# Patient Record
Sex: FEMALE | Race: WHITE | NOT HISPANIC OR LATINO | Employment: FULL TIME | URBAN - METROPOLITAN AREA
[De-identification: names, ages, dates, MRNs, and addresses within clinical notes are randomized per-mention and may not be internally consistent; named-entity substitution may affect disease eponyms.]

---

## 2020-09-04 LAB — HEP C HIM: NORMAL

## 2021-01-18 LAB — HEP C HIM: NORMAL

## 2021-09-14 ENCOUNTER — LAB REQUISITION (OUTPATIENT)
Dept: LAB | Facility: CLINIC | Age: 31
End: 2021-09-14

## 2021-09-14 LAB — HBV SURFACE AB SERPL IA-ACNC: 106.74 M[IU]/ML

## 2021-09-14 PROCEDURE — 86735 MUMPS ANTIBODY: CPT | Performed by: INTERNAL MEDICINE

## 2021-09-14 PROCEDURE — 86787 VARICELLA-ZOSTER ANTIBODY: CPT | Performed by: INTERNAL MEDICINE

## 2021-09-14 PROCEDURE — 86765 RUBEOLA ANTIBODY: CPT | Performed by: INTERNAL MEDICINE

## 2021-09-14 PROCEDURE — 86706 HEP B SURFACE ANTIBODY: CPT | Performed by: INTERNAL MEDICINE

## 2021-09-14 PROCEDURE — 86762 RUBELLA ANTIBODY: CPT | Performed by: INTERNAL MEDICINE

## 2021-09-15 LAB
MEV IGG SER IA-ACNC: 136 AU/ML
MEV IGG SER IA-ACNC: POSITIVE
MUMPS ANTIBODY IGG INSTRUMENT VALUE: 76.5 AU/ML
MUV IGG SER QL IA: POSITIVE
RUBV IGG SERPL QL IA: 7.37 INDEX
RUBV IGG SERPL QL IA: POSITIVE
VZV IGG SER QL IA: 2203 INDEX
VZV IGG SER QL IA: POSITIVE

## 2021-11-22 ENCOUNTER — TRANSFERRED RECORDS (OUTPATIENT)
Dept: HEALTH INFORMATION MANAGEMENT | Facility: CLINIC | Age: 31
End: 2021-11-22

## 2022-01-17 ENCOUNTER — TRANSFERRED RECORDS (OUTPATIENT)
Dept: HEALTH INFORMATION MANAGEMENT | Facility: CLINIC | Age: 32
End: 2022-01-17

## 2022-01-17 LAB — HEP C HIM: NORMAL

## 2022-03-24 ENCOUNTER — TRANSFERRED RECORDS (OUTPATIENT)
Dept: HEALTH INFORMATION MANAGEMENT | Facility: CLINIC | Age: 32
End: 2022-03-24

## 2022-03-25 ENCOUNTER — TRANSFERRED RECORDS (OUTPATIENT)
Dept: HEALTH INFORMATION MANAGEMENT | Facility: CLINIC | Age: 32
End: 2022-03-25

## 2022-05-23 ENCOUNTER — TRANSFERRED RECORDS (OUTPATIENT)
Dept: HEALTH INFORMATION MANAGEMENT | Facility: CLINIC | Age: 32
End: 2022-05-23

## 2022-06-22 ENCOUNTER — TRANSFERRED RECORDS (OUTPATIENT)
Dept: HEALTH INFORMATION MANAGEMENT | Facility: CLINIC | Age: 32
End: 2022-06-22

## 2022-07-01 ENCOUNTER — TRANSFERRED RECORDS (OUTPATIENT)
Dept: HEALTH INFORMATION MANAGEMENT | Facility: CLINIC | Age: 32
End: 2022-07-01

## 2022-07-22 ENCOUNTER — REFERRAL (OUTPATIENT)
Dept: TRANSPLANT | Facility: CLINIC | Age: 32
End: 2022-07-22

## 2022-07-22 DIAGNOSIS — N18.6 ESRD (END STAGE RENAL DISEASE) (H): Primary | ICD-10-CM

## 2022-07-22 DIAGNOSIS — N18.6 END STAGE RENAL DISEASE (H): ICD-10-CM

## 2022-07-22 DIAGNOSIS — Z01.818 PRE-TRANSPLANT EVALUATION FOR KIDNEY TRANSPLANT: ICD-10-CM

## 2022-07-22 DIAGNOSIS — N12 INTERSTITIAL NEPHRITIS: ICD-10-CM

## 2022-07-22 NOTE — LETTER
Ira Zamora  [address] [facility] [address]  [place] [facility] [address]                September 27, 2022    Adrien Ira,     It was a pleasure to speak with you over the phone again today.  I am sending an email to review the pre transplant information we covered and also sending this same content in this letter via your My Chart for your convenience.     A  from our Office has already sent you a schedule for your pre kidney transplant evaluation day on September 29, 2022. Please do bring 1 family member or friend to this appointment day to help listen to the patient education and to help ask questions that are important to you. You can eat/ drink normally on this day. Also, do take all of your prescribed medications as ordered on this day. Upon completion of your appointments I will compile the outcomes and have your results reviewed at the Transplant Team Selection Committee on Wednesday, October 5, 2022  This is a medical review meeting only and so you will not be asked to attend. I will call you within a few days after this meeting to inform you of the outcomes and to assist in making arrangements for completion of your evaluation. I will also send you a summary letter after our telephone conversation.     You will receive an email from Flor in our Transplant Office today. This email contains a Receipt of Information Consent and patient education materials. Please follow the directions and electronically sign the consent as well as try to read as much of the materials as possible prior to your September 29, 2022 appointments.      Please complete your pre kidney transplant education on [facility] Transplant Place. This is an online website that our Transplant Program uses to house a lot of patient education for our Program.  To find My Transplant Place you can google search for it or click on this link : [email or web address].  Your first step is to register as a new user, then pick your settings:  adult/kidney/English. Next notice the education is divided into 3 sections: pre transplant/transplant/post transplant. In the pre transplant section please view pre kidney eval parts 1 and 2 video sets.  Please complete these videos prior to your appointments as this will give you good background to then speak with the providers.     Additional transplant resources are as follows:   www.UNOS.org  UNOS, or United Network of Organ Sharing, is the national organization in our Country that maintains all of the organ wait lists as well as is responsible for the rules and regulations about organ allocation. I would recommend looking at the Transplant Living section as this area is created just for patients.   www.SRTR.org  SRTR, or the Scientific Registry for Transplant Recipients is a national data base that all Transplant Centers report their success and failure rates to for all organ transplant types performed at their Center. The results are public knowledge and do provide a good perspective of organ transplant.     Once you have attended your kidney transplant evaluation appointments, you can have live donors register online with our Program to initiate their evaluations at [email or web address]. The donor will receive a detailed email response back with information and next steps specific to their situation. Donors can also call our Office and ask to speak with a live donor coordinator in the event of questions at [phone].     Please let me know of any questions or concerns!   Liberty Ruiz, RN, BSN  Pre Kidney Pancreas Transplant Coordinator   [facility]  Solid Organ Transplant Care   [address] [place] [address] [address]  [address] [address]  [email or web address]   Office: [phone] Direct Number: [phone]   Fax: [phone]  Employed by [facility]     CC's: Dr. Man Feldman, Dr. London Leroy, [facility]

## 2022-07-22 NOTE — LETTER
Ira Zamora  [address] [facility] [address]  [place] [address]          Dear Ira,    Thank you for your interest in the Transplant Center at [facility]. We look forward to being a part of your care team and assisting you through the transplant process.    As we discussed, your transplant coordinator is Liberty Ruiz (Kidney).  You may call your coordinator at any time with questions or concerns.  Your first scheduled call will be on 8/9/2022 between 8am and 12pm.  If this needs to change, call [phone].    Please complete the following.    1. Fill out and return the enclosed forms    Authorization for Electronic Communication    Authorization to Discuss Protected Health Information    Authorization for Release of Protected Health Information    2. Sign up for:    [facility], access to your electronic medical record (see enclosed pamphlet)    [email or web address], a transplant education website    You can use these tools to learn more about your transplant, communicate with your care team, and track your medical details      Sincerely,      Solid Organ Transplant  [facility]    cc: Referring Physician PCP

## 2022-07-26 ENCOUNTER — REFERRAL (OUTPATIENT)
Dept: TRANSPLANT | Facility: CLINIC | Age: 32
End: 2022-07-26

## 2022-07-26 VITALS — HEIGHT: 62 IN | BODY MASS INDEX: 27.6 KG/M2 | WEIGHT: 150 LBS

## 2022-07-26 NOTE — TELEPHONE ENCOUNTER
PCP: Dr London Leroy   Referring Provider: Self,  Nephrologist: Dr Man Feldman at [facility]  Referring Diagnosis: ESRD, Interstitial Nephritis    GFR/Date:     Is patient under the age of 65? No  Is patient diabetic? No  Is patient on insulin? No  Was patient offered a pancreas transplant referral? No    Is patient in a group home/assisted living? No  Does patient have a guardian? No    Referral intake process completed.  Patient is aware that after financial approval is received, medical records will be requested.   Patient confirmed for a callback from transplant coordinator on 8/9/2022. (within 2 weeks)  Tentative evaluation date 8/15/2022 (within 4 weeks) if appointment is virtual, does patient have capabilities of setting this up? Patient is requesting an in office visit.    Confirmed coordinator will discuss evaluation process in more detail at the time of their call.   Patient is aware of the need to arrange age appropriate cancer screening, vaccinations, and dental care.  Reminded patient to complete questionnaire, complete medical records release, and review packet prior to evaluation visit .  Assessed patient for special needs (ie-wheelchair, assistance, guardian, and ):  Yes   Patient instructed to call [phone] with questions.     Patient gave verbal consent during intake call to obtain medical records and documents outside of [facility]:  Yes

## 2022-07-27 ENCOUNTER — TRANSFERRED RECORDS (OUTPATIENT)
Dept: HEALTH INFORMATION MANAGEMENT | Facility: CLINIC | Age: 32
End: 2022-07-27

## 2022-07-27 LAB
ALT SERPL-CCNC: 10 IU/L (ref 0–32)
AST SERPL-CCNC: 15 IU/L (ref 0–40)

## 2022-08-08 ENCOUNTER — TRANSFERRED RECORDS (OUTPATIENT)
Dept: HEALTH INFORMATION MANAGEMENT | Facility: CLINIC | Age: 32
End: 2022-08-08

## 2022-08-09 NOTE — TELEPHONE ENCOUNTER
Reviewed chart for purpose of pre kidney transplant evaluation planning. Patient has ESRD on dialysis since 09/04/2020 per 2728 form. 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 Has followed with Dr. Man Feldman since 2010. Listed at [facility] before starting on dialysis - currently on INACTIVE status due to insurance out of network with plan to transfer time to new listing. Was listed at [facility] 12/04/2020 and removed 08/04/2022 due to insurance out of network. Diagnosed with Chron's diagnosed in 2004 - takes Michael, follows with [place] - requested clinic notes, upper endoscopy 08/08/2022 at [facility] in [place], colonoscopy at [facility] 03/24/2022 in [place]. Seen by cardiology 01/21/2020 for palpitations and remote mitral regurgitation by echo 2010. Echo done 02/04/2020 normal left ventricle size and function - EF 62%, mitral regurgitation was resolved. PAP 08/22/2022 negative, ASCUS with positive high risk HPV cervical 07/24/2017. No history of smoking, occasional alcohol, no recreational drug use. BMI about 27. 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 All okay to proceed with kidney transplant evaluation.       Contacted patient and introduced myself as their Transplant Coordinator, also introduced the role of the Transplant Coordinator in the transplant process.  Explained the purpose of this call including reviewing next steps and answering questions.  Pt explained she is very interested in listing at our Center and is intending to transfer wait time here. Explained her insur is out of network for [facility] and [facility]. Pt explained she thinks her interstitial nephritis is from taking omeprazole. Patient explained she was diagnosed with Chron's in 2004, has been in remission for some time now.   Confirmed Referring Provider, Dialysis Center, and Primary Care Physician. Notified patient of the importance of continued communication with referring providers  and primary care physicians.    Reviewed components of transplant evaluation process including necessary appointments, tests, and procedures.    Answered questions for patient regarding evaluation, provided my name and contact information and requested they call with any additional questions.    Determined that patient would like additional information regarding transplant by:     Drop Down choices: Mail, Email, Rocco, Phone Call   Encourage Rocco   Explained a  will send her a schedule for her PKE on 08/15/2022 via My Chart. Instructed to bring someone along to eval. Instructed to eat/drink normally. Instructed to take medications as prescribed. Explained she will receive an email from Flor in our Office with Receipt of Info and patient education materials - instructed to sign consent and read materials. Instructed on My Transplant Place website and to view pre kidney eval parts 1 and 2. Instructed on use of www.unos.org and www.srtr.org.  Patient expressed excellent understanding of all and was in good agreement with the plan.     Pre kidney transplant patient education email/ letter sent.

## 2022-08-10 ENCOUNTER — TRANSFERRED RECORDS (OUTPATIENT)
Dept: HEALTH INFORMATION MANAGEMENT | Facility: CLINIC | Age: 32
End: 2022-08-10

## 2022-08-10 NOTE — TELEPHONE ENCOUNTER
Patient called asking about start time for PKE on Mon, Aug 15, and she doesn't think that she would be here by 730am as she gets off dialysis at 730am.  She wants to reschedule her eval to a non dialysis date and confirmed for Thurs, Sept 29.

## 2022-08-15 ENCOUNTER — TRANSFERRED RECORDS (OUTPATIENT)
Dept: HEALTH INFORMATION MANAGEMENT | Facility: CLINIC | Age: 32
End: 2022-08-15

## 2022-09-27 NOTE — TELEPHONE ENCOUNTER
Called patient yesterday and LM asking her to call me and to confirm if she will be attending her pre K eval on 09/29/2022 or not.     Called patient again and spoke pt, she confirmed she will attend appts for pre kidney tx eval on Thurs Sept 29th. Explained a  will send her a schedule in her My Chart, Flor will send an email with Receipt of Info and other educational materials - instructed to sign consent/ read materials. Re-instructed pt today on use of My Transplant Place and to view pre kidney eval parts 1 and 2. Patient stating she has already visited www.unos.org and www.srtr.org websites and has found them to be very interesting. Pt aware to eat/ drink normally and take all medications as prescribed. Pt will attend PKE appts with her .   Pt expressed very understanding of all and was in good agreement with the plan.

## 2022-09-28 PROBLEM — N18.6 ESRD (END STAGE RENAL DISEASE) ON DIALYSIS (H): Status: ACTIVE | Noted: 2022-09-28

## 2022-09-28 PROBLEM — Z99.2 ESRD (END STAGE RENAL DISEASE) ON DIALYSIS (H): Status: ACTIVE | Noted: 2022-09-28

## 2022-09-28 PROBLEM — K50.90 CROHN'S DISEASE (H): Status: ACTIVE | Noted: 2022-09-28

## 2022-09-28 LAB
ABO/RH(D): NORMAL
ANTIBODY SCREEN: NEGATIVE
SPECIMEN EXPIRATION DATE: NORMAL

## 2022-09-29 ENCOUNTER — LAB (OUTPATIENT)
Dept: LAB | Facility: CLINIC | Age: 32
End: 2022-09-29
Payer: COMMERCIAL

## 2022-09-29 ENCOUNTER — OFFICE VISIT (OUTPATIENT)
Dept: TRANSPLANT | Facility: CLINIC | Age: 32
End: 2022-09-29
Attending: NURSE PRACTITIONER
Payer: COMMERCIAL

## 2022-09-29 ENCOUNTER — DOCUMENTATION ONLY (OUTPATIENT)
Dept: TRANSPLANT | Facility: CLINIC | Age: 32
End: 2022-09-29

## 2022-09-29 VITALS
HEART RATE: 72 BPM | SYSTOLIC BLOOD PRESSURE: 134 MMHG | HEIGHT: 63 IN | WEIGHT: 146.9 LBS | OXYGEN SATURATION: 95 % | BODY MASS INDEX: 26.03 KG/M2 | DIASTOLIC BLOOD PRESSURE: 89 MMHG

## 2022-09-29 DIAGNOSIS — Z01.818 PRE-TRANSPLANT EVALUATION FOR KIDNEY TRANSPLANT: ICD-10-CM

## 2022-09-29 DIAGNOSIS — N12 INTERSTITIAL NEPHRITIS: ICD-10-CM

## 2022-09-29 DIAGNOSIS — N18.6 ESRD (END STAGE RENAL DISEASE) (H): ICD-10-CM

## 2022-09-29 DIAGNOSIS — N18.6 END STAGE RENAL DISEASE (H): ICD-10-CM

## 2022-09-29 DIAGNOSIS — N05.8 IGM NEPHROPATHY: Primary | ICD-10-CM

## 2022-09-29 DIAGNOSIS — Z01.818 PRE-TRANSPLANT EVALUATION FOR KIDNEY TRANSPLANT: Primary | ICD-10-CM

## 2022-09-29 LAB
A1 AB TITR SERPL: >256 {TITER}
ABO/RH(D): NORMAL
ALBUMIN SERPL BCG-MCNC: 4.4 G/DL (ref 3.5–5.2)
ALBUMIN UR-MCNC: 100 MG/DL
ALP SERPL-CCNC: 75 U/L (ref 35–104)
ALT SERPL W P-5'-P-CCNC: 12 U/L (ref 10–35)
ANION GAP SERPL CALCULATED.3IONS-SCNC: 13 MMOL/L (ref 7–15)
ANTIBODY TITER IGM SCREEN: NEGATIVE
APPEARANCE UR: CLEAR
APTT PPP: 29 SECONDS (ref 22–38)
AST SERPL W P-5'-P-CCNC: 17 U/L (ref 10–35)
B IGG TITR SERPL: >256 {TITER}
BASOPHILS # BLD AUTO: 0 10E3/UL (ref 0–0.2)
BASOPHILS NFR BLD AUTO: 0 %
BILIRUB SERPL-MCNC: 0.4 MG/DL
BILIRUB UR QL STRIP: NEGATIVE
BUN SERPL-MCNC: 35.9 MG/DL (ref 6–20)
CALCIUM SERPL-MCNC: 10.1 MG/DL (ref 8.6–10)
CHLORIDE SERPL-SCNC: 97 MMOL/L (ref 98–107)
COLOR UR AUTO: YELLOW
CREAT SERPL-MCNC: 4.92 MG/DL (ref 0.51–0.95)
DEPRECATED HCO3 PLAS-SCNC: 28 MMOL/L (ref 22–29)
EOSINOPHIL # BLD AUTO: 0.2 10E3/UL (ref 0–0.7)
EOSINOPHIL NFR BLD AUTO: 3 %
ERYTHROCYTE [DISTWIDTH] IN BLOOD BY AUTOMATED COUNT: 12.6 % (ref 10–15)
FACTOR 2 INTERPRETATION: ABNORMAL
FACTOR V INTERPRETATION: ABNORMAL
GFR SERPL CREATININE-BSD FRML MDRD: 11 ML/MIN/1.73M2
GLUCOSE SERPL-MCNC: 79 MG/DL (ref 70–99)
GLUCOSE UR STRIP-MCNC: 100 MG/DL
HBV CORE AB SERPL QL IA: NONREACTIVE
HBV SURFACE AB SERPL IA-ACNC: 106.74 M[IU]/ML
HBV SURFACE AB SERPL IA-ACNC: REACTIVE M[IU]/ML
HBV SURFACE AG SERPL QL IA: NONREACTIVE
HCG SERPL QL: NEGATIVE
HCT VFR BLD AUTO: 35.6 % (ref 35–47)
HCV AB SERPL QL IA: NONREACTIVE
HGB BLD-MCNC: 11.6 G/DL (ref 11.7–15.7)
HGB UR QL STRIP: ABNORMAL
IMM GRANULOCYTES # BLD: 0 10E3/UL
IMM GRANULOCYTES NFR BLD: 0 %
INR PPP: 1.01 (ref 0.85–1.15)
KETONES UR STRIP-MCNC: NEGATIVE MG/DL
LAB DIRECTOR COMMENTS: ABNORMAL
LAB DIRECTOR DISCLAIMER: ABNORMAL
LAB DIRECTOR INTERPRETATION: ABNORMAL
LAB DIRECTOR METHODOLOGY: ABNORMAL
LAB DIRECTOR RESULTS: ABNORMAL
LEUKOCYTE ESTERASE UR QL STRIP: NEGATIVE
LYMPHOCYTES # BLD AUTO: 2 10E3/UL (ref 0.8–5.3)
LYMPHOCYTES NFR BLD AUTO: 28 %
MCH RBC QN AUTO: 30.2 PG (ref 26.5–33)
MCHC RBC AUTO-ENTMCNC: 32.6 G/DL (ref 31.5–36.5)
MCV RBC AUTO: 93 FL (ref 78–100)
MONOCYTES # BLD AUTO: 0.4 10E3/UL (ref 0–1.3)
MONOCYTES NFR BLD AUTO: 5 %
NEUTROPHILS # BLD AUTO: 4.5 10E3/UL (ref 1.6–8.3)
NEUTROPHILS NFR BLD AUTO: 64 %
NITRATE UR QL: NEGATIVE
NRBC # BLD AUTO: 0 10E3/UL
NRBC BLD AUTO-RTO: 0 /100
PH UR STRIP: 8.5 [PH] (ref 5–7)
PLATELET # BLD AUTO: 170 10E3/UL (ref 150–450)
POTASSIUM SERPL-SCNC: 4 MMOL/L (ref 3.4–5.3)
PROT SERPL-MCNC: 7.6 G/DL (ref 6.4–8.3)
RBC # BLD AUTO: 3.84 10E6/UL (ref 3.8–5.2)
RBC URINE: <1 /HPF
SODIUM SERPL-SCNC: 138 MMOL/L (ref 136–145)
SP GR UR STRIP: 1.01 (ref 1–1.03)
SPECIMEN DESCRIPTION: ABNORMAL
SPECIMEN EXPIRATION DATE: NORMAL
SPECIMEN EXPIRATION DATE: NORMAL
SQUAMOUS EPITHELIAL: 1 /HPF
T PALLIDUM AB SER QL: NONREACTIVE
UROBILINOGEN UR STRIP-MCNC: NORMAL MG/DL
WBC # BLD AUTO: 7.1 10E3/UL (ref 4–11)
WBC URINE: 1 /HPF

## 2022-09-29 PROCEDURE — 80053 COMPREHEN METABOLIC PANEL: CPT | Performed by: PATHOLOGY

## 2022-09-29 PROCEDURE — 86803 HEPATITIS C AB TEST: CPT | Mod: 90 | Performed by: PATHOLOGY

## 2022-09-29 PROCEDURE — 99000 SPECIMEN HANDLING OFFICE-LAB: CPT | Performed by: PATHOLOGY

## 2022-09-29 PROCEDURE — 85730 THROMBOPLASTIN TIME PARTIAL: CPT | Performed by: PATHOLOGY

## 2022-09-29 PROCEDURE — 86147 CARDIOLIPIN ANTIBODY EA IG: CPT | Mod: 90 | Performed by: PATHOLOGY

## 2022-09-29 PROCEDURE — 99207 PR NO CHARGE COORDINATED CARE PS: CPT

## 2022-09-29 PROCEDURE — 86850 RBC ANTIBODY SCREEN: CPT | Mod: 90 | Performed by: PATHOLOGY

## 2022-09-29 PROCEDURE — 99205 OFFICE O/P NEW HI 60 MIN: CPT

## 2022-09-29 PROCEDURE — 81001 URINALYSIS AUTO W/SCOPE: CPT | Performed by: PATHOLOGY

## 2022-09-29 PROCEDURE — 99204 OFFICE O/P NEW MOD 45 MIN: CPT | Performed by: SURGERY

## 2022-09-29 PROCEDURE — 86665 EPSTEIN-BARR CAPSID VCA: CPT | Mod: 90 | Performed by: PATHOLOGY

## 2022-09-29 PROCEDURE — 86787 VARICELLA-ZOSTER ANTIBODY: CPT | Mod: 90 | Performed by: PATHOLOGY

## 2022-09-29 PROCEDURE — 86901 BLOOD TYPING SEROLOGIC RH(D): CPT | Mod: 90 | Performed by: PATHOLOGY

## 2022-09-29 PROCEDURE — 93000 ELECTROCARDIOGRAM COMPLETE: CPT | Performed by: INTERNAL MEDICINE

## 2022-09-29 PROCEDURE — 86481 TB AG RESPONSE T-CELL SUSP: CPT | Mod: 90 | Performed by: PATHOLOGY

## 2022-09-29 PROCEDURE — 85025 COMPLETE CBC W/AUTO DIFF WBC: CPT | Performed by: PATHOLOGY

## 2022-09-29 PROCEDURE — 86704 HEP B CORE ANTIBODY TOTAL: CPT | Mod: 90 | Performed by: PATHOLOGY

## 2022-09-29 PROCEDURE — 85670 THROMBIN TIME PLASMA: CPT | Mod: 90 | Performed by: PATHOLOGY

## 2022-09-29 PROCEDURE — 86886 COOMBS TEST INDIRECT TITER: CPT | Mod: 90 | Performed by: PATHOLOGY

## 2022-09-29 PROCEDURE — 36415 COLL VENOUS BLD VENIPUNCTURE: CPT | Performed by: PATHOLOGY

## 2022-09-29 PROCEDURE — 86832 HLA CLASS I HIGH DEFIN QUAL: CPT | Performed by: PATHOLOGY

## 2022-09-29 PROCEDURE — 86780 TREPONEMA PALLIDUM: CPT | Mod: 90 | Performed by: PATHOLOGY

## 2022-09-29 PROCEDURE — G0452 MOLECULAR PATHOLOGY INTERPR: HCPCS | Mod: 26 | Performed by: STUDENT IN AN ORGANIZED HEALTH CARE EDUCATION/TRAINING PROGRAM

## 2022-09-29 PROCEDURE — 86900 BLOOD TYPING SEROLOGIC ABO: CPT | Mod: 90 | Performed by: PATHOLOGY

## 2022-09-29 PROCEDURE — 81378 HLA I & II TYPING HR: CPT | Performed by: PATHOLOGY

## 2022-09-29 PROCEDURE — 86644 CMV ANTIBODY: CPT | Mod: 90 | Performed by: PATHOLOGY

## 2022-09-29 PROCEDURE — 85390 FIBRINOLYSINS SCREEN I&R: CPT | Performed by: PATHOLOGY

## 2022-09-29 PROCEDURE — 81240 F2 GENE: CPT | Performed by: PHYSICIAN ASSISTANT

## 2022-09-29 PROCEDURE — 85613 RUSSELL VIPER VENOM DILUTED: CPT | Mod: 90 | Performed by: PATHOLOGY

## 2022-09-29 PROCEDURE — 87340 HEPATITIS B SURFACE AG IA: CPT | Mod: 90 | Performed by: PATHOLOGY

## 2022-09-29 PROCEDURE — 86706 HEP B SURFACE ANTIBODY: CPT | Mod: 90 | Performed by: PATHOLOGY

## 2022-09-29 PROCEDURE — 81382 HLA II TYPING 1 LOC HR: CPT | Mod: 59 | Performed by: PATHOLOGY

## 2022-09-29 PROCEDURE — 85610 PROTHROMBIN TIME: CPT | Performed by: PATHOLOGY

## 2022-09-29 PROCEDURE — 84703 CHORIONIC GONADOTROPIN ASSAY: CPT | Performed by: PATHOLOGY

## 2022-09-29 PROCEDURE — 86833 HLA CLASS II HIGH DEFIN QUAL: CPT | Performed by: PATHOLOGY

## 2022-09-29 RX ORDER — ROPINIROLE 0.25 MG/1
TABLET, FILM COATED ORAL
Status: ON HOLD | COMMUNITY
Start: 2022-09-16 | End: 2024-04-17

## 2022-09-29 RX ORDER — ACETAMINOPHEN 500 MG
1000 TABLET ORAL EVERY 6 HOURS PRN
COMMUNITY
Start: 2020-12-07

## 2022-09-29 RX ORDER — ONDANSETRON 4 MG/1
TABLET, ORALLY DISINTEGRATING ORAL
Status: ON HOLD | COMMUNITY
Start: 2022-07-29 | End: 2024-04-21

## 2022-09-29 RX ORDER — FAMOTIDINE 40 MG/1
40 TABLET, FILM COATED ORAL
Status: ON HOLD | COMMUNITY
Start: 2022-08-08 | End: 2024-04-17

## 2022-09-29 RX ORDER — USTEKINUMAB 90 MG/ML
90 INJECTION, SOLUTION SUBCUTANEOUS
COMMUNITY
Start: 2021-09-28 | End: 2024-09-03

## 2022-09-29 RX ORDER — PROCHLORPERAZINE MALEATE 5 MG
5 TABLET ORAL
Status: ON HOLD | COMMUNITY
Start: 2020-11-16 | End: 2024-04-17

## 2022-09-29 RX ORDER — DOXAZOSIN 8 MG/1
1 TABLET ORAL AT BEDTIME
Status: ON HOLD | COMMUNITY
Start: 2021-12-10 | End: 2024-04-21

## 2022-09-29 RX ORDER — CALCIUM ACETATE 667 MG/1
CAPSULE ORAL
Status: ON HOLD | COMMUNITY
Start: 2021-01-22 | End: 2024-04-17

## 2022-09-29 RX ORDER — PAROXETINE 30 MG/1
30 TABLET, FILM COATED ORAL EVERY EVENING
COMMUNITY
Start: 2022-09-16

## 2022-09-29 NOTE — PROGRESS NOTES
Transplant Surgery Consult Note     Medical record number: [id number]  YOB: 1990,   Consult requested  for evaluation of kidney transplant candidacy.    Assessment and Recommendations:Ms. Zamora appears to be a good candidate for kidney transplantation and has a good understanding of the risks and benefits of this approach to the management of renal failure. The following issues should be addressed prior to finalizing her transplant candidacy:     Transplant order: Ms. Zamora has End stage renal failure due to interstitial nephritis whose condition is not expected to resolve, is expected to progress, and is expected to continue to develop related comorbid conditions.  Recommend he be considered as a candidate for kidney transplant.  Cardiology consult for cardiac risk stratification to be ordered: Yes. Will need comment on aortic root dilation  CT abdomen and pelvis without contrast to be ordered for assessment of vascular targets: No  Transplant listing labs ordered to include HLA, ABOx2, Cr, etc.  Dietician consult ordered: Yes  Social work consult ordered: Yes  Imaging reports reviewed:  None available  Radiology images reviewed: none available  Recipient suitable to move forward with work up of living donors:  Yes   Cardiology    The majority of our visit was spent in counselling, discussing the medical and surgical risks of kidney transplantation. We discussed approximate wait time and how that is influenced by issues such as blood type and sensitization (PRA) and access to a living donor. I contrasted potential waiting time for living vs  donor kidneys from  normal (0-85%) or higher (%) kidney donor profile index (KDPI) donors and their associated outcomes. I would not recommend this individual to consider kidneys from high KDPI donors. The reason for this decision is best summarized as: improved long term graft survival. Potential surgical complications of kidney transplantation  include bleeding, superficial or deep wound complications (infection, hernia, lymphocele), ureteral anastomotic failure (leak or stenosis), graft thrombosis, need for reoperation and other issues such as cardiac complications, pneumonia, deep venous thrombosis, pulmonary embolism, post transplant diabetes and death. The potential for recurrent disease or need for retransplantation was also addressed. We discussed the possible need for ureteral stent (and subsequent removal), and the utility of protocol biopsy and laboratory studies to evaluate for rejection or recurrent disease. We discussed the risk of graft rejection, our center's average graft and patient survival rates, immunosuppression protocols, as well as the potential opportunity to participate in clinical trials.  We also discussed the average length of stay, recovery process, and posttransplant lab and monitoring protocol.  I emphasized the need for strict immunosuppression medication adherence and the potential for complications of immunosuppression such as skin cancer or lymphoma, as well as a very low but not zero risk of donor-derived disease transmission risks (infection, cancer). Ms. aZmora asked good questions and her candidacy will be reviewed at our Multidisciplinary Selection Committee. Thank you for the opportunity to participate in Ms. Zamora's care.      Total time: 60 minutes        Glenis Vann MD FACS  Assistant Professor of Surgery  Director, Living Kidney Donor Program.    ---------------------------------------------------------------------------------------------------    HPI: Ms. Zamora has End stage renal failure due to interstitial nephritis. The patient is non-diabetic.     Crohn's in remission on Sterara without many episodes of diarrhea    The patient is on dialysis.    Has potential kidney donors:  Doesn't know .  Interested in participation in paired exchange if a donor is willing: Doesn't know     The patient has the  following pertinent history:       No    Yes  Dialysis:    []      [x] via:   R AVF    Blood Transfusion                  []      [x]  Number of units:   Most recently: 2010  Pregnancy:    [x]      [] Number:       Previous Transplant:  [x]      [] Details:    Cancer    [x]      [] Comment:   Kidney stones   [x]      [] Comment:      Recurrent infections  [x]      []  Type:                  Bladder dysfunction  [x]      [] Cause:    Claudication   [x]      [] Distance:    Previous Amputation  [x]      [] Cause:     Chronic anticoagulation  [x]      [] Indication:   Restorationism  [x]      []     PSHx:  fistula     Past Medical History:   Diagnosis Date     Crohn's disease (H)     diagnosed in 2004     ESRD (end stage renal disease) on dialysis (H)      History of blood transfusion      Hypertension      Tubulointerstitial nephropathy 11/09/2011    kidney biopsy 11/09/2011 - Acute and  chronic tubulointerstitial nephropathy.     No past surgical history on file.  No family history on file.  Social History     Socioeconomic History     Marital status:      Spouse name: Not on file     Number of children: Not on file     Years of education: Not on file     Highest education level: Not on file   Occupational History     Not on file   Tobacco Use     Smoking status: Never Smoker     Smokeless tobacco: Never Used   Substance and Sexual Activity     Alcohol use: Yes     Comment: Very occasional, last drink two months ago, wine cooler     Drug use: Never     Sexual activity: Not on file   Other Topics Concern     Not on file   Social History Narrative     Not on file     Social Determinants of Health     Financial Resource Strain: Not on file   Food Insecurity: Not on file   Transportation Needs: Not on file   Physical Activity: Not on file   Stress: Not on file   Social Connections: Not on file   Intimate Partner Violence: Not on file   Housing Stability: Not on file       ROS:   CONSTITUTIONAL:  No fevers or  chills  EYES: negative for icterus  ENT:  negative for hearing loss, tinnitus and sore throat  RESPIRATORY:  negative for cough, sputum, dyspnea  CARDIOVASCULAR:  negative for chest pain Fatigue  GASTROINTESTINAL:  negative for nausea, vomiting, diarrhea or constipation  GENITOURINARY:  negative for incontinence, dysuria, bladder emptying problems  HEME:  No easy bruising  INTEGUMENT:  negative for rash and pruritus  NEURO:  Negative for headache, seizure disorder  Allergies:   No Known Allergies  Medications:  Prescription Medications as of 2022       Rx Number Disp Refills Start End Last Dispensed Date Next Fill Date Owning Pharmacy    acetaminophen (TYLENOL) 500 MG tablet    2020        Sig: Take 1,000 mg by mouth    Class: Historical    Route: Oral    calcium acetate (PHOSLO) 667 MG CAPS capsule    2021        Class: Historical    Route: Oral    cholecalciferol 50 MCG ( UT) tablet            Sig: Take 50 mcg by mouth daily    Class: Historical    Route: Oral    doxazosin (CARDURA) 8 MG tablet    12/10/2021        Sig: Take 1 tablet by mouth 2 times daily    Class: Historical    Route: Oral    famotidine (PEPCID) 40 MG tablet    2022        Sig: Take 40 mg by mouth    Class: Historical    Route: Oral    iron sucrose (VENOFER) 20 MG/ML injection    10/8/2021 2023       Si mg    Class: Historical    labetalol (NORMODYNE) 100 MG tablet    3/22/2021        Sig: Take 1 tablet by mouth twice a day at 8am and 8pm    Class: Historical    levonorgestrel (KYLEENA) 19.5 MG IUD    2021        Si Device by Intrauterine route    Class: Historical    Route: Intrauterine    omeprazole (PRILOSEC) 20 MG DR capsule            Sig: Take 20 mg by mouth daily    Class: Historical    Route: Oral    ondansetron (ZOFRAN ODT) 4 MG ODT tab    2022        Class: Historical    Route: Sublingual    PARoxetine (PAXIL) 30 MG tablet    2022        Sig: Take 30 mg by mouth    Class: Historical     Route: Oral    prochlorperazine (COMPAZINE) 5 MG tablet    11/16/2020        Sig: Take 5 mg by mouth    Class: Historical    Route: Oral    rOPINIRole (REQUIP) 0.25 MG tablet    9/16/2022        Sig: TAKE ONE TABLET BY MOUTH AT BEDTIME FOR RESTLESS LEG    Class: Historical    ustekinumab (STELARA) 90 MG/ML    9/28/2021        Sig: Inject 90 mg Subcutaneous    Class: Historical    Route: Subcutaneous        Exam:     There were no vitals taken for this visit.  Appearance: in no apparent distress.   Skin: normal  Eyes:  no redness or discharge.  Sclera anicteric  Head and Neck: Normal, no rashes or jaundice  Respiratory: easy respirations, no audible wheezing.  Abdomen: rounded, No distention   Extremeties: femoral 3+/3+, Edema, none  Neuro: without deficit   Psychiatric: Normal mood and affect    Diagnostics:   No results found for this or any previous visit (from the past 672 hour(s)).  No results found for: CPRA

## 2022-09-29 NOTE — LETTER
9/29/2022         RE: Ira Zamora  [address]  [address]  [facility] [address]        Dear Colleague,    Thank you for referring your patient, Ira Zamora, to the [facility]. Please see a copy of my visit note below.    TRANSPLANT NEPHROLOGY RECIPIENT EVALUATION NOTE    Assessment and Plan:  # Kidney Transplant Evaluation: Patient is a good candidate overall. Benefits of a living donor transplant were discussed.    # ESKD: doing OK on dialysis since September 2020, but would likely benefit from a kidney transplant. She has been listed at [facility], but recently had insurance changes, and so is here to transfer her waiting time. Will review results of both native kidney biopsies with the multidisciplinary committee.     # Cardiac Risk: Feb 2020 ECHO showing EF 62%, dilated ascending aorta 3.8 cm and dilated aortic sinus 3.6 cm. Due for repeat ECHO. No other known history of cardiac disease or events. She is asymptomatic at a low level of exertion.     # Crohn's: followed by [facility]. Does occasionally alternate between diarrhea and constipation, but overall feels symptoms are well controlled on ustekinumab.     # Health Maintenance: PAP: Up to date and Dental: Up to date    Discussed the risks and benefits of a transplant, including the risk of surgery and immunosuppression medications.  Patient's overall evaluation will be discussed in the Transplant Program's regular meeting with a final recommendation on the patients suitability for transplant to be made at that time.    Pending completion of the full evaluation, patient presently appears to be enough of an acceptable kidney transplant recipient candidate to have any potential kidney donors start the evaluation process.      Evaluation:  Ira Zamora was seen in consultation at the request of Dr. Glenis Vann for evaluation as a potential kidney transplant recipient.    Reason for Visit:  Ira Zamora is a 32 year old female with ESKD who  presents for kidney transplant evaluation.    History of Present Illness:  Ms. Zamora is a 32-year-old female with Crohn's since age 14 currently on ustekinumab, ESKD on dialysis since September 2020.         Kidney Disease Hx:   Native kidney biopsy December 2010 showing:  FINAL DIAGNOSIS   KIDNEY, SIDE NOT SPECIFIED, NEEDLE BIOPSIES (VIALS A, B, AND C):   - Acute tubular interstitial nephritis associated with few eosinophils most   consistent with hypersensitivity reaction.   - Mild vascular sclerosis associated with mild interstitial fibrosis.    She was treated with prednisone (which was also being used for Crohn's treatment) and discontinuation of PPI.     Second native kidney biopsy in October 2011 showing:  DIAGNOSIS (EVALUATED AT [facility])   KIDNEY, LEFT, NEEDLE BIOPSY (VIALS A, B AND C):   - Diffuse chronic (active) tubulointerstitial nephritis   - Focal sclerosing immune complex mediated glomerulopathy (see report and     see comment) consistent with focal sclerosing IgM nephropathy   - Marked interstitial fibrosis, global glomerular sclerosis (50%), mild     arteriosclerosis (vial B), and mild arteriolosclerosis      Has been on HD since September 2020, which is going OK. Still has [employer]ERSONF working OK. Listed at [facility] pre-HD, but inactive due to insurance changes. Now here to transfer waiting time.       Primary Nephrologist: Dr. Feldman       H/o Kidney Stones: No       H/o Recurrent/Frequent UTI: No         Diabetic Hx: None           Cardiac/Vascular Disease Risk Factors:        - Dilated ascending aorta (3.8 cm) and aortic sinus (3.6 cm) on 2020 ECHO. No other known history of cardiac disease or events.         Viral Serology Status       CMV IgG Antibody: Negative       EBV IgG Antibody: Positive         Volume Status/Weight:        Volume status: Euvolemic       Weight:  Acceptable BMI       BMI: Body mass index is 26.44 kg/m .         Functional Capacity/Frailty:        No exercise, but  no physical limitations. Can walk several blocks and go up and down stairs. No chest pain, SOB, or claudication.    Fatigue/Decreased Energy: [x] No [] Yes    Chest Pain or SOB with Exertion: [x] No [] Yes    Significant Weight Change: [x] No [] Yes    Nausea, Vomiting or Diarrhea: [x] No [] Yes    Fever, Sweats or Chills:  [x] No [] Yes    Leg Swelling [x] No [] Yes      Allergy Testing Questions:   Medication that caused a reaction None   Antibiotics used that didn't give an allergic reaction?  None    COVID Vaccination Up To Date: Yes    Potential Living Kidney Donors: No    Review of Systems:  A comprehensive review of systems was obtained and negative, except as noted in the HPI or PMH.    Past Medical History:   Medical record was reviewed and PMH was discussed with patient and noted below.  Past Medical History:   Diagnosis Date     Anemia in chronic kidney disease      Crohn's disease (H)     diagnosed in 2004     ESRD (end stage renal disease) on dialysis (H)      History of blood transfusion      Hypertension      Secondary renal hyperparathyroidism (H)      Tubulointerstitial nephropathy 11/09/2011    kidney biopsy 11/09/2011 - Acute and  chronic tubulointerstitial nephropathy.       Past Social History:   Past Surgical History:   Procedure Laterality Date     H NEEDLE GUIDE,  KIDNEY BIOPSY       Personal history of bleeding or anesthesia problems: No    Family History:  Family History   Problem Relation Age of Onset     Endometriosis Mother      Coronary Artery Disease Mother        Personal History:   Social History     Socioeconomic History     Marital status:      Spouse name: Not on file     Number of children: Not on file     Years of education: Not on file     Highest education level: Not on file   Occupational History     Not on file   Tobacco Use     Smoking status: Never Smoker     Smokeless tobacco: Never Used   Substance and Sexual Activity     Alcohol use: Yes     Comment: Very  "occasional, last drink two months ago, wine cooler     Drug use: Never     Sexual activity: Not on file   Other Topics Concern     Not on file   Social History Narrative     Not on file     Social Determinants of Health     Financial Resource Strain: Not on file   Food Insecurity: Not on file   Transportation Needs: Not on file   Physical Activity: Not on file   Stress: Not on file   Social Connections: Not on file   Intimate Partner Violence: Not on file   Housing Stability: Not on file       Allergies:  No Known Allergies    Medications:  Current Outpatient Medications   Medication Sig     acetaminophen (TYLENOL) 500 MG tablet Take 1,000 mg by mouth     calcium acetate (PHOSLO) 667 MG CAPS capsule      cholecalciferol 50 MCG (2000 UT) tablet Take 50 mcg by mouth daily     doxazosin (CARDURA) 8 MG tablet Take 1 tablet by mouth 2 times daily     famotidine (PEPCID) 40 MG tablet Take 40 mg by mouth     iron sucrose (VENOFER) 20 MG/ML injection 50 mg     labetalol (NORMODYNE) 100 MG tablet Take 1 tablet by mouth twice a day at 8am and 8pm     levonorgestrel (KYLEENA) 19.5 MG IUD 1 Device by Intrauterine route     omeprazole (PRILOSEC) 20 MG DR capsule Take 20 mg by mouth daily     ondansetron (ZOFRAN ODT) 4 MG ODT tab      PARoxetine (PAXIL) 30 MG tablet Take 30 mg by mouth     prochlorperazine (COMPAZINE) 5 MG tablet Take 5 mg by mouth     rOPINIRole (REQUIP) 0.25 MG tablet TAKE ONE TABLET BY MOUTH AT BEDTIME FOR RESTLESS LEG     ustekinumab (STELARA) 90 MG/ML Inject 90 mg Subcutaneous     No current facility-administered medications for this visit.       Vitals:  /89   Pulse 72   Ht 1.588 m (5' 2.5\")   Wt 66.6 kg (146 lb 14.4 oz)   SpO2 95%   BMI 26.44 kg/m      Exam:  GENERAL APPEARANCE: alert and no distress  HENT: mouth without ulcers or lesions  LYMPHATICS: no cervical or supraclavicular nodes  RESP: lungs clear to auscultation - no rales, rhonchi or wheezes  CV: regular rhythm, normal rate, no rub, " no murmur  EDEMA: no LE edema bilaterally  ABDOMEN: soft, nondistended, nontender, bowel sounds normal  MS: extremities normal - no gross deformities noted, no evidence of inflammation in joints, no muscle tenderness  SKIN: no rash    Results:   Recent Results (from the past 336 hour(s))   EKG 12-lead, tracing only [EKG1]    Collection Time: 09/29/22  9:47 AM   Result Value Ref Range    Systolic Blood Pressure  mmHg    Diastolic Blood Pressure  mmHg    Ventricular Rate 63 BPM    Atrial Rate 63 BPM    WV Interval 156 ms    QRS Duration 80 ms     ms    QTc 454 ms    P Axis 22 degrees    R AXIS -6 degrees    T Axis 27 degrees    Interpretation ECG       Sinus rhythm  Left ventricular hypertrophy by voltage  Otherwise normal ECG  No previous ECGs available  Confirmed by fellow Boy West ([id number]) on 9/29/2022 1:56:38 PM  Confirmed by MD QUICK DAVID ([id number]) on 9/30/2022 10:04:22 AM     HCG qualitative [CNO427]    Collection Time: 09/29/22 11:40 AM   Result Value Ref Range    hCG Serum Qualitative Negative Negative   Varicella Zoster Virus Antibody IgG [[id number]]    Collection Time: 09/29/22 11:40 AM   Result Value Ref Range    VZV Marielle IgG Instrument Value 1,687.0 <135.0 Index    Varicella Zoster Antibody IgG Positive    Treponema Abs w Reflex to RPR and Titer [[id number]]    Collection Time: 09/29/22 11:40 AM   Result Value Ref Range    Treponema Antibody Total Nonreactive Nonreactive   HIV Antigen Antibody Combo Pretransplant    Collection Time: 09/29/22 11:40 AM   Result Value Ref Range    HIV Antigen Antibody Combo Pretransplant Nonreactive Nonreactive   Hepatitis C antibody [[id number]]    Collection Time: 09/29/22 11:40 AM   Result Value Ref Range    Hepatitis C Antibody Nonreactive Nonreactive   Hepatitis B surface antigen [[id number]]    Collection Time: 09/29/22 11:40 AM   Result Value Ref Range    Hepatitis B Surface Antigen Nonreactive Nonreactive   Hepatitis B Surface Antibody [[id number]]    Collection Time: 09/29/22  11:40 AM   Result Value Ref Range    Hepatitis B Surface Antibody Instrument Value 106.74 <8.00 m[IU]/mL    Hepatitis B Surface Antibody Reactive    Hepatitis B core antibody [[id number]]    Collection Time: 09/29/22 11:40 AM   Result Value Ref Range    Hepatitis B Core Antibody Total Nonreactive Nonreactive   EBV Capsid Antibody IgG [[id number]]    Collection Time: 09/29/22 11:40 AM   Result Value Ref Range    EBV Capsid Marielle IgG Instrument Value >750.0 (H) <18.0 U/mL    EBV Capsid Antibody IgG Positive (A) No detectable antibody.   CMV Antibody IgG [[id number]]    Collection Time: 09/29/22 11:40 AM   Result Value Ref Range    CMV Marielle IgG Instrument Value <0.20 <0.60 U/mL    CMV Antibody IgG No detectable antibody. No detectable antibody.    Thrombin time [DTQ798]    Collection Time: 09/29/22 11:40 AM   Result Value Ref Range    Thrombin Time 17.1 13.0 - 19.0 Seconds   Partial thromboplastin time [LAB56]    Collection Time: 09/29/22 11:40 AM   Result Value Ref Range    aPTT 29 22 - 38 Seconds   Lupus Anticoagulant Panel [[id number]]    Collection Time: 09/29/22 11:40 AM   Result Value Ref Range    PTT Ratio 1.08 <1.21    DRVVT Screen Ratio 1.04 <1.08    Lupus Result Negative Negative    Lupus Interpretation       The INR is normal.  APTT ratio is normal.    DRVVT Screen ratio is normal.  Thrombin time is normal.  NEGATIVE TEST; A LUPUS ANTICOAGULANT WAS NOT DETECTED IN THIS SPECIMEN WITHIN THE LIMITS OF THE TESTING REPERTOIRE.  If the clinical picture is strongly suggestive of an antiphospholipid syndrome, recommend anticardiolipin and beta-2-glycoprotein (IgG and IgM) antibody tests.    Nadja Nayak MD, PhD  [facility]       INR [[id number]]    Collection Time: 09/29/22 11:40 AM   Result Value Ref Range    INR 1.01 0.85 - 1.15   Cardiolipin Marielle IgG and IgM [LAB 6836]    Collection Time: 09/29/22 11:40 AM   Result Value Ref Range    Cardiolipin Marielle IgG Instrument Value 2.9 <10.0 GPL-U/mL    Cardiolipin Antibody IgG  Negative Negative    Cardiolipin Marielle IgM Instrument Value <2.0 <10.0 MPL-U/mL    Cardiolipin Antibody IgM Negative Negative   Comprehensive metabolic panel [LAB17]    Collection Time: 09/29/22 11:40 AM   Result Value Ref Range    Sodium 138 136 - 145 mmol/L    Potassium 4.0 3.4 - 5.3 mmol/L    Chloride 97 (L) 98 - 107 mmol/L    Carbon Dioxide (CO2) 28 22 - 29 mmol/L    Anion Gap 13 7 - 15 mmol/L    Urea Nitrogen 35.9 (H) 6.0 - 20.0 mg/dL    Creatinine 4.92 (H) 0.51 - 0.95 mg/dL    Calcium 10.1 (H) 8.6 - 10.0 mg/dL    Glucose 79 70 - 99 mg/dL    Alkaline Phosphatase 75 35 - 104 U/L    AST 17 10 - 35 U/L    ALT 12 10 - 35 U/L    Protein Total 7.6 6.4 - 8.3 g/dL    Albumin 4.4 3.5 - 5.2 g/dL    Bilirubin Total 0.4 <=1.2 mg/dL    GFR Estimate 11 (L) >60 mL/min/1.73m2   Antibody titer red cell [[id number]]    Collection Time: 09/29/22 11:40 AM   Result Value Ref Range    Anti-A1 IgG Titer >256     Anti-B IgG Titer >256     SPECIMEN EXPIRATION DATE [id number]     ANTIBODY TITER IGM SCREEN Negative    Quantiferon TB Gold Plus Grey Tube    Collection Time: 09/29/22 11:40 AM    Specimen: Arm, Left; Blood   Result Value Ref Range    Quantiferon Nil Tube 0.03 IU/mL   Quantiferon TB Gold Plus Green Tube    Collection Time: 09/29/22 11:40 AM    Specimen: Arm, Left; Blood   Result Value Ref Range    Quantiferon TB1 Tube 0.03 IU/mL   Quantiferon TB Gold Plus Yellow Tube    Collection Time: 09/29/22 11:40 AM    Specimen: Arm, Left; Blood   Result Value Ref Range    Quantiferon TB2 Tube 0.03    Quantiferon TB Gold Plus Purple Tube    Collection Time: 09/29/22 11:40 AM    Specimen: Arm, Left; Blood   Result Value Ref Range    Quantiferon Mitogen 4.51 IU/mL   CBC with platelets and differential    Collection Time: 09/29/22 11:40 AM   Result Value Ref Range    WBC Count 7.1 4.0 - 11.0 10e3/uL    RBC Count 3.84 3.80 - 5.20 10e6/uL    Hemoglobin 11.6 (L) 11.7 - 15.7 g/dL    Hematocrit 35.6 35.0 - 47.0 %    MCV 93 78 - 100 fL    MCH 30.2  26.5 - 33.0 pg    MCHC 32.6 31.5 - 36.5 g/dL    RDW 12.6 10.0 - 15.0 %    Platelet Count 170 150 - 450 10e3/uL    % Neutrophils 64 %    % Lymphocytes 28 %    % Monocytes 5 %    % Eosinophils 3 %    % Basophils 0 %    % Immature Granulocytes 0 %    NRBCs per 100 WBC 0 <1 /100    Absolute Neutrophils 4.5 1.6 - 8.3 10e3/uL    Absolute Lymphocytes 2.0 0.8 - 5.3 10e3/uL    Absolute Monocytes 0.4 0.0 - 1.3 10e3/uL    Absolute Eosinophils 0.2 0.0 - 0.7 10e3/uL    Absolute Basophils 0.0 0.0 - 0.2 10e3/uL    Absolute Immature Granulocytes 0.0 <=0.4 10e3/uL    Absolute NRBCs 0.0 10e3/uL   Adult Type and Screen    Collection Time: 09/29/22 11:40 AM   Result Value Ref Range    ABO/RH(D) O POS     Antibody Screen Negative Negative    SPECIMEN EXPIRATION DATE [id number]    Quantiferon TB Gold Plus    Collection Time: 09/29/22 11:40 AM    Specimen: Arm, Left; Blood   Result Value Ref Range    Quantiferon-TB Gold Plus Negative Negative    TB1 Ag minus Nil Value 0.00 IU/mL    TB2 Ag minus Nil Value 0.00 IU/mL    Mitogen minus Nil Result 4.48 IU/mL    Nil Result 0.03 IU/mL   HLA Halley Class I, Single Antigen    Collection Time: 09/29/22 11:40 AM   Result Value Ref Range    SA 1 TEST METHOD SA FCS     SA 1 CELL Class I     SA1 HI RISK HALLEY None     SA1 MOD RISK HALLEY None     SA 1  COMMENTS        Test performed by modified procedure. Serum heat inactivated and tested by a modified (Schaller) protocol including fetal calf serum addition. High-risk, mfi >3,000. Mod-risk, mfi 500-3,000.   HLA Halley, CPRA    Collection Time: 09/29/22 11:40 AM   Result Value Ref Range    PROTOCOL CUTOFF Plan A, 500 mfi cumulative      UNOS CPRA 0     UNACCEPTABLE ANTIGENS None    ABO and Rh    Collection Time: 09/29/22 11:44 AM   Result Value Ref Range    ABO/RH(D) O POS     SPECIMEN EXPIRATION DATE [id number]    UA with Microscopic reflex to Culture    Collection Time: 09/29/22 11:53 AM    Specimen: Urine, Midstream   Result Value Ref Range    Color Urine  Yellow Colorless, Straw, Light Yellow, Yellow    Appearance Urine Clear Clear    Glucose Urine 100  (A) Negative mg/dL    Bilirubin Urine Negative Negative    Ketones Urine Negative Negative mg/dL    Specific Gravity Urine 1.010 1.003 - 1.035    Blood Urine Trace (A) Negative    pH Urine 8.5 (H) 5.0 - 7.0    Protein Albumin Urine 100  (A) Negative mg/dL    Urobilinogen Urine Normal Normal, 2.0 mg/dL    Nitrite Urine Negative Negative    Leukocyte Esterase Urine Negative Negative    RBC Urine <1 <=2 /HPF    WBC Urine 1 <=5 /HPF    Squamous Epithelials Urine 1 <=1 /HPF             Again, thank you for allowing me to participate in the care of your patient.        Sincerely,        KANNAN

## 2022-09-29 NOTE — LETTER
Date:October 4, 2022      Patient was self referred, no letter generated. Do not send.        [facility] Health Information

## 2022-09-29 NOTE — PROGRESS NOTES
Kidney Transplant Referral - 7/22/2022  Patient attended appointments accompanied by   Patient completed AM appointments with all [facility] providers.  Time and location of PM appointments reviewed with patient.  Patient instructed next contact from Transplant Coordinator will be following Selection Committee  Patient stated understanding  Patient states Receipt of Information for Organ Transplant Recipient form signed and returned via [employer] form signed and faxed to HIM  Patient stated she watched [facility] Pre Kidney Transplant videos 1&2.      Summary    Team s concerns/comments:   1) Cardiac risk assessment  2) PAD assessment  3) Crohn's  4) Health maintenance    Candidacy category: Green    Action/Plan:   1) EKG today. Echocardiogrm ordered  2) Imaging per Surgery  3) Followed by [facility]. Stable  4) UTD  Expected Selection Meeting Discussion: 10/5/22

## 2022-09-29 NOTE — LETTER
2022         RE: Ira Zamora  [address]  [address]  [facility] [address]        Dear Colleague,    Thank you for referring your patient, Ira Zamora, to the [facility]. Please see a copy of my visit note below.    Transplant Surgery Consult Note     Medical record number: [id number]  YOB: 1990,   Consult requested  for evaluation of kidney transplant candidacy.    Assessment and Recommendations:Ms. Zamora appears to be a good candidate for kidney transplantation and has a good understanding of the risks and benefits of this approach to the management of renal failure. The following issues should be addressed prior to finalizing her transplant candidacy:     Transplant order: Ms. Zamora has End stage renal failure due to interstitial nephritis whose condition is not expected to resolve, is expected to progress, and is expected to continue to develop related comorbid conditions.  Recommend he be considered as a candidate for kidney transplant.  Cardiology consult for cardiac risk stratification to be ordered: Yes. Will need comment on aortic root dilation  CT abdomen and pelvis without contrast to be ordered for assessment of vascular targets: No  Transplant listing labs ordered to include HLA, ABOx2, Cr, etc.  Dietician consult ordered: Yes  Social work consult ordered: Yes  Imaging reports reviewed:  None available  Radiology images reviewed: none available  Recipient suitable to move forward with work up of living donors:  Yes   Cardiology    The majority of our visit was spent in counselling, discussing the medical and surgical risks of kidney transplantation. We discussed approximate wait time and how that is influenced by issues such as blood type and sensitization (PRA) and access to a living donor. I contrasted potential waiting time for living vs  donor kidneys from  normal (0-85%) or higher (%) kidney donor profile index (KDPI) donors and their  associated outcomes. I would not recommend this individual to consider kidneys from high KDPI donors. The reason for this decision is best summarized as: improved long term graft survival. Potential surgical complications of kidney transplantation include bleeding, superficial or deep wound complications (infection, hernia, lymphocele), ureteral anastomotic failure (leak or stenosis), graft thrombosis, need for reoperation and other issues such as cardiac complications, pneumonia, deep venous thrombosis, pulmonary embolism, post transplant diabetes and death. The potential for recurrent disease or need for retransplantation was also addressed. We discussed the possible need for ureteral stent (and subsequent removal), and the utility of protocol biopsy and laboratory studies to evaluate for rejection or recurrent disease. We discussed the risk of graft rejection, our center's average graft and patient survival rates, immunosuppression protocols, as well as the potential opportunity to participate in clinical trials.  We also discussed the average length of stay, recovery process, and posttransplant lab and monitoring protocol.  I emphasized the need for strict immunosuppression medication adherence and the potential for complications of immunosuppression such as skin cancer or lymphoma, as well as a very low but not zero risk of donor-derived disease transmission risks (infection, cancer). Ms. Zamora asked good questions and her candidacy will be reviewed at our Multidisciplinary Selection Committee. Thank you for the opportunity to participate in Ms. Zamora's care.      Total time: 60 minutes        Glenis Vann MD FACS  Assistant Professor of Surgery  Director, Living Kidney Donor Program.    ---------------------------------------------------------------------------------------------------    HPI: Ms. Zamora has End stage renal failure due to interstitial nephritis. The patient is non-diabetic.     Crohn's  in remission on Sterara without many episodes of diarrhea    The patient is on dialysis.    Has potential kidney donors:  Doesn't know .  Interested in participation in paired exchange if a donor is willing: Doesn't know     The patient has the following pertinent history:       No    Yes  Dialysis:    []      [x] via:   R AVF    Blood Transfusion                  []      [x]  Number of units:   Most recently: 2010  Pregnancy:    [x]      [] Number:       Previous Transplant:  [x]      [] Details:    Cancer    [x]      [] Comment:   Kidney stones   [x]      [] Comment:      Recurrent infections  [x]      []  Type:                  Bladder dysfunction  [x]      [] Cause:    Claudication   [x]      [] Distance:    Previous Amputation  [x]      [] Cause:     Chronic anticoagulation  [x]      [] Indication:   Religious  [x]      []     PSHx:  fistula     Past Medical History:   Diagnosis Date     Crohn's disease (H)     diagnosed in 2004     ESRD (end stage renal disease) on dialysis (H)      History of blood transfusion      Hypertension      Tubulointerstitial nephropathy 11/09/2011    kidney biopsy 11/09/2011 - Acute and  chronic tubulointerstitial nephropathy.     No past surgical history on file.  No family history on file.  Social History     Socioeconomic History     Marital status:      Spouse name: Not on file     Number of children: Not on file     Years of education: Not on file     Highest education level: Not on file   Occupational History     Not on file   Tobacco Use     Smoking status: Never Smoker     Smokeless tobacco: Never Used   Substance and Sexual Activity     Alcohol use: Yes     Comment: Very occasional, last drink two months ago, wine cooler     Drug use: Never     Sexual activity: Not on file   Other Topics Concern     Not on file   Social History Narrative     Not on file     Social Determinants of Health     Financial Resource Strain: Not on file   Food Insecurity: Not on file    Transportation Needs: Not on file   Physical Activity: Not on file   Stress: Not on file   Social Connections: Not on file   Intimate Partner Violence: Not on file   Housing Stability: Not on file       ROS:   CONSTITUTIONAL:  No fevers or chills  EYES: negative for icterus  ENT:  negative for hearing loss, tinnitus and sore throat  RESPIRATORY:  negative for cough, sputum, dyspnea  CARDIOVASCULAR:  negative for chest pain Fatigue  GASTROINTESTINAL:  negative for nausea, vomiting, diarrhea or constipation  GENITOURINARY:  negative for incontinence, dysuria, bladder emptying problems  HEME:  No easy bruising  INTEGUMENT:  negative for rash and pruritus  NEURO:  Negative for headache, seizure disorder  Allergies:   No Known Allergies  Medications:  Prescription Medications as of 2022       Rx Number Disp Refills Start End Last Dispensed Date Next Fill Date Owning Pharmacy    acetaminophen (TYLENOL) 500 MG tablet    2020        Sig: Take 1,000 mg by mouth    Class: Historical    Route: Oral    calcium acetate (PHOSLO) 667 MG CAPS capsule    2021        Class: Historical    Route: Oral    cholecalciferol 50 MCG (2000 UT) tablet            Sig: Take 50 mcg by mouth daily    Class: Historical    Route: Oral    doxazosin (CARDURA) 8 MG tablet    12/10/2021        Sig: Take 1 tablet by mouth 2 times daily    Class: Historical    Route: Oral    famotidine (PEPCID) 40 MG tablet    2022        Sig: Take 40 mg by mouth    Class: Historical    Route: Oral    iron sucrose (VENOFER) 20 MG/ML injection    10/8/2021 2023       Si mg    Class: Historical    labetalol (NORMODYNE) 100 MG tablet    3/22/2021        Sig: Take 1 tablet by mouth twice a day at 8am and 8pm    Class: Historical    levonorgestrel (KYLEENA) 19.5 MG IUD    2021        Si Device by Intrauterine route    Class: Historical    Route: Intrauterine    omeprazole (PRILOSEC) 20 MG DR capsule            Sig: Take 20 mg by mouth  daily    Class: Historical    Route: Oral    ondansetron (ZOFRAN ODT) 4 MG ODT tab    7/29/2022        Class: Historical    Route: Sublingual    PARoxetine (PAXIL) 30 MG tablet    9/16/2022        Sig: Take 30 mg by mouth    Class: Historical    Route: Oral    prochlorperazine (COMPAZINE) 5 MG tablet    11/16/2020        Sig: Take 5 mg by mouth    Class: Historical    Route: Oral    rOPINIRole (REQUIP) 0.25 MG tablet    9/16/2022        Sig: TAKE ONE TABLET BY MOUTH AT BEDTIME FOR RESTLESS LEG    Class: Historical    ustekinumab (STELARA) 90 MG/ML    9/28/2021        Sig: Inject 90 mg Subcutaneous    Class: Historical    Route: Subcutaneous        Exam:     There were no vitals taken for this visit.  Appearance: in no apparent distress.   Skin: normal  Eyes:  no redness or discharge.  Sclera anicteric  Head and Neck: Normal, no rashes or jaundice  Respiratory: easy respirations, no audible wheezing.  Abdomen: rounded, No distention   Extremeties: femoral 3+/3+, Edema, none  Neuro: without deficit   Psychiatric: Normal mood and affect    Diagnostics:   No results found for this or any previous visit (from the past 672 hour(s)).  No results found for: [facility]        Again, thank you for allowing me to participate in the care of your patient.      Sincerely,    No name on file

## 2022-09-29 NOTE — PROGRESS NOTES
[facility]plant  Outpatient MNT: Kidney Transplant Evaluation    Current BMI: 26.4 (HT 62.5 in,  lbs/67 kg)  BMI is within recommendation of <35 for kidney transplant    Frailty Assessment -- Not Frail (1/5 points)- low activity level       Time Spent: 15 minutes  Visit Type: Initial   Referring Physician: Soo Morris accompanied by: her      History of previous txp: no   Dialysis: yes     Dialysis Info: 10/2020 HD 450am   Protein supplement: no     Nutrition Assessment  - Appetite: good  - Food allergies/intolerances: no   - Meal prep & grocery shopping:    - Previous RD education: yes  - Issues chewing or swallowing: no   - N/V/D/C: D & C alternates d/t Crohn's   - Food access concerns: no     Vitamins, Supplements, Pertinent Meds: phoslo (takes consistently), vit D   Herbal Medicines/Supplements: none     Edema: none     Weight hx: stable     Diet Recall  Breakfast Cereal w/ 2% or eggos (HD days)    Lunch Spaghetti leftovers from [employer]   Dinner Steak w/ green beans & cottage cheese; usually has a protein every night   Snacks Chips, string cheese    Beverages Water, milk (8-16 oz/day), energy drink, pop (strawberry soda)- 1-2/day (not daily)   Alcohol Once every 3 months    Dining out 3x/week- moved into own place in March/not living w/ parents now so they find they dine out more frequently     Physical Activity  No routine at baseline; no limitations      Labs  9/19 Phos 8.4   K 4.3     Nutrition Diagnosis  No nutrition diagnosis identified at this time     Nutrition Intervention  Nutrition education provided:  Discussed sodium intake (low sodium foods and drinks, seasoning food without salt and tips for low sodium diet).  Reviewed high Phos and to consider switching to almond milk, reducing processed food/frequency of dining out d/t likely contributing to phosphorus.     Reviewed post txp diet guidelines in brief (will review in further detail post  txp):  (1) Review of proper food safety measures d/t immunosuppressant therapy post-op and increased risk for food-borne illness    (2) Avoid the following post txp d/t risk for rejection, unknown effects on the organs, and/or potential interactions with immunosuppressants:  - Herbal, Chinese, holistic, chiropractic, natural, alternative medicines and supplements  - Detoxes and cleanses  - Weight loss pills  - Protein powders or other products with extracts or herbs (ie green tea extract)    (3) Med regimen and possible side effects    Patient Understanding: Pt verbalized understanding of education provided.  Expected Engagement: Good  Follow-Up Plans: PRN     Nutrition Goals  No nutrition goals identified at this time     Jael Madden, RD, LD, CCTD

## 2022-09-29 NOTE — PROGRESS NOTES
TRANSPLANT NEPHROLOGY RECIPIENT EVALUATION NOTE    Assessment and Plan:  # Kidney Transplant Evaluation: Patient is a good candidate overall. Benefits of a living donor transplant were discussed.    # ESKD: doing OK on dialysis since September 2020, but would likely benefit from a kidney transplant. She has been listed at [facility], but recently had insurance changes, and so is here to transfer her waiting time. Will review results of both native kidney biopsies with the multidisciplinary committee.     # Cardiac Risk: Feb 2020 ECHO showing EF 62%, dilated ascending aorta 3.8 cm and dilated aortic sinus 3.6 cm. Due for repeat ECHO. No other known history of cardiac disease or events. She is asymptomatic at a low level of exertion.     # Crohn's: followed by [facility]. Does occasionally alternate between diarrhea and constipation, but overall feels symptoms are well controlled on ustekinumab.     # Health Maintenance: PAP: Up to date and Dental: Up to date    Discussed the risks and benefits of a transplant, including the risk of surgery and immunosuppression medications.  Patient's overall evaluation will be discussed in the Transplant Program's regular meeting with a final recommendation on the patients suitability for transplant to be made at that time.    Pending completion of the full evaluation, patient presently appears to be enough of an acceptable kidney transplant recipient candidate to have any potential kidney donors start the evaluation process.      Evaluation:  Ira Zamora was seen in consultation at the request of Dr. Glenis Vann for evaluation as a potential kidney transplant recipient.    Reason for Visit:  Ira Zamora is a 32 year old female with ESKD who presents for kidney transplant evaluation.    History of Present Illness:  Ms. Zamora is a 32-year-old female with Crohn's since age 14 currently on ustekinumab, ESKD on dialysis since September 2020.         Kidney Disease Hx:   Native kidney biopsy  December 2010 showing:  FINAL DIAGNOSIS   KIDNEY, SIDE NOT SPECIFIED, NEEDLE BIOPSIES (VIALS A, B, AND C):   - Acute tubular interstitial nephritis associated with few eosinophils most   consistent with hypersensitivity reaction.   - Mild vascular sclerosis associated with mild interstitial fibrosis.    She was treated with prednisone (which was also being used for Crohn's treatment) and discontinuation of PPI.     Second native kidney biopsy in October 2011 showing:  DIAGNOSIS (EVALUATED AT [facility])   KIDNEY, LEFT, NEEDLE BIOPSY (VIALS A, B AND C):   - Diffuse chronic (active) tubulointerstitial nephritis   - Focal sclerosing immune complex mediated glomerulopathy (see report and     see comment) consistent with focal sclerosing IgM nephropathy   - Marked interstitial fibrosis, global glomerular sclerosis (50%), mild     arteriosclerosis (vial B), and mild arteriolosclerosis      Has been on HD since September 2020, which is going OK. Still has [employer] [place] working OK. Listed at [facility] pre-HD, but inactive due to insurance changes. Now here to transfer waiting time.       Primary Nephrologist: Dr. Feldman       H/o Kidney Stones: No       H/o Recurrent/Frequent UTI: No         Diabetic Hx: None           Cardiac/Vascular Disease Risk Factors:        - Dilated ascending aorta (3.8 cm) and aortic sinus (3.6 cm) on 2020 ECHO. No other known history of cardiac disease or events.         Viral Serology Status       CMV IgG Antibody: Negative       EBV IgG Antibody: Positive         Volume Status/Weight:        Volume status: Euvolemic       Weight:  Acceptable BMI       BMI: Body mass index is 26.44 kg/m .         Functional Capacity/Frailty:        No exercise, but no physical limitations. Can walk several blocks and go up and down stairs. No chest pain, SOB, or claudication.    Fatigue/Decreased Energy: [x] No [] Yes    Chest Pain or SOB with Exertion: [x] No [] Yes    Significant Weight Change: [x] No []  Yes    Nausea, Vomiting or Diarrhea: [x] No [] Yes    Fever, Sweats or Chills:  [x] No [] Yes    Leg Swelling [x] No [] Yes      Allergy Testing Questions:   Medication that caused a reaction None   Antibiotics used that didn't give an allergic reaction?  None    COVID Vaccination Up To Date: Yes    Potential Living Kidney Donors: No    Review of Systems:  A comprehensive review of systems was obtained and negative, except as noted in the HPI or PMH.    Past Medical History:   Medical record was reviewed and PMH was discussed with patient and noted below.  Past Medical History:   Diagnosis Date     Anemia in chronic kidney disease      Crohn's disease (H)     diagnosed in 2004     ESRD (end stage renal disease) on dialysis (H)      History of blood transfusion      Hypertension      Secondary renal hyperparathyroidism (H)      Tubulointerstitial nephropathy 11/09/2011    kidney biopsy 11/09/2011 - Acute and  chronic tubulointerstitial nephropathy.       Past Social History:   Past Surgical History:   Procedure Laterality Date     H NEEDLE GUIDE,  KIDNEY BIOPSY       Personal history of bleeding or anesthesia problems: No    Family History:  Family History   Problem Relation Age of Onset     Endometriosis Mother      Coronary Artery Disease Mother        Personal History:   Social History     Socioeconomic History     Marital status:      Spouse name: Not on file     Number of children: Not on file     Years of education: Not on file     Highest education level: Not on file   Occupational History     Not on file   Tobacco Use     Smoking status: Never Smoker     Smokeless tobacco: Never Used   Substance and Sexual Activity     Alcohol use: Yes     Comment: Very occasional, last drink two months ago, wine cooler     Drug use: Never     Sexual activity: Not on file   Other Topics Concern     Not on file   Social History Narrative     Not on file     Social Determinants of Health     Financial Resource Strain:  "Not on file   Food Insecurity: Not on file   Transportation Needs: Not on file   Physical Activity: Not on file   Stress: Not on file   Social Connections: Not on file   Intimate Partner Violence: Not on file   Housing Stability: Not on file       Allergies:  No Known Allergies    Medications:  Current Outpatient Medications   Medication Sig     acetaminophen (TYLENOL) 500 MG tablet Take 1,000 mg by mouth     calcium acetate (PHOSLO) 667 MG CAPS capsule      cholecalciferol 50 MCG (2000 UT) tablet Take 50 mcg by mouth daily     doxazosin (CARDURA) 8 MG tablet Take 1 tablet by mouth 2 times daily     famotidine (PEPCID) 40 MG tablet Take 40 mg by mouth     iron sucrose (VENOFER) 20 MG/ML injection 50 mg     labetalol (NORMODYNE) 100 MG tablet Take 1 tablet by mouth twice a day at 8am and 8pm     levonorgestrel (KYLEENA) 19.5 MG IUD 1 Device by Intrauterine route     omeprazole (PRILOSEC) 20 MG DR capsule Take 20 mg by mouth daily     ondansetron (ZOFRAN ODT) 4 MG ODT tab      PARoxetine (PAXIL) 30 MG tablet Take 30 mg by mouth     prochlorperazine (COMPAZINE) 5 MG tablet Take 5 mg by mouth     rOPINIRole (REQUIP) 0.25 MG tablet TAKE ONE TABLET BY MOUTH AT BEDTIME FOR RESTLESS LEG     ustekinumab (STELARA) 90 MG/ML Inject 90 mg Subcutaneous     No current facility-administered medications for this visit.       Vitals:  /89   Pulse 72   Ht 1.588 m (5' 2.5\")   Wt 66.6 kg (146 lb 14.4 oz)   SpO2 95%   BMI 26.44 kg/m      Exam:  GENERAL APPEARANCE: alert and no distress  HENT: mouth without ulcers or lesions  LYMPHATICS: no cervical or supraclavicular nodes  RESP: lungs clear to auscultation - no rales, rhonchi or wheezes  CV: regular rhythm, normal rate, no rub, no murmur  EDEMA: no LE edema bilaterally  ABDOMEN: soft, nondistended, nontender, bowel sounds normal  MS: extremities normal - no gross deformities noted, no evidence of inflammation in joints, no muscle tenderness  SKIN: no rash    Results:   Recent " Results (from the past 336 hour(s))   EKG 12-lead, tracing only [EKG1]    Collection Time: 09/29/22  9:47 AM   Result Value Ref Range    Systolic Blood Pressure  mmHg    Diastolic Blood Pressure  mmHg    Ventricular Rate 63 BPM    Atrial Rate 63 BPM    NY Interval 156 ms    QRS Duration 80 ms     ms    QTc 454 ms    P Axis 22 degrees    R AXIS -6 degrees    T Axis 27 degrees    Interpretation ECG       Sinus rhythm  Left ventricular hypertrophy by voltage  Otherwise normal ECG  No previous ECGs available  Confirmed by fellow Boy West ([id number]) on 9/29/2022 1:56:38 PM  Confirmed by MD QUICK DAVID [id number]) on 9/30/2022 10:04:22 AM     HCG qualitative [YKU391]    Collection Time: 09/29/22 11:40 AM   Result Value Ref Range    hCG Serum Qualitative Negative Negative   Varicella Zoster Virus Antibody IgG [[id number]]    Collection Time: 09/29/22 11:40 AM   Result Value Ref Range    VZV Marielle IgG Instrument Value 1,687.0 <135.0 Index    Varicella Zoster Antibody IgG Positive    Treponema Abs w Reflex to RPR and Titer [MYS3163]    Collection Time: 09/29/22 11:40 AM   Result Value Ref Range    Treponema Antibody Total Nonreactive Nonreactive   HIV Antigen Antibody Combo Pretransplant    Collection Time: 09/29/22 11:40 AM   Result Value Ref Range    HIV Antigen Antibody Combo Pretransplant Nonreactive Nonreactive   Hepatitis C antibody [LTP127]    Collection Time: 09/29/22 11:40 AM   Result Value Ref Range    Hepatitis C Antibody Nonreactive Nonreactive   Hepatitis B surface antigen [KKW044]    Collection Time: 09/29/22 11:40 AM   Result Value Ref Range    Hepatitis B Surface Antigen Nonreactive Nonreactive   Hepatitis B Surface Antibody [[id number]]    Collection Time: 09/29/22 11:40 AM   Result Value Ref Range    Hepatitis B Surface Antibody Instrument Value 106.74 <8.00 m[IU]/mL    Hepatitis B Surface Antibody Reactive    Hepatitis B core antibody [[id number]]    Collection Time: 09/29/22 11:40 AM   Result Value Ref Range     Hepatitis B Core Antibody Total Nonreactive Nonreactive   EBV Capsid Antibody IgG [[id number]]    Collection Time: 09/29/22 11:40 AM   Result Value Ref Range    EBV Capsid Marielle IgG Instrument Value >750.0 (H) <18.0 U/mL    EBV Capsid Antibody IgG Positive (A) No detectable antibody.   CMV Antibody IgG [[id number]]    Collection Time: 09/29/22 11:40 AM   Result Value Ref Range    CMV Marielle IgG Instrument Value <0.20 <0.60 U/mL    CMV Antibody IgG No detectable antibody. No detectable antibody.    Thrombin time [XYH304]    Collection Time: 09/29/22 11:40 AM   Result Value Ref Range    Thrombin Time 17.1 13.0 - 19.0 Seconds   Partial thromboplastin time [LAB56]    Collection Time: 09/29/22 11:40 AM   Result Value Ref Range    aPTT 29 22 - 38 Seconds   Lupus Anticoagulant Panel [[id number]]    Collection Time: 09/29/22 11:40 AM   Result Value Ref Range    PTT Ratio 1.08 <1.21    DRVVT Screen Ratio 1.04 <1.08    Lupus Result Negative Negative    Lupus Interpretation       The INR is normal.  APTT ratio is normal.    DRVVT Screen ratio is normal.  Thrombin time is normal.  NEGATIVE TEST; A LUPUS ANTICOAGULANT WAS NOT DETECTED IN THIS SPECIMEN WITHIN THE LIMITS OF THE TESTING REPERTOIRE.  If the clinical picture is strongly suggestive of an antiphospholipid syndrome, recommend anticardiolipin and beta-2-glycoprotein (IgG and IgM) antibody tests.    Nadja Nayak MD, PhD  [facility]       INR [[id number]]    Collection Time: 09/29/22 11:40 AM   Result Value Ref Range    INR 1.01 0.85 - 1.15   Cardiolipin Marielle IgG and IgM [LAB 6836]    Collection Time: 09/29/22 11:40 AM   Result Value Ref Range    Cardiolipin Marielle IgG Instrument Value 2.9 <10.0 GPL-U/mL    Cardiolipin Antibody IgG Negative Negative    Cardiolipin Marielle IgM Instrument Value <2.0 <10.0 MPL-U/mL    Cardiolipin Antibody IgM Negative Negative   Comprehensive metabolic panel [LAB17]    Collection Time: 09/29/22 11:40 AM   Result Value Ref Range    Sodium 138 136 - 145  mmol/L    Potassium 4.0 3.4 - 5.3 mmol/L    Chloride 97 (L) 98 - 107 mmol/L    Carbon Dioxide (CO2) 28 22 - 29 mmol/L    Anion Gap 13 7 - 15 mmol/L    Urea Nitrogen 35.9 (H) 6.0 - 20.0 mg/dL    Creatinine 4.92 (H) 0.51 - 0.95 mg/dL    Calcium 10.1 (H) 8.6 - 10.0 mg/dL    Glucose 79 70 - 99 mg/dL    Alkaline Phosphatase 75 35 - 104 U/L    AST 17 10 - 35 U/L    ALT 12 10 - 35 U/L    Protein Total 7.6 6.4 - 8.3 g/dL    Albumin 4.4 3.5 - 5.2 g/dL    Bilirubin Total 0.4 <=1.2 mg/dL    GFR Estimate 11 (L) >60 mL/min/1.73m2   Antibody titer red cell [[id number]]    Collection Time: 09/29/22 11:40 AM   Result Value Ref Range    Anti-A1 IgG Titer >256     Anti-B IgG Titer >256     SPECIMEN EXPIRATION DATE [id number]     ANTIBODY TITER IGM SCREEN Negative    Quantiferon TB Gold Plus Grey Tube    Collection Time: 09/29/22 11:40 AM    Specimen: Arm, Left; Blood   Result Value Ref Range    Quantiferon Nil Tube 0.03 IU/mL   Quantiferon TB Gold Plus Green Tube    Collection Time: 09/29/22 11:40 AM    Specimen: Arm, Left; Blood   Result Value Ref Range    Quantiferon TB1 Tube 0.03 IU/mL   Quantiferon TB Gold Plus Yellow Tube    Collection Time: 09/29/22 11:40 AM    Specimen: Arm, Left; Blood   Result Value Ref Range    Quantiferon TB2 Tube 0.03    Quantiferon TB Gold Plus Purple Tube    Collection Time: 09/29/22 11:40 AM    Specimen: Arm, Left; Blood   Result Value Ref Range    Quantiferon Mitogen 4.51 IU/mL   CBC with platelets and differential    Collection Time: 09/29/22 11:40 AM   Result Value Ref Range    WBC Count 7.1 4.0 - 11.0 10e3/uL    RBC Count 3.84 3.80 - 5.20 10e6/uL    Hemoglobin 11.6 (L) 11.7 - 15.7 g/dL    Hematocrit 35.6 35.0 - 47.0 %    MCV 93 78 - 100 fL    MCH 30.2 26.5 - 33.0 pg    MCHC 32.6 31.5 - 36.5 g/dL    RDW 12.6 10.0 - 15.0 %    Platelet Count 170 150 - 450 10e3/uL    % Neutrophils 64 %    % Lymphocytes 28 %    % Monocytes 5 %    % Eosinophils 3 %    % Basophils 0 %    % Immature Granulocytes 0 %     NRBCs per 100 WBC 0 <1 /100    Absolute Neutrophils 4.5 1.6 - 8.3 10e3/uL    Absolute Lymphocytes 2.0 0.8 - 5.3 10e3/uL    Absolute Monocytes 0.4 0.0 - 1.3 10e3/uL    Absolute Eosinophils 0.2 0.0 - 0.7 10e3/uL    Absolute Basophils 0.0 0.0 - 0.2 10e3/uL    Absolute Immature Granulocytes 0.0 <=0.4 10e3/uL    Absolute NRBCs 0.0 10e3/uL   Adult Type and Screen    Collection Time: 09/29/22 11:40 AM   Result Value Ref Range    ABO/RH(D) O POS     Antibody Screen Negative Negative    SPECIMEN EXPIRATION DATE [id number]    Quantiferon TB Gold Plus    Collection Time: 09/29/22 11:40 AM    Specimen: Arm, Left; Blood   Result Value Ref Range    Quantiferon-TB Gold Plus Negative Negative    TB1 Ag minus Nil Value 0.00 IU/mL    TB2 Ag minus Nil Value 0.00 IU/mL    Mitogen minus Nil Result 4.48 IU/mL    Nil Result 0.03 IU/mL   HLA Halley Class I, Single Antigen    Collection Time: 09/29/22 11:40 AM   Result Value Ref Range    SA 1 TEST METHOD SA FCS     SA 1 CELL Class I     SA1 HI RISK HALLEY None     SA1 MOD RISK HALLEY None     SA 1  COMMENTS        Test performed by modified procedure. Serum heat inactivated and tested by a modified (Palacios) protocol including fetal calf serum addition. High-risk, mfi >3,000. Mod-risk, mfi 500-3,000.   HLA Halley, CPRA    Collection Time: 09/29/22 11:40 AM   Result Value Ref Range    PROTOCOL CUTOFF Plan A, 500 mfi cumulative      UNOS CPRA 0     UNACCEPTABLE ANTIGENS None    ABO and Rh    Collection Time: 09/29/22 11:44 AM   Result Value Ref Range    ABO/RH(D) O POS     SPECIMEN EXPIRATION DATE [id number]    UA with Microscopic reflex to Culture    Collection Time: 09/29/22 11:53 AM    Specimen: Urine, Midstream   Result Value Ref Range    Color Urine Yellow Colorless, Straw, Light Yellow, Yellow    Appearance Urine Clear Clear    Glucose Urine 100  (A) Negative mg/dL    Bilirubin Urine Negative Negative    Ketones Urine Negative Negative mg/dL    Specific Gravity Urine 1.010 1.003 - 1.035     Blood Urine Trace (A) Negative    pH Urine 8.5 (H) 5.0 - 7.0    Protein Albumin Urine 100  (A) Negative mg/dL    Urobilinogen Urine Normal Normal, 2.0 mg/dL    Nitrite Urine Negative Negative    Leukocyte Esterase Urine Negative Negative    RBC Urine <1 <=2 /HPF    WBC Urine 1 <=5 /HPF    Squamous Epithelials Urine 1 <=1 /HPF

## 2022-09-29 NOTE — PROGRESS NOTES
Psychosocial Assessment For Kidney Transplantation  Patient Name/ Age: Ira Zamora 32 year old   Medical Record #: [id number]  Duration of Interview:     30 min  Process:   Face-to-Face Interview                (counseling < 50%)   Present at Appointment: Ira and spouse Slim         : MARTHA Jones LIC Date:  September 29, 2022        Type of transplant: Kidney     Donor type:      Cadaver   Prior Transplants:    No Status of Transplant:           Current Living Situation    Location:   [address] [place] [address] [address]  [facility] [address]  With Whom: lives with their spouse       Family/ Social Support:    Ira lives in [place], [place] with her spouse Slim. She has no children. She has two siblings and existing parents in [place]. She feels that she has a strong, involved support system.  available, helpful   Committed Relationship:    Slim  Stable/Supportive   Other Supports:   Extended family, friends   Spouse to be caregiver post transplant  available, helpful       Activities/ Functional Ability    Current Level: independent with ADL's     Transportation drives self       Vocational/Employment/Financial     Employment   full time   Job Description  Works full time as        Income   Salary/wages and Spouse's Income   Insurance  Preferred on through employer       At this time, patient can afford medication costs:  Yes  Private Insurance       Medical Status    Current Mode of Treatment for ESRD None   Complications None       Behavioral    Tobacco Use No Chemical Dependency No         Psychiatric Impairment Yes   Generalized anixety- paxil and does not see a therapist. She feels that symptoms are well managed.     Reading Ability: Good  Education Level: Technical College Recent Legal History No      Coping Style/Strategies: coloring, arts and puzzles        Ability to Adhere to Complex Medical Regime: Yes     Adherence History:        Education  _X_ Medicare  _X_  Rehabilitation  _X_ Donor issues  _X_ Community resources  _X_ Post discharge housing  _X_ Financial resources  _X_ Medical insurance options  _X_ Psych adjustment  _X_ Family adjustment  _X_ Health Care Directive Provided Education   Psychosocial Risks of Transplant Reviewed and Discussed:  _X_ Increased stress related to emotional,            family, social, employment or financial           situation  _X_ Effect on work and/or disability benefits  _X_ Effect on future health and life           insurance  _X_ Transplant outcome expectations may           not be met  _X_ Mental Health Risks: anxiety,           depression, PTSD, guilt, grief and           chronic fatigue     Notable Items:   None noted.       Final Evaluation/Assessment   Patient seemed to process information well. Appeared well informed, motivated and able to follow post transplant requirements. Behavior was appropriate during interview. Has adequate income and insurance coverage. Adequate social support. No major contraindications noted for transplant.  At this time patient appears to understand the risks and benefits of transplant.      Recommendation  Acceptable    Selection Criteria Met:  Plan for support Yes   Chemical Dependence No  Smoking No  Mental Health No  Adequate Finances Yes    Signature: MARTHA Jones   Title: Clinical

## 2022-09-30 LAB
ATRIAL RATE - MUSE: 63 BPM
CARDIOLIPIN IGG SER IA-ACNC: 2.9 GPL-U/ML
CARDIOLIPIN IGG SER IA-ACNC: NEGATIVE
CARDIOLIPIN IGM SER IA-ACNC: <2 MPL-U/ML
CARDIOLIPIN IGM SER IA-ACNC: NEGATIVE
CMV IGG SERPL IA-ACNC: <0.2 U/ML
CMV IGG SERPL IA-ACNC: NORMAL
DIASTOLIC BLOOD PRESSURE - MUSE: NORMAL MMHG
DRVVT SCREEN RATIO: 1.04
EBV VCA IGG SER IA-ACNC: >750 U/ML
EBV VCA IGG SER IA-ACNC: POSITIVE
GAMMA INTERFERON BACKGROUND BLD IA-ACNC: 0.03 IU/ML
HIV 1+2 AB+HIV1 P24 AG SERPL QL IA: NONREACTIVE
INTERPRETATION ECG - MUSE: NORMAL
LA PPP-IMP: NEGATIVE
LUPUS INTERPRETATION: NORMAL
M TB IFN-G BLD-IMP: NEGATIVE
M TB IFN-G CD4+ BCKGRND COR BLD-ACNC: 4.48 IU/ML
MITOGEN IGNF BCKGRD COR BLD-ACNC: 0 IU/ML
MITOGEN IGNF BCKGRD COR BLD-ACNC: 0 IU/ML
P AXIS - MUSE: 22 DEGREES
PR INTERVAL - MUSE: 156 MS
PROTOCOL CUTOFF: NORMAL
PTT RATIO: 1.08
QRS DURATION - MUSE: 80 MS
QT - MUSE: 444 MS
QTC - MUSE: 454 MS
QUANTIFERON MITOGEN: 4.51 IU/ML
QUANTIFERON NIL TUBE: 0.03 IU/ML
QUANTIFERON TB1 TUBE: 0.03 IU/ML
QUANTIFERON TB2 TUBE: 0.03
R AXIS - MUSE: -6 DEGREES
SA 1 CELL: NORMAL
SA 1 TEST METHOD: NORMAL
SA1 HI RISK ABY: NORMAL
SA1 MOD RISK ABY: NORMAL
SYSTOLIC BLOOD PRESSURE - MUSE: NORMAL MMHG
T AXIS - MUSE: 27 DEGREES
THROMBIN TIME: 17.1 SECONDS (ref 13–19)
UNACCEPTABLE ANTIGENS: NORMAL
UNOS CPRA: 0
VENTRICULAR RATE- MUSE: 63 BPM
VZV IGG SER QL IA: 1687 INDEX
VZV IGG SER QL IA: POSITIVE
ZZZSA 1  COMMENTS: NORMAL

## 2022-10-03 PROBLEM — N25.81 SECONDARY RENAL HYPERPARATHYROIDISM (H): Status: ACTIVE | Noted: 2022-10-03

## 2022-10-04 LAB
SA 2 CELL: NORMAL
SA 2 TEST METHOD: NORMAL
SA2 HI RISK ABY: NORMAL
SA2 MOD RISK ABY: NORMAL
ZZZSA 2 COMMENTS: NORMAL

## 2022-10-05 LAB
A*LOCUS SEROLOGIC EQUIVALENT: 2
A*LOCUS: NORMAL
ABTEST METHOD: NORMAL
B*: NORMAL
B*LOCUS SEROLOGIC EQUIVALENT: 7
B*LOCUS: NORMAL
B*SEROLOGIC EQUIVALENT: 51
BW-1: NORMAL
BW-2: NORMAL
C*: NORMAL
C*LOCUS SEROLOGIC EQUIVALENT: 7
C*LOCUS: NORMAL
C*SEROLOGIC EQUIVALENT: 15
DPA1*: NORMAL
DPB1*: NORMAL
DPB1*LOCUS NMDP: NORMAL
DPB1*LOCUS: NORMAL
DPB1*NMDP: NORMAL
DQA1*: NORMAL
DQA1*LOCUS: NORMAL
DQB1*: NORMAL
DQB1*LOCUS SEROLOGIC EQUIVALENT: 9
DQB1*LOCUS: NORMAL
DQB1*SEROLOGIC EQUIVALENT: 6
DRB1*: NORMAL
DRB1*LOCUS SEROLOGIC EQUIVALENT: 9
DRB1*LOCUS: NORMAL
DRB1*SEROLOGIC EQUIVALENT: 15
DRB4*LOCUS SEROLOGIC EQUIVALENT: 53
DRB4*LOCUS: NORMAL
DRB5*: NORMAL
DRB5*SEROLOGIC EQUIVALENT: 51
DRSSO TEST METHOD: NORMAL

## 2022-10-17 ENCOUNTER — TELEPHONE (OUTPATIENT)
Dept: TRANSPLANT | Facility: CLINIC | Age: 32
End: 2022-10-17

## 2022-10-17 ENCOUNTER — CARE COORDINATION (OUTPATIENT)
Dept: TRANSPLANT | Facility: CLINIC | Age: 32
End: 2022-10-17

## 2022-10-17 ENCOUNTER — DOCUMENTATION ONLY (OUTPATIENT)
Dept: TRANSPLANT | Facility: CLINIC | Age: 32
End: 2022-10-17

## 2022-10-17 NOTE — LETTER
10/17/22        Irajorgito Zamora  [address]  [address]  [facility] [address]        Dear Ira,    It was a pleasure to see you recently for consideration of kidney transplantation. Your pre-transplant evaluation results were reviewed at our Multidisciplinary Selection Committee on 10/05/2022. The Committee is requesting the following items are completed before determining your candidacy:    1. Echocardiogram now for updated assessment of your dilated ascending aorta.   2. Blood draw for protein immunofixation and electrophoresis, kappa lambda light chains.   3. PAP smear needs to remain up to date. Please work with your primary care doctor for this.   4. Dental work needs to remain up to date. Please work with your dentist for this.   5. Vaccinations need to remain up to date for: COVID-19, hepatitis B, pneumovax. Please work with your primary care doctor for this.   6. You have already signed the required consents for Receipt of Information and KDPI > 85%.   7. Review of [facility] patient education over the phone with myself still needs to be done. If not already done so, please view pre kidney eval parts 1 and 2 at [email or web address]     A  from our Office will call you to make appointments for items # 1 and 2. Please call me as soon as you have completed items # 1 and 2 so I can have the results reviewed at the Multidisciplinary Selection Committee for approval. When approved as well have completed item# 7, you will be eligible to be added onto the kidney transplant waitlist on ACTIVE status as weill as to proceed with a live donor kidney transplant in the event you have an approved live donor.     Please have any potential live kidney donors register online at [facility] to initiate their evaluations through our program's living donor screening tool at [email or web address]. Please note that potential donors will get an e-mail response once they submit their form within  1-2 business days. Potential donors may call our Main Office Number at [phone] and ask to speak with a donor coordinator if they have questions about the process. You will be notified by your transplant coordinator if a live donor has been approved for donation.     You have not been added onto the kidney transplant waitlist at this time because you are already on dialysis. In the future when you are added onto the waitlist, your initial wait time start date will be the same as the start date of your chronic dialysis. I will also then fax your Wait Time Transfer Form to [facility] to transfer your wait time from [facility] to your listing here.     For any questions, please contact myself at  the Transplant Office Main Number at [phone] or at my Direct Number at [phone].    Sincerely,  Liberty Ruiz, RN, BSN  Pre Kidney Pancreas Transplant Coordinator   [facility]  Solid Organ Transplant Care   [address] [place] [address]  [address] [address]  [email or web address]   Office: [phone] Direct Number: [phone]   Fax: [phone]  Employed by [facility] [employer]     CC's: Dr. Man Feldman, Dr. London Leroy, [facility]

## 2022-11-14 DIAGNOSIS — N18.6 ESRD (END STAGE RENAL DISEASE) (H): Primary | ICD-10-CM

## 2022-11-14 DIAGNOSIS — Z76.82 AWAITING ORGAN TRANSPLANT: ICD-10-CM

## 2022-11-23 ENCOUNTER — LAB (OUTPATIENT)
Dept: LAB | Facility: CLINIC | Age: 32
End: 2022-11-23
Payer: COMMERCIAL

## 2022-11-23 ENCOUNTER — ANCILLARY PROCEDURE (OUTPATIENT)
Dept: CARDIOLOGY | Facility: CLINIC | Age: 32
End: 2022-11-23
Attending: NURSE PRACTITIONER
Payer: COMMERCIAL

## 2022-11-23 DIAGNOSIS — N12 INTERSTITIAL NEPHRITIS: ICD-10-CM

## 2022-11-23 DIAGNOSIS — N18.6 ESRD (END STAGE RENAL DISEASE) (H): ICD-10-CM

## 2022-11-23 DIAGNOSIS — Z01.818 PRE-TRANSPLANT EVALUATION FOR KIDNEY TRANSPLANT: ICD-10-CM

## 2022-11-23 DIAGNOSIS — N18.6 END STAGE RENAL DISEASE (H): ICD-10-CM

## 2022-11-23 DIAGNOSIS — N05.8 IGM NEPHROPATHY: ICD-10-CM

## 2022-11-23 DIAGNOSIS — Z76.82 AWAITING ORGAN TRANSPLANT: ICD-10-CM

## 2022-11-23 LAB
LVEF ECHO: NORMAL
TOTAL PROTEIN SERUM FOR ELP: 6.7 G/DL (ref 6.4–8.3)

## 2022-11-23 PROCEDURE — 93306 TTE W/DOPPLER COMPLETE: CPT | Performed by: INTERNAL MEDICINE

## 2022-11-23 PROCEDURE — 83521 IG LIGHT CHAINS FREE EACH: CPT | Mod: 90 | Performed by: PATHOLOGY

## 2022-11-23 PROCEDURE — 84155 ASSAY OF PROTEIN SERUM: CPT | Mod: 90 | Performed by: PATHOLOGY

## 2022-11-23 PROCEDURE — 99000 SPECIMEN HANDLING OFFICE-LAB: CPT | Performed by: PATHOLOGY

## 2022-11-23 PROCEDURE — 84165 PROTEIN E-PHORESIS SERUM: CPT

## 2022-11-23 PROCEDURE — 36415 COLL VENOUS BLD VENIPUNCTURE: CPT | Performed by: PATHOLOGY

## 2022-11-23 PROCEDURE — 86334 IMMUNOFIX E-PHORESIS SERUM: CPT

## 2022-11-25 LAB
ALBUMIN SERPL ELPH-MCNC: 4.1 G/DL (ref 3.7–5.1)
ALPHA1 GLOB SERPL ELPH-MCNC: 0.3 G/DL (ref 0.2–0.4)
ALPHA2 GLOB SERPL ELPH-MCNC: 0.6 G/DL (ref 0.5–0.9)
B-GLOBULIN SERPL ELPH-MCNC: 0.7 G/DL (ref 0.6–1)
GAMMA GLOB SERPL ELPH-MCNC: 1.1 G/DL (ref 0.7–1.6)
KAPPA LC FREE SER-MCNC: 10.97 MG/DL (ref 0.33–1.94)
KAPPA LC FREE/LAMBDA FREE SER NEPH: 1.24 {RATIO} (ref 0.26–1.65)
LAMBDA LC FREE SERPL-MCNC: 8.84 MG/DL (ref 0.57–2.63)
M PROTEIN SERPL ELPH-MCNC: 0 G/DL
PROT PATTERN SERPL ELPH-IMP: NORMAL
PROT PATTERN SERPL IFE-IMP: NORMAL

## 2022-12-13 ENCOUNTER — TELEPHONE (OUTPATIENT)
Dept: TRANSPLANT | Facility: CLINIC | Age: 32
End: 2022-12-13

## 2022-12-13 DIAGNOSIS — Z76.82 AWAITING ORGAN TRANSPLANT: Primary | ICD-10-CM

## 2022-12-13 NOTE — LETTER
PHYSICIAN ORDER   ALA/PRA BLOOD    DATE & TIME ISSUED: 2022 7:51 AM  PATIENT NAME: Ira Zamora   : 1990     [facility] MR#  [id number]     DIAGNOSIS/ICD-10 CODE: Awaiting Organ transplant [Z76.82}   EXPIRES: (1 YEAR AFTER DATE ISSUED)  EVERY 12 weeks / 3 months   1. Please draw 20ml of blood in red top (plain) tube for Antileukocyte Antibody (ALA or PRA).   2. Label tubes with the patient s name, complete lab slip.         3. Mailers, lab slips with instructions are sent to patient separately.      4. Call the Outreach Lab at [phone] to reorder mailers.       5. Mail blood to (this address is also on the mailers):    IMMUNOLOGY LABORATORY   [facility]   Room [phone] [address]  [address]        Steve Acuna MD  Surgical Director, Kidney Transplantation

## 2022-12-13 NOTE — TELEPHONE ENCOUNTER
Called patient today and reached her VMSTELLA stating she is approved for active listing and can be listed as soon as we have reviewed the MTP education. Asked her to return my call.

## 2022-12-13 NOTE — LETTER
12/13/22        Ira Zamora  [address]  [address]  [facility] [address]        Dear Ira,    Your pre-transplant evaluation results were reviewed at our Multidisciplinary Selection Committee 11/30/2022. The Committee has approved you as a candidate for kidney transplant at our Center. You can be listed here as soon as you have completed the below item.     1. Review of [facility] patient education over the phone with myself. Please do call me as soon as possible to complete this.     For any questions, please contact myself at the Transplant Office Main Number at [phone] or at my Direct Number at [phone].      Sincerely,  Liberty Ruiz, RN, BSN  Pre Kidney Pancreas Transplant Coordinator   [facility]  Solid Organ Transplant Care   [address] [place] [address] [address]  [address] [address]  [email or web address]   Office Number: [phone] Direct Number: [phone]   Fax Number: [phone]  Employed by [facility]     CC's: Dr. Man Feldman, Dr. London Leroy, [facility]

## 2023-01-05 ENCOUNTER — DOCUMENTATION ONLY (OUTPATIENT)
Dept: TRANSPLANT | Facility: CLINIC | Age: 33
End: 2023-01-05

## 2023-01-05 ENCOUNTER — TELEPHONE (OUTPATIENT)
Dept: TRANSPLANT | Facility: CLINIC | Age: 33
End: 2023-01-05

## 2023-01-05 NOTE — PROGRESS NOTES
Kidney Transplant Evaluation - 9/29/2022  Ira Zamora confirmed she has viewed both pre kidney eval parts 1 and 2 on The [facility] website. Pt stating she does not have any questions at this time.   Content reviewed:    Living Donation and how to access that program. Pt confirmed she has the website and number for pt's to register at and call.     Paired exchange    Kidney Donor Profile Index (KDPI). Pt has already signed with a no response.     Waiting list issues (right to decline without penalty, high PHS risk donors, what to expect when called with an offer)    Hospital experience, length of stay, need to stay locally. Pt lives locally in [place],  post-discharge (2-4 weeks).    Surgical options (with pictures)                             Post-surgery lifting and driving restrictions. Instructed cannot lift over 10 pounds for first 8-10 weeks and cannot drive until approved by transplant surgeon at 2 weeks post op appointment.     Post-transplant routines, frequency of lab work and clinic visits. Pt plans to attend appts at [facility].     Need to stay locally post-discharge (2-4 weeks)    Role of Transplant Coordinator    Participants were informed of the benefits of transplant as well as potential risks such as infection, cancer, and death. The need for total adherence with immunosuppression medications and following transplant regimens was stressed.  The overall evaluation/approval/listing process was reviewed.        The patient was provided with the following documents:  What You Need to Know About a Kidney Transplant  Adult Kidney Transplant - A Guide for Patients  SRTR Data Sheet - Kidney  Brochure - Kidney Allocation  Brochure - Multiple Listing and Waiting Time Transfer  What Every Patient Needs to Know (UNOS)  UNOS Facts and Figures  Finding a Donor    Pt expressed excellent understanding of all.

## 2023-01-05 NOTE — TELEPHONE ENCOUNTER
Called pt today and spoke with her at length. Completed review of [facility] education, see my separate note from today for documentation of this. Explained she needs new PRA sample now, order sent to dialysis and I will call them as well to start draws now and to continue every 3 months. Explained her insur prior auth for kidney transplant remains pending at this moment, explained as soon as confirmed I will call her and list active on K alone. Once listed will proceed with transferring her wait time. Instructed pt to continue to look for live donors. Pt expressed excellent understanding of all and was in good agreement with the plan.    Called dialysis, asked they draw new PRA sample at next convenience.

## 2023-01-05 NOTE — LETTER
PHYSICIAN ORDER   ALA/PRA BLOOD    DATE & TIME ISSUED: 2023 3:20 PM  PATIENT NAME: Ira Zamora   : 1990     [facility] MR#  [id number]     DIAGNOSIS/ICD-10 CODE: Awaiting Organ transplant [Z76.82}   EXPIRES: (1 YEAR AFTER DATE ISSUED)  EVERY 12 weeks / 3 months   1. Please draw 20ml of blood in red top (plain) tube for Antileukocyte Antibody (ALA or PRA).   2. Label tubes with the patient s name, complete lab slip.         3. Mailers, lab slips with instructions are sent to patient separately.      4. Call the Outreach Lab at [phone] to reorder mailers.       5. Mail blood to (this address is also on the mailers):    IMMUNOLOGY LABORATORY   [facility]   Room [phone]  [address]        Steve Acuna MD  Surgical Director, Kidney Transplantation

## 2023-01-09 PROCEDURE — 86833 HLA CLASS II HIGH DEFIN QUAL: CPT | Performed by: TRANSPLANT SURGERY

## 2023-01-09 PROCEDURE — 86832 HLA CLASS I HIGH DEFIN QUAL: CPT | Performed by: TRANSPLANT SURGERY

## 2023-01-10 ENCOUNTER — LAB (OUTPATIENT)
Dept: LAB | Facility: CLINIC | Age: 33
End: 2023-01-10

## 2023-01-10 DIAGNOSIS — Z76.82 AWAITING ORGAN TRANSPLANT: ICD-10-CM

## 2023-01-11 LAB
PROTOCOL CUTOFF: NORMAL
SA 1 CELL: NORMAL
SA 1 TEST METHOD: NORMAL
SA 2 CELL: NORMAL
SA 2 TEST METHOD: NORMAL
SA1 HI RISK ABY: NORMAL
SA1 MOD RISK ABY: NORMAL
SA2 HI RISK ABY: NORMAL
SA2 MOD RISK ABY: NORMAL
UNACCEPTABLE ANTIGENS: NORMAL
UNOS CPRA: 0
ZZZSA 1  COMMENTS: NORMAL
ZZZSA 2 COMMENTS: NORMAL

## 2023-01-26 ENCOUNTER — TELEPHONE (OUTPATIENT)
Dept: TRANSPLANT | Facility: CLINIC | Age: 33
End: 2023-01-26
Payer: MEDICARE

## 2023-01-26 NOTE — Clinical Note
Please note I have added patient onto kidney alone wait list today on ACTIVE status. Thanks! Liberty

## 2023-01-26 NOTE — TELEPHONE ENCOUNTER
Was notified by PFR that insurance prior auth is now confirmed. Listed pt today on kidney transplant wait list today on ACTIVE status as pt now meets all criteria for this. Pt qualifies for wait time with a dialysis start date of 2020. Will also send Wait Time Transfer Form to [facility] to transfer wait time here from listing at [facility]. Generated listing letter today in Epic - electronically sent to pt/ providers.     Called pt today and LM that I have now listed her on kidney wait list on ACTIVE status. Instructed pt to call me today to review pt education/ routine.     Faxed Wait Time Transfer Form to [facility] today. Noticed on 2023 at 1:50 pm, [facility] has already modified wait time accrual start date to 2019 in OS listing.     Spoke with patient when she returned my call and informed her of eligibility of listing to active status. Pt very happy and wanting to list ACTIVE status as well as to transfer wait time here from [facility]. Explained I have faxed her Wait Time Transfer Form to [facility] today. ( Sent My Chart Message to pt at  1:55 pm that wait time has been transferred and her wait time start date is now 2019).   -Verified contact information as documented in EPIC  -Informed patient that a  donor offer can happen at any time (24 hours/day, 7 days/week)  -Instructed patient about importance of having PRAs drawn every 3 months via: (Mailers/[facility] Lab). Explained her next due date is 2023.   -Reviewed organ offer process including request to accept or deny offer, informing coordinator of any recent infections  -Reviewed timeframe after call with organ offer (process for admission to hospital and pre-op testing and work and timeframe)  -Discussed expected length of surgical procedure, expected inpatient length of stay post transplant, and potential to stay locally post transplant.  Pt lives locally. Pt confirmed her  will be assisting her post transplant.   Provided name and contact  information for additional questions or concerns. Pt expressed excellent understanding of all and was in good agreement with the plan.      Handoff staff message today to Marivel PATEL and Flor KIMBALL.

## 2023-01-26 NOTE — LETTER
2023    Ira Zamora  [address]  [address]  [place] [address]      Dear Ms. Zamora,    This letter is sent to confirm that you have completed your transplant work-up and you are a candidate in the kidney transplant program at the [facility]. You were placed on the kidney ACTIVE waitlist on 2023.  I have also faxed your Wait Time Transfer form to [employer] today to transfer your wait time from [facility] to your listing here.     You are now ACTIVE on the waitlist and when an organ becomes available, a coordinator will need to speak to you immediately.  You could be contacted at any time during the day or night as an organ could become available at any time.  Please make certain our office always has your current telephone numbers and address.      Items we will need from you:      We have received approval from your insurance company for the transplant procedure.  It is critical that you notify us if there is any change in your insurance.  It is also important that you familiarize yourself with the details of your specific insurance policy.  Our patient  is available to assist you if you should have any questions regarding your coverage.      An ALA or PRA blood sample needs to be sent here every 3 months to match you with  donors or any potential living donors. Your next sample is due by 2023. Special mailing boxes (called mailers) will be sent to you directly from the Outreach Department.  You should take the physician order form and the  to your dialysis unit when it is time to for this testing to be done.  Additional mailers can be obtained by calling the Transplant Office and asking to speak to a kidney .      During this waiting period, we may request additional periodic laboratory tests with your primary physician.  It will be your responsibility to remind your physician to forward your  results to the Transplant Office.      We need to be kept informed of any changes in your medical condition such as:    o changes in your medications,   o significant changes in your health  o significant infections (such as pneumonia or abscesses)  o blood transfusions  o any condition which requires hospitalization  o any surgery      Remember to complete any routine cancer screening tests required before your transplant.  This includes colonoscopy; prostate screening for men, and mammogram and gynecologic testing for women, as well as dental work.  Your primary care clinic can assist you with arranging for these exams.  Remind your caregivers to forward copies of the records and final reports.      We want you to know that our program has physician and surgeon coverage 24 hours a day, 365 days a year. In addition, our transplant surgeons and physicians will not be on call for two or more transplant programs more than 30 miles apart unless the circumstances have been reviewed and approved by the United Network for Organ Sharing (UNOS) Membership and Professional Standards Committee (MPSC). Finally, our primary physician and primary surgeons are not designated as the primary surgeon or primary physician at more than 1 transplant hospital. If this coverage changes or there are substantial program changes, you will be notified in writing by letter.     Attached is a letter from UNOS that describes the services and information offered to patients by UNOS and the Organ Procurement and Transplantation Network (OPTN).    We appreciate having had the opportunity to participate in your care.  If you have questions, please feel free to call the Transplant Office at [phone] or [phone].      Sincerely,  Solid Organ Transplant  [facility], [facility]      Enclosures: Telephone Contact List, Travel Resources, OS Letter,  Waitlist Information Update and While You Are Waiting  cc: Dr. Man Feldman, Dr. London Leroy, [facility]                           The Organ Procurement and Transplantation Network  Toll-free patient services line:     Your resource for organ transplant information    If you have a question regarding your own medical care, you always should call your transplant hospital first. However, for general organ transplant-related information, you can call the Organ Procurement and Transplantation Network (OPTN) toll-free patient services line at [phone]- [phone]. Anyone, including potential transplant candidates, candidates, recipients, family members, friends, living donors, and donor family members, can call this number to:          Talk about organ donation, living donation, the transplant process, the donation process, and transplant policies.    Get a free patient information kit with helpful booklets, waiting list and transplant information, and a list of all transplant hospitals.    Ask questions about the OPTN website (https://optn.transplant.[facility].gov/), the United Network for Organ Sharing s (UNOS) website (https://unos.org/), or the UNOS website for living donors and transplant recipients. (https://www.transplantliving.org/).    Learn how the OPTN can help you.    Talk about any concerns that you may have with a transplant hospital.    The nation s transplant system, the OPTN, is managed under federal contract by the United Network for Organ Sharing (UNOS), which is a non-profit charitable organization. The OPTN helps create and define organ sharing policies that make the best use of donated organs. This process continuously evaluating new advances and discoveries so policies can be adapted to best serve patients waiting for transplants. To do so, the OPTN works closely with transplant professionals, transplant patients, transplant candidates, donor families, living donors, and the public. All  transplant programs and organ procurement organizations throughout the country are OPTN members and are obligated to follow the policies the OPTN creates for allocating organs.    The OPTN also is responsible for:      Providing educational material for patients, the public, and professionals.    Raising awareness of the need for donated organs and tissue.    Coordinating organ procurement, matching, and placement.    Collecting information about every organ transplant and donation that occurs in the [place].    Remember, you should contact your transplant hospital directly if you have questions or concerns about your own medical care including medical records, work-up progress, and test results.    We are not your transplant hospital, and our staff will not be able to answer questions about your case, so please keep your transplant hospital s phone number handy.    However, while you research your transplant needs and learn as much as you can about transplantation and donation, we welcome your call to our toll-free patient services line at [phone].          Updated 4/1/2019

## 2023-01-27 ENCOUNTER — LAB REQUISITION (OUTPATIENT)
Dept: LAB | Facility: CLINIC | Age: 33
End: 2023-01-27
Payer: COMMERCIAL

## 2023-01-27 ENCOUNTER — DOCUMENTATION ONLY (OUTPATIENT)
Dept: TRANSPLANT | Facility: CLINIC | Age: 33
End: 2023-01-27

## 2023-01-27 LAB
PROTOCOL CUTOFF: NORMAL
UNACCEPTABLE ANTIGENS: NORMAL
UNOS CPRA: 0

## 2023-04-07 ENCOUNTER — LAB (OUTPATIENT)
Dept: LAB | Facility: CLINIC | Age: 33
End: 2023-04-07
Payer: COMMERCIAL

## 2023-04-07 DIAGNOSIS — Z76.82 AWAITING ORGAN TRANSPLANT: ICD-10-CM

## 2023-04-07 PROCEDURE — 86832 HLA CLASS I HIGH DEFIN QUAL: CPT

## 2023-04-07 PROCEDURE — 86833 HLA CLASS II HIGH DEFIN QUAL: CPT

## 2023-08-19 DIAGNOSIS — Z76.82 AWAITING ORGAN TRANSPLANT: Primary | ICD-10-CM

## 2023-08-20 ENCOUNTER — ORGAN (OUTPATIENT)
Dept: TRANSPLANT | Facility: CLINIC | Age: 33
End: 2023-08-20
Payer: MEDICARE

## 2023-08-20 ENCOUNTER — APPOINTMENT (OUTPATIENT)
Dept: LAB | Facility: CLINIC | Age: 33
End: 2023-08-20
Payer: MEDICARE

## 2023-08-20 DIAGNOSIS — Z76.82 AWAITING ORGAN TRANSPLANT: Primary | ICD-10-CM

## 2023-08-20 PROCEDURE — 86832 HLA CLASS I HIGH DEFIN QUAL: CPT | Performed by: TRANSPLANT SURGERY

## 2023-08-20 PROCEDURE — 86833 HLA CLASS II HIGH DEFIN QUAL: CPT | Performed by: TRANSPLANT SURGERY

## 2023-08-20 PROCEDURE — 36415 COLL VENOUS BLD VENIPUNCTURE: CPT

## 2023-08-20 PROCEDURE — 86825 HLA X-MATH NON-CYTOTOXIC: CPT | Performed by: TRANSPLANT SURGERY

## 2023-08-20 NOTE — TELEPHONE ENCOUNTER
There are three types of donors - living donors, brain dead donors, and DCD donors.  Living donation would be like if your family member donated an organ to you.  Brain death donors are  donors who have no brain stem reflexes whose bodies are being kept alive with life support.  And then there is your type of offer - DCD donation.    DCD stands for donation after cardiac death.  It is when the potential donor had a brain injury but did not progress to brain death, so the family is choosing to withdraw life support.  There is a chance that the donor may not pass in time suitable for donation and transplant, but without moving forward, we have no way of knowing for sure.    So the important part for you to know about this is kidneys from DCD donors have the same long term outcomes as kidneys from brain dead donors.  Sometimes they may take longer to come to full power after transplant. This is called 'delayed graft function' and may result in the need for some dialysis treatments for the first few days or weeks.

## 2023-08-21 ENCOUNTER — LAB REQUISITION (OUTPATIENT)
Dept: LAB | Facility: CLINIC | Age: 33
End: 2023-08-21
Payer: MEDICARE

## 2023-08-21 PROCEDURE — 86832 HLA CLASS I HIGH DEFIN QUAL: CPT | Performed by: TRANSPLANT SURGERY

## 2023-08-21 PROCEDURE — 86833 HLA CLASS II HIGH DEFIN QUAL: CPT | Performed by: TRANSPLANT SURGERY

## 2023-08-21 NOTE — TELEPHONE ENCOUNTER
Organ Offer Encounter Information    Organ Offer Information  Organ offer date & time: 8/20/2023  9:47 AM  Coordinator/Fellow/Attending name: Liseth Mcgill RN   Organ(s):  Organ UNOS ID Match Run ID Comment Organ Laterality   Kidney [id number] MNOP backup         Recent infections?: No      New medications?: No Recent pregnancy?: No     Angicoagulation medications?: No Recent vaccinations?: No     Recent blood transfusions?: No Recent hospitalizations?: No   Has your insurance changed in the last 6-12 months?: Neg    Patient last dialyzed: 8/18/2023  5:00 AM  Dialysis type: Hemo  Discussed organ offer with: Patient  Patient/Caregiver name: Ira Zamora  Discussed risk category with Patient/Other: DCD  Understood donor criteria, verbalized understanding  Patient/Other asked to speak to a surgeon?: No  Discussed program-specific outcomes: Did not have questions regarding SRTR  Right to decline organ offer without penalty, Patient/Other: Aware of option to decline without penalty  Organ offer decision status Patient/Other: Accepted Offer  Organ disposition: Case Cancelled - Other (specify)  Additional Comments: 8/20/2023 9:48 AM  Kidney: Backup local  MD: Dr Díaz  OPO Contact: Misty [phone]  VXM Results: Compatible per Dr Vicente-No DSA in 4/7/23 sample. Peak DSA  in 7/1/2020 sample.   XM Plan (FXM must be done with serum no older than 10 days from transplant): patient will come in to drop off blood for FXM   Is this an ABO A2 donor to ABO B Recipient:No    Plan (Admission, NPO, Donor OR): Patient to drop off blood for FXM and return home. Donor OR not set yet-probably will not happen until Monday 8/21/2023 per onsite coordinator.    FXM ordered.   1105-spoke with Du in Acute Lab-he confirmed he has FXM order.   1110-Spoke with Sid in Immunology-he confirmed he has donor blood and will do FXM this afternoon  - - -   COVID Screening  In the past month, have you:  Or anyone close to you had a  positive COVID test or suspected to have COVID: No  Had any COVID symptoms (Fever, Cough, Short of Breath, Loss of Taste/Smell, Rash): No  Liseth Mcgill, RT    1650: FXM results received. Results forwarded to Dr Díaz and Dr Martins.   1754: Spoke with Dr Martins. He discussed FXM results with Dr Díaz and we are good to move forward. Confirmed with Dr Martins we are ok to report FXM results as negative to patient. We are still backup for kidney. Per Dr Martins, once we get a set OR time, patient is to be called and instructed to be NPO 4 hours prior to set OR time. If we become primary for kidney, then patient can be admitted tomorrow am.  1819: Called and spoke with patient. She was informed FXM results are negative. She was told that she is still backup for kidney and there is no set OR time as of now, but we will keep her updated.  1836: Updated  7A Angelina Cordero RN that we do not need a bed tonight but there is a possibility we will need a bed for tomorrow am    Liseth Mcgill, RT    8/20/2023 7:08 PM:   Called Ira to give her my information and verify her understanding of the situation that she is 2nd on the list but still backup to multivisceral offers for this donor. DCD status reviewed and questions answered regarding the process. Informed that I will call with any updates if the donor OR is set otherwise she can expect to be called around 0700 tomorrow with the oncoming coordinators information for further updates tomorrow. Pt verbalized understanding and no further questions at this time.   Mallory Oconnell RN  Transplant Coordinator    August 21, 2023 7:13 AM  Called patient to provide updated contact info.  She is just finishing up at [facility].  Donor OR set for 1430, HR accepted on NRP.  Will follow up with patient after WIT determined.  Donor OR Time: 1430  Procuring MD: Pedrito  Contact in the OR: Martha  Organs Being Procured: HR on NRP & KI  Flush Solution: UW  Biopsy: No  Pump: Yes (In [place])  Special  Requests (Special blood tubes, nodes, waivers): No  MD for Visualization: Finger  Transportation Details: TBD  Paola Huang, RN   Transplant Coordinator    August 21, 2023 5:04 PM  WIT >60 min, Dr. Díaz declined offer.  Updated patient.  Paola Huang RN   Transplant Coordinator               Attestation I have discussed all of the above with the Patient/Legal Guardian/Caregiver regarding this organ offer.: Yes  Coordinator/Fellow/Attending name: Liesth Mcgill RN

## 2023-08-22 LAB
PROTOCOL CUTOFF: NORMAL
SA 1 CELL: NORMAL
SA 1 CELL: NORMAL
SA 1 TEST METHOD: NORMAL
SA 1 TEST METHOD: NORMAL
SA 2 CELL: NORMAL
SA 2 CELL: NORMAL
SA 2 TEST METHOD: NORMAL
SA 2 TEST METHOD: NORMAL
SA1 HI RISK ABY: NORMAL
SA1 HI RISK ABY: NORMAL
SA1 MOD RISK ABY: NORMAL
SA1 MOD RISK ABY: NORMAL
SA2 HI RISK ABY: NORMAL
SA2 HI RISK ABY: NORMAL
SA2 MOD RISK ABY: NORMAL
SA2 MOD RISK ABY: NORMAL
UNACCEPTABLE ANTIGENS: NORMAL
UNOS CPRA: 9
ZZZSA 1  COMMENTS: NORMAL
ZZZSA 1  COMMENTS: NORMAL
ZZZSA 2 COMMENTS: NORMAL
ZZZSA 2 COMMENTS: NORMAL

## 2023-08-23 LAB
CELL TYPE ALLO: NORMAL
CELL TYPE AUTO: NORMAL
CHANNELSHIFTALLOB1: -21
CHANNELSHIFTALLOB2: 1
CHANNELSHIFTALLOT1: 42
CHANNELSHIFTALLOT2: -6
CHANNELSHIFTAUTOB1: -20
CHANNELSHIFTAUTOB2: -16
CHANNELSHIFTAUTOT1: -14
CHANNELSHIFTAUTOT2: -25
CROSSMATCHDATEALLO: NORMAL
CROSSMATCHDATEAUTO: NORMAL
DONOR ALLO: NORMAL
DONOR AUTO: NORMAL
DONORCELLDATE ALLO: NORMAL
DONORCELLDATE AUTO: NORMAL
POS CUT OFF ALLO B: >69
POS CUT OFF ALLO T: >53
POS CUT OFF AUTO B: >69
POS CUT OFF AUTO T: >53
RESULT ALLO B1: NORMAL
RESULT ALLO B2: NORMAL
RESULT ALLO T1: NORMAL
RESULT ALLO T2: NORMAL
RESULT AUTO B1: NORMAL
RESULT AUTO B2: NORMAL
RESULT AUTO T1: NORMAL
RESULT AUTO T2: NORMAL
SERUM DATE ALLO B1: NORMAL
SERUM DATE ALLO B2: NORMAL
SERUM DATE ALLO T1: NORMAL
SERUM DATE ALLO T2: NORMAL
SERUM DATE AUTO B1: NORMAL
SERUM DATE AUTO B2: NORMAL
SERUM DATE AUTO T1: NORMAL
SERUM DATE AUTO T2: NORMAL
TESTMETHODALLO: NORMAL
TESTMETHODAUTO: NORMAL
TREATMENT ALLO B1: NORMAL
TREATMENT ALLO B2: NORMAL
TREATMENT ALLO T1: NORMAL
TREATMENT ALLO T2: NORMAL
TREATMENT AUTO B1: NORMAL
TREATMENT AUTO B2: NORMAL
TREATMENT AUTO T1: NORMAL
TREATMENT AUTO T2: NORMAL
ZZZCOMMENT ALLOB2: NORMAL

## 2023-11-16 DIAGNOSIS — Z01.810 PRE-OPERATIVE CARDIOVASCULAR EXAMINATION: ICD-10-CM

## 2023-11-16 DIAGNOSIS — Z76.82 ORGAN TRANSPLANT CANDIDATE: ICD-10-CM

## 2023-11-16 DIAGNOSIS — N18.6 ESRD (END STAGE RENAL DISEASE) (H): Primary | ICD-10-CM

## 2023-11-20 ENCOUNTER — LAB (OUTPATIENT)
Dept: LAB | Facility: CLINIC | Age: 33
End: 2023-11-20
Payer: MEDICARE

## 2023-11-20 DIAGNOSIS — Z76.82 AWAITING ORGAN TRANSPLANT: ICD-10-CM

## 2023-11-20 PROCEDURE — 86832 HLA CLASS I HIGH DEFIN QUAL: CPT

## 2023-11-20 PROCEDURE — 86833 HLA CLASS II HIGH DEFIN QUAL: CPT

## 2023-11-25 ENCOUNTER — HEALTH MAINTENANCE LETTER (OUTPATIENT)
Age: 33
End: 2023-11-25

## 2023-12-07 LAB
PROTOCOL CUTOFF: NORMAL
SA 1 CELL: NORMAL
SA 1 TEST METHOD: NORMAL
SA 2 CELL: NORMAL
SA 2 TEST METHOD: NORMAL
SA1 HI RISK ABY: NORMAL
SA1 MOD RISK ABY: NORMAL
SA2 HI RISK ABY: NORMAL
SA2 MOD RISK ABY: NORMAL
UNACCEPTABLE ANTIGENS: NORMAL
UNOS CPRA: 9
ZZZSA 1  COMMENTS: NORMAL
ZZZSA 2 COMMENTS: NORMAL

## 2024-01-04 ENCOUNTER — ANCILLARY PROCEDURE (OUTPATIENT)
Dept: CARDIOLOGY | Facility: CLINIC | Age: 34
End: 2024-01-04
Attending: NURSE PRACTITIONER
Payer: MEDICARE

## 2024-01-04 DIAGNOSIS — N18.6 ESRD (END STAGE RENAL DISEASE) (H): ICD-10-CM

## 2024-01-04 DIAGNOSIS — Z01.810 PRE-OPERATIVE CARDIOVASCULAR EXAMINATION: ICD-10-CM

## 2024-01-04 DIAGNOSIS — Z76.82 ORGAN TRANSPLANT CANDIDATE: ICD-10-CM

## 2024-01-04 LAB — LVEF ECHO: NORMAL

## 2024-01-04 PROCEDURE — 93306 TTE W/DOPPLER COMPLETE: CPT | Performed by: INTERNAL MEDICINE

## 2024-01-16 DIAGNOSIS — Z76.82 AWAITING ORGAN TRANSPLANT: Primary | ICD-10-CM

## 2024-01-28 ENCOUNTER — HEALTH MAINTENANCE LETTER (OUTPATIENT)
Age: 34
End: 2024-01-28

## 2024-02-27 ENCOUNTER — DOCUMENTATION ONLY (OUTPATIENT)
Dept: TRANSPLANT | Facility: CLINIC | Age: 34
End: 2024-02-27
Payer: MEDICARE

## 2024-02-28 ENCOUNTER — LAB (OUTPATIENT)
Dept: LAB | Facility: CLINIC | Age: 34
End: 2024-02-28
Payer: MEDICARE

## 2024-02-28 DIAGNOSIS — N18.6 ESRD (END STAGE RENAL DISEASE) (H): ICD-10-CM

## 2024-02-28 DIAGNOSIS — Z76.82 ORGAN TRANSPLANT CANDIDATE: ICD-10-CM

## 2024-02-28 PROCEDURE — 86832 HLA CLASS I HIGH DEFIN QUAL: CPT | Performed by: SURGERY

## 2024-02-28 PROCEDURE — 86833 HLA CLASS II HIGH DEFIN QUAL: CPT | Performed by: SURGERY

## 2024-02-29 DIAGNOSIS — Z76.82 ORGAN TRANSPLANT CANDIDATE: ICD-10-CM

## 2024-02-29 DIAGNOSIS — N18.6 ESRD (END STAGE RENAL DISEASE) (H): Primary | ICD-10-CM

## 2024-03-19 ENCOUNTER — DOCUMENTATION ONLY (OUTPATIENT)
Dept: TRANSPLANT | Facility: CLINIC | Age: 34
End: 2024-03-19
Payer: MEDICARE

## 2024-04-01 ENCOUNTER — TELEPHONE (OUTPATIENT)
Dept: TRANSPLANT | Facility: CLINIC | Age: 34
End: 2024-04-01
Payer: MEDICARE

## 2024-04-17 ENCOUNTER — APPOINTMENT (OUTPATIENT)
Dept: GENERAL RADIOLOGY | Facility: CLINIC | Age: 34
DRG: 652 | End: 2024-04-17
Attending: NURSE PRACTITIONER
Payer: MEDICARE

## 2024-04-17 ENCOUNTER — HOSPITAL ENCOUNTER (INPATIENT)
Facility: CLINIC | Age: 34
LOS: 3 days | Discharge: HOME HEALTH | DRG: 652 | End: 2024-04-21
Attending: TRANSPLANT SURGERY | Admitting: TRANSPLANT SURGERY
Payer: MEDICARE

## 2024-04-17 ENCOUNTER — ANESTHESIA EVENT (OUTPATIENT)
Dept: SURGERY | Facility: CLINIC | Age: 34
DRG: 652 | End: 2024-04-17
Payer: MEDICARE

## 2024-04-17 ENCOUNTER — APPOINTMENT (OUTPATIENT)
Dept: GENERAL RADIOLOGY | Facility: CLINIC | Age: 34
DRG: 652 | End: 2024-04-17
Attending: STUDENT IN AN ORGANIZED HEALTH CARE EDUCATION/TRAINING PROGRAM
Payer: MEDICARE

## 2024-04-17 ENCOUNTER — DOCUMENTATION ONLY (OUTPATIENT)
Dept: TRANSPLANT | Facility: CLINIC | Age: 34
End: 2024-04-17
Payer: MEDICARE

## 2024-04-17 ENCOUNTER — ANESTHESIA (OUTPATIENT)
Dept: SURGERY | Facility: CLINIC | Age: 34
DRG: 652 | End: 2024-04-17
Payer: MEDICARE

## 2024-04-17 ENCOUNTER — APPOINTMENT (OUTPATIENT)
Dept: ULTRASOUND IMAGING | Facility: CLINIC | Age: 34
DRG: 652 | End: 2024-04-17
Attending: STUDENT IN AN ORGANIZED HEALTH CARE EDUCATION/TRAINING PROGRAM
Payer: MEDICARE

## 2024-04-17 ENCOUNTER — ORGAN (OUTPATIENT)
Dept: TRANSPLANT | Facility: CLINIC | Age: 34
End: 2024-04-17
Payer: MEDICARE

## 2024-04-17 DIAGNOSIS — Z94.0 KIDNEY REPLACED BY TRANSPLANT: Primary | Chronic | ICD-10-CM

## 2024-04-17 PROBLEM — Z76.82 ORGAN TRANSPLANT CANDIDATE: Status: ACTIVE | Noted: 2024-04-17

## 2024-04-17 PROBLEM — R94.31 LONG QT INTERVAL: Status: ACTIVE | Noted: 2024-04-17

## 2024-04-17 LAB
ABO/RH(D): NORMAL
ALBUMIN MFR UR ELPH: 83.3 MG/DL
ALBUMIN SERPL BCG-MCNC: 4.1 G/DL (ref 3.5–5.2)
ALBUMIN UR-MCNC: 100 MG/DL
ALP SERPL-CCNC: 55 U/L (ref 40–150)
ALT SERPL W P-5'-P-CCNC: 5 U/L (ref 0–50)
ANION GAP SERPL CALCULATED.3IONS-SCNC: 11 MMOL/L (ref 7–15)
ANION GAP SERPL CALCULATED.3IONS-SCNC: 11 MMOL/L (ref 7–15)
ANTIBODY SCREEN: NEGATIVE
APPEARANCE UR: CLEAR
APTT PPP: 27 SECONDS (ref 22–38)
AST SERPL W P-5'-P-CCNC: 17 U/L (ref 0–45)
BASE EXCESS BLDA CALC-SCNC: 7 MMOL/L (ref -3–3)
BASOPHILS # BLD AUTO: 0 10E3/UL (ref 0–0.2)
BASOPHILS NFR BLD AUTO: 1 %
BILIRUB SERPL-MCNC: 0.6 MG/DL
BILIRUB UR QL STRIP: NEGATIVE
BLD PROD TYP BPU: NORMAL
BLD PROD TYP BPU: NORMAL
BLOOD COMPONENT TYPE: NORMAL
BLOOD COMPONENT TYPE: NORMAL
BUN SERPL-MCNC: 11.6 MG/DL (ref 6–20)
BUN SERPL-MCNC: 16 MG/DL (ref 6–20)
CA-I BLD-MCNC: 4.5 MG/DL (ref 4.4–5.2)
CALCIUM SERPL-MCNC: 8.7 MG/DL (ref 8.6–10)
CALCIUM SERPL-MCNC: 9.3 MG/DL (ref 8.6–10)
CHLORIDE SERPL-SCNC: 95 MMOL/L (ref 98–107)
CHLORIDE SERPL-SCNC: 96 MMOL/L (ref 98–107)
CHOLEST SERPL-MCNC: 177 MG/DL
CMV DNA SPEC NAA+PROBE-ACNC: NOT DETECTED IU/ML
CMV IGG SERPL IA-ACNC: <0.2 U/ML
CMV IGG SERPL IA-ACNC: NORMAL
CODING SYSTEM: NORMAL
CODING SYSTEM: NORMAL
COLOR UR AUTO: ABNORMAL
CREAT SERPL-MCNC: 3.11 MG/DL (ref 0.51–0.95)
CREAT SERPL-MCNC: 3.92 MG/DL (ref 0.51–0.95)
CREAT UR-MCNC: 8.2 MG/DL
CROSSMATCH: NORMAL
CROSSMATCH: NORMAL
DEPRECATED HCO3 PLAS-SCNC: 29 MMOL/L (ref 22–29)
DEPRECATED HCO3 PLAS-SCNC: 33 MMOL/L (ref 22–29)
EBV VCA IGG SER IA-ACNC: >750 U/ML
EBV VCA IGG SER IA-ACNC: POSITIVE
EBV VCA IGM SER IA-ACNC: <10 U/ML
EBV VCA IGM SER IA-ACNC: NORMAL
EGFRCR SERPLBLD CKD-EPI 2021: 15 ML/MIN/1.73M2
EGFRCR SERPLBLD CKD-EPI 2021: 19 ML/MIN/1.73M2
EOSINOPHIL # BLD AUTO: 0.2 10E3/UL (ref 0–0.7)
EOSINOPHIL NFR BLD AUTO: 3 %
ERYTHROCYTE [DISTWIDTH] IN BLOOD BY AUTOMATED COUNT: 12.9 % (ref 10–15)
ERYTHROCYTE [DISTWIDTH] IN BLOOD BY AUTOMATED COUNT: 13 % (ref 10–15)
FASTING STATUS PATIENT QL REPORTED: NO
GLUCOSE BLD-MCNC: 168 MG/DL (ref 70–99)
GLUCOSE BLDC GLUCOMTR-MCNC: 96 MG/DL (ref 70–99)
GLUCOSE SERPL-MCNC: 166 MG/DL (ref 70–99)
GLUCOSE SERPL-MCNC: 91 MG/DL (ref 70–99)
GLUCOSE UR STRIP-MCNC: 70 MG/DL
HBA1C MFR BLD: 4.7 %
HBV CORE AB SERPL QL IA: NONREACTIVE
HBV SURFACE AB SERPL IA-ACNC: 110 M[IU]/ML
HBV SURFACE AB SERPL IA-ACNC: REACTIVE M[IU]/ML
HBV SURFACE AG SERPL QL IA: NONREACTIVE
HCG INTACT+B SERPL-ACNC: 1 MIU/ML
HCO3 BLDA-SCNC: 31 MMOL/L (ref 21–28)
HCT VFR BLD AUTO: 29.5 % (ref 35–47)
HCT VFR BLD AUTO: 31.3 % (ref 35–47)
HCV AB SERPL QL IA: NONREACTIVE
HDLC SERPL-MCNC: 40 MG/DL
HGB BLD-MCNC: 10.5 G/DL (ref 11.7–15.7)
HGB BLD-MCNC: 9.6 G/DL (ref 11.7–15.7)
HGB BLD-MCNC: 9.6 G/DL (ref 11.7–15.7)
HGB BLD-MCNC: 9.7 G/DL (ref 11.7–15.7)
HGB UR QL STRIP: ABNORMAL
HIV 1+2 AB+HIV1 P24 AG SERPL QL IA: NONREACTIVE
IMM GRANULOCYTES # BLD: 0.1 10E3/UL
IMM GRANULOCYTES NFR BLD: 1 %
INR PPP: 1.2 (ref 0.85–1.15)
KETONES UR STRIP-MCNC: NEGATIVE MG/DL
LACTATE BLD-SCNC: 0.8 MMOL/L (ref 0.7–2)
LDLC SERPL CALC-MCNC: 109 MG/DL
LEUKOCYTE ESTERASE UR QL STRIP: NEGATIVE
LYMPHOCYTES # BLD AUTO: 1.6 10E3/UL (ref 0.8–5.3)
LYMPHOCYTES NFR BLD AUTO: 26 %
MAGNESIUM SERPL-MCNC: 2.5 MG/DL (ref 1.7–2.3)
MCH RBC QN AUTO: 30.8 PG (ref 26.5–33)
MCH RBC QN AUTO: 31.5 PG (ref 26.5–33)
MCHC RBC AUTO-ENTMCNC: 32.5 G/DL (ref 31.5–36.5)
MCHC RBC AUTO-ENTMCNC: 33.5 G/DL (ref 31.5–36.5)
MCV RBC AUTO: 94 FL (ref 78–100)
MCV RBC AUTO: 95 FL (ref 78–100)
MONOCYTES # BLD AUTO: 0.4 10E3/UL (ref 0–1.3)
MONOCYTES NFR BLD AUTO: 6 %
NEUTROPHILS # BLD AUTO: 3.9 10E3/UL (ref 1.6–8.3)
NEUTROPHILS NFR BLD AUTO: 63 %
NITRATE UR QL: NEGATIVE
NONHDLC SERPL-MCNC: 137 MG/DL
NRBC # BLD AUTO: 0 10E3/UL
NRBC BLD AUTO-RTO: 0 /100
O2/TOTAL GAS SETTING VFR VENT: 46 %
OXYHGB MFR BLDA: 91 % (ref 92–100)
PCO2 BLDA: 38 MM HG (ref 35–45)
PH BLDA: 7.51 [PH] (ref 7.35–7.45)
PH UR STRIP: 8.5 [PH] (ref 5–7)
PHOSPHATE SERPL-MCNC: 2.7 MG/DL (ref 2.5–4.5)
PLATELET # BLD AUTO: 122 10E3/UL (ref 150–450)
PLATELET # BLD AUTO: 91 10E3/UL (ref 150–450)
PO2 BLDA: 61 MM HG (ref 80–105)
POTASSIUM BLD-SCNC: 3.7 MMOL/L (ref 3.4–5.3)
POTASSIUM SERPL-SCNC: 3.4 MMOL/L (ref 3.4–5.3)
POTASSIUM SERPL-SCNC: 3.9 MMOL/L (ref 3.4–5.3)
POTASSIUM SERPL-SCNC: 3.9 MMOL/L (ref 3.4–5.3)
PROT SERPL-MCNC: 6.5 G/DL (ref 6.4–8.3)
PROT/CREAT 24H UR: 10.16 MG/MG CR (ref 0–0.2)
RBC # BLD AUTO: 3.12 10E6/UL (ref 3.8–5.2)
RBC # BLD AUTO: 3.33 10E6/UL (ref 3.8–5.2)
RBC URINE: 2 /HPF
SAO2 % BLDA: 94 % (ref 96–97)
SARS-COV-2 RNA RESP QL NAA+PROBE: NEGATIVE
SODIUM BLD-SCNC: 136 MMOL/L (ref 135–145)
SODIUM SERPL-SCNC: 136 MMOL/L (ref 135–145)
SODIUM SERPL-SCNC: 139 MMOL/L (ref 135–145)
SP GR UR STRIP: 1 (ref 1–1.03)
SPECIMEN EXPIRATION DATE: NORMAL
SQUAMOUS EPITHELIAL: 1 /HPF
TRIGL SERPL-MCNC: 142 MG/DL
UNIT ABO/RH: NORMAL
UNIT ABO/RH: NORMAL
UNIT NUMBER: NORMAL
UNIT NUMBER: NORMAL
UNIT STATUS: NORMAL
UNIT STATUS: NORMAL
UNIT TYPE ISBT: 5100
UNIT TYPE ISBT: 5100
UROBILINOGEN UR STRIP-MCNC: NORMAL MG/DL
WBC # BLD AUTO: 6.1 10E3/UL (ref 4–11)
WBC # BLD AUTO: 8.1 10E3/UL (ref 4–11)
WBC URINE: <1 /HPF

## 2024-04-17 PROCEDURE — 30243S1 TRANSFUSION OF NONAUTOLOGOUS GLOBULIN INTO CENTRAL VEIN, PERCUTANEOUS APPROACH: ICD-10-PCS | Performed by: STUDENT IN AN ORGANIZED HEALTH CARE EDUCATION/TRAINING PROGRAM

## 2024-04-17 PROCEDURE — 999N000141 HC STATISTIC PRE-PROCEDURE NURSING ASSESSMENT: Performed by: TRANSPLANT SURGERY

## 2024-04-17 PROCEDURE — 83036 HEMOGLOBIN GLYCOSYLATED A1C: CPT | Performed by: NURSE PRACTITIONER

## 2024-04-17 PROCEDURE — 250N000011 HC RX IP 250 OP 636: Performed by: STUDENT IN AN ORGANIZED HEALTH CARE EDUCATION/TRAINING PROGRAM

## 2024-04-17 PROCEDURE — 80053 COMPREHEN METABOLIC PANEL: CPT | Performed by: NURSE PRACTITIONER

## 2024-04-17 PROCEDURE — 250N000011 HC RX IP 250 OP 636: Performed by: ANESTHESIOLOGY

## 2024-04-17 PROCEDURE — 86706 HEP B SURFACE ANTIBODY: CPT | Performed by: NURSE PRACTITIONER

## 2024-04-17 PROCEDURE — 87389 HIV-1 AG W/HIV-1&-2 AB AG IA: CPT | Performed by: NURSE PRACTITIONER

## 2024-04-17 PROCEDURE — 250N000009 HC RX 250

## 2024-04-17 PROCEDURE — 250N000025 HC SEVOFLURANE, PER MIN: Performed by: TRANSPLANT SURGERY

## 2024-04-17 PROCEDURE — 87340 HEPATITIS B SURFACE AG IA: CPT | Performed by: NURSE PRACTITIONER

## 2024-04-17 PROCEDURE — 250N000013 HC RX MED GY IP 250 OP 250 PS 637: Performed by: NURSE PRACTITIONER

## 2024-04-17 PROCEDURE — 76776 US EXAM K TRANSPL W/DOPPLER: CPT

## 2024-04-17 PROCEDURE — 82962 GLUCOSE BLOOD TEST: CPT

## 2024-04-17 PROCEDURE — 86833 HLA CLASS II HIGH DEFIN QUAL: CPT | Performed by: NURSE PRACTITIONER

## 2024-04-17 PROCEDURE — 812N000003 HC ACQUISITION KIDNEY CADAVER

## 2024-04-17 PROCEDURE — 250N000009 HC RX 250: Performed by: ANESTHESIOLOGY

## 2024-04-17 PROCEDURE — 272N000001 HC OR GENERAL SUPPLY STERILE: Performed by: TRANSPLANT SURGERY

## 2024-04-17 PROCEDURE — 50360 RNL ALTRNSPLJ W/O RCP NFRCT: CPT | Mod: RT | Performed by: TRANSPLANT SURGERY

## 2024-04-17 PROCEDURE — 86825 HLA X-MATH NON-CYTOTOXIC: CPT | Performed by: NURSE PRACTITIONER

## 2024-04-17 PROCEDURE — 250N000011 HC RX IP 250 OP 636: Performed by: TRANSPLANT SURGERY

## 2024-04-17 PROCEDURE — 86803 HEPATITIS C AB TEST: CPT | Performed by: NURSE PRACTITIONER

## 2024-04-17 PROCEDURE — 87635 SARS-COV-2 COVID-19 AMP PRB: CPT | Performed by: NURSE PRACTITIONER

## 2024-04-17 PROCEDURE — 76776 US EXAM K TRANSPL W/DOPPLER: CPT | Mod: 26 | Performed by: RADIOLOGY

## 2024-04-17 PROCEDURE — 86704 HEP B CORE ANTIBODY TOTAL: CPT | Performed by: NURSE PRACTITIONER

## 2024-04-17 PROCEDURE — 258N000003 HC RX IP 258 OP 636: Performed by: TRANSPLANT SURGERY

## 2024-04-17 PROCEDURE — 86900 BLOOD TYPING SEROLOGIC ABO: CPT | Performed by: NURSE PRACTITIONER

## 2024-04-17 PROCEDURE — 360N000077 HC SURGERY LEVEL 4, PER MIN: Performed by: TRANSPLANT SURGERY

## 2024-04-17 PROCEDURE — 258N000003 HC RX IP 258 OP 636: Performed by: ANESTHESIOLOGY

## 2024-04-17 PROCEDURE — 999N000128 HC STATISTIC PERIPHERAL IV START W/O US GUIDANCE

## 2024-04-17 PROCEDURE — 86923 COMPATIBILITY TEST ELECTRIC: CPT

## 2024-04-17 PROCEDURE — 85027 COMPLETE CBC AUTOMATED: CPT | Performed by: STUDENT IN AN ORGANIZED HEALTH CARE EDUCATION/TRAINING PROGRAM

## 2024-04-17 PROCEDURE — 85730 THROMBOPLASTIN TIME PARTIAL: CPT | Performed by: NURSE PRACTITIONER

## 2024-04-17 PROCEDURE — 84295 ASSAY OF SERUM SODIUM: CPT | Performed by: STUDENT IN AN ORGANIZED HEALTH CARE EDUCATION/TRAINING PROGRAM

## 2024-04-17 PROCEDURE — 84100 ASSAY OF PHOSPHORUS: CPT | Performed by: STUDENT IN AN ORGANIZED HEALTH CARE EDUCATION/TRAINING PROGRAM

## 2024-04-17 PROCEDURE — 80061 LIPID PANEL: CPT | Performed by: NURSE PRACTITIONER

## 2024-04-17 PROCEDURE — 710N000010 HC RECOVERY PHASE 1, LEVEL 2, PER MIN: Performed by: TRANSPLANT SURGERY

## 2024-04-17 PROCEDURE — 84702 CHORIONIC GONADOTROPIN TEST: CPT | Performed by: NURSE PRACTITIONER

## 2024-04-17 PROCEDURE — 250N000011 HC RX IP 250 OP 636

## 2024-04-17 PROCEDURE — 85025 COMPLETE CBC W/AUTO DIFF WBC: CPT | Performed by: NURSE PRACTITIONER

## 2024-04-17 PROCEDURE — 81001 URINALYSIS AUTO W/SCOPE: CPT | Performed by: NURSE PRACTITIONER

## 2024-04-17 PROCEDURE — 0TY00Z0 TRANSPLANTATION OF RIGHT KIDNEY, ALLOGENEIC, OPEN APPROACH: ICD-10-PCS | Performed by: STUDENT IN AN ORGANIZED HEALTH CARE EDUCATION/TRAINING PROGRAM

## 2024-04-17 PROCEDURE — 87516 HEPATITIS B DNA AMP PROBE: CPT | Performed by: NURSE PRACTITIONER

## 2024-04-17 PROCEDURE — 36415 COLL VENOUS BLD VENIPUNCTURE: CPT | Performed by: NURSE PRACTITIONER

## 2024-04-17 PROCEDURE — 99223 1ST HOSP IP/OBS HIGH 75: CPT | Mod: AI

## 2024-04-17 PROCEDURE — 250N000011 HC RX IP 250 OP 636: Performed by: NURSE PRACTITIONER

## 2024-04-17 PROCEDURE — C2617 STENT, NON-COR, TEM W/O DEL: HCPCS | Performed by: TRANSPLANT SURGERY

## 2024-04-17 PROCEDURE — 370N000017 HC ANESTHESIA TECHNICAL FEE, PER MIN: Performed by: TRANSPLANT SURGERY

## 2024-04-17 PROCEDURE — 83735 ASSAY OF MAGNESIUM: CPT | Performed by: STUDENT IN AN ORGANIZED HEALTH CARE EDUCATION/TRAINING PROGRAM

## 2024-04-17 PROCEDURE — 86790 VIRUS ANTIBODY NOS: CPT | Performed by: NURSE PRACTITIONER

## 2024-04-17 PROCEDURE — 258N000003 HC RX IP 258 OP 636: Performed by: NURSE PRACTITIONER

## 2024-04-17 PROCEDURE — 999N000065 XR CHEST PORT 1 VIEW

## 2024-04-17 PROCEDURE — 86665 EPSTEIN-BARR CAPSID VCA: CPT | Performed by: NURSE PRACTITIONER

## 2024-04-17 PROCEDURE — 82330 ASSAY OF CALCIUM: CPT

## 2024-04-17 PROCEDURE — 86644 CMV ANTIBODY: CPT | Performed by: NURSE PRACTITIONER

## 2024-04-17 PROCEDURE — 71046 X-RAY EXAM CHEST 2 VIEWS: CPT

## 2024-04-17 PROCEDURE — 258N000003 HC RX IP 258 OP 636: Performed by: STUDENT IN AN ORGANIZED HEALTH CARE EDUCATION/TRAINING PROGRAM

## 2024-04-17 PROCEDURE — 85610 PROTHROMBIN TIME: CPT | Performed by: NURSE PRACTITIONER

## 2024-04-17 PROCEDURE — 71046 X-RAY EXAM CHEST 2 VIEWS: CPT | Mod: 26 | Performed by: RADIOLOGY

## 2024-04-17 PROCEDURE — 999N000054 HC STATISTIC EKG NON-CHARGEABLE

## 2024-04-17 PROCEDURE — 84156 ASSAY OF PROTEIN URINE: CPT | Performed by: NURSE PRACTITIONER

## 2024-04-17 PROCEDURE — 71045 X-RAY EXAM CHEST 1 VIEW: CPT | Mod: 26 | Performed by: RADIOLOGY

## 2024-04-17 PROCEDURE — 86832 HLA CLASS I HIGH DEFIN QUAL: CPT | Performed by: NURSE PRACTITIONER

## 2024-04-17 DEVICE — URETERAL STENT
Type: IMPLANTABLE DEVICE | Site: URETER | Status: NON-FUNCTIONAL
Brand: PERCUFLEX™ PLUS
Removed: 2024-05-23

## 2024-04-17 RX ORDER — HYDROMORPHONE HCL IN WATER/PF 6 MG/30 ML
0.4 PATIENT CONTROLLED ANALGESIA SYRINGE INTRAVENOUS EVERY 5 MIN PRN
Status: CANCELLED | OUTPATIENT
Start: 2024-04-17

## 2024-04-17 RX ORDER — FUROSEMIDE 10 MG/ML
INJECTION INTRAMUSCULAR; INTRAVENOUS PRN
Status: DISCONTINUED | OUTPATIENT
Start: 2024-04-17 | End: 2024-04-17

## 2024-04-17 RX ORDER — ATORVASTATIN CALCIUM 10 MG/1
10 TABLET, FILM COATED ORAL DAILY
Status: DISCONTINUED | OUTPATIENT
Start: 2024-04-18 | End: 2024-04-21 | Stop reason: HOSPADM

## 2024-04-17 RX ORDER — VALGANCICLOVIR 450 MG/1
450 TABLET, FILM COATED ORAL
Status: DISCONTINUED | OUTPATIENT
Start: 2024-04-18 | End: 2024-04-21 | Stop reason: HOSPADM

## 2024-04-17 RX ORDER — FUROSEMIDE 10 MG/ML
80 INJECTION INTRAMUSCULAR; INTRAVENOUS
Status: DISCONTINUED | OUTPATIENT
Start: 2024-04-18 | End: 2024-04-18

## 2024-04-17 RX ORDER — MAGNESIUM OXIDE 400 MG/1
400 TABLET ORAL
Status: DISCONTINUED | OUTPATIENT
Start: 2024-04-19 | End: 2024-04-21 | Stop reason: HOSPADM

## 2024-04-17 RX ORDER — DEXAMETHASONE SODIUM PHOSPHATE 4 MG/ML
4 INJECTION, SOLUTION INTRA-ARTICULAR; INTRALESIONAL; INTRAMUSCULAR; INTRAVENOUS; SOFT TISSUE
Status: CANCELLED | OUTPATIENT
Start: 2024-04-17

## 2024-04-17 RX ORDER — CEFUROXIME SODIUM 1.5 G/16ML
1.5 INJECTION, POWDER, FOR SOLUTION INTRAVENOUS ONCE
Status: COMPLETED | OUTPATIENT
Start: 2024-04-17 | End: 2024-04-17

## 2024-04-17 RX ORDER — ACETAMINOPHEN 325 MG/1
975 TABLET ORAL ONCE
Status: COMPLETED | OUTPATIENT
Start: 2024-04-17 | End: 2024-04-17

## 2024-04-17 RX ORDER — HYDRALAZINE HYDROCHLORIDE 20 MG/ML
2.5-5 INJECTION INTRAMUSCULAR; INTRAVENOUS EVERY 10 MIN PRN
Status: DISCONTINUED | OUTPATIENT
Start: 2024-04-17 | End: 2024-04-18 | Stop reason: HOSPADM

## 2024-04-17 RX ORDER — SODIUM CHLORIDE, SODIUM LACTATE, POTASSIUM CHLORIDE, CALCIUM CHLORIDE 600; 310; 30; 20 MG/100ML; MG/100ML; MG/100ML; MG/100ML
INJECTION, SOLUTION INTRAVENOUS CONTINUOUS
Status: DISCONTINUED | OUTPATIENT
Start: 2024-04-17 | End: 2024-04-17 | Stop reason: HOSPADM

## 2024-04-17 RX ORDER — PROPOFOL 10 MG/ML
INJECTION, EMULSION INTRAVENOUS PRN
Status: DISCONTINUED | OUTPATIENT
Start: 2024-04-17 | End: 2024-04-17

## 2024-04-17 RX ORDER — FENTANYL CITRATE 50 UG/ML
50 INJECTION, SOLUTION INTRAMUSCULAR; INTRAVENOUS EVERY 5 MIN PRN
Status: DISCONTINUED | OUTPATIENT
Start: 2024-04-17 | End: 2024-04-18 | Stop reason: HOSPADM

## 2024-04-17 RX ORDER — FENTANYL CITRATE 50 UG/ML
INJECTION, SOLUTION INTRAMUSCULAR; INTRAVENOUS PRN
Status: DISCONTINUED | OUTPATIENT
Start: 2024-04-17 | End: 2024-04-17

## 2024-04-17 RX ORDER — PROPOFOL 10 MG/ML
INJECTION, EMULSION INTRAVENOUS CONTINUOUS PRN
Status: DISCONTINUED | OUTPATIENT
Start: 2024-04-17 | End: 2024-04-17

## 2024-04-17 RX ORDER — SULFAMETHOXAZOLE AND TRIMETHOPRIM 400; 80 MG/1; MG/1
1 TABLET ORAL
Status: DISCONTINUED | OUTPATIENT
Start: 2024-04-18 | End: 2024-04-21 | Stop reason: HOSPADM

## 2024-04-17 RX ORDER — MEPERIDINE HYDROCHLORIDE 25 MG/ML
12.5 INJECTION INTRAMUSCULAR; INTRAVENOUS; SUBCUTANEOUS EVERY 5 MIN PRN
Status: CANCELLED | OUTPATIENT
Start: 2024-04-17

## 2024-04-17 RX ORDER — MYCOPHENOLATE MOFETIL 250 MG/1
750 CAPSULE ORAL
Status: DISCONTINUED | OUTPATIENT
Start: 2024-04-18 | End: 2024-04-21 | Stop reason: HOSPADM

## 2024-04-17 RX ORDER — HYDROMORPHONE HYDROCHLORIDE 1 MG/ML
0.2 INJECTION, SOLUTION INTRAMUSCULAR; INTRAVENOUS; SUBCUTANEOUS
Status: DISCONTINUED | OUTPATIENT
Start: 2024-04-17 | End: 2024-04-18

## 2024-04-17 RX ORDER — SODIUM CHLORIDE 450 MG/100ML
INJECTION, SOLUTION INTRAVENOUS CONTINUOUS PRN
Status: DISCONTINUED | OUTPATIENT
Start: 2024-04-17 | End: 2024-04-18

## 2024-04-17 RX ORDER — FENTANYL CITRATE 50 UG/ML
25 INJECTION, SOLUTION INTRAMUSCULAR; INTRAVENOUS EVERY 5 MIN PRN
Status: DISCONTINUED | OUTPATIENT
Start: 2024-04-17 | End: 2024-04-18 | Stop reason: HOSPADM

## 2024-04-17 RX ORDER — NALOXONE HYDROCHLORIDE 0.4 MG/ML
0.1 INJECTION, SOLUTION INTRAMUSCULAR; INTRAVENOUS; SUBCUTANEOUS
Status: CANCELLED | OUTPATIENT
Start: 2024-04-17

## 2024-04-17 RX ORDER — LABETALOL HYDROCHLORIDE 5 MG/ML
10 INJECTION, SOLUTION INTRAVENOUS
Status: CANCELLED | OUTPATIENT
Start: 2024-04-17

## 2024-04-17 RX ORDER — SODIUM CHLORIDE 9 MG/ML
1000 INJECTION, SOLUTION INTRAVENOUS CONTINUOUS PRN
Status: DISCONTINUED | OUTPATIENT
Start: 2024-04-17 | End: 2024-04-18

## 2024-04-17 RX ORDER — SODIUM CHLORIDE, SODIUM LACTATE, POTASSIUM CHLORIDE, CALCIUM CHLORIDE 600; 310; 30; 20 MG/100ML; MG/100ML; MG/100ML; MG/100ML
INJECTION, SOLUTION INTRAVENOUS CONTINUOUS
Status: CANCELLED | OUTPATIENT
Start: 2024-04-17

## 2024-04-17 RX ORDER — HYDROMORPHONE HYDROCHLORIDE 1 MG/ML
0.2 INJECTION, SOLUTION INTRAMUSCULAR; INTRAVENOUS; SUBCUTANEOUS EVERY 5 MIN PRN
Status: DISCONTINUED | OUTPATIENT
Start: 2024-04-17 | End: 2024-04-18 | Stop reason: HOSPADM

## 2024-04-17 RX ORDER — FENTANYL CITRATE 50 UG/ML
25 INJECTION, SOLUTION INTRAMUSCULAR; INTRAVENOUS EVERY 5 MIN PRN
Status: CANCELLED | OUTPATIENT
Start: 2024-04-17

## 2024-04-17 RX ORDER — MAGNESIUM SULFATE HEPTAHYDRATE 40 MG/ML
2 INJECTION, SOLUTION INTRAVENOUS ONCE
Status: COMPLETED | OUTPATIENT
Start: 2024-04-17 | End: 2024-04-17

## 2024-04-17 RX ORDER — SODIUM CHLORIDE, SODIUM GLUCONATE, SODIUM ACETATE, POTASSIUM CHLORIDE AND MAGNESIUM CHLORIDE 526; 502; 368; 37; 30 MG/100ML; MG/100ML; MG/100ML; MG/100ML; MG/100ML
INJECTION, SOLUTION INTRAVENOUS CONTINUOUS PRN
Status: DISCONTINUED | OUTPATIENT
Start: 2024-04-17 | End: 2024-04-17

## 2024-04-17 RX ORDER — SODIUM CHLORIDE, SODIUM LACTATE, POTASSIUM CHLORIDE, CALCIUM CHLORIDE 600; 310; 30; 20 MG/100ML; MG/100ML; MG/100ML; MG/100ML
INJECTION, SOLUTION INTRAVENOUS CONTINUOUS
Status: DISCONTINUED | OUTPATIENT
Start: 2024-04-17 | End: 2024-04-18 | Stop reason: HOSPADM

## 2024-04-17 RX ORDER — TACROLIMUS 1 MG/1
0.03 CAPSULE ORAL
Status: DISCONTINUED | OUTPATIENT
Start: 2024-04-18 | End: 2024-04-20

## 2024-04-17 RX ORDER — ACETAMINOPHEN 325 MG/1
650 TABLET ORAL EVERY 4 HOURS PRN
Status: DISCONTINUED | OUTPATIENT
Start: 2024-04-20 | End: 2024-04-21 | Stop reason: HOSPADM

## 2024-04-17 RX ORDER — OXYCODONE HYDROCHLORIDE 10 MG/1
10 TABLET ORAL EVERY 4 HOURS PRN
Status: DISCONTINUED | OUTPATIENT
Start: 2024-04-17 | End: 2024-04-18

## 2024-04-17 RX ORDER — EPHEDRINE SULFATE 50 MG/ML
INJECTION, SOLUTION INTRAMUSCULAR; INTRAVENOUS; SUBCUTANEOUS PRN
Status: DISCONTINUED | OUTPATIENT
Start: 2024-04-17 | End: 2024-04-17

## 2024-04-17 RX ORDER — CEFUROXIME SODIUM 1.5 G/16ML
1.5 INJECTION, POWDER, FOR SOLUTION INTRAVENOUS SEE ADMIN INSTRUCTIONS
Status: DISCONTINUED | OUTPATIENT
Start: 2024-04-17 | End: 2024-04-17 | Stop reason: HOSPADM

## 2024-04-17 RX ORDER — LIDOCAINE 40 MG/G
CREAM TOPICAL
Status: DISCONTINUED | OUTPATIENT
Start: 2024-04-17 | End: 2024-04-17 | Stop reason: HOSPADM

## 2024-04-17 RX ORDER — ONDANSETRON 2 MG/ML
4 INJECTION INTRAMUSCULAR; INTRAVENOUS EVERY 6 HOURS PRN
Status: DISCONTINUED | OUTPATIENT
Start: 2024-04-17 | End: 2024-04-21 | Stop reason: HOSPADM

## 2024-04-17 RX ORDER — MANNITOL 20 G/100ML
INJECTION, SOLUTION INTRAVENOUS PRN
Status: DISCONTINUED | OUTPATIENT
Start: 2024-04-17 | End: 2024-04-17

## 2024-04-17 RX ORDER — HYDRALAZINE HYDROCHLORIDE 20 MG/ML
2.5-5 INJECTION INTRAMUSCULAR; INTRAVENOUS EVERY 10 MIN PRN
Status: CANCELLED | OUTPATIENT
Start: 2024-04-17

## 2024-04-17 RX ORDER — ONDANSETRON 4 MG/1
4 TABLET, ORALLY DISINTEGRATING ORAL EVERY 6 HOURS PRN
Status: DISCONTINUED | OUTPATIENT
Start: 2024-04-17 | End: 2024-04-21 | Stop reason: HOSPADM

## 2024-04-17 RX ORDER — HYDROMORPHONE HCL IN WATER/PF 6 MG/30 ML
0.2 PATIENT CONTROLLED ANALGESIA SYRINGE INTRAVENOUS EVERY 5 MIN PRN
Status: CANCELLED | OUTPATIENT
Start: 2024-04-17

## 2024-04-17 RX ORDER — FENTANYL CITRATE 50 UG/ML
50 INJECTION, SOLUTION INTRAMUSCULAR; INTRAVENOUS EVERY 5 MIN PRN
Status: CANCELLED | OUTPATIENT
Start: 2024-04-17

## 2024-04-17 RX ORDER — LABETALOL HYDROCHLORIDE 5 MG/ML
10 INJECTION, SOLUTION INTRAVENOUS
Status: DISCONTINUED | OUTPATIENT
Start: 2024-04-17 | End: 2024-04-18 | Stop reason: HOSPADM

## 2024-04-17 RX ORDER — HYDROMORPHONE HYDROCHLORIDE 1 MG/ML
0.4 INJECTION, SOLUTION INTRAMUSCULAR; INTRAVENOUS; SUBCUTANEOUS EVERY 5 MIN PRN
Status: DISCONTINUED | OUTPATIENT
Start: 2024-04-17 | End: 2024-04-18 | Stop reason: HOSPADM

## 2024-04-17 RX ORDER — AMOXICILLIN 250 MG
1 CAPSULE ORAL 2 TIMES DAILY
Status: DISCONTINUED | OUTPATIENT
Start: 2024-04-17 | End: 2024-04-21 | Stop reason: HOSPADM

## 2024-04-17 RX ORDER — NALOXONE HYDROCHLORIDE 0.4 MG/ML
0.1 INJECTION, SOLUTION INTRAMUSCULAR; INTRAVENOUS; SUBCUTANEOUS
Status: DISCONTINUED | OUTPATIENT
Start: 2024-04-17 | End: 2024-04-18 | Stop reason: HOSPADM

## 2024-04-17 RX ORDER — POLYETHYLENE GLYCOL 3350 17 G/17G
17 POWDER, FOR SOLUTION ORAL DAILY
Status: DISCONTINUED | OUTPATIENT
Start: 2024-04-18 | End: 2024-04-21 | Stop reason: HOSPADM

## 2024-04-17 RX ORDER — DEXTROSE, SODIUM CHLORIDE, SODIUM LACTATE, POTASSIUM CHLORIDE, AND CALCIUM CHLORIDE 5; .6; .31; .03; .02 G/100ML; G/100ML; G/100ML; G/100ML; G/100ML
INJECTION, SOLUTION INTRAVENOUS CONTINUOUS
Status: DISCONTINUED | OUTPATIENT
Start: 2024-04-17 | End: 2024-04-18

## 2024-04-17 RX ORDER — OXYCODONE HYDROCHLORIDE 5 MG/1
5 TABLET ORAL EVERY 4 HOURS PRN
Status: DISCONTINUED | OUTPATIENT
Start: 2024-04-17 | End: 2024-04-18

## 2024-04-17 RX ORDER — ONDANSETRON 2 MG/ML
4 INJECTION INTRAMUSCULAR; INTRAVENOUS EVERY 30 MIN PRN
Status: CANCELLED | OUTPATIENT
Start: 2024-04-17

## 2024-04-17 RX ORDER — ONDANSETRON 4 MG/1
4 TABLET, ORALLY DISINTEGRATING ORAL EVERY 30 MIN PRN
Status: CANCELLED | OUTPATIENT
Start: 2024-04-17

## 2024-04-17 RX ORDER — BISACODYL 10 MG
10 SUPPOSITORY, RECTAL RECTAL DAILY PRN
Status: DISCONTINUED | OUTPATIENT
Start: 2024-04-20 | End: 2024-04-21 | Stop reason: HOSPADM

## 2024-04-17 RX ORDER — HYDROMORPHONE HYDROCHLORIDE 1 MG/ML
0.4 INJECTION, SOLUTION INTRAMUSCULAR; INTRAVENOUS; SUBCUTANEOUS
Status: DISCONTINUED | OUTPATIENT
Start: 2024-04-17 | End: 2024-04-18

## 2024-04-17 RX ORDER — ACETAMINOPHEN 325 MG/1
975 TABLET ORAL EVERY 8 HOURS
Status: DISCONTINUED | OUTPATIENT
Start: 2024-04-17 | End: 2024-04-18

## 2024-04-17 RX ORDER — PROCHLORPERAZINE MALEATE 10 MG
10 TABLET ORAL EVERY 6 HOURS PRN
Status: DISCONTINUED | OUTPATIENT
Start: 2024-04-17 | End: 2024-04-21 | Stop reason: HOSPADM

## 2024-04-17 RX ADMIN — Medication 70 MG: at 17:05

## 2024-04-17 RX ADMIN — SUGAMMADEX 50 MG: 100 INJECTION, SOLUTION INTRAVENOUS at 21:43

## 2024-04-17 RX ADMIN — Medication 20 MG: at 18:50

## 2024-04-17 RX ADMIN — MANNITOL 25 G: 20 INJECTION, SOLUTION INTRAVENOUS at 19:42

## 2024-04-17 RX ADMIN — Medication 10 MG: at 20:59

## 2024-04-17 RX ADMIN — PROPOFOL 30 MG: 10 INJECTION, EMULSION INTRAVENOUS at 18:05

## 2024-04-17 RX ADMIN — CEFUROXIME 1.5 G: 1.5 INJECTION, POWDER, FOR SOLUTION INTRAVENOUS at 18:16

## 2024-04-17 RX ADMIN — PROPOFOL 200 MG: 10 INJECTION, EMULSION INTRAVENOUS at 17:04

## 2024-04-17 RX ADMIN — ACETAMINOPHEN 975 MG: 325 TABLET, FILM COATED ORAL at 16:21

## 2024-04-17 RX ADMIN — FENTANYL CITRATE 25 MCG: 50 INJECTION, SOLUTION INTRAMUSCULAR; INTRAVENOUS at 23:13

## 2024-04-17 RX ADMIN — SODIUM CHLORIDE, SODIUM LACTATE, POTASSIUM CHLORIDE, CALCIUM CHLORIDE AND DEXTROSE MONOHYDRATE 100 ML/HR: 5; 600; 310; 30; 20 INJECTION, SOLUTION INTRAVENOUS at 22:00

## 2024-04-17 RX ADMIN — Medication 20 MG: at 18:05

## 2024-04-17 RX ADMIN — FENTANYL CITRATE 25 MCG: 50 INJECTION, SOLUTION INTRAMUSCULAR; INTRAVENOUS at 22:28

## 2024-04-17 RX ADMIN — SODIUM CHLORIDE, SODIUM GLUCONATE, SODIUM ACETATE, POTASSIUM CHLORIDE AND MAGNESIUM CHLORIDE: 526; 502; 368; 37; 30 INJECTION, SOLUTION INTRAVENOUS at 16:58

## 2024-04-17 RX ADMIN — Medication 10 MG: at 19:52

## 2024-04-17 RX ADMIN — FOSAPREPITANT 150 MG: 150 INJECTION, POWDER, LYOPHILIZED, FOR SOLUTION INTRAVENOUS at 18:26

## 2024-04-17 RX ADMIN — PROPOFOL 180 MCG/KG/MIN: 10 INJECTION, EMULSION INTRAVENOUS at 17:08

## 2024-04-17 RX ADMIN — SODIUM CHLORIDE 500 MG: 9 INJECTION, SOLUTION INTRAVENOUS at 18:21

## 2024-04-17 RX ADMIN — FENTANYL CITRATE 50 MCG: 50 INJECTION INTRAMUSCULAR; INTRAVENOUS at 19:10

## 2024-04-17 RX ADMIN — SODIUM CHLORIDE 1000 ML: 9 INJECTION, SOLUTION INTRAVENOUS at 23:12

## 2024-04-17 RX ADMIN — SUGAMMADEX 50 MG: 100 INJECTION, SOLUTION INTRAVENOUS at 21:39

## 2024-04-17 RX ADMIN — FENTANYL CITRATE 50 MCG: 50 INJECTION INTRAMUSCULAR; INTRAVENOUS at 17:04

## 2024-04-17 RX ADMIN — SUGAMMADEX 50 MG: 100 INJECTION, SOLUTION INTRAVENOUS at 21:32

## 2024-04-17 RX ADMIN — Medication 10 MG: at 20:20

## 2024-04-17 RX ADMIN — ANTI-THYMOCYTE GLOBULIN (RABBIT) 125 MG: 5 INJECTION, POWDER, LYOPHILIZED, FOR SOLUTION INTRAVENOUS at 18:23

## 2024-04-17 RX ADMIN — FENTANYL CITRATE 25 MCG: 50 INJECTION, SOLUTION INTRAMUSCULAR; INTRAVENOUS at 23:33

## 2024-04-17 RX ADMIN — HYDROMORPHONE HYDROCHLORIDE 0.5 MG: 1 INJECTION, SOLUTION INTRAMUSCULAR; INTRAVENOUS; SUBCUTANEOUS at 20:22

## 2024-04-17 RX ADMIN — MAGNESIUM SULFATE HEPTAHYDRATE 2 G: 40 INJECTION, SOLUTION INTRAVENOUS at 18:34

## 2024-04-17 RX ADMIN — FENTANYL CITRATE 25 MCG: 50 INJECTION, SOLUTION INTRAMUSCULAR; INTRAVENOUS at 22:35

## 2024-04-17 RX ADMIN — FUROSEMIDE 40 MG: 10 INJECTION, SOLUTION INTRAVENOUS at 19:42

## 2024-04-17 RX ADMIN — MIDAZOLAM 2 MG: 1 INJECTION INTRAMUSCULAR; INTRAVENOUS at 17:00

## 2024-04-17 RX ADMIN — EPHEDRINE SULFATE 10 MG: 5 INJECTION INTRAVENOUS at 17:36

## 2024-04-17 RX ADMIN — MYCOPHENOLATE MOFETIL 1000 MG: 500 INJECTION, POWDER, LYOPHILIZED, FOR SOLUTION INTRAVENOUS at 18:58

## 2024-04-17 ASSESSMENT — ACTIVITIES OF DAILY LIVING (ADL)
ADLS_ACUITY_SCORE: 20
ADLS_ACUITY_SCORE: 35
ADLS_ACUITY_SCORE: 20

## 2024-04-17 ASSESSMENT — COLUMBIA-SUICIDE SEVERITY RATING SCALE - C-SSRS
2. HAVE YOU ACTUALLY HAD ANY THOUGHTS OF KILLING YOURSELF IN THE PAST MONTH?: NO
6. HAVE YOU EVER DONE ANYTHING, STARTED TO DO ANYTHING, OR PREPARED TO DO ANYTHING TO END YOUR LIFE?: NO
1. IN THE PAST MONTH, HAVE YOU WISHED YOU WERE DEAD OR WISHED YOU COULD GO TO SLEEP AND NOT WAKE UP?: NO

## 2024-04-17 NOTE — TELEPHONE ENCOUNTER
"TRANSPLANT OR REPORT    Organ: En bloc Kidney  Laterality (if known): n/a  Organ Location: SD    UNOS ID: [id number]  Donor OR Time: 1030  Expected/Actual Cross Clamp Time: 1230  Expected Organ Arrival Time: 1600    Surgeon: Finger  Time in OR: 1600  Time in 3C (N/A for LI): 1500    Recipient Details  Admission ETA: 0800  Unit: 7A  Isolation: NO  Latex Allergy: NO  : NO  Diagnosis: ESRD    Liver Transplants  Bypass/Perfusion: NO  Cellsaver: NO  Hemodialysis: NO  ~ \"RENAL STAFF TEACHING SERVICE MEDICINE\" : Remind LI Fellow to discuss with nephrology on call.  ~ CRRT Resource Nurse: NO  (Telephone Number for CRRT [phone]. When prompted, the caller should say  CRRT Resource 1\")    Kidney/Panc Transplants  XM Status (Need to wait for XM?): FXM upon pt arrival.     Liver or KP/PA Recipients - Vessel Banking:  Donor has positive serologies for HIV/HCV/HBV: NO  Donor has risk criteria for HIV/HCV/HBV: no      Transplant Coordinator Contact Info: Marshal Martin RN        Vessel Bank Information  Transplant hospitals must not store a donor s extra vessels if the donor has tested positive for any of the following:   - HIV by antibody, antigen, or nucleic acid test (CARLOS EDUARDO)   - Hepatitis B surface antigen (HBsAg)   - Hepatitis B (HBV) by CARLOS EDUARDO   - Hepatitis C (HCV) by antibody or CARLOS EDUARDO     Extra vessels from donors that do not test positive for HIV, HBV, or HCV as above may be stored   "

## 2024-04-17 NOTE — CONSULTS
[facility]  Transplant Nephrology Consult  Date of Admission:  4/17/2024  Today's Date: 04/17/2024  Requesting physician: Carlitos Díaz MD    Recommendations:  - Recommend high intensity induction to avoid steroid taper due to severe GERD  - Stop PPI and start famotidine 20 mg PO BID. May need to use sucralfate as well  - No indication for dialysis now. She last had dialysis this morning.     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   Initial treatment included immune suppression and stopping PPI.  PPI later resumed.  She has been on hemodialysis since September 2020 and presents today for pediatric en bloc kidney transplant.   -iHD via RUE AVF MWF, last HD on 4/17 AM    # Blood Pressure: Controlled;  Goal BP: < 150/90   - Volume status: Euvolemic  EDW ~  60.5 kg   - Changes: Not at this time    # Anemia in Chronic Renal Disease: Hgb:  10.5       JUNG: No   - Iron studies: Unknown at this time, but checked with dialysis    # Mineral Bone Disorder:    - Secondary renal hyperparathyroidism; PTH level: Unknown at this time, but checked with dialysis        On treatment: None  - Vitamin D; level: Unknown at this time, but checked with dialysis        On supplement: No  - Calcium; level: Normal        On supplement: No  - Phosphorus; level: Not checked recently            On supplement: No    # Electrolytes:   - Potassium; level: Low normal           On supplement: No  - Magnesium; level: Not checked recently         On supplement: No  - Bicarbonate; level: High           On supplement: No  - Sodium; level: Normal    # Crohns: followed by [facility]. Does occasionally alternate between diarrhea and constipation.  On ustekinumab PTA.   -Will plan to hold for at least 6 weeks post txp    # Cardiac/Vascular Disease Risk Factors: Dilated ascending aorta (3.8 cm)  on 2024 ECHO (stable from 2020).    # GERD: Stop PPI and start famotidine 20 mg PO BID.      Recommendations were communicated to the primary team verbally.    Seen and discussed with Dr. Quezada.    Jas Gutierrez, LAURITA CNP  Pager: [phone]        Physician Attestation     I saw and evaluated Ira Zamora as part of a shared APRN/PA visit.     I personally reviewed the vital signs, medications, labs, and imaging.    I personally provided a substantive portion of care for this patient and I approve the care plan as written by the ERICA.  I was involved with Medical Decision Making including: Please see A&P for additional details of medical decision making.  MANAGEMENT DISCUSSED with the following over the past 24 hours: rATG induction to avoid steroids due to GERD. Concern that AIN was caused by PPI so will stop now and start H2 blocker instead. Proceed with DDKT later today. Last iHD earlier today. Nephrologist is Dr. Feldman.      Yan Quezada MD  Date of Service (when I saw the patient): 04/17/24     REASON FOR CONSULT   Presents for Kidney Transplant    History of Present Illness   Ira Zamora is a 34 year old female with ESRD secondary to interstitial nephritis which was thought to be induced by PPI. She has been on hemodialysis since September 2020 and presents today for pediatric en bloc kidney transplant.  She reports she dialyzed at her outpatient unit this morning.  The final crossmatch is in process and most recent PRA 9%.   Pt denies recent illness. No blood transfusions in the last 6 months.      Review of Systems    The 10 point Review of Systems is negative other than noted in the HPI or here.     Past Medical History    I have reviewed this patient's medical history and updated it with pertinent information if needed.   Past Medical History:   Diagnosis Date    Anemia in chronic kidney disease     Anxiety 2007    Ascending aorta dilation (H24) 2020 2020: ascending aorta 3.8 cm, aortic sinus 3.6 cm. 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       Past Surgical History   I have reviewed this patient's surgical history and updated it with pertinent information if needed.  Past Surgical History:   Procedure Laterality Date    H NEEDLE GUIDE,  KIDNEY BIOPSY         Family History   I have reviewed this patient's family history and updated it with pertinent information if needed.   Family History   Problem Relation Age of Onset    Endometriosis Mother     Coronary Artery Disease Mother        Social History   I have reviewed this patient's social history and updated it with pertinent information if needed. Ira Zamora  reports that she has never smoked. She has never used smokeless tobacco. She reports current alcohol use. She reports that she does not use drugs.    Allergies   No Known Allergies  Prior to Admission Medications   Current Facility-Administered Medications   Medication Dose Route Frequency Provider Last Rate Last Admin    acetaminophen (TYLENOL) tablet 975 mg  975 mg Oral Once Holli Nelson APRN CNP        basiliximab (SIMULECT) 20 mg in sodium chloride 0.9 % 50 mL infusion  20 mg Intravenous See Admin Instructions Holli Nelson APRN CNP        cefuroxime (ZINACEF) 1.5 g vial to attach to  ml bag for ADULTS or NS 50 ml bag for PEDS  1.5 g Intravenous Once Holli Nelson APRN CNP        And    cefuroxime (ZINACEF) 1.5 g vial to attach to  ml bag for ADULTS or NS 50 ml bag for PEDS  1.5 g  Intravenous See Admin Instructions Holli Nelson APRN CNP        methylPREDNISolone sodium succinate (solu-MEDROL) 500 mg in sodium chloride 0.9 % 104 mL intermittent infusion  500 mg Intravenous Once Holli Nelson APRN CNP        mycophenolate mofetil (CELLCEPT) 1,000 mg in D5W intra-operative intermittent infusion  1,000 mg Intravenous Once Holli Nelson APRN CNP        sodium chloride (PF) 0.9% PF flush 3 mL  3 mL Intracatheter Q8H Holli Nelson APRN CNP         Current Facility-Administered Medications   Medication Dose Route Frequency Provider Last Rate Last Admin       Physical Exam   No data recorded      No data recorded No data recorded     There were no vitals taken for this visit.    Admit       GENERAL APPEARANCE: alert and no distress  HENT: mouth without ulcers or lesions  LYMPHATICS: no cervical or supraclavicular nodes  RESP: lungs clear to auscultation - no rales, rhonchi or wheezes  CV: RRR, low grade murmur noted  EDEMA: no LE edema bilaterally  ABDOMEN: soft, nondistended, nontender, bowel sounds normal  MS: extremities normal - no gross deformities noted, no evidence of inflammation in joints, no muscle tenderness  SKIN: no rash  PSYCH: mentation appears normal and affect normal/bright  DIALYSIS ACCESS:  RUE AV fistula +bruit/+thrill      Data   CMPNo lab results found in last 7 days.  CBCNo lab results found in last 7 days.  INRNo lab results found in last 7 days.  ABGNo lab results found in last 7 days.   Urine Studies  Recent Labs   Lab Test 09/29/22  1153   COLOR Yellow   APPEARANCE Clear   URINEGLC 100*   URINEBILI Negative   URINEKETONE Negative   SG 1.010   UBLD Trace*   URINEPH 8.5*   PROTEIN 100*   NITRITE Negative   LEUKEST Negative   RBCU <1   WBCU 1     No lab results found.  PTH  No lab results found.  Iron Studies  No lab results found.    IMAGING:  All imaging studies reviewed by me.

## 2024-04-17 NOTE — ANESTHESIA PROCEDURE NOTES
Airway       Patient location during procedure: OR       Procedure Start/Stop Times: 4/17/2024 5:07 PM  Staff -        Other Anesthesia Staff: Shira Means       Performed By: CHASIDY and with CRNAs       Procedure performed by resident/fellow/CRNA in presence of a teaching physician.    Consent for Airway        Urgency: elective  Indications and Patient Condition       Indications for airway management: rayshawn-procedural       Induction type:intravenous       Mask difficulty assessment: 1 - vent by mask    Final Airway Details       Final airway type: endotracheal airway       Successful airway: ETT - single and Oral  Endotracheal Airway Details        ETT size (mm): 7.0       Cuffed: yes       Cuff volume (mL): 8       Successful intubation technique: direct laryngoscopy       DL Blade Type: Baltazar 2       Grade View of Cords: 1       Adjucts: stylet       Position: Center       Measured from: gums/teeth       Secured at (cm): 2       Bite block used: None    Post intubation assessment        Placement verified by: capnometry, equal breath sounds and chest rise        Number of attempts at approach: 1       Secured with: tape       Ease of procedure: easy       Dentition: Intact and Unchanged    Medication(s) Administered   Medication Administration Time: 4/17/2024 5:07 PM

## 2024-04-17 NOTE — PLAN OF CARE
BP (!) 140/83 (BP Location: Left arm)   Pulse 99   Temp 99.5  F (37.5  C)   Resp 16   Wt 59.4 kg (131 lb)   SpO2 98%   BMI 23.58 kg/m      Shift: arrived to  from home @0900, through 1530  Isolation Status: standard  VS: stable on room air, afebrile  Neuro: Aox4  Behaviors: calm, able to make needs known family @ bedside  BG: none  Labs: creat 3.11  Respiratory: WDL  Cardiac: WDL  Pain/Nausea: denies  PRN: none given  Diet: NPO, been NPO since last night  IV Access: L hand PIV, R AV fistula   Infusion(s): none given  Lines/Drains: N/A  GI/: voiding spotnaneously, no BM on shift  Skin: R shin scabs/ bruising, skin check completed  Mobility: UAL  Events/Education: EKG, CXR, labs, UA, COVID swab, pre-op showers x2 complete. Waiting for OR, watching transplant education videos with family at bedside  Plan: OR @1600

## 2024-04-17 NOTE — H&P
[facility]    History and Physical  Transplant Surgery     Date of Admission:  24    Assessment & Plan   Ira Zamora is a 34 year old female with history of ESRD on dialysis d/t interstitial nephritis, Crohn's, HTN, GERD, and dilated ascending aorta. Presents for a possible  donor en bloc kidney transplant.    -NPO  -Labs, CXR, EKG, COVID PCR, Xmatch  -Pre-op shower  -cPRA 9, but will give Thymo due to need to minimize steroids. History of severe reflux and suspected interstitial nephritis secondary to PPI.    Holli Nelson NP       Chief Complaint   Organ transplant candidate    History of Present Illness   Ira Zamora is a 34 year old female with history of ESRD on dialysis d/t interstitial nephritis, Crohn's, HTN, GERD, and dilated ascending aorta. Presents for a possible  donor en bloc kidney transplant.    Feeling nervous today but otherwise well. Has chronic frontal HA, unchanged from baseline. No other associated symptoms. Chronic semi-loose stools d/t Crohn's. Chronic tingling in R hand related to dialysis fistula. Unknown LMP (Kyleena IUD). No recent illnesses or hospitalizations. Denies fever, chills, night sweats.    Last dialysis: today  Access: RUE fistula  Urine: Small volumes 4-5x/d  EDW: 60.5kg    Clinically Significant Risk Factors Present on Admission                  # Hypertension: Noted on problem list                    Past Medical History    I have reviewed this patient's medical history and updated it with pertinent information if needed.   Past Medical History:   Diagnosis Date    Anemia in chronic kidney disease     Anxiety     Ascending aorta dilation (H24) 2020: ascending aorta 3.8 cm, aortic sinus 3.6 cm.  Echo: Ao root diam: 3.2 cm, asc Aorta Diam: 3.8 cm    ASCUS with positive high risk HPV cervical     ASCUS with positive high risk HPV cervical 2017    Crohn's disease (H) 2004    ESRD (end  stage renal disease) on dialysis (H)     GERD (gastroesophageal reflux disease)     Hidradenitis suppurativa 2015    History of blood transfusion     Hypertension     Juvenile rheumatoid arthritis (H)     Asymptomatic in adulthood    Secondary renal hyperparathyroidism (H24)     Tubulointerstitial nephropathy 2011    kidney biopsy 2011 - Acute and  chronic tubulointerstitial nephropathy.       Past Surgical History   I have reviewed this patient's surgical history and updated it with pertinent information if needed.  Past Surgical History:   Procedure Laterality Date    H NEEDLE GUIDE,  KIDNEY BIOPSY         Prior to Admission Medications   Prior to Admission Medications   Prescriptions Last Dose Informant Patient Reported? Taking?   LABETALOL HCL PO 2024  Yes Yes   Sig: Take 50 mg by mouth at bedtime   PARoxetine (PAXIL) 30 MG tablet 2024  Yes Yes   Sig: Take 30 mg by mouth   acetaminophen (TYLENOL) 500 MG tablet Unknown  Yes Yes   Sig: Take 1,000 mg by mouth   cholecalciferol 50 MCG (2000 UT) tablet 2024  Yes Yes   Sig: Take 50 mcg by mouth three times a week   doxazosin (CARDURA) 8 MG tablet 2024  Yes Yes   Sig: Take 1 tablet by mouth at bedtime   levonorgestrel (KYLEENA) 19.5 MG IUD 2024  Yes Yes   Si Device by Intrauterine route   omeprazole (PRILOSEC) 20 MG DR capsule 2024  Yes Yes   Sig: Take 20 mg by mouth daily   ondansetron (ZOFRAN ODT) 4 MG ODT tab Unknown  Yes Yes   ustekinumab (STELARA) 90 MG/ML Past Month  Yes Yes   Sig: Inject 90 mg Subcutaneous once every eight weeks      Facility-Administered Medications: None     Allergies   No Known Allergies    Social History   I have reviewed this patient's social history and updated it with pertinent information if needed. Rare EtOH use. No tobacco or illegal drugs.    Family History   I have reviewed this patient's family history and updated it with pertinent information if needed.   Family History   Problem  Relation Age of Onset    Endometriosis Mother     Coronary Artery Disease Mother        Review of Systems   The 10 point Review of Systems is negative other than noted in the HPI or here.     Physical Exam                      Vital Signs with Ranges  Temp:  [98.1  F (36.7  C)] 98.1  F (36.7  C)  Pulse:  [99] 99  Resp:  [16] 16  BP: (140)/(83) 140/83  SpO2:  [98 %] 98 %  131 lbs 0 oz    Constitutional: NAD  Eyes: Clear  Respiratory: CTA  Cardiovascular: RRR, Murmur (dialysis fistula)  GI: Soft, non-tender  Lymph/Hematologic: No edema  Genitourinary: Not examined  Skin: Bruising at fistula  Musculoskeletal: Strength 5/5  Neurologic: A&Ox4  Neuropsychiatric: Calm    Data   Pending

## 2024-04-17 NOTE — PROGRESS NOTES
Px. Alert and oriented x4, able to make needs known.. No c/o pain. Had 2 showers today. Temp earlier was 99.5 deg C, rechecked and was 98.3. Said it must have been because she took an warm shower prior to the temp check earlier. Ready to go to OR for her procedure. Out of the unit via wheelchair by 15:45. Continue with POC.

## 2024-04-17 NOTE — ANESTHESIA PREPROCEDURE EVALUATION
Anesthesia Pre-Procedure Evaluation    Patient: Ira Zamora   MRN: [id number] : 1990        Procedure : Procedure(s):  Transplant kidney recipient  donor          Past Medical History:   Diagnosis Date    Anemia in chronic kidney disease     Crohn's disease (H)     ESRD (end stage renal disease) on dialysis (H)     History of blood transfusion     Hypertension     Secondary renal hyperparathyroidism (H)     Tubulointerstitial nephropathy 2011    kidney biopsy 2011 - Acute and  chronic tubulointerstitial nephropathy.      Past Surgical History:   Procedure Laterality Date    H NEEDLE GUIDE,  KIDNEY BIOPSY        No Known Allergies   Social History     Tobacco Use    Smoking status: Never    Smokeless tobacco: Never   Substance Use Topics    Alcohol use: Yes     Comment: Very occasional, last drink two months ago, wine cooler      Wt Readings from Last 1 Encounters:   22 66.6 kg (146 lb 14.4 oz)        Anesthesia Evaluation   Pt has had prior anesthetic.     No history of anesthetic complications       ROS/MED HX  ENT/Pulmonary:  - neg pulmonary ROS     Neurologic:  - neg neurologic ROS     Cardiovascular: Comment: Asc Ao dilation    Prolonged QT (QTc 503 today)    (+)  hypertension- -   -  - -                                 Previous cardiac testing   Echo: Date: 24 Results:  Global and regional left ventricular function is normal with an EF of 60-65%. Right ventricular function, chamber size, wall motion, and thickness are normal. Ascending aorta is mildly dilated at 3.8 cm (similar to previous study). No significant valvular abnormalities present. This study was compared with the study from 2022 . No significant changes noted.    Stress Test:  Date: Results:    ECG Reviewed:  Date: Results:    Cath:  Date: Results:      METS/Exercise Tolerance: 4 - Raking leaves, gardening    Hematologic:     (+)      anemia,          Musculoskeletal:       GI/Hepatic:     (+) GERD,  Symptomatic,     Inflammatory bowel disease,             Renal/Genitourinary:     (+) renal disease (interstitial nephritis 2/2 omeprazole use, on HD MWF, last dialyzed this morning), type: ESRD, Pt requires dialysis, type: Hemodialysis,          Endo:     (+)          thyroid problem, hyperthyroidism,           Psychiatric/Substance Use:       Infectious Disease:       Malignancy:       Other:            Physical Exam    Airway        Mallampati: I   TM distance: > 3 FB   Neck ROM: full   Mouth opening: > 3 cm    Respiratory Devices and Support         Dental     Comment: Patient reports no loose or chipped teeth        Cardiovascular          Rhythm and rate: regular and normal     Pulmonary           breath sounds clear to auscultation           OUTSIDE LABS:  CBC:   Lab Results   Component Value Date    WBC 7.1 09/29/2022    HGB 11.6 (L) 09/29/2022    HCT 35.6 09/29/2022     09/29/2022     BMP:   Lab Results   Component Value Date     09/29/2022    POTASSIUM 4.0 09/29/2022    CHLORIDE 97 (L) 09/29/2022    CO2 28 09/29/2022    BUN 35.9 (H) 09/29/2022    CR 4.92 (H) 09/29/2022    GLC 79 09/29/2022     COAGS:   Lab Results   Component Value Date    PTT 29 09/29/2022    INR 1.01 09/29/2022     POC:   Lab Results   Component Value Date    HCGS Negative 09/29/2022     HEPATIC:   Lab Results   Component Value Date    ALBUMIN 4.4 09/29/2022    PROTTOTAL 7.6 09/29/2022    ALT 12 09/29/2022    AST 17 09/29/2022    ALKPHOS 75 09/29/2022    BILITOTAL 0.4 09/29/2022     OTHER:   Lab Results   Component Value Date    ROBLES 10.1 (H) 09/29/2022       Anesthesia Plan    ASA Status:  3    NPO Status:  ELEVATED Aspiration Risk/Unknown    Anesthesia Type: General.     - Airway: ETT   Induction: RSI.   Maintenance: TIVA.   Techniques and Equipment:     - Lines/Monitors: Central Line, BIS     - Blood: T&S     Consents    Anesthesia Plan(s) and associated risks, benefits, and realistic alternatives discussed. Questions  answered and patient/representative(s) expressed understanding.     - Discussed:     - Discussed with:  Patient      - Extended Intubation/Ventilatory Support Discussed: No.      - Patient is DNR/DNI Status: No          Postoperative Care    Pain management: IV analgesics, Multi-modal analgesia.   PONV prophylaxis: Aprepitant, Background Propofol Infusion     Comments:               Brandon Torres MD    I have reviewed the pertinent notes and labs in the chart from the past 30 days and (re)examined the patient.  Any updates or changes from those notes are reflected in this note.

## 2024-04-17 NOTE — PROGRESS NOTES
Admitted/transferred from: home  2 RN full   skin assessment completed by Suyapa Gamino, RN and Radha BRITT RN.  Skin assessment finding: skin intact, no problems some R shin bruising/scabs  Interventions/actions: other , no intervention needed      Will continue to monitor.

## 2024-04-17 NOTE — PHARMACY-ADMISSION MEDICATION HISTORY
Pharmacist Admission Medication History    Admission medication history is complete. The information provided in this note is only as accurate as the sources available at the time of the update.    Information Source(s): Patient, Clinic records, and CareEverywhere/SureScripts via in-person    Pertinent Information: Spoke with Ira who is an accurate and complete historian. She was not aware exactly when her last dose of Stelara was but looking at previous clinic notes, it would be 3/21 and next dose on 5/16.     Changes made to PTA medication list:  Deleted: Cholecalciferol  Changed: Omeprazole to PM    Allergies reviewed with patient and updates made in EHR: yes    Medication History Completed By: Asher Guaman [facility] 4/17/2024 9:52 AM    PTA Med List   Medication Sig Last Dose    acetaminophen (TYLENOL) 500 MG tablet Take 1,000 mg by mouth 4/15/2024    doxazosin (CARDURA) 8 MG tablet Take 1 tablet by mouth at bedtime 4/16/2024    LABETALOL HCL PO Take 50 mg by mouth at bedtime 4/16/2024    levonorgestrel (KYLEENA) 19.5 MG IUD 1 Device by Intrauterine route 4/17/2024    omeprazole (PRILOSEC) 20 MG DR capsule Take 40 mg by mouth at bedtime 4/17/2024    ondansetron (ZOFRAN ODT) 4 MG ODT tab  More than a month    PARoxetine (PAXIL) 30 MG tablet Take 30 mg by mouth 4/17/2024    ustekinumab (STELARA) 90 MG/ML Inject 90 mg Subcutaneous once every eight weeks 3/21/2024

## 2024-04-17 NOTE — LETTER
Transition Communication Hand-off for Care Transitions to Next Level of Care Provider    Name: Ira Zamora  : 1990  MRN #: [id number]  Primary Care Provider: Shalom Pierce     Primary Clinic: No address on file     Reason for Hospitalization:  End stage renal disease (H) [N18.6]  Admit Date/Time: 2024  9:04 AM  Discharge Date: 2024  Payor Source: Payor: MEDICARE / Plan: MEDICARE / Product Type: Medicare /          Reason for Communication Hand-off Referral: Other continuity of care    Discharge Plan: home with home care      Discharge Needs Assessment:  Needs      Flowsheet Row Most Recent Value   Equipment Currently Used at Home none          Follow-up plan:    Future Appointments   Date Time Provider Department Center   2024  8:00 AM [facility]       Any outstanding tests or procedures:        Referrals       Future Labs/Procedures    Home Care Referral     Comments:    [facility]  Skilled Nursing: Transplant Labs Monday-Thursday  SOC:     Your provider has ordered home health services. If you have not been contacted within 2 days of your discharge please call the selected Home Care agency listed on your Discharge document.  If a Home Care agency is NOT listed, please call [phone].            MERYL Mcdonough  Phone: [phone]  Pager: [phone]  7B Med Surg Vocera  Nurse Coordinator      Social Work and Care Management Department       SEARCHABLE in [facility] - search CARE COORDINATOR       [place] [address] [place] ([phone]) Saturday & ; ([phone]) [facility] Recognized Holidays     Units: 5A Onc Vocera & 5C Vocera Pager: [phone]    Units: 6B Vocera & 6C Vocera  Pager: [phone]    Units: 7A SOT RNCC Vocera, 7B Med Surg Vocera, & 7C Med Surg Vocera  Pager: [phone]    Units: 6A Vocera & 4A CVICU Vocera, 4C MICU Vocera, and 4E SICU Vocera   Pager: [phone]    Units: 5 Ortho Vocera & 5 Med Surg Vocera  Pager:  [phone]    Units: 6 Med Surg Vocera & 8 Med Surg Vocera  Pager [phone]         AVS/Discharge Summary is the source of truth; this is a helpful guide for improved communication of patient story

## 2024-04-18 ENCOUNTER — LAB REQUISITION (OUTPATIENT)
Dept: LAB | Facility: CLINIC | Age: 34
DRG: 652 | End: 2024-04-18
Payer: MEDICARE

## 2024-04-18 ENCOUNTER — TELEPHONE (OUTPATIENT)
Dept: PHARMACY | Facility: CLINIC | Age: 34
End: 2024-04-18
Payer: MEDICARE

## 2024-04-18 ENCOUNTER — DOCUMENTATION ONLY (OUTPATIENT)
Dept: TRANSPLANT | Facility: CLINIC | Age: 34
End: 2024-04-18
Payer: MEDICARE

## 2024-04-18 ENCOUNTER — APPOINTMENT (OUTPATIENT)
Dept: ULTRASOUND IMAGING | Facility: CLINIC | Age: 34
DRG: 652 | End: 2024-04-18
Attending: NURSE PRACTITIONER
Payer: MEDICARE

## 2024-04-18 PROBLEM — Z94.0 KIDNEY REPLACED BY TRANSPLANT: Chronic | Status: ACTIVE | Noted: 2024-04-18

## 2024-04-18 PROBLEM — D84.9 IMMUNOSUPPRESSED STATUS (H): Status: ACTIVE | Noted: 2024-04-18

## 2024-04-18 PROBLEM — Z94.0 KIDNEY REPLACED BY TRANSPLANT: Status: ACTIVE | Noted: 2024-04-18

## 2024-04-18 PROBLEM — Z76.82 ORGAN TRANSPLANT CANDIDATE: Status: RESOLVED | Noted: 2024-04-17 | Resolved: 2024-04-18

## 2024-04-18 PROBLEM — D84.9 IMMUNOSUPPRESSED STATUS (H): Chronic | Status: ACTIVE | Noted: 2024-04-18

## 2024-04-18 LAB
ANION GAP SERPL CALCULATED.3IONS-SCNC: 10 MMOL/L (ref 7–15)
BASOPHILS # BLD AUTO: 0 10E3/UL (ref 0–0.2)
BASOPHILS NFR BLD AUTO: 0 %
BUN SERPL-MCNC: 19.6 MG/DL (ref 6–20)
CALCIUM SERPL-MCNC: 9.2 MG/DL (ref 8.6–10)
CHLORIDE SERPL-SCNC: 95 MMOL/L (ref 98–107)
CREAT SERPL-MCNC: 4.16 MG/DL (ref 0.51–0.95)
DEPRECATED HCO3 PLAS-SCNC: 27 MMOL/L (ref 22–29)
EGFRCR SERPLBLD CKD-EPI 2021: 14 ML/MIN/1.73M2
EOSINOPHIL # BLD AUTO: 0 10E3/UL (ref 0–0.7)
EOSINOPHIL NFR BLD AUTO: 0 %
ERYTHROCYTE [DISTWIDTH] IN BLOOD BY AUTOMATED COUNT: 13.1 % (ref 10–15)
GLUCOSE BLDC GLUCOMTR-MCNC: 181 MG/DL (ref 70–99)
GLUCOSE BLDC GLUCOMTR-MCNC: 181 MG/DL (ref 70–99)
GLUCOSE BLDC GLUCOMTR-MCNC: 185 MG/DL (ref 70–99)
GLUCOSE BLDC GLUCOMTR-MCNC: 189 MG/DL (ref 70–99)
GLUCOSE BLDC GLUCOMTR-MCNC: 193 MG/DL (ref 70–99)
GLUCOSE SERPL-MCNC: 209 MG/DL (ref 70–99)
HCT VFR BLD AUTO: 30.5 % (ref 35–47)
HGB BLD-MCNC: 9.4 G/DL (ref 11.7–15.7)
HGB BLD-MCNC: 9.5 G/DL (ref 11.7–15.7)
HGB BLD-MCNC: 9.6 G/DL (ref 11.7–15.7)
HGB BLD-MCNC: 9.6 G/DL (ref 11.7–15.7)
HGB BLD-MCNC: 9.7 G/DL (ref 11.7–15.7)
IMM GRANULOCYTES # BLD: 0.1 10E3/UL
IMM GRANULOCYTES NFR BLD: 1 %
LYMPHOCYTES # BLD AUTO: 0.1 10E3/UL (ref 0.8–5.3)
LYMPHOCYTES NFR BLD AUTO: 1 %
MAGNESIUM SERPL-MCNC: 2.5 MG/DL (ref 1.7–2.3)
MCH RBC QN AUTO: 30.4 PG (ref 26.5–33)
MCHC RBC AUTO-ENTMCNC: 31.5 G/DL (ref 31.5–36.5)
MCV RBC AUTO: 97 FL (ref 78–100)
MONOCYTES # BLD AUTO: 0.2 10E3/UL (ref 0–1.3)
MONOCYTES NFR BLD AUTO: 2 %
NEUTROPHILS # BLD AUTO: 12 10E3/UL (ref 1.6–8.3)
NEUTROPHILS NFR BLD AUTO: 96 %
NRBC # BLD AUTO: 0 10E3/UL
NRBC BLD AUTO-RTO: 0 /100
PHOSPHATE SERPL-MCNC: 4.2 MG/DL (ref 2.5–4.5)
PLATELET # BLD AUTO: 112 10E3/UL (ref 150–450)
POTASSIUM SERPL-SCNC: 3.9 MMOL/L (ref 3.4–5.3)
POTASSIUM SERPL-SCNC: 3.9 MMOL/L (ref 3.4–5.3)
POTASSIUM SERPL-SCNC: 4.2 MMOL/L (ref 3.4–5.3)
POTASSIUM SERPL-SCNC: 4.2 MMOL/L (ref 3.4–5.3)
POTASSIUM SERPL-SCNC: 4.4 MMOL/L (ref 3.4–5.3)
POTASSIUM SERPL-SCNC: 4.4 MMOL/L (ref 3.4–5.3)
RBC # BLD AUTO: 3.16 10E6/UL (ref 3.8–5.2)
SODIUM SERPL-SCNC: 132 MMOL/L (ref 135–145)
WBC # BLD AUTO: 12.4 10E3/UL (ref 4–11)

## 2024-04-18 PROCEDURE — 99233 SBSQ HOSP IP/OBS HIGH 50: CPT | Performed by: INTERNAL MEDICINE

## 2024-04-18 PROCEDURE — 84132 ASSAY OF SERUM POTASSIUM: CPT | Performed by: STUDENT IN AN ORGANIZED HEALTH CARE EDUCATION/TRAINING PROGRAM

## 2024-04-18 PROCEDURE — 250N000013 HC RX MED GY IP 250 OP 250 PS 637: Performed by: NURSE PRACTITIONER

## 2024-04-18 PROCEDURE — 258N000003 HC RX IP 258 OP 636: Performed by: NURSE PRACTITIONER

## 2024-04-18 PROCEDURE — 76776 US EXAM K TRANSPL W/DOPPLER: CPT

## 2024-04-18 PROCEDURE — 85025 COMPLETE CBC W/AUTO DIFF WBC: CPT | Performed by: STUDENT IN AN ORGANIZED HEALTH CARE EDUCATION/TRAINING PROGRAM

## 2024-04-18 PROCEDURE — 80048 BASIC METABOLIC PNL TOTAL CA: CPT | Performed by: STUDENT IN AN ORGANIZED HEALTH CARE EDUCATION/TRAINING PROGRAM

## 2024-04-18 PROCEDURE — 250N000013 HC RX MED GY IP 250 OP 250 PS 637: Performed by: STUDENT IN AN ORGANIZED HEALTH CARE EDUCATION/TRAINING PROGRAM

## 2024-04-18 PROCEDURE — 76776 US EXAM K TRANSPL W/DOPPLER: CPT | Mod: 26 | Performed by: RADIOLOGY

## 2024-04-18 PROCEDURE — 85018 HEMOGLOBIN: CPT | Performed by: STUDENT IN AN ORGANIZED HEALTH CARE EDUCATION/TRAINING PROGRAM

## 2024-04-18 PROCEDURE — 120N000011 HC R&B TRANSPLANT UMMC

## 2024-04-18 PROCEDURE — 250N000011 HC RX IP 250 OP 636: Performed by: STUDENT IN AN ORGANIZED HEALTH CARE EDUCATION/TRAINING PROGRAM

## 2024-04-18 PROCEDURE — 82374 ASSAY BLOOD CARBON DIOXIDE: CPT | Performed by: STUDENT IN AN ORGANIZED HEALTH CARE EDUCATION/TRAINING PROGRAM

## 2024-04-18 PROCEDURE — 36591 DRAW BLOOD OFF VENOUS DEVICE: CPT | Performed by: STUDENT IN AN ORGANIZED HEALTH CARE EDUCATION/TRAINING PROGRAM

## 2024-04-18 PROCEDURE — 36592 COLLECT BLOOD FROM PICC: CPT | Performed by: STUDENT IN AN ORGANIZED HEALTH CARE EDUCATION/TRAINING PROGRAM

## 2024-04-18 PROCEDURE — 250N000013 HC RX MED GY IP 250 OP 250 PS 637: Performed by: TRANSPLANT SURGERY

## 2024-04-18 PROCEDURE — 250N000012 HC RX MED GY IP 250 OP 636 PS 637: Performed by: NURSE PRACTITIONER

## 2024-04-18 PROCEDURE — 86833 HLA CLASS II HIGH DEFIN QUAL: CPT | Performed by: TRANSPLANT SURGERY

## 2024-04-18 PROCEDURE — 86832 HLA CLASS I HIGH DEFIN QUAL: CPT | Performed by: TRANSPLANT SURGERY

## 2024-04-18 PROCEDURE — 250N000012 HC RX MED GY IP 250 OP 636 PS 637: Performed by: STUDENT IN AN ORGANIZED HEALTH CARE EDUCATION/TRAINING PROGRAM

## 2024-04-18 PROCEDURE — 84100 ASSAY OF PHOSPHORUS: CPT | Performed by: STUDENT IN AN ORGANIZED HEALTH CARE EDUCATION/TRAINING PROGRAM

## 2024-04-18 PROCEDURE — 250N000011 HC RX IP 250 OP 636

## 2024-04-18 PROCEDURE — 250N000011 HC RX IP 250 OP 636: Mod: JZ | Performed by: NURSE PRACTITIONER

## 2024-04-18 PROCEDURE — 85048 AUTOMATED LEUKOCYTE COUNT: CPT | Performed by: STUDENT IN AN ORGANIZED HEALTH CARE EDUCATION/TRAINING PROGRAM

## 2024-04-18 PROCEDURE — 36415 COLL VENOUS BLD VENIPUNCTURE: CPT | Performed by: STUDENT IN AN ORGANIZED HEALTH CARE EDUCATION/TRAINING PROGRAM

## 2024-04-18 PROCEDURE — 83735 ASSAY OF MAGNESIUM: CPT | Performed by: STUDENT IN AN ORGANIZED HEALTH CARE EDUCATION/TRAINING PROGRAM

## 2024-04-18 PROCEDURE — 82962 GLUCOSE BLOOD TEST: CPT

## 2024-04-18 RX ORDER — NALOXONE HYDROCHLORIDE 0.4 MG/ML
0.4 INJECTION, SOLUTION INTRAMUSCULAR; INTRAVENOUS; SUBCUTANEOUS
Status: DISCONTINUED | OUTPATIENT
Start: 2024-04-18 | End: 2024-04-21 | Stop reason: HOSPADM

## 2024-04-18 RX ORDER — NICOTINE POLACRILEX 4 MG
15-30 LOZENGE BUCCAL
Status: DISCONTINUED | OUTPATIENT
Start: 2024-04-18 | End: 2024-04-21 | Stop reason: HOSPADM

## 2024-04-18 RX ORDER — ACETAMINOPHEN 325 MG/1
650 TABLET ORAL EVERY 8 HOURS
Status: COMPLETED | OUTPATIENT
Start: 2024-04-18 | End: 2024-04-20

## 2024-04-18 RX ORDER — OXYCODONE HYDROCHLORIDE 10 MG/1
10 TABLET ORAL EVERY 4 HOURS PRN
Status: DISCONTINUED | OUTPATIENT
Start: 2024-04-18 | End: 2024-04-18

## 2024-04-18 RX ORDER — METHOCARBAMOL 500 MG/1
500 TABLET, FILM COATED ORAL 4 TIMES DAILY PRN
Status: DISCONTINUED | OUTPATIENT
Start: 2024-04-18 | End: 2024-04-21 | Stop reason: HOSPADM

## 2024-04-18 RX ORDER — ACETAMINOPHEN 325 MG/1
650 TABLET ORAL ONCE
Status: DISCONTINUED | OUTPATIENT
Start: 2024-04-18 | End: 2024-04-18 | Stop reason: ALTCHOICE

## 2024-04-18 RX ORDER — FAMOTIDINE 10 MG
10 TABLET ORAL DAILY
Status: DISCONTINUED | OUTPATIENT
Start: 2024-04-18 | End: 2024-04-18

## 2024-04-18 RX ORDER — LIDOCAINE 4 G/G
2 PATCH TOPICAL
Status: DISCONTINUED | OUTPATIENT
Start: 2024-04-18 | End: 2024-04-21 | Stop reason: HOSPADM

## 2024-04-18 RX ORDER — NALOXONE HYDROCHLORIDE 0.4 MG/ML
0.2 INJECTION, SOLUTION INTRAMUSCULAR; INTRAVENOUS; SUBCUTANEOUS
Status: DISCONTINUED | OUTPATIENT
Start: 2024-04-18 | End: 2024-04-21 | Stop reason: HOSPADM

## 2024-04-18 RX ORDER — SODIUM CHLORIDE, SODIUM LACTATE, POTASSIUM CHLORIDE, CALCIUM CHLORIDE 600; 310; 30; 20 MG/100ML; MG/100ML; MG/100ML; MG/100ML
INJECTION, SOLUTION INTRAVENOUS CONTINUOUS
Status: DISCONTINUED | OUTPATIENT
Start: 2024-04-18 | End: 2024-04-20

## 2024-04-18 RX ORDER — DIPHENHYDRAMINE HCL 25 MG
25-50 CAPSULE ORAL ONCE
Qty: 2 CAPSULE | Refills: 0 | Status: COMPLETED | OUTPATIENT
Start: 2024-04-18 | End: 2024-04-18

## 2024-04-18 RX ORDER — FAMOTIDINE 20 MG/1
20 TABLET, FILM COATED ORAL DAILY
Status: DISCONTINUED | OUTPATIENT
Start: 2024-04-19 | End: 2024-04-21 | Stop reason: HOSPADM

## 2024-04-18 RX ORDER — PAROXETINE 30 MG/1
30 TABLET, FILM COATED ORAL DAILY
Status: DISCONTINUED | OUTPATIENT
Start: 2024-04-18 | End: 2024-04-21 | Stop reason: HOSPADM

## 2024-04-18 RX ORDER — FAMOTIDINE 10 MG
10 TABLET ORAL DAILY
Status: DISCONTINUED | OUTPATIENT
Start: 2024-04-19 | End: 2024-04-18

## 2024-04-18 RX ORDER — DIPHENHYDRAMINE HCL 12.5MG/5ML
25-50 LIQUID (ML) ORAL ONCE
Qty: 20 ML | Refills: 0 | Status: COMPLETED | OUTPATIENT
Start: 2024-04-18 | End: 2024-04-18

## 2024-04-18 RX ORDER — HEPARIN SODIUM 10000 [USP'U]/100ML
400 INJECTION, SOLUTION INTRAVENOUS CONTINUOUS
Status: DISPENSED | OUTPATIENT
Start: 2024-04-18 | End: 2024-04-20

## 2024-04-18 RX ORDER — DEXTROSE MONOHYDRATE 25 G/50ML
25-50 INJECTION, SOLUTION INTRAVENOUS
Status: DISCONTINUED | OUTPATIENT
Start: 2024-04-18 | End: 2024-04-21 | Stop reason: HOSPADM

## 2024-04-18 RX ORDER — FAMOTIDINE 20 MG/1
20 TABLET, FILM COATED ORAL 2 TIMES DAILY
Status: DISCONTINUED | OUTPATIENT
Start: 2024-04-18 | End: 2024-04-18

## 2024-04-18 RX ORDER — OXYCODONE HYDROCHLORIDE 5 MG/1
5 TABLET ORAL EVERY 4 HOURS PRN
Status: DISCONTINUED | OUTPATIENT
Start: 2024-04-18 | End: 2024-04-18

## 2024-04-18 RX ORDER — HYDROCODONE BITARTRATE AND ACETAMINOPHEN 5; 325 MG/1; MG/1
1 TABLET ORAL EVERY 4 HOURS PRN
Status: DISCONTINUED | OUTPATIENT
Start: 2024-04-18 | End: 2024-04-19

## 2024-04-18 RX ORDER — HYDROMORPHONE HCL IN WATER/PF 6 MG/30 ML
PATIENT CONTROLLED ANALGESIA SYRINGE INTRAVENOUS
Status: COMPLETED
Start: 2024-04-18 | End: 2024-04-18

## 2024-04-18 RX ADMIN — HYDROMORPHONE HYDROCHLORIDE 0.2 MG: 0.2 INJECTION, SOLUTION INTRAMUSCULAR; INTRAVENOUS; SUBCUTANEOUS at 00:36

## 2024-04-18 RX ADMIN — HYDROCODONE BITARTRATE AND ACETAMINOPHEN 1 TABLET: 5; 325 TABLET ORAL at 10:36

## 2024-04-18 RX ADMIN — HEPARIN SODIUM 200 UNITS/HR: 10000 INJECTION, SOLUTION INTRAVENOUS at 10:43

## 2024-04-18 RX ADMIN — MYCOPHENOLATE MOFETIL 750 MG: 250 CAPSULE ORAL at 19:52

## 2024-04-18 RX ADMIN — METHYLPREDNISOLONE SODIUM SUCCINATE 250 MG: 125 INJECTION INTRAMUSCULAR; INTRAVENOUS at 13:58

## 2024-04-18 RX ADMIN — TACROLIMUS 2 MG: 1 CAPSULE ORAL at 19:52

## 2024-04-18 RX ADMIN — METHOCARBAMOL 500 MG: 500 TABLET ORAL at 14:58

## 2024-04-18 RX ADMIN — ATORVASTATIN CALCIUM 10 MG: 10 TABLET, FILM COATED ORAL at 08:14

## 2024-04-18 RX ADMIN — ACETAMINOPHEN 650 MG: 325 TABLET, FILM COATED ORAL at 13:57

## 2024-04-18 RX ADMIN — VALGANCICLOVIR HYDROCHLORIDE 450 MG: 450 TABLET ORAL at 09:29

## 2024-04-18 RX ADMIN — MYCOPHENOLATE MOFETIL 750 MG: 250 CAPSULE ORAL at 06:13

## 2024-04-18 RX ADMIN — SULFAMETHOXAZOLE AND TRIMETHOPRIM 1 TABLET: 400; 80 TABLET ORAL at 09:29

## 2024-04-18 RX ADMIN — OXYCODONE HYDROCHLORIDE 5 MG: 5 TABLET ORAL at 00:54

## 2024-04-18 RX ADMIN — INSULIN ASPART 1 UNITS: 100 INJECTION, SOLUTION INTRAVENOUS; SUBCUTANEOUS at 12:30

## 2024-04-18 RX ADMIN — DIPHENHYDRAMINE HYDROCHLORIDE 25 MG: 25 CAPSULE ORAL at 13:57

## 2024-04-18 RX ADMIN — ACETAMINOPHEN 650 MG: 325 TABLET, FILM COATED ORAL at 22:12

## 2024-04-18 RX ADMIN — ANTI-THYMOCYTE GLOBULIN (RABBIT) 125 MG: 5 INJECTION, POWDER, LYOPHILIZED, FOR SOLUTION INTRAVENOUS at 14:50

## 2024-04-18 RX ADMIN — FAMOTIDINE 10 MG: 10 TABLET ORAL at 08:26

## 2024-04-18 RX ADMIN — SENNOSIDES AND DOCUSATE SODIUM 1 TABLET: 50; 8.6 TABLET ORAL at 19:52

## 2024-04-18 RX ADMIN — ACETAMINOPHEN 975 MG: 325 TABLET, FILM COATED ORAL at 06:13

## 2024-04-18 RX ADMIN — SODIUM CHLORIDE, POTASSIUM CHLORIDE, SODIUM LACTATE AND CALCIUM CHLORIDE: 600; 310; 30; 20 INJECTION, SOLUTION INTRAVENOUS at 16:04

## 2024-04-18 RX ADMIN — INSULIN ASPART 1 UNITS: 100 INJECTION, SOLUTION INTRAVENOUS; SUBCUTANEOUS at 17:36

## 2024-04-18 RX ADMIN — HYDROMORPHONE HYDROCHLORIDE 0.4 MG: 1 INJECTION, SOLUTION INTRAMUSCULAR; INTRAVENOUS; SUBCUTANEOUS at 03:14

## 2024-04-18 RX ADMIN — TACROLIMUS 2 MG: 1 CAPSULE ORAL at 08:13

## 2024-04-18 RX ADMIN — PAROXETINE 30 MG: 30 TABLET, FILM COATED ORAL at 08:14

## 2024-04-18 RX ADMIN — SODIUM CHLORIDE, POTASSIUM CHLORIDE, SODIUM LACTATE AND CALCIUM CHLORIDE: 600; 310; 30; 20 INJECTION, SOLUTION INTRAVENOUS at 06:49

## 2024-04-18 ASSESSMENT — ACTIVITIES OF DAILY LIVING (ADL)
ADLS_ACUITY_SCORE: 25
ADLS_ACUITY_SCORE: 22
ADLS_ACUITY_SCORE: 25

## 2024-04-18 NOTE — TELEPHONE ENCOUNTER
A pharmacist spent 30 minutes providing medication teaching with Ira Zamora for discharge with a focus on new medications/dose changes.  The discharge medication list was reviewed with the patient/family and the following points were discussed, as applicable: Name, description, purpose, dose/strength, duration of medications, common side effects, food/medications to avoid, action to be taken if dose is missed, when to call MD, and how to obtain refills.  The patient will be responsible for managing medications. Additionally, the following transplant related education was covered: Purpose of medication card, Medication videos, Timing of medications and day of lab draw considerations , Prescription Insurance , and Discharge process for receiving meds   Patient will  transplant supplies including 7 day pill organizer, thermometer, and BP monitor at the discharge pharmacy along with medications.  Patient chooses to receive medications from  specialty pharmacy.   Clinical Pharmacy Consult:                                                      Transplant Specific:   Date of Transplant: 4/17/24  Type of Transplant: kidney  First Transplant: yes  History of rejection: no    Immunosuppression Regimen   TAC 2mg qAM & 2mg qPM and MMF 750mg qAM & 750mg qPM  Patient specific goal: 8-10  Most recent level: not drawn yet   Immunosuppressant Levels:  not drawn yet  Pt adherent to lab draws: yes  Scr:   Lab Results   Component Value Date    CR 4.16 04/18/2024     Side effects: no side effects    Prophylactic Medications  PJP Prophylactic: Bactrim SS three times per week  Scheduled Discontinue Date: Lifelong     Antifungal: Not needed thus far  Scheduled Discontinue Date: N/A     CMV Prophylactic: CrCl 10 to 24 mL/minute: Valcyte 450 mg twice weekly   Scheduled Discontinue Date:  3 to 6 months  Anticipated date TBD    Acid Reducer: Pepcid (famotidine)  Scheduled Reviewed Date:  TBD    Vascular Prophylactic: Not needed thus  far  Scheduled Discontinue Date: N/A    Med rec/DUR performed: yes  Med Rec Discrepancies: no    Reminders:    Bring to first clinic appt: med box, med card, bp monitor, all medications being taken, and lab book.  2.   MTM pharmacist visit on first clinic appt and if ok, again in 3 to 4 months during follow up appt.  3.   Avoid Grapefruit and Grapefruit juice.   4.   Avoid herbal supplements. If wish to take other medications or supplements, call your coordinator.   5.   Keep lab appts.   6.   Can use apps on phone like [employer] to help manage medication lists and reminders.   7.   Make sure you are protecting your skin by wearing long sleeves and applying sunscreen to exposed skin, for any significant time in the sun.     Transplant Coordinator is Radha Eddy [facility]

## 2024-04-18 NOTE — BRIEF OP NOTE
[facility]    Brief Operative Note    Pre-operative diagnosis: End stage renal disease (H) [N18.6]  Post-operative diagnosis Same as pre-operative diagnosis    Procedure: Transplant kidney recipient  donor,bilateral uretral stent implantation, N/A - Abdomen    Surgeon: Surgeons and Role:     * Carlitos Díaz MD - Primary     * Scott Jerry MD - Fellow - Assisting  Anesthesia: General   Estimated Blood Loss: Less than 100 ml    Drains:19 F Deniz  Specimens: * No specimens in log *  Findings:   None.  Complications: None.  Implants:   Implant Name Type Inv. Item Serial No.  Lot No. LRB No. Used Action   STENT URETERAL PERCUFLEX PLUS [id number] - [id number] Stent STENT URETERAL PERCUFLEX PLUS [id number]  [employer] [id number] Left 1 Implanted   STENT URETERAL PERCUFLEX PLUS [id number] - [id number] Stent STENT URETERAL PERCUFLEX PLUS [id number]  [employer] [id number] Right 1 Implanted

## 2024-04-18 NOTE — PROGRESS NOTES
"Transplant Surgery  Inpatient Daily Progress Note  2024    Assessment & Plan: 34 year old with history of ESRD on dialysis d/t interstitial nephritis, Crohn's, HTN, GERD, and dilated ascending aorta. S/p  donor en bloc kidney transplant with ureteral stent x2 on 24.    Graft function: POD #1   Kidney: Cr 3.9->4.2. Made urine prior to transplant. Post-op US normal. Repeat today.     Immunosuppressed status secondary to medications:   -cPRA 9, but will give Thymo due to need to minimize steroids. History of severe reflux and suspected interstitial nephritis secondary to PPI.   Thymo: 125/125  Steroids:  500/250  MMF: 750mg BID. Will need REMS teaching. Has IUD.  Tacro: Goal level 8-10    Hematology:   Anemia of chronic disease: Hgb 9.6, stable.  Thrombocytopenia: Plt 112, present on admission.  Anticoagulation: Heparin 200u/h. Increase to 400u/h this afternoon. Start ASA 81mg tomorrow. Plan for ASA 325mg daily on discharge.    Cardiorespiratory:   HTN: Monitor for now.    GI/Nutrition:   Diet: Regular  Bowel regimen: Senna, PEG  GERD: Start famotidine. NO PPI due to history of interstitial nephritis.    Endocrine:   Steroid hyperglycemia, mild: Stop D5 in IVF. Add sliding scale insulin.    Fluid/Electrolytes: MIVF: Straight rate  Hyponatremia: Due to renal dysfunction. Monitor.    : Mcintosh to remain due to new surgical anastomosis x7 days.    Infectious disease: Afebrile    Neuro:  Acute post-op pain: Controlled.  -APAP 650mg q8H  -Norco 5/325mg q4h PRN per pt preference  -Lidoderm    Prophylaxis: DVT, fall, GI, viral (Valcyte), pneumocystis (TMP/sulfa)    Disposition: 7A     ERICA/Fellow/Resident Provider: Holli Nelson NP [id number]    Faculty: Carlitos Díaz MD   _________________________________________________________________    Interval History: History from patient and/or EMR  Overnight events: Minimal pain, feeling OK. Wants Norco instead of oxy because oxy makes her \"sleepy\".    ROS:   A 10-point " review of systems was negative except as noted above.    Meds:  Current Facility-Administered Medications   Medication Dose Route Frequency Provider Last Rate Last Admin    acetaminophen (TYLENOL) tablet 650 mg  650 mg Oral Q8H Holli Nelson APRN CNP        anti-thymocyte globulin (THYMOGLOBULIN - Rabbit) 125 mg in sodium chloride 0.9 % intermittent infusion  2 mg/kg Intravenous Central line Once Holli Nelson APRN CNP        atorvastatin (LIPITOR) tablet 10 mg  10 mg Oral Daily Scott Jerry MD   10 mg at 04/18/24 0814    diphenhydrAMINE (BENADRYL) capsule 25-50 mg  25-50 mg Oral Once Holli Nelson APRN CNP        Or    diphenhydrAMINE (BENADRYL) solution 25-50 mg  25-50 mg Per NG tube Once Holli Nelson APRN CNP        [START ON 4/19/2024] famotidine (PEPCID) tablet 20 mg  20 mg Oral Daily Carlitos Díaz MD        Lidocaine (LIDOCARE) 4 % Patch 2 patch  2 patch Transdermal Q24H Holli Nelson APRN CNP        [Held by provider] magnesium oxide (MAG-OX) tablet 400 mg  400 mg Oral Daily with lunch Scott Jerry MD        methylPREDNISolone sodium succinate (solu-MEDROL) 250 mg in sodium chloride 0.9 % 59 mL intermittent infusion  250 mg Intravenous Once Holli Nelson APRN CNP        mycophenolate (GENERIC EQUIVALENT) capsule 750 mg  750 mg Oral BID IS Scott Jerry MD   750 mg at 04/18/24 0613    PARoxetine (PAXIL) tablet 30 mg  30 mg Oral Daily Holli Nelson APRN CNP   30 mg at 04/18/24 0814    polyethylene glycol (MIRALAX) Packet 17 g  17 g Oral Daily Scott Jerry MD        senna-docusate (SENOKOT-S/PERICOLACE) 8.6-50 MG per tablet 1 tablet  1 tablet Oral BID Scott Jerry MD        sodium chloride (PF) 0.9% PF flush 10 mL  10 mL Intracatheter Q8H Scott Jerry MD        sulfamethoxazole-trimethoprim (BACTRIM) 400-80 MG per tablet 1 tablet  1 tablet Oral Once per day on Tuesday Thursday Saturday Scott Jerry MD   1 tablet at 04/18/24 0957  "   tacrolimus (GENERIC EQUIVALENT) capsule 2 mg  0.03 mg/kg Oral BID IS Soctt Jerry MD   2 mg at 04/18/24 0813    valGANciclovir (VALCYTE) tablet 450 mg  450 mg Oral Once per day on Monday Thursday Scott Jerry MD   450 mg at 04/18/24 0929       Physical Exam:     Admit Weight: 59.4 kg (131 lb)    Current vitals:   /86   Pulse 96   Temp 97.4  F (36.3  C) (Axillary)   Resp 16   Ht 1.588 m (5' 2.52\")   Wt 59.4 kg (131 lb)   LMP 04/03/2024 (Approximate)   SpO2 97%   BMI 23.56 kg/m      Vital sign ranges:    Temp:  [97.4  F (36.3  C)-99.5  F (37.5  C)] 97.4  F (36.3  C)  Pulse:  [77-96] 96  Resp:  [12-21] 16  BP: (102-148)/(68-86) 123/86  SpO2:  [95 %-98 %] 97 %    General Appearance: in no apparent distress.   Skin: Warm, perfused  Heart: NSR  Lungs: Unlabored  Abdomen: The abdomen is Soft, incision covered, EVELIO serosang  : de leon is present.  Urine is pink.  Extremities: edema: none, strength 5/5  Neurologic: awake, alert, and oriented x4. Tremor absent..     Data:   CMP  Recent Labs   Lab 04/18/24  0956 04/18/24  0549 04/18/24  0200 04/18/24  0024 04/17/24  2230 04/17/24  2123 04/17/24  1603 04/17/24  0958   NA  --  132*  --   --  136 136  --  139   POTASSIUM 4.2 4.4  4.4   < >  --  3.9  3.9 3.7  --  3.4   CHLORIDE  --  95*  --   --  96*  --   --  95*   CO2  --  27  --   --  29  --   --  33*   GLC  --  209*  --  193* 166* 168*   < > 91   BUN  --  19.6  --   --  16.0  --   --  11.6   CR  --  4.16*  --   --  3.92*  --   --  3.11*   GFRESTIMATED  --  14*  --   --  15*  --   --  19*   ROBLES  --  9.2  --   --  8.7  --   --  9.3   ICAW  --   --   --   --   --  4.5  --   --    MAG  --  2.5*  --   --  2.5*  --   --   --    PHOS  --  4.2  --   --  2.7  --   --   --    ALBUMIN  --   --   --   --   --   --   --  4.1   BILITOTAL  --   --   --   --   --   --   --  0.6   ALKPHOS  --   --   --   --   --   --   --  55   AST  --   --   --   --   --   --   --  17   ALT  --   --   --   --   --   --   --  5    < > = " values in this interval not displayed.     CBC  Recent Labs   Lab 04/18/24  0956 04/18/24  0549 04/18/24  0200 04/17/24  2230 04/17/24  2123 04/17/24  0958   HGB 9.6* 9.6*   < > 9.6*  9.6*   < > 10.5*   WBC  --  12.4*  --  8.1  --  6.1   PLT  --  112*  --  91*  --  122*   A1C  --   --   --   --   --  4.7    < > = values in this interval not displayed.

## 2024-04-18 NOTE — TELEPHONE ENCOUNTER
Organ Offer Encounter Information    Organ Offer Information  Organ offer date & time: 4/17/2024  1:05 AM  Coordinator/Fellow/Attending name: Marshal Martin RN   Organ(s):  Organ UNOS ID Match Run ID Comment Organ Laterality   Kidney [id number] MNOP         Recent infections?: No      New medications?: No Recent pregnancy?: No     Angicoagulation medications?: No Recent vaccinations?: No     Recent blood transfusions?: No Recent hospitalizations?: No   Has your insurance changed in the last 6-12 months?: Neg    Patient last dialyzed: 4/15/2024 12:44 AM  Dialysis type: Hemo  Discussed organ offer with: Patient  Patient/Caregiver name: Ira  Discussed risk category with Patient/Other: N/A  Understood donor criteria, verbalized understanding  Patient/Other asked to speak to a surgeon?: No  Discussed program-specific outcomes: Asked questions regarding SRTR, verbalized understanding  Right to decline organ offer without penalty, Patient/Other: Aware of option to decline without penalty  Organ offer decision status Patient/Other: Accepted Offer  Organ disposition: Transplanted  Additional Comments: 4/17/2024 12:45 AM  Kidney: Local   MD: Bre  OPO Contact:  Import Line [phone]  VXM Results: Compatible with no DSA per Dr Martinez.   XM Plan (FXM must be done with serum no older than 10 days from transplant): FXM upon pt arrival.   Donor/Recip HCV Status: NEG/NEG  (HCV+ Donors - Discuss HCV genotyping/quant testing with MD & send Epic in basket message to [facility] HCV POOL - Include Donor UNOS ID)  Is this an ABO A2 donor to ABO B Recipient:  (In the event of A2 to B transplant, Anti-A titers need to be collected at the time of the crossmatch or admission - whichever is first.  Use [employer] .A2toB for instructions.)  Plan (Admission, NPO, Donor OR):  Called pt  with a primary en bloc kidney offer. At stated she will accept offer. Admission instructions provided and pt verbalized understanding.  Pt's ETA 0800.   - - -   COVID Screening  In the past month, have you:  Or anyone close to you had a positive COVID test or suspected to have COVID: no  Had any COVID symptoms (Fever, Cough, Short of Breath, Loss of Taste/Smell, Rash): no    Admissions: pt ETA 0800. Yun @ 0120.   Unit: 7A   Darcell @0100.   Update Provider Entering Orders (XM Plan & COVID Testing):  Day shift PA to place orders.   Immunology:  Lucretia @ 0142  Inpatient Lab (COVID Testing [phone], Option 2): Brando @ 0147.   KIDNEY Research ([phone]): SURG  RESEARCH PAGER [ Msg Id [id number] ] @ 0200.  Research team (Teddy) notified.   Book OR: OR set @ 1600. Christiane @ 0220  Vessel Storage Confirmation (PA/BANDAR/NILTON):  N/A  Blood Bank: Yue @ 0200.   TransNet/ABO Verification: Done @ 0239  Add Organ:  Done @ 0210    Marshal Martin RN on 4/17/2024 at 1:50 AM  _ _ _  Donor OR Time: 1030  Procuring MD: Ming Jerry   Contact in the OR: LS Import Line  Organs Being Procured: Hr, Liver, & En bloc Kidneys   Expected Delays: no  Flush Solution: UW  Biopsy: NO  Pump: NO  Special Requests (Special blood tubes, nodes, waivers-XM and/or anatomical): NONE  MD for Visualization: Dr Díaz/Dr Jerry  Transportation Details: [facility] will set up transportation. En bloc kidney will be brought back by [facility] liver team.     Marshal Martin RN on 4/17/2024 at 2:06 AM  _ _ _      4/17/2024 7:22 AM:   Called patient to give her my contact information, she is in dialysis and plans to be admitted to  afterwards around 0900. Made NPO at 0800. No further questions at this time.   Mallory Oconnell RN  Transplant Coordinator    Attestation I have discussed all of the above with the Patient/Legal Guardian/Caregiver regarding this organ offer.: Yes  Coordinator/Fellow/Attending name: Marshal Martin RN

## 2024-04-18 NOTE — PROGRESS NOTES
Patient removed from OS waitlist after  donor kidney transplant. OS ID [id number].    Donor Has Risk Criteria for Transmission of HIV/HCV/HBV: No  Recipient Notified of Risk Criteria: N/A

## 2024-04-18 NOTE — PHARMACY-TRANSPLANT NOTE
Planned immunosuppression regimen per kidney transplant protocol:  INDUCTION: High intensity, PRA 9. High intensity chosen due to need to reduce steroid use.  Thymoglobulin 6 mg/kg total as divided below:  4/17/24 (POD 0): thymoglobulin 2 mg/kg (125mg), methylprednisolone 500 mg IV  4/18/24 (POD 1): thymoglobulin 2 mg/kg (125mg), methylprednisolone 250 mg IV  4/19/24 (POD 2): thymoglobulin 2 mg/kg (125mg), methylprednisolone 100 mg IV     MAINTENANCE:   - Mycophenolate 750 mg BID  - Tacrolimus 2mg BID with goal trough levels of 8-10 mcg/L for first 6 months post-transplant     Opportunistic pathogen prophylaxis includes:  - PJP: trimethoprim/sulfamethoxazole  - CMV D unknown/R-: Valganciclovir for 3 months duration tentatively

## 2024-04-18 NOTE — PLAN OF CARE
Goal Outcome Evaluation:      Plan of Care Reviewed With: patient, family    Overall Patient Progress: no changeOverall Patient Progress: no change    Outcome Evaluation: See RD note 4/18    MAVIS Aly, RD, LD  Available on [employer]  Vocera: M-F (7:00-3:30) 7A/7B Clinical Dietitian  Weekend/Holiday (7:00-3:30) - Weekend Clinical Dietitian      **Clinical Dietitians are no longer available by pager

## 2024-04-18 NOTE — PLAN OF CARE
"BP (!) 144/89 (BP Location: Left arm, Patient Position: Supine, Cuff Size: Adult Regular)   Pulse 99   Temp 98.8  F (37.1  C) (Oral)   Resp 16   Ht 1.588 m (5' 2.52\")   Wt 59.4 kg (131 lb)   LMP 04/03/2024 (Approximate)   SpO2 95%   BMI 23.56 kg/m      Shift: [id number]  VS: Stable on RA, afebrile.  Neuro: AOx4  BG: ACHS with sliding scale insulin.  Labs: Creatinine 4.16.  Respiratory: WDL  Pain/Nausea/PRN: Pain managed with NORCO x1, tylenol (given as pre-med for Thymo), robaxin x1.  Diet: Regular diet, good appetite.  LDA: R internal jugular - quad lumen, L PIV x2, both saline locked, EVELIO drain.  GI/: Mcintosh catheter, fair output, no BM, has not passed gas.  Skin: Abdominal surgical incision still covered with post-op dressing, UTV.   Mobility: Assist x1 with IV pole, up to chair today.  Plan: Thymo today, continue plan of care.     Handoff given to following RN.    "

## 2024-04-18 NOTE — PROGRESS NOTES
Edward Brown from transplant team notified via page that labs results & ultrasound were complete. Provider called back & was informed that there were no concerning issues at that time.

## 2024-04-18 NOTE — DISCHARGE SUMMARY
[facility]    Discharge Summary  Transplant Surgery    Date of Admission:  2024  Date of Discharge:  2024  Discharging Provider: Skye Judge NP/Herber Patel MD    Attestation: I saw and examined the patient with the transplant team. I independently reviewed the pertinent laboratory and imaging information and made management decisions. I agree with the findings and plan as documented in this note.  Herber Patel MD      Discharge Diagnoses   Principal Problem:    Kidney replaced by transplant  Active Problems:    Hypertension    Anemia in chronic kidney disease    Long QT interval    Immunosuppressed status (H24)    Gross hematuria    Gastroesophageal reflux disease without esophagitis    Acute post-operative pain      History of Present Illness   Ira Zamora is an 34 year old female with history of ESRD on dialysis d/t interstitial nephritis, Crohn's, HTN, GERD, and dilated ascending aorta. S/p  donor en bloc kidney transplant with ureteral stent x2 on 24.     Hospital Course   Kidney transplant, en bloc: SCr down to 2.39 by discharge.    Imaging:  -Postop US Normal   -Repeat US obtained POD #1 Small, hypoechoic fluid collection adjacent to the lateral kidney measuring 2.2 x 0.8 x 2.0 cm likely representing postsurgical fluid versus hematoma    Immunosuppressed due to medications:  -cPRA 9, Thymo due to need to minimize steroids with history of severe reflux and suspected interstitial nephritis secondary to PPI.   -Induction with Thymoglobulin and steroid taper.  -Tacrolimus, goal level 8-10 (12 hour trough).  -Mycophenolate 750mg BID.  -Infectious prophylaxis with Bactrim indefinitely and valganciclovir x6 months.    Transplant coordinator Radha Jorge  [phone]  Donor type:  DBD  DSA at time of transplant:  NO  Ureteral stent: TWO STENTS  CMV:  Donor + / Recipient -  EBV:  Donor + / Recipient +  Thymoglobulin:  356mg ,  6mg/kg      Hematology:   Anemia of chronic disease, ABL due to hematuria: Hgb stable ~8 at discharge.  Thrombocytopenia: present on admission. Plts ~80 at discharge.   Anticoagulation: on a Heparin drip straight rate while hospitalized. Stopped on POD #3 due to worsening gross hematuria. Will continue ASA 325mg at discharge.      Cardiorespiratory:   HTN: -150. Continue to monitor, No antihypertensives at discharge.      GI/Nutrition:   Diet: Regular, post-transplant: Heart healthy dietary habits long term (low saturated/trans fat, low sodium). High protein diet x 8 weeks. Practice food safety precautions.    GERD: Started famotidine. NO PPI due to history of interstitial nephritis.     Endocrine:   Steroid hyperglycemia, mild: was not requiring insulin at discharge.      Fluid/Electrolytes:    Hyponatremia: Due to renal dysfunction. Resolved at discharge.      : Mcintosh to remain due to new surgical anastomosis x7 days.     Infectious disease: Afebrile, WBC normal.     Neuro:  Acute post-op pain: Controlled. Discontinued on tylenol, robaxin,and Oxycodone PRN pain.        Significant Results and Procedures   24  Transplant kidney recipient  donor, bilateral uretral stent implantation  Surgeon:         Surgeons and Role:     * Carlitos Díaz MD - Primary      Code Status   Full    Primary Care Physician   Shalom SALVADOR Self    Physical Exam   Temp: 97.6  F (36.4  C) Temp src: Oral BP: 133/88 Pulse: 93   Resp: 16 SpO2: 95 % O2 Device: None (Room air)    Vitals:    24 0936 24 0652   Weight: 59.4 kg (131 lb) 63.1 kg (139 lb 1.6 oz)     Vital Signs with Ranges  Temp:  [97.6  F (36.4  C)-97.7  F (36.5  C)] 97.6  F (36.4  C)  Pulse:  [] 93  Resp:  [16-18] 16  BP: (131-149)/(86-97) 133/88  SpO2:  [95 %-96 %] 95 %  I/O last 3 completed shifts:  In: 730 [P.O.:720; I.V.:10]  Out: 1620 [Urine:1400; Drains:220]     General Appearance: in no apparent distress.   Skin: Warm, perfused  Lungs:  Unlabored  Abdomen: The abdomen is Soft, appropriately tender, ND, incision CDI,  EVELIO x1 with serosang output  : de leon is present +gross hematuria, Urine is pink.  Extremities: edema: none, strength 5/5  Neurologic: awake, alert, and oriented x4. Tremor absent..       Time Spent on this Encounter   I, Skye Judge NP, personally saw the patient today and spent greater than 30 minutes discharging this patient.    Discharge Disposition   Discharged to home  Condition at discharge: Good    Consultations This Hospital Stay   NEPHROLOGY KIDNEY/PANCREAS TRANSPLANT ADULT IP CONSULT  NURSING TO CONSULT FOR VASCULAR ACCESS CARE IP CONSULT  SOT MEDICATION HISTORY IP PHARMACY CONSULT  SOCIAL WORK IP CONSULT  PHARMACY IP CONSULT  NUTRITION SERVICES ADULT IP CONSULT  NEPHROLOGY KIDNEY/PANCREAS TRANSPLANT ADULT IP CONSULT  PHYSICAL THERAPY ADULT IP CONSULT    Discharge Orders       Home Care Referral      Reason for your hospital stay    Kidney transpalnt surgery     Activity    Walk at least four times a day, lift no greater than 10 pounds for 6-8 weeks from the time of surgery.  No driving while taking narcotics or 3 weeks after surgery.     When to contact your care team    WHEN TO CONTACT YOUR  COORDINATOR:     Transplant Coordinator [phone]     Notify your coordinator if you have pain over your kidney, increased redness or drainage from your incision, fever greater than 100F, decreased urine output or new or increased amount of blood in urine.     Notify your coordinator immediately if you are ever unable to take your immunosuppressive medications for any reason.     If you have URGENT concerns after office hours, please call the hospital switchboard at [phone] and ask to have the organ transplant nurse on-call paged. If you have a life-threatening emergency, go to the nearest emergency room.     Monitor and record    blood pressure daily  weight every day. Call your coordinator if you experience a 2 lb  weight gain or weight loss within a 24 hour period.     Wound care and dressings    If you have staples in place, they will be removed in 3 weeks after operation. Wash incision daily with soap and water. Do not soak or scrub.     Tubes and drains    EVELIO drain plan: Please monitor and write down color and amount of output. Drain will be removed at discretion of transplant surgeon.     Mcintosh plan: Please monitor color and amount of urine output. Mcintosh will be removed on post-op day 7 Days.     Adult [facility] Follow-up and recommended labs and tests    [facility] FOLLOW UP:     1. [facility]: Over the next 2 days you will be seen in the [facility] (ph. [phone], option 3). Your labs will be drawn at the beginning of your appointment. DO NOT take your medications prior to having labs drawn. Please bring all your medications with you from home to take after labs are drawn.     2. Follow up with Dr. Carlitos Díaz M.D., Ph.D. in Transplant Clinic in 1-2 weeks.     3. Follow up with Transplant Nephrologist as scheduled.     4.  Ureteral stent removal x2 in 4-6 weeks, to be scheduled by Transplant Coordinator. If a  does not contact you for this, please contact your transplant coordinator.     5. Follow up with your primary care provider in ~8 weeks. Patient to schedule.     Remember to always bring an updated medication list to all appointments.        Call your Transplant Coordinator ([phone]) with questions about Transplant Center appointment scheduling.     LABS:     CBC, BMP, magnesium, phosphorus, tacrolimus level to be drawn daily while in AT[facility], then every Monday and Thursday by home health care nurse if arranged, or at an outpatient lab.            Appointments on [place] and/or [facility] (with [facility] or [facility] provider or service). Call [phone] if you haven't heard regarding these appointments within 7 days of discharge.     Diet    Diet  recommendations post-transplant: Heart healthy dietary habits long term (low saturated/trans fat, low sodium). High protein diet x 8 weeks. Practice food safety precautions.     Discharge Medications    Current Discharge Medication List        START taking these medications    Details   aspirin (ASA) 325 MG tablet Take 1 tablet (325 mg) by mouth daily  Qty: 30 tablet, Refills: 2    Associated Diagnoses: Kidney replaced by transplant      atorvastatin (LIPITOR) 10 MG tablet Take 1 tablet (10 mg) by mouth daily  Qty: 30 tablet, Refills: 1    Associated Diagnoses: Kidney replaced by transplant      famotidine (PEPCID) 20 MG tablet Take 2 tablets (40 mg) by mouth daily  Qty: 60 tablet, Refills: 1    Associated Diagnoses: Kidney replaced by transplant      methocarbamol (ROBAXIN) 500 MG tablet Take 1 tablet (500 mg) by mouth 4 times daily as needed for muscle spasms  Qty: 12 tablet, Refills: 0    Associated Diagnoses: Kidney replaced by transplant      mycophenolate (GENERIC EQUIVALENT) 250 MG capsule Take 3 capsules (750 mg) by mouth 2 times daily  Qty: 180 capsule, Refills: 1    Associated Diagnoses: Kidney replaced by transplant      oxyCODONE (ROXICODONE) 5 MG tablet Take 1 tablet (5 mg) by mouth every 6 hours as needed for moderate to severe pain  Qty: 8 tablet, Refills: 0    Associated Diagnoses: Kidney replaced by transplant      senna-docusate (SENOKOT-S/PERICOLACE) 8.6-50 MG tablet Take 1 tablet by mouth 2 times daily as needed for constipation  Qty: 30 tablet, Refills: 0    Associated Diagnoses: Kidney replaced by transplant      sulfamethoxazole-trimethoprim (BACTRIM) 400-80 MG tablet Take 1 tablet by mouth three times a week  Qty: 30 tablet, Refills: 1    Associated Diagnoses: Kidney replaced by transplant      !! tacrolimus (GENERIC EQUIVALENT) 0.5 MG capsule Take 1 capsule (0.5 mg) by mouth 2 times daily Total=2.5mg BID  Qty: 60 capsule, Refills: 1    Associated Diagnoses: Kidney replaced by transplant       !! tacrolimus (GENERIC EQUIVALENT) 1 MG capsule Take 2 capsules (2 mg) by mouth 2 times daily Total=2.5mg BID  Qty: 120 capsule, Refills: 1    Associated Diagnoses: Kidney replaced by transplant      valGANciclovir (VALCYTE) 450 MG tablet Take 1 tablet (450 mg) by mouth twice a week  Qty: 60 tablet, Refills: 2    Associated Diagnoses: Kidney replaced by transplant       !! - Potential duplicate medications found. Please discuss with provider.        CONTINUE these medications which have CHANGED    Details   doxazosin (CARDURA) 4 MG tablet Take 1 tablet (4 mg) by mouth at bedtime  Qty: 30 tablet, Refills: 1    Associated Diagnoses: Kidney replaced by transplant           CONTINUE these medications which have NOT CHANGED    Details   acetaminophen (TYLENOL) 500 MG tablet Take 1,000 mg by mouth every 6 hours as needed      levonorgestrel (KYLEENA) 19.5 MG IUD 1 Device by Intrauterine route      PARoxetine (PAXIL) 30 MG tablet Take 30 mg by mouth      ustekinumab (STELARA) 90 MG/ML Inject 90 mg Subcutaneous once every eight weeks           STOP taking these medications       LABETALOL HCL PO Comments:   Reason for Stopping:         omeprazole (PRILOSEC) 20 MG DR capsule Comments:   Reason for Stopping:         ondansetron (ZOFRAN ODT) 4 MG ODT tab Comments:   Reason for Stopping:             Allergies   Allergies   Allergen Reactions    Proton Pump Inhibitors Nephrotoxicity     No PPIs due to history of renal failure due to interstitial nephritis     Data   Results for orders placed or performed during the hospital encounter of 04/17/24   XR Chest 2 Views    Narrative    Exam: XR CHEST 2 VIEWS, 4/17/2024 11:00 AM    Indication: Pre-op screening for transplant    Comparison: 9/29/2022    Findings:   2 views of the chest. Mild cardiomegaly. No pneumothorax, pleural  effusion, or consolidation. Pulmonary vascularity is distinct.      Impression    Impression: Mild cardiomegaly, otherwise negative chest.    I have  personally reviewed the examination and initial interpretation  and I agree with the findings.    YARIEL GARCIA MD         SYSTEM ID:  [id number]   XR Chest Port 1 View    Narrative    Exam: XR CHEST PORT 1 VIEW 4/17/2024 10:40 PM    Indication: Central line    Comparison: Chest x-ray 4/17/2024    Findings:   AP portable chest radiograph at 20 degrees. Right IJ central line with  the tip at the distal SVC. Trach is midline. Cardiac silhouette is  enlarged. Increased perihilar and left basilar opacities. No  pneumothorax. Questionable trace pleural effusions. Partially  visualized upper abdomen unremarkable. No acute osseous abnormalities.      Impression    Impression:   1. Interval placement of a right IJ central venous catheter with the  tip of the distal SVC.  2. Cardiomegaly with opacities favoring mild pulmonary  edema/atelectasis.    I have personally reviewed the examination and initial interpretation  and I agree with the findings.    KARI ROGERS MD         SYSTEM ID:  [id number]   US Renal Transplant with Doppler    Narrative    EXAMINATION: US RENAL TRANSPLANT 4/17/2024 11:15 PM     COMPARISON: None.    HISTORY: En bloc kidneys (2 kidneys), only 1 artery, 1 vein, double  ureter, postoperative day 0.    TECHNIQUE: Grey-scale, color Doppler and spectral flow analysis.    FINDINGS:  The transplant kidneys are located in the right lower quadrant and  measure inferior: 7.0 cm, superior: 7.1 cm. Both demonstrate a  parenchyma that is of normal thickness and mildly increased cortex  echogenicity with prominent pyramids. No focal lesions. No  hydronephrosis. No perinephric fluid collection.    SUPERIOR:  Renal artery flow:   141 cm/s peak systolic at hilum.  143 cm/s peak systolic at aorta.  Arcuate artery resistive indices (upper to lower): 0.65, 0.61, 0.69    Renal Vein Flow:  50 cm/s at hilum.   122 cm/s at IVC.    INFERIOR:  Renal artery flow:   94 cm/s peak systolic at hilum.  143 cm/s peak systolic at  aorta.  Arcuate artery resistive indices (upper to lower): 0.69, 0.75, 0.70    Renal Vein Flow:  10 cm/s at hilum.   122 cm/s at IVC.    Iliac artery flow:  Transplant aorta to iliac artery, anastomosis: 152 cm/s.  164 cm/s peak systolic above anastomosis.  179 cm/s peak systolic below anastomosis.    Iliac vein flow:  Transplant IVC to iliac vein: 86 cm/s  47 cm/s peak systolic above anastomosis.  34 cm/s peak systolic below anastomosis.    The bladder is decompressed with Mcintosh inside.        Impression    IMPRESSION:   Normal POD0 En Bloc transplant kidneys ultrasound.    I have personally reviewed the examination and initial interpretation  and I agree with the findings.    KARI ROGERS MD         SYSTEM ID:  [id number]   US Renal Transplant with Doppler    Narrative    EXAMINATION: US RENAL TRANSPLANT,  4/18/2024 11:28 AM     COMPARISON: 4/17/2024    HISTORY: POD #1 en bloc transplant with rising creatinine, one artery,  one vein, double ureter.    TECHNIQUE:  Grey-scale, color Doppler and spectral flow analysis.    FINDINGS:  The transplant kidney is located in the right lower quadrant, and  measure lateral: 7.3 cm, medial: 6.8 cm. Parenchyma is of normal  thickness and echogenicity. No focal lesions. No hydronephrosis. No  perinephric fluid collection. Mcintosh catheter balloon within the  bladder lumen. Hypoechoic perinephric fluid collection measuring 2.2 x  0.8 x 0.2 cm just superior to the lateral transplant kidney, which  does not demonstrate internal vascularity on Doppler.    LATERAL:   Renal artery flow:   59 cm/sec peak systolic at hilum.  198 cm/s peak systolic at anastomosis.  Arcuate artery resistive indices (upper to lower): 0.75, 0.8, 0.78    Renal Vein Flow:  13 cm/sat hilum.   129 cm/s at anastomosis.    MEDIAL:   Renal artery flow:   66 cm/sec peak systolic at hilum.  142 cm/s peak systolic at anastomosis.  Arcuate artery resistive indices (upper to lower): 0.62, 0.62, 0.65    Renal Vein  Flow:  15 cm/sat hilum.   109 at anastomosis.    Iliac artery flow:  Transplant aorta to iliac artery anastomosis: 105 cm/s  118 peak systolic above anastomosis.  115 peak systolic below anastomosis.    Iliac vein flow:  Transplant IVC to iliac vein anastomosis: 119 cm/s  Patent above and below the anastomosis.      Impression    IMPRESSION: Small, hypoechoic fluid collection adjacent to the lateral  kidney measuring 2.2 x 0.8 x 2.0 cm likely representing postsurgical  fluid versus hematoma. Otherwise normal sonographic appearance of the  en bloc transplant kidneys.    I have personally reviewed the examination and initial interpretation  and I agree with the findings.    KALEN MOROCHO MD         SYSTEM ID:  [id number]     Most Recent 3 CBC's:  Recent Labs   Lab Test 04/21/24  0552 04/20/24  1327 04/20/24  0559 04/19/24  0621   WBC 6.1  --  7.2 12.5*   HGB 8.0* 8.2* 7.8* 8.8*   MCV 97  --  97 97   PLT 84*  --  94* 107*     Most Recent 3 BMP's:  Recent Labs   Lab Test 04/21/24  0838 04/21/24  0552 04/20/24  2153 04/20/24  0850 04/20/24  0559 04/19/24  0733 04/19/24  0621   NA  --  137  --   --  138  --  136   POTASSIUM  --  4.3  --   --  4.3  --  4.2   CHLORIDE  --  107  --   --  105  --  101   CO2  --  23  --   --  23  --  22   BUN  --  47.1*  --   --  49.3*  --  36.7*   CR  --  2.39*  --   --  2.84*  --  3.54*   ANIONGAP  --  7  --   --  10  --  13   ROBLES  --  9.4  --   --  9.6  --  9.7   GLC 92 96 86   < > 122*   < > 152*    < > = values in this interval not displayed.     Most Recent Hemoglobin A1c:  Recent Labs   Lab Test 04/17/24  0958   A1C 4.7     Most Recent 6 glucoses:  Recent Labs   Lab Test 04/21/24  0838 04/21/24  0552 04/20/24  2153 04/20/24  1757 04/20/24  1236 04/20/24  0850   GLC 92 96 86 84 95 104*

## 2024-04-18 NOTE — ANESTHESIA PROCEDURE NOTES
Central Line/PA Catheter Placement    Pre-Procedure   Staff -        Anesthesiologist:  Jae Fernandes MD       Resident/Fellow: Brandon Torres MD       Performed By: anesthesiologist and resident       Location: OR       Pre-Anesthestic Checklist: patient identified, IV checked, site marked, risks and benefits discussed, informed consent, monitors and equipment checked, pre-op evaluation and at physician/surgeon's request  Timeout:       Correct Patient: Yes        Correct Procedure: Yes        Correct Site: Yes        Correct Position: Yes        Correct Laterality: Yes   Line Placement:   This line was placed Post Induction    Procedure   Procedure: central line       Laterality: right       Insertion Site: internal jugular.       Patient Position: Trendelenburg and supine  Sterile Prep        All elements of maximal sterile barrier technique followed       Patient Prep/Sterile Barriers: draped, hand hygiene, gloves , hat , mask , draped, gown, sterile gel and probe cover       Skin prep: Chloraprep  Insertion/Injection        Technique: ultrasound guided        1. Ultrasound was used to evaluate the access site.       2. Vein evaluated via ultrasound for patency/adequacy.       3. Using real-time ultrasound the needle/catheter was observed entering the artery/vein.       5. The visualized structures were anatomically normal.       6. There were no apparent abnormal pathologic findings.       Introducer Type: 9 Fr, 10 cm        Type: CVC       Catheter Size: 7 Fr       Catheter Length: 20       Number of Lumens: quad lumen  Narrative         Secured by: suture       Biopatch and Tegaderm dressing used.       Complications: None apparent,        blood aspirated from all lumens,        All lumens flushed: Yes       Verification method: Ultrasound

## 2024-04-18 NOTE — ANESTHESIA POSTPROCEDURE EVALUATION
Patient: Ira Zamora    Procedure: Procedure(s):  Transplant kidney recipient  donor,bilateral uretral stent implantation       Anesthesia Type:  General    Note:  Disposition: Admission   Postop Pain Control:    PONV:    Neuro/Psych:    Airway/Respiratory:    CV/Hemodynamics:    Other NRE:    DID A NON-ROUTINE EVENT OCCUR?     Event details/Postop Comments:  Discharged from PACU without call for postop evaluation           Last vitals:  Vitals Value Taken Time   /68 24 2345   Temp 36.4  C (97.6  F) 24 0000   Pulse 87 24 2359   Resp 11 24 2359   SpO2 97 % 24 2359   Vitals shown include unfiled device data.    Electronically Signed By: Jae Fernandes MD  2024  2:25 AM

## 2024-04-18 NOTE — PROVIDER NOTIFICATION
notified as pt's BP's were below parameters. 100's-110's/70's-80's. CVP also lower at this time at 4-5. He came to see the pt. No new orders.

## 2024-04-18 NOTE — PROGRESS NOTES
[facility]   Transplant Nephrology Progress Note  Date of Admission:  4/17/2024  Today's Date: 04/18/2024    Recommendations:  - Agree with repeating U/S  -Continue rATG induction     Assessment & Plan   # DDKT (pediatric en bloc): Trend up   - Baseline Creatinine: ~ TBD   - Proteinuria: Not checked post transplant   - Date DSA Last Checked: Apr/2024      Latest DSA: No DSA at time of transplant   - BK Viremia: Not checked post transplant   - Kidney Tx Biopsy: No   - Transplant Ureteral Stent: Yes, 2 stents    # Immunosuppression: Tacrolimus immediate release (goal 8-10) and Mycophenolate mofetil (dose 750 mg every 12 hours)   - Induction with Recent Transplant:  High Intensity Protocol due to severe GERD and concern with steroids   - Patient is in an immunosuppressed state and will continue to monitor for efficacy and toxicity of immunosuppression medications.   - Changes: Not at this time    # Infection Prophylaxis:   - PJP: Sulfa/TMP (Bactrim)  - CMV: Valganciclovir (Valcyte). Recipient CMV IgG negative. Awaiting donor serologies    # Hypertension: Controlled;  Goal BP: < 150/90   - Volume status: Euvolemic     - Changes: Not at this time. Hold doxazosin 8mg at bedtime and labetalol 50mg at bedtime     # Elevated Blood Glucose: Glucose generally running ~ 200   - Management as per primary team.    # Anemia in Chronic Renal Disease: Hgb: Stable      JUNG: No   - Iron studies: Unknown at this time, but checked with dialysis    # Mineral Bone Disorder:   - Secondary renal hyperparathyroidism; PTH level: Unknown at this time, but checked with dialysis        On treatment: None  - Vitamin D; level: Unknown at this time, but checked with dialysis        On supplement: No  - Calcium; level: Normal        On supplement: No  - Phosphorus; level: Normal        On supplement: No    # Electrolytes:   - Potassium; level: Normal        On supplement: No  - Magnesium; level: High         On supplement: No  - Bicarbonate; level: Normal        On supplement: No    # Crohns: followed by [facility]. Does occasionally alternate between diarrhea and constipation.  On ustekinumab PTA.              -Will plan to hold for at least 6 weeks post txp     # Cardiac/Vascular Disease Risk Factors: Dilated ascending aorta (3.8 cm)  on 2024 ECHO (stable from 2020).     # GERD:   -Pt should remain off PPI due to concern for AIN.   -Famotidine increased to 40mg PO BID      # Transplant History:  Etiology of Kidney Failure: Interstitial nephritis  Tx: DDKT, pediatric en-bloc  Transplant: 4/17/2024 (Kidney)  Donor Type: Donation after Brain Death Donor Class: Standard Criteria Donor  Crossmatch at time of Tx: negative  DSA at time of Tx: No  Significant changes in immunosuppression: None  Significant transplant-related complications: None    Recommendations were communicated to the primary team verbally.      Yan Quezada MD  Transplant Nephrology  Contact information available via [facility] Paging/Directory      Interval History   Ms. Yadavs creatinine is 4.16 (04/18 0549); Trend up.  1L urine output.  Other significant labs/tests/vitals: Scr increased from post op  No events overnight.  No chest pain or shortness of breath.  No leg swelling.  No nausea and vomiting.  Bowel movements are not present.  No fever, sweats or chills.      Review of Systems   4 point ROS was obtained and negative except as noted in the Interval History.    MEDICATIONS:  Current Facility-Administered Medications   Medication Dose Route Frequency Provider Last Rate Last Admin    acetaminophen (TYLENOL) tablet 650 mg  650 mg Oral Q8H Holli Nelson APRN CNP        anti-thymocyte globulin (THYMOGLOBULIN - Rabbit) 125 mg in sodium chloride 0.9 % intermittent infusion  2 mg/kg Intravenous Central line Once Holli Nelson APRN CNP        atorvastatin (LIPITOR) tablet 10 mg  10 mg Oral Daily Scott Jerry MD   10 mg at 04/18/24 0814     diphenhydrAMINE (BENADRYL) capsule 25-50 mg  25-50 mg Oral Once Holli Nelson APRN CNP        Or    diphenhydrAMINE (BENADRYL) solution 25-50 mg  25-50 mg Per NG tube Once Holli Nelson APRN CNP        [START ON 4/19/2024] famotidine (PEPCID) tablet 20 mg  20 mg Oral Daily Carlitos Díaz MD        insulin aspart (NovoLOG) injection (RAPID ACTING)  1-7 Units Subcutaneous TID AC Holli Nelson APRN CNP        insulin aspart (NovoLOG) injection (RAPID ACTING)  1-5 Units Subcutaneous At Bedtime Holli Nelson APRN CNP        Lidocaine (LIDOCARE) 4 % Patch 2 patch  2 patch Transdermal Q24H Holli Nelson APRN CNP        [Held by provider] magnesium oxide (MAG-OX) tablet 400 mg  400 mg Oral Daily with lunch Scott Jerry MD        methylPREDNISolone sodium succinate (solu-MEDROL) 250 mg in sodium chloride 0.9 % 59 mL intermittent infusion  250 mg Intravenous Once Holli Nelson APRN CNP        mycophenolate (GENERIC EQUIVALENT) capsule 750 mg  750 mg Oral BID IS Scott Jerry MD   750 mg at 04/18/24 0613    PARoxetine (PAXIL) tablet 30 mg  30 mg Oral Daily Holli Nelson APRN CNP   30 mg at 04/18/24 0814    polyethylene glycol (MIRALAX) Packet 17 g  17 g Oral Daily Scott Jerry MD        senna-docusate (SENOKOT-S/PERICOLACE) 8.6-50 MG per tablet 1 tablet  1 tablet Oral BID Scott Jerry MD        sodium chloride (PF) 0.9% PF flush 10 mL  10 mL Intracatheter Q8H Scott Jerry MD        sulfamethoxazole-trimethoprim (BACTRIM) 400-80 MG per tablet 1 tablet  1 tablet Oral Once per day on Tuesday Thursday Saturday Scott Jerry MD   1 tablet at 04/18/24 0929    tacrolimus (GENERIC EQUIVALENT) capsule 2 mg  0.03 mg/kg Oral BID IS Scott Jerry MD   2 mg at 04/18/24 0813    valGANciclovir (VALCYTE) tablet 450 mg  450 mg Oral Once per day on Monday Thursday Scott Jerry MD   450 mg at 04/18/24 0929     Current Facility-Administered Medications   Medication  "Dose Route Frequency Provider Last Rate Last Admin    heparin infusion 25,000 units in 0.45% NaCl 250 mL ANTICOAGULANT  200 Units/hr Intravenous Continuous Holli Nelson APRN CNP 2 mL/hr at 24 1043 200 Units/hr at 24 1043    lactated ringers infusion   Intravenous Continuous Holli Nelson APRN  mL/hr at 24 0900 Rate Verify at 24 0900       Physical Exam   Temp  Av.2  F (36.8  C)  Min: 97.4  F (36.3  C)  Max: 99.5  F (37.5  C)      Pulse  Av.6  Min: 77  Max: 99 Resp  Av  Min: 12  Max: 21  SpO2  Av %  Min: 95 %  Max: 98 %    CVP (mmHg): 7 mmHgBP 123/86   Pulse 96   Temp 97.4  F (36.3  C) (Axillary)   Resp 16   Ht 1.588 m (5' 2.52\")   Wt 59.4 kg (131 lb)   LMP 2024 (Approximate)   SpO2 97%   BMI 23.56 kg/m     Date 24 0700 - 24 0659   Shift 3443-4019 9166-6168 2018-9687 24 Hour Total   INTAKE   P.O. 240   240   Shift Total(mL/kg) 240(4.04)   240(4.04)   OUTPUT   Urine 300   300   Drains 120   120   Shift Total(mL/kg) 420(7.07)   420(7.07)   Weight (kg) 59.42 59.42 59.42 59.42      Admit Weight: 59.4 kg (131 lb)     GENERAL APPEARANCE: alert and no distress  HENT: mouth without ulcers or lesions  RESP: lungs clear to auscultation - no rales, rhonchi or wheezes  CV: regular rhythm, normal rate, no rub, no murmur  EDEMA: no LE edema bilaterally  ABDOMEN: soft, nondistended, nontender, bowel sounds normal  MS: extremities normal - no gross deformities noted, no evidence of inflammation in joints, no muscle tenderness  SKIN: no rash  NEURO: normal strength and tone, sensory exam grossly normal, mentation intact and speech normal  PSYCH: mentation appears normal and affect normal/bright  TX KIDNEY: normal  DIALYSIS ACCESS:  RUE AV fistula with good thrill, aneurysmal    Data   All labs reviewed by me.  CMP  Recent Labs   Lab 24  0956 24  0549 24  0200 24  0024 24  2230 24  2123 24  1603 " 04/17/24  0958   NA  --  132*  --   --  136 136  --  139   POTASSIUM 4.2 4.4  4.4 4.2  --  3.9  3.9 3.7  --  3.4   CHLORIDE  --  95*  --   --  96*  --   --  95*   CO2  --  27  --   --  29  --   --  33*   ANIONGAP  --  10  --   --  11  --   --  11   GLC  --  209*  --  193* 166* 168*   < > 91   BUN  --  19.6  --   --  16.0  --   --  11.6   CR  --  4.16*  --   --  3.92*  --   --  3.11*   GFRESTIMATED  --  14*  --   --  15*  --   --  19*   ROBLES  --  9.2  --   --  8.7  --   --  9.3   MAG  --  2.5*  --   --  2.5*  --   --   --    PHOS  --  4.2  --   --  2.7  --   --   --    PROTTOTAL  --   --   --   --   --   --   --  6.5   ALBUMIN  --   --   --   --   --   --   --  4.1   BILITOTAL  --   --   --   --   --   --   --  0.6   ALKPHOS  --   --   --   --   --   --   --  55   AST  --   --   --   --   --   --   --  17   ALT  --   --   --   --   --   --   --  5    < > = values in this interval not displayed.     CBC  Recent Labs   Lab 04/18/24  0956 04/18/24  0549 04/18/24  0200 04/17/24  2230 04/17/24  2123 04/17/24 0958   HGB 9.6* 9.6* 9.7* 9.6*  9.6*   < > 10.5*   WBC  --  12.4*  --  8.1  --  6.1   RBC  --  3.16*  --  3.12*  --  3.33*   HCT  --  30.5*  --  29.5*  --  31.3*   MCV  --  97  --  95  --  94   MCH  --  30.4  --  30.8  --  31.5   MCHC  --  31.5  --  32.5  --  33.5   RDW  --  13.1  --  13.0  --  12.9   PLT  --  112*  --  91*  --  122*    < > = values in this interval not displayed.     INR  Recent Labs   Lab 04/17/24  0958   INR 1.20*   PTT 27     ABG  Recent Labs   Lab 04/17/24  2123   PH 7.51*   PCO2 38   PO2 61*   HCO3 31*   O2PER 46.0      Urine Studies  Recent Labs   Lab Test 04/17/24  1113 09/29/22  1153   COLOR Straw Yellow   APPEARANCE Clear Clear   URINEGLC 70* 100*   URINEBILI Negative Negative   URINEKETONE Negative Negative   SG 1.004 1.010   UBLD Trace* Trace*   URINEPH 8.5* 8.5*   PROTEIN 100* 100*   NITRITE Negative Negative   LEUKEST Negative Negative   RBCU 2 <1   WBCU <1 1     No lab results  found.  PTH  No lab results found.  Iron Studies  No lab results found.    IMAGING:  All imaging studies reviewed by me.

## 2024-04-18 NOTE — PROGRESS NOTES
"CLINICAL NUTRITION SERVICES - ASSESSMENT NOTE     Nutrition Prescription    RECOMMENDATIONS FOR MDs/PROVIDERS TO ORDER:  None at this time.    Malnutrition Status:    Patient does not meet two of the established criteria necessary for diagnosing malnutrition but is at risk for malnutrition    Recommendations already ordered by Registered Dietitian (RD):  - Ensure Max once/day  - Magic cup once/day    Future/Additional Recommendations:  Monitor appetite/po intake, acceptance of ONS, wt trends, pertinent labs.  Monitor for ability to provide post-txp nutrition education.     REASON FOR ASSESSMENT  Ira Zamora is a/an 34 year old female assessed by the dietitian for Provider Order - Post Op Kidney Transplant- Assess and educate post SOT    CLINICAL HISTORY  History of ESRD on dialysis d/t interstitial nephritis, Crohn's, HTN, GERD, and dilated ascending aorta. Presents for a possible  donor en bloc kidney transplant.     NUTRITION HISTORY  Pt reports decent appetite since txp. Was very hungry for breakfast this morning but did not like her grilled cheese, so did not eat much at lunchtime. Reports PTA had variable intake, but most of the time would have one large meal plus snacks. Did have some wt loss from 67 kg to 60.5 kg over the course of ~1 year. Declined post-txp nutrition education at this time d/t feeling tired. Briefly discussed increased protein needs and provided handouts. Pt agreeable to trying supplements.    CURRENT NUTRITION ORDERS  Diet: Regular  Intake/Tolerance: 1x 100% intake since admission.    LABS  Na 132 (L)  BUN 19.6 (WNL), Cr 4.16 (H)  Mg 2.5 (H), Phos 4.2 (WNL), K 4.4 (WNL)  Glucose 209 (H)    MEDICATIONS  Thymoglobulin  Novolog  Pepcid  Mycophenolate  Miralax (not given)  Senokot (not given)  Bactrim  Tacrolimus    ANTHROPOMETRICS  Height: 158.8 cm (5' 2.52\")  Most Recent Weight: 59.4 kg (131 lb)    IBW: 51.1 kg  BMI: Normal BMI  Weight History:   Wt Readings from Last 15 Encounters: "   04/17/24 59.4 kg (131 lb)   09/29/22 66.6 kg (146 lb 14.4 oz)   07/26/22 68 kg (150 lb)   Lack of recent wt hx available    Dosing Weight: 59.4 kg (actual wt)    ASSESSED NUTRITION NEEDS  Estimated Energy Needs: 1780 - 2080 kcals/day (30 - 35 kcals/kg )  Justification: Increased needs  Estimated Protein Needs: 77 - 119 grams protein/day (1.3 - 2 grams of pro/kg)  Justification: Increased needs  Estimated Fluid Needs: UOP + 500 mL/day  Justification: Maintenance or Per provider pending fluid status    PHYSICAL FINDINGS  See malnutrition section below.     MALNUTRITION  % Intake: Unable to assess -> difficult to assess exact quantity of intake PTA  % Weight Loss: Weight loss does not meet criteria  Subcutaneous Fat Loss: None observed  Muscle Loss: Temporal:  mild, Upper leg (quadricep, hamstring):  moderate, and Patellar region:  moderate  Fluid Accumulation/Edema: None noted  Malnutrition Diagnosis: Patient does not meet two of the established criteria necessary for diagnosing malnutrition but is at risk for malnutrition    NUTRITION DIAGNOSIS  Increased nutrient needs (kcals/protein) related to s/p kidney transplant as evidenced by assessed nutrition needs 30-35 kcals/kg and 1.3-2.0 g/kg protein.      INTERVENTIONS  Implementation  Nutrition Education: Pt declined at this time d/t feeling tired. Provided handouts: Guide to your diet after transplant and Food safety booklet. Briefly discussed increased protein needs post-txp.   Medical food supplement therapy - Ensure max and magic cup daily    Goals  Patient to consume % of nutritionally adequate meal trays TID, or the equivalent with supplements/snacks.     Monitoring/Evaluation  Progress toward goals will be monitored and evaluated per protocol.    Krista Villarreal, MPS, RD, LD  Available on [facility]: M-F (7:00-3:30) 7A/7B Clinical Dietitian  Weekend/Holiday (7:00-3:30) - Weekend Clinical Dietitian      **Clinical Dietitians are no longer available by  pager

## 2024-04-18 NOTE — PROGRESS NOTES
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 Chron's followed by [facility]. Dilated ascending aorta (last echo 1/2024).   Barriers to post transplant care: None   Patient Status Form Completed: done

## 2024-04-18 NOTE — ANESTHESIA CARE TRANSFER NOTE
Patient: Ira Zamora    Procedure: Procedure(s):  Transplant kidney recipient  donor,bilateral uretral stent implantation       Diagnosis: End stage renal disease (H) [N18.6]  Diagnosis Additional Information: No value filed.    Anesthesia Type:   General     Note:    Oropharynx: oropharynx clear of all foreign objects and spontaneously breathing  Level of Consciousness: drowsy  Oxygen Supplementation: face mask  Level of Supplemental Oxygen (L/min / FiO2): 6  Independent Airway: airway patency satisfactory and stable  Dentition: dentition unchanged  Vital Signs Stable: post-procedure vital signs reviewed and stable  Report to RN Given: handoff report given  Patient transferred to: PACU    Handoff Report: Identifed the Patient, Identified the Reponsible Provider, Reviewed the pertinent medical history, Discussed the surgical course, Reviewed Intra-OP anesthesia mangement and issues during anesthesia, Set expectations for post-procedure period and Allowed opportunity for questions and acknowledgement of understanding      Vitals:  Vitals Value Taken Time   BP     Temp     Pulse 82 24 2225   Resp 20 24 2225   SpO2 97 % 24 2225   Vitals shown include unfiled device data.    Electronically Signed By: Brandon Torres MD  2024  10:25 PM

## 2024-04-18 NOTE — PLAN OF CARE
"/72   Pulse 77   Temp 97.4  F (36.3  C) (Axillary)   Resp 16   Ht 1.588 m (5' 2.52\")   Wt 59.4 kg (131 lb)   LMP 04/03/2024 (Approximate)   SpO2 96%   BMI 23.56 kg/m   Afeb. BP slightly low 100's-120's/60's-80's.MD aware of softer BP's. HR-70's-80's. Satting well on 2 L/ NC. Tele-NSR. CVP-4-6..MD aware of CVP's being on the low side. Patient c/o pain in her incision and her right upper arm fistula. Upon arrival pt asked if anything was done with her fistula during surgery because it hurts. I told her no that I don't think anything was done with it. Pt's fistula was noted to be very bruised. Both her and her family all stated that her fistula has been bruised for some time. Pt then slept pretty well until about 0500. When she woke up at 0500 she stated that her fistula hurt really bad, worse than her incision. Pt stated that when she put her arm up across her chest the pain was less. Gave her an ice pack for her fistula.. On call MD notified and came to see the pt. Prn IV Dilaudid given x 2. Prn Oxycodone given x 1. At about 0700 pt stated that the pain in her fistula was gone. Patient denies nausea.. . Urine Output - 125-75 ml /hr via de leon. MD made aware when UOP dropped off to 75 ml /hr at 0500. Urine was yellow pink when arriving on 7A then has progressively became pink/red then red. Bowel Function - LBM 4/16. Not passing gas yet. BS hypoactive.. Nutrition - drank a couple cups of ice water..Diet advanced to regular. Drains - EVELIO x 1 with 110 ml bloody out. Activity - has not been OOB yet. Pt states she is willing to get up and walk after breakfast. Gave IS and instructions on how to use it.                            "

## 2024-04-18 NOTE — PROGRESS NOTES
"  Surgery Cross-Cover  Post Op Check    2024    Ira Zamora is a 34 year old female POD#0 s/p Procedure(s):  Transplant kidney recipient  donor,bilateral uretral stent implantation for Pre-Op Diagnosis Codes:     * End stage renal disease (H) [N18.6]    Pt reports their pain is controlled with current regimen. Reports being very tired since waking up from surgery. Denies nausea, SOB, chest pain, or dizziness. Patient Is voiding via urinary catheter.    /80   Pulse 84   Temp 97.6  F (36.4  C) (Axillary)   Resp 17   Ht 1.588 m (5' 2.52\")   Wt 59.4 kg (131 lb)   LMP 2024 (Approximate)   SpO2 97%   BMI 23.56 kg/m      Gen: A&O x4, NAD   Chest: breathing non-labored on nasal cannula at 1.5 liters per minute   Abdomen: soft, appropriately tender, non-distended  Incision: clean, dry, intact covered by dressings with a small amount of drainage.  Extremities: warm and well perfused  Devices: EVELIO drain with serosanguinous output    A/P: No acute post-op issues. Noted soft blood pressures and CVP. Continue to monitor. Continue q4hr Hbg and K. Continue current plan of care. Please call with any questions.    Edward Brown MD  PGY1  "

## 2024-04-19 LAB
ANION GAP SERPL CALCULATED.3IONS-SCNC: 13 MMOL/L (ref 7–15)
ATRIAL RATE - MUSE: 74 BPM
BASOPHILS # BLD AUTO: 0 10E3/UL (ref 0–0.2)
BASOPHILS NFR BLD AUTO: 0 %
BUN SERPL-MCNC: 36.7 MG/DL (ref 6–20)
CALCIUM SERPL-MCNC: 9.7 MG/DL (ref 8.6–10)
CHLORIDE SERPL-SCNC: 101 MMOL/L (ref 98–107)
CREAT SERPL-MCNC: 3.54 MG/DL (ref 0.51–0.95)
DEPRECATED HCO3 PLAS-SCNC: 22 MMOL/L (ref 22–29)
DIASTOLIC BLOOD PRESSURE - MUSE: NORMAL MMHG
DONOR IDENTIFICATION: NORMAL
DSA COMMENTS: NORMAL
DSA PRESENT: NO
DSA TEST METHOD: NORMAL
EGFRCR SERPLBLD CKD-EPI 2021: 17 ML/MIN/1.73M2
EOSINOPHIL # BLD AUTO: 0 10E3/UL (ref 0–0.7)
EOSINOPHIL NFR BLD AUTO: 0 %
ERYTHROCYTE [DISTWIDTH] IN BLOOD BY AUTOMATED COUNT: 13.1 % (ref 10–15)
GLUCOSE BLDC GLUCOMTR-MCNC: 121 MG/DL (ref 70–99)
GLUCOSE BLDC GLUCOMTR-MCNC: 138 MG/DL (ref 70–99)
GLUCOSE BLDC GLUCOMTR-MCNC: 142 MG/DL (ref 70–99)
GLUCOSE BLDC GLUCOMTR-MCNC: 145 MG/DL (ref 70–99)
GLUCOSE BLDC GLUCOMTR-MCNC: 166 MG/DL (ref 70–99)
GLUCOSE SERPL-MCNC: 152 MG/DL (ref 70–99)
HBV DNA SERPL QL NAA+PROBE: NORMAL
HCT VFR BLD AUTO: 27.2 % (ref 35–47)
HCV RNA SERPL QL NAA+PROBE: NORMAL
HGB BLD-MCNC: 8.8 G/DL (ref 11.7–15.7)
HIV1+2 RNA SERPL QL NAA+PROBE: NORMAL
IMM GRANULOCYTES # BLD: 0.2 10E3/UL
IMM GRANULOCYTES NFR BLD: 2 %
INTERPRETATION ECG - MUSE: NORMAL
LYMPHOCYTES # BLD AUTO: 0.2 10E3/UL (ref 0.8–5.3)
LYMPHOCYTES NFR BLD AUTO: 1 %
MAGNESIUM SERPL-MCNC: 2.3 MG/DL (ref 1.7–2.3)
MCH RBC QN AUTO: 31.3 PG (ref 26.5–33)
MCHC RBC AUTO-ENTMCNC: 32.4 G/DL (ref 31.5–36.5)
MCV RBC AUTO: 97 FL (ref 78–100)
MONOCYTES # BLD AUTO: 0.2 10E3/UL (ref 0–1.3)
MONOCYTES NFR BLD AUTO: 2 %
NEUTROPHILS # BLD AUTO: 11.9 10E3/UL (ref 1.6–8.3)
NEUTROPHILS NFR BLD AUTO: 95 %
NRBC # BLD AUTO: 0 10E3/UL
NRBC BLD AUTO-RTO: 0 /100
ORGAN: NORMAL
P AXIS - MUSE: 44 DEGREES
PHOSPHATE SERPL-MCNC: 6.1 MG/DL (ref 2.5–4.5)
PLATELET # BLD AUTO: 107 10E3/UL (ref 150–450)
POTASSIUM SERPL-SCNC: 4.2 MMOL/L (ref 3.4–5.3)
PR INTERVAL - MUSE: 150 MS
PROTOCOL CUTOFF: NORMAL
QRS DURATION - MUSE: 84 MS
QT - MUSE: 454 MS
QTC - MUSE: 503 MS
R AXIS - MUSE: -5 DEGREES
RBC # BLD AUTO: 2.81 10E6/UL (ref 3.8–5.2)
SA 1 CELL: NORMAL
SA 1 CELL: NORMAL
SA 1 TEST METHOD: NORMAL
SA 1 TEST METHOD: NORMAL
SA 2 CELL: NORMAL
SA 2 CELL: NORMAL
SA 2 TEST METHOD: NORMAL
SA 2 TEST METHOD: NORMAL
SA1 HI RISK ABY: NORMAL
SA1 HI RISK ABY: NORMAL
SA1 MOD RISK ABY: NORMAL
SA1 MOD RISK ABY: NORMAL
SA2 HI RISK ABY: NORMAL
SA2 HI RISK ABY: NORMAL
SA2 MOD RISK ABY: NORMAL
SA2 MOD RISK ABY: NORMAL
SODIUM SERPL-SCNC: 136 MMOL/L (ref 135–145)
SYSTOLIC BLOOD PRESSURE - MUSE: NORMAL MMHG
T AXIS - MUSE: 28 DEGREES
UFH PPP CHRO-ACNC: <0.1 IU/ML
UNACCEPTABLE ANTIGENS: NORMAL
UNOS CPRA: 9
VENTRICULAR RATE- MUSE: 74 BPM
WBC # BLD AUTO: 12.5 10E3/UL (ref 4–11)
ZZZSA 1  COMMENTS: NORMAL
ZZZSA 1  COMMENTS: NORMAL
ZZZSA 2 COMMENTS: NORMAL
ZZZSA 2 COMMENTS: NORMAL

## 2024-04-19 PROCEDURE — 258N000003 HC RX IP 258 OP 636: Performed by: NURSE PRACTITIONER

## 2024-04-19 PROCEDURE — 250N000013 HC RX MED GY IP 250 OP 250 PS 637: Performed by: NURSE PRACTITIONER

## 2024-04-19 PROCEDURE — 80048 BASIC METABOLIC PNL TOTAL CA: CPT | Performed by: STUDENT IN AN ORGANIZED HEALTH CARE EDUCATION/TRAINING PROGRAM

## 2024-04-19 PROCEDURE — 250N000013 HC RX MED GY IP 250 OP 250 PS 637: Performed by: TRANSPLANT SURGERY

## 2024-04-19 PROCEDURE — 250N000013 HC RX MED GY IP 250 OP 250 PS 637: Performed by: STUDENT IN AN ORGANIZED HEALTH CARE EDUCATION/TRAINING PROGRAM

## 2024-04-19 PROCEDURE — 120N000011 HC R&B TRANSPLANT UMMC

## 2024-04-19 PROCEDURE — 85520 HEPARIN ASSAY: CPT | Performed by: NURSE PRACTITIONER

## 2024-04-19 PROCEDURE — 250N000013 HC RX MED GY IP 250 OP 250 PS 637

## 2024-04-19 PROCEDURE — 84100 ASSAY OF PHOSPHORUS: CPT | Performed by: STUDENT IN AN ORGANIZED HEALTH CARE EDUCATION/TRAINING PROGRAM

## 2024-04-19 PROCEDURE — 83735 ASSAY OF MAGNESIUM: CPT | Performed by: STUDENT IN AN ORGANIZED HEALTH CARE EDUCATION/TRAINING PROGRAM

## 2024-04-19 PROCEDURE — 36592 COLLECT BLOOD FROM PICC: CPT | Performed by: STUDENT IN AN ORGANIZED HEALTH CARE EDUCATION/TRAINING PROGRAM

## 2024-04-19 PROCEDURE — 250N000011 HC RX IP 250 OP 636: Performed by: NURSE PRACTITIONER

## 2024-04-19 PROCEDURE — 250N000012 HC RX MED GY IP 250 OP 636 PS 637: Performed by: STUDENT IN AN ORGANIZED HEALTH CARE EDUCATION/TRAINING PROGRAM

## 2024-04-19 PROCEDURE — 99233 SBSQ HOSP IP/OBS HIGH 50: CPT

## 2024-04-19 PROCEDURE — 85025 COMPLETE CBC W/AUTO DIFF WBC: CPT | Performed by: STUDENT IN AN ORGANIZED HEALTH CARE EDUCATION/TRAINING PROGRAM

## 2024-04-19 RX ORDER — DIPHENHYDRAMINE HCL 12.5MG/5ML
25-50 LIQUID (ML) ORAL ONCE
Qty: 20 ML | Refills: 0 | Status: COMPLETED | OUTPATIENT
Start: 2024-04-19 | End: 2024-04-19

## 2024-04-19 RX ORDER — ASPIRIN 81 MG/1
81 TABLET ORAL DAILY
Status: COMPLETED | OUTPATIENT
Start: 2024-04-19 | End: 2024-04-19

## 2024-04-19 RX ORDER — ASPIRIN 325 MG
325 TABLET ORAL DAILY
Status: DISCONTINUED | OUTPATIENT
Start: 2024-04-20 | End: 2024-04-21 | Stop reason: HOSPADM

## 2024-04-19 RX ORDER — DIPHENHYDRAMINE HCL 25 MG
25-50 CAPSULE ORAL ONCE
Qty: 2 CAPSULE | Refills: 0 | Status: COMPLETED | OUTPATIENT
Start: 2024-04-19 | End: 2024-04-19

## 2024-04-19 RX ORDER — CALCIUM CARBONATE 500 MG/1
1000 TABLET, CHEWABLE ORAL 2 TIMES DAILY PRN
Status: DISCONTINUED | OUTPATIENT
Start: 2024-04-19 | End: 2024-04-21 | Stop reason: HOSPADM

## 2024-04-19 RX ORDER — OXYCODONE HYDROCHLORIDE 5 MG/1
5 TABLET ORAL EVERY 4 HOURS PRN
Status: DISCONTINUED | OUTPATIENT
Start: 2024-04-19 | End: 2024-04-21 | Stop reason: HOSPADM

## 2024-04-19 RX ORDER — METHYLPREDNISOLONE SODIUM SUCCINATE 125 MG/2ML
100 INJECTION, POWDER, LYOPHILIZED, FOR SOLUTION INTRAMUSCULAR; INTRAVENOUS ONCE
Qty: 2 ML | Refills: 0 | Status: COMPLETED | OUTPATIENT
Start: 2024-04-19 | End: 2024-04-19

## 2024-04-19 RX ORDER — ACETAMINOPHEN 325 MG/1
650 TABLET ORAL ONCE
Status: DISCONTINUED | OUTPATIENT
Start: 2024-04-19 | End: 2024-04-19

## 2024-04-19 RX ADMIN — SENNOSIDES AND DOCUSATE SODIUM 1 TABLET: 50; 8.6 TABLET ORAL at 20:01

## 2024-04-19 RX ADMIN — OXYCODONE HYDROCHLORIDE 5 MG: 5 TABLET ORAL at 19:04

## 2024-04-19 RX ADMIN — DIPHENHYDRAMINE HYDROCHLORIDE 25 MG: 25 CAPSULE ORAL at 13:51

## 2024-04-19 RX ADMIN — MYCOPHENOLATE MOFETIL 750 MG: 250 CAPSULE ORAL at 17:35

## 2024-04-19 RX ADMIN — PAROXETINE 30 MG: 30 TABLET, FILM COATED ORAL at 07:49

## 2024-04-19 RX ADMIN — FAMOTIDINE 20 MG: 20 TABLET ORAL at 07:49

## 2024-04-19 RX ADMIN — ASPIRIN 81 MG: 81 TABLET ORAL at 10:22

## 2024-04-19 RX ADMIN — OXYCODONE HYDROCHLORIDE 5 MG: 5 TABLET ORAL at 02:59

## 2024-04-19 RX ADMIN — POLYETHYLENE GLYCOL 3350 17 G: 17 POWDER, FOR SOLUTION ORAL at 07:50

## 2024-04-19 RX ADMIN — ANTI-THYMOCYTE GLOBULIN (RABBIT) 100 MG: 5 INJECTION, POWDER, LYOPHILIZED, FOR SOLUTION INTRAVENOUS at 14:31

## 2024-04-19 RX ADMIN — METHYLPREDNISOLONE SODIUM SUCCINATE 100 MG: 125 INJECTION, POWDER, FOR SOLUTION INTRAMUSCULAR; INTRAVENOUS at 13:51

## 2024-04-19 RX ADMIN — MYCOPHENOLATE MOFETIL 750 MG: 250 CAPSULE ORAL at 07:49

## 2024-04-19 RX ADMIN — ATORVASTATIN CALCIUM 10 MG: 10 TABLET, FILM COATED ORAL at 07:49

## 2024-04-19 RX ADMIN — SODIUM CHLORIDE, POTASSIUM CHLORIDE, SODIUM LACTATE AND CALCIUM CHLORIDE: 600; 310; 30; 20 INJECTION, SOLUTION INTRAVENOUS at 11:39

## 2024-04-19 RX ADMIN — SODIUM CHLORIDE, POTASSIUM CHLORIDE, SODIUM LACTATE AND CALCIUM CHLORIDE: 600; 310; 30; 20 INJECTION, SOLUTION INTRAVENOUS at 02:01

## 2024-04-19 RX ADMIN — ACETAMINOPHEN 650 MG: 325 TABLET, FILM COATED ORAL at 13:50

## 2024-04-19 RX ADMIN — METHOCARBAMOL 500 MG: 500 TABLET ORAL at 02:03

## 2024-04-19 RX ADMIN — CALCIUM CARBONATE (ANTACID) CHEW TAB 500 MG 1000 MG: 500 CHEW TAB at 14:31

## 2024-04-19 RX ADMIN — SENNOSIDES AND DOCUSATE SODIUM 1 TABLET: 50; 8.6 TABLET ORAL at 07:49

## 2024-04-19 RX ADMIN — OXYCODONE HYDROCHLORIDE 5 MG: 5 TABLET ORAL at 14:31

## 2024-04-19 RX ADMIN — ACETAMINOPHEN 650 MG: 325 TABLET, FILM COATED ORAL at 06:39

## 2024-04-19 RX ADMIN — TACROLIMUS 2 MG: 1 CAPSULE ORAL at 07:49

## 2024-04-19 RX ADMIN — TACROLIMUS 2 MG: 1 CAPSULE ORAL at 17:35

## 2024-04-19 RX ADMIN — INSULIN ASPART 1 UNITS: 100 INJECTION, SOLUTION INTRAVENOUS; SUBCUTANEOUS at 12:43

## 2024-04-19 RX ADMIN — ACETAMINOPHEN 650 MG: 325 TABLET, FILM COATED ORAL at 21:09

## 2024-04-19 ASSESSMENT — ACTIVITIES OF DAILY LIVING (ADL)
ADLS_ACUITY_SCORE: 26
DEPENDENT_IADLS:: INDEPENDENT
ADLS_ACUITY_SCORE: 26
ADLS_ACUITY_SCORE: 25
ADLS_ACUITY_SCORE: 26
ADLS_ACUITY_SCORE: 25
ADLS_ACUITY_SCORE: 26

## 2024-04-19 NOTE — PLAN OF CARE
"Goal Outcome Evaluation:      Plan of Care Reviewed With: patient    Overall Patient Progress: improvingOverall Patient Progress: improving    BP (!) 151/87 (BP Location: Left arm)   Pulse 97   Temp 97.7  F (36.5  C) (Oral)   Resp 16   Ht 1.588 m (5' 2.52\")   Wt 59.4 kg (131 lb)   LMP 04/03/2024 (Approximate)   SpO2 98%   BMI 23.56 kg/m      Shift: 0076-5211  Isolation Status: N/A  VS: stable on RA, afebrile  Neuro: A&O x4  Behaviors: receptive to cares  BG: ACHS w/ 0200 check: 166  Labs/Imaging: Creatinine 3.54, Xa <0.10  Respiratory: WDL  Cardiac: WDL  Pain/Nausea: Denies nausea. C/O some pain, given PRN Robaxin and Oxycodone  PRN: Robaxin 500 mg PO x1 and Oxycodone 5 mg PO x1 for pain  Diet: regular  IV Access: L PIV x2, R internal jugular, R AV fistula  Infusion(s): LR @ 100 mL/hr, Heparin SR @ 400 units/hr  Lines/Drains: R EVELIO drain, ~ 160 mL serosang output overnight; de leon   GI/: No BM on shift, LBM 4/16 prior to surgery, not passing gas, +BS. De Leon patent, ~ 625 mL red urine overnight  Skin: R hockey stick incision with op dressing in place, small amount of dried drainage  Mobility: Ax1 with lines  Events/Education: Patient requested to switch from Norco back to Oxycodone for better pain relief  Plan: Notify MD of any significant changes, continue plan of care.       "

## 2024-04-19 NOTE — PROGRESS NOTES
"Transplant Surgery  Inpatient Daily Progress Note  2024    Assessment & Plan: 34 year old with history of ESRD on dialysis d/t interstitial nephritis, Crohn's, HTN, GERD, and dilated ascending aorta. S/p  donor en bloc kidney transplant with ureteral stent x2 on 24.    Graft function: POD #2   Kidney: Cr 3.52 from 3.9. Made urine prior to transplant. Post-op US normal.     Immunosuppressed status secondary to medications:   -cPRA 9, but will give Thymo due to need to minimize steroids. History of severe reflux and suspected interstitial nephritis secondary to PPI.   Thymo: 125/125  Steroids:  500/250  MMF: 750mg BID. Will need REMS teaching. Has IUD.  Tacro: Goal level 8-10    Hematology:   Anemia of chronic disease: Hgb 9.6, stable.  Thrombocytopenia: Plt 112, present on admission.  Anticoagulation: Heparin 400u/h, plan to stop heparin drip tomorrow. Start ASA 81mg today. Plan for ASA 325mg daily on discharge.    Cardiorespiratory:   HTN: Monitor for now.    GI/Nutrition:   Diet: Regular  Bowel regimen: Senna, PEG  GERD: Start famotidine. NO PPI due to history of interstitial nephritis.    Endocrine:   Steroid hyperglycemia, mild: Stop D5 in IVF. Add sliding scale insulin.    Fluid/Electrolytes: MIVF: Straight rate  Hyponatremia: Due to renal dysfunction. Monitor.    : De Leon to remain due to new surgical anastomosis x7 days.    Infectious disease: Afebrile    Neuro:  Acute post-op pain: Controlled.  -APAP 650mg q8H  -Norco 5/325mg q4h PRN per pt preference  -Lidoderm    Prophylaxis: DVT, fall, GI, viral (Valcyte), pneumocystis (TMP/sulfa)    Disposition: 7A, plan to discharge tomorrow with de leon in place.     ERICA/Fellow/Resident Provider:   Sam \"Jacob\" Pete CHOWDHURY  General Surgery Resident    Faculty: Carlitos Díaz MD   _________________________________________________________________    Interval History: History from patient and/or EMR  reports ambulating well, pain control, and tolerating " diet. Denies nausea and vomiting.    ROS:   A 10-point review of systems was negative except as noted above.    Meds:  Current Facility-Administered Medications   Medication Dose Route Frequency Provider Last Rate Last Admin    acetaminophen (TYLENOL) tablet 650 mg  650 mg Oral Q8H Holli Nelson APRN CNP   650 mg at 04/19/24 0639    anti-thymocyte globulin (THYMOGLOBULIN - Rabbit) 100 mg in sodium chloride 0.9 % 295 mL intermittent infusion  100 mg Intravenous Central line Once Skye Judge NP        atorvastatin (LIPITOR) tablet 10 mg  10 mg Oral Daily Scott Jerry MD   10 mg at 04/19/24 0749    diphenhydrAMINE (BENADRYL) capsule 25-50 mg  25-50 mg Oral Once Skye Judge NP        Or    diphenhydrAMINE (BENADRYL) solution 25-50 mg  25-50 mg Per NG tube Once Skye Judge NP        famotidine (PEPCID) tablet 20 mg  20 mg Oral Daily Carlitos Díaz MD   20 mg at 04/19/24 0749    insulin aspart (NovoLOG) injection (RAPID ACTING)  1-7 Units Subcutaneous TID AC Holli Nelson APRN CNP   1 Units at 04/18/24 1736    insulin aspart (NovoLOG) injection (RAPID ACTING)  1-5 Units Subcutaneous At Bedtime Holli Nelson APRN CNP        Lidocaine (LIDOCARE) 4 % Patch 2 patch  2 patch Transdermal Q24H Holli Nelson APRN CNP        [Held by provider] magnesium oxide (MAG-OX) tablet 400 mg  400 mg Oral Daily with lunch Scott Jerry MD        methylPREDNISolone sodium succinate (solu-MEDROL) injection 100 mg  100 mg Intravenous Once Skye Judge NP        mycophenolate (GENERIC EQUIVALENT) capsule 750 mg  750 mg Oral BID IS Scott Jerry MD   750 mg at 04/19/24 0749    PARoxetine (PAXIL) tablet 30 mg  30 mg Oral Daily Holli Nelson APRN CNP   30 mg at 04/19/24 0749    polyethylene glycol (MIRALAX) Packet 17 g  17 g Oral Daily Scott Jerry MD   17 g at 04/19/24 0750    senna-docusate (SENOKOT-S/PERICOLACE) 8.6-50 MG per tablet 1 tablet  1 tablet Oral  "BID Scott Jerry MD   1 tablet at 04/19/24 0749    sodium chloride (PF) 0.9% PF flush 10 mL  10 mL Intracatheter Q8H Scott Jerry MD   10 mL at 04/18/24 2213    sulfamethoxazole-trimethoprim (BACTRIM) 400-80 MG per tablet 1 tablet  1 tablet Oral Once per day on Tuesday Thursday Saturday Scott Jerry MD   1 tablet at 04/18/24 0929    tacrolimus (GENERIC EQUIVALENT) capsule 2 mg  0.03 mg/kg Oral BID IS Scott Jerry MD   2 mg at 04/19/24 0749    valGANciclovir (VALCYTE) tablet 450 mg  450 mg Oral Once per day on Monday Thursday Scott Jerry MD   450 mg at 04/18/24 0929       Physical Exam:     Admit Weight: 59.4 kg (131 lb)    Current vitals:   BP (!) 134/91 (BP Location: Left arm)   Pulse 105   Temp 97.9  F (36.6  C) (Oral)   Resp 16   Ht 1.588 m (5' 2.52\")   Wt 63.1 kg (139 lb 1.6 oz)   LMP 04/03/2024 (Approximate)   SpO2 100%   BMI 25.02 kg/m      Vital sign ranges:    Temp:  [97.7  F (36.5  C)-98.8  F (37.1  C)] 97.9  F (36.6  C)  Pulse:  [] 105  Resp:  [16] 16  BP: (122-151)/(79-95) 134/91  SpO2:  [90 %-100 %] 100 %    General Appearance: in no apparent distress.   Skin: Warm, perfused  Lungs: Unlabored  Abdomen: The abdomen is Soft, incision covered, EVELIO serosang  : de leon is present.  Urine is pink.  Extremities: edema: none, strength 5/5  Neurologic: awake, alert, and oriented x4. Tremor absent..     Data:   CMP  Recent Labs   Lab 04/19/24  0733 04/19/24  0621 04/18/24  2138 04/18/24  1842 04/18/24  0956 04/18/24  0549 04/17/24  2230 04/17/24  2123 04/17/24  1603 04/17/24  0958   NA  --  136  --   --   --  132*   < > 136  --  139   POTASSIUM  --  4.2  --  3.9   < > 4.4  4.4   < > 3.7  --  3.4   CHLORIDE  --  101  --   --   --  95*   < >  --   --  95*   CO2  --  22  --   --   --  27   < >  --   --  33*   * 152*   < >  --    < > 209*   < > 168*   < > 91   BUN  --  36.7*  --   --   --  19.6   < >  --   --  11.6   CR  --  3.54*  --   --   --  4.16*   < >  --   --  3.11* "   GFRESTIMATED  --  17*  --   --   --  14*   < >  --   --  19*   ROBLES  --  9.7  --   --   --  9.2   < >  --   --  9.3   ICAW  --   --   --   --   --   --   --  4.5  --   --    MAG  --  2.3  --   --   --  2.5*   < >  --   --   --    PHOS  --  6.1*  --   --   --  4.2   < >  --   --   --    ALBUMIN  --   --   --   --   --   --   --   --   --  4.1   BILITOTAL  --   --   --   --   --   --   --   --   --  0.6   ALKPHOS  --   --   --   --   --   --   --   --   --  55   AST  --   --   --   --   --   --   --   --   --  17   ALT  --   --   --   --   --   --   --   --   --  5    < > = values in this interval not displayed.     CBC  Recent Labs   Lab 04/19/24  0621 04/18/24  1842 04/18/24  0956 04/18/24  0549 04/17/24  2123 04/17/24  0958   HGB 8.8* 9.4*   < > 9.6*   < > 10.5*   WBC 12.5*  --   --  12.4*   < > 6.1   *  --   --  112*   < > 122*   A1C  --   --   --   --   --  4.7    < > = values in this interval not displayed.

## 2024-04-19 NOTE — PROGRESS NOTES
Care Management Discharge Note    Discharge Date: 04/20/2024       Discharge Disposition:  Home with Family     Discharge Services:  Home care, transplant labs     Discharge DME:  n/a     Discharge Transportation:  Family will transport home     Private pay costs discussed: Not applicable    Does the patient's insurance plan have a 3 day qualifying hospital stay waiver?  No    PAS Confirmation Code:  n/a   Patient/family educated on Medicare website which has current facility and service quality ratings:  n/a     Education Provided on the Discharge Plan:  yes   Persons Notified of Discharge Plans: Pt, many family members at the bedside  Patient/Family in Agreement with the Plan:  Yes     Handoff Referral Completed: Yes    Additional Information:  Pt is anticipated to discharge on Saturday. Pt will discharge with her de leon, follow up appointment confirmed and available on AVS. Pt will have home care for transplant labs following [place] [facility] two [place] [facility] Home care orders placed. Will fax DC orders once available. Pt's father will transport home late Saturday afternoon.    No other discharge needs identified at this time.     Rama Kelly RN  RNCC Float   [phone]

## 2024-04-19 NOTE — PROGRESS NOTES
Care Management Follow Up    Length of Stay (days): 1    Expected Discharge Date: 04/20/2024     Concerns to be Addressed:  discharge planning      Patient plan of care discussed at interdisciplinary rounds: Yes    Anticipated Discharge Disposition: Home with Family       Anticipated Discharge Services:  Home Care, labs   Anticipated Discharge DME:  n/a     Patient/family educated on Medicare website which has current facility and service quality ratings:  n/a   Education Provided on the Discharge Plan:  yes   Patient/Family in Agreement with the Plan:  yes     Referrals Placed by CM/SW:  x1   Private pay costs discussed: Not applicable    Additional Information:  RNCC notified Pt will discharge on Saturday. ATC appointments requested. Home care confirmed, [facility] is confirmed for ongoing home transplant labs.     Home Care Confirmed  [facility]   [place]  P: [phone]  SOC: 4/24    RNCC updated transplant SW. RNCC met with Pt at the bedside to discuss home care and discharge planning.    Care management will continue to follow and support safe discharge planning as needed.     Rama Kelly RN  RN[facility] Float   [phone]

## 2024-04-19 NOTE — DISCHARGE INSTRUCTIONS
Your Lab appointments are scheduled for 4 hours. You will go over all of the transplant education, medication list and pill identification to ensure accuracy and knowledge.  There is an assessment by an RN on the weekend.  Then during the week you will see a NP.  Please ensure that the you bring all of their medication to the appointment.     Nutrition Services - Post-Transplant Diet Guidelines   Follow recommendations on the Guide to Your Diet after Transplant handout (summary below).    You have increased energy and protein needs for six to eight weeks after transplant. Your goal is to consume at least ----- grams of protein per day during this time frame.   Eat a heart-healthy diet (low sodium, low saturated and trans-fat) once you are outside of the 6-8 week post-transplant window, and once your appetite improves to normal  In some cases (but not in all cases), adjust potassium intake   Take calcium and vitamin D supplement if your doctor or team orders  Take measures to prevent food poisoning: store and prepare foods to the proper temperature, practice good handwashing, heat all deli meat (to 165 degrees Fahrenheit), avoid raw fish and meats (including uncooked eggs, non-pasteurized or raw milk, and mold-ripened, non-pasteurized, and raw cheeses [including Brie, Camembert, Roquefort, Stilton, Gorgonzola and Raheem or other soft, unpasteurized cheeses such as Mexican queso fresco]), and throw out leftovers older than two days.   Do not consume grapefruit or pomegranate-containing products. These can interact with your medications.   Avoid the following post txp d/t risk for rejection, unknown effects on the organs, and/or potential interactions with immunosuppressants:       - Herbal, Chinese, holistic, chiropractic, natural, alternative medicines and supplements       - Detoxes and cleanses       - Weight loss pills       - Protein powders or other products with extracts or herbs (ie green tea extract)  If you  have any nutrition-related questions or concerns after discharge, please contact our outpatient transplant dietitian, Jael Madden at [phone]

## 2024-04-19 NOTE — PLAN OF CARE
Goal Outcome Evaluation:    Vitally stable. Pain adequately controlled with scheduled Tylenol. Dose #2 of Thymo given uneventfully. Fair appetite on regular diet. Blood sugars 181,185. Up with assist of one to chair. 425 mL red urine output via Mcintosh. 70 mL serosanguinous output via EVELIO.

## 2024-04-19 NOTE — PROGRESS NOTES
[facility]   Transplant Nephrology Progress Note  Date of Admission:  4/17/2024  Today's Date: 04/19/2024    Recommendations:  - Start calcium carbonate 1000 mg PO BID PRN for GERD.  - Increase famotidine when creatinine clearance improves.      Assessment & Plan   # DDKT (pediatric en bloc): Decreased serum creatinine   - Baseline Creatinine: ~ TBD   - Proteinuria: Not checked post transplant   - Date DSA Last Checked: Apr/2024      Latest DSA: No DSA at time of transplant   - BK Viremia: Not checked post transplant   - Kidney Tx Biopsy: No   - Transplant Ureteral Stent: Yes, 2 stents    # Immunosuppression: Tacrolimus immediate release (goal 8-10) and Mycophenolate mofetil (dose 750 mg every 12 hours)   - Induction with Recent Transplant:  High Intensity Protocol due to severe GERD and concern with steroids   - Patient is in an immunosuppressed state and will continue to monitor for efficacy and toxicity of immunosuppression medications.   - Changes: Not at this time    # Infection Prophylaxis:   - PJP: Sulfa/TMP (Bactrim)  - CMV: Valganciclovir (Valcyte). Recipient CMV IgG negative. Awaiting donor serologies    # Hypertension: Controlled;  Goal BP: < 150/90   - Volume status: Euvolemic     - Changes: Not at this time. Hold doxazosin 8mg at bedtime and labetalol 50mg at bedtime     # Elevated Blood Glucose: Glucose generally running ~ 200   - Management as per primary team.    # Anemia in Chronic Renal Disease: Hgb: Trend down      JUNG: No   - Iron studies: Unknown at this time, but checked with dialysis    # Mineral Bone Disorder:   - Secondary renal hyperparathyroidism; PTH level: Unknown at this time, but checked with dialysis        On treatment: None  - Vitamin D; level: Unknown at this time, but checked with dialysis        On supplement: No  - Calcium; level: Normal            On supplement: No  - Phosphorus; level: High (trend for now)         On supplement:  No    # Electrolytes:   - Potassium; level: Normal        On supplement: No  - Magnesium; level: Normal        On supplement: No  - Bicarbonate; level: Normal        On supplement: No    # Crohns: followed by [facility]. Does occasionally alternate between diarrhea and constipation.  On ustekinumab PTA.              -Will plan to hold for at least 6 weeks post txp     # Cardiac/Vascular Disease Risk Factors: Dilated ascending aorta (3.8 cm)  on 2024 ECHO (stable from 2020).     # GERD:   -Pt should remain off PPI due to concern for AIN.   -Famotidine increased to 40mg PO BID    - Start calcium carbonate 1000 mg PO BID PRN for GERD.    # Transplant History:  Etiology of Kidney Failure: Interstitial nephritis  Tx: DDKT, pediatric en-bloc  Transplant: 4/17/2024 (Kidney)  Donor Type: Donation after Brain Death Donor Class: Standard Criteria Donor  Crossmatch at time of Tx: negative  DSA at time of Tx: No  Significant changes in immunosuppression: None  Significant transplant-related complications: None    Recommendations were communicated to the primary team verbally.      LAURITA H[facility]  Transplant Nephrology  Contact information available via [facility] Paging/Directory      Interval History   Ms. Yadavs creatinine is 3.54 (04/19 0621); Decreased from 4.16 yesterday.  Estimated Creatinine Clearance: 19.8 mL/min (A) (based on SCr of 3.54 mg/dL (H)).   I/O last 3 completed shifts:  In: 2196.67 [P.O.:580; I.V.:1616.67]  Out: 1920 [Urine:1500; Drains:420]   Other significant labs/tests/vitals: SBP 120s - 151 overnight. Afebrile   - 4/18/24 Transplant Renal Ultrasound: Small, hypoechoic fluid collection adjacent to the lateral kidney measuring 2.2 x 0.8 x 2.0 cm likely representing postsurgical fluid versus hematoma.      No acute events overnight.  No chest pain or shortness of breath.  Reports cough secondary to GERD, but denies heartburn.  No leg swelling.  No nausea or vomiting.            Review of Systems   4 point  ROS was obtained and negative except as noted in the Interval History.    MEDICATIONS:  Current Facility-Administered Medications   Medication Dose Route Frequency Provider Last Rate Last Admin    acetaminophen (TYLENOL) tablet 650 mg  650 mg Oral Q8H Holli Nelson APRN CNP   650 mg at 04/19/24 0639    atorvastatin (LIPITOR) tablet 10 mg  10 mg Oral Daily Scott Jerry MD   10 mg at 04/18/24 0814    famotidine (PEPCID) tablet 20 mg  20 mg Oral Daily Carlitos Díaz MD        insulin aspart (NovoLOG) injection (RAPID ACTING)  1-7 Units Subcutaneous TID AC Holli Nelson APRN CNP   1 Units at 04/18/24 1736    insulin aspart (NovoLOG) injection (RAPID ACTING)  1-5 Units Subcutaneous At Bedtime Holli Nelson APRN CNP        Lidocaine (LIDOCARE) 4 % Patch 2 patch  2 patch Transdermal Q24H Holli Nelson APRN CNP        [Held by provider] magnesium oxide (MAG-OX) tablet 400 mg  400 mg Oral Daily with lunch Scott Jerry MD        mycophenolate (GENERIC EQUIVALENT) capsule 750 mg  750 mg Oral BID IS Scott Jerry MD   750 mg at 04/18/24 1952    PARoxetine (PAXIL) tablet 30 mg  30 mg Oral Daily Holli Nelson APRN CNP   30 mg at 04/18/24 0814    polyethylene glycol (MIRALAX) Packet 17 g  17 g Oral Daily Scott Jerry MD        senna-docusate (SENOKOT-S/PERICOLACE) 8.6-50 MG per tablet 1 tablet  1 tablet Oral BID Scott Jerry MD   1 tablet at 04/18/24 1952    sodium chloride (PF) 0.9% PF flush 10 mL  10 mL Intracatheter Q8H Scott Jerry MD   10 mL at 04/18/24 2213    sulfamethoxazole-trimethoprim (BACTRIM) 400-80 MG per tablet 1 tablet  1 tablet Oral Once per day on Tuesday Thursday Saturday Scott Jerry MD   1 tablet at 04/18/24 0929    tacrolimus (GENERIC EQUIVALENT) capsule 2 mg  0.03 mg/kg Oral BID IS Scott Jerry MD   2 mg at 04/18/24 1952    valGANciclovir (VALCYTE) tablet 450 mg  450 mg Oral Once per day on Monday Thursday Scott Jerry MD   450 mg at  "24 0929     Current Facility-Administered Medications   Medication Dose Route Frequency Provider Last Rate Last Admin    heparin infusion 25,000 units in 0.45% NaCl 250 mL ANTICOAGULANT  400 Units/hr Intravenous Continuous Holli Nelson APRN CNP 4 mL/hr at 24 0000 400 Units/hr at 24 0000    lactated ringers infusion   Intravenous Continuous Holli Nelson APRN  mL/hr at 24 0201 New Bag at 24 0201       Physical Exam   Temp  Av.2  F (36.8  C)  Min: 97.4  F (36.3  C)  Max: 99.5  F (37.5  C)      Pulse  Av.6  Min: 77  Max: 99 Resp  Av  Min: 12  Max: 21  SpO2  Av %  Min: 95 %  Max: 98 %    CVP (mmHg): 7 mmHgBP (!) 140/95 (BP Location: Left arm)   Pulse 106   Temp 97.9  F (36.6  C) (Oral)   Resp 16   Ht 1.588 m (5' 2.52\")   Wt 63.1 kg (139 lb 1.6 oz)   LMP 2024 (Approximate)   SpO2 96%   BMI 25.02 kg/m     Date 24 07 - 24 0659   Shift 0127-7140 7208-9466 9262-0433 24 Hour Total   INTAKE   P.O. 240   240   Shift Total(mL/kg) 240(4.04)   240(4.04)   OUTPUT   Urine 300   300   Drains 120   120   Shift Total(mL/kg) 420(7.07)   420(7.07)   Weight (kg) 59.42 59.42 59.42 59.42      Admit Weight: 59.4 kg (131 lb)     GENERAL APPEARANCE: alert and no distress  RESP: lungs clear to auscultation - no rales, rhonchi or wheezes  CV: regular rhythm, normal rate, no rub, no murmur  EDEMA: no LE edema bilaterally  ABDOMEN: soft, nondistended, nontender, bowel sounds normal  MS: extremities normal - no gross deformities noted, no evidence of inflammation in joints, no muscle tenderness  SKIN: no rash  NEURO: normal strength and tone, sensory exam grossly normal, mentation intact and speech normal  PSYCH: mentation appears normal and affect normal/bright  TX KIDNEY: normal  DIALYSIS ACCESS:  RUE AV fistula with good thrill, aneurysmal    Data   All labs reviewed by me.  CMP  Recent Labs   Lab 24  0621 24  0209 24  2138 " 04/18/24  1842 04/18/24  1712 04/18/24  1434 04/18/24  1215 04/18/24  0956 04/18/24  0549 04/18/24  0024 04/17/24  2230 04/17/24  2123 04/17/24  1603 04/17/24  0958     --   --   --   --   --   --   --  132*  --  136 136  --  139   POTASSIUM 4.2  --   --  3.9  --  3.9  --  4.2 4.4  4.4   < > 3.9  3.9 3.7  --  3.4   CHLORIDE 101  --   --   --   --   --   --   --  95*  --  96*  --   --  95*   CO2 22  --   --   --   --   --   --   --  27  --  29  --   --  33*   ANIONGAP 13  --   --   --   --   --   --   --  10  --  11  --   --  11   * 166* 185*  --  181*  --    < >  --  209*   < > 166* 168*   < > 91   BUN 36.7*  --   --   --   --   --   --   --  19.6  --  16.0  --   --  11.6   CR 3.54*  --   --   --   --   --   --   --  4.16*  --  3.92*  --   --  3.11*   GFRESTIMATED 17*  --   --   --   --   --   --   --  14*  --  15*  --   --  19*   ROBLES 9.7  --   --   --   --   --   --   --  9.2  --  8.7  --   --  9.3   MAG 2.3  --   --   --   --   --   --   --  2.5*  --  2.5*  --   --   --    PHOS 6.1*  --   --   --   --   --   --   --  4.2  --  2.7  --   --   --    PROTTOTAL  --   --   --   --   --   --   --   --   --   --   --   --   --  6.5   ALBUMIN  --   --   --   --   --   --   --   --   --   --   --   --   --  4.1   BILITOTAL  --   --   --   --   --   --   --   --   --   --   --   --   --  0.6   ALKPHOS  --   --   --   --   --   --   --   --   --   --   --   --   --  55   AST  --   --   --   --   --   --   --   --   --   --   --   --   --  17   ALT  --   --   --   --   --   --   --   --   --   --   --   --   --  5    < > = values in this interval not displayed.     CBC  Recent Labs   Lab 04/19/24  0621 04/18/24  1842 04/18/24  1434 04/18/24  0956 04/18/24  0549 04/18/24  0200 04/17/24  2230 04/17/24  2123 04/17/24  0958   HGB 8.8* 9.4* 9.5* 9.6* 9.6*   < > 9.6*  9.6*   < > 10.5*   WBC 12.5*  --   --   --  12.4*  --  8.1  --  6.1   RBC 2.81*  --   --   --  3.16*  --  3.12*  --  3.33*   HCT 27.2*  --   --   --   30.5*  --  29.5*  --  31.3*   MCV 97  --   --   --  97  --  95  --  94   MCH 31.3  --   --   --  30.4  --  30.8  --  31.5   MCHC 32.4  --   --   --  31.5  --  32.5  --  33.5   RDW 13.1  --   --   --  13.1  --  13.0  --  12.9   *  --   --   --  112*  --  91*  --  122*    < > = values in this interval not displayed.     INR  Recent Labs   Lab 04/17/24  0958   INR 1.20*   PTT 27     ABG  Recent Labs   Lab 04/17/24  2123   PH 7.51*   PCO2 38   PO2 61*   HCO3 31*   O2PER 46.0      Urine Studies  Recent Labs   Lab Test 04/17/24  1113 09/29/22  1153   COLOR Straw Yellow   APPEARANCE Clear Clear   URINEGLC 70* 100*   URINEBILI Negative Negative   URINEKETONE Negative Negative   SG 1.004 1.010   UBLD Trace* Trace*   URINEPH 8.5* 8.5*   PROTEIN 100* 100*   NITRITE Negative Negative   LEUKEST Negative Negative   RBCU 2 <1   WBCU <1 1     No lab results found.  PTH  No lab results found.  Iron Studies  No lab results found.    IMAGING:  All imaging studies reviewed by me.    Patent

## 2024-04-19 NOTE — PROGRESS NOTES
CLINICAL NUTRITION SERVICES - DISCHARGE/Brief NOTE    Patient s discharge needs assessed and discharge planning has been conducted with the multidisciplinary transplant care team including physicians, pharmacy, social work and transplant coordinator.    Diet: regular - Ira reports she is eating 50% of her meals. She likes the ensure max protein and magic cup   Labs (4/19): phos 6.1 mg/dL (H), BUN 36.7 mg/dL (H), Cr 3.54 mg/dL (H)     Interventions  Reviewed post transplant nutrition guidelines. Ira reported she did not have any questions about nutrition guidelines.  Increase ensure max protein to twice daily   Discharge instructions written    Follow up/Monitoring:  Once discharged, place outpatient nutrition consult via the transplant team if nutrition concerns arise.    Rama Ellis MS/RD/LD/CNSC  Available on [employer]   M-F (7am-3:30pm) - 7A/7B Clinical Dietitian  Weekend/Holiday Dietitian (7am-3:30pm)    ** Clinical Dietitians no longer available on pager

## 2024-04-19 NOTE — PROVIDER NOTIFICATION
[id number]- ERIK Zamora.  Pt requesting if she can get Oxycodone for pain. Pt ok if norco needs to be discontinued for oxycodone to be available. Call with questions.   Olimpia Vieira

## 2024-04-19 NOTE — PLAN OF CARE
Goal Outcome Evaluation:      Plan of Care Reviewed With: patient, spouse, parent    Overall Patient Progress: no changeOverall Patient Progress: no change    Outcome Evaluation: Anticipate dc to home (with family support) tomorrow if medically cleared

## 2024-04-19 NOTE — PLAN OF CARE
"/87   Pulse 92   Temp 97.7  F (36.5  C) (Oral)   Resp 16   Ht 1.588 m (5' 2.52\")   Wt 63.1 kg (139 lb 1.6 oz)   LMP 04/03/2024 (Approximate)   SpO2 93%   BMI 25.02 kg/m      Shift: 6036-2941  Isolation Status: None  VS: VSS on RA, afebrile  Neuro: Aox4  Behaviors: Calm, pleasant, sleeping in between cares  BG: ACHs. 138, 142  Labs: K 4.2, Cr 3.54  Respiratory: WDL  Cardiac: WDL  Pain/Nausea: Denies nausea. C/o incisional pain  PRN: Oxy x2, Tyl x1 for Thymo pre-med  Diet: Regular, tolerating well  IV Access: R IJ, R AVF, L PIV x2  Infusion(s): Hep SR @ 400u/hr, LR @ 50ml/hr, Thymo running  Lines/Drains: R EVELIO 175ml, serosang output  GI/: Mcintosh in place. +BS, no BM today, passing gas  Skin: R incision, covered with primapore  Mobility: Ax1 with IV pole  Events/Education: n/a  Plan: Continue with plan of care and update provider of changes.   "

## 2024-04-19 NOTE — CONSULTS
Care Management Initial Consult    General Information  Assessment completed with: Patient, Family, Spouse or significant other,    Type of CM/SW Visit: Initial Assessment    Primary Care Provider verified and updated as needed: No   Readmission within the last 30 days: no previous admission in last 30 days      Reason for Consult: discharge planning  Advance Care Planning: Advance Care Planning Reviewed: no concerns identified          Communication Assessment  Patient's communication style: spoken language (English or Bilingual)    Hearing Difficulty or Deaf: no   Wear Glasses or Blind: yes    Cognitive  Cognitive/Neuro/Behavioral: WDL                      Living Environment:   People in home: spouse     Current living Arrangements: apartment      Able to return to prior arrangements: yes       Family/Social Support:  Care provided by: self  Provides care for: no one  Marital Status:   , Parent(s), Sibling(s)  Collin       Description of Support System: Involved, Supportive    Support Assessment: Adequate family and caregiver support, Adequate social supports    Current Resources:   Patient receiving home care services:       Community Resources: None  Equipment currently used at home: none  Supplies currently used at home: None    Employment/Financial:  Employment Status: employed full-time        Financial Concerns: none   Referral to Financial Worker: No        PHARMACY BENEFIT: [employer]    PROCESSING INFO: ID# [id number] [facility]#  PCN# [id number] BIN# [id number] (EFFECTIVE 01/01/23 ).    DEDUCTIBLE $500 & MAX OUT-OF-POCKET $2,600    COPAY STRUCTURE:    $ 10 FOR GENERIC    20 % COINSURANCE FOR BRAND    $ 10 (GENERIC), 30 % (BRAND) FOR NON-FORMULARY MEDICATIONS         TEST CLAIM SPECIALTY #28    MYCOPHENOLATE 250mg (#240/30DS)-- $0 AT TIME OF SERVICE    PROGRAF 1mg (#120/30DS)-- $0 AT TIME OF SERVICE    TACROLIMUS 1mg (#120/30DS)-- $0 AT TIME OF SERVICE    CYCLOSPORINE 100mg (#60/30DS)-- $0 AT TIME OF  SERVICE    VALGANCICLOVIR 450mg (#60/30DS)--$10         TEST CLAIM DISCHARGE #27    MYCOPHENOLATE 250mg (#240/30DS)-- $0 AT TIME OF SERVICE    PROGRAF 1mg (#120/30DS)-- $0 AT TIME OF SERVICE    TACROLIMUS 1mg (#120/30DS)-- $0 AT TIME OF SERVICE    CYCLOSPORINE 100mg (#60/30DS)-- $0 AT TIME OF SERVICE    VALGANCICLOVIR 450mg (#60/30DS)--$10    Does the patient's insurance plan have a 3 day qualifying hospital stay waiver?  No    Lifestyle & Psychosocial Needs:  Social Determinants of Health     Food Insecurity: Not on file   Depression: Not at risk (10/4/2023)    [facility], [facility]    PHQ-2     PHQ-2 TOTAL SCORE: 0   Housing Stability: Not on file   Tobacco Use: Low Risk  (10/31/2023)    Received from [facility], [facility]    Patient History     Smoking Tobacco Use: Never     Smokeless Tobacco Use: Never     Passive Exposure: Not on file   Financial Resource Strain: High Risk (12/23/2021)    Received from [facility], [facility]    Financial Resource Strain     Difficulty of Paying Living Expenses: Not on file     Difficulty of Paying Living Expenses: Not on file   Alcohol Use: Not on file   Transportation Needs: Not on file   Physical Activity: Not on file   Interpersonal Safety: Not on file   Stress: Not on file   Social Connections: Unknown (12/23/2021)    Received from [facility], [facility]    Social Connections     Frequency of Communication with Friends and Family: Not on file   Health Literacy: Not on file       Functional Status:  Prior to admission patient needed assistance:   Dependent ADLs:: Independent  Dependent IADLs:: Independent  Assesssment of Functional Status: Not at  functional baseline    Mental Health  Status:  Mental Health Status: No Current Concerns       Chemical Dependency Status:  Chemical Dependency Status: No Current Concerns             Values/Beliefs:  Spiritual, Cultural Beliefs, Jehovah's witness Practices, Values that affect care: no               Additional Information:  Patient underwent pediatric en-bloc kidney transplant.  Met with patient to update psychosocial assessment and provide brief education about SW role while inpatient, as well as expectations/requirements and follow up needs post-transplant. SW also provided education about need for compliance with transplant medications, and explained ESRD Medicare benefits and medication coverage under Medicare part B.  Medicare 2728 forms completed and signed by patient.    SW met with patient, spouse and family at bedside. Pt reports that she has strong social and family supports in place that include her spouse, parents and siblings. She plans to commute to/from clinic for follow up appointments. Her dad will transport her. He plans to pick patient up from hospital tomorrow after noon. RNCC arranged for home care for ongoing transplant labs after initial clinic follow up. Pt denies any mental health concerns. She reports she already has coverage through ESRD Medicare. She reports she plans to connect with her HR services to initiate FMLA paperwork. SILVANA confirmed hospital staff can assist if patient needs signatures for paperwork.       VILLA Barber, [facility]   Outpatient Kidney/Pancreas/Auto Islet Transplant Program   Ph: [phone]

## 2024-04-20 ENCOUNTER — APPOINTMENT (OUTPATIENT)
Dept: PHYSICAL THERAPY | Facility: CLINIC | Age: 34
DRG: 652 | End: 2024-04-20
Attending: NURSE PRACTITIONER
Payer: MEDICARE

## 2024-04-20 LAB
ANION GAP SERPL CALCULATED.3IONS-SCNC: 10 MMOL/L (ref 7–15)
BASOPHILS # BLD AUTO: 0 10E3/UL (ref 0–0.2)
BASOPHILS NFR BLD AUTO: 0 %
BUN SERPL-MCNC: 49.3 MG/DL (ref 6–20)
CALCIUM SERPL-MCNC: 9.6 MG/DL (ref 8.6–10)
CHLORIDE SERPL-SCNC: 105 MMOL/L (ref 98–107)
CREAT FLD-MCNC: 2.9 MG/DL
CREAT SERPL-MCNC: 2.84 MG/DL (ref 0.51–0.95)
CREATININE BODY FLUID SOURCE: NORMAL
DEPRECATED HCO3 PLAS-SCNC: 23 MMOL/L (ref 22–29)
EGFRCR SERPLBLD CKD-EPI 2021: 22 ML/MIN/1.73M2
EOSINOPHIL # BLD AUTO: 0 10E3/UL (ref 0–0.7)
EOSINOPHIL NFR BLD AUTO: 0 %
ERYTHROCYTE [DISTWIDTH] IN BLOOD BY AUTOMATED COUNT: 13.4 % (ref 10–15)
GLUCOSE BLDC GLUCOMTR-MCNC: 104 MG/DL (ref 70–99)
GLUCOSE BLDC GLUCOMTR-MCNC: 147 MG/DL (ref 70–99)
GLUCOSE BLDC GLUCOMTR-MCNC: 84 MG/DL (ref 70–99)
GLUCOSE BLDC GLUCOMTR-MCNC: 86 MG/DL (ref 70–99)
GLUCOSE BLDC GLUCOMTR-MCNC: 95 MG/DL (ref 70–99)
GLUCOSE SERPL-MCNC: 122 MG/DL (ref 70–99)
HCT VFR BLD AUTO: 24.9 % (ref 35–47)
HGB BLD-MCNC: 7.8 G/DL (ref 11.7–15.7)
HGB BLD-MCNC: 8.2 G/DL (ref 11.7–15.7)
IMM GRANULOCYTES # BLD: 0 10E3/UL
IMM GRANULOCYTES NFR BLD: 0 %
LYMPHOCYTES # BLD AUTO: 0.2 10E3/UL (ref 0.8–5.3)
LYMPHOCYTES NFR BLD AUTO: 2 %
MAGNESIUM SERPL-MCNC: 2.2 MG/DL (ref 1.7–2.3)
MCH RBC QN AUTO: 30.4 PG (ref 26.5–33)
MCHC RBC AUTO-ENTMCNC: 31.3 G/DL (ref 31.5–36.5)
MCV RBC AUTO: 97 FL (ref 78–100)
MONOCYTES # BLD AUTO: 0.3 10E3/UL (ref 0–1.3)
MONOCYTES NFR BLD AUTO: 4 %
NEUTROPHILS # BLD AUTO: 6.7 10E3/UL (ref 1.6–8.3)
NEUTROPHILS NFR BLD AUTO: 94 %
NRBC # BLD AUTO: 0 10E3/UL
NRBC BLD AUTO-RTO: 0 /100
PHOSPHATE SERPL-MCNC: 4.5 MG/DL (ref 2.5–4.5)
PLATELET # BLD AUTO: 94 10E3/UL (ref 150–450)
POTASSIUM SERPL-SCNC: 4.3 MMOL/L (ref 3.4–5.3)
RBC # BLD AUTO: 2.57 10E6/UL (ref 3.8–5.2)
SODIUM SERPL-SCNC: 138 MMOL/L (ref 135–145)
TACROLIMUS BLD-MCNC: 5.9 UG/L (ref 5–15)
TME LAST DOSE: NORMAL H
TME LAST DOSE: NORMAL H
UFH PPP CHRO-ACNC: <0.1 IU/ML
WBC # BLD AUTO: 7.2 10E3/UL (ref 4–11)

## 2024-04-20 PROCEDURE — 80197 ASSAY OF TACROLIMUS: CPT | Performed by: STUDENT IN AN ORGANIZED HEALTH CARE EDUCATION/TRAINING PROGRAM

## 2024-04-20 PROCEDURE — 250N000013 HC RX MED GY IP 250 OP 250 PS 637: Performed by: NURSE PRACTITIONER

## 2024-04-20 PROCEDURE — 250N000012 HC RX MED GY IP 250 OP 636 PS 637: Performed by: STUDENT IN AN ORGANIZED HEALTH CARE EDUCATION/TRAINING PROGRAM

## 2024-04-20 PROCEDURE — 83735 ASSAY OF MAGNESIUM: CPT | Performed by: STUDENT IN AN ORGANIZED HEALTH CARE EDUCATION/TRAINING PROGRAM

## 2024-04-20 PROCEDURE — 120N000011 HC R&B TRANSPLANT UMMC

## 2024-04-20 PROCEDURE — 250N000012 HC RX MED GY IP 250 OP 636 PS 637: Performed by: NURSE PRACTITIONER

## 2024-04-20 PROCEDURE — 97116 GAIT TRAINING THERAPY: CPT | Mod: GP | Performed by: STUDENT IN AN ORGANIZED HEALTH CARE EDUCATION/TRAINING PROGRAM

## 2024-04-20 PROCEDURE — 97530 THERAPEUTIC ACTIVITIES: CPT | Mod: GP | Performed by: STUDENT IN AN ORGANIZED HEALTH CARE EDUCATION/TRAINING PROGRAM

## 2024-04-20 PROCEDURE — 99233 SBSQ HOSP IP/OBS HIGH 50: CPT | Performed by: INTERNAL MEDICINE

## 2024-04-20 PROCEDURE — 80048 BASIC METABOLIC PNL TOTAL CA: CPT | Performed by: STUDENT IN AN ORGANIZED HEALTH CARE EDUCATION/TRAINING PROGRAM

## 2024-04-20 PROCEDURE — 85520 HEPARIN ASSAY: CPT | Performed by: NURSE PRACTITIONER

## 2024-04-20 PROCEDURE — 250N000013 HC RX MED GY IP 250 OP 250 PS 637

## 2024-04-20 PROCEDURE — 36592 COLLECT BLOOD FROM PICC: CPT | Performed by: NURSE PRACTITIONER

## 2024-04-20 PROCEDURE — 250N000013 HC RX MED GY IP 250 OP 250 PS 637: Performed by: TRANSPLANT SURGERY

## 2024-04-20 PROCEDURE — 258N000003 HC RX IP 258 OP 636: Performed by: NURSE PRACTITIONER

## 2024-04-20 PROCEDURE — 82570 ASSAY OF URINE CREATININE: CPT | Performed by: NURSE PRACTITIONER

## 2024-04-20 PROCEDURE — 250N000013 HC RX MED GY IP 250 OP 250 PS 637: Performed by: STUDENT IN AN ORGANIZED HEALTH CARE EDUCATION/TRAINING PROGRAM

## 2024-04-20 PROCEDURE — 85018 HEMOGLOBIN: CPT | Performed by: NURSE PRACTITIONER

## 2024-04-20 PROCEDURE — 36415 COLL VENOUS BLD VENIPUNCTURE: CPT | Performed by: STUDENT IN AN ORGANIZED HEALTH CARE EDUCATION/TRAINING PROGRAM

## 2024-04-20 PROCEDURE — 85025 COMPLETE CBC W/AUTO DIFF WBC: CPT | Performed by: STUDENT IN AN ORGANIZED HEALTH CARE EDUCATION/TRAINING PROGRAM

## 2024-04-20 PROCEDURE — 84100 ASSAY OF PHOSPHORUS: CPT | Performed by: STUDENT IN AN ORGANIZED HEALTH CARE EDUCATION/TRAINING PROGRAM

## 2024-04-20 PROCEDURE — 250N000013 HC RX MED GY IP 250 OP 250 PS 637: Performed by: INTERNAL MEDICINE

## 2024-04-20 PROCEDURE — 97161 PT EVAL LOW COMPLEX 20 MIN: CPT | Mod: GP | Performed by: STUDENT IN AN ORGANIZED HEALTH CARE EDUCATION/TRAINING PROGRAM

## 2024-04-20 RX ORDER — DOXAZOSIN 4 MG/1
4 TABLET ORAL AT BEDTIME
Status: DISCONTINUED | OUTPATIENT
Start: 2024-04-20 | End: 2024-04-21 | Stop reason: HOSPADM

## 2024-04-20 RX ADMIN — ATORVASTATIN CALCIUM 10 MG: 10 TABLET, FILM COATED ORAL at 08:45

## 2024-04-20 RX ADMIN — ASPIRIN 325 MG ORAL TABLET 325 MG: 325 PILL ORAL at 08:45

## 2024-04-20 RX ADMIN — ACETAMINOPHEN 650 MG: 325 TABLET, FILM COATED ORAL at 19:48

## 2024-04-20 RX ADMIN — BISACODYL 10 MG: 10 SUPPOSITORY RECTAL at 06:09

## 2024-04-20 RX ADMIN — SULFAMETHOXAZOLE AND TRIMETHOPRIM 1 TABLET: 400; 80 TABLET ORAL at 08:57

## 2024-04-20 RX ADMIN — TACROLIMUS 2 MG: 1 CAPSULE ORAL at 08:45

## 2024-04-20 RX ADMIN — FAMOTIDINE 20 MG: 20 TABLET ORAL at 08:45

## 2024-04-20 RX ADMIN — DOXAZOSIN 4 MG: 4 TABLET ORAL at 22:07

## 2024-04-20 RX ADMIN — OXYCODONE HYDROCHLORIDE 5 MG: 5 TABLET ORAL at 06:38

## 2024-04-20 RX ADMIN — MYCOPHENOLATE MOFETIL 750 MG: 250 CAPSULE ORAL at 17:58

## 2024-04-20 RX ADMIN — SENNOSIDES AND DOCUSATE SODIUM 1 TABLET: 50; 8.6 TABLET ORAL at 08:45

## 2024-04-20 RX ADMIN — ACETAMINOPHEN 650 MG: 325 TABLET, FILM COATED ORAL at 06:08

## 2024-04-20 RX ADMIN — TACROLIMUS 2.5 MG: 1 CAPSULE ORAL at 17:57

## 2024-04-20 RX ADMIN — MYCOPHENOLATE MOFETIL 750 MG: 250 CAPSULE ORAL at 08:45

## 2024-04-20 RX ADMIN — OXYCODONE HYDROCHLORIDE 5 MG: 5 TABLET ORAL at 19:48

## 2024-04-20 RX ADMIN — SODIUM CHLORIDE, POTASSIUM CHLORIDE, SODIUM LACTATE AND CALCIUM CHLORIDE: 600; 310; 30; 20 INJECTION, SOLUTION INTRAVENOUS at 04:40

## 2024-04-20 RX ADMIN — PAROXETINE 30 MG: 30 TABLET, FILM COATED ORAL at 08:45

## 2024-04-20 ASSESSMENT — ACTIVITIES OF DAILY LIVING (ADL)
ADLS_ACUITY_SCORE: 25

## 2024-04-20 NOTE — PLAN OF CARE
"BP (!) 150/92 (BP Location: Left arm)   Pulse 87   Temp 97.9  F (36.6  C) (Oral)   Resp 18   Ht 1.588 m (5' 2.52\")   Wt 63.1 kg (139 lb 1.6 oz)   LMP 04/03/2024 (Approximate)   SpO2 100%   BMI 25.02 kg/m      1900 - 0730    Hypertensive up to 150/90's, OVSS and afebrile. Regular diet. BG ACHS (145, 147).     Heparin @400 units/hr- straight rated. EVELIO serosang with clots- 180 out.    Pain controlled with scheduled tylenol and PRN oxycodone x1. Suppository given.     Discharge today with de leon.   "

## 2024-04-20 NOTE — PROGRESS NOTES
24 1200   Appointment Info   Signing Clinician's Name / Credentials (PT) Whit Portillo   Rehab Comments (PT) Abdominal   Living Environment   People in Home parent(s)   Current Living Arrangements house   Home Accessibility stairs to enter home;stairs within home   Number of Stairs, Main Entrance 3   Stair Railings, Main Entrance railing on left side (ascending)   Number of Stairs, Within Home, Primary other (see comments)  (Doesn't need to use- can stay on one level)   Transportation Anticipated family or friend will provide   Living Environment Comments Patient lives with her parents. Both will be around to help at discharge. THey are moving a bed to the main level and she will stay there until she can do the stairs better.   Self-Care   Usual Activity Tolerance good   Current Activity Tolerance moderate   Equipment Currently Used at Home none   Fall history within last six months no   Activity/Exercise/Self-Care Comment Patient was independent with all ADLS and IADLS. 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   General Information   Onset of Illness/Injury or Date of Surgery 24   Referring Physician Skye Judge   Patient/Family Therapy Goals Statement (PT) Get back home   Pertinent History of Current Problem (include personal factors and/or comorbidities that impact the POC) 4 year old with history of ESRD on dialysis d/t interstitial nephritis, Crohn's, HTN, GERD, and dilated ascending aorta. S/p  donor en bloc kidney transplant with ureteral stent x2 on    Existing Precautions/Restrictions abdominal   General Observations Patient with david red urine in catheter. Reports dizziness with position changes   Cognition   Affect/Mental Status (Cognition) WNL   Orientation Status (Cognition) oriented x 4   Follows Commands (Cognition) WNL   Pain Assessment   Patient Currently in Pain Yes, see Vital Sign flowsheet   Range of Motion (ROM)   ROM Comment WNL   Strength (Manual Muscle Testing)  "  Strength Comments At least 3/5 per transfers   Bed Mobility   Comment, (Bed Mobility) Rolls with verbal cues for logroll   Transfers   Comment, (Transfers) Sit to stand with CGA only without AD   Gait/Stairs (Locomotion)   Comment, (Gait/Stairs) Ambulates with slow gait speed and demonstrates mild unsteadiness. States \"My legs feel shaky\"   Balance   Balance Comments Good sitting balance. SBA for standing balance   Sensory Examination   Sensory Perception Comments NT   Clinical Impression   Criteria for Skilled Therapeutic Intervention Yes, treatment indicated   PT Diagnosis (PT) Impaired functional mobility   Influenced by the following impairments SOB, fatigue, pain   Functional limitations due to impairments Transfers, gait and stairs   Clinical Presentation (PT Evaluation Complexity) stable   Clinical Presentation Rationale Clinical judgment   Clinical Decision Making (Complexity) low complexity   Planned Therapy Interventions (PT) balance training;bed mobility training;gait training;stair training;strengthening;transfer training;progressive activity/exercise;risk factor education;home program guidelines   Risk & Benefits of therapy have been explained evaluation/treatment results reviewed;care plan/treatment goals reviewed;patient   Clinical Impression Comments Patient presents with dizziness, fatigue and higher level balance issues with all upright mobility. HR up to 120 bpm with minimal activity and BP higher than parameters. RN and provider alerted to changes in vitals and pt's concern for pain in her bladder.   PT Total Evaluation Time   PT Moris Low Complexity Minutes (97481) 7   Physical Therapy Goals   PT Frequency Daily   PT Predicted Duration/Target Date for Goal Attainment 04/25/24   PT Goals Bed Mobility;Transfers;Gait;Stairs   PT: Bed Mobility Independent;Within precautions;Supine to/from sit   PT: Transfers Independent;Sit to/from stand;Within precautions   PT: Gait Independent;Greater than 200 " feet;Within precautions   PT: Stairs Independent;3 stairs   Interventions   Interventions Quick Adds Gait Training;Therapeutic Activity   Therapeutic Activity   Therapeutic Activities: dynamic activities to improve functional performance Minutes (25090) 15   Treatment Detail/Skilled Intervention PT: Patient greeted supine in bed agreeable tosession. Educated on roll of PT in the hospital and dc planning. Instructed in abdominal precautions including no twist, valsalva or lift/push/pull >10#. Patient instructed in logroll technique and able to sit up with verbal cues independently. Reports increase in dizziness- BP initially 158/110. Discussed waiting to see if this resolves but still at 162/98. Returned to supine and let RN know. Returned 2 hours later and retook- initially 153/93 so mobilized. Able to demonstrate some carry over of education from previous. Reports new bladder pain with burning sensation and leak around catheter. Again provider alerted following gait.   Gait Training   Gait Training Minutes (11392) 8   Treatment Detail/Skilled Intervention PT: Patient with resting HR is 82 bpm and /93. Following gait, patient's HR was 120 bpm and BP was 169/99 - provider alerted as out of parameters. Patient ambulates with no AD but reports feeling shaky throughout needing close SBA. Patient reports she feels off balance and demonstrates some shakiness in LEs. Walks slow (not age appropriate speed) and needs to stop 1x due to fatigue for standing break due to SOB. Patient's O2 at 93% at beginning of walk and 98% following.   PT Discharge Planning   PT Plan PT: Gait, stairs (needs to do 3 to go home), assess standing and walking balance   PT Discharge Recommendation (DC Rec) home with assist   PT Rationale for DC Rec Patient presents with dizziness, fatigue and higher level balance issues with all upright mobility. HR up to 120 bpm with minimal activity and BP higher than parameters. RN and provider alerted to  changes in vitals and pt's concern for pain in her bladder. Anticipate once is medically stability, will be able to go home with assistance.   PT Brief overview of current status SBA for all mobility   Total Session Time   Timed Code Treatment Minutes 23   Total Session Time (sum of timed and untimed services) 30

## 2024-04-20 NOTE — PROGRESS NOTES
"Transplant Surgery  Inpatient Daily Progress Note  2024    Assessment & Plan: 34 year old with history of ESRD on dialysis d/t interstitial nephritis, Crohn's, HTN, GERD, and dilated ascending aorta. S/p  donor en bloc kidney transplant with ureteral stent x2 on 24.    Updates:  Creatinine trending downward 2.5  Urine bloody today - will stop heparin and continue 325 mg Aspirin  Cleared for discharge once urine clears up  Otherwise, doing well    ____________________________________________    Graft function: POD #3   Kidney: Cr 3.52 from 3.9. Made urine prior to transplant. Post-op US normal.     Immunosuppressed status secondary to medications:   -cPRA 9, but will give Thymo due to need to minimize steroids. History of severe reflux and suspected interstitial nephritis secondary to PPI.   Thymo: 125/125  Steroids:  500/250  MMF: 750mg BID. Will need REMS teaching. Has IUD.  Tacro: Goal level 8-10    Hematology:   Anemia of chronic disease: Hgb 9.6, stable.  Thrombocytopenia: Plt 112, present on admission.  Anticoagulation: Heparin 400u/h, plan to stop heparin drip tomorrow. Start ASA 81mg today. Plan for ASA 325mg daily on discharge.    Cardiorespiratory:   HTN: Monitor for now.    GI/Nutrition:   Diet: Regular  Bowel regimen: Senna, PEG  GERD: Start famotidine. NO PPI due to history of interstitial nephritis.    Endocrine:   Steroid hyperglycemia, mild: Stop D5 in IVF. Add sliding scale insulin.    Fluid/Electrolytes: MIVF: Straight rate  Hyponatremia: Due to renal dysfunction. Monitor.    : De Leon to remain due to new surgical anastomosis x7 days.    Infectious disease: Afebrile    Neuro:  Acute post-op pain: Controlled.  -APAP 650mg q8H  -Norco 5/325mg q4h PRN per pt preference  -Lidoderm    Prophylaxis: DVT, fall, GI, viral (Valcyte), pneumocystis (TMP/sulfa)    Disposition: 7A, plan to discharge tomorrow with de leon in place.     ERICA/Fellow/Resident Provider:   Sam \"Jacob\" Pete " MD  General Surgery Resident    Faculty: Carlitos Díaz MD   _________________________________________________________________    Interval History: History from patient and/or EMR  reports ambulating well, pain control, and tolerating diet. Denies nausea and vomiting.    ROS:   A 10-point review of systems was negative except as noted above.    Meds:  Current Facility-Administered Medications   Medication Dose Route Frequency Provider Last Rate Last Admin     acetaminophen (TYLENOL) tablet 650 mg  650 mg Oral Q8H Holli Nelson APRN CNP   650 mg at 04/20/24 0608     aspirin (ASA) tablet 325 mg  325 mg Oral Daily Sam Freeman MD   325 mg at 04/20/24 0845     atorvastatin (LIPITOR) tablet 10 mg  10 mg Oral Daily Scott Jerry MD   10 mg at 04/20/24 0845     famotidine (PEPCID) tablet 20 mg  20 mg Oral Daily Carlitos Díaz MD   20 mg at 04/20/24 0845     insulin aspart (NovoLOG) injection (RAPID ACTING)  1-7 Units Subcutaneous TID AC Holli Nelson APRN CNP   1 Units at 04/19/24 1243     insulin aspart (NovoLOG) injection (RAPID ACTING)  1-5 Units Subcutaneous At Bedtime Holli Nelson APRN CNP         Lidocaine (LIDOCARE) 4 % Patch 2 patch  2 patch Transdermal Q24H Holli Nelson APRN CNP         [Held by provider] magnesium oxide (MAG-OX) tablet 400 mg  400 mg Oral Daily with lunch Scott Jerry MD         mycophenolate (GENERIC EQUIVALENT) capsule 750 mg  750 mg Oral BID IS Scott Jerry MD   750 mg at 04/20/24 0845     PARoxetine (PAXIL) tablet 30 mg  30 mg Oral Daily Holli Nelson APRN CNP   30 mg at 04/20/24 0845     polyethylene glycol (MIRALAX) Packet 17 g  17 g Oral Daily Scott Jerry MD   17 g at 04/19/24 0750     senna-docusate (SENOKOT-S/PERICOLACE) 8.6-50 MG per tablet 1 tablet  1 tablet Oral BID Scott Jerry MD   1 tablet at 04/20/24 0845     sodium chloride (PF) 0.9% PF flush 10 mL  10 mL Intracatheter Q8H Scott Jerry MD   10 mL at 04/20/24 0610  "    sulfamethoxazole-trimethoprim (BACTRIM) 400-80 MG per tablet 1 tablet  1 tablet Oral Once per day on Tuesday Thursday Saturday Scott Jerry MD   1 tablet at 04/20/24 0857     tacrolimus (GENERIC EQUIVALENT) capsule 2 mg  0.03 mg/kg Oral BID IS Scott Jerry MD   2 mg at 04/20/24 0845     valGANciclovir (VALCYTE) tablet 450 mg  450 mg Oral Once per day on Monday Thursday Scott Jerry MD   450 mg at 04/18/24 0929       Physical Exam:     Admit Weight: 59.4 kg (131 lb)    Current vitals:   BP (!) 162/110   Pulse 88   Temp 98.4  F (36.9  C) (Oral)   Resp 16   Ht 1.588 m (5' 2.52\")   Wt 63.1 kg (139 lb 1.6 oz)   LMP 04/03/2024 (Approximate)   SpO2 95%   BMI 25.02 kg/m      Vital sign ranges:    Temp:  [97.7  F (36.5  C)-98.4  F (36.9  C)] 98.4  F (36.9  C)  Pulse:  [] 88  Resp:  [16-18] 16  BP: (134-162)/() 162/110  SpO2:  [93 %-100 %] 95 %    General Appearance: in no apparent distress.   Skin: Warm, perfused  Lungs: Unlabored  Abdomen: The abdomen is Soft, incision covered, EVELIO serosang  : de leon is present.  Urine is pink.  Extremities: edema: none, strength 5/5  Neurologic: awake, alert, and oriented x4. Tremor absent..     Data:   CMP  Recent Labs   Lab 04/20/24  0850 04/20/24  0559 04/19/24  0733 04/19/24  0621 04/17/24  2230 04/17/24  2123 04/17/24  1603 04/17/24  0958   NA  --  138  --  136   < > 136  --  139   POTASSIUM  --  4.3  --  4.2   < > 3.7  --  3.4   CHLORIDE  --  105  --  101   < >  --   --  95*   CO2  --  23  --  22   < >  --   --  33*   * 122*   < > 152*   < > 168*   < > 91   BUN  --  49.3*  --  36.7*   < >  --   --  11.6   CR  --  2.84*  --  3.54*   < >  --   --  3.11*   GFRESTIMATED  --  22*  --  17*   < >  --   --  19*   ROBLES  --  9.6  --  9.7   < >  --   --  9.3   ICAW  --   --   --   --   --  4.5  --   --    MAG  --  2.2  --  2.3   < >  --   --   --    PHOS  --  4.5  --  6.1*   < >  --   --   --    ALBUMIN  --   --   --   --   --   --   --  4.1   BILITOTAL  " --   --   --   --   --   --   --  0.6   ALKPHOS  --   --   --   --   --   --   --  55   AST  --   --   --   --   --   --   --  17   ALT  --   --   --   --   --   --   --  5    < > = values in this interval not displayed.     CBC  Recent Labs   Lab 04/20/24  0559 04/19/24  0621 04/17/24  2123 04/17/24  0958   HGB 7.8* 8.8*   < > 10.5*   WBC 7.2 12.5*   < > 6.1   PLT 94* 107*   < > 122*   A1C  --   --   --  4.7    < > = values in this interval not displayed.

## 2024-04-20 NOTE — PROGRESS NOTES
[facility]   Transplant Nephrology Progress Note  Date of Admission:  4/17/2024  Today's Date: 04/20/2024    Recommendations:  -Recommend stopping heparin due to bloody urine output.   -Plan for discharge tmrw if UOP clears up  -Start doxazosin 4mg at bedtime (ordered)      Assessment & Plan   # DDKT (pediatric en bloc): Trend down in SCr   - Baseline Creatinine: ~ TBD   - Proteinuria: Not checked post transplant   - Date DSA Last Checked: Apr/2024      Latest DSA: No DSA at time of transplant   - BK Viremia: Not checked post transplant   - Kidney Tx Biopsy: No   - Transplant Ureteral Stent: Yes, 2 stents   -7d de leon   -Stop heparin gtt due to bloody UOP. Continue ASA 81mg daily. Will plan to increase ASA to 325mg daily on discharge.     # Immunosuppression: Tacrolimus immediate release (goal 8-10) and Mycophenolate mofetil (dose 750 mg every 12 hours)   - Induction with Recent Transplant:  High Intensity Protocol due to severe GERD and concern with steroids   - Patient is in an immunosuppressed state and will continue to monitor for efficacy and toxicity of immunosuppression medications.   - Changes: Not at this time    # Infection Prophylaxis:   - PJP: Sulfa/TMP (Bactrim)  - CMV: Valganciclovir (Valcyte). Recipient CMV IgG negative. Awaiting donor serologies    # Hypertension: Borderline control;  Goal BP: < 150/90   - Volume status: Euvolemic     - Changes: Yes - start doxazosin 4mg at bedtime .     # Elevated Blood Glucose: Glucose generally running ~ 200   - Management as per primary team.    # Anemia in Chronic Renal Disease: Hgb: Trend down      JUNG: No   - Iron studies: Unknown at this time, but checked with dialysis    # Mineral Bone Disorder:   - Secondary renal hyperparathyroidism; PTH level: Unknown at this time, but checked with dialysis        On treatment: None  - Vitamin D; level: Unknown at this time, but checked with dialysis        On supplement: No  -  Calcium; level: Normal            On supplement: No  - Phosphorus; level: Normal, but trend down         On supplement: No    # Electrolytes:   - Potassium; level: Normal        On supplement: No  - Magnesium; level: Normal        On supplement: No  - Bicarbonate; level: Normal        On supplement: No    # Crohns: followed by [facility]. Does occasionally alternate between diarrhea and constipation.  On ustekinumab PTA.              -Will plan to hold for at least 6 weeks post txp     # Cardiac/Vascular Disease Risk Factors: Dilated ascending aorta (3.8 cm)  on 2024 ECHO (stable from 2020).     # GERD:   -Pt should remain off PPI due to concern for AIN.   -Increase famotidine as GFR improves. Currently on famotidine 20mg daily  - Continue calcium carbonate 1000 mg PO BID PRN for GERD.    # Transplant History:  Etiology of Kidney Failure: Interstitial nephritis  Tx: DDKT, pediatric en-bloc  Transplant: 4/17/2024 (Kidney)  Donor Type: Donation after Brain Death Donor Class: Standard Criteria Donor  Crossmatch at time of Tx: negative  DSA at time of Tx: No  Significant changes in immunosuppression: None  Significant transplant-related complications: None    Recommendations were communicated to the primary team verbally.      Yan Quezada MD  Transplant Nephrology  Contact information available via [facility] Paging/Directory      Interval History   Ms. Yadavs creatinine is 2.84 (04/20 0559); Trend down.  Bloody urine output.  Other significant labs/tests/vitals: Scr decreasing, BP increasing  No events overnight.  No chest pain or shortness of breath.  No leg swelling.  No nausea and vomiting.  Bowel movements are not yet present.  No fever, sweats or chills.      Review of Systems   4 point ROS was obtained and negative except as noted in the Interval History.    MEDICATIONS:  Current Facility-Administered Medications   Medication Dose Route Frequency Provider Last Rate Last Admin    acetaminophen (TYLENOL) tablet 650 mg  650  mg Oral Q8H Holli Nelson APRN CNP   650 mg at 04/20/24 0608    aspirin (ASA) tablet 325 mg  325 mg Oral Daily Sam Freeman MD   325 mg at 04/20/24 0845    atorvastatin (LIPITOR) tablet 10 mg  10 mg Oral Daily Scott Jerry MD   10 mg at 04/20/24 0845    famotidine (PEPCID) tablet 20 mg  20 mg Oral Daily Carlitos Díaz MD   20 mg at 04/20/24 0845    insulin aspart (NovoLOG) injection (RAPID ACTING)  1-7 Units Subcutaneous TID AC Holli Nelson APRN CNP   1 Units at 04/19/24 1243    insulin aspart (NovoLOG) injection (RAPID ACTING)  1-5 Units Subcutaneous At Bedtime Holli Nelson APRN CNP        Lidocaine (LIDOCARE) 4 % Patch 2 patch  2 patch Transdermal Q24H Holli Nelson APRN CNP        [Held by provider] magnesium oxide (MAG-OX) tablet 400 mg  400 mg Oral Daily with lunch Scott Jerry MD        mycophenolate (GENERIC EQUIVALENT) capsule 750 mg  750 mg Oral BID IS Scott Jerry MD   750 mg at 04/20/24 0845    PARoxetine (PAXIL) tablet 30 mg  30 mg Oral Daily Holli Nelson APRN CNP   30 mg at 04/20/24 0845    polyethylene glycol (MIRALAX) Packet 17 g  17 g Oral Daily Scott Jerry MD   17 g at 04/19/24 0750    senna-docusate (SENOKOT-S/PERICOLACE) 8.6-50 MG per tablet 1 tablet  1 tablet Oral BID Scott Jerry MD   1 tablet at 04/20/24 0845    sodium chloride (PF) 0.9% PF flush 10 mL  10 mL Intracatheter Q8H Scott Jerry MD   10 mL at 04/20/24 0610    sulfamethoxazole-trimethoprim (BACTRIM) 400-80 MG per tablet 1 tablet  1 tablet Oral Once per day on Tuesday Thursday Saturday Scott Jerry MD   1 tablet at 04/20/24 0857    tacrolimus (GENERIC EQUIVALENT) capsule 2 mg  0.03 mg/kg Oral BID IS Scott Jerry MD   2 mg at 04/20/24 0845    valGANciclovir (VALCYTE) tablet 450 mg  450 mg Oral Once per day on Monday Thursday Scott Jerry MD   450 mg at 04/18/24 0929     Current Facility-Administered Medications   Medication Dose Route Frequency Provider  "Last Rate Last Admin       Physical Exam   Temp  Av.2  F (36.8  C)  Min: 97.4  F (36.3  C)  Max: 99.5  F (37.5  C)      Pulse  Av.6  Min: 77  Max: 99 Resp  Av  Min: 12  Max: 21  SpO2  Av %  Min: 95 %  Max: 98 %    CVP (mmHg): 7 mmHgBP (!) 162/110   Pulse 88   Temp 98.4  F (36.9  C) (Oral)   Resp 16   Ht 1.588 m (5' 2.52\")   Wt 63.1 kg (139 lb 1.6 oz)   LMP 2024 (Approximate)   SpO2 95%   BMI 25.02 kg/m     Date 24 0700 - 24 0659   Shift 7882-4497 6474-0055 7844-4211 24 Hour Total   INTAKE   P.O. 240   240   Shift Total(mL/kg) 240(4.04)   240(4.04)   OUTPUT   Urine 300   300   Drains 120   120   Shift Total(mL/kg) 420(7.07)   420(7.07)   Weight (kg) 59.42 59.42 59.42 59.42      Admit Weight: 59.4 kg (131 lb)     GENERAL APPEARANCE: alert and no distress  RESP: lungs clear to auscultation - no rales, rhonchi or wheezes  CV: regular rhythm, normal rate, no rub, no murmur  EDEMA: no LE edema bilaterally  ABDOMEN: soft, nondistended, nontender, bowel sounds normal  MS: extremities normal - no gross deformities noted, no evidence of inflammation in joints, no muscle tenderness  SKIN: no rash  NEURO: normal strength and tone, sensory exam grossly normal, mentation intact and speech normal  PSYCH: mentation appears normal and affect normal/bright  TX KIDNEY: normal  DIALYSIS ACCESS:  RUE AV fistula with good thrill, aneurysmal    Data   All labs reviewed by me.  CMP  Recent Labs   Lab 24  0850 24  0559 24  0224 24  2114 24  0733 24  0621 24  2138 24  1842 24  1712 24  1434 24  0956 24  0549 24  0024 24  2230 24  1603 24  0958   NA  --  138  --   --   --  136  --   --   --   --   --  132*  --  136   < > 139   POTASSIUM  --  4.3  --   --   --  4.2  --  3.9  --  3.9   < > 4.4  4.4   < > 3.9  3.9   < > 3.4   CHLORIDE  --  105  --   --   --  101  --   --   --   --   --  95*  --  96*  --  " 95*   CO2  --  23  --   --   --  22  --   --   --   --   --  27  --  29  --  33*   ANIONGAP  --  10  --   --   --  13  --   --   --   --   --  10  --  11  --  11   * 122* 147* 145*   < > 152*   < >  --    < >  --    < > 209*   < > 166*   < > 91   BUN  --  49.3*  --   --   --  36.7*  --   --   --   --   --  19.6  --  16.0  --  11.6   CR  --  2.84*  --   --   --  3.54*  --   --   --   --   --  4.16*  --  3.92*  --  3.11*   GFRESTIMATED  --  22*  --   --   --  17*  --   --   --   --   --  14*  --  15*  --  19*   ROBLES  --  9.6  --   --   --  9.7  --   --   --   --   --  9.2  --  8.7  --  9.3   MAG  --  2.2  --   --   --  2.3  --   --   --   --   --  2.5*  --  2.5*  --   --    PHOS  --  4.5  --   --   --  6.1*  --   --   --   --   --  4.2  --  2.7  --   --    PROTTOTAL  --   --   --   --   --   --   --   --   --   --   --   --   --   --   --  6.5   ALBUMIN  --   --   --   --   --   --   --   --   --   --   --   --   --   --   --  4.1   BILITOTAL  --   --   --   --   --   --   --   --   --   --   --   --   --   --   --  0.6   ALKPHOS  --   --   --   --   --   --   --   --   --   --   --   --   --   --   --  55   AST  --   --   --   --   --   --   --   --   --   --   --   --   --   --   --  17   ALT  --   --   --   --   --   --   --   --   --   --   --   --   --   --   --  5    < > = values in this interval not displayed.     CBC  Recent Labs   Lab 04/20/24  0559 04/19/24  0621 04/18/24  1842 04/18/24  1434 04/18/24  0956 04/18/24  0549 04/18/24  0200 04/17/24  2230   HGB 7.8* 8.8* 9.4* 9.5*   < > 9.6*   < > 9.6*  9.6*   WBC 7.2 12.5*  --   --   --  12.4*  --  8.1   RBC 2.57* 2.81*  --   --   --  3.16*  --  3.12*   HCT 24.9* 27.2*  --   --   --  30.5*  --  29.5*   MCV 97 97  --   --   --  97  --  95   MCH 30.4 31.3  --   --   --  30.4  --  30.8   MCHC 31.3* 32.4  --   --   --  31.5  --  32.5   RDW 13.4 13.1  --   --   --  13.1  --  13.0   PLT 94* 107*  --   --   --  112*  --  91*    < > = values in this interval not  displayed.     INR  Recent Labs   Lab 04/17/24  0958   INR 1.20*   PTT 27     ABG  Recent Labs   Lab 04/17/24  2123   PH 7.51*   PCO2 38   PO2 61*   HCO3 31*   O2PER 46.0      Urine Studies  Recent Labs   Lab Test 04/17/24  1113 09/29/22  1153   COLOR Straw Yellow   APPEARANCE Clear Clear   URINEGLC 70* 100*   URINEBILI Negative Negative   URINEKETONE Negative Negative   SG 1.004 1.010   UBLD Trace* Trace*   URINEPH 8.5* 8.5*   PROTEIN 100* 100*   NITRITE Negative Negative   LEUKEST Negative Negative   RBCU 2 <1   WBCU <1 1     No lab results found.  PTH  No lab results found.  Iron Studies  No lab results found.    IMAGING:  All imaging studies reviewed by me.

## 2024-04-20 NOTE — OP NOTE
Transplant Surgery  Operative Note     Procedure date:  04/17/24    Preoperative diagnosis:  End Stage renal failure due to interstitial nephritis    Postoperative diagnosis:  Same,     Procedure:  1. En-bloc kidney kidney transplant,  Donation after Brain Death, Right  iliac fossa, without vascular reconstruction. A J-J ureteral stent was placed x2 (bladder patch).  2. Kidney allograft preparation on Back Table      Surgeon:  YASMINE CHAHAL    Fellow/Assistant:  MD Rosalino Fellow. There was no qualified resident to assist with this procedure.    Anesthesia:  General    Specimen:  None    Drains:  Deniz drain    Urine output:  100 mls    Estimated blood loss:  50    Fluids administered:        Indication: The patient has End Stage renal failure due to interstitial nephritis and received an organ offer for an en bloc Donation after Brain Death kidney allograft. After discussing the risks and benefits of proceeding, the patient agreed to proceed with surgery and provided informed consent.  Findings: Integrity of recipient artery: good  Intraoperative Events: none    Final ABO/Crossmatch verification: After the donor organ arrived to the operating room and prior to anastomosis, I participated in the transplant pre-verification upon organ receipt timeout by visually verifying the donor ID, organ and laterality, donor blood type, recipient unique identifier, recipient blood type, and that the donor and recipient are blood type compatible. The crossmatch was done prospectively; the T cell flow crossmatch result was negative and B cell flow crossmatch result was negative prior to anastomosis.  The patient received Thymoglobulin, Cellcept and Solumedrol on induction.    Donor Organ Information:   Donor UNOS ID:  [id number]    Donor arterial clamp on:  4/17/2024 12:41 PM    Total ischemic time:  429 min    Cold ischemic time:  429 min    Warm ischemic time:  40 min    Preservation fluid:  UW     Back Table Details:    Procedure:  Bench preparation of the kidney allograft for transplantation without vascular reconstruction    Surgeon:  BAILEE SHAIKH    Faculty Co-Surgeon:  YASMINE CHAHAL    Fellow/Assistant:  MD Rosalino Fellow    Donor arrival to recipient room:  4/17/2024  5:02 PM    Graft injury:  None    Graft biopsy:  None    Organ received on:  Ice    Pump resistance:       Pump flow:       Arterial anatomy:  en bloc kidney - donor aorta anastomosed to recipient external iliac artery    Donor arterial quality:  good    Venous anatomy:  donor IVC anastomosed to recipient external iliac vein    Ureteral anatomy:  En bloc kidneys, donor bladder patch anastomosed to recipient bladder, 2 stents used    Any reconstruction:  none    Artery:  as above    Vein:  as above     Complications: None.    Findings:       Donor inferior aorta anastomosed to recipient external iliac artery                     Donor IVC anastomosed to external iliac vein                     Donor bladder patch anastomosed to recipient bladder, 2 stents in place                     Lateral attachments of each kidney sutured to side wall to secure in place                    Drain placed    Back Table Preparation:  The donor kidney was received and inspected. It had been flushed with UW. The graft was prepared on the back table by removing perinephric fat and ligating venous tributaries and lymphatics. The ureter was also cleaned of excess tissue. If required, reconstruction was performed as detailed above. The kidney was stored in iced cold preservation solution until ready for transplantation. Faculty was present for the critical portions of the procedure.    Operative Procedure:   Arterial anastomosis start:  4/17/2024  7:10 PM    Arterial unclamp:  4/17/2024  7:50 PM    Extra vessels used:    none      The patient was brought to the operating room, placed in a supine position, and a time out was performed. Sequential compression devices were placed on  both lower extremities and general endotracheal anesthesia was induced.  The patient was given IV antibiotics and a Mcintosh catheter. A central line was placed by Anesthesia service. The abdomen was then shaved, prepped, and draped in the usual sterile fashion.  An incision was made in the right lower quadrant and carried down through the subcutaneous tissue and the abdominal wall fascia. If encountered, the epigastric vessels were ligated in continuity, divided and secured with surgical clips. The right iliac artery and vein were exposed. The retractor system was placed and the lymphatics overlying the vessels were serially ligated and divided.     The patient was heparinized. We applied atraumatic vascular clamps and the donor kidney was brought to the operative field. We made a venotomy and the donor IVC was anastomosed to the recipient right external iliac vein in an end-to-side fashion. An arteriotomy was made and the donor aorta was anastomosed to the recipient right external iliac artery  in an end to side fashion. The patient was simultaneously loaded with IV mannitol, Lasix and volume. The renal artery was protected and the clamps were removed. After several cardiac cycles, we opened the renal artery and the kidney had good reperfusion and was soft.    The transplant bladder patch was anastomosed to the recipient bladder in 2 layers with absorbable suture. Prior to performing the anastomoses, a 12x6 stent was placed in each ureter. The kidneys made urine prior to implantation.    Hemostasis was obtained, the anastomoses inspected, and the kidneys placed in the iliac fossa. After placement, the vessel lay was inspected and found to be acceptable. The kidneys were then secured by their lateral attachments to the side wall to ensure they did not twist.  The field was irrigated with antibiotic solution. A drain was placed. The retractor was removed and the abdominal wall fascia reapproximated. Subcutaneous  tissues were irrigated and hemostasis obtained.  The skin was reapproximated with staples and a dry dressing was applied.   All needle, sponge and instrument counts were correct x 2. The patient was awakened, extubated, and transferred to PACU for post-op monitoring. Faculty was present for key portions of the procedure.    Ming Jerry MD  Transplant Surgery Fellow

## 2024-04-20 NOTE — PLAN OF CARE
"BP (!) 149/89 (BP Location: Left arm)   Pulse 93   Temp 97.7  F (36.5  C) (Oral)   Resp 18   Ht 1.588 m (5' 2.52\")   Wt 63.1 kg (139 lb 1.6 oz)   LMP 04/03/2024 (Approximate)   SpO2 95%   BMI 25.02 kg/m      Shift: 5203-9169  Isolation Status: None  VS: Mild HTN 140s on RA, afebrile  Neuro: Aox4  Behaviors: Calm, pleasant. Sleeping in between cares  BG: ACHS. 104, 93, 83  Labs: Hgb 7.8, 8.2  Respiratory: WDL  Cardiac: WDL  Pain/Nausea: Denies pain or nausea  PRN: none  Diet: Regular, tolerating well. Fair appetite this shift  IV Access: L PIV x2, R IJ, R AVF  Infusion(s): None  Lines/Drains: EVELIO - serosang output  GI/: Mcintosh in place. No BM, passing gas. Refused PRN bowel meds  Skin: R hockey stick incision, sutured, TENA  Mobility: SBA  Events/Education: Worked with PT today. Explained Mcintosh cares with patient  Plan: Possible discharge tomorrow, 4/21. Continue with plan of care and update provider of changes  "

## 2024-04-21 ENCOUNTER — APPOINTMENT (OUTPATIENT)
Dept: PHYSICAL THERAPY | Facility: CLINIC | Age: 34
DRG: 652 | End: 2024-04-21
Attending: TRANSPLANT SURGERY
Payer: MEDICARE

## 2024-04-21 ENCOUNTER — TELEPHONE (OUTPATIENT)
Dept: PHARMACY | Facility: CLINIC | Age: 34
End: 2024-04-21
Payer: MEDICARE

## 2024-04-21 VITALS
RESPIRATION RATE: 16 BRPM | HEIGHT: 63 IN | SYSTOLIC BLOOD PRESSURE: 133 MMHG | DIASTOLIC BLOOD PRESSURE: 88 MMHG | HEART RATE: 93 BPM | OXYGEN SATURATION: 95 % | BODY MASS INDEX: 24.64 KG/M2 | WEIGHT: 139.1 LBS | TEMPERATURE: 97.6 F

## 2024-04-21 LAB
ANION GAP SERPL CALCULATED.3IONS-SCNC: 7 MMOL/L (ref 7–15)
BASOPHILS # BLD AUTO: 0 10E3/UL (ref 0–0.2)
BASOPHILS NFR BLD AUTO: 0 %
BUN SERPL-MCNC: 47.1 MG/DL (ref 6–20)
CALCIUM SERPL-MCNC: 9.4 MG/DL (ref 8.6–10)
CHLORIDE SERPL-SCNC: 107 MMOL/L (ref 98–107)
CREAT SERPL-MCNC: 2.39 MG/DL (ref 0.51–0.95)
DEPRECATED HCO3 PLAS-SCNC: 23 MMOL/L (ref 22–29)
EGFRCR SERPLBLD CKD-EPI 2021: 27 ML/MIN/1.73M2
EOSINOPHIL # BLD AUTO: 0.1 10E3/UL (ref 0–0.7)
EOSINOPHIL NFR BLD AUTO: 1 %
ERYTHROCYTE [DISTWIDTH] IN BLOOD BY AUTOMATED COUNT: 13.3 % (ref 10–15)
GLUCOSE BLDC GLUCOMTR-MCNC: 84 MG/DL (ref 70–99)
GLUCOSE BLDC GLUCOMTR-MCNC: 92 MG/DL (ref 70–99)
GLUCOSE SERPL-MCNC: 96 MG/DL (ref 70–99)
HCT VFR BLD AUTO: 25.1 % (ref 35–47)
HGB BLD-MCNC: 8 G/DL (ref 11.7–15.7)
IMM GRANULOCYTES # BLD: 0.1 10E3/UL
IMM GRANULOCYTES NFR BLD: 1 %
LYMPHOCYTES # BLD AUTO: 0.2 10E3/UL (ref 0.8–5.3)
LYMPHOCYTES NFR BLD AUTO: 4 %
MAGNESIUM SERPL-MCNC: 2.2 MG/DL (ref 1.7–2.3)
MCH RBC QN AUTO: 31 PG (ref 26.5–33)
MCHC RBC AUTO-ENTMCNC: 31.9 G/DL (ref 31.5–36.5)
MCV RBC AUTO: 97 FL (ref 78–100)
MONOCYTES # BLD AUTO: 0.4 10E3/UL (ref 0–1.3)
MONOCYTES NFR BLD AUTO: 7 %
NEUTROPHILS # BLD AUTO: 5.4 10E3/UL (ref 1.6–8.3)
NEUTROPHILS NFR BLD AUTO: 87 %
NRBC # BLD AUTO: 0 10E3/UL
NRBC BLD AUTO-RTO: 0 /100
PHOSPHATE SERPL-MCNC: 2.2 MG/DL (ref 2.5–4.5)
PLATELET # BLD AUTO: 84 10E3/UL (ref 150–450)
POTASSIUM SERPL-SCNC: 4.3 MMOL/L (ref 3.4–5.3)
RBC # BLD AUTO: 2.58 10E6/UL (ref 3.8–5.2)
SODIUM SERPL-SCNC: 137 MMOL/L (ref 135–145)
WBC # BLD AUTO: 6.1 10E3/UL (ref 4–11)

## 2024-04-21 PROCEDURE — 85025 COMPLETE CBC W/AUTO DIFF WBC: CPT | Performed by: STUDENT IN AN ORGANIZED HEALTH CARE EDUCATION/TRAINING PROGRAM

## 2024-04-21 PROCEDURE — 250N000013 HC RX MED GY IP 250 OP 250 PS 637: Performed by: STUDENT IN AN ORGANIZED HEALTH CARE EDUCATION/TRAINING PROGRAM

## 2024-04-21 PROCEDURE — 250N000012 HC RX MED GY IP 250 OP 636 PS 637: Performed by: STUDENT IN AN ORGANIZED HEALTH CARE EDUCATION/TRAINING PROGRAM

## 2024-04-21 PROCEDURE — 99233 SBSQ HOSP IP/OBS HIGH 50: CPT | Performed by: INTERNAL MEDICINE

## 2024-04-21 PROCEDURE — 250N000013 HC RX MED GY IP 250 OP 250 PS 637

## 2024-04-21 PROCEDURE — 250N000013 HC RX MED GY IP 250 OP 250 PS 637: Performed by: NURSE PRACTITIONER

## 2024-04-21 PROCEDURE — 80048 BASIC METABOLIC PNL TOTAL CA: CPT | Performed by: STUDENT IN AN ORGANIZED HEALTH CARE EDUCATION/TRAINING PROGRAM

## 2024-04-21 PROCEDURE — 250N000013 HC RX MED GY IP 250 OP 250 PS 637: Performed by: TRANSPLANT SURGERY

## 2024-04-21 PROCEDURE — 97116 GAIT TRAINING THERAPY: CPT | Mod: GP

## 2024-04-21 PROCEDURE — 99207 PR NO BILLABLE SERVICE THIS VISIT: CPT | Performed by: SURGERY

## 2024-04-21 PROCEDURE — 97530 THERAPEUTIC ACTIVITIES: CPT | Mod: GP

## 2024-04-21 PROCEDURE — 250N000012 HC RX MED GY IP 250 OP 636 PS 637: Performed by: NURSE PRACTITIONER

## 2024-04-21 PROCEDURE — 36415 COLL VENOUS BLD VENIPUNCTURE: CPT | Performed by: STUDENT IN AN ORGANIZED HEALTH CARE EDUCATION/TRAINING PROGRAM

## 2024-04-21 PROCEDURE — 83735 ASSAY OF MAGNESIUM: CPT | Performed by: STUDENT IN AN ORGANIZED HEALTH CARE EDUCATION/TRAINING PROGRAM

## 2024-04-21 PROCEDURE — 84100 ASSAY OF PHOSPHORUS: CPT | Performed by: STUDENT IN AN ORGANIZED HEALTH CARE EDUCATION/TRAINING PROGRAM

## 2024-04-21 RX ORDER — MYCOPHENOLATE MOFETIL 250 MG/1
750 CAPSULE ORAL 2 TIMES DAILY
Qty: 180 CAPSULE | Refills: 1 | Status: ACTIVE | OUTPATIENT
Start: 2024-04-21 | End: 2024-04-22

## 2024-04-21 RX ORDER — OXYCODONE HYDROCHLORIDE 5 MG/1
5 TABLET ORAL EVERY 6 HOURS PRN
Qty: 8 TABLET | Refills: 0 | Status: SHIPPED | OUTPATIENT
Start: 2024-04-21 | End: 2024-05-02

## 2024-04-21 RX ORDER — SULFAMETHOXAZOLE AND TRIMETHOPRIM 400; 80 MG/1; MG/1
1 TABLET ORAL
Qty: 30 TABLET | Refills: 1 | Status: ACTIVE | OUTPATIENT
Start: 2024-04-23 | End: 2024-04-21

## 2024-04-21 RX ORDER — TACROLIMUS 0.5 MG/1
0.5 CAPSULE ORAL 2 TIMES DAILY
Qty: 60 CAPSULE | Refills: 1 | Status: ACTIVE | OUTPATIENT
Start: 2024-04-21 | End: 2024-04-21

## 2024-04-21 RX ORDER — ATORVASTATIN CALCIUM 10 MG/1
10 TABLET, FILM COATED ORAL DAILY
Qty: 30 TABLET | Refills: 1 | Status: SHIPPED | OUTPATIENT
Start: 2024-04-21 | End: 2024-07-15

## 2024-04-21 RX ORDER — ASPIRIN 325 MG
325 TABLET ORAL DAILY
Qty: 30 TABLET | Refills: 2 | Status: ON HOLD | OUTPATIENT
Start: 2024-04-21 | End: 2024-07-09

## 2024-04-21 RX ORDER — VALGANCICLOVIR 450 MG/1
450 TABLET, FILM COATED ORAL EVERY OTHER DAY
Qty: 60 TABLET | Refills: 2 | Status: ACTIVE | OUTPATIENT
Start: 2024-04-21 | End: 2024-05-23

## 2024-04-21 RX ORDER — DOXAZOSIN 4 MG/1
4 TABLET ORAL AT BEDTIME
Qty: 30 TABLET | Refills: 1 | Status: SHIPPED | OUTPATIENT
Start: 2024-04-21 | End: 2024-04-30

## 2024-04-21 RX ORDER — VALGANCICLOVIR 450 MG/1
450 TABLET, FILM COATED ORAL
Qty: 60 TABLET | Refills: 2 | Status: ACTIVE | OUTPATIENT
Start: 2024-04-22 | End: 2024-04-21

## 2024-04-21 RX ORDER — AMOXICILLIN 250 MG
1 CAPSULE ORAL 2 TIMES DAILY PRN
Qty: 30 TABLET | Refills: 0 | Status: SHIPPED | OUTPATIENT
Start: 2024-04-21 | End: 2024-04-30

## 2024-04-21 RX ORDER — METHOCARBAMOL 500 MG/1
500 TABLET, FILM COATED ORAL 4 TIMES DAILY PRN
Qty: 12 TABLET | Refills: 0 | Status: SHIPPED | OUTPATIENT
Start: 2024-04-21 | End: 2024-05-10

## 2024-04-21 RX ORDER — TACROLIMUS 1 MG/1
2 CAPSULE ORAL 2 TIMES DAILY
Qty: 120 CAPSULE | Refills: 1 | Status: ACTIVE | OUTPATIENT
Start: 2024-04-21 | End: 2024-04-22

## 2024-04-21 RX ORDER — SULFAMETHOXAZOLE AND TRIMETHOPRIM 400; 80 MG/1; MG/1
1 TABLET ORAL
Qty: 30 TABLET | Refills: 1 | Status: ON HOLD | OUTPATIENT
Start: 2024-04-23 | End: 2024-06-09

## 2024-04-21 RX ORDER — FAMOTIDINE 20 MG/1
40 TABLET, FILM COATED ORAL DAILY
Qty: 60 TABLET | Refills: 1 | Status: SHIPPED | OUTPATIENT
Start: 2024-04-21 | End: 2024-06-03

## 2024-04-21 RX ORDER — TACROLIMUS 0.5 MG/1
0.5 CAPSULE ORAL 2 TIMES DAILY
Qty: 60 CAPSULE | Refills: 1 | Status: ACTIVE | OUTPATIENT
Start: 2024-04-21 | End: 2024-04-22

## 2024-04-21 RX ADMIN — OXYCODONE HYDROCHLORIDE 5 MG: 5 TABLET ORAL at 04:18

## 2024-04-21 RX ADMIN — ASPIRIN 325 MG ORAL TABLET 325 MG: 325 PILL ORAL at 08:34

## 2024-04-21 RX ADMIN — FAMOTIDINE 20 MG: 20 TABLET ORAL at 08:34

## 2024-04-21 RX ADMIN — PAROXETINE 30 MG: 30 TABLET, FILM COATED ORAL at 08:34

## 2024-04-21 RX ADMIN — MYCOPHENOLATE MOFETIL 750 MG: 250 CAPSULE ORAL at 08:34

## 2024-04-21 RX ADMIN — OXYCODONE HYDROCHLORIDE 5 MG: 5 TABLET ORAL at 12:38

## 2024-04-21 RX ADMIN — ACETAMINOPHEN 650 MG: 325 TABLET, FILM COATED ORAL at 04:18

## 2024-04-21 RX ADMIN — TACROLIMUS 2.5 MG: 1 CAPSULE ORAL at 08:34

## 2024-04-21 RX ADMIN — SENNOSIDES AND DOCUSATE SODIUM 1 TABLET: 50; 8.6 TABLET ORAL at 08:34

## 2024-04-21 RX ADMIN — ACETAMINOPHEN 650 MG: 325 TABLET, FILM COATED ORAL at 12:38

## 2024-04-21 RX ADMIN — ATORVASTATIN CALCIUM 10 MG: 10 TABLET, FILM COATED ORAL at 08:34

## 2024-04-21 ASSESSMENT — ACTIVITIES OF DAILY LIVING (ADL)
ADLS_ACUITY_SCORE: 25

## 2024-04-21 NOTE — PLAN OF CARE
DISCHARGE:  Patient with orders to discharge to Home.     Education Provided:   Med Card - updated  Lab Book - updated  Handouts - Dayna care and EVELIO care  Specialty Pharmacy - 4/18  LDAs - Mcintosh and EVELIO    Discharge instructions, medications & follow ups reviewed with patient and significant other. Copy of discharge summary given to Patient. PIV and IJ removed. Belongings returned from security n/a. [facility] notified, report given - yes.    Patient in stable condition. AVSS. Patient and significant other had no further questions regarding discharge instructions and medications. Patient transferred out by wheelchair & left with transport.  Plan for n/a.

## 2024-04-21 NOTE — PLAN OF CARE
Physical Therapy Discharge Summary    Reason for therapy discharge:    All goals and outcomes met, no further needs identified.  Anticipate discharge home today    Progress towards therapy goal(s). See goals on Care Plan in [facility] electronic health record for goal details.  Goals met    Therapy recommendation(s):    Continue home exercise program.  Ambulation program- ambulate 3-4s/day, gradually increasing ambulation time/distance. Pt ind with ambulation on flat surfaces. Recommend SBA for ambulation outside/on uneven surfaces initially. Anticipate pt will progress well with gradual increase of activity/mobility at home.

## 2024-04-21 NOTE — PLAN OF CARE
"/88 (BP Location: Left arm, Patient Position: Semi-Guerra's, Cuff Size: Adult Regular)   Pulse 93   Temp 97.6  F (36.4  C) (Oral)   Resp 16   Ht 1.588 m (5' 2.52\")   Wt 63.1 kg (139 lb 1.6 oz)   LMP 04/03/2024 (Approximate)   SpO2 95%   BMI 25.02 kg/m      1900 - 0730    Hypertensive up to 140/90's, OVSS and afebrile. Regular diet, poor appetite. BG ACHS (86).     EVELIO serosang- 170 out.    Pain controlled with PRN tylenol x2 and oxycodone x2.     Discharge w/ de leon, red urine.   "

## 2024-04-21 NOTE — PHARMACY-TRANSPLANT NOTE
Solid Organ Transplant Recipient Prior to Discharge Note    34 year old female s/p  donor kidney transplant on 2024.    INDUCTION immunosuppression: high intensity  --Thymoglobulin total of 6 mg/kg and methylprednisolne IV daily x 3 doses    MAINTENANCE  --Mycophenolate 750 mg by mouth twice daily  --Tacrolimus titrated to goal trough level of 8-10 mcg/L for first 6 months post-transplant. Last level was 5.9 mcg/L (12.25 hour post dose) on 24. Patient's dose was increased from 2 mg twice daily to 2.5 mg twice daily 24 evening.    PROPHYLAXIS  --Trimethoprim/sulfamethoxazole for PJP prophylaxis  --Valganciclovir for CMV prophylaxis      Pharmacy has monitored for medication interactions and immunosuppression levels in conjunction with the multidisciplinary team. In anticipation for discharge, medication therapy needs have been addressed daily throughout the current admission via multidisciplinary rounds and/or discussions, order verification, daily clinical pharmacy review, and communication with prescribers.    Ryan Velasco, PharmD

## 2024-04-21 NOTE — TELEPHONE ENCOUNTER
Ira Zamora is a post-kidney transplant patient who discharged on 4/21/24. Patient chooses to receive medications from [place] [facility]. The patient will be responsible for managing medications.    SOT*LIAISON (IRA) IS A (KIDNEYS EN-BLOC) TRANSPLANT PATIENT  (DATE OF TRANSPLANT: 04/17/24 )      PRIMARY HEALTH BENEFIT: [employer]  ID# [id number] GRP# [id number]  (EFFECTIVE 01/01/2023)  PROCESSING INFO: ID# [id number] GRP#  BIN# [id number]  SECONDARY MEDICARE HEALTH BENEFIT: MEDICARE  ID# [id number](PART A EFFECTIVE 07/01/22, PART B EFFECTIVE 06/01/23 )   PROCESSING INFO: [facility] SECONDARY ID#[id number] GRP# BIN#[id number]  PT WILL PAY $0 AT TIME OF SERVICE FOR IMMUNOS     PHARMACY BENEFIT: CLEARSCRIPT  PROCESSING INFO: ID# [id number] GRP#  PCN# [id number] BIN# [id number] (EFFECTIVE 01/01/23 ).   DEDUCTIBLE $500 & MAX OUT-OF-POCKET $2,600  COPAY STRUCTURE:  $ 10 FOR GENERIC  20 % COINSURANCE FOR BRAND  $ 10 (GENERIC), 30 % (BRAND) FOR NON-FORMULARY MEDICATIONS    TEST CLAIM SPECIALTY #28  MYCOPHENOLATE 250MG (#240/30DS)-- $0 AT TIME OF SERVICE  PROGRAF 1MG (#120/30DS)-- $0 AT TIME OF SERVICE  TACROLIMUS 1MG (#120/30DS)-- $0 AT TIME OF SERVICE  CYCLOSPORINE 100MG (#60/30DS)-- $0 AT TIME OF SERVICE  VALGANCICLOVIR 450MG (#60/30DS)--$10    TEST CLAIM DISCHARGE #27   MYCOPHENOLATE 250MG (#240/30DS)-- $0 AT TIME OF SERVICE  PROGRAF 1MG (#120/30DS)-- $0 AT TIME OF SERVICE  TACROLIMUS 1MG (#120/30DS)-- $0 AT TIME OF SERVICE  CYCLOSPORINE 100MG (#60/30DS)-- $0 AT TIME OF SERVICE  VALGANCICLOVIR 450MG (#60/30DS)--$10    **CAN PATIENT FILL AT [facility] FOR MEDICATIONS LISTED? YES    Thank You,  Apoorva Samuel, PharmD  [facility]  [phone]

## 2024-04-21 NOTE — PROGRESS NOTES
[facility]   Transplant Nephrology Progress Note  Date of Admission:  4/17/2024  Today's Date: 04/21/2024    Recommendations:  -Ok to discharge.   -Increase ASA to 325mg daily  -Increase famotidine to 40mg daily       Assessment & Plan   # DDKT (pediatric en bloc): Trend down in SCr   - Baseline Creatinine: ~ TBD   - Proteinuria: Not checked post transplant   - Date DSA Last Checked: Apr/2024      Latest DSA: No DSA at time of transplant   - BK Viremia: Not checked post transplant   - Kidney Tx Biopsy: No   - Transplant Ureteral Stent: Yes, 2 stents   -7d de leon   -Increase ASA to 325mg daily on discharge.     # Immunosuppression: Tacrolimus immediate release (goal 8-10) and Mycophenolate mofetil (dose 750 mg every 12 hours)   - Induction with Recent Transplant:  High Intensity Protocol due to severe GERD and concern with steroids   - Patient is in an immunosuppressed state and will continue to monitor for efficacy and toxicity of immunosuppression medications.   - Changes: Not at this time    # Infection Prophylaxis:   - PJP: Sulfa/TMP (Bactrim)  - CMV: Valganciclovir (Valcyte). Recipient CMV IgG negative. Awaiting donor serologies    # Hypertension: Borderline control;  Goal BP: < 150/90   - Volume status: Euvolemic     - Changes: Yes - start doxazosin 4mg at bedtime .       # Anemia in Chronic Renal Disease: Hgb: Stable, low      JUNG: No   - Iron studies: Unknown at this time, but checked with dialysis    # Mineral Bone Disorder:   - Secondary renal hyperparathyroidism; PTH level: Unknown at this time, but checked with dialysis        On treatment: None  - Vitamin D; level: Unknown at this time, but checked with dialysis        On supplement: No  - Calcium; level: Normal            On supplement: No  - Phosphorus; level: Low normal        On supplement: No    # Electrolytes:   - Potassium; level: Normal        On supplement: No  - Magnesium; level: Normal        On  supplement: No  - Bicarbonate; level: Normal        On supplement: No    # Crohns: followed by [facility]. Does occasionally alternate between diarrhea and constipation.  On ustekinumab PTA.              -Will plan to hold for at least 6 weeks post txp     # Cardiac/Vascular Disease Risk Factors: Dilated ascending aorta (3.8 cm)  on 2024 ECHO (stable from 2020).     # GERD:   -Pt should remain off PPI due to concern for AIN.   -Increase famotidine to 40mg daily with improving kidney function  - Continue calcium carbonate 1000 mg PO BID PRN for GERD.    # Transplant History:  Etiology of Kidney Failure: Interstitial nephritis  Tx: DDKT, pediatric en-bloc  Transplant: 4/17/2024 (Kidney)  Donor Type: Donation after Brain Death Donor Class: Standard Criteria Donor  Crossmatch at time of Tx: negative  DSA at time of Tx: No  Significant changes in immunosuppression: None  Significant transplant-related complications: None    Recommendations were communicated to the primary team verbally.      Yan Quezada MD  Transplant Nephrology  Contact information available via [facility] Paging/Directory      Interval History   Ms. Puckett creatinine is 2.39 (04/21 0552); Trend down.  Good urine output, no longer bloody  Other significant labs/tests/vitals: Hb stable at 8, Scr decreasing  No events overnight.  No chest pain or shortness of breath.  No leg swelling.  No nausea and vomiting.  Bowel movements are normal.  No fever, sweats or chills.        Review of Systems   4 point ROS was obtained and negative except as noted in the Interval History.    MEDICATIONS:  Current Facility-Administered Medications   Medication Dose Route Frequency Provider Last Rate Last Admin    aspirin (ASA) tablet 325 mg  325 mg Oral Daily Sam Freeman MD   325 mg at 04/21/24 0834    atorvastatin (LIPITOR) tablet 10 mg  10 mg Oral Daily Scott Jerry MD   10 mg at 04/21/24 0834    doxazosin (CARDURA) tablet 4 mg  4 mg Oral At Bedtime Yan Quezada MD   4  mg at 24 2207    famotidine (PEPCID) tablet 20 mg  20 mg Oral Daily Carlitos Díaz MD   20 mg at 24 0834    insulin aspart (NovoLOG) injection (RAPID ACTING)  1-7 Units Subcutaneous TID AC Holli Nelson APRN CNP   1 Units at 24 1243    insulin aspart (NovoLOG) injection (RAPID ACTING)  1-5 Units Subcutaneous At Bedtime Holli Nelson APRN CNP        Lidocaine (LIDOCARE) 4 % Patch 2 patch  2 patch Transdermal Q24H Holli Nelson APRN CNP        [Held by provider] magnesium oxide (MAG-OX) tablet 400 mg  400 mg Oral Daily with lunch Scott Jerry MD        mycophenolate (GENERIC EQUIVALENT) capsule 750 mg  750 mg Oral BID IS Scott Jerry MD   750 mg at 24 0834    PARoxetine (PAXIL) tablet 30 mg  30 mg Oral Daily Holli Nelson APRN CNP   30 mg at 24 0834    polyethylene glycol (MIRALAX) Packet 17 g  17 g Oral Daily Scott Jerry MD   17 g at 24 0750    senna-docusate (SENOKOT-S/PERICOLACE) 8.6-50 MG per tablet 1 tablet  1 tablet Oral BID Scott Jerry MD   1 tablet at 24 0834    sodium chloride (PF) 0.9% PF flush 10 mL  10 mL Intracatheter Q8H Scott Jerry MD   10 mL at 24 2207    sulfamethoxazole-trimethoprim (BACTRIM) 400-80 MG per tablet 1 tablet  1 tablet Oral Once per day on  Scott Jerry MD   1 tablet at 24 0857    tacrolimus (GENERIC EQUIVALENT) capsule 2.5 mg  2.5 mg Oral BID IS Skye Judge NP   2.5 mg at 24 0834    valGANciclovir (VALCYTE) tablet 450 mg  450 mg Oral Once per day on  Scott Jerry MD   450 mg at 24 0929     Current Facility-Administered Medications   Medication Dose Route Frequency Provider Last Rate Last Admin       Physical Exam   Temp  Av.2  F (36.8  C)  Min: 97.4  F (36.3  C)  Max: 99.5  F (37.5  C)      Pulse  Av.6  Min: 77  Max: 99 Resp  Av  Min: 12  Max: 21  SpO2  Av %  Min: 95 %  Max: 98 %    CVP (mmHg):  "7 mmHgBP 133/88 (BP Location: Left arm, Patient Position: Semi-Guerra's, Cuff Size: Adult Regular)   Pulse 93   Temp 97.6  F (36.4  C) (Oral)   Resp 16   Ht 1.588 m (5' 2.52\")   Wt 63.1 kg (139 lb 1.6 oz)   LMP 04/03/2024 (Approximate)   SpO2 95%   BMI 25.02 kg/m     Date 04/18/24 0700 - 04/19/24 0659   Shift 1358-7757 7235-7208 3002-0354 24 Hour Total   INTAKE   P.O. 240   240   Shift Total(mL/kg) 240(4.04)   240(4.04)   OUTPUT   Urine 300   300   Drains 120   120   Shift Total(mL/kg) 420(7.07)   420(7.07)   Weight (kg) 59.42 59.42 59.42 59.42      Admit Weight: 59.4 kg (131 lb)     GENERAL APPEARANCE: alert and no distress  RESP: lungs clear to auscultation - no rales, rhonchi or wheezes  CV: regular rhythm, normal rate, no rub, no murmur  EDEMA: no LE edema bilaterally  ABDOMEN: soft, nondistended, nontender, bowel sounds normal  MS: extremities normal - no gross deformities noted, no evidence of inflammation in joints, no muscle tenderness  SKIN: no rash  NEURO: normal strength and tone, sensory exam grossly normal, mentation intact and speech normal  PSYCH: mentation appears normal and affect normal/bright  TX KIDNEY: normal  DIALYSIS ACCESS:  RUE AV fistula with good thrill, aneurysmal    Data   All labs reviewed by me.  CMP  Recent Labs   Lab 04/21/24  0838 04/21/24  0552 04/20/24  2153 04/20/24  1757 04/20/24  0850 04/20/24  0559 04/19/24  0733 04/19/24  0621 04/18/24  2138 04/18/24  1842 04/18/24  0956 04/18/24  0549 04/17/24  1603 04/17/24  0958   NA  --  137  --   --   --  138  --  136  --   --   --  132*   < > 139   POTASSIUM  --  4.3  --   --   --  4.3  --  4.2  --  3.9   < > 4.4  4.4   < > 3.4   CHLORIDE  --  107  --   --   --  105  --  101  --   --   --  95*   < > 95*   CO2  --  23  --   --   --  23  --  22  --   --   --  27   < > 33*   ANIONGAP  --  7  --   --   --  10  --  13  --   --   --  10   < > 11   GLC 92 96 86 84   < > 122*   < > 152*   < >  --    < > 209*   < > 91   BUN  --  47.1*  " --   --   --  49.3*  --  36.7*  --   --   --  19.6   < > 11.6   CR  --  2.39*  --   --   --  2.84*  --  3.54*  --   --   --  4.16*   < > 3.11*   GFRESTIMATED  --  27*  --   --   --  22*  --  17*  --   --   --  14*   < > 19*   ROBLES  --  9.4  --   --   --  9.6  --  9.7  --   --   --  9.2   < > 9.3   MAG  --  2.2  --   --   --  2.2  --  2.3  --   --   --  2.5*   < >  --    PHOS  --  2.2*  --   --   --  4.5  --  6.1*  --   --   --  4.2   < >  --    PROTTOTAL  --   --   --   --   --   --   --   --   --   --   --   --   --  6.5   ALBUMIN  --   --   --   --   --   --   --   --   --   --   --   --   --  4.1   BILITOTAL  --   --   --   --   --   --   --   --   --   --   --   --   --  0.6   ALKPHOS  --   --   --   --   --   --   --   --   --   --   --   --   --  55   AST  --   --   --   --   --   --   --   --   --   --   --   --   --  17   ALT  --   --   --   --   --   --   --   --   --   --   --   --   --  5    < > = values in this interval not displayed.     CBC  Recent Labs   Lab 04/21/24  0552 04/20/24  1327 04/20/24  0559 04/19/24  0621 04/18/24  0956 04/18/24  0549   HGB 8.0* 8.2* 7.8* 8.8*   < > 9.6*   WBC 6.1  --  7.2 12.5*  --  12.4*   RBC 2.58*  --  2.57* 2.81*  --  3.16*   HCT 25.1*  --  24.9* 27.2*  --  30.5*   MCV 97  --  97 97  --  97   MCH 31.0  --  30.4 31.3  --  30.4   MCHC 31.9  --  31.3* 32.4  --  31.5   RDW 13.3  --  13.4 13.1  --  13.1   PLT 84*  --  94* 107*  --  112*    < > = values in this interval not displayed.     INR  Recent Labs   Lab 04/17/24  0958   INR 1.20*   PTT 27     ABG  Recent Labs   Lab 04/17/24  2123   PH 7.51*   PCO2 38   PO2 61*   HCO3 31*   O2PER 46.0      Urine Studies  Recent Labs   Lab Test 04/17/24  1113 09/29/22  1153   COLOR Straw Yellow   APPEARANCE Clear Clear   URINEGLC 70* 100*   URINEBILI Negative Negative   URINEKETONE Negative Negative   SG 1.004 1.010   UBLD Trace* Trace*   URINEPH 8.5* 8.5*   PROTEIN 100* 100*   NITRITE Negative Negative   LEUKEST Negative Negative    RBCU 2 <1   WBCU <1 1     No lab results found.  PTH  No lab results found.  Iron Studies  No lab results found.    IMAGING:  All imaging studies reviewed by me.

## 2024-04-21 NOTE — PROGRESS NOTES
Care Management Discharge Note    Discharge Date: 04/21/2024       Discharge Disposition: Home with services    Discharge Services: Home Care      Discharge DME:  NA    Discharge Transportation: family or friend will provide    Private pay costs discussed: Not applicable    Does the patient's insurance plan have a 3 day qualifying hospital stay waiver?  No    PAS Confirmation Code:  NA  Patient/family educated on Medicare website which has current facility and service quality ratings:  yes    Education Provided on the Discharge Plan:  yes  Persons Notified of Discharge Plans: patient  Patient/Family in Agreement with the Plan:  yes    Handoff Referral Completed: Yes    Additional Information:  Received update from provider that patient will be discharging to home today with de leon.  Plan for de leon removal in clinic.  [facility] updated, they are planning for SOC 4/24.  ATC scheduled for tomorrow at 8am.  RNCC will remain available if further needs arise.      [facility](SN)  Phone: [phone]  Fax: [phone]     MERYL Mcdonough  Phone: [phone]  Pager: [phone]  7B Med Surg Vocera  Nurse Coordinator      Social Work and Care Management Department       SEARCHABLE in [facility] - search CARE COORDINATOR       [place] ([phone]) Saturday & Sunday; ([phone]) [facility] Recognized Holidays     Units: 5A Onc Vocera & 5C Vocera Pager: [phone]    Units: 6B Vocera & 6C Vocera  Pager: [phone]    Units: 7A SOT RNCC Vocera, 7B Med Surg Vocera, & 7C Med Surg Vocera  Pager: [phone]    Units: 6A Vocera & 4A CVICU Vocera, 4C MICU Vocera, and 4E SICU Vocera   Pager: [phone]    Units: 5 Ortho Vocera & 5 Med Surg Vocera  Pager: [phone]    Units: 6 Med Surg Vocera & 8 Med Surg Vocera  Pager [phone]

## 2024-04-22 ENCOUNTER — OFFICE VISIT (OUTPATIENT)
Dept: INFUSION THERAPY | Facility: CLINIC | Age: 34
End: 2024-04-22
Attending: NURSE PRACTITIONER
Payer: MEDICARE

## 2024-04-22 ENCOUNTER — TELEPHONE (OUTPATIENT)
Dept: TRANSPLANT | Facility: CLINIC | Age: 34
End: 2024-04-22

## 2024-04-22 VITALS
OXYGEN SATURATION: 99 % | RESPIRATION RATE: 16 BRPM | TEMPERATURE: 98.5 F | HEART RATE: 113 BPM | SYSTOLIC BLOOD PRESSURE: 153 MMHG | BODY MASS INDEX: 24.64 KG/M2 | WEIGHT: 137 LBS | DIASTOLIC BLOOD PRESSURE: 104 MMHG

## 2024-04-22 DIAGNOSIS — N18.6 END STAGE RENAL DISEASE (H): ICD-10-CM

## 2024-04-22 DIAGNOSIS — Z48.298 AFTERCARE FOLLOWING ORGAN TRANSPLANT: Primary | ICD-10-CM

## 2024-04-22 DIAGNOSIS — Z94.0 KIDNEY REPLACED BY TRANSPLANT: Chronic | ICD-10-CM

## 2024-04-22 DIAGNOSIS — E83.39 HYPOPHOSPHATEMIA: Primary | ICD-10-CM

## 2024-04-22 DIAGNOSIS — Z94.0 KIDNEY REPLACED BY TRANSPLANT: Primary | ICD-10-CM

## 2024-04-22 LAB
ANION GAP SERPL CALCULATED.3IONS-SCNC: 8 MMOL/L (ref 7–15)
BASOPHILS # BLD AUTO: 0 10E3/UL (ref 0–0.2)
BASOPHILS NFR BLD AUTO: 0 %
BUN SERPL-MCNC: 33.6 MG/DL (ref 6–20)
CALCIUM SERPL-MCNC: 9.7 MG/DL (ref 8.6–10)
CELL TYPE ALLO: NORMAL
CELL TYPE AUTO: NORMAL
CHANNELSHIFTALLOB1: -35
CHANNELSHIFTALLOB2: -8
CHANNELSHIFTALLOT1: -35
CHANNELSHIFTALLOT2: -16
CHANNELSHIFTAUTOB1: -26
CHANNELSHIFTAUTOB2: -16
CHANNELSHIFTAUTOT1: -29
CHANNELSHIFTAUTOT2: -11
CHLORIDE SERPL-SCNC: 108 MMOL/L (ref 98–107)
CREAT SERPL-MCNC: 1.71 MG/DL (ref 0.51–0.95)
CROSSMATCHDATEALLO: NORMAL
CROSSMATCHDATEAUTO: NORMAL
DEPRECATED HCO3 PLAS-SCNC: 24 MMOL/L (ref 22–29)
DONOR ALLO: NORMAL
DONOR AUTO: NORMAL
DONORCELLDATE ALLO: NORMAL
DONORCELLDATE AUTO: NORMAL
EGFRCR SERPLBLD CKD-EPI 2021: 40 ML/MIN/1.73M2
EOSINOPHIL # BLD AUTO: 0.1 10E3/UL (ref 0–0.7)
EOSINOPHIL NFR BLD AUTO: 2 %
ERYTHROCYTE [DISTWIDTH] IN BLOOD BY AUTOMATED COUNT: 12.9 % (ref 10–15)
FERRITIN SERPL-MCNC: 2181 NG/ML (ref 6–175)
GLUCOSE SERPL-MCNC: 107 MG/DL (ref 70–99)
HCT VFR BLD AUTO: 25.9 % (ref 35–47)
HGB BLD-MCNC: 8.4 G/DL (ref 11.7–15.7)
IMM GRANULOCYTES # BLD: 0 10E3/UL
IMM GRANULOCYTES NFR BLD: 1 %
IRON BINDING CAPACITY (ROCHE): 147 UG/DL (ref 240–430)
IRON SATN MFR SERPL: 36 % (ref 15–46)
IRON SERPL-MCNC: 53 UG/DL (ref 37–145)
LYMPHOCYTES # BLD AUTO: 0.2 10E3/UL (ref 0.8–5.3)
LYMPHOCYTES NFR BLD AUTO: 3 %
MAGNESIUM SERPL-MCNC: 2 MG/DL (ref 1.7–2.3)
MCH RBC QN AUTO: 30.4 PG (ref 26.5–33)
MCHC RBC AUTO-ENTMCNC: 32.4 G/DL (ref 31.5–36.5)
MCV RBC AUTO: 94 FL (ref 78–100)
MONOCYTES # BLD AUTO: 0.3 10E3/UL (ref 0–1.3)
MONOCYTES NFR BLD AUTO: 6 %
NEUTROPHILS # BLD AUTO: 4.7 10E3/UL (ref 1.6–8.3)
NEUTROPHILS NFR BLD AUTO: 88 %
NRBC # BLD AUTO: 0 10E3/UL
NRBC BLD AUTO-RTO: 0 /100
PHOSPHATE SERPL-MCNC: 1.8 MG/DL (ref 2.5–4.5)
PLATELET # BLD AUTO: 107 10E3/UL (ref 150–450)
POS CUT OFF ALLO B: >69
POS CUT OFF ALLO T: >53
POS CUT OFF AUTO B: >69
POS CUT OFF AUTO T: >53
POTASSIUM SERPL-SCNC: 4 MMOL/L (ref 3.4–5.3)
PTH-INTACT SERPL-MCNC: 138 PG/ML (ref 15–65)
RBC # BLD AUTO: 2.76 10E6/UL (ref 3.8–5.2)
RESULT ALLO B1: NORMAL
RESULT ALLO B2: NORMAL
RESULT ALLO T1: NORMAL
RESULT ALLO T2: NORMAL
RESULT AUTO B1: NORMAL
RESULT AUTO B2: NORMAL
RESULT AUTO T1: NORMAL
RESULT AUTO T2: NORMAL
SERUM DATE ALLO B1: NORMAL
SERUM DATE ALLO B2: NORMAL
SERUM DATE ALLO T1: NORMAL
SERUM DATE ALLO T2: NORMAL
SERUM DATE AUTO B1: NORMAL
SERUM DATE AUTO B2: NORMAL
SERUM DATE AUTO T1: NORMAL
SERUM DATE AUTO T2: NORMAL
SODIUM SERPL-SCNC: 140 MMOL/L (ref 135–145)
TACROLIMUS BLD-MCNC: 5 UG/L (ref 5–15)
TESTMETHODALLO: NORMAL
TESTMETHODAUTO: NORMAL
TME LAST DOSE: NORMAL H
TME LAST DOSE: NORMAL H
TREATMENT ALLO B1: NORMAL
TREATMENT ALLO B2: NORMAL
TREATMENT ALLO T1: NORMAL
TREATMENT ALLO T2: NORMAL
TREATMENT AUTO B1: NORMAL
TREATMENT AUTO B2: NORMAL
TREATMENT AUTO T1: NORMAL
TREATMENT AUTO T2: NORMAL
VIT D+METAB SERPL-MCNC: 18 NG/ML (ref 20–50)
WBC # BLD AUTO: 5.3 10E3/UL (ref 4–11)
ZZZCOMMENT ALLOB2: NORMAL

## 2024-04-22 PROCEDURE — 83970 ASSAY OF PARATHORMONE: CPT

## 2024-04-22 PROCEDURE — 80197 ASSAY OF TACROLIMUS: CPT

## 2024-04-22 PROCEDURE — 85025 COMPLETE CBC W/AUTO DIFF WBC: CPT

## 2024-04-22 PROCEDURE — 84100 ASSAY OF PHOSPHORUS: CPT

## 2024-04-22 PROCEDURE — 83550 IRON BINDING TEST: CPT

## 2024-04-22 PROCEDURE — 99213 OFFICE O/P EST LOW 20 MIN: CPT | Mod: 24 | Performed by: NURSE PRACTITIONER

## 2024-04-22 PROCEDURE — 83735 ASSAY OF MAGNESIUM: CPT

## 2024-04-22 PROCEDURE — 82306 VITAMIN D 25 HYDROXY: CPT

## 2024-04-22 PROCEDURE — 82728 ASSAY OF FERRITIN: CPT

## 2024-04-22 PROCEDURE — 80048 BASIC METABOLIC PNL TOTAL CA: CPT

## 2024-04-22 PROCEDURE — 36415 COLL VENOUS BLD VENIPUNCTURE: CPT

## 2024-04-22 RX ORDER — TACROLIMUS 0.5 MG/1
0.5 CAPSULE ORAL 2 TIMES DAILY
Qty: 60 CAPSULE | Refills: 11 | Status: SHIPPED | OUTPATIENT
Start: 2024-04-22 | End: 2024-04-23

## 2024-04-22 RX ORDER — TACROLIMUS 1 MG/1
4 CAPSULE ORAL 2 TIMES DAILY
Qty: 240 CAPSULE | Refills: 11 | Status: SHIPPED | OUTPATIENT
Start: 2024-04-22 | End: 2024-04-23

## 2024-04-22 RX ORDER — MYCOPHENOLATE MOFETIL 250 MG/1
750 CAPSULE ORAL 2 TIMES DAILY
Qty: 180 CAPSULE | Refills: 1 | Status: SHIPPED | OUTPATIENT
Start: 2024-04-22 | End: 2024-05-23

## 2024-04-22 ASSESSMENT — PAIN SCALES - GENERAL: PAINLEVEL: MILD PAIN (3)

## 2024-04-22 NOTE — PROGRESS NOTES
"Ira Zamora came to [facility] today for a lab and assess following a kidney transplant on 04/17.      Discharge date: 04/21/2024  Transplant coordinator: Radha Jorge  Phone number patient can be reached at: [phone]    Physical Assessment:  See physical assessment located under \"Document Flowsheets\".  Incision site: C/D/I, EVELIO drain emptied last evening for 30 ml serosanginous drainage, 50 ml today drained by this RN  Lines: internal jugular removed 04/21 in the afternoon, Please remove dressing 04/23  Mcintosh: Intact, rocio colored urine  Urine clarity: Clear  Hydration: 2L of water yesterday  Nutrition: Had sandwich for lunch yesterday, ensure for dinner to supplement, poor appetite   Last BM: 04/20, small, continues on po senna tabs BID and will  Miralax from pharmacy, +flatus  Pain: Pain \"3\" to the incision site   My transplant place videos watched: Yes  Labs drawn by [facility] staff Yes    Plan of care for today: Meds reviewed and med box loaded (pt to bring additional meds tomorrow that were unavailable today).  Pt assessed, teaching topics reviewed, Mcintosh and EVELIO drained and dressing to EVELIO changed.  Site benign.  Pt tolerated well.      Medication changes: Phosphorus BID sent to downstairs pharmacy for patient to  when available.  Pt to bring this to appointment tomorrow, 04/23.    Medications administered:  Am meds including 2.5mg Tacrolimus      Patient education:    The following teaching topics were addressed: Importance of drinking 2L of non-caffeinated fluids daily, Incisional care, Signs/symptoms of infection, Good handwashing, Medications (purposes, doses and times of administration), Phone numbers to call with concenrs (Transplant coordinator, Unit [facility]D and [facility]), 7A discharge check list, Plan of care, Drain care, and Mcintosh cath care   Patient and , Braxton,  verbalized understanding and all questions answered.    Drug level:  Patient took 2.5mg of Tacrolimus last evening at " 8pm.  Care coordinator to follow up with the result.    Face to face time: 120 minutes  Discharge Plan    Pt will follow up with [facility] tomorrow, 04/23.  Message sent to [facility] scheduling to facilitate this.  Discharge instructions reviewed with patient: YES  Patient/Representative verbalized understanding, all questions answered: YES    Discharged from unit at 10am with whom: , Braxton, to home.    Frieda Woods, RN, RN

## 2024-04-22 NOTE — PATIENT INSTRUCTIONS
Contact Information:    Transplant Coordinator: Radha Jorge  Transplant Office:  [phone]  [facility]:  [phone]  Ask for physician on call for kidney transplant.  Unit [address] (Transplant Unit):  [phone]  [facility]:  [phone]  [facility]:  [phone]    Dear Ira Zamora    Thank you for choosing [facility] ([facility]) for your transplant cares.  The following information is a summary of our appointment as well as important reminders.      Please make sure your phone is available today because your nurse coordinator will call to update you with your anti-rejection drug levels and possibly make changes to your anti-rejection dosages.    If you are a transplant patient and require transplant education, please click on this link: [email or web address].    We look forward in seeing you on your next appointment here at [facility] ([facility]).  Please don t hesitate to call us at [phone] to reschedule any of your appointments or to speak with one of the [facility] registered nurses.  It was a pleasure taking care of you today.    Sincerely,    [facility]  [facility]  [address] [place] [address]  [address]  Phone:  [phone]

## 2024-04-22 NOTE — TELEPHONE ENCOUNTER
Kidney and Pancreas Transplant Coordinator Transition to Outpatient Discharge Note    Surgeon (updated in Transplant Information tab): dawson  Nephrologist (updated in Transplant Information tab): finger  Transplant Coordinator: Radha Jorge RN    Lines / drains in place at time of discharge:  Stent: Yes.  Other: na  If yes, review of parameters to track and when to contact RNCC: No..    Immunosuppression:   CNI: tac  Antimetabolite: mmf  Prednisone taper: No.    Induction: high    Prophylaxis:   CMV: D-/R+ : Valcyte 6 months; renal dosing  EBV: D+/R+  Antibiotic: bactrim    High Risk DSA: No.    Pharmacy after discharge: fv  Lab after discharge: fv  Lab letter faxed to outside lab, if needed: No.    Education:  Writer spoke with Ira Zamora.   We discussed immunosuppression medications, indications, side effects, dose adjustments, and lab monitoring.   Lab draw schedule was provided via MyChart/mail to the patient and fully explained.    DSA  kits needed:  No.    If so, request submitted to centralized team for send-out.  Living Donor:  No.    If yes, discussed with patient data collection through [facility] for K[facility] in effort  to improve eplet matching. This will include additional, non-clinical lab   draw organized by an external organization, [facility].   Consent to Communicate on File: Yes.    If no, discussed inability for team to communicate with any person other than the patient regarding PHI, including scheduling.   If no, consent to communicate was offered: No.     Further education was provided on typical post transplant course, labs examined with thresholds, biopsy, infection prevention, office contact numbers, and when to call.     Discharge Transition Plan:   Pt will transition home, with assistance from family. Pt will have home care.   Pt states having working BP monitor, scale, and thermometer at home.    Follow Up:  Orders for post-transplant follow-up appointments placed:  Yes.  Patient Status Checklist Task updated: Yes.    Special/Additional needs: No.  Pt questions for follow up:     Radha Jorge RN  Post-Kidney Transplant Coordinator  (ph) [phone]

## 2024-04-22 NOTE — PROGRESS NOTES
TRANSPLANT SURGERY EARLY POST TRANSPLANT VISIT      Assessment & Plan   -Start oral phos supplement today. EVELIO drain and de leon present. Return to [facility] tomorrow.      # DDKT: (pediatric en bloc) Cr Trend down.     - Baseline Creatinine: ~ TBD   - Proteinuria: Not checked recently   - Date DSA Last Checked: Apr/2024      Latest DSA: No DSA at time of transplant   - BK Viremia: Not checked recently   - Kidney Tx Biopsy: No   - Transplant Ureteral Stent: Yes x 2     # Immunosuppression: Tacrolimus immediate release (goal 8-10) and Mycophenolate mofetil (dose 750 mg every 12 hours)   - Induction with Recent Transplant:  High Intensity Protocol   - Continue with intensive monitoring of immunosuppression for efficacy and toxicity.   - Changes: Not at this time    # Infection Prophylaxis:   - PJP: Sulfa/TMP (Bactrim)  - CMV: Valganciclovir (Valcyte)     # Hypertension: Borderline control;  Goal BP: < 150/90   - Volume status: Euvolemic  EDW ~    - Changes: Not at this time HR mildly tachycardic. Monitor home readings.       # Anemia in Chronic Renal Disease: Hgb: Trend up      JUNG: No   - Iron studies: Replete    # Mineral Bone Disorder:    - Secondary renal hyperparathyroidism; PTH level: Minimally elevated ( pg/ml)        On treatment: None  - Vitamin D; level: Unknown at this time       On supplement: No  - Calcium; level: Normal                                             On supplement: No  - Phosphorus; level: Low        On supplement: no, start oral phos today 4/22  # Electrolytes:   - Potassium; level: Normal        On supplement: No  - Magnesium; level: Normal        On supplement: No  - Bicarbonate; level: Normal        On supplement: No  - Sodium; level: Normal     # Crohns: followed by [facility]. Does occasionally alternate between diarrhea and constipation.  On ustekinumab PTA.              -Will plan to hold for at least 6 weeks post txp     # Cardiac/Vascular Disease Risk Factors: Dilated ascending aorta (3.8  cm)  on 2024 ECHO (stable from 2020).     # GERD:   -Pt should remain off PPI due to concern for AIN.   -Increase famotidine to 40mg daily with improving kidney function  - Continue calcium carbonate 1000 mg PO BID PRN for GERD    # Medical Compliance: Yes    # Health Maintenance and Vaccination Review: No issues    # Transplant History:  Etiology of Kidney Failure: Interstitial nephritis  Tx: DDKT  Transplant: 4/17/2024 (Kidney)  Donor Type: Donation after Brain Death Donor Class: Standard Criteria Donor  Crossmatch at time of Tx: negative  DSA at time of Tx: No  Significant changes in immunosuppression: None  CMV IgG Ab High Risk Discordance (D+/R-): No  EBV IgG Ab High Risk Discordance (D+/R-): No  Significant transplant-related complications: None    Transplant Office Phone Number: [phone]    Assessment and plan was discussed with the patient and she voiced her understanding and agreement.    Return visit: [facility] tomorrow     LAURITA Jacobson CNP    Chief Complaint   Ms. Zamora is a 34 year old here for hospital follow up after kidney transplant.     History of Present Illness    Feeling well overall. Pain controlled. Drinking over 2 liters per day.   LBM 2 days ago - will add on miralax with senna today.   Appetite is fair, will start protein drinks as meal replacements.   Mcintosh present, around 2,000ml output overnight.   EVELIO drain 60ml out overnight.     No leg edema.   No CP or SOB.   No F/C.   No N/V.       Home BP:  146/90    Problem List   Patient Active Problem List   Diagnosis    ESRD (end stage renal disease) on dialysis (H)    Crohn's disease (H)    Hypertension    Anemia in chronic kidney disease    Secondary renal hyperparathyroidism (H24)    Long QT interval    Kidney replaced by transplant    Immunosuppressed status (H24)       Allergies   Allergies   Allergen Reactions    Proton Pump Inhibitors Nephrotoxicity     No PPIs due to history of renal failure due to interstitial nephritis        Medications   Current Outpatient Medications   Medication Sig Dispense Refill    acetaminophen (TYLENOL) 500 MG tablet Take 1,000 mg by mouth every 6 hours as needed      aspirin (ASA) 325 MG tablet Take 1 tablet (325 mg) by mouth daily 30 tablet 2    atorvastatin (LIPITOR) 10 MG tablet Take 1 tablet (10 mg) by mouth daily 30 tablet 1    doxazosin (CARDURA) 4 MG tablet Take 1 tablet (4 mg) by mouth at bedtime 30 tablet 1    famotidine (PEPCID) 20 MG tablet Take 2 tablets (40 mg) by mouth daily 60 tablet 1    levonorgestrel (KYLEENA) 19.5 MG IUD 1 Device by Intrauterine route      methocarbamol (ROBAXIN) 500 MG tablet Take 1 tablet (500 mg) by mouth 4 times daily as needed for muscle spasms 12 tablet 0    mycophenolate (GENERIC EQUIVALENT) 250 MG capsule Take 3 capsules (750 mg) by mouth 2 times daily 180 capsule 1    oxyCODONE (ROXICODONE) 5 MG tablet Take 1 tablet (5 mg) by mouth every 6 hours as needed for moderate to severe pain 8 tablet 0    PARoxetine (PAXIL) 30 MG tablet Take 30 mg by mouth      senna-docusate (SENOKOT-S/PERICOLACE) 8.6-50 MG tablet Take 1 tablet by mouth 2 times daily as needed for constipation 30 tablet 0    [START ON 4/23/2024] sulfamethoxazole-trimethoprim (BACTRIM) 400-80 MG tablet Take 1 tablet by mouth three times a week 30 tablet 1    tacrolimus (GENERIC EQUIVALENT) 0.5 MG capsule Take 1 capsule (0.5 mg) by mouth 2 times daily Total=2.5mg BID 60 capsule 1    tacrolimus (GENERIC EQUIVALENT) 1 MG capsule Take 2 capsules (2 mg) by mouth 2 times daily Total=2.5mg  capsule 1    ustekinumab (STELARA) 90 MG/ML Inject 90 mg Subcutaneous once every eight weeks      valGANciclovir (VALCYTE) 450 MG tablet Take 1 tablet (450 mg) by mouth every other day Titrate up to 900mg daily when directed by transplant team with improving renal function 60 tablet 2     No current facility-administered medications for this visit.     There are no discontinued medications.    Physical Exam   Vital  Signs: LMP 04/03/2024 (Approximate)     GENERAL APPEARANCE: alert and no distress  HENT: mouth without ulcers or lesions  RESP: lungs clear to auscultation - no rales, rhonchi or wheezes  CV: regular rhythm, normal rate, no rub, no murmur  EDEMA: no LE edema bilaterally  ABDOMEN: soft, nondistended, nontender, bowel sounds normal  MS: extremities normal - no gross deformities noted, no evidence of inflammation in joints, no muscle tenderness  SKIN: no rash  SKIN: RLQ incision intact with staples, no erythema or drainage. EVELIO site intact and no erythema.       Data         Latest Ref Rng & Units 4/22/2024     8:21 AM 4/21/2024    12:40 PM 4/21/2024     8:38 AM   Renal   Sodium 135 - 145 mmol/L 140      K 3.4 - 5.3 mmol/L 4.0      Cl 98 - 107 mmol/L 108      Cl (external) 98 - 107 mmol/L 108      CO2 22 - 29 mmol/L 24      Urea Nitrogen 6.0 - 20.0 mg/dL 33.6      Creatinine 0.51 - 0.95 mg/dL 1.71      Glucose 70 - 99 mg/dL 107  84  92    Calcium 8.6 - 10.0 mg/dL 9.7      Magnesium 1.7 - 2.3 mg/dL 2.0            Latest Ref Rng & Units 4/22/2024     8:21 AM 4/21/2024     5:52 AM 4/20/2024     5:59 AM   Bone Health   Phosphorus 2.5 - 4.5 mg/dL 1.8  2.2  4.5    Parathyroid Hormone Intact 15 - 65 pg/mL 138            Latest Ref Rng & Units 4/22/2024     8:21 AM 4/21/2024     5:52 AM 4/20/2024     1:27 PM   Heme   WBC 4.0 - 11.0 10e3/uL 5.3  6.1     Hgb 11.7 - 15.7 g/dL 8.4  8.0  8.2    Plt 150 - 450 10e3/uL 107  84           Latest Ref Rng & Units 4/17/2024     9:58 AM 9/29/2022    11:40 AM 8/8/2022     9:23 AM   Liver   AP 40 - 150 U/L 55  75     AP (external) 44 - 121 IU/L   73       TBili <=1.2 mg/dL 0.6  0.4     TBili (external) 0.0 - 1.2 mg/dL   0.4       DBili (external) 0.00 - 0.40 mg/dL   <0.10       ALT 0 - 50 U/L 5  12     ALT (external) 0 - 32 IU/L   10       AST 0 - 45 U/L 17  17     AST (external) 0 - 40 IU/L   15       Tot Protein 6.4 - 8.3 g/dL 6.5  7.6     Tot Protein (external) 6.0 - 8.5 g/dL   6.3        Albumin 3.5 - 5.2 g/dL 4.1  4.4     Albumin (external) 3.8 - 4.8 g/dL   4.0           This result is from an external source.         Latest Ref Rng & Units 4/17/2024     9:58 AM   Pancreas   A1C <5.7 % 4.7          Latest Ref Rng & Units 4/22/2024     8:21 AM   Iron studies   Iron 37 - 145 ug/dL 53    Iron Sat Index 15 - 46 % 36          Latest Ref Rng & Units 4/17/2024     9:58 AM 9/29/2022    11:40 AM   [facility] Txp Virology   EBV CAPSID ANTIBODY IGG No detectable antibody. Positive  Positive      Failed to redirect to the Timeline version of the [employer].  Recent Labs   Lab Test 04/20/24  0559   TACROL 5.9

## 2024-04-22 NOTE — LETTER
4/22/2024         RE: Ira Zamora  [address] [facility]  [place] [address]        Dear Colleague,    Thank you for referring your patient, Ira Zamora, to the [facility]. Please see a copy of my visit note below.    TRANSPLANT SURGERY EARLY POST TRANSPLANT VISIT      Assessment & Plan  -Start oral phos supplement today. EVELIO drain and de leon present. Return to [facility] tomorrow.      # DDKT: (pediatric en bloc) Cr Trend down.     - Baseline Creatinine: ~ TBD   - Proteinuria: Not checked recently   - Date DSA Last Checked: Apr/2024      Latest DSA: No DSA at time of transplant   - BK Viremia: Not checked recently   - Kidney Tx Biopsy: No   - Transplant Ureteral Stent: Yes x 2     # Immunosuppression: Tacrolimus immediate release (goal 8-10) and Mycophenolate mofetil (dose 750 mg every 12 hours)   - Induction with Recent Transplant:  High Intensity Protocol   - Continue with intensive monitoring of immunosuppression for efficacy and toxicity.   - Changes: Not at this time    # Infection Prophylaxis:   - PJP: Sulfa/TMP (Bactrim)  - CMV: Valganciclovir (Valcyte)     # Hypertension: Borderline control;  Goal BP: < 150/90   - Volume status: Euvolemic  EDW ~    - Changes: Not at this time HR mildly tachycardic. Monitor home readings.       # Anemia in Chronic Renal Disease: Hgb: Trend up      JUNG: No   - Iron studies: Replete    # Mineral Bone Disorder:    - Secondary renal hyperparathyroidism; PTH level: Minimally elevated ( pg/ml)        On treatment: None  - Vitamin D; level: Unknown at this time       On supplement: No  - Calcium; level: Normal                                             On supplement: No  - Phosphorus; level: Low        On supplement: no, start oral phos today 4/22  # Electrolytes:   - Potassium; level: Normal        On supplement: No  - Magnesium; level: Normal        On supplement: No  - Bicarbonate; level: Normal        On supplement: No  - Sodium; level:  Normal     # Crohns: followed by [facility]. Does occasionally alternate between diarrhea and constipation.  On ustekinumab PTA.              -Will plan to hold for at least 6 weeks post txp     # Cardiac/Vascular Disease Risk Factors: Dilated ascending aorta (3.8 cm)  on 2024 ECHO (stable from 2020).     # GERD:   -Pt should remain off PPI due to concern for AIN.   -Increase famotidine to 40mg daily with improving kidney function  - Continue calcium carbonate 1000 mg PO BID PRN for GERD    # Medical Compliance: Yes    # Health Maintenance and Vaccination Review: No issues    # Transplant History:  Etiology of Kidney Failure: Interstitial nephritis  Tx: DDKT  Transplant: 4/17/2024 (Kidney)  Donor Type: Donation after Brain Death Donor Class: Standard Criteria Donor  Crossmatch at time of Tx: negative  DSA at time of Tx: No  Significant changes in immunosuppression: None  CMV IgG Ab High Risk Discordance (D+/R-): No  EBV IgG Ab High Risk Discordance (D+/R-): No  Significant transplant-related complications: None    Transplant Office Phone Number: [phone]    Assessment and plan was discussed with the patient and she voiced her understanding and agreement.    Return visit: [facility] tomorrow     LAURITA Jacobson CNP    Chief Complaint  Ms. Zamora is a 34 year old here for hospital follow up after kidney transplant.     History of Present Illness   Feeling well overall. Pain controlled. Drinking over 2 liters per day.   LBM 2 days ago - will add on miralax with senna today.   Appetite is fair, will start protein drinks as meal replacements.   Mcintosh present, around 2,000ml output overnight.   EVELIO drain 60ml out overnight.     No leg edema.   No CP or SOB.   No F/C.   No N/V.       Home BP:  146/90    Problem List  Patient Active Problem List   Diagnosis    ESRD (end stage renal disease) on dialysis (H)    Crohn's disease (H)    Hypertension    Anemia in chronic kidney disease    Secondary renal hyperparathyroidism (H24)     Long QT interval    Kidney replaced by transplant    Immunosuppressed status (H24)       Allergies  Allergies   Allergen Reactions    Proton Pump Inhibitors Nephrotoxicity     No PPIs due to history of renal failure due to interstitial nephritis       Medications  Current Outpatient Medications   Medication Sig Dispense Refill    acetaminophen (TYLENOL) 500 MG tablet Take 1,000 mg by mouth every 6 hours as needed      aspirin (ASA) 325 MG tablet Take 1 tablet (325 mg) by mouth daily 30 tablet 2    atorvastatin (LIPITOR) 10 MG tablet Take 1 tablet (10 mg) by mouth daily 30 tablet 1    doxazosin (CARDURA) 4 MG tablet Take 1 tablet (4 mg) by mouth at bedtime 30 tablet 1    famotidine (PEPCID) 20 MG tablet Take 2 tablets (40 mg) by mouth daily 60 tablet 1    levonorgestrel (KYLEENA) 19.5 MG IUD 1 Device by Intrauterine route      methocarbamol (ROBAXIN) 500 MG tablet Take 1 tablet (500 mg) by mouth 4 times daily as needed for muscle spasms 12 tablet 0    mycophenolate (GENERIC EQUIVALENT) 250 MG capsule Take 3 capsules (750 mg) by mouth 2 times daily 180 capsule 1    oxyCODONE (ROXICODONE) 5 MG tablet Take 1 tablet (5 mg) by mouth every 6 hours as needed for moderate to severe pain 8 tablet 0    PARoxetine (PAXIL) 30 MG tablet Take 30 mg by mouth      senna-docusate (SENOKOT-S/PERICOLACE) 8.6-50 MG tablet Take 1 tablet by mouth 2 times daily as needed for constipation 30 tablet 0    [START ON 4/23/2024] sulfamethoxazole-trimethoprim (BACTRIM) 400-80 MG tablet Take 1 tablet by mouth three times a week 30 tablet 1    tacrolimus (GENERIC EQUIVALENT) 0.5 MG capsule Take 1 capsule (0.5 mg) by mouth 2 times daily Total=2.5mg BID 60 capsule 1    tacrolimus (GENERIC EQUIVALENT) 1 MG capsule Take 2 capsules (2 mg) by mouth 2 times daily Total=2.5mg  capsule 1    ustekinumab (STELARA) 90 MG/ML Inject 90 mg Subcutaneous once every eight weeks      valGANciclovir (VALCYTE) 450 MG tablet Take 1 tablet (450 mg) by mouth every  other day Titrate up to 900mg daily when directed by transplant team with improving renal function 60 tablet 2     No current facility-administered medications for this visit.     There are no discontinued medications.    Physical Exam  Vital Signs: LMP 04/03/2024 (Approximate)     GENERAL APPEARANCE: alert and no distress  HENT: mouth without ulcers or lesions  RESP: lungs clear to auscultation - no rales, rhonchi or wheezes  CV: regular rhythm, normal rate, no rub, no murmur  EDEMA: no LE edema bilaterally  ABDOMEN: soft, nondistended, nontender, bowel sounds normal  MS: extremities normal - no gross deformities noted, no evidence of inflammation in joints, no muscle tenderness  SKIN: no rash  SKIN: RLQ incision intact with staples, no erythema or drainage. EVELIO site intact and no erythema.       Data        Latest Ref Rng & Units 4/22/2024     8:21 AM 4/21/2024    12:40 PM 4/21/2024     8:38 AM   Renal   Sodium 135 - 145 mmol/L 140      K 3.4 - 5.3 mmol/L 4.0      Cl 98 - 107 mmol/L 108      Cl (external) 98 - 107 mmol/L 108      CO2 22 - 29 mmol/L 24      Urea Nitrogen 6.0 - 20.0 mg/dL 33.6      Creatinine 0.51 - 0.95 mg/dL 1.71      Glucose 70 - 99 mg/dL 107  84  92    Calcium 8.6 - 10.0 mg/dL 9.7      Magnesium 1.7 - 2.3 mg/dL 2.0            Latest Ref Rng & Units 4/22/2024     8:21 AM 4/21/2024     5:52 AM 4/20/2024     5:59 AM   Bone Health   Phosphorus 2.5 - 4.5 mg/dL 1.8  2.2  4.5    Parathyroid Hormone Intact 15 - 65 pg/mL 138            Latest Ref Rng & Units 4/22/2024     8:21 AM 4/21/2024     5:52 AM 4/20/2024     1:27 PM   Heme   WBC 4.0 - 11.0 10e3/uL 5.3  6.1     Hgb 11.7 - 15.7 g/dL 8.4  8.0  8.2    Plt 150 - 450 10e3/uL 107  84           Latest Ref Rng & Units 4/17/2024     9:58 AM 9/29/2022    11:40 AM 8/8/2022     9:23 AM   Liver   AP 40 - 150 U/L 55  75     AP (external) 44 - 121 IU/L   73       TBili <=1.2 mg/dL 0.6  0.4     TBili (external) 0.0 - 1.2 mg/dL   0.4       DBili (external) 0.00 -  0.40 mg/dL   <0.10       ALT 0 - 50 U/L 5  12     ALT (external) 0 - 32 IU/L   10       AST 0 - 45 U/L 17  17     AST (external) 0 - 40 IU/L   15       Tot Protein 6.4 - 8.3 g/dL 6.5  7.6     Tot Protein (external) 6.0 - 8.5 g/dL   6.3       Albumin 3.5 - 5.2 g/dL 4.1  4.4     Albumin (external) 3.8 - 4.8 g/dL   4.0           This result is from an external source.         Latest Ref Rng & Units 4/17/2024     9:58 AM   Pancreas   A1C <5.7 % 4.7          Latest Ref Rng & Units 4/22/2024     8:21 AM   Iron studies   Iron 37 - 145 ug/dL 53    Iron Sat Index 15 - 46 % 36          Latest Ref Rng & Units 4/17/2024     9:58 AM 9/29/2022    11:40 AM   [facility] Txp Virology   EBV CAPSID ANTIBODY IGG No detectable antibody. Positive  Positive      Failed to redirect to the Timeline version of the [employer].  Recent Labs   Lab Test 04/20/24  0559   TACROL 5.9            LAURITA Jacobson CNP

## 2024-04-23 ENCOUNTER — OFFICE VISIT (OUTPATIENT)
Dept: INFUSION THERAPY | Facility: CLINIC | Age: 34
End: 2024-04-23
Attending: NURSE PRACTITIONER
Payer: MEDICARE

## 2024-04-23 ENCOUNTER — TELEPHONE (OUTPATIENT)
Dept: TRANSPLANT | Facility: CLINIC | Age: 34
End: 2024-04-23

## 2024-04-23 ENCOUNTER — APPOINTMENT (OUTPATIENT)
Dept: LAB | Facility: CLINIC | Age: 34
End: 2024-04-23
Attending: NURSE PRACTITIONER
Payer: MEDICARE

## 2024-04-23 VITALS
TEMPERATURE: 98.6 F | RESPIRATION RATE: 16 BRPM | HEART RATE: 102 BPM | OXYGEN SATURATION: 99 % | WEIGHT: 133.8 LBS | DIASTOLIC BLOOD PRESSURE: 87 MMHG | SYSTOLIC BLOOD PRESSURE: 135 MMHG | BODY MASS INDEX: 24.07 KG/M2

## 2024-04-23 DIAGNOSIS — Z98.890 OTHER SPECIFIED POSTPROCEDURAL STATES: ICD-10-CM

## 2024-04-23 DIAGNOSIS — Z79.899 ENCOUNTER FOR LONG-TERM CURRENT USE OF MEDICATION: ICD-10-CM

## 2024-04-23 DIAGNOSIS — Z94.0 KIDNEY REPLACED BY TRANSPLANT: Primary | ICD-10-CM

## 2024-04-23 DIAGNOSIS — Z20.828 CONTACT WITH AND (SUSPECTED) EXPOSURE TO OTHER VIRAL COMMUNICABLE DISEASES: ICD-10-CM

## 2024-04-23 DIAGNOSIS — Z94.0 KIDNEY REPLACED BY TRANSPLANT: ICD-10-CM

## 2024-04-23 DIAGNOSIS — Z94.0 KIDNEY REPLACED BY TRANSPLANT: Primary | Chronic | ICD-10-CM

## 2024-04-23 DIAGNOSIS — Z48.298 AFTERCARE FOLLOWING ORGAN TRANSPLANT: ICD-10-CM

## 2024-04-23 LAB
ANION GAP SERPL CALCULATED.3IONS-SCNC: 9 MMOL/L (ref 7–15)
BASOPHILS # BLD AUTO: 0 10E3/UL (ref 0–0.2)
BASOPHILS NFR BLD AUTO: 0 %
BUN SERPL-MCNC: 30.4 MG/DL (ref 6–20)
CALCIUM SERPL-MCNC: 10 MG/DL (ref 8.6–10)
CHLORIDE SERPL-SCNC: 109 MMOL/L (ref 98–107)
CMV DNA SPEC NAA+PROBE-ACNC: NOT DETECTED IU/ML
CREAT SERPL-MCNC: 1.41 MG/DL (ref 0.51–0.95)
DEPRECATED HCO3 PLAS-SCNC: 21 MMOL/L (ref 22–29)
EGFRCR SERPLBLD CKD-EPI 2021: 50 ML/MIN/1.73M2
EOSINOPHIL # BLD AUTO: 0.1 10E3/UL (ref 0–0.7)
EOSINOPHIL NFR BLD AUTO: 2 %
ERYTHROCYTE [DISTWIDTH] IN BLOOD BY AUTOMATED COUNT: 12.9 % (ref 10–15)
GLUCOSE SERPL-MCNC: 138 MG/DL (ref 70–99)
HCT VFR BLD AUTO: 27.9 % (ref 35–47)
HGB BLD-MCNC: 9 G/DL (ref 11.7–15.7)
IMM GRANULOCYTES # BLD: 0.1 10E3/UL
IMM GRANULOCYTES NFR BLD: 1 %
LYMPHOCYTES # BLD AUTO: 0.2 10E3/UL (ref 0.8–5.3)
LYMPHOCYTES NFR BLD AUTO: 3 %
MAGNESIUM SERPL-MCNC: 1.7 MG/DL (ref 1.7–2.3)
MCH RBC QN AUTO: 30.5 PG (ref 26.5–33)
MCHC RBC AUTO-ENTMCNC: 32.3 G/DL (ref 31.5–36.5)
MCV RBC AUTO: 95 FL (ref 78–100)
MONOCYTES # BLD AUTO: 0.2 10E3/UL (ref 0–1.3)
MONOCYTES NFR BLD AUTO: 4 %
NEUTROPHILS # BLD AUTO: 5.2 10E3/UL (ref 1.6–8.3)
NEUTROPHILS NFR BLD AUTO: 90 %
NRBC # BLD AUTO: 0 10E3/UL
NRBC BLD AUTO-RTO: 0 /100
PHOSPHATE SERPL-MCNC: 1.8 MG/DL (ref 2.5–4.5)
PLATELET # BLD AUTO: 134 10E3/UL (ref 150–450)
POTASSIUM SERPL-SCNC: 4.3 MMOL/L (ref 3.4–5.3)
RBC # BLD AUTO: 2.95 10E6/UL (ref 3.8–5.2)
SODIUM SERPL-SCNC: 139 MMOL/L (ref 135–145)
TACROLIMUS BLD-MCNC: 7.2 UG/L (ref 5–15)
TME LAST DOSE: NORMAL H
TME LAST DOSE: NORMAL H
WBC # BLD AUTO: 5.7 10E3/UL (ref 4–11)

## 2024-04-23 PROCEDURE — 80180 DRUG SCRN QUAN MYCOPHENOLATE: CPT

## 2024-04-23 PROCEDURE — 80048 BASIC METABOLIC PNL TOTAL CA: CPT

## 2024-04-23 PROCEDURE — 36415 COLL VENOUS BLD VENIPUNCTURE: CPT

## 2024-04-23 PROCEDURE — 84100 ASSAY OF PHOSPHORUS: CPT

## 2024-04-23 PROCEDURE — 99213 OFFICE O/P EST LOW 20 MIN: CPT | Mod: 24 | Performed by: NURSE PRACTITIONER

## 2024-04-23 PROCEDURE — 80197 ASSAY OF TACROLIMUS: CPT

## 2024-04-23 PROCEDURE — 83735 ASSAY OF MAGNESIUM: CPT

## 2024-04-23 PROCEDURE — 85004 AUTOMATED DIFF WBC COUNT: CPT

## 2024-04-23 RX ORDER — TACROLIMUS 1 MG/1
5 CAPSULE ORAL 2 TIMES DAILY
Qty: 240 CAPSULE | Refills: 11 | Status: SHIPPED | OUTPATIENT
Start: 2024-04-23 | End: 2024-04-25

## 2024-04-23 RX ORDER — TACROLIMUS 0.5 MG/1
CAPSULE ORAL
Qty: 60 CAPSULE | Refills: 11 | Status: SHIPPED | OUTPATIENT
Start: 2024-04-23 | End: 2024-04-25

## 2024-04-23 ASSESSMENT — PAIN SCALES - GENERAL: PAINLEVEL: MODERATE PAIN (5)

## 2024-04-23 NOTE — TELEPHONE ENCOUNTER
Increased patient tac dose. She reports body aches and states has temp of 99. RNCC asked patient to monitor her temp and she will report to ATC tomorrow AM for labs. Instructed patient to call on call coordinator if temp cristy over 100.4. She states she was having some bladder pain, so UA was ordered for tomorrow AM to ensure no infection.

## 2024-04-23 NOTE — PROGRESS NOTES
"Ira Zamora came to [facility] today for a lab and assess following a kidney transplant on 4/17.      Discharge date: 4/21  Transplant coordinator: Radha GANN  Phone number patient can be reached at: [phone] cell      Physical Assessment:  See physical assessment located under \"Document Flowsheets\".  Incision site: RLQ with staples, CDI. No s/s of infection. Incision care given.  Lines: Old IJ line dressing removed.  EVELIO: 90 ml output last 24 hours. 50 ml removed in SIPC.  Mcintosh: UOP 1775, complaining of irritation at insertion site and with GI cramping. To be removed in clinic tomorrow.  Urine clarity: clear yellow. 275 ml removed in SIPC.  Hydration: 1.5 L water/ milk last 24 hours. Reiterated drinking 2-3 L water daily and limiting caffeine.  Nutrition: minimal appetite, denies vomiting, some nausea yesterday   Last BM: 2 bouts of diarrhea since yesterday evening. Contributes to too much Senna  Pain: 3/10 incision, not taking any pain meds.     My transplant place videos watched: yes    Labs drawn by [facility].    Plan of care for today:   -Labs and assessment reviewed with Pratima Macedo NP.  -Confirmed patient DID NOT receive phosphorus script yesterday. Will  today add to pill box and start taking.   -Reports feeling lightheaded on ambulation.   -[facility] checklist completed.    Medication changes:   Increase doxazosin from 4 mg to 8 mg at HS. Med card updated.    Medications administered: n/a      Patient education:  The following teaching topics were addressed: Importance of drinking 2L of non-caffeinated fluids daily, Incisional care, Signs/symptoms of infection, Good handwashing, Medications (purposes, doses and times of administration), Phone numbers to call with concenrs (Transplant coordinator, [address] and [facility]), Plan of care, Drain care, and Mcintosh cath care   Patient and Spouse  verbalized understanding and all questions answered.    Drug level:  Patient took 4.5 mg of Tacrolimus last " "evening at 9 PM.  Care coordinator to follow up with the result.    Face to face time: 45 minutes    Discharge Plan  Pt will follow up with: lab tomorrow morning and clinic appt Pratima after. NP will coordinate clinic appt. Msg sent to scheduling for lab appt.   Discharge instructions reviewed with patient: YES  Patient/Representative verbalized understanding, all questions answered: YES    Discharged from unit at 0945 with whom: spouse to home.    Vital signs:  Temp: 98.6  F (37  C) Temp src: Oral BP: 137/85 Pulse: 108   Resp: 16 SpO2: 99 %       Weight: 61.6 kg (135 lb 14.4 oz)  Estimated body mass index is 24.44 kg/m  as calculated from the following:    Height as of 4/18/24: 1.588 m (5' 2.52\").    Weight as of this encounter: 61.6 kg (135 lb 14.4 oz).        "

## 2024-04-23 NOTE — PROGRESS NOTES
TRANSPLANT SURGERY EARLY POST TRANSPLANT VISIT      Assessment & Plan   -Labs tomorrow and de leon removal in clinic in the morning. EVELIO drain present. Increase doxazosin to 8mg q.HS  Push oral fluids for goal of 2-3 liters.     # DDKT: (pediatric en bloc) Cr Trend down.     - Baseline Creatinine: ~ TBD   - Proteinuria: Not checked recently   - Date DSA Last Checked: Apr/2024      Latest DSA: No DSA at time of transplant   - BK Viremia: Not checked recently   - Kidney Tx Biopsy: No   - Transplant Ureteral Stent: Yes x 2     # Immunosuppression: Tacrolimus immediate release (goal 8-10) and Mycophenolate mofetil (dose 750 mg every 12 hours)   - Induction with Recent Transplant:  High Intensity Protocol   - Continue with intensive monitoring of immunosuppression for efficacy and toxicity.   - Changes: Not at this time    # Infection Prophylaxis:   - PJP: Sulfa/TMP (Bactrim)  - CMV: Valganciclovir (Valcyte)     # Hypertension: Borderline control;  Goal BP: < 150/90. Standing BP today 159/101. .    - Volume status: Euvolemic  EDW ~    - Changes: Yes - increase doxazosin to 8mg q.HS         # Anemia in Chronic Renal Disease: Hgb: Trend up      JUNG: No   - Iron studies: Replete    # Mineral Bone Disorder:    - Secondary renal hyperparathyroidism; PTH level: Minimally elevated ( pg/ml)        On treatment: None  - Vitamin D; level: Unknown at this time       On supplement: No  - Calcium; level: Normal                                             On supplement: No  - Phosphorus; level: Low        On supplement: yes but hasn't picked up Rx yet, will do so today.     # Electrolytes:   - Potassium; level: Normal        On supplement: No  - Magnesium; level: Normal        On supplement: No  - Bicarbonate; level: Low normal        On supplement: No  - Sodium; level: Normal     # Crohns: followed by [facility]. Does occasionally alternate between diarrhea and constipation.  On ustekinumab PTA.              -Will plan to hold for  at least 6 weeks post txp     # Cardiac/Vascular Disease Risk Factors: Dilated ascending aorta (3.8 cm)  on 2024 ECHO (stable from 2020).     # GERD:   -Pt should remain off PPI due to concern for AIN.   -Increase famotidine to 40mg daily with improving kidney function  - Continue calcium carbonate 1000 mg PO BID PRN for GERD    # Medical Compliance: Yes    # Health Maintenance and Vaccination Review: No issues    # Transplant History:  Etiology of Kidney Failure: Interstitial nephritis  Tx: DDKT  Transplant: 4/17/2024 (Kidney)  Donor Type: Donation after Brain Death Donor Class: Standard Criteria Donor  Crossmatch at time of Tx: negative  DSA at time of Tx: No  Significant changes in immunosuppression: None  CMV IgG Ab High Risk Discordance (D+/R-): No  EBV IgG Ab High Risk Discordance (D+/R-): No  Significant transplant-related complications: None    Transplant Office Phone Number: [phone]    Assessment and plan was discussed with the patient and she voiced her understanding and agreement.    Return visit: labs and nursing visit tomorrow for LAURITA Soto CNP    Chief Complaint   Ms. Zamora is a 34 year old here for hospital follow up after kidney transplant.     History of Present Illness    Had episode of diarrhea yesterday and x1 today. Believes it to be related to the senna she took yesterday. Will hold bowel meds today.   EVELIO had 90ml out yesterday   UOP in de leon 1775 out yesterday.   Didn't drink as much yesterday but will push oral fluids today.   No leg edema.   No CP or SOB.   No F/C.   No N/V.       Home BP:  146/90    Problem List   Patient Active Problem List   Diagnosis    ESRD (end stage renal disease) on dialysis (H)    Crohn's disease (H)    Hypertension    Anemia in chronic kidney disease    Secondary renal hyperparathyroidism (H24)    Long QT interval    Kidney replaced by transplant    Immunosuppressed status (H24)       Allergies   Allergies   Allergen Reactions     Proton Pump Inhibitors Nephrotoxicity     No PPIs due to history of renal failure due to interstitial nephritis    Tegaderm Transparent Dressing (Informational Only) Blisters       Medications   Current Outpatient Medications   Medication Sig Dispense Refill    acetaminophen (TYLENOL) 500 MG tablet Take 1,000 mg by mouth every 6 hours as needed      aspirin (ASA) 325 MG tablet Take 1 tablet (325 mg) by mouth daily 30 tablet 2    atorvastatin (LIPITOR) 10 MG tablet Take 1 tablet (10 mg) by mouth daily 30 tablet 1    doxazosin (CARDURA) 4 MG tablet Take 1 tablet (4 mg) by mouth at bedtime 30 tablet 1    famotidine (PEPCID) 20 MG tablet Take 2 tablets (40 mg) by mouth daily 60 tablet 1    levonorgestrel (KYLEENA) 19.5 MG IUD 1 Device by Intrauterine route      methocarbamol (ROBAXIN) 500 MG tablet Take 1 tablet (500 mg) by mouth 4 times daily as needed for muscle spasms 12 tablet 0    mycophenolate (GENERIC EQUIVALENT) 250 MG capsule Take 3 capsules (750 mg) by mouth 2 times daily 180 capsule 1    oxyCODONE (ROXICODONE) 5 MG tablet Take 1 tablet (5 mg) by mouth every 6 hours as needed for moderate to severe pain 8 tablet 0    PARoxetine (PAXIL) 30 MG tablet Take 30 mg by mouth      phosphorus tablet 250 mg 250 MG per tablet Take 1 tablet (250 mg) by mouth 2 times daily (Patient not taking: Reported on 4/23/2024) 60 tablet 1    senna-docusate (SENOKOT-S/PERICOLACE) 8.6-50 MG tablet Take 1 tablet by mouth 2 times daily as needed for constipation 30 tablet 0    sulfamethoxazole-trimethoprim (BACTRIM) 400-80 MG tablet Take 1 tablet by mouth three times a week 30 tablet 1    tacrolimus (GENERIC EQUIVALENT) 0.5 MG capsule Take 1 capsule (0.5 mg) by mouth 2 times daily Total=4.5mg BID 60 capsule 11    tacrolimus (GENERIC EQUIVALENT) 1 MG capsule Take 4 capsules (4 mg) by mouth 2 times daily Total=4.5mg  capsule 11    ustekinumab (STELARA) 90 MG/ML Inject 90 mg Subcutaneous once every eight weeks      valGANciclovir  (VALCYTE) 450 MG tablet Take 1 tablet (450 mg) by mouth every other day Titrate up to 900mg daily when directed by transplant team with improving renal function 60 tablet 2     No current facility-administered medications for this visit.     There are no discontinued medications.    Physical Exam   Vital Signs: LMP 04/03/2024 (Approximate)   BP standing 159/101        GENERAL APPEARANCE: alert and no distress  HENT: mouth without ulcers or lesions  RESP: lungs clear to auscultation - no rales, rhonchi or wheezes  CV: regular rhythm, normal rate, no rub, no murmur  EDEMA: no LE edema bilaterally  ABDOMEN: soft, nondistended, nontender, bowel sounds normal  MS: extremities normal - no gross deformities noted, no evidence of inflammation in joints, no muscle tenderness  SKIN: no rash  SKIN: RLQ incision intact with staples, no erythema or drainage. EVELIO site intact and no erythema.       Data         Latest Ref Rng & Units 4/23/2024     7:35 AM 4/22/2024     8:21 AM 4/21/2024    12:40 PM   Renal   Sodium 135 - 145 mmol/L 139  140     K 3.4 - 5.3 mmol/L 4.3  4.0     Cl 98 - 107 mmol/L 109  108     Cl (external) 98 - 107 mmol/L 109  108     CO2 22 - 29 mmol/L 21  24     Urea Nitrogen 6.0 - 20.0 mg/dL 30.4  33.6     Creatinine 0.51 - 0.95 mg/dL 1.41  1.71     Glucose 70 - 99 mg/dL 138  107  84    Calcium 8.6 - 10.0 mg/dL 10.0  9.7     Magnesium 1.7 - 2.3 mg/dL 1.7  2.0           Latest Ref Rng & Units 4/23/2024     7:35 AM 4/22/2024     8:21 AM 4/21/2024     5:52 AM   Bone Health   Phosphorus 2.5 - 4.5 mg/dL 1.8  1.8  2.2    Parathyroid Hormone Intact 15 - 65 pg/mL  138     Vit D Def 20 - 50 ng/mL  18           Latest Ref Rng & Units 4/23/2024     7:35 AM 4/22/2024     8:21 AM 4/21/2024     5:52 AM   Heme   WBC 4.0 - 11.0 10e3/uL 5.7  5.3  6.1    Hgb 11.7 - 15.7 g/dL 9.0  8.4  8.0    Plt 150 - 450 10e3/uL 134  107  84          Latest Ref Rng & Units 4/17/2024     9:58 AM 9/29/2022    11:40 AM 8/8/2022     9:23 AM    Liver   AP 40 - 150 U/L 55  75     AP (external) 44 - 121 IU/L   73       TBili <=1.2 mg/dL 0.6  0.4     TBili (external) 0.0 - 1.2 mg/dL   0.4       DBili (external) 0.00 - 0.40 mg/dL   <0.10       ALT 0 - 50 U/L 5  12     ALT (external) 0 - 32 IU/L   10       AST 0 - 45 U/L 17  17     AST (external) 0 - 40 IU/L   15       Tot Protein 6.4 - 8.3 g/dL 6.5  7.6     Tot Protein (external) 6.0 - 8.5 g/dL   6.3       Albumin 3.5 - 5.2 g/dL 4.1  4.4     Albumin (external) 3.8 - 4.8 g/dL   4.0           This result is from an external source.         Latest Ref Rng & Units 4/17/2024     9:58 AM   Pancreas   A1C <5.7 % 4.7          Latest Ref Rng & Units 4/22/2024     8:21 AM   Iron studies   Iron 37 - 145 ug/dL 53    Iron Sat Index 15 - 46 % 36    Ferritin 6 - 175 ng/mL 2,181          Latest Ref Rng & Units 4/17/2024     9:58 AM 9/29/2022    11:40 AM   [facility] T[facility] Virology   EBV CAPSID ANTIBODY IGG No detectable antibody. Positive  Positive      Failed to redirect to the Timeline version of the [employer].  Recent Labs   Lab Test 04/20/24  0559 04/22/24  0821   DOSTAC  --  4/21/2024   TACROL 5.9 5.0

## 2024-04-23 NOTE — NURSING NOTE
Chief Complaint   Patient presents with    Blood Draw     Labs drawn via PIV placed by RN in lab     Labs drawn from PIV placed by RN. Line flushed with saline. Vitals taken. Pt checked in for appointment(s).     Brittany Hoang RN

## 2024-04-23 NOTE — LETTER
4/23/2024         RE: Ira Zamora  [address]   [place] [address]        Dear Colleague,    Thank you for referring your patient, Ira Zamora, to the [facility]. Please see a copy of my visit note below.    TRANSPLANT SURGERY EARLY POST TRANSPLANT VISIT      Assessment & Plan  -Labs tomorrow and de leon removal in clinic in the morning. EVELIO drain present. Increase doxazosin to 8mg q.HS  Push oral fluids for goal of 2-3 liters.     # DDKT: (pediatric en bloc) Cr Trend down.     - Baseline Creatinine: ~ TBD   - Proteinuria: Not checked recently   - Date DSA Last Checked: Apr/2024      Latest DSA: No DSA at time of transplant   - BK Viremia: Not checked recently   - Kidney Tx Biopsy: No   - Transplant Ureteral Stent: Yes x 2     # Immunosuppression: Tacrolimus immediate release (goal 8-10) and Mycophenolate mofetil (dose 750 mg every 12 hours)   - Induction with Recent Transplant:  High Intensity Protocol   - Continue with intensive monitoring of immunosuppression for efficacy and toxicity.   - Changes: Not at this time    # Infection Prophylaxis:   - PJP: Sulfa/TMP (Bactrim)  - CMV: Valganciclovir (Valcyte)     # Hypertension: Borderline control;  Goal BP: < 150/90. Standing BP today 159/101. .    - Volume status: Euvolemic  EDW ~    - Changes: Yes - increase doxazosin to 8mg q.HS         # Anemia in Chronic Renal Disease: Hgb: Trend up      JUNG: No   - Iron studies: Replete    # Mineral Bone Disorder:    - Secondary renal hyperparathyroidism; PTH level: Minimally elevated ( pg/ml)        On treatment: None  - Vitamin D; level: Unknown at this time       On supplement: No  - Calcium; level: Normal                                             On supplement: No  - Phosphorus; level: Low        On supplement: yes but hasn't picked up Rx yet, will do so today.     # Electrolytes:   - Potassium; level: Normal        On supplement: No  - Magnesium; level: Normal         On supplement: No  - Bicarbonate; level: Low normal        On supplement: No  - Sodium; level: Normal     # Crohns: followed by [facility]. Does occasionally alternate between diarrhea and constipation.  On ustekinumab PTA.              -Will plan to hold for at least 6 weeks post txp     # Cardiac/Vascular Disease Risk Factors: Dilated ascending aorta (3.8 cm)  on 2024 ECHO (stable from 2020).     # GERD:   -Pt should remain off PPI due to concern for AIN.   -Increase famotidine to 40mg daily with improving kidney function  - Continue calcium carbonate 1000 mg PO BID PRN for GERD    # Medical Compliance: Yes    # Health Maintenance and Vaccination Review: No issues    # Transplant History:  Etiology of Kidney Failure: Interstitial nephritis  Tx: DDKT  Transplant: 4/17/2024 (Kidney)  Donor Type: Donation after Brain Death Donor Class: Standard Criteria Donor  Crossmatch at time of Tx: negative  DSA at time of Tx: No  Significant changes in immunosuppression: None  CMV IgG Ab High Risk Discordance (D+/R-): No  EBV IgG Ab High Risk Discordance (D+/R-): No  Significant transplant-related complications: None    Transplant Office Phone Number: [phone]    Assessment and plan was discussed with the patient and she voiced her understanding and agreement.    Return visit: labs and nursing visit tomorrow for LAURITA Soto CNP    Chief Complaint  Ms. Zamora is a 34 year old here for hospital follow up after kidney transplant.     History of Present Illness   Had episode of diarrhea yesterday and x1 today. Believes it to be related to the senna she took yesterday. Will hold bowel meds today.   EVELIO had 90ml out yesterday   UOP in de leon 1775 out yesterday.   Didn't drink as much yesterday but will push oral fluids today.   No leg edema.   No CP or SOB.   No F/C.   No N/V.       Home BP:  146/90    Problem List  Patient Active Problem List   Diagnosis    ESRD (end stage renal disease) on dialysis (H)     Crohn's disease (H)    Hypertension    Anemia in chronic kidney disease    Secondary renal hyperparathyroidism (H24)    Long QT interval    Kidney replaced by transplant    Immunosuppressed status (H24)       Allergies  Allergies   Allergen Reactions    Proton Pump Inhibitors Nephrotoxicity     No PPIs due to history of renal failure due to interstitial nephritis    Tegaderm Transparent Dressing (Informational Only) Blisters       Medications  Current Outpatient Medications   Medication Sig Dispense Refill    acetaminophen (TYLENOL) 500 MG tablet Take 1,000 mg by mouth every 6 hours as needed      aspirin (ASA) 325 MG tablet Take 1 tablet (325 mg) by mouth daily 30 tablet 2    atorvastatin (LIPITOR) 10 MG tablet Take 1 tablet (10 mg) by mouth daily 30 tablet 1    doxazosin (CARDURA) 4 MG tablet Take 1 tablet (4 mg) by mouth at bedtime 30 tablet 1    famotidine (PEPCID) 20 MG tablet Take 2 tablets (40 mg) by mouth daily 60 tablet 1    levonorgestrel (KYLEENA) 19.5 MG IUD 1 Device by Intrauterine route      methocarbamol (ROBAXIN) 500 MG tablet Take 1 tablet (500 mg) by mouth 4 times daily as needed for muscle spasms 12 tablet 0    mycophenolate (GENERIC EQUIVALENT) 250 MG capsule Take 3 capsules (750 mg) by mouth 2 times daily 180 capsule 1    oxyCODONE (ROXICODONE) 5 MG tablet Take 1 tablet (5 mg) by mouth every 6 hours as needed for moderate to severe pain 8 tablet 0    PARoxetine (PAXIL) 30 MG tablet Take 30 mg by mouth      phosphorus tablet 250 mg 250 MG per tablet Take 1 tablet (250 mg) by mouth 2 times daily (Patient not taking: Reported on 4/23/2024) 60 tablet 1    senna-docusate (SENOKOT-S/PERICOLACE) 8.6-50 MG tablet Take 1 tablet by mouth 2 times daily as needed for constipation 30 tablet 0    sulfamethoxazole-trimethoprim (BACTRIM) 400-80 MG tablet Take 1 tablet by mouth three times a week 30 tablet 1    tacrolimus (GENERIC EQUIVALENT) 0.5 MG capsule Take 1 capsule (0.5 mg) by mouth 2 times daily  Total=4.5mg BID 60 capsule 11    tacrolimus (GENERIC EQUIVALENT) 1 MG capsule Take 4 capsules (4 mg) by mouth 2 times daily Total=4.5mg  capsule 11    ustekinumab (STELARA) 90 MG/ML Inject 90 mg Subcutaneous once every eight weeks      valGANciclovir (VALCYTE) 450 MG tablet Take 1 tablet (450 mg) by mouth every other day Titrate up to 900mg daily when directed by transplant team with improving renal function 60 tablet 2     No current facility-administered medications for this visit.     There are no discontinued medications.    Physical Exam  Vital Signs: LMP 04/03/2024 (Approximate)   BP standing 159/101        GENERAL APPEARANCE: alert and no distress  HENT: mouth without ulcers or lesions  RESP: lungs clear to auscultation - no rales, rhonchi or wheezes  CV: regular rhythm, normal rate, no rub, no murmur  EDEMA: no LE edema bilaterally  ABDOMEN: soft, nondistended, nontender, bowel sounds normal  MS: extremities normal - no gross deformities noted, no evidence of inflammation in joints, no muscle tenderness  SKIN: no rash  SKIN: RLQ incision intact with staples, no erythema or drainage. EVELIO site intact and no erythema.       Data        Latest Ref Rng & Units 4/23/2024     7:35 AM 4/22/2024     8:21 AM 4/21/2024    12:40 PM   Renal   Sodium 135 - 145 mmol/L 139  140     K 3.4 - 5.3 mmol/L 4.3  4.0     Cl 98 - 107 mmol/L 109  108     Cl (external) 98 - 107 mmol/L 109  108     CO2 22 - 29 mmol/L 21  24     Urea Nitrogen 6.0 - 20.0 mg/dL 30.4  33.6     Creatinine 0.51 - 0.95 mg/dL 1.41  1.71     Glucose 70 - 99 mg/dL 138  107  84    Calcium 8.6 - 10.0 mg/dL 10.0  9.7     Magnesium 1.7 - 2.3 mg/dL 1.7  2.0           Latest Ref Rng & Units 4/23/2024     7:35 AM 4/22/2024     8:21 AM 4/21/2024     5:52 AM   Bone Health   Phosphorus 2.5 - 4.5 mg/dL 1.8  1.8  2.2    Parathyroid Hormone Intact 15 - 65 pg/mL  138     Vit D Def 20 - 50 ng/mL  18           Latest Ref Rng & Units 4/23/2024     7:35 AM 4/22/2024      8:21 AM 4/21/2024     5:52 AM   Heme   WBC 4.0 - 11.0 10e3/uL 5.7  5.3  6.1    Hgb 11.7 - 15.7 g/dL 9.0  8.4  8.0    Plt 150 - 450 10e3/uL 134  107  84          Latest Ref Rng & Units 4/17/2024     9:58 AM 9/29/2022    11:40 AM 8/8/2022     9:23 AM   Liver   AP 40 - 150 U/L 55  75     AP (external) 44 - 121 IU/L   73       TBili <=1.2 mg/dL 0.6  0.4     TBili (external) 0.0 - 1.2 mg/dL   0.4       DBili (external) 0.00 - 0.40 mg/dL   <0.10       ALT 0 - 50 U/L 5  12     ALT (external) 0 - 32 IU/L   10       AST 0 - 45 U/L 17  17     AST (external) 0 - 40 IU/L   15       Tot Protein 6.4 - 8.3 g/dL 6.5  7.6     Tot Protein (external) 6.0 - 8.5 g/dL   6.3       Albumin 3.5 - 5.2 g/dL 4.1  4.4     Albumin (external) 3.8 - 4.8 g/dL   4.0           This result is from an external source.         Latest Ref Rng & Units 4/17/2024     9:58 AM   Pancreas   A1C <5.7 % 4.7          Latest Ref Rng & Units 4/22/2024     8:21 AM   Iron studies   Iron 37 - 145 ug/dL 53    Iron Sat Index 15 - 46 % 36    Ferritin 6 - 175 ng/mL 2,181          Latest Ref Rng & Units 4/17/2024     9:58 AM 9/29/2022    11:40 AM   [facility] Txp Virology   EBV CAPSID ANTIBODY IGG No detectable antibody. Positive  Positive      Failed to redirect to the Timeline version of the [employer].  Recent Labs   Lab Test 04/20/24  0559 04/22/24  0821   DOSTAC  --  4/21/2024   TACROL 5.9 5.0              LAURITA Jacobson CNP

## 2024-04-23 NOTE — TELEPHONE ENCOUNTER
ISSUE:   Tacrolimus IR level 7.2 on 4/23, goal 8-10, dose 4.5 mg BID.    PLAN:   Call Patient and confirm this was an accurate 12-hour trough.   Verify Tacrolimus IR dose 4.5 mg BID.   Confirm no new medications or or missed doses.   Confirm no new illness / infection / diarrhea.   If accurate trough and accurate dose, increase Tacrolimus IR dose to 5 mg BID     Is this more than a 50% increase or decrease in current IS dose: No  If YES, justification: na    Repeat labs in 3 days.  *If > 50% change in immunosuppression dose, repeat labs in 1 week.     OUTCOME:   Spoke with Patient, they confirm accurate trough level and current dose 4.5 mg BID.   Patient confirmed dose change to 5 mg BID.  Patient agreed to repeat labs in 1 days.   Orders sent to preferred pharmacy for dose change and lab for repeat labs.   Patient voiced understanding of plan.

## 2024-04-23 NOTE — PATIENT INSTRUCTIONS
Dear Ira Zamora    Thank you for choosing [facility] ([facility]) for your transplant cares.  The following information is a summary of our appointment as well as important reminders.      Please make sure your phone is available today because your coordinator will call to update you with your anti-rejection drug levels and possibly make changes to your anti-rejection dosages.    Remember to increase doxazosin tonight to 8 mg and start taking phosphorus.     If you are a transplant patient and require transplant education, please click on this link: [email or web address].    Transplant Coordinator: Radha GANN  Transplant Office:  [phone]  [facility]:  [phone]  Ask for physician on call for kidney transplant.  Unit 7[address] (Transplant Unit):  [phone]  [facility]:  [phone]      We look forward in seeing you on your next appointment here at [facility] ([facility]).  Please don t hesitate to call us at [phone] to reschedule any of your appointments or to speak with one of the [facility] registered nurses.  It was a pleasure taking care of you today.    Sincerely,    [facility]  [address] [place]  [address]  Phone:  [phone]

## 2024-04-24 ENCOUNTER — LAB (OUTPATIENT)
Dept: LAB | Facility: CLINIC | Age: 34
End: 2024-04-24
Payer: MEDICARE

## 2024-04-24 ENCOUNTER — ALLIED HEALTH/NURSE VISIT (OUTPATIENT)
Dept: TRANSPLANT | Facility: CLINIC | Age: 34
End: 2024-04-24
Attending: NURSE PRACTITIONER
Payer: MEDICARE

## 2024-04-24 DIAGNOSIS — Z94.0 KIDNEY REPLACED BY TRANSPLANT: ICD-10-CM

## 2024-04-24 DIAGNOSIS — Z98.890 OTHER SPECIFIED POSTPROCEDURAL STATES: ICD-10-CM

## 2024-04-24 DIAGNOSIS — Z79.899 ENCOUNTER FOR LONG-TERM CURRENT USE OF MEDICATION: ICD-10-CM

## 2024-04-24 DIAGNOSIS — Z20.828 CONTACT WITH AND (SUSPECTED) EXPOSURE TO OTHER VIRAL COMMUNICABLE DISEASES: ICD-10-CM

## 2024-04-24 DIAGNOSIS — Z48.298 AFTERCARE FOLLOWING ORGAN TRANSPLANT: ICD-10-CM

## 2024-04-24 DIAGNOSIS — Z94.0 KIDNEY REPLACED BY TRANSPLANT: Primary | ICD-10-CM

## 2024-04-24 LAB
ANION GAP SERPL CALCULATED.3IONS-SCNC: 7 MMOL/L (ref 7–15)
BUN SERPL-MCNC: 26.2 MG/DL (ref 6–20)
CALCIUM SERPL-MCNC: 9.9 MG/DL (ref 8.6–10)
CHLORIDE SERPL-SCNC: 110 MMOL/L (ref 98–107)
CREAT SERPL-MCNC: 1.42 MG/DL (ref 0.51–0.95)
DEPRECATED HCO3 PLAS-SCNC: 22 MMOL/L (ref 22–29)
EGFRCR SERPLBLD CKD-EPI 2021: 50 ML/MIN/1.73M2
ERYTHROCYTE [DISTWIDTH] IN BLOOD BY AUTOMATED COUNT: 13.2 % (ref 10–15)
GLUCOSE SERPL-MCNC: 123 MG/DL (ref 70–99)
HCT VFR BLD AUTO: 27.1 % (ref 35–47)
HGB BLD-MCNC: 9 G/DL (ref 11.7–15.7)
MCH RBC QN AUTO: 31.4 PG (ref 26.5–33)
MCHC RBC AUTO-ENTMCNC: 33.2 G/DL (ref 31.5–36.5)
MCV RBC AUTO: 94 FL (ref 78–100)
MYCOPHENOLATE SERPL LC/MS/MS-MCNC: 2.2 MG/L (ref 1–3.5)
MYCOPHENOLATE-G SERPL LC/MS/MS-MCNC: 107.5 MG/L (ref 30–95)
PLATELET # BLD AUTO: 169 10E3/UL (ref 150–450)
POTASSIUM SERPL-SCNC: 5.1 MMOL/L (ref 3.4–5.3)
RBC # BLD AUTO: 2.87 10E6/UL (ref 3.8–5.2)
SODIUM SERPL-SCNC: 139 MMOL/L (ref 135–145)
TACROLIMUS BLD-MCNC: 7.2 UG/L (ref 5–15)
TME LAST DOSE: ABNORMAL H
TME LAST DOSE: ABNORMAL H
TME LAST DOSE: NORMAL H
TME LAST DOSE: NORMAL H
WBC # BLD AUTO: 6.3 10E3/UL (ref 4–11)

## 2024-04-24 PROCEDURE — 80048 BASIC METABOLIC PNL TOTAL CA: CPT | Performed by: PATHOLOGY

## 2024-04-24 PROCEDURE — 99000 SPECIMEN HANDLING OFFICE-LAB: CPT | Performed by: PATHOLOGY

## 2024-04-24 PROCEDURE — 80197 ASSAY OF TACROLIMUS: CPT | Performed by: INTERNAL MEDICINE

## 2024-04-24 PROCEDURE — 85027 COMPLETE CBC AUTOMATED: CPT | Performed by: PATHOLOGY

## 2024-04-24 PROCEDURE — 36415 COLL VENOUS BLD VENIPUNCTURE: CPT | Performed by: PATHOLOGY

## 2024-04-24 NOTE — PROGRESS NOTES
Chief Complaint   Patient presents with    Allied Health Visit     Mcintosh Removal      Removed Mcintosh w/o complication. No pain or concerns    Anu Nuñez CMA

## 2024-04-25 ENCOUNTER — DOCUMENTATION ONLY (OUTPATIENT)
Dept: TRANSPLANT | Facility: CLINIC | Age: 34
End: 2024-04-25

## 2024-04-25 ENCOUNTER — HOSPITAL ENCOUNTER (OUTPATIENT)
Facility: CLINIC | Age: 34
Discharge: HOME | End: 2024-04-25
Payer: COMMERCIAL

## 2024-04-25 ENCOUNTER — LAB REQUISITION (OUTPATIENT)
Dept: LAB | Facility: CLINIC | Age: 34
End: 2024-04-25
Payer: COMMERCIAL

## 2024-04-25 DIAGNOSIS — Z94.0 KIDNEY REPLACED BY TRANSPLANT: ICD-10-CM

## 2024-04-25 DIAGNOSIS — Z94.0 KIDNEY TRANSPLANT STATUS: ICD-10-CM

## 2024-04-25 LAB
ANION GAP SERPL CALCULATED.3IONS-SCNC: 10 MMOL/L (ref 7–15)
BASOPHILS # BLD MANUAL: 0.1 10E3/UL (ref 0–0.2)
BASOPHILS NFR BLD MANUAL: 1 %
BK VIRUS IGG ANTIBODY: ABNORMAL TITER
BUN SERPL-MCNC: 31.8 MG/DL (ref 6–20)
CALCIUM SERPL-MCNC: 10 MG/DL (ref 8.6–10)
CHLORIDE SERPL-SCNC: 110 MMOL/L (ref 98–107)
CREAT SERPL-MCNC: 1.32 MG/DL (ref 0.51–0.95)
DEPRECATED HCO3 PLAS-SCNC: 19 MMOL/L (ref 22–29)
EGFRCR SERPLBLD CKD-EPI 2021: 54 ML/MIN/1.73M2
EOSINOPHIL # BLD MANUAL: 0.2 10E3/UL (ref 0–0.7)
EOSINOPHIL NFR BLD MANUAL: 3 %
ERYTHROCYTE [DISTWIDTH] IN BLOOD BY AUTOMATED COUNT: 13.2 % (ref 10–15)
GLUCOSE SERPL-MCNC: 88 MG/DL (ref 70–99)
HCT VFR BLD AUTO: 26 % (ref 35–47)
HGB BLD-MCNC: 8.4 G/DL (ref 11.7–15.7)
LYMPHOCYTES # BLD MANUAL: 0.2 10E3/UL (ref 0.8–5.3)
LYMPHOCYTES NFR BLD MANUAL: 3 %
MCH RBC QN AUTO: 30.5 PG (ref 26.5–33)
MCHC RBC AUTO-ENTMCNC: 32.3 G/DL (ref 31.5–36.5)
MCV RBC AUTO: 95 FL (ref 78–100)
METAMYELOCYTES # BLD MANUAL: 0.3 10E3/UL
METAMYELOCYTES NFR BLD MANUAL: 4 %
MONOCYTES # BLD MANUAL: 0.4 10E3/UL (ref 0–1.3)
MONOCYTES NFR BLD MANUAL: 6 %
MYELOCYTES # BLD MANUAL: 0.1 10E3/UL
MYELOCYTES NFR BLD MANUAL: 1 %
NEUTROPHILS # BLD MANUAL: 5.8 10E3/UL (ref 1.6–8.3)
NEUTROPHILS NFR BLD MANUAL: 82 %
NRBC # BLD AUTO: 0 10E3/UL
NRBC BLD AUTO-RTO: 0 /100
PHOSPHATE SERPL-MCNC: 3.1 MG/DL (ref 2.5–4.5)
PLAT MORPH BLD: ABNORMAL
PLATELET # BLD AUTO: 197 10E3/UL (ref 150–450)
POTASSIUM SERPL-SCNC: 5.1 MMOL/L (ref 3.4–5.3)
RBC # BLD AUTO: 2.75 10E6/UL (ref 3.8–5.2)
RBC MORPH BLD: ABNORMAL
SODIUM SERPL-SCNC: 139 MMOL/L (ref 135–145)
TACROLIMUS BLD-MCNC: 9.6 UG/L (ref 5–15)
TME LAST DOSE: NORMAL H
TME LAST DOSE: NORMAL H
WBC # BLD AUTO: 7.1 10E3/UL (ref 4–11)

## 2024-04-25 PROCEDURE — 80197 ASSAY OF TACROLIMUS: CPT | Mod: ORL | Performed by: INTERNAL MEDICINE

## 2024-04-25 RX ORDER — TACROLIMUS 1 MG/1
5 CAPSULE ORAL 2 TIMES DAILY
Qty: 300 CAPSULE | Refills: 11 | Status: SHIPPED | OUTPATIENT
Start: 2024-04-25 | End: 2024-05-02

## 2024-04-25 RX ORDER — TACROLIMUS 0.5 MG/1
0.5 CAPSULE ORAL 2 TIMES DAILY
Qty: 60 CAPSULE | Refills: 11 | Status: SHIPPED | OUTPATIENT
Start: 2024-04-25 | End: 2024-05-02

## 2024-04-27 PROBLEM — R31.0 GROSS HEMATURIA: Status: ACTIVE | Noted: 2024-04-27

## 2024-04-27 PROBLEM — D63.1 ANEMIA IN CHRONIC KIDNEY DISEASE: Status: ACTIVE | Noted: 2022-10-03

## 2024-04-27 PROBLEM — G89.18 ACUTE POST-OPERATIVE PAIN: Status: ACTIVE | Noted: 2024-04-27

## 2024-04-27 PROBLEM — K21.9 GASTROESOPHAGEAL REFLUX DISEASE WITHOUT ESOPHAGITIS: Status: ACTIVE | Noted: 2024-04-27

## 2024-04-27 PROBLEM — I10 HYPERTENSION: Status: ACTIVE | Noted: 2022-09-28

## 2024-04-27 PROBLEM — N18.9 ANEMIA IN CHRONIC KIDNEY DISEASE: Status: ACTIVE | Noted: 2022-10-03

## 2024-04-30 ENCOUNTER — VIRTUAL VISIT (OUTPATIENT)
Dept: PHARMACY | Facility: CLINIC | Age: 34
End: 2024-04-30
Payer: MEDICARE

## 2024-04-30 DIAGNOSIS — E78.5 DYSLIPIDEMIA: ICD-10-CM

## 2024-04-30 DIAGNOSIS — I15.9 SECONDARY HYPERTENSION: ICD-10-CM

## 2024-04-30 DIAGNOSIS — Z78.9 TAKES DIETARY SUPPLEMENTS: ICD-10-CM

## 2024-04-30 DIAGNOSIS — Z94.0 KIDNEY REPLACED BY TRANSPLANT: Primary | ICD-10-CM

## 2024-04-30 DIAGNOSIS — F41.1 GAD (GENERALIZED ANXIETY DISORDER): ICD-10-CM

## 2024-04-30 DIAGNOSIS — K21.9 GASTROESOPHAGEAL REFLUX DISEASE, UNSPECIFIED WHETHER ESOPHAGITIS PRESENT: ICD-10-CM

## 2024-04-30 DIAGNOSIS — Z94.0 KIDNEY TRANSPLANT RECIPIENT: Primary | ICD-10-CM

## 2024-04-30 DIAGNOSIS — R19.5 LOOSE STOOLS: ICD-10-CM

## 2024-04-30 PROCEDURE — 99607 MTMS BY PHARM ADDL 15 MIN: CPT | Mod: 93 | Performed by: PHARMACIST

## 2024-04-30 PROCEDURE — 99605 MTMS BY PHARM NP 15 MIN: CPT | Mod: 93 | Performed by: PHARMACIST

## 2024-04-30 RX ORDER — MYCOPHENOLIC ACID 180 MG/1
540 TABLET, DELAYED RELEASE ORAL 2 TIMES DAILY
Qty: 180 TABLET | Refills: 3 | Status: SHIPPED | OUTPATIENT
Start: 2024-04-30 | End: 2024-05-30

## 2024-04-30 RX ORDER — DOXAZOSIN 4 MG/1
2 TABLET ORAL AT BEDTIME
COMMUNITY
End: 2024-05-10

## 2024-04-30 NOTE — PROGRESS NOTES
"Medication Therapy Management (MTM) Encounter    ASSESSMENT:                            Medication Adherence/Access: No issues identified    Kidney Transplant:    Valcyte dosed too lower per CrCl, will increase dose. Patient may benefit from switching Mycophenolate Mofetil to EC Myfortic.    GERD:   Can increase famotidine to 40mg twice daily for a few days, then try splitting the dose to 20mg twice daily rather than 40mg dosed first thing in the morning.    Supplements:   Phosphorus levels normal, will stop.     Hypertension   BPs quite low <110, well below goal of 150/90, will reduce Doxazosin further.    Hyperlipidemia   Stable.     Crohns disease/ Loose stools:   Considering patient's GERD sx as well as occasional loose stools and hx of Crohns, may be better for patient to be on enteric coated Myfortic vs Mycophenolate Mofetil. Will discuss with txp team.     Anxiety  Stable.    PLAN:                            Txp team...  Consider switching Mycophenolate Mofetil to Myfortic due to GERD sx and loose stools/ Crohns.     Pt to...  Reduce Doxazosin 2mg every evening x 1 week, then send me your BPs.   Try Famotidine 40mg twice daily for 3 days, then decrease to 20mg twice daily.   Increase Valcyte (Valganciclovir) to 1 tablet (450mg) once daily.  Stop Phosphorus  [facility] mail order will call you three weeks post discharge, but if your dose changes, call the number on the back of the pill box to talk to them earlier, [phone]. If your doctor sends over a new prescription to the mail order pharmacy you will have to call them to set up the order. They have an [facility] called \"[facility]\" as well.     Follow-up: 2 months post transplant.     SUBJECTIVE/OBJECTIVE:                          Ira Zamora is a 34 year old female called for a transitions of care visit. She was discharged from [facility] on 4/21 for kidney transplant.      Reason for visit: initial post txp.    Allergies/ADRs: Reviewed in chart  Past Medical " History: Reviewed in chart  Tobacco: She reports that she has never smoked. She has never used smokeless tobacco.  Alcohol: not currently using  THC/CBD: none    Medication Adherence/Access: Patient uses pill box(es) and uses reminders/alarms.  Patient takes medications 2 time(s) per day. 9:30 am and pm  Per patient, misses medication 0 times per week.   Medication barriers: none.   The patient fills medications at [facility]: YES.    Kidney Transplant:    Tacrolimus 5.5 mg twice daily  Mycophenolate Mofetil 750 mg twice daily.   Birth control if female is 48 yo or younger discussed: Yes   Pt reports some loose stools  Transplant date: 4/14/24  Vascular Prophylaxis: Aspirin 325mg daily. Denies gastrointestinal bleed sx.  CMV prophylaxis: CrCl 25 to 39 mL/minute: Valcyte 450 mg every other day Donor (+), Recipient (-), treat 6 months post tx   Valcyte birth defects discussed: Yes   PJP prophylaxis: Bactrim SS three times per week  Tx Coordinator: Renae Stewart MD: Dr. Ortiz, Using Med Card: Yes  Immunizations: annual flu shot 2023; Pneumovax 23:  2021; Prevnar 20: 2023; TDaP:  2012; Shingrix: unknown, HBV: immunity per last titers, COVID: Primary x 3 and Bivalent x 1    Estimated Creatinine Clearance: 57.5 mL/min (A) (based on SCr of 1.32 mg/dL (H)).    GERD:   Famotidine 40 mg once daily   Taking Tums twice daily.   PPI induced interstitial nephritis.   Patient reports heartburn all day, but has improved, but more after dinner.   Patient feels that current regimen is somewhat effective.    Supplements:   Phosphorus 250mg twice daily. Knows what foods are high on phosphorus.   Lab Results   Component Value Date    MAG 1.7 04/23/2024    PHOS 3.1 04/25/2024    PHOS 1.8 (L) 04/23/2024    PHOS 1.8 (L) 04/22/2024     Hypertension   Doxazosin 4mg at bedtime reduced 3-4 days ago.   Patient reports SOB and heart rate increases when walking around.   Patient self monitors blood pressure.  Home BP monitoring 4/24 147/93,  4/26 126/82, 4/27 111/75, 4/28 114/76, 4/29 107/79 .       BP Readings from Last 3 Encounters:   04/23/24 135/87   04/22/24 (!) 153/104   04/21/24 133/88     Pulse Readings from Last 3 Encounters:   04/23/24 102   04/22/24 113   04/21/24 93     Hyperlipidemia   Atorvastatin 10mg daily  Patient reports general aches and pains.    Recent Labs   Lab Test 04/17/24  0958   CHOL 177   HDL 40*   *   TRIG 142     Crohns disease/ Loose stools:   Stelara every 8 weeks. Hold at least 6 weeks post txp per Dr. Quezada.   Maybe a little better post transplant. Having 1-2 loose stools daily.     Anxiety  Paroxetine 30mg at bedtime.   Tried decreasing in the past, anxiety returned. Has been taking for 20 years.   Patient reports no current medication side effects.  Patient reports symptoms are stable.    Today's Vitals: LMP 04/03/2024 (Approximate)   ----------------  Post Discharge Medication Reconciliation Status: discharge medications reconciled and changed, per note/orders.    I spent 30 minutes with this patient today. All changes were made via collaborative practice agreement with Dr. Ortiz. A copy of the visit note was provided to the patient's provider(s).    A summary of these recommendations was sent via [employer].    Kirk Patel, PharmD  MTM Pharmacist    Phone: [phone]     Telemedicine Visit Details  Type of service:  Telephone visit  Start Time: 12:15 PM  End Time: 12:43 PM     Medication Therapy Recommendations  Gastroesophageal reflux disease without esophagitis    Current Medication: famotidine (PEPCID) 20 MG tablet   Rationale: Dose too low - Dosage too low - Effectiveness   Recommendation: Increase Frequency   Status: Accepted per CPA         Hypertension    Current Medication: doxazosin (CARDURA) 4 MG tablet (Discontinued)   Rationale: Dose too high - Dosage too high - Safety   Recommendation: Decrease Dose   Status: Accepted per CPA         Kidney replaced by transplant    Current Medication:  mycophenolate (GENERIC EQUIVALENT) 250 MG capsule   Rationale: Undesirable effect - Adverse medication event - Safety   Recommendation: Change Medication Formulation  - mycophenolic acid 180 MG EC tablet   Status: Contact Provider - Awaiting Response          Current Medication: valGANciclovir (VALCYTE) 450 MG tablet   Rationale: Dose too low - Dosage too low - Effectiveness   Recommendation: Increase Frequency   Status: Accepted per CPA         Takes dietary supplements    Current Medication: phosphorus tablet 250 mg 250 MG per tablet (Discontinued)   Rationale: No medical indication at this time - Unnecessary medication therapy - Indication   Recommendation: Discontinue Medication   Status: Accepted per CPA

## 2024-04-30 NOTE — Clinical Note
1. Thoughts on switching Mycophenolate Mofetil to Myfortic due to GERD sx and loose stools/ Crohns?

## 2024-04-30 NOTE — PATIENT INSTRUCTIONS
"Recommendations from today's MTM visit:                                                      Reduce Doxazosin 2mg every evening x 1 week, then send me your BPs.   Try Famotidine 40mg twice daily for 3 days, then decrease to 20mg twice daily.   Increase Valcyte (Valganciclovir) to 1 tablet (450mg) once daily.  Stop Phosphorus  [facility] mail [facility] will call you three weeks post discharge, but if your dose changes, call the number on the back of the pill box to talk to them earlier, [phone]. If your doctor sends over a new prescription to the mail order pharmacy you will have to call them to set up the order. They have an [facility] called \"[facility]\" as well.     Follow-up: 2 months post transplant.     It was great speaking with you today.  I value your experience and would be very thankful for your time in providing feedback in our clinic survey. In the next few days, you may receive an email or text message from [facility] with a link to a survey related to your  clinical pharmacist.\"     To schedule another MTM appointment, please call the clinic directly or you may call the MTM scheduling line at [phone] or toll-free at [phone].     My Clinical Pharmacist's contact information:                                                      Please feel free to contact me with any questions or concerns you have.      Kirk Patel, PharmD  MTM Pharmacist    Phone: [phone]     "

## 2024-04-30 NOTE — PROGRESS NOTES
Neno Ortiz MD Griffin, Peter Alberto, [facility]; Radha Jorge, AMANDA  Okay with me.          Previous Messages       ----- Message -----  From: Kirk Patel [facility]  Sent: 4/30/2024   1:04 PM CDT  To: Neno Ortiz MD; Radha Jorge RN      1. Thoughts on switching Mycophenolate Mofetil to Myfortic due to GERD sx and loose stools/ Crohns?

## 2024-05-01 ENCOUNTER — TELEPHONE (OUTPATIENT)
Dept: TRANSPLANT | Facility: CLINIC | Age: 34
End: 2024-05-01
Payer: MEDICARE

## 2024-05-01 NOTE — TELEPHONE ENCOUNTER
Post Kidney and Pancreas Transplant Team Conference  Date: 5/1/2024  Transplant Coordinator: Radha Jorge     Attendees:  [x]  Dr. Ortiz [x] Roxanne Betancur, RN [x] Betty Valencia LPN     []  Dr. Arellano [x] Radha Jorge, RN [] Diamond Atkinson LPN    [x] Dr. Quezada [x] Otilia Prather RN    [x] Dr. Tsai [x] Geneva Dennis RN [] Frida Harris RN   [] Dr. Burgess [] Shantelle Ko, RN    [] Dr. Díaz [] Jael Olson, AMANDA    [x]  Dr. Patel [] Nia Pryor RN    [] Dr. Brooks [] Mao Mahan RN    [x] Nicole Kowalski, NP [] Mary Lou Corcoran RN    [x] Pratima Macedo NP [] Brenda Zayas RN        Verbal Plan Read Back:   Patient to see Pratima Macedo tomorrow    Routed to RN Coordinator   Betty Valencia LPN

## 2024-05-02 ENCOUNTER — LAB REQUISITION (OUTPATIENT)
Dept: LAB | Facility: CLINIC | Age: 34
End: 2024-05-02
Payer: MEDICARE

## 2024-05-02 ENCOUNTER — OFFICE VISIT (OUTPATIENT)
Dept: TRANSPLANT | Facility: CLINIC | Age: 34
End: 2024-05-02
Attending: NURSE PRACTITIONER
Payer: MEDICARE

## 2024-05-02 ENCOUNTER — TELEPHONE (OUTPATIENT)
Dept: TRANSPLANT | Facility: CLINIC | Age: 34
End: 2024-05-02

## 2024-05-02 VITALS
HEART RATE: 102 BPM | OXYGEN SATURATION: 100 % | WEIGHT: 126.1 LBS | TEMPERATURE: 98.4 F | SYSTOLIC BLOOD PRESSURE: 115 MMHG | DIASTOLIC BLOOD PRESSURE: 78 MMHG | BODY MASS INDEX: 22.68 KG/M2

## 2024-05-02 DIAGNOSIS — Z94.0 KIDNEY REPLACED BY TRANSPLANT: ICD-10-CM

## 2024-05-02 DIAGNOSIS — Z94.0 KIDNEY TRANSPLANT STATUS: ICD-10-CM

## 2024-05-02 DIAGNOSIS — E86.0 DEHYDRATION: Primary | ICD-10-CM

## 2024-05-02 LAB
ANION GAP SERPL CALCULATED.3IONS-SCNC: 8 MMOL/L (ref 7–15)
BASOPHILS # BLD AUTO: 0.1 10E3/UL (ref 0–0.2)
BASOPHILS NFR BLD AUTO: 1 %
BUN SERPL-MCNC: 24.8 MG/DL (ref 6–20)
CALCIUM SERPL-MCNC: 9.8 MG/DL (ref 8.6–10)
CHLORIDE SERPL-SCNC: 111 MMOL/L (ref 98–107)
CREAT SERPL-MCNC: 1.26 MG/DL (ref 0.51–0.95)
DEPRECATED HCO3 PLAS-SCNC: 21 MMOL/L (ref 22–29)
EGFRCR SERPLBLD CKD-EPI 2021: 57 ML/MIN/1.73M2
EOSINOPHIL # BLD AUTO: 0.1 10E3/UL (ref 0–0.7)
EOSINOPHIL NFR BLD AUTO: 2 %
ERYTHROCYTE [DISTWIDTH] IN BLOOD BY AUTOMATED COUNT: 14.2 % (ref 10–15)
GLUCOSE SERPL-MCNC: 95 MG/DL (ref 70–99)
HCT VFR BLD AUTO: 30 % (ref 35–47)
HGB BLD-MCNC: 9.5 G/DL (ref 11.7–15.7)
IMM GRANULOCYTES # BLD: 0 10E3/UL
IMM GRANULOCYTES NFR BLD: 0 %
LYMPHOCYTES # BLD AUTO: 0.3 10E3/UL (ref 0.8–5.3)
LYMPHOCYTES NFR BLD AUTO: 6 %
MAGNESIUM SERPL-MCNC: 1.3 MG/DL (ref 1.7–2.3)
MCH RBC QN AUTO: 30.4 PG (ref 26.5–33)
MCHC RBC AUTO-ENTMCNC: 31.7 G/DL (ref 31.5–36.5)
MCV RBC AUTO: 96 FL (ref 78–100)
MONOCYTES # BLD AUTO: 0.4 10E3/UL (ref 0–1.3)
MONOCYTES NFR BLD AUTO: 7 %
NEUTROPHILS # BLD AUTO: 4.5 10E3/UL (ref 1.6–8.3)
NEUTROPHILS NFR BLD AUTO: 84 %
NRBC # BLD AUTO: 0 10E3/UL
NRBC BLD AUTO-RTO: 0 /100
PHOSPHATE SERPL-MCNC: 2.7 MG/DL (ref 2.5–4.5)
PLATELET # BLD AUTO: 238 10E3/UL (ref 150–450)
POTASSIUM SERPL-SCNC: 4.5 MMOL/L (ref 3.4–5.3)
RBC # BLD AUTO: 3.13 10E6/UL (ref 3.8–5.2)
SODIUM SERPL-SCNC: 140 MMOL/L (ref 135–145)
TACROLIMUS BLD-MCNC: 18.4 UG/L (ref 5–15)
TME LAST DOSE: ABNORMAL H
TME LAST DOSE: ABNORMAL H
WBC # BLD AUTO: 5.4 10E3/UL (ref 4–11)

## 2024-05-02 PROCEDURE — 99213 OFFICE O/P EST LOW 20 MIN: CPT | Mod: 24 | Performed by: NURSE PRACTITIONER

## 2024-05-02 PROCEDURE — 80197 ASSAY OF TACROLIMUS: CPT | Mod: ORL | Performed by: INTERNAL MEDICINE

## 2024-05-02 PROCEDURE — G0463 HOSPITAL OUTPT CLINIC VISIT: HCPCS | Performed by: NURSE PRACTITIONER

## 2024-05-02 RX ORDER — MAGNESIUM OXIDE 400 MG/1
400 TABLET ORAL 2 TIMES DAILY
Qty: 60 TABLET | Refills: 0 | Status: SHIPPED | OUTPATIENT
Start: 2024-05-02 | End: 2024-06-01

## 2024-05-02 RX ORDER — TACROLIMUS 1 MG/1
3 CAPSULE ORAL 2 TIMES DAILY
Qty: 180 CAPSULE | Refills: 11 | Status: SHIPPED | OUTPATIENT
Start: 2024-05-02 | End: 2024-05-10

## 2024-05-02 RX ORDER — TACROLIMUS 0.5 MG/1
CAPSULE ORAL
Qty: 60 CAPSULE | Refills: 11 | Status: SHIPPED | OUTPATIENT
Start: 2024-05-02 | End: 2024-05-20

## 2024-05-02 NOTE — LETTER
2024         RE: Ira Zamora  [address] [address]        Dear Colleague,    Thank you for referring your patient, Ira Zamora, to the [facility]. Please see a copy of my visit note below.    Transplant Surgery Progress Note    Transplants:  2024 (Kidney); Postoperative day:  15  S: Ira Zamora is an 34 year old female with history of ESRD on dialysis d/t interstitial nephritis, Crohn's, HTN, GERD, and dilated ascending aorta. S/p  donor en bloc kidney transplant with ureteral stent x2 on 24.     Appetite is improving. Fair fluid intake, likely less than 2 liters daily.   Notes tachycardia and fatigue with walking, no CP.   Down 7lbs. Pre sx weight 60kg.   Pain is tolerable.   EVELIO drain has had about 30ml output daily the last 4 days.      BP at home 110-117/70s at home.     On 450mg valcyte daily that was increased per pharmacy.     No N/V/D. Denies constipation.     Drain removed today.     Standing BP today, 108/ 73 and .     On doxazosin 2mg nightly.     Transplant History:    Transplant Type:  DDKT  Donor Type: Donation after Brain Death   Transplant Date:  2024 (Kidney)   Ureteral Stent:  Yes   Crossmatch:  negative   DSA at Tx:  No  Baseline Cr:    DeNovo DSA: No    Acute Rejection Hx:  No    Present Maintenance Immunosuppression:  Tacrolimus and Mycophenolate mofetil    CMV IgG Ab Discordance:  No  EBV IgG Ab Discordance:  No    BK Viremia:  No  EBV Viremia:  No    Transplant Coordinator: Radha Jorge     Transplant Office Phone Number: [phone]     Immunosuppressant Medications       Immunosuppressive Agents Disp Start End     mycophenolate (GENERIC EQUIVALENT) 250 MG capsule 180 capsule 2024 --    Sig - Route: Take 3 capsules (750 mg) by mouth 2 times daily - Oral    Class: E-Prescribe    Notes to Pharmacy: TXP DT 2024 (Kidney) TXP Dischg DT 2024 DX Kidney replaced by transplant Z94.0 TX [facility]  [facility] ([place], [place])     mycophenolic acid (GENERIC EQUIVALENT) 180 MG EC tablet 180 tablet 4/30/2024 --    Sig - Route: Take 3 tablets (540 mg) by mouth 2 times daily - Oral    Class: E-Prescribe     tacrolimus (GENERIC EQUIVALENT) 1 MG capsule 180 capsule 5/2/2024 --    Sig - Route: Take 3 capsules (3 mg) by mouth 2 times daily - Oral    Class: E-Prescribe    Notes to Pharmacy: TXP DT 4/17/2024 (Kidney) TXP Dischg DT 4/21/2024 DX Kidney replaced by transplant Z94.0 [facility] ([place], [place])     tacrolimus (GENERIC EQUIVALENT) 0.5 MG capsule 60 capsule 5/2/2024 --    Sig: Hold for dose changes    Patient not taking: Reported on 5/2/2024    Class: E-Prescribe    Notes to Pharmacy: TXP DT 4/17/2024 (Kidney) TXP Dischg DT 4/21/2024 DX Kidney replaced by transplant Z94.0 [facility] ([place], [place])            Possible Immunosuppression-related side effects:   []             headache  []             vivid dreams  []             irritability  []             cognitive difficuties  []             fine tremor  []             nausea  []             diarrhea  []             neuropathy      []             edema  []             renal calcineurin toxicity  []             hyperkalemia  []             post-transplant diabetes  []             decreased appetite  []             increased appetite  []             other:  []             none    Prescription Medications as of 5/2/2024         Rx Number Disp Refills Start End Last Dispensed Date Next Fill Date Owning Pharmacy    acetaminophen (TYLENOL) 500 MG tablet  -- -- 12/7/2020 --       Sig: Take 1,000 mg by mouth every 6 hours as needed    Class: Historical    Route: Oral    aspirin (ASA) 325 MG tablet  30 tablet 2 4/21/2024 --   [facility] - [place], [place] - [address]    Sig: Take 1 tablet (325 mg) by mouth daily     Class: E-Prescribe    Route: Oral    Renewals       Renewal requests to authorizing provider (Skye Judge NP) <b>prohibited</b>            atorvastatin (LIPITOR) 10 MG tablet  30 tablet 1 2024 --   [address]    Sig: Take 1 tablet (10 mg) by mouth daily    Class: E-Prescribe    Route: Oral    doxazosin (CARDURA) 4 MG tablet  -- --  --       Sig: Take 2 mg by mouth at bedtime    Class: Historical    Route: Oral    famotidine (PEPCID) 20 MG tablet  60 tablet 1 2024 --   [address]    Sig: Take 2 tablets (40 mg) by mouth daily    Class: E-Prescribe    Route: Oral    Renewals       Renewal requests to authorizing provider (Skye Judge NP) <b>prohibited</b>            levonorgestrel (KYLEENA) 19.5 MG IUD  -- -- 2021 --       Si Device by Intrauterine route    Class: Historical    Route: Intrauterine    methocarbamol (ROBAXIN) 500 MG tablet  12 tablet 0 2024 --   [address]    Sig: Take 1 tablet (500 mg) by mouth 4 times daily as needed for muscle spasms    Class: E-Prescribe    Route: Oral    Renewals       Renewal requests to authorizing provider (Skye Judge NP) <b>prohibited</b>            mycophenolate (GENERIC EQUIVALENT) 250 MG capsule  180 capsule 1 2024 --   [facility] [place] [id number]    Sig: Take 3 capsules (750 mg) by mouth 2 times daily    Class: E-Prescribe    Notes to Pharmacy: TXP DT 2024 (Kidney) TXP Dischg DT 2024 DX Kidney replaced by transplant Z94.0 TX Center [facility] ([place], [place])    Route: Oral    mycophenolic acid (GENERIC EQUIVALENT) 180 MG EC tablet  180 tablet 3 2024 --   [facility] [place] [id number] [address]    Sig: Take 3 tablets  (540 mg) by mouth 2 times daily    Class: E-Prescribe    Route: Oral    PARoxetine (PAXIL) 30 MG tablet  -- -- 9/16/2022 --       Sig: Take 30 mg by mouth every evening    Class: Historical    Route: Oral    sulfamethoxazole-trimethoprim (BACTRIM) 400-80 MG tablet  30 tablet 1 4/23/2024 --   [address]    Sig: Take 1 tablet by mouth three times a week    Class: E-Prescribe    Notes to Pharmacy: Titrate up to daily when directed by transplant team with improving renal function    Route: Oral    tacrolimus (GENERIC EQUIVALENT) 1 MG capsule  180 capsule 11 5/2/2024 --   [facility] [place] [address] [address]    Sig: Take 3 capsules (3 mg) by mouth 2 times daily    Class: E-Prescribe    Notes to Pharmacy: TXP DT 4/17/2024 (Kidney) TXP Dischg DT 4/21/2024 DX Kidney replaced by transplant Z94.0 TX Center [facility] ([place], [place])    Route: Oral    ustekinumab (STELARA) 90 MG/ML  -- -- 9/28/2021 --       Sig: Inject 90 mg Subcutaneous once every eight weeks    Class: Historical    Route: Subcutaneous    valGANciclovir (VALCYTE) 450 MG tablet  60 tablet 2 4/21/2024 --   [address]    Sig: Take 1 tablet (450 mg) by mouth every other day Titrate up to 900mg daily when directed by transplant team with improving renal function    Class: E-Prescribe    Route: Oral    oxyCODONE (ROXICODONE) 5 MG tablet  8 tablet 0 4/21/2024 --   [address]    Sig: Take 1 tablet (5 mg) by mouth every 6 hours as needed for moderate to severe pain    Class: E-Prescribe    Earliest Fill Date: 4/21/2024    Route: Oral    tacrolimus (GENERIC EQUIVALENT) 0.5 MG capsule  60 capsule 11 5/2/2024 --   [facility] [place] [address] [address]    Sig: Hold for dose changes     Class: E-Prescribe    Notes to Pharmacy: TXP DT 4/17/2024 (Kidney) TXP Dischg DT 4/21/2024 DX Kidney replaced by transplant Z94.0 TX Center [facility] ([place], [place])            O:   Temp:  [98.4  F (36.9  C)] 98.4  F (36.9  C)  Pulse:  [102] 102  BP: (115)/(78) 115/78  SpO2:  [100 %] 100 %  General Appearance: in no apparent distress.   Skin: Normal, no rashes or jaundice  Heart: no murmur. Mild tachycardia   Lungs: easy respirations, no audible wheezing.  Abdomen: flat, The wound is dry and intact, without hernia. The abdomen is non-tender. The kidney graft is not tender.  There is no ascites.  Extremities: Tremor absent.   Edema: absent.   RLQ incision intact with staples. No erythema or drainage.    EVELIO drain removed with no issues. Gauze applied.           Latest Ref Rng & Units 5/2/2024     9:20 AM 4/25/2024     9:30 AM 4/24/2024     9:07 AM 4/23/2024     7:35 AM 4/22/2024     8:21 AM   Transplant Immunosuppression Labs   Creat 0.51 - 0.95 mg/dL 1.26  1.32  1.42  1.41  1.71    Urea Nitrogen 6.0 - 20.0 mg/dL 24.8  31.8  26.2  30.4  33.6    WBC 4.0 - 11.0 10e3/uL 5.4  7.1  6.3  5.7  5.3    Neutrophil % 84  82   90  88    ANEU 1.6 - 8.3 10e3/uL  5.8           Chemistries:   Recent Labs   Lab Test 05/02/24  0920   BUN 24.8*   CR 1.26*   GFRESTIMATED 57*   GLC 95     Lab Results   Component Value Date    A1C 4.7 04/17/2024     Recent Labs   Lab Test 04/17/24  0958   ALBUMIN 4.1   BILITOTAL 0.6   ALKPHOS 55   AST 17   ALT 5     Urine Studies:  Recent Labs   Lab Test 04/17/24  1113   COLOR Straw   APPEARANCE Clear   URINEGLC 70*   URINEBILI Negative   URINEKETONE Negative   SG 1.004   UBLD Trace*   URINEPH 8.5*   PROTEIN 100*   NITRITE Negative   LEUKEST Negative   RBCU 2   WBCU <1     No lab results found.  Hematology:   Recent Labs   Lab Test 05/02/24  0920 04/25/24  0930 04/24/24  0907   HGB 9.5* 8.4* 9.0*    197 169   WBC 5.4 7.1 6.3     Coags:   Recent Labs   Lab  Test 04/17/24  0958 09/29/22  1140   INR 1.20* 1.01     HLA antibodies:   SA1 HI RISK HALLEY   Date Value Ref Range Status   04/17/2024 None  Final     SA1 MOD RISK HALLEY   Date Value Ref Range Status   04/17/2024 None  Final     SA2 HI RISK HALLEY   Date Value Ref Range Status   04/17/2024 None  Final     SA2 MOD RISK HALLEY   Date Value Ref Range Status   04/17/2024 None  Final       Assessment: Ira Zamora is doing fairly well s/p DDKT:  Issues we addressed during her visit include:    Plan:    1. Graft function: Cr stable at 1.2   2. Immunosuppression Management: No change    .  Complexity of management:Medium.  Contributing factors:  s/p kidney tx, hypomagnesemia, dehydration.   3. : EVELIO drain removed with no issues.   Start Mag Ox 400mg BID.   Stop doxazosin. Monitor BP.   -Arrange for IV fluid bolus       Followup: labs as scheduled   Case discussed with Dr. Ortiz      Total Time: 30 min  Counselling Time: 10 min.    LAURITA Jacobson        Again, thank you for allowing me to participate in the care of your patient.        Sincerely,        LAURITA Jacobson CNP

## 2024-05-02 NOTE — NURSING NOTE
"Chief Complaint   Patient presents with    Follow Up     Drain removal       Vital signs:  Temp: 98.4  F (36.9  C) Temp src: Oral BP: 115/78 Pulse: 102     SpO2: 100 %       Weight: 57.2 kg (126 lb 1.6 oz)  Estimated body mass index is 22.68 kg/m  as calculated from the following:    Height as of 4/18/24: 1.588 m (5' 2.52\").    Weight as of this encounter: 57.2 kg (126 lb 1.6 oz).      William Servin RN on 5/2/2024 at 1:29 PM    "

## 2024-05-02 NOTE — TELEPHONE ENCOUNTER
----- Message from Neno Ortiz MD sent at 5/2/2024 12:06 PM CDT -----  No, because of the time lag, too hard to follow.  Her creatinine is actually decreasing, so would just lower the dose as you normally would.    Jas  ----- Message -----  From: Radha Jorge RN  Sent: 5/2/2024  11:57 AM CDT  To: Neno Ortiz MD    I'm getting a lag in her results as they are coming from home care but TAC level on Tuesday was 22. Should she hold tonights dose and resume at lower dose? Currently taking 5mg BID

## 2024-05-02 NOTE — PROGRESS NOTES
Transplant Surgery Progress Note    Transplants:  2024 (Kidney); Postoperative day:  15  S: Ira Zamora is an 34 year old female with history of ESRD on dialysis d/t interstitial nephritis, Crohn's, HTN, GERD, and dilated ascending aorta. S/p  donor en bloc kidney transplant with ureteral stent x2 on 24.     Appetite is improving. Fair fluid intake, likely less than 2 liters daily.   Notes tachycardia and fatigue with walking, no CP.   Down 7lbs. Pre sx weight 60kg.   Pain is tolerable.   EVELIO drain has had about 30ml output daily the last 4 days.      BP at home 110-117/70s at home.     On 450mg valcyte daily that was increased per pharmacy.     No N/V/D. Denies constipation.     Drain removed today.     Standing BP today, 108/ 73 and .     On doxazosin 2mg nightly.     Transplant History:    Transplant Type:  DDKT  Donor Type: Donation after Brain Death   Transplant Date:  2024 (Kidney)   Ureteral Stent:  Yes   Crossmatch:  negative   DSA at Tx:  No  Baseline Cr:    DeNovo DSA: No    Acute Rejection Hx:  No    Present Maintenance Immunosuppression:  Tacrolimus and Mycophenolate mofetil    CMV IgG Ab Discordance:  No  EBV IgG Ab Discordance:  No    BK Viremia:  No  EBV Viremia:  No    Transplant Coordinator: Radha Jorge     Transplant Office Phone Number: [phone]     Immunosuppressant Medications       Immunosuppressive Agents Disp Start End     mycophenolate (GENERIC EQUIVALENT) 250 MG capsule 180 capsule 2024 --    Sig - Route: Take 3 capsules (750 mg) by mouth 2 times daily - Oral    Class: E-Prescribe    Notes to Pharmacy: TXP DT 2024 (Kidney) TXP Dischg DT 2024 DX Kidney replaced by transplant Z94.0 TX [facility] ([place], [place])     mycophenolic acid (GENERIC EQUIVALENT) 180 MG EC tablet 180 tablet 2024 --    Sig - Route: Take 3 tablets (540 mg) by mouth 2 times daily - Oral    Class: E-Prescribe      tacrolimus (GENERIC EQUIVALENT) 1 MG capsule 180 capsule 5/2/2024 --    Sig - Route: Take 3 capsules (3 mg) by mouth 2 times daily - Oral    Class: E-Prescribe    Notes to Pharmacy: TXP DT 4/17/2024 (Kidney) TXP Dischg DT 4/21/2024 DX Kidney replaced by transplant Z94.0 TX [facility] ([place], [place])     tacrolimus (GENERIC EQUIVALENT) 0.5 MG capsule 60 capsule 5/2/2024 --    Sig: Hold for dose changes    Patient not taking: Reported on 5/2/2024    Class: E-Prescribe    Notes to Pharmacy: TXP DT 4/17/2024 (Kidney) TXP Dischg DT 4/21/2024 DX Kidney replaced by transplant Z94.0 [facility] ([place], [place])            Possible Immunosuppression-related side effects:   []             headache  []             vivid dreams  []             irritability  []             cognitive difficuties  []             fine tremor  []             nausea  []             diarrhea  []             neuropathy      []             edema  []             renal calcineurin toxicity  []             hyperkalemia  []             post-transplant diabetes  []             decreased appetite  []             increased appetite  []             other:  []             none    Prescription Medications as of 5/2/2024         Rx Number Disp Refills Start End Last Dispensed Date Next Fill Date Owning Pharmacy    acetaminophen (TYLENOL) 500 MG tablet  -- -- 12/7/2020 --       Sig: Take 1,000 mg by mouth every 6 hours as needed    Class: Historical    Route: Oral    aspirin (ASA) 325 MG tablet  30 tablet 2 4/21/2024 --   [facility] - [place], [place] - [address] [facility]    Sig: Take 1 tablet (325 mg) by mouth daily    Class: E-Prescribe    Route: Oral    Renewals       Renewal requests to authorizing provider (Skye Judge NP) <b>prohibited</b>            atorvastatin (LIPITOR) 10 MG tablet  30 tablet 1 4/21/2024 --   [facility]  Discharge - [address]    Sig: Take 1 tablet (10 mg) by mouth daily    Class: E-Prescribe    Route: Oral    doxazosin (CARDURA) 4 MG tablet  -- --  --       Sig: Take 2 mg by mouth at bedtime    Class: Historical    Route: Oral    famotidine (PEPCID) 20 MG tablet  60 tablet 1 2024 --   [facility] - [address]    Sig: Take 2 tablets (40 mg) by mouth daily    Class: E-Prescribe    Route: Oral    Renewals       Renewal requests to authorizing provider (Skye Judge NP) <b>prohibited</b>            levonorgestrel (KYLEENA) 19.5 MG IUD  -- -- 2021 --       Si Device by Intrauterine route    Class: Historical    Route: Intrauterine    methocarbamol (ROBAXIN) 500 MG tablet  12 tablet 0 2024 --   [facility] - [address]    Sig: Take 1 tablet (500 mg) by mouth 4 times daily as needed for muscle spasms    Class: E-Prescribe    Route: Oral    Renewals       Renewal requests to authorizing provider (Skye Judge NP) <b>prohibited</b>            mycophenolate (GENERIC EQUIVALENT) 250 MG capsule  180 capsule 1 2024 --   [facility] - [place] [address] [address]    Sig: Take 3 capsules (750 mg) by mouth 2 times daily    Class: E-Prescribe    Notes to Pharmacy: TXP DT 2024 (Kidney) TXP Dischg DT 2024 DX Kidney replaced by transplant Z94.0 TX Center [facility] ([place], [place])    Route: Oral    mycophenolic acid (GENERIC EQUIVALENT) 180 MG EC tablet  180 tablet 3 2024 --   [facility] - [place] [address] [address]    Sig: Take 3 tablets (540 mg) by mouth 2 times daily    Class: E-Prescribe    Route: Oral    PARoxetine (PAXIL) 30 MG tablet  -- -- 2022 --       Sig: Take 30 mg by mouth every evening    Class: Historical    Route: Oral     sulfamethoxazole-trimethoprim (BACTRIM) 400-80 MG tablet  30 tablet 1 4/23/2024 --   [facility] [place], [place] - [address] [place] [address]    Sig: Take 1 tablet by mouth three times a week    Class: E-Prescribe    Notes to Pharmacy: Titrate up to daily when directed by transplant team with improving renal function    Route: Oral    tacrolimus (GENERIC EQUIVALENT) 1 MG capsule  180 capsule 11 5/2/2024 --   [facility] - [place] [address] [address]    Sig: Take 3 capsules (3 mg) by mouth 2 times daily    Class: E-Prescribe    Notes to Pharmacy: TXP DT 4/17/2024 (Kidney) TXP Dischg DT 4/21/2024 DX Kidney replaced by transplant Z94.0 [facility] ([place], [place])    Route: Oral    ustekinumab (STELARA) 90 MG/ML  -- -- 9/28/2021 --       Sig: Inject 90 mg Subcutaneous once every eight weeks    Class: Historical    Route: Subcutaneous    valGANciclovir (VALCYTE) 450 MG tablet  60 tablet 2 4/21/2024 --   [facility] [place], [place] - [address] [place] [address]    Sig: Take 1 tablet (450 mg) by mouth every other day Titrate up to 900mg daily when directed by transplant team with improving renal function    Class: E-Prescribe    Route: Oral    oxyCODONE (ROXICODONE) 5 MG tablet  8 tablet 0 4/21/2024 --   [facility] [place], [place] - [address] [place] [address]    Sig: Take 1 tablet (5 mg) by mouth every 6 hours as needed for moderate to severe pain    Class: E-Prescribe    Earliest Fill Date: 4/21/2024    Route: Oral    tacrolimus (GENERIC EQUIVALENT) 0.5 MG capsule  60 capsule 11 5/2/2024 --   [facility] - [place] [address] [address]    Sig: Hold for dose changes    Class: E-Prescribe    Notes to Pharmacy: TXP DT 4/17/2024 (Kidney) TXP Dischg DT 4/21/2024 DX Kidney replaced by transplant Z94.0 [facility] ([place], [place])             O:   Temp:  [98.4  F (36.9  C)] 98.4  F (36.9  C)  Pulse:  [102] 102  BP: (115)/(78) 115/78  SpO2:  [100 %] 100 %  General Appearance: in no apparent distress.   Skin: Normal, no rashes or jaundice  Heart: no murmur. Mild tachycardia   Lungs: easy respirations, no audible wheezing.  Abdomen: flat, The wound is dry and intact, without hernia. The abdomen is non-tender. The kidney graft is not tender.  There is no ascites.  Extremities: Tremor absent.   Edema: absent.   RLQ incision intact with staples. No erythema or drainage.    EVELIO drain removed with no issues. Gauze applied.           Latest Ref Rng & Units 5/2/2024     9:20 AM 4/25/2024     9:30 AM 4/24/2024     9:07 AM 4/23/2024     7:35 AM 4/22/2024     8:21 AM   Transplant Immunosuppression Labs   Creat 0.51 - 0.95 mg/dL 1.26  1.32  1.42  1.41  1.71    Urea Nitrogen 6.0 - 20.0 mg/dL 24.8  31.8  26.2  30.4  33.6    WBC 4.0 - 11.0 10e3/uL 5.4  7.1  6.3  5.7  5.3    Neutrophil % 84  82   90  88    ANEU 1.6 - 8.3 10e3/uL  5.8           Chemistries:   Recent Labs   Lab Test 05/02/24  0920   BUN 24.8*   CR 1.26*   GFRESTIMATED 57*   GLC 95     Lab Results   Component Value Date    A1C 4.7 04/17/2024     Recent Labs   Lab Test 04/17/24  0958   ALBUMIN 4.1   BILITOTAL 0.6   ALKPHOS 55   AST 17   ALT 5     Urine Studies:  Recent Labs   Lab Test 04/17/24  1113   COLOR Straw   APPEARANCE Clear   URINEGLC 70*   URINEBILI Negative   URINEKETONE Negative   SG 1.004   UBLD Trace*   URINEPH 8.5*   PROTEIN 100*   NITRITE Negative   LEUKEST Negative   RBCU 2   WBCU <1     No lab results found.  Hematology:   Recent Labs   Lab Test 05/02/24  0920 04/25/24  0930 04/24/24  0907   HGB 9.5* 8.4* 9.0*    197 169   WBC 5.4 7.1 6.3     Coags:   Recent Labs   Lab Test 04/17/24  0958 09/29/22  1140   INR 1.20* 1.01     HLA antibodies:   SA1 HI RISK HALLEY   Date Value Ref Range Status   04/17/2024 None  Final     SA1 MOD RISK HALLEY   Date Value Ref Range Status   04/17/2024 None   Final     SA2 HI RISK HALLEY   Date Value Ref Range Status   04/17/2024 None  Final     SA2 MOD RISK HALLEY   Date Value Ref Range Status   04/17/2024 None  Final       Assessment: Ira Zamora is doing fairly well s/p DDKT:  Issues we addressed during her visit include:    Plan:    1. Graft function: Cr stable at 1.2   2. Immunosuppression Management: No change    .  Complexity of management:Medium.  Contributing factors:  s/p kidney tx, hypomagnesemia, dehydration.   3. : EVELIO drain removed with no issues.   Start Mag Ox 400mg BID.   Stop doxazosin. Monitor BP.   -Arrange for IV fluid bolus       Followup: labs as scheduled   Case discussed with Dr. Ortiz      Total Time: 30 min  Counselling Time: 10 min.    LAURITA Jacobson

## 2024-05-03 ENCOUNTER — INFUSION THERAPY VISIT (OUTPATIENT)
Dept: INFUSION THERAPY | Facility: CLINIC | Age: 34
End: 2024-05-03
Attending: NURSE PRACTITIONER
Payer: MEDICARE

## 2024-05-03 VITALS — HEART RATE: 92 BPM | TEMPERATURE: 98.6 F | SYSTOLIC BLOOD PRESSURE: 122 MMHG | DIASTOLIC BLOOD PRESSURE: 79 MMHG

## 2024-05-03 DIAGNOSIS — E86.0 DEHYDRATION: Primary | ICD-10-CM

## 2024-05-03 DIAGNOSIS — R30.0 DYSURIA: ICD-10-CM

## 2024-05-03 LAB
ALBUMIN UR-MCNC: NEGATIVE MG/DL
APPEARANCE UR: CLEAR
BACTERIA #/AREA URNS HPF: ABNORMAL /HPF
BILIRUB UR QL STRIP: NEGATIVE
COLOR UR AUTO: ABNORMAL
GLUCOSE UR STRIP-MCNC: 100 MG/DL
HGB UR QL STRIP: NEGATIVE
KETONES UR STRIP-MCNC: NEGATIVE MG/DL
LEUKOCYTE ESTERASE UR QL STRIP: ABNORMAL
MUCOUS THREADS #/AREA URNS LPF: PRESENT /LPF
NITRATE UR QL: NEGATIVE
PH UR STRIP: 5.5 [PH] (ref 5–7)
RBC URINE: 2 /HPF
SP GR UR STRIP: 1.01 (ref 1–1.03)
SQUAMOUS EPITHELIAL: 1 /HPF
UROBILINOGEN UR STRIP-MCNC: NORMAL MG/DL
WBC URINE: 4 /HPF

## 2024-05-03 PROCEDURE — 81001 URINALYSIS AUTO W/SCOPE: CPT | Performed by: NURSE PRACTITIONER

## 2024-05-03 PROCEDURE — 87086 URINE CULTURE/COLONY COUNT: CPT | Performed by: NURSE PRACTITIONER

## 2024-05-03 PROCEDURE — 96360 HYDRATION IV INFUSION INIT: CPT

## 2024-05-03 PROCEDURE — 258N000003 HC RX IP 258 OP 636: Performed by: NURSE PRACTITIONER

## 2024-05-03 RX ORDER — METHYLPREDNISOLONE SODIUM SUCCINATE 125 MG/2ML
125 INJECTION, POWDER, LYOPHILIZED, FOR SOLUTION INTRAMUSCULAR; INTRAVENOUS
Status: CANCELLED
Start: 2024-05-03

## 2024-05-03 RX ORDER — EPINEPHRINE 1 MG/ML
0.3 INJECTION, SOLUTION INTRAMUSCULAR; SUBCUTANEOUS EVERY 5 MIN PRN
Status: CANCELLED | OUTPATIENT
Start: 2024-05-03

## 2024-05-03 RX ORDER — MEPERIDINE HYDROCHLORIDE 25 MG/ML
25 INJECTION INTRAMUSCULAR; INTRAVENOUS; SUBCUTANEOUS EVERY 30 MIN PRN
Status: CANCELLED | OUTPATIENT
Start: 2024-05-03

## 2024-05-03 RX ORDER — ALBUTEROL SULFATE 0.83 MG/ML
2.5 SOLUTION RESPIRATORY (INHALATION)
Status: CANCELLED | OUTPATIENT
Start: 2024-05-03

## 2024-05-03 RX ORDER — DIPHENHYDRAMINE HYDROCHLORIDE 50 MG/ML
50 INJECTION INTRAMUSCULAR; INTRAVENOUS
Status: CANCELLED
Start: 2024-05-03

## 2024-05-03 RX ORDER — HEPARIN SODIUM,PORCINE 10 UNIT/ML
5-20 VIAL (ML) INTRAVENOUS DAILY PRN
Status: CANCELLED | OUTPATIENT
Start: 2024-05-03

## 2024-05-03 RX ORDER — EPINEPHRINE 1 MG/ML
0.3 INJECTION, SOLUTION, CONCENTRATE INTRAVENOUS EVERY 5 MIN PRN
Status: CANCELLED | OUTPATIENT
Start: 2024-05-03

## 2024-05-03 RX ORDER — HEPARIN SODIUM (PORCINE) LOCK FLUSH IV SOLN 100 UNIT/ML 100 UNIT/ML
5 SOLUTION INTRAVENOUS
Status: CANCELLED | OUTPATIENT
Start: 2024-05-03

## 2024-05-03 RX ORDER — ALBUTEROL SULFATE 90 UG/1
1-2 AEROSOL, METERED RESPIRATORY (INHALATION)
Status: CANCELLED
Start: 2024-05-03

## 2024-05-03 RX ADMIN — SODIUM CHLORIDE 1000 ML: 9 INJECTION, SOLUTION INTRAVENOUS at 14:51

## 2024-05-03 NOTE — PROGRESS NOTES
Infusion Nursing Note:  Ira Zamora presents today for Patient presents with:  Infusion: IVF      Patient seen by provider today: No   present during visit today: No    Note: During [facility]'s [facility] appointment, orders from Pratima Macedo were completed.  Patient identification verified by name and date of birth.  Assessment completed.  Vitals recorded in Doc Flowsheets.  Patient was provided with education regarding medication/procedure and possible side effects.  Patient verbalized understanding.  Patient reporting dysuria, urgency and bladder spasms. Obtained verbal order for UA/UC from Pratima Macedo CNP    Frequency: one time  Labs: UA collected for UTI symptoms  Premedications:NA  Infusion Rate/Length: NS  infused at 999* mL/hr. Total infusion time of approximately 1 hour     Administrations This Visit       sodium chloride 0.9% BOLUS 1,000 mL       Admin Date  05/03/2024 Action  $New Bag Dose  1,000 mL Route  Intravenous Documented By  Breanne Syed, AMANDA                    Intravenous Access:    Peripheral IV placed.by VAT    Treatment Conditions:  Not Applicable.      Post Infusion Assessment:  Patient tolerated infusion without incident.  Site patent and intact, free from redness, edema or discomfort.  No evidence of extravasations.  Access discontinued per protocol.       Discharge Plan:   Discharge instructions reviewed with: Patient.  AVS to patient via [employer].  Patient will return   Future Appointments   Date Time Provider Department Center   5/6/2024  1:45 PM Carlitos Díaz MD [facility]   5/23/2024  9:30 AM [id number]   5/23/2024 10:00 AM  LAB [facility]   5/23/2024 10:30 AM Pratima Macedo APRN [facility]   5/23/2024 11:30 AM Yan Quezada MD [facility]   6/27/2024  9:30 AM  LAB [facility]   6/27/2024 10:00 AM Kirk Patel, [facility]   6/27/2024 11:30 AM Neno Ortiz MD [facility]    6/27/2024 12:00 PM Bayron Hernandez MSW [facility]   9/3/2024  9:15 AM [facility]   9/3/2024  9:30 AM Rachel Lima MD [facility]   10/22/2024  8:45 AM [facility]   10/22/2024  9:00 AM Neno Ortiz MD [facility]   2/3/2025  2:30 PM [facility]   2/3/2025  3:05 PM Neno Ortiz MD [facility]   4/22/2025 10:30 AM [facility]   4/22/2025 11:00 AM Neno Ortiz MD [facility]      for next appointment.   Patient discharged in stable condition accompanied by: self.  Departure Mode: Ambulatory    /79 (BP Location: Left arm, Patient Position: Semi-Guerra's, Cuff Size: Adult Small)   Pulse 92   Temp 98.6  F (37  C) (Oral)   LMP 04/03/2024 (Approximate)   Breanne Syed RN on 5/3/2024 at 3:02 PM  .

## 2024-05-03 NOTE — PATIENT INSTRUCTIONS
Dear Ira Zamora    Thank you for choosing [facility] ([facility]) for your infusion.  The following information is a summary of our appointment as well as important reminders.      Follow up with your transplant coordinator regarding your urine results.    If you are a transplant patient and require transplant education, please click on this link: [email or web address].    We look forward in seeing you on your next appointment here at [facility] ([facility]).  Please don t hesitate to call us at [phone] to reschedule any of your appointments or to speak with one of the [facility] registered nurses.  It was a pleasure taking care of you today.    Sincerely,    [facility]  [facility]  [address] [place] [address]  [address]  Phone:  [phone]

## 2024-05-04 LAB — BACTERIA UR CULT: NO GROWTH

## 2024-05-06 ENCOUNTER — LAB REQUISITION (OUTPATIENT)
Dept: LAB | Facility: CLINIC | Age: 34
End: 2024-05-06
Payer: MEDICARE

## 2024-05-06 ENCOUNTER — OFFICE VISIT (OUTPATIENT)
Dept: TRANSPLANT | Facility: CLINIC | Age: 34
End: 2024-05-06
Attending: INTERNAL MEDICINE
Payer: MEDICARE

## 2024-05-06 VITALS
WEIGHT: 123.8 LBS | SYSTOLIC BLOOD PRESSURE: 109 MMHG | HEART RATE: 101 BPM | DIASTOLIC BLOOD PRESSURE: 74 MMHG | BODY MASS INDEX: 22.27 KG/M2 | OXYGEN SATURATION: 98 %

## 2024-05-06 DIAGNOSIS — N18.9 CHRONIC KIDNEY DISEASE, UNSPECIFIED: ICD-10-CM

## 2024-05-06 DIAGNOSIS — I12.9 HYPERTENSIVE CHRONIC KIDNEY DISEASE WITH STAGE 1 THROUGH STAGE 4 CHRONIC KIDNEY DISEASE, OR UNSPECIFIED CHRONIC KIDNEY DISEASE: ICD-10-CM

## 2024-05-06 DIAGNOSIS — Z48.298 AFTERCARE FOLLOWING ORGAN TRANSPLANT: ICD-10-CM

## 2024-05-06 LAB
ANION GAP SERPL CALCULATED.3IONS-SCNC: 9 MMOL/L (ref 7–15)
BASOPHILS # BLD AUTO: 0 10E3/UL (ref 0–0.2)
BASOPHILS NFR BLD AUTO: 1 %
BUN SERPL-MCNC: 24.4 MG/DL (ref 6–20)
CALCIUM SERPL-MCNC: 10 MG/DL (ref 8.6–10)
CHLORIDE SERPL-SCNC: 109 MMOL/L (ref 98–107)
CREAT SERPL-MCNC: 1.17 MG/DL (ref 0.51–0.95)
DEPRECATED HCO3 PLAS-SCNC: 19 MMOL/L (ref 22–29)
EGFRCR SERPLBLD CKD-EPI 2021: 62 ML/MIN/1.73M2
EOSINOPHIL # BLD AUTO: 0.1 10E3/UL (ref 0–0.7)
EOSINOPHIL NFR BLD AUTO: 4 %
ERYTHROCYTE [DISTWIDTH] IN BLOOD BY AUTOMATED COUNT: 14.7 % (ref 10–15)
GLUCOSE SERPL-MCNC: 90 MG/DL (ref 70–99)
HCT VFR BLD AUTO: 31.6 % (ref 35–47)
HGB BLD-MCNC: 10 G/DL (ref 11.7–15.7)
IMM GRANULOCYTES # BLD: 0 10E3/UL
IMM GRANULOCYTES NFR BLD: 0 %
LYMPHOCYTES # BLD AUTO: 0.3 10E3/UL (ref 0.8–5.3)
LYMPHOCYTES NFR BLD AUTO: 9 %
MAGNESIUM SERPL-MCNC: 1.6 MG/DL (ref 1.7–2.3)
MCH RBC QN AUTO: 30.9 PG (ref 26.5–33)
MCHC RBC AUTO-ENTMCNC: 31.6 G/DL (ref 31.5–36.5)
MCV RBC AUTO: 98 FL (ref 78–100)
MONOCYTES # BLD AUTO: 0.3 10E3/UL (ref 0–1.3)
MONOCYTES NFR BLD AUTO: 11 %
NEUTROPHILS # BLD AUTO: 2.2 10E3/UL (ref 1.6–8.3)
NEUTROPHILS NFR BLD AUTO: 75 %
NRBC # BLD AUTO: 0 10E3/UL
NRBC BLD AUTO-RTO: 0 /100
PHOSPHATE SERPL-MCNC: 3.2 MG/DL (ref 2.5–4.5)
PLATELET # BLD AUTO: 236 10E3/UL (ref 150–450)
POTASSIUM SERPL-SCNC: 5 MMOL/L (ref 3.4–5.3)
RBC # BLD AUTO: 3.24 10E6/UL (ref 3.8–5.2)
SODIUM SERPL-SCNC: 137 MMOL/L (ref 135–145)
TACROLIMUS BLD-MCNC: 13.8 UG/L (ref 5–15)
TME LAST DOSE: NORMAL H
TME LAST DOSE: NORMAL H
WBC # BLD AUTO: 3 10E3/UL (ref 4–11)

## 2024-05-06 PROCEDURE — 84100 ASSAY OF PHOSPHORUS: CPT | Mod: ORL | Performed by: FAMILY MEDICINE

## 2024-05-06 PROCEDURE — 85025 COMPLETE CBC W/AUTO DIFF WBC: CPT | Mod: ORL | Performed by: FAMILY MEDICINE

## 2024-05-06 PROCEDURE — 83735 ASSAY OF MAGNESIUM: CPT | Mod: ORL | Performed by: FAMILY MEDICINE

## 2024-05-06 PROCEDURE — 80048 BASIC METABOLIC PNL TOTAL CA: CPT | Mod: ORL | Performed by: FAMILY MEDICINE

## 2024-05-06 PROCEDURE — 80197 ASSAY OF TACROLIMUS: CPT | Mod: ORL | Performed by: FAMILY MEDICINE

## 2024-05-06 PROCEDURE — G0463 HOSPITAL OUTPT CLINIC VISIT: HCPCS | Performed by: TRANSPLANT SURGERY

## 2024-05-06 PROCEDURE — 99213 OFFICE O/P EST LOW 20 MIN: CPT | Mod: 24 | Performed by: TRANSPLANT SURGERY

## 2024-05-06 ASSESSMENT — PAIN SCALES - GENERAL: PAINLEVEL: NO PAIN (0)

## 2024-05-06 NOTE — PROGRESS NOTES
Transplant Surgery Progress Note    Date of Visit: 2024    Assessment: Generally doing well. No issues.  Encourage drinking fluids.    Plan:    1. Graft function: No change  2. Immunosuppression Management: No change , recently reduced due to high levels.  .  Complexity of management:Low.  Contributing factors:     3. Prophylaxis: Bactrim and valcyte.  4. Mag/Phos/Potassium: Nl phos, K.  Mag low.  On mag suppl.  5. Stent: 2 stents.  Due for removal in a few weeks.  6.  Staples out today    Followup: routine    Total Time: 20 min,     Carlitos Díaz MD, PhD  Professor of Surgery        Transplants:  2024 (Kidney); Postoperative day:  19  History of Present Illness:    Hospital Details:  [facility]    [facility]     Discharge Summary  Transplant Surgery     Date of Admission:  2024  Date of Discharge:  2024  Discharging Provider: Skye Judge NP/Carlitos Díaz MD           Discharge Diagnoses  Principal Problem:    Kidney replaced by transplant  Active Problems:    Hypertension    Anemia in chronic kidney disease    Long QT interval    Immunosuppressed status (H24)    Gross hematuria    Gastroesophageal reflux disease without esophagitis    Acute post-operative pain              History of Present Illness  Ira Zamora is an 34 year old female with history of ESRD on dialysis d/t interstitial nephritis, Crohn's, HTN, GERD, and dilated ascending aorta. S/p  donor en bloc kidney transplant with ureteral stent x2 on 24.       Generally feeling well.  Had some myalgias yesterday but has resolved.  No sick contacts    Having trouble drinking enough fluids.    Cr 1.2.  Mag 1.3.    Tac 18.  Was lowered to 3 mg bid         Hospital Course  Kidney transplant, en bloc: SCr down to 2.39 by discharge.     Imaging:  -Postop US Normal   -Repeat US obtained POD #1 Small, hypoechoic fluid collection adjacent to the lateral kidney measuring  2.2 x 0.8 x 2.0 cm likely representing postsurgical fluid versus hematoma     Immunosuppressed due to medications:  -cPRA 9, Thymo due to need to minimize steroids with history of severe reflux and suspected interstitial nephritis secondary to PPI.   -Induction with Thymoglobulin and steroid taper.  -Tacrolimus, goal level 8-10 (12 hour trough).  -Mycophenolate 750mg BID. -> now 72) bid of MPA  -Infectious prophylaxis with Bactrim indefinitely and valganciclovir x6 months.     Transplant coordinator Radha Jorge  [phone]  Donor type:  DBD  DSA at time of transplant:  NO  Ureteral stent: TWO STENTS  CMV:  Donor + / Recipient -  EBV:  Donor + / Recipient +  Thymoglobulin:  356mg , 6mg/kg       Review of Systems:   CONSTITUTIONAL:  No fevers or chills  EYES: negative for icterus  ENT:  negative for hearing loss, tinnitus and sore throat  RESPIRATORY:  negative for cough, sputum, dyspnea  CARDIOVASCULAR:  negative for chest pain   GASTROINTESTINAL:  negative for nausea, vomiting, diarrhea or constipation  GENITOURINARY:  negative for incontinence, dysuria, bladder emptying problems  HEME:  No easy bruising  INTEGUMENT:  negative for rash and pruritus  NEURO:  Negative for headache, seizure disorder    Immunosuppression:    Immunosuppressant Medications       Immunosuppressive Agents Disp Start End     mycophenolate (GENERIC EQUIVALENT) 250 MG capsule 180 capsule 4/22/2024 --    Sig - Route: Take 3 capsules (750 mg) by mouth 2 times daily - Oral    Class: E-Prescribe    Notes to Pharmacy: TXP DT 4/17/2024 (Kidney) TXP Dischg DT 4/21/2024 DX Kidney replaced by transplant Z94.0 TX [facility] ([place], [place])     mycophenolic acid (GENERIC EQUIVALENT) 180 MG EC tablet 180 tablet 4/30/2024 --    Sig - Route: Take 3 tablets (540 mg) by mouth 2 times daily - Oral    Class: E-Prescribe     tacrolimus (GENERIC EQUIVALENT) 1 MG capsule 180 capsule 5/2/2024 --    Sig - Route:  Take 3 capsules (3 mg) by mouth 2 times daily - Oral    Class: E-Prescribe    Notes to Pharmacy: TXP DT 4/17/2024 (Kidney) TXP Dischg DT 4/21/2024 DX Kidney replaced by transplant Z94.0 TX Center [facility] ([place], [place])     tacrolimus (GENERIC EQUIVALENT) 0.5 MG capsule 60 capsule 5/2/2024 --    Sig: Hold for dose changes    Patient not taking: Reported on 5/2/2024    Class: E-Prescribe    Notes to Pharmacy: TXP DT 4/17/2024 (Kidney) TXP Dischg DT 4/21/2024 DX Kidney replaced by transplant Z94.0 TX Center [facility] ([place], [place])            Possible Immunosuppression-related side effects:   []             headache  []             vivid dreams  []             irritability  []             fine tremor  []             nausea  []             diarrhea  []             neuropathy      []             edema  []             renal calcineurin toxicity  []             hyperkalemia  []             post-transplant diabetes  []             decreased appetite  []             increased appetite  []             other:  [x]             None    Prophylaxis:    [x]             Valcyte  [x]             Mycelex  []             Bactrim  []             Dapsone  []             Protonix  []             Other:      Prescription Medications as of 5/6/2024         Rx Number Disp Refills Start End Last Dispensed Date Next Fill Date Owning Pharmacy    acetaminophen (TYLENOL) 500 MG tablet  -- -- 12/7/2020 --       Sig: Take 1,000 mg by mouth every 6 hours as needed    Class: Historical    Route: Oral    aspirin (ASA) 325 MG tablet  30 tablet 2 4/21/2024 --   [facility] - [place], [place] - [address] [facility]    Sig: Take 1 tablet (325 mg) by mouth daily    Class: E-Prescribe    Route: Oral    Renewals       Renewal requests to authorizing provider (Skye Judge NP) <b>prohibited</b>            atorvastatin (LIPITOR) 10 MG tablet  30  tablet 1 2024 --   [facility] [place], [place] - [address] [place] [address]    Sig: Take 1 tablet (10 mg) by mouth daily    Class: E-Prescribe    Route: Oral    doxazosin (CARDURA) 4 MG tablet  -- --  --       Sig: Take 2 mg by mouth at bedtime    Class: Historical    Route: Oral    famotidine (PEPCID) 20 MG tablet  60 tablet 1 2024 --   [facility] [address]    Sig: Take 2 tablets (40 mg) by mouth daily    Class: E-Prescribe    Route: Oral    Renewals       Renewal requests to authorizing provider (Skye Judge NP) <b>prohibited</b>            levonorgestrel (KYLEENA) 19.5 MG IUD  -- -- 2021 --       Si Device by Intrauterine route    Class: Historical    Route: Intrauterine    magnesium oxide (MAG-OX) 400 MG tablet  60 tablet 0 2024   [id number] - [place], [place] - [address] [place] [address]    Sig: Take 1 tablet (400 mg) by mouth 2 times daily for 30 days    Class: E-Prescribe    Route: Oral    methocarbamol (ROBAXIN) 500 MG tablet  12 tablet 0 2024 --   [facility] [address]    Sig: Take 1 tablet (500 mg) by mouth 4 times daily as needed for muscle spasms    Class: E-Prescribe    Route: Oral    Renewals       Renewal requests to authorizing provider (Skye Judge NP) <b>prohibited</b>            mycophenolate (GENERIC EQUIVALENT) 250 MG capsule  180 capsule 1 2024 --   [facility] - [place], [place] - [address]    Sig: Take 3 capsules (750 mg) by mouth 2 times daily    Class: E-Prescribe    Notes to Pharmacy: TXP DT 2024 (Kidney) TXP Dischg DT 2024 DX Kidney replaced by transplant Z94.0 TX Center [facility] ([place], [place])    Route: Oral    mycophenolic acid (GENERIC EQUIVALENT) 180 MG EC tablet  180 tablet 3 2024 --   [facility] - [place], [place] -  [facility] [address]     Sig: Take 3 tablets (540 mg) by mouth 2 times daily    Class: E-Prescribe    Route: Oral    PARoxetine (PAXIL) 30 MG tablet  -- -- 9/16/2022 --       Sig: Take 30 mg by mouth every evening    Class: Historical    Route: Oral    sulfamethoxazole-trimethoprim (BACTRIM) 400-80 MG tablet  30 tablet 1 4/23/2024 --   [facility] - [place], [place] - [address] [place] [address]    Sig: Take 1 tablet by mouth three times a week    Class: E-Prescribe    Notes to Pharmacy: Titrate up to daily when directed by transplant team with improving renal function    Route: Oral    tacrolimus (GENERIC EQUIVALENT) 1 MG capsule  180 capsule 11 5/2/2024 --   [facility] - [address]    Sig: Take 3 capsules (3 mg) by mouth 2 times daily    Class: E-Prescribe    Notes to Pharmacy: TXP DT 4/17/2024 (Kidney) TXP Dischg DT 4/21/2024 DX Kidney replaced by transplant Z94.0 TX [facility] ([place], [place])    Route: Oral    ustekinumab (STELARA) 90 MG/ML  -- -- 9/28/2021 --       Sig: Inject 90 mg Subcutaneous once every eight weeks    Class: Historical    Route: Subcutaneous    tacrolimus (GENERIC EQUIVALENT) 0.5 MG capsule  60 capsule 11 5/2/2024 --   [facility] [address]     Sig: Hold for dose changes    Class: E-Prescribe    Notes to Pharmacy: TXP DT 4/17/2024 (Kidney) TXP Dischg DT 4/21/2024 DX Kidney replaced by transplant Z94.0 [facility] ([place], [place])    valGANciclovir (VALCYTE) 450 MG tablet  60 tablet 2 4/21/2024 --   [facility] - [place], [place] - [address] [place] [address]    Sig: Take 1 tablet (450 mg) by mouth every other day Titrate up to 900mg daily when directed by transplant team with improving renal function    Class: E-Prescribe    Route: Oral            Exam:    Pulse:  [101] 101  BP: (109)/(74)  109/74  SpO2:  [98 %] 98 %    Pulse:  [101] 101  BP: (109)/(74) 109/74  SpO2:  [98 %] 98 %  General Appearance: in no apparent distress.   Skin: Normal, no rashes or jaundice  Lungs: easy respirations, no audible wheezing.  Abdomen: flat, The wound is dry and intact,  Extremities: Tremor absent.   Edema: absent.         Latest Ref Rng & Units 5/2/2024     9:20 AM 4/25/2024     9:30 AM 4/24/2024     9:07 AM 4/23/2024     7:35 AM 4/22/2024     8:21 AM   Transplant Immunosuppression Labs   Creat 0.51 - 0.95 mg/dL 1.26  1.32  1.42  1.41  1.71    Urea Nitrogen 6.0 - 20.0 mg/dL 24.8  31.8  26.2  30.4  33.6    WBC 4.0 - 11.0 10e3/uL 5.4  7.1  6.3  5.7  5.3    Neutrophil % 84  82   90  88    ANEU 1.6 - 8.3 10e3/uL  5.8           Chemistries:   Recent Labs   Lab Test 05/02/24  0920   BUN 24.8*   CR 1.26*   GFRESTIMATED 57*   GLC 95     Lab Results   Component Value Date    A1C 4.7 04/17/2024     Recent Labs   Lab Test 04/17/24  0958   ALBUMIN 4.1   BILITOTAL 0.6   ALKPHOS 55   AST 17   ALT 5     Urine Studies:  Recent Labs   Lab Test 05/03/24  1600   COLOR Light Yellow   APPEARANCE Clear   URINEGLC 100*   URINEBILI Negative   URINEKETONE Negative   SG 1.014   UBLD Negative   URINEPH 5.5   PROTEIN Negative   NITRITE Negative   LEUKEST Trace*   RBCU 2   WBCU 4     No lab results found.  Hematology:   Recent Labs   Lab Test 05/02/24  0920 04/25/24  0930 04/24/24  0907   HGB 9.5* 8.4* 9.0*    197 169   WBC 5.4 7.1 6.3     Coags:   Recent Labs   Lab Test 04/17/24  0958 09/29/22  1140   INR 1.20* 1.01     HLA antibodies:   SA1 HI RISK HALLEY   Date Value Ref Range Status   04/17/2024 None  Final     SA1 MOD RISK HALLEY   Date Value Ref Range Status   04/17/2024 None  Final     SA2 HI RISK HALLEY   Date Value Ref Range Status   04/17/2024 None  Final     SA2 MOD RISK HALLEY   Date Value Ref Range Status   04/17/2024 None  Final

## 2024-05-06 NOTE — LETTER
2024         RE: Ira Zamora  [address] [facility] [address]        Dear Colleague,    Thank you for referring your patient, Ira Zamora, to the [facility]. Please see a copy of my visit note below.    Transplant Surgery Progress Note    Date of Visit: 2024    Assessment: Generally doing well. No issues.  Encourage drinking fluids.    Plan:    1. Graft function: No change  2. Immunosuppression Management: No change , recently reduced due to high levels.  .  Complexity of management:Low.  Contributing factors:     3. Prophylaxis: Bactrim and valcyte.  4. Mag/Phos/Potassium: Nl phos, K.  Mag low.  On mag suppl.  5. Stent: 2 stents.  Due for removal in a few weeks.  6.  Staples out today    Followup: routine    Total Time: 20 min,     Carlitos Díaz MD, PhD  Professor of Surgery        Transplants:  2024 (Kidney); Postoperative day:  19  History of Present Illness:    Hospital Details:  [facility]    [facility]     Discharge Summary  Transplant Surgery     Date of Admission:  2024  Date of Discharge:  2024  Discharging Provider: Skye Judge NP/Carlitos Díaz MD           Discharge Diagnoses  Principal Problem:    Kidney replaced by transplant  Active Problems:    Hypertension    Anemia in chronic kidney disease    Long QT interval    Immunosuppressed status (H24)    Gross hematuria    Gastroesophageal reflux disease without esophagitis    Acute post-operative pain              History of Present Illness  Ira Zamora is an 34 year old female with history of ESRD on dialysis d/t interstitial nephritis, Crohn's, HTN, GERD, and dilated ascending aorta. S/p  donor en bloc kidney transplant with ureteral stent x2 on 24.       Generally feeling well.  Had some myalgias yesterday but has resolved.  No sick contacts    Having trouble drinking enough fluids.    Cr 1.2.  Mag 1.3.    Tac 18.  Was  lowered to 3 mg bid         Hospital Course  Kidney transplant, en bloc: SCr down to 2.39 by discharge.     Imaging:  -Postop US Normal   -Repeat US obtained POD #1 Small, hypoechoic fluid collection adjacent to the lateral kidney measuring 2.2 x 0.8 x 2.0 cm likely representing postsurgical fluid versus hematoma     Immunosuppressed due to medications:  -cPRA 9, Thymo due to need to minimize steroids with history of severe reflux and suspected interstitial nephritis secondary to PPI.   -Induction with Thymoglobulin and steroid taper.  -Tacrolimus, goal level 8-10 (12 hour trough).  -Mycophenolate 750mg BID. -> now 72) bid of MPA  -Infectious prophylaxis with Bactrim indefinitely and valganciclovir x6 months.     Transplant coordinator Radha Jorge  [phone]  Donor type:  DBD  DSA at time of transplant:  NO  Ureteral stent: TWO STENTS  CMV:  Donor + / Recipient -  EBV:  Donor + / Recipient +  Thymoglobulin:  356mg , 6mg/kg       Review of Systems:   CONSTITUTIONAL:  No fevers or chills  EYES: negative for icterus  ENT:  negative for hearing loss, tinnitus and sore throat  RESPIRATORY:  negative for cough, sputum, dyspnea  CARDIOVASCULAR:  negative for chest pain   GASTROINTESTINAL:  negative for nausea, vomiting, diarrhea or constipation  GENITOURINARY:  negative for incontinence, dysuria, bladder emptying problems  HEME:  No easy bruising  INTEGUMENT:  negative for rash and pruritus  NEURO:  Negative for headache, seizure disorder    Immunosuppression:    Immunosuppressant Medications       Immunosuppressive Agents Disp Start End     mycophenolate (GENERIC EQUIVALENT) 250 MG capsule 180 capsule 4/22/2024 --    Sig - Route: Take 3 capsules (750 mg) by mouth 2 times daily - Oral    Class: E-Prescribe    Notes to Pharmacy: TXP DT 4/17/2024 (Kidney) TXP Dischg DT 4/21/2024 DX Kidney replaced by transplant Z94.0 TX Center [facility] ([place], [place])     mycophenolic acid  (GENERIC EQUIVALENT) 180 MG EC tablet 180 tablet 4/30/2024 --    Sig - Route: Take 3 tablets (540 mg) by mouth 2 times daily - Oral    Class: E-Prescribe     tacrolimus (GENERIC EQUIVALENT) 1 MG capsule 180 capsule 5/2/2024 --    Sig - Route: Take 3 capsules (3 mg) by mouth 2 times daily - Oral    Class: E-Prescribe    Notes to Pharmacy: TXP DT 4/17/2024 (Kidney) TXP Dischg DT 4/21/2024 DX Kidney replaced by transplant Z94.0 TX [facility] ([place], [place])     tacrolimus (GENERIC EQUIVALENT) 0.5 MG capsule 60 capsule 5/2/2024 --    Sig: Hold for dose changes    Patient not taking: Reported on 5/2/2024    Class: E-Prescribe    Notes to Pharmacy: TXP DT 4/17/2024 (Kidney) TXP Dischg DT 4/21/2024 DX Kidney replaced by transplant Z94.0 [facility] ([place], [place])            Possible Immunosuppression-related side effects:   []             headache  []             vivid dreams  []             irritability  []             fine tremor  []             nausea  []             diarrhea  []             neuropathy      []             edema  []             renal calcineurin toxicity  []             hyperkalemia  []             post-transplant diabetes  []             decreased appetite  []             increased appetite  []             other:  [x]             None    Prophylaxis:    [x]             Valcyte  [x]             Mycelex  []             Bactrim  []             Dapsone  []             Protonix  []             Other:      Prescription Medications as of 5/6/2024         Rx Number Disp Refills Start End Last Dispensed Date Next Fill Date Owning Pharmacy    acetaminophen (TYLENOL) 500 MG tablet  -- -- 12/7/2020 --       Sig: Take 1,000 mg by mouth every 6 hours as needed    Class: Historical    Route: Oral    aspirin (ASA) 325 MG tablet  30 tablet 2 4/21/2024 --   [facility] - [place], [place] - [address] [place] [address]  SE    Sig: Take 1 tablet (325 mg) by mouth daily    Class: E-Prescribe    Route: Oral    Renewals       Renewal requests to authorizing provider (Skye Judge NP) <b>prohibited</b>            atorvastatin (LIPITOR) 10 MG tablet  30 tablet 1 2024 --   [address]    Sig: Take 1 tablet (10 mg) by mouth daily    Class: E-Prescribe    Route: Oral    doxazosin (CARDURA) 4 MG tablet  -- --  --       Sig: Take 2 mg by mouth at bedtime    Class: Historical    Route: Oral    famotidine (PEPCID) 20 MG tablet  60 tablet 1 2024 --   [address]    Sig: Take 2 tablets (40 mg) by mouth daily    Class: E-Prescribe    Route: Oral    Renewals       Renewal requests to authorizing provider (Skye Judge NP) <b>prohibited</b>            levonorgestrel (KYLEENA) 19.5 MG IUD  -- -- 2021 --       Si Device by Intrauterine route    Class: Historical    Route: Intrauterine    magnesium oxide (MAG-OX) 400 MG tablet  60 tablet 0 2024   [id number] - [place], [place] - [address] [facility]    Sig: Take 1 tablet (400 mg) by mouth 2 times daily for 30 days    Class: E-Prescribe    Route: Oral    methocarbamol (ROBAXIN) 500 MG tablet  12 tablet 0 2024 --   [address]    Sig: Take 1 tablet (500 mg) by mouth 4 times daily as needed for muscle spasms    Class: E-Prescribe    Route: Oral    Renewals       Renewal requests to authorizing provider (Skye Judge NP) <b>prohibited</b>            mycophenolate (GENERIC EQUIVALENT) 250 MG capsule  180 capsule 1 2024 --   [facility] - [place] [address] [address]     Sig: Take 3 capsules (750 mg) by mouth 2 times daily    Class: E-Prescribe    Notes to Pharmacy: TXP DT 2024 (Kidney) TXP Dischg DT 2024 DX Kidney replaced by  transplant Z94.0 TX [facility] ([place], [place])    Route: Oral    mycophenolic acid (GENERIC EQUIVALENT) 180 MG EC tablet  180 tablet 3 4/30/2024 --   [facility] - [place] [address] [address]    Sig: Take 3 tablets (540 mg) by mouth 2 times daily    Class: E-Prescribe    Route: Oral    PARoxetine (PAXIL) 30 MG tablet  -- -- 9/16/2022 --       Sig: Take 30 mg by mouth every evening    Class: Historical    Route: Oral    sulfamethoxazole-trimethoprim (BACTRIM) 400-80 MG tablet  30 tablet 1 4/23/2024 --   [facility] - [place], [place] - [address] [place] [address] SE    Sig: Take 1 tablet by mouth three times a week    Class: E-Prescribe    Notes to Pharmacy: Titrate up to daily when directed by transplant team with improving renal function    Route: Oral    tacrolimus (GENERIC EQUIVALENT) 1 MG capsule  180 capsule 11 5/2/2024 --   [facility] - [place] [address] [address]    Sig: Take 3 capsules (3 mg) by mouth 2 times daily    Class: E-Prescribe    Notes to Pharmacy: TXP DT 4/17/2024 (Kidney) TXP Dischg DT 4/21/2024 DX Kidney replaced by transplant Z94.0 [facility] ([place], [place])    Route: Oral    ustekinumab (STELARA) 90 MG/ML  -- -- 9/28/2021 --       Sig: Inject 90 mg Subcutaneous once every eight weeks    Class: Historical    Route: Subcutaneous    tacrolimus (GENERIC EQUIVALENT) 0.5 MG capsule  60 capsule 11 5/2/2024 --   [facility] - [place] [address] [address]    Sig: Hold for dose changes    Class: E-Prescribe    Notes to Pharmacy: TXP DT 4/17/2024 (Kidney) TXP Dischg DT 4/21/2024 DX Kidney replaced by transplant Z94.0 [facility] ([place], [place])    valGANciclovir (VALCYTE) 450 MG tablet  60 tablet 2 4/21/2024 --   [facility] - [place] - [facility]    Sig: Take 1 tablet (450 mg) by mouth every other day Titrate up to 900mg daily when directed by transplant team with improving renal function    Class: E-Prescribe    Route: Oral            Exam:    Pulse:  [101] 101  BP: (109)/(74) 109/74  SpO2:  [98 %] 98 %    Pulse:  [101] 101  BP: (109)/(74) 109/74  SpO2:  [98 %] 98 %  General Appearance: in no apparent distress.   Skin: Normal, no rashes or jaundice  Lungs: easy respirations, no audible wheezing.  Abdomen: flat, The wound is dry and intact,  Extremities: Tremor absent.   Edema: absent.         Latest Ref Rng & Units 5/2/2024     9:20 AM 4/25/2024     9:30 AM 4/24/2024     9:07 AM 4/23/2024     7:35 AM 4/22/2024     8:21 AM   Transplant Immunosuppression Labs   Creat 0.51 - 0.95 mg/dL 1.26  1.32  1.42  1.41  1.71    Urea Nitrogen 6.0 - 20.0 mg/dL 24.8  31.8  26.2  30.4  33.6    WBC 4.0 - 11.0 10e3/uL 5.4  7.1  6.3  5.7  5.3    Neutrophil % 84  82   90  88    ANEU 1.6 - 8.3 10e3/uL  5.8           Chemistries:   Recent Labs   Lab Test 05/02/24  0920   BUN 24.8*   CR 1.26*   GFRESTIMATED 57*   GLC 95     Lab Results   Component Value Date    A1C 4.7 04/17/2024     Recent Labs   Lab Test 04/17/24  0958   ALBUMIN 4.1   BILITOTAL 0.6   ALKPHOS 55   AST 17   ALT 5     Urine Studies:  Recent Labs   Lab Test 05/03/24  1600   COLOR Light Yellow   APPEARANCE Clear   URINEGLC 100*   URINEBILI Negative   URINEKETONE Negative   SG 1.014   UBLD Negative   URINEPH 5.5   PROTEIN Negative   NITRITE Negative   LEUKEST Trace*   RBCU 2   WBCU 4     No lab results found.  Hematology:   Recent Labs   Lab Test 05/02/24  0920 04/25/24  0930 04/24/24  0907   HGB 9.5* 8.4* 9.0*    197 169   WBC 5.4 7.1 6.3     Coags:   Recent Labs   Lab Test 04/17/24  0958 09/29/22  1140   INR 1.20* 1.01     HLA antibodies:   SA1 HI RISK HALLEY   Date Value Ref Range Status   04/17/2024 None  Final     SA1 MOD RISK HALLEY   Date Value Ref Range Status   04/17/2024 None  Final     SA2 HI  RISK HALLEY   Date Value Ref Range Status   04/17/2024 None  Final     SA2 MOD RISK HALLEY   Date Value Ref Range Status   04/17/2024 None  Final         Again, thank you for allowing me to participate in the care of your patient.        Sincerely,        Carlitos Díaz MD

## 2024-05-08 ENCOUNTER — TELEPHONE (OUTPATIENT)
Dept: TRANSPLANT | Facility: CLINIC | Age: 34
End: 2024-05-08
Payer: MEDICARE

## 2024-05-08 NOTE — TELEPHONE ENCOUNTER
Post Kidney and Pancreas Transplant Team Conference  Date: 5/8/2024  Transplant Coordinator: Radha Jorge     Attendees:  [x]  Dr. Ortiz [x] Roxanne Betancur, RN [x] Betty Valencia LPN     [x]  Dr. Arellano [x] Radha Jorge, RN [] Diamond Atkinson LPN    [x] Dr. Quezada [x] Otilia Prather RN    [x] Dr. Tsai [x] [place] Dennis, RN [x] Frida Harris RN   [] Dr. Burgess [] Shantelle Ko, RN    [x] Dr. Díaz [] Jael Olson RN    []  Dr. Patel [] Nia Pryor RN    [] Dr. Brooks [] Mao Mahan RN    [x] Nicole Kowalski NP [] Mary Lou Corcoran RN    [x] Pratima Macedo NP [] Brenda Zayas RN        Verbal Plan Read Back:   Continue current treatment plan    Routed to RN Coordinator   Betty Valencia LPN

## 2024-05-09 ENCOUNTER — LAB REQUISITION (OUTPATIENT)
Dept: LAB | Facility: CLINIC | Age: 34
End: 2024-05-09
Payer: MEDICARE

## 2024-05-09 ENCOUNTER — HOSPITAL ENCOUNTER (OUTPATIENT)
Facility: CLINIC | Age: 34
Discharge: HOME | End: 2024-05-09
Payer: COMMERCIAL

## 2024-05-09 LAB
ALBUMIN SERPL BCG-MCNC: 4.1 G/DL (ref 3.5–5.2)
ALP SERPL-CCNC: 90 U/L (ref 40–150)
ALT SERPL W P-5'-P-CCNC: 8 U/L (ref 0–50)
ANION GAP SERPL CALCULATED.3IONS-SCNC: 8 MMOL/L (ref 7–15)
AST SERPL W P-5'-P-CCNC: 17 U/L (ref 0–45)
BASOPHILS # BLD AUTO: 0 10E3/UL (ref 0–0.2)
BASOPHILS NFR BLD AUTO: 1 %
BILIRUB SERPL-MCNC: 0.4 MG/DL
BUN SERPL-MCNC: 31.8 MG/DL (ref 6–20)
CALCIUM SERPL-MCNC: 10.3 MG/DL (ref 8.6–10)
CHLORIDE SERPL-SCNC: 109 MMOL/L (ref 98–107)
CREAT SERPL-MCNC: 1.17 MG/DL (ref 0.51–0.95)
DEPRECATED HCO3 PLAS-SCNC: 22 MMOL/L (ref 22–29)
EGFRCR SERPLBLD CKD-EPI 2021: 62 ML/MIN/1.73M2
EOSINOPHIL # BLD AUTO: 0.2 10E3/UL (ref 0–0.7)
EOSINOPHIL NFR BLD AUTO: 6 %
ERYTHROCYTE [DISTWIDTH] IN BLOOD BY AUTOMATED COUNT: 14.6 % (ref 10–15)
GLUCOSE SERPL-MCNC: 92 MG/DL (ref 70–99)
HCT VFR BLD AUTO: 32.9 % (ref 35–47)
HGB BLD-MCNC: 10.3 G/DL (ref 11.7–15.7)
IMM GRANULOCYTES # BLD: 0 10E3/UL
IMM GRANULOCYTES NFR BLD: 0 %
LYMPHOCYTES # BLD AUTO: 0.3 10E3/UL (ref 0.8–5.3)
LYMPHOCYTES NFR BLD AUTO: 9 %
MAGNESIUM SERPL-MCNC: 1.6 MG/DL (ref 1.7–2.3)
MCH RBC QN AUTO: 30.2 PG (ref 26.5–33)
MCHC RBC AUTO-ENTMCNC: 31.3 G/DL (ref 31.5–36.5)
MCV RBC AUTO: 97 FL (ref 78–100)
MONOCYTES # BLD AUTO: 0.2 10E3/UL (ref 0–1.3)
MONOCYTES NFR BLD AUTO: 9 %
NEUTROPHILS # BLD AUTO: 2.1 10E3/UL (ref 1.6–8.3)
NEUTROPHILS NFR BLD AUTO: 75 %
NRBC # BLD AUTO: 0 10E3/UL
NRBC BLD AUTO-RTO: 0 /100
PHOSPHATE SERPL-MCNC: 2.7 MG/DL (ref 2.5–4.5)
PLATELET # BLD AUTO: 259 10E3/UL (ref 150–450)
POTASSIUM SERPL-SCNC: 5.1 MMOL/L (ref 3.4–5.3)
PROT SERPL-MCNC: 6.7 G/DL (ref 6.4–8.3)
RBC # BLD AUTO: 3.41 10E6/UL (ref 3.8–5.2)
SODIUM SERPL-SCNC: 139 MMOL/L (ref 135–145)
TACROLIMUS BLD-MCNC: 16.4 UG/L (ref 5–15)
TME LAST DOSE: ABNORMAL H
TME LAST DOSE: ABNORMAL H
WBC # BLD AUTO: 2.8 10E3/UL (ref 4–11)

## 2024-05-09 PROCEDURE — 80197 ASSAY OF TACROLIMUS: CPT | Mod: ORL | Performed by: INTERNAL MEDICINE

## 2024-05-10 ENCOUNTER — TELEPHONE (OUTPATIENT)
Dept: TRANSPLANT | Facility: CLINIC | Age: 34
End: 2024-05-10
Payer: MEDICARE

## 2024-05-10 DIAGNOSIS — Z94.0 KIDNEY REPLACED BY TRANSPLANT: ICD-10-CM

## 2024-05-10 RX ORDER — TACROLIMUS 1 MG/1
2 CAPSULE ORAL 2 TIMES DAILY
Qty: 120 CAPSULE | Refills: 11 | Status: SHIPPED | OUTPATIENT
Start: 2024-05-10 | End: 2024-05-20

## 2024-05-10 NOTE — TELEPHONE ENCOUNTER
Issue: elevated tac above goal    Plan: decrease tac to meet goal. Decreased tac to 2mg twice daily.     Outcome: Patient reports feeling tired and fatigued. States she feels better mid-day and is sleeping well. RNCC asked patient to mention this at her appointments next week if it is an ongoing issue. Patient's labs are stable and hemoglobin is the best it has been at 10. Will continue to monitor fatigue.

## 2024-05-20 ENCOUNTER — TELEPHONE (OUTPATIENT)
Dept: TRANSPLANT | Facility: CLINIC | Age: 34
End: 2024-05-20
Payer: MEDICARE

## 2024-05-20 DIAGNOSIS — Z94.0 KIDNEY REPLACED BY TRANSPLANT: ICD-10-CM

## 2024-05-20 RX ORDER — TACROLIMUS 0.5 MG/1
0.5 CAPSULE ORAL 2 TIMES DAILY
Qty: 60 CAPSULE | Refills: 11 | Status: ON HOLD | OUTPATIENT
Start: 2024-05-20 | End: 2024-06-09

## 2024-05-20 RX ORDER — TACROLIMUS 1 MG/1
1 CAPSULE ORAL 2 TIMES DAILY
Qty: 60 CAPSULE | Refills: 11 | Status: ON HOLD | OUTPATIENT
Start: 2024-05-20 | End: 2024-06-09

## 2024-05-20 NOTE — TELEPHONE ENCOUNTER
ISSUE:   Tacrolimus IR level 11.2 on 5/20, goal 8-10, dose 2 mg BID.    PLAN:   Call Patient and confirm this was an accurate 12-hour trough.   Verify Tacrolimus IR dose 2 mg BID.   Confirm no new medications or or missed doses.   Confirm no new illness / infection / diarrhea.   If accurate trough and accurate dose, decrease Tacrolimus IR dose to 1.5 mg BID     Is this more than a 50% increase or decrease in current IS dose: No  If YES, justification: na    Repeat labs in 2 days.  *If > 50% change in immunosuppression dose, repeat labs in 1 week.     OUTCOME:   Spoke with Patient, they confirm accurate trough level and current dose 2 mg BID.   Patient confirmed dose change to 1.5 mg BID.  Patient agreed to repeat labs in 3 days.   Orders sent to preferred pharmacy for dose change and lab for repeat labs.   Patient voiced understanding of plan.

## 2024-05-23 ENCOUNTER — LAB (OUTPATIENT)
Dept: LAB | Facility: CLINIC | Age: 34
End: 2024-05-23
Attending: INTERNAL MEDICINE
Payer: MEDICARE

## 2024-05-23 ENCOUNTER — ANCILLARY PROCEDURE (OUTPATIENT)
Dept: GENERAL RADIOLOGY | Facility: CLINIC | Age: 34
End: 2024-05-23
Attending: INTERNAL MEDICINE
Payer: MEDICARE

## 2024-05-23 ENCOUNTER — OFFICE VISIT (OUTPATIENT)
Dept: TRANSPLANT | Facility: CLINIC | Age: 34
End: 2024-05-23
Attending: INTERNAL MEDICINE
Payer: MEDICARE

## 2024-05-23 ENCOUNTER — TELEPHONE (OUTPATIENT)
Dept: PHARMACY | Facility: CLINIC | Age: 34
End: 2024-05-23

## 2024-05-23 VITALS
TEMPERATURE: 97.8 F | SYSTOLIC BLOOD PRESSURE: 102 MMHG | WEIGHT: 124.8 LBS | OXYGEN SATURATION: 100 % | BODY MASS INDEX: 22.45 KG/M2 | HEART RATE: 89 BPM | DIASTOLIC BLOOD PRESSURE: 71 MMHG

## 2024-05-23 DIAGNOSIS — Z48.298 AFTERCARE FOLLOWING ORGAN TRANSPLANT: ICD-10-CM

## 2024-05-23 DIAGNOSIS — Z98.890 OTHER SPECIFIED POSTPROCEDURAL STATES: ICD-10-CM

## 2024-05-23 DIAGNOSIS — Z79.899 ENCOUNTER FOR LONG-TERM CURRENT USE OF MEDICATION: ICD-10-CM

## 2024-05-23 DIAGNOSIS — D72.810 LYMPHOPENIA: ICD-10-CM

## 2024-05-23 DIAGNOSIS — D84.9 IMMUNOSUPPRESSED STATUS (H): ICD-10-CM

## 2024-05-23 DIAGNOSIS — Z20.828 CONTACT WITH AND (SUSPECTED) EXPOSURE TO OTHER VIRAL COMMUNICABLE DISEASES: ICD-10-CM

## 2024-05-23 DIAGNOSIS — Z94.0 KIDNEY REPLACED BY TRANSPLANT: ICD-10-CM

## 2024-05-23 DIAGNOSIS — Z48.298 AFTERCARE FOLLOWING ORGAN TRANSPLANT: Primary | ICD-10-CM

## 2024-05-23 DIAGNOSIS — Z94.0 KIDNEY REPLACED BY TRANSPLANT: Primary | Chronic | ICD-10-CM

## 2024-05-23 PROBLEM — N18.6 ESRD (END STAGE RENAL DISEASE) ON DIALYSIS (H): Status: RESOLVED | Noted: 2022-09-28 | Resolved: 2024-05-23

## 2024-05-23 PROBLEM — N18.2 ANEMIA IN STAGE 2 CHRONIC KIDNEY DISEASE: Status: ACTIVE | Noted: 2022-10-03

## 2024-05-23 PROBLEM — Z99.2 ESRD (END STAGE RENAL DISEASE) ON DIALYSIS (H): Status: RESOLVED | Noted: 2022-09-28 | Resolved: 2024-05-23

## 2024-05-23 PROBLEM — G89.18 ACUTE POST-OPERATIVE PAIN: Status: RESOLVED | Noted: 2024-04-27 | Resolved: 2024-05-23

## 2024-05-23 PROBLEM — I10 HYPERTENSION: Status: RESOLVED | Noted: 2022-09-28 | Resolved: 2024-05-23

## 2024-05-23 PROBLEM — R31.0 GROSS HEMATURIA: Status: RESOLVED | Noted: 2024-04-27 | Resolved: 2024-05-23

## 2024-05-23 LAB
ALBUMIN MFR UR ELPH: 11.7 MG/DL
ANION GAP SERPL CALCULATED.3IONS-SCNC: 8 MMOL/L (ref 7–15)
BASOPHILS # BLD AUTO: 0 10E3/UL (ref 0–0.2)
BASOPHILS NFR BLD AUTO: 1 %
BK VIRUS SPECIMEN TYPE: NORMAL
BKV DNA # SPEC NAA+PROBE: NOT DETECTED IU/ML
BUN SERPL-MCNC: 26.8 MG/DL (ref 6–20)
CALCIUM SERPL-MCNC: 9.9 MG/DL (ref 8.6–10)
CHLORIDE SERPL-SCNC: 107 MMOL/L (ref 98–107)
CREAT SERPL-MCNC: 1.14 MG/DL (ref 0.51–0.95)
CREAT UR-MCNC: 90.6 MG/DL
DEPRECATED HCO3 PLAS-SCNC: 23 MMOL/L (ref 22–29)
EGFRCR SERPLBLD CKD-EPI 2021: 64 ML/MIN/1.73M2
EOSINOPHIL # BLD AUTO: 0 10E3/UL (ref 0–0.7)
EOSINOPHIL NFR BLD AUTO: 2 %
ERYTHROCYTE [DISTWIDTH] IN BLOOD BY AUTOMATED COUNT: 14.5 % (ref 10–15)
FERRITIN SERPL-MCNC: 2181 NG/ML (ref 6–175)
GLUCOSE SERPL-MCNC: 75 MG/DL (ref 70–99)
HCT VFR BLD AUTO: 33.6 % (ref 35–47)
HGB BLD-MCNC: 10.6 G/DL (ref 11.7–15.7)
IMM GRANULOCYTES # BLD: 0 10E3/UL
IMM GRANULOCYTES NFR BLD: 1 %
IRON BINDING CAPACITY (ROCHE): 205 UG/DL (ref 240–430)
IRON SATN MFR SERPL: 38 % (ref 15–46)
IRON SERPL-MCNC: 77 UG/DL (ref 37–145)
LYMPHOCYTES # BLD AUTO: 0.3 10E3/UL (ref 0.8–5.3)
LYMPHOCYTES NFR BLD AUTO: 13 %
Lab: NORMAL
MAGNESIUM SERPL-MCNC: 1.4 MG/DL (ref 1.7–2.3)
MCH RBC QN AUTO: 29.9 PG (ref 26.5–33)
MCHC RBC AUTO-ENTMCNC: 31.5 G/DL (ref 31.5–36.5)
MCV RBC AUTO: 95 FL (ref 78–100)
MONOCYTES # BLD AUTO: 0.2 10E3/UL (ref 0–1.3)
MONOCYTES NFR BLD AUTO: 9 %
NEUTROPHILS # BLD AUTO: 1.5 10E3/UL (ref 1.6–8.3)
NEUTROPHILS NFR BLD AUTO: 74 %
NRBC # BLD AUTO: 0 10E3/UL
NRBC BLD AUTO-RTO: 0 /100
PERFORMING LABORATORY: NORMAL
PHOSPHATE SERPL-MCNC: 2.9 MG/DL (ref 2.5–4.5)
PLATELET # BLD AUTO: 196 10E3/UL (ref 150–450)
POTASSIUM SERPL-SCNC: 4.2 MMOL/L (ref 3.4–5.3)
PROT/CREAT 24H UR: 0.13 MG/MG CR (ref 0–0.2)
PTH-INTACT SERPL-MCNC: 131 PG/ML (ref 15–65)
RBC # BLD AUTO: 3.55 10E6/UL (ref 3.8–5.2)
SODIUM SERPL-SCNC: 138 MMOL/L (ref 135–145)
SPECIMEN STATUS: NORMAL
TACROLIMUS BLD-MCNC: 9.2 UG/L (ref 5–15)
TEST NAME: NORMAL
TME LAST DOSE: NORMAL H
TME LAST DOSE: NORMAL H
VIT D+METAB SERPL-MCNC: 28 NG/ML (ref 20–50)
WBC # BLD AUTO: 1.9 10E3/UL (ref 4–11)
WBC # BLD AUTO: 1.9 10E3/UL (ref 4–11)

## 2024-05-23 PROCEDURE — 82306 VITAMIN D 25 HYDROXY: CPT | Performed by: INTERNAL MEDICINE

## 2024-05-23 PROCEDURE — 83970 ASSAY OF PARATHORMONE: CPT | Performed by: PATHOLOGY

## 2024-05-23 PROCEDURE — 250N000009 HC RX 250: Performed by: NURSE PRACTITIONER

## 2024-05-23 PROCEDURE — 83735 ASSAY OF MAGNESIUM: CPT | Performed by: PATHOLOGY

## 2024-05-23 PROCEDURE — 36415 COLL VENOUS BLD VENIPUNCTURE: CPT | Performed by: PATHOLOGY

## 2024-05-23 PROCEDURE — 80180 DRUG SCRN QUAN MYCOPHENOLATE: CPT | Performed by: INTERNAL MEDICINE

## 2024-05-23 PROCEDURE — 86833 HLA CLASS II HIGH DEFIN QUAL: CPT | Performed by: INTERNAL MEDICINE

## 2024-05-23 PROCEDURE — 83540 ASSAY OF IRON: CPT | Performed by: PATHOLOGY

## 2024-05-23 PROCEDURE — 86832 HLA CLASS I HIGH DEFIN QUAL: CPT | Performed by: INTERNAL MEDICINE

## 2024-05-23 PROCEDURE — 86828 HLA CLASS I&II ANTIBODY QUAL: CPT | Mod: XU | Performed by: INTERNAL MEDICINE

## 2024-05-23 PROCEDURE — 82728 ASSAY OF FERRITIN: CPT | Performed by: PATHOLOGY

## 2024-05-23 PROCEDURE — 80197 ASSAY OF TACROLIMUS: CPT | Performed by: INTERNAL MEDICINE

## 2024-05-23 PROCEDURE — 87516 HEPATITIS B DNA AMP PROBE: CPT | Performed by: INTERNAL MEDICINE

## 2024-05-23 PROCEDURE — G2211 COMPLEX E/M VISIT ADD ON: HCPCS | Performed by: INTERNAL MEDICINE

## 2024-05-23 PROCEDURE — 84156 ASSAY OF PROTEIN URINE: CPT | Performed by: PATHOLOGY

## 2024-05-23 PROCEDURE — 99000 SPECIMEN HANDLING OFFICE-LAB: CPT | Performed by: PATHOLOGY

## 2024-05-23 PROCEDURE — 87799 DETECT AGENT NOS DNA QUANT: CPT | Performed by: INTERNAL MEDICINE

## 2024-05-23 PROCEDURE — 83550 IRON BINDING TEST: CPT | Performed by: PATHOLOGY

## 2024-05-23 PROCEDURE — 250N000013 HC RX MED GY IP 250 OP 250 PS 637: Performed by: NURSE PRACTITIONER

## 2024-05-23 PROCEDURE — 85025 COMPLETE CBC W/AUTO DIFF WBC: CPT | Performed by: PATHOLOGY

## 2024-05-23 PROCEDURE — 84100 ASSAY OF PHOSPHORUS: CPT | Performed by: PATHOLOGY

## 2024-05-23 PROCEDURE — G0463 HOSPITAL OUTPT CLINIC VISIT: HCPCS | Performed by: INTERNAL MEDICINE

## 2024-05-23 PROCEDURE — 74018 RADEX ABDOMEN 1 VIEW: CPT | Mod: GC | Performed by: RADIOLOGY

## 2024-05-23 PROCEDURE — 80048 BASIC METABOLIC PNL TOTAL CA: CPT | Performed by: PATHOLOGY

## 2024-05-23 PROCEDURE — 99215 OFFICE O/P EST HI 40 MIN: CPT | Mod: 24 | Performed by: INTERNAL MEDICINE

## 2024-05-23 RX ORDER — LEVOFLOXACIN 250 MG/1
500 TABLET, FILM COATED ORAL ONCE
Status: COMPLETED | OUTPATIENT
Start: 2024-05-23 | End: 2024-05-23

## 2024-05-23 RX ORDER — VALGANCICLOVIR 450 MG/1
900 TABLET, FILM COATED ORAL DAILY
COMMUNITY
Start: 2024-05-23 | End: 2024-06-18

## 2024-05-23 RX ORDER — LIDOCAINE HYDROCHLORIDE 20 MG/ML
JELLY TOPICAL ONCE
Status: COMPLETED | OUTPATIENT
Start: 2024-05-23 | End: 2024-05-23

## 2024-05-23 RX ADMIN — LEVOFLOXACIN 500 MG: 250 TABLET, FILM COATED ORAL at 11:22

## 2024-05-23 RX ADMIN — LIDOCAINE HYDROCHLORIDE: 20 JELLY TOPICAL at 11:23

## 2024-05-23 ASSESSMENT — PAIN SCALES - GENERAL: PAINLEVEL: NO PAIN (0)

## 2024-05-23 NOTE — LETTER
5/23/2024         RE: Ira Zamora  [address] [place] [address]  [place] [address]        Dear Colleague,    Thank you for referring your patient, Ira Zamora, to the [facility]. Please see a copy of my visit note below.    TRANSPLANT NEPHROLOGY EARLY POST TRANSPLANT VISIT    Assessment & Plan  #  DDKT (pediatric en bloc) : Stable   - Baseline Creatinine: ~ 1.1-1.3   - Proteinuria: Normal (<0.2 grams)   - Date DSA Last Checked: Apr/2024      Latest DSA: No DSA at time of transplant, repaet pending   - BK Viremia: Not checked post transplant, pending   - Kidney Tx Biopsy: No   - Transplant Ureteral Stent: Yes; Scheduled removal date May 23, 2024   -Continue ASA 325mg daily. Will check with surgery to see duration of high dose ASA     # Immunosuppression: Tacrolimus immediate release (goal 8-10) and Mycophenolic acid (dose 540 mg every 12 hours)   - Induction with Recent Transplant:  High Intensity Protocol due to severe GERD and concern with steroids    - Continue with intensive monitoring of immunosuppression for efficacy and toxicity.   - Changes: Not at this time    # Infection Prophylaxis:   - PJP: Sulfa/TMP (Bactrim) MWF, keep MWF for now with low WBC, increase if WBC stabilizes  - CMV: Valganciclovir (Valcyte). Patient is CMV IgG Ab discordant (D+/R-) and will continue on Valcyte x 6 months, then check CMV PCR monthly until 12 months post transplant.        # Hypertension: Controlled, but low at times;  Goal BP: < 140/90   - Changes: Not at this time. No longer on doxazosin    # Anemia in Chronic Renal Disease: Hgb: Stable, low      JUNG: No   - Iron studies: Replete    # Mineral Bone Disorder:    - Secondary renal hyperparathyroidism; PTH level: Minimally elevated ( pg/ml)        On treatment: None  - Vitamin D; level: Low        On supplement: No  - Calcium; level: Normal        On supplement: No  - Phosphorus; level: Normal        On supplement: No    # Electrolytes:   - Potassium;  level: Normal        On supplement: No  - Magnesium; level: Low normal        On supplement: Yes, 400mg Mg Ox BID  - Bicarbonate; level: Normal        On supplement: No    # Leukopenia:   -WBC down to 1.9 today. Obtain diff, MPA level, CMV PCR    # Crohns: followed by [facility]. Does occasionally alternate between diarrhea and constipation.  On ustekinumab PTA.              -Will plan to hold for at least 6 weeks post txp. Ok to restart in 2 weeks as long as WBC is stable     # Cardiac/Vascular Disease Risk Factors: Dilated ascending aorta (3.8 cm)  on 2024 ECHO (stable from 2020).     # GERD:   -Pt should remain off PPI due to concern for AIN.   -Continue famotidine 40mg daily with improving kidney function    # Medical Compliance: Yes    # Health Maintenance and Vaccination Review: Not Reviewed    # Transplant History:  Etiology of Kidney Failure: Interstitial nephritis  Tx: DDKT (pediatric en-bloc)  Transplant: 4/17/2024 (Kidney)  Donor Type: Donation after Brain Death Donor Class: Standard Criteria Donor  Crossmatch at time of Tx: negative  DSA at time of Tx: No  Significant changes in immunosuppression: None  CMV IgG Ab High Risk Discordance (D+/R-): Yes  EBV IgG Ab High Risk Discordance (D+/R-): No  Significant transplant-related complications: None    Transplant Office Phone Number: [phone]    Assessment and plan was discussed with the patient and she voiced her understanding and agreement.    Return visit: Return in 5 weeks (on 6/27/2024) for previously scheduled visit, 2 month post transplant visit.    Yan Quezada MD    The longitudinal plan of care for kidney transplant was addressed during this visit. Due to the added complexity in care, I will continue to support Ira Zamora in the subsequent management of this condition(s) and with the ongoing continuity of care of this condition(s).       Chief Complaint  Ms. Zamora is a 34 year old here for kidney transplant, immunosuppression management, and hospital  "follow up.     History of Present Illness  The patient overall feels well. she denies any recent hospitalizations. she denies nausea, vomiting, diarrhea, fever, chills, chest pain, LE edema, dysuria, hematuria.     She states she is fatigued. She is \"randomly nauseous\" but then it goes away quickly. She staets she is having loose stools. She is having SOB w/ exertion. Her appetite is improving but is still low compared to prior. She occasionally has night sweats but doesn't know if it is due to her heated blanket. She denies orthostasis.     Home BP:  100s systolic    Problem List  Patient Active Problem List   Diagnosis     ESRD (end stage renal disease) on dialysis (H)     Crohn's disease (H)     Hypertension     Anemia in chronic kidney disease     Secondary renal hyperparathyroidism (H24)     Long QT interval     Kidney replaced by transplant     Immunosuppressed status (H24)     Gross hematuria     Gastroesophageal reflux disease without esophagitis     Acute post-operative pain     Dehydration       Allergies  Allergies   Allergen Reactions     Proton Pump Inhibitors Nephrotoxicity     No PPIs due to history of renal failure due to interstitial nephritis     Tegaderm Transparent Dressing (Informational Only) Blisters       Medications  Current Outpatient Medications   Medication Sig Dispense Refill     valGANciclovir (VALCYTE) 450 MG tablet Take 2 tablets (900 mg) by mouth daily       acetaminophen (TYLENOL) 500 MG tablet Take 1,000 mg by mouth every 6 hours as needed       aspirin (ASA) 325 MG tablet Take 1 tablet (325 mg) by mouth daily 30 tablet 2     atorvastatin (LIPITOR) 10 MG tablet Take 1 tablet (10 mg) by mouth daily 30 tablet 1     famotidine (PEPCID) 20 MG tablet Take 2 tablets (40 mg) by mouth daily 60 tablet 1     levonorgestrel (KYLEENA) 19.5 MG IUD 1 Device by Intrauterine route       magnesium oxide (MAG-OX) 400 MG tablet Take 1 tablet (400 mg) by mouth 2 times daily for 30 days 60 tablet 0     " mycophenolic acid (GENERIC EQUIVALENT) 180 MG EC tablet Take 3 tablets (540 mg) by mouth 2 times daily 180 tablet 3     PARoxetine (PAXIL) 30 MG tablet Take 30 mg by mouth every evening       sulfamethoxazole-trimethoprim (BACTRIM) 400-80 MG tablet Take 1 tablet by mouth three times a week 30 tablet 1     tacrolimus (GENERIC EQUIVALENT) 0.5 MG capsule Take 1 capsule (0.5 mg) by mouth 2 times daily 1.5mg twice daily 60 capsule 11     tacrolimus (GENERIC EQUIVALENT) 1 MG capsule Take 1 capsule (1 mg) by mouth 2 times daily 1.5mg twice daily 60 capsule 11     ustekinumab (STELARA) 90 MG/ML Inject 90 mg Subcutaneous once every eight weeks       Current Facility-Administered Medications   Medication Dose Route Frequency Provider Last Rate Last Admin     levofloxacin (LEVAQUIN) tablet 500 mg  500 mg Oral Once Pratima Macedo APRN CNP         lidocaine (XYLOCAINE) 2 % external gel   Urethral Once Pratima Macedo APRN CNP         Medications Discontinued During This Encounter   Medication Reason     mycophenolate (GENERIC EQUIVALENT) 250 MG capsule      valGANciclovir (VALCYTE) 450 MG tablet        Physical Exam  Vital Signs: /71 (BP Location: Left arm, Patient Position: Sitting, Cuff Size: Adult Regular)   Pulse 89   Temp 97.8  F (36.6  C) (Oral)   Wt 56.6 kg (124 lb 12.8 oz)   LMP 04/03/2024 (Approximate)   SpO2 100%   BMI 22.45 kg/m      GENERAL APPEARANCE: alert and no distress  HENT: mouth without ulcers or lesions  RESP: lungs clear to auscultation - no rales, rhonchi or wheezes  CV: regular rhythm, normal rate, no rub, no murmur  EDEMA: no LE edema bilaterally  ABDOMEN: soft, nondistended, nontender, bowel sounds normal  MS: extremities normal - no gross deformities noted, no evidence of inflammation in joints, no muscle tenderness  SKIN: no rash  NEURO: normal strength and tone, sensory exam grossly normal, mentation intact and speech normal  PSYCH: mentation appears normal and affect normal/bright  TX  KIDNEY: normal with heaped up skin in the inferior part of incision  DIALYSIS ACCESS:  RUE AV fistula aneurysmal with good thrill    Data        Latest Ref Rng & Units 5/9/2024     9:10 AM 5/6/2024     9:15 AM 5/2/2024     9:20 AM   Renal   Sodium 135 - 145 mmol/L 139  137  140    K 3.4 - 5.3 mmol/L 5.1  5.0  4.5    Cl 98 - 107 mmol/L 109  109  111    Cl (external) 98 - 107 mmol/L 109  109  111    CO2 22 - 29 mmol/L 22  19  21    Urea Nitrogen 6.0 - 20.0 mg/dL 31.8  24.4  24.8    Creatinine 0.51 - 0.95 mg/dL 1.17  1.17  1.26    Glucose 70 - 99 mg/dL 92  90  95    Calcium 8.6 - 10.0 mg/dL 10.3  10.0  9.8    Magnesium 1.7 - 2.3 mg/dL 1.6  1.6  1.3          Latest Ref Rng & Units 5/9/2024     9:10 AM 5/6/2024     9:15 AM 5/2/2024     9:20 AM   Bone Health   Phosphorus 2.5 - 4.5 mg/dL 2.7  3.2  2.7          Latest Ref Rng & Units 5/9/2024     9:10 AM 5/6/2024     9:15 AM 5/2/2024     9:20 AM   Heme   WBC 4.0 - 11.0 10e3/uL 2.8  3.0  5.4    Hgb 11.7 - 15.7 g/dL 10.3  10.0  9.5    Plt 150 - 450 10e3/uL 259  236  238          Latest Ref Rng & Units 5/9/2024     9:10 AM 4/17/2024     9:58 AM 9/29/2022    11:40 AM   Liver   AP 40 - 150 U/L 90  55  75    TBili <=1.2 mg/dL 0.4  0.6  0.4    ALT 0 - 50 U/L 8  5  12    AST 0 - 45 U/L 17  17  17    Tot Protein 6.4 - 8.3 g/dL 6.7  6.5  7.6    Albumin 3.5 - 5.2 g/dL 4.1  4.1  4.4          Latest Ref Rng & Units 4/17/2024     9:58 AM   Pancreas   A1C <5.7 % 4.7          Latest Ref Rng & Units 4/22/2024     8:21 AM   Iron studies   Iron 37 - 145 ug/dL 53    Iron Sat Index 15 - 46 % 36    Ferritin 6 - 175 ng/mL 2,181          Latest Ref Rng & Units 4/17/2024     9:58 AM 9/29/2022    11:40 AM   [facility] Txp Virology   EBV CAPSID ANTIBODY IGG No detectable antibody. Positive  Positive      Failed to redirect to the Timeline version of the [employer].  Recent Labs   Lab Test 04/23/24  0735 04/24/24  0907 04/25/24  0930 05/02/24  0920 05/06/24  0915 05/09/24  0910   DOSTAC 4/22/2024  4/23/2024 4/24/2024  --   --   --    TACROL 7.2 7.2 9.6 18.4* 13.8 16.4*     Recent Labs   Lab Test 04/23/24  0735   DOSMPA 4/22/2024   9:00 PM   MPACID 2.20   MPAG 107.5*          Again, thank you for allowing me to participate in the care of your patient.        Sincerely,        Yan Quezada MD

## 2024-05-23 NOTE — NURSING NOTE
Cystoscopy - Ureteral stent removal    Prepped patient for procedure with no complications.   2% lidocaine gel injected into urethra via urojet.   Assisted Pratima Macedo CNP with stent removal x2.  Administered 500mg Levaquin PO after procedure.  Patient was discharged in stable condition.       Allergies   Allergen Reactions    Amlodipine Headache and Other (See Comments)     Other Reaction(s): Unknown    Proton Pump Inhibitors Nephrotoxicity     No PPIs due to history of renal failure due to interstitial nephritis    Tegaderm Transparent Dressing (Informational Only) Blisters         EDU WILLIS RN on 5/23/2024 at 11:23 AM

## 2024-05-23 NOTE — PROGRESS NOTES
TRANSPLANT NEPHROLOGY EARLY POST TRANSPLANT VISIT    Assessment & Plan   #  DDKT (pediatric en bloc) : Stable   - Baseline Creatinine: ~ 1.1-1.3   - Proteinuria: Normal (<0.2 grams)   - Date DSA Last Checked: Apr/2024      Latest DSA: No DSA at time of transplant, repaet pending   - BK Viremia: Not checked post transplant, pending   - Kidney Tx Biopsy: No   - Transplant Ureteral Stent: Yes; Scheduled removal date May 23, 2024   -Continue ASA 325mg daily. Will check with surgery to see duration of high dose ASA     # Immunosuppression: Tacrolimus immediate release (goal 8-10) and Mycophenolic acid (dose 540 mg every 12 hours)   - Induction with Recent Transplant:  High Intensity Protocol due to severe GERD and concern with steroids    - Continue with intensive monitoring of immunosuppression for efficacy and toxicity.   - Changes: Not at this time    # Infection Prophylaxis:   - PJP: Sulfa/TMP (Bactrim) MWF, keep MWF for now with low WBC, increase if WBC stabilizes  - CMV: Valganciclovir (Valcyte). Patient is CMV IgG Ab discordant (D+/R-) and will continue on Valcyte x 6 months, then check CMV PCR monthly until 12 months post transplant.        # Hypertension: Controlled, but low at times;  Goal BP: < 140/90   - Changes: Not at this time. No longer on doxazosin    # Anemia in Chronic Renal Disease: Hgb: Stable, low      JUNG: No   - Iron studies: Replete    # Mineral Bone Disorder:    - Secondary renal hyperparathyroidism; PTH level: Minimally elevated ( pg/ml)        On treatment: None  - Vitamin D; level: Low        On supplement: No  - Calcium; level: Normal        On supplement: No  - Phosphorus; level: Normal        On supplement: No    # Electrolytes:   - Potassium; level: Normal        On supplement: No  - Magnesium; level: Low normal        On supplement: Yes, 400mg Mg Ox BID  - Bicarbonate; level: Normal        On supplement: No    # Leukopenia:   -WBC down to 1.9 today. Obtain diff, MPA level, CMV  PCR    # Crohns: followed by [facility]. Does occasionally alternate between diarrhea and constipation.  On ustekinumab PTA.              -Will plan to hold for at least 6 weeks post txp. Ok to restart in 2 weeks as long as WBC is stable     # Cardiac/Vascular Disease Risk Factors: Dilated ascending aorta (3.8 cm)  on 2024 ECHO (stable from 2020).     # GERD:   -Pt should remain off PPI due to concern for AIN.   -Continue famotidine 40mg daily with improving kidney function    # Medical Compliance: Yes    # Health Maintenance and Vaccination Review: Not Reviewed    # Transplant History:  Etiology of Kidney Failure: Interstitial nephritis  Tx: DDKT (pediatric en-bloc)  Transplant: 4/17/2024 (Kidney)  Donor Type: Donation after Brain Death Donor Class: Standard Criteria Donor  Crossmatch at time of Tx: negative  DSA at time of Tx: No  Significant changes in immunosuppression: None  CMV IgG Ab High Risk Discordance (D+/R-): Yes  EBV IgG Ab High Risk Discordance (D+/R-): No  Significant transplant-related complications: None    Transplant Office Phone Number: [phone]    Assessment and plan was discussed with the patient and she voiced her understanding and agreement.    Return visit: Return in 5 weeks (on 6/27/2024) for previously scheduled visit, 2 month post transplant visit.    Yan Quezada MD    The longitudinal plan of care for kidney transplant was addressed during this visit. Due to the added complexity in care, I will continue to support Ira Zamora in the subsequent management of this condition(s) and with the ongoing continuity of care of this condition(s).       Chief Complaint   Ms. Zamora is a 34 year old here for kidney transplant, immunosuppression management, and hospital follow up.     History of Present Illness   The patient overall feels well. she denies any recent hospitalizations. she denies nausea, vomiting, diarrhea, fever, chills, chest pain, LE edema, dysuria, hematuria.     She states she is  "fatigued. She is \"randomly nauseous\" but then it goes away quickly. She staets she is having loose stools. She is having SOB w/ exertion. Her appetite is improving but is still low compared to prior. She occasionally has night sweats but doesn't know if it is due to her heated blanket. She denies orthostasis.     Home BP:  100s systolic    Problem List   Patient Active Problem List   Diagnosis    ESRD (end stage renal disease) on dialysis (H)    Crohn's disease (H)    Hypertension    Anemia in chronic kidney disease    Secondary renal hyperparathyroidism (H24)    Long QT interval    Kidney replaced by transplant    Immunosuppressed status (H24)    Gross hematuria    Gastroesophageal reflux disease without esophagitis    Acute post-operative pain    Dehydration       Allergies   Allergies   Allergen Reactions    Proton Pump Inhibitors Nephrotoxicity     No PPIs due to history of renal failure due to interstitial nephritis    Tegaderm Transparent Dressing (Informational Only) Blisters       Medications   Current Outpatient Medications   Medication Sig Dispense Refill    valGANciclovir (VALCYTE) 450 MG tablet Take 2 tablets (900 mg) by mouth daily      acetaminophen (TYLENOL) 500 MG tablet Take 1,000 mg by mouth every 6 hours as needed      aspirin (ASA) 325 MG tablet Take 1 tablet (325 mg) by mouth daily 30 tablet 2    atorvastatin (LIPITOR) 10 MG tablet Take 1 tablet (10 mg) by mouth daily 30 tablet 1    famotidine (PEPCID) 20 MG tablet Take 2 tablets (40 mg) by mouth daily 60 tablet 1    levonorgestrel (KYLEENA) 19.5 MG IUD 1 Device by Intrauterine route      magnesium oxide (MAG-OX) 400 MG tablet Take 1 tablet (400 mg) by mouth 2 times daily for 30 days 60 tablet 0    mycophenolic acid (GENERIC EQUIVALENT) 180 MG EC tablet Take 3 tablets (540 mg) by mouth 2 times daily 180 tablet 3    PARoxetine (PAXIL) 30 MG tablet Take 30 mg by mouth every evening      sulfamethoxazole-trimethoprim (BACTRIM) 400-80 MG tablet Take " 1 tablet by mouth three times a week 30 tablet 1    tacrolimus (GENERIC EQUIVALENT) 0.5 MG capsule Take 1 capsule (0.5 mg) by mouth 2 times daily 1.5mg twice daily 60 capsule 11    tacrolimus (GENERIC EQUIVALENT) 1 MG capsule Take 1 capsule (1 mg) by mouth 2 times daily 1.5mg twice daily 60 capsule 11    ustekinumab (STELARA) 90 MG/ML Inject 90 mg Subcutaneous once every eight weeks       Current Facility-Administered Medications   Medication Dose Route Frequency Provider Last Rate Last Admin    levofloxacin (LEVAQUIN) tablet 500 mg  500 mg Oral Once Pratima Macedo APRN CNP        lidocaine (XYLOCAINE) 2 % external gel   Urethral Once Pratima Macedo APRN CNP         Medications Discontinued During This Encounter   Medication Reason    mycophenolate (GENERIC EQUIVALENT) 250 MG capsule     valGANciclovir (VALCYTE) 450 MG tablet        Physical Exam   Vital Signs: /71 (BP Location: Left arm, Patient Position: Sitting, Cuff Size: Adult Regular)   Pulse 89   Temp 97.8  F (36.6  C) (Oral)   Wt 56.6 kg (124 lb 12.8 oz)   LMP 04/03/2024 (Approximate)   SpO2 100%   BMI 22.45 kg/m      GENERAL APPEARANCE: alert and no distress  HENT: mouth without ulcers or lesions  RESP: lungs clear to auscultation - no rales, rhonchi or wheezes  CV: regular rhythm, normal rate, no rub, no murmur  EDEMA: no LE edema bilaterally  ABDOMEN: soft, nondistended, nontender, bowel sounds normal  MS: extremities normal - no gross deformities noted, no evidence of inflammation in joints, no muscle tenderness  SKIN: no rash  NEURO: normal strength and tone, sensory exam grossly normal, mentation intact and speech normal  PSYCH: mentation appears normal and affect normal/bright  TX KIDNEY: normal with heaped up skin in the inferior part of incision  DIALYSIS ACCESS:  RUE AV fistula aneurysmal with good thrill    Data         Latest Ref Rng & Units 5/9/2024     9:10 AM 5/6/2024     9:15 AM 5/2/2024     9:20 AM   Renal   Sodium 135 - 145  mmol/L 139  137  140    K 3.4 - 5.3 mmol/L 5.1  5.0  4.5    Cl 98 - 107 mmol/L 109  109  111    Cl (external) 98 - 107 mmol/L 109  109  111    CO2 22 - 29 mmol/L 22  19  21    Urea Nitrogen 6.0 - 20.0 mg/dL 31.8  24.4  24.8    Creatinine 0.51 - 0.95 mg/dL 1.17  1.17  1.26    Glucose 70 - 99 mg/dL 92  90  95    Calcium 8.6 - 10.0 mg/dL 10.3  10.0  9.8    Magnesium 1.7 - 2.3 mg/dL 1.6  1.6  1.3          Latest Ref Rng & Units 5/9/2024     9:10 AM 5/6/2024     9:15 AM 5/2/2024     9:20 AM   Bone Health   Phosphorus 2.5 - 4.5 mg/dL 2.7  3.2  2.7          Latest Ref Rng & Units 5/9/2024     9:10 AM 5/6/2024     9:15 AM 5/2/2024     9:20 AM   Heme   WBC 4.0 - 11.0 10e3/uL 2.8  3.0  5.4    Hgb 11.7 - 15.7 g/dL 10.3  10.0  9.5    Plt 150 - 450 10e3/uL 259  236  238          Latest Ref Rng & Units 5/9/2024     9:10 AM 4/17/2024     9:58 AM 9/29/2022    11:40 AM   Liver   AP 40 - 150 U/L 90  55  75    TBili <=1.2 mg/dL 0.4  0.6  0.4    ALT 0 - 50 U/L 8  5  12    AST 0 - 45 U/L 17  17  17    Tot Protein 6.4 - 8.3 g/dL 6.7  6.5  7.6    Albumin 3.5 - 5.2 g/dL 4.1  4.1  4.4          Latest Ref Rng & Units 4/17/2024     9:58 AM   Pancreas   A1C <5.7 % 4.7          Latest Ref Rng & Units 4/22/2024     8:21 AM   Iron studies   Iron 37 - 145 ug/dL 53    Iron Sat Index 15 - 46 % 36    Ferritin 6 - 175 ng/mL 2,181          Latest Ref Rng & Units 4/17/2024     9:58 AM 9/29/2022    11:40 AM   [facility] Txp Virology   EBV CAPSID ANTIBODY IGG No detectable antibody. Positive  Positive      Failed to redirect to the Timeline version of the [employer].  Recent Labs   Lab Test 04/23/24  0735 04/24/24  0907 04/25/24  0930 05/02/24  0920 05/06/24  0915 05/09/24  0910   DOSTAC 4/22/2024 4/23/2024 4/24/2024  --   --   --    TACROL 7.2 7.2 9.6 18.4* 13.8 16.4*     Recent Labs   Lab Test 04/23/24  0735   DOSMPA 4/22/2024   9:00 PM   MPACID 2.20   MPAG 107.5*

## 2024-05-23 NOTE — TELEPHONE ENCOUNTER
Clinical Pharmacy Consult:                                                      Transplant Specific: 1 month post transplant pharmacy review  Date of Transplant: 4/17/24  Type of Transplant: kidney  First Transplant: yes  History of rejection: no    Immunosuppression Regimen   TAC 1.5 mg qAM & 1.5 mg qPM and  mg qAM & 540 mg qPM  Patient specific goal: 8-10  Most recent level: 11.9 on 5/19/2024  Immunosuppressant Levels:  Supratherapeutic  Pt adherent to lab draws: yes  Scr:   Lab Results   Component Value Date    CR 4.16 04/18/2024     Side effects:     Prophylactic Medications  PJP Prophylactic: Bactrim SS three times per week  Scheduled Discontinue Date: Lifelong     Antifungal: Not needed thus far  Scheduled Discontinue Date: N/A     CMV Prophylactic: Valcyte 900 mg once daily   Scheduled Discontinue Date:  3 to 6 months  Anticipate stop 10/17/2024     Acid Reducer: Pepcid (famotidine)  Scheduled Reviewed Date:  TBD    Vascular Prophylactic: Not needed thus far  Scheduled Discontinue Date: N/A    Med rec/DUR performed: yes  Med Rec Discrepancies: no    Unable to reach patient after 3 attempts for month 1 pharmacy check in. Patient has enrolled into [facility] services. Last tacrolimus slightly high, dose adjusted.   Will attempt follow-up 1 in month.   Time Spent: 5 minutes    Denis Wu, PharmD, BCACP  [facility]  [address] [place] [id number]  Specialty - [phone]  Transplant - [phone]  Mail - [phone]

## 2024-05-23 NOTE — NURSING NOTE
Chief Complaint   Patient presents with    RECHECK     K/P POST ACUTE TXP NEPH - AKT       /71 (BP Location: Left arm, Patient Position: Sitting, Cuff Size: Adult Regular)   Pulse 89   Temp 97.8  F (36.6  C) (Oral)   Wt 56.6 kg (124 lb 12.8 oz)   LMP 04/03/2024 (Approximate)   SpO2 100%   BMI 22.45 kg/m      Godwin Iyer CMA on 5/23/2024 at 10:19 AM

## 2024-05-23 NOTE — LETTER
5/23/2024         RE: Ira Zamora  [address]  [place] [address]        Dear Colleague,    Thank you for referring your patient, Ira Zamora, to the [facility]. Please see a copy of my visit note below.    See procedure note.       Again, thank you for allowing me to participate in the care of your patient.        Sincerely,        LAURITA Jacobson CNP

## 2024-05-23 NOTE — PATIENT INSTRUCTIONS
Patient Recommendations:  - Increase valganciclovir to 900mg daily   -I recommend psyllium based fiber supplement daily regardless of stools (powder in water, capsule, or gummy)    Transplant Patient Information  Your Post Transplant Coordinator is: Radha Jorge  For non urgent items, we encourage you to contact your coordinator/care team online via Tracka[employer]  You and your care team can also contact your transplant coordinator Monday - Friday, 8am - 5pm at [phone] (Option 2 to reach the coordinator or Option 4 to schedule an appointment).  After hours for urgent matters, please call [facility] at [phone].

## 2024-05-23 NOTE — PROCEDURES
Transplant Surgery  Operative Note    Preop dx: Status post  Donor kidney en bloc transplant.  Post op dx: same   Procedure: Flexible cystoscopy and ureteral stent removal   Surgeon: LAURITA Jacobson  Assistant: Geneva Gale RN   Anesthesia: local  EBL: 0   Specimens: none.  Findings: none abnormal. Stent inspected and noted to be intact.   Indication: The patient is status post kidney transplant and the ureteral stent is no longer needed.    Procedure: The patient was positioned supine on the table.  The groin was sterilely prepped and draped in the usual fashion. Time out was done. Urojet was applied to the urethra. A flexible cystoscope was inserted and advanced thru the urethra into the bladder x 2. The stent was visualized and grasped x 2.  The cystoscope, grasper and stent were removed en-mass x 2. Both stents were visualized to be intact.  The bladder mucosa was normal in appearance. Antibiotics were administered. The patient tolerated the procedure well and was asked to void before leaving the clinic.

## 2024-05-24 LAB
HBV DNA SERPL QL NAA+PROBE: NORMAL
HCV RNA SERPL QL NAA+PROBE: NORMAL
HIV1+2 RNA SERPL QL NAA+PROBE: NORMAL

## 2024-05-26 LAB — MISCELLANEOUS TEST 1 (ARUP): ABNORMAL

## 2024-05-27 LAB
DONOR IDENTIFICATION: NORMAL
DSA COMMENTS: NORMAL
DSA PRESENT: NO
DSA TEST METHOD: NORMAL
INT SUB COMMENTS: NORMAL
INT SUB RESULT: NORMAL
INTERF SUBSTANCE: NORMAL
INTSUB TEST METHOD: NORMAL
ORGAN: NORMAL
SA 1  COMMENTS: NORMAL
SA 1 CELL: NORMAL
SA 1 TEST METHOD: NORMAL
SA 2 CELL: NORMAL
SA 2 COMMENTS: NORMAL
SA 2 TEST METHOD: NORMAL
SA1 HI RISK ABY: NORMAL
SA1 MOD RISK ABY: NORMAL
SA2 HI RISK ABY: NORMAL
SA2 MOD RISK ABY: NORMAL
UNACCEPTABLE ANTIGENS: NORMAL
UNOS CPRA: 9

## 2024-05-28 ENCOUNTER — MYC MEDICAL ADVICE (OUTPATIENT)
Dept: OTHER | Age: 34
End: 2024-05-28

## 2024-05-29 ENCOUNTER — TELEPHONE (OUTPATIENT)
Dept: TRANSPLANT | Facility: CLINIC | Age: 34
End: 2024-05-29
Payer: MEDICARE

## 2024-05-29 DIAGNOSIS — Z94.0 KIDNEY TRANSPLANT RECIPIENT: ICD-10-CM

## 2024-05-29 NOTE — TELEPHONE ENCOUNTER
Patient called regarding mouth Sores and wondering what to do. Caller also sent a mychart message.

## 2024-05-29 NOTE — LETTER
PHYSICIAN ORDERS      DATE & TIME ISSUED: May 30, 2024 11:56 AM  PATIENT NAME: Ira Zamora   : 1990     [facility] MR# [if applicable]: [id number]     DIAGNOSIS:  kidney transplanted  ICD-10 CODE: z94.0   Please draw the following labs at patient's next visit:  -CMV Quantitative, PCR  -HSV PCR, blood      Any questions please call: [phone]    Please fax each result same day as resulted/available    Critical lab results page [phone]    Fax results to:  [phone].      Yan Quezada MD

## 2024-05-30 RX ORDER — MYCOPHENOLIC ACID 180 MG/1
360 TABLET, DELAYED RELEASE ORAL 2 TIMES DAILY
Qty: 120 TABLET | Refills: 11 | Status: SHIPPED | OUTPATIENT
Start: 2024-05-30 | End: 2024-06-12

## 2024-05-30 NOTE — TELEPHONE ENCOUNTER
Yan Quezada MD Colianni, Lauren, RN  Phone Number: [phone]     No, let's decrease MPA to 360mg bid, send CMV PCR and HSV PCR blood. Prescribe magic mouthwash.

## 2024-05-30 NOTE — TELEPHONE ENCOUNTER
Spoke to patient. She went to ER last night for chills and body aches. All testing negative; was prescribed magic mouthwash. CMV PCR added to home care labs as well as HSV. Patient will start mouthwash and continue to monitor symptoms.

## 2024-05-31 ENCOUNTER — LAB REQUISITION (OUTPATIENT)
Dept: LAB | Facility: OTHER | Age: 34
End: 2024-05-31
Payer: MEDICARE

## 2024-05-31 DIAGNOSIS — Z94.0 KIDNEY TRANSPLANT STATUS: ICD-10-CM

## 2024-05-31 LAB
ANION GAP SERPL CALCULATED.3IONS-SCNC: 11 MMOL/L (ref 7–15)
BASOPHILS # BLD AUTO: 0 10E3/UL (ref 0–0.2)
BASOPHILS NFR BLD AUTO: 1 %
BUN SERPL-MCNC: 24 MG/DL (ref 6–20)
CALCIUM SERPL-MCNC: 10.3 MG/DL (ref 8.6–10)
CHLORIDE SERPL-SCNC: 107 MMOL/L (ref 98–107)
CREAT SERPL-MCNC: 1.14 MG/DL (ref 0.51–0.95)
DEPRECATED HCO3 PLAS-SCNC: 24 MMOL/L (ref 22–29)
EGFRCR SERPLBLD CKD-EPI 2021: 64 ML/MIN/1.73M2
EOSINOPHIL # BLD AUTO: 0.1 10E3/UL (ref 0–0.7)
EOSINOPHIL NFR BLD AUTO: 6 %
ERYTHROCYTE [DISTWIDTH] IN BLOOD BY AUTOMATED COUNT: 14.1 % (ref 10–15)
GLUCOSE SERPL-MCNC: 89 MG/DL (ref 70–99)
HCT VFR BLD AUTO: 35.1 % (ref 35–47)
HGB BLD-MCNC: 11.3 G/DL (ref 11.7–15.7)
IMM GRANULOCYTES # BLD: 0.1 10E3/UL
IMM GRANULOCYTES NFR BLD: 3 %
LYMPHOCYTES # BLD AUTO: 0.3 10E3/UL (ref 0.8–5.3)
LYMPHOCYTES NFR BLD AUTO: 18 %
MAGNESIUM SERPL-MCNC: 1.7 MG/DL (ref 1.7–2.3)
MCH RBC QN AUTO: 30.5 PG (ref 26.5–33)
MCHC RBC AUTO-ENTMCNC: 32.2 G/DL (ref 31.5–36.5)
MCV RBC AUTO: 95 FL (ref 78–100)
MONOCYTES # BLD AUTO: 0.2 10E3/UL (ref 0–1.3)
MONOCYTES NFR BLD AUTO: 13 %
NEUTROPHILS # BLD AUTO: 1 10E3/UL (ref 1.6–8.3)
NEUTROPHILS NFR BLD AUTO: 59 %
NRBC # BLD AUTO: 0 10E3/UL
NRBC BLD AUTO-RTO: 0 /100
PHOSPHATE SERPL-MCNC: 2.8 MG/DL (ref 2.5–4.5)
PLATELET # BLD AUTO: 219 10E3/UL (ref 150–450)
POTASSIUM SERPL-SCNC: 4.1 MMOL/L (ref 3.4–5.3)
RBC # BLD AUTO: 3.7 10E6/UL (ref 3.8–5.2)
SODIUM SERPL-SCNC: 142 MMOL/L (ref 135–145)
TACROLIMUS BLD-MCNC: 8.7 UG/L (ref 5–15)
TME LAST DOSE: NORMAL H
TME LAST DOSE: NORMAL H
WBC # BLD AUTO: 1.7 10E3/UL (ref 4–11)

## 2024-05-31 PROCEDURE — 84100 ASSAY OF PHOSPHORUS: CPT | Mod: ORL | Performed by: INTERNAL MEDICINE

## 2024-05-31 PROCEDURE — 87529 HSV DNA AMP PROBE: CPT | Mod: ORL | Performed by: INTERNAL MEDICINE

## 2024-05-31 PROCEDURE — 83735 ASSAY OF MAGNESIUM: CPT | Mod: ORL | Performed by: INTERNAL MEDICINE

## 2024-05-31 PROCEDURE — 85025 COMPLETE CBC W/AUTO DIFF WBC: CPT | Mod: ORL | Performed by: INTERNAL MEDICINE

## 2024-05-31 PROCEDURE — 80197 ASSAY OF TACROLIMUS: CPT | Mod: ORL | Performed by: INTERNAL MEDICINE

## 2024-05-31 PROCEDURE — 80048 BASIC METABOLIC PNL TOTAL CA: CPT | Mod: ORL | Performed by: INTERNAL MEDICINE

## 2024-06-01 LAB — CMV DNA SPEC NAA+PROBE-ACNC: NOT DETECTED IU/ML

## 2024-06-03 ENCOUNTER — TELEPHONE (OUTPATIENT)
Dept: TRANSPLANT | Facility: CLINIC | Age: 34
End: 2024-06-03
Payer: MEDICARE

## 2024-06-03 DIAGNOSIS — B00.9 HERPES: ICD-10-CM

## 2024-06-03 DIAGNOSIS — K13.70 ORAL MUCOSAL LESION: Primary | ICD-10-CM

## 2024-06-03 DIAGNOSIS — K13.70 ORAL MUCOSAL LESION: ICD-10-CM

## 2024-06-03 DIAGNOSIS — Z94.0 KIDNEY REPLACED BY TRANSPLANT: Chronic | ICD-10-CM

## 2024-06-03 LAB
HSV1 DNA SPEC QL NAA+PROBE: NEGATIVE
HSV2 DNA SPEC QL NAA+PROBE: NEGATIVE

## 2024-06-03 RX ORDER — FAMOTIDINE 20 MG/1
40 TABLET, FILM COATED ORAL DAILY
Qty: 60 TABLET | Refills: 1 | Status: ON HOLD | OUTPATIENT
Start: 2024-06-03 | End: 2024-06-09

## 2024-06-03 RX ORDER — VALACYCLOVIR HYDROCHLORIDE 1 G/1
1000 TABLET, FILM COATED ORAL 2 TIMES DAILY
Qty: 28 TABLET | Refills: 0 | Status: ON HOLD | OUTPATIENT
Start: 2024-06-03 | End: 2024-06-09

## 2024-06-03 NOTE — TELEPHONE ENCOUNTER
Ira Zamora Lauren, RN  Phone Number: [phone]     Vahid Garcia,    My mouth sores aren t getting better and I m in agony, and also I ve developed an ulcer on my labia just outside my vagina. I haven t eaten very much in the last 5 days. I m not sure what to do. Can we trial taking me off myfortic for a little while just so my sores can heal or even change it to something else? I m in 24/7 pain and orajel and magic mouth wash seem to help minimally. I also need more magic mouth wash as I ve used the stuff up that I got from the hospital.    Thanks    Per Dr. Quezada: Swab for HSV, valtrex 1g BID 14 day, hold valcyte   Adeno    Patient is going to gynecologist today. Will swab for HSV and  rx for Valtrex

## 2024-06-03 NOTE — TELEPHONE ENCOUNTER
Provider Call: General  Route to LPN    Reason for call: Pharmcy received a transfer medication form [facility]- re Magic Mouthwash  They need to review  the recipe of diffent mediations used     Call back needed? Yes    Return Call Needed  Same as documented in contacts section  When to return call?: Same day: Route High Priority

## 2024-06-03 NOTE — TELEPHONE ENCOUNTER
Spoke to pharmacy; they are unable to fill this medication. RN[facility] sent med to [facility] pharmacy as they said they will be able to compound,

## 2024-06-05 ENCOUNTER — HOSPITAL ENCOUNTER (INPATIENT)
Facility: CLINIC | Age: 34
LOS: 2 days | Discharge: HOME | DRG: 157 | End: 2024-06-09
Attending: EMERGENCY MEDICINE | Admitting: SURGERY
Payer: MEDICARE

## 2024-06-05 DIAGNOSIS — Z94.0 S/P KIDNEY TRANSPLANT: ICD-10-CM

## 2024-06-05 DIAGNOSIS — K12.1 MOUTH ULCERS: ICD-10-CM

## 2024-06-05 DIAGNOSIS — D72.819 LEUKOPENIA, UNSPECIFIED TYPE: ICD-10-CM

## 2024-06-05 DIAGNOSIS — N76.5 VAGINAL ULCER: ICD-10-CM

## 2024-06-05 DIAGNOSIS — D84.9 IMMUNOSUPPRESSED STATUS (H): ICD-10-CM

## 2024-06-05 DIAGNOSIS — E83.42 HYPOMAGNESEMIA: Primary | ICD-10-CM

## 2024-06-05 DIAGNOSIS — Z94.0 KIDNEY REPLACED BY TRANSPLANT: Chronic | ICD-10-CM

## 2024-06-05 LAB
ALBUMIN SERPL BCG-MCNC: 3.8 G/DL (ref 3.5–5.2)
ALP SERPL-CCNC: 82 U/L (ref 40–150)
ALT SERPL W P-5'-P-CCNC: 18 U/L (ref 0–50)
ANION GAP SERPL CALCULATED.3IONS-SCNC: 9 MMOL/L (ref 7–15)
AST SERPL W P-5'-P-CCNC: 19 U/L (ref 0–45)
BASOPHILS # BLD AUTO: 0 10E3/UL (ref 0–0.2)
BASOPHILS NFR BLD AUTO: 1 %
BILIRUB SERPL-MCNC: 0.4 MG/DL
BUN SERPL-MCNC: 22.7 MG/DL (ref 6–20)
CALCIUM SERPL-MCNC: 10 MG/DL (ref 8.6–10)
CHLORIDE SERPL-SCNC: 106 MMOL/L (ref 98–107)
CLUE CELLS: ABNORMAL
CREAT SERPL-MCNC: 1.06 MG/DL (ref 0.51–0.95)
DEPRECATED HCO3 PLAS-SCNC: 22 MMOL/L (ref 22–29)
EGFRCR SERPLBLD CKD-EPI 2021: 70 ML/MIN/1.73M2
EOSINOPHIL # BLD AUTO: 0.1 10E3/UL (ref 0–0.7)
EOSINOPHIL NFR BLD AUTO: 4 %
ERYTHROCYTE [DISTWIDTH] IN BLOOD BY AUTOMATED COUNT: 14 % (ref 10–15)
GLUCOSE SERPL-MCNC: 124 MG/DL (ref 70–99)
HCT VFR BLD AUTO: 32.9 % (ref 35–47)
HGB BLD-MCNC: 10.7 G/DL (ref 11.7–15.7)
HOLD SPECIMEN: NORMAL
HOLD SPECIMEN: NORMAL
IMM GRANULOCYTES # BLD: 0 10E3/UL
IMM GRANULOCYTES NFR BLD: 2 %
LYMPHOCYTES # BLD AUTO: 0.2 10E3/UL (ref 0.8–5.3)
LYMPHOCYTES NFR BLD AUTO: 8 %
MCH RBC QN AUTO: 30.8 PG (ref 26.5–33)
MCHC RBC AUTO-ENTMCNC: 32.5 G/DL (ref 31.5–36.5)
MCV RBC AUTO: 95 FL (ref 78–100)
MONOCYTES # BLD AUTO: 0.3 10E3/UL (ref 0–1.3)
MONOCYTES NFR BLD AUTO: 14 %
NEUTROPHILS # BLD AUTO: 1.4 10E3/UL (ref 1.6–8.3)
NEUTROPHILS NFR BLD AUTO: 71 %
NRBC # BLD AUTO: 0 10E3/UL
NRBC BLD AUTO-RTO: 0 /100
PLATELET # BLD AUTO: 202 10E3/UL (ref 150–450)
POTASSIUM SERPL-SCNC: 4.1 MMOL/L (ref 3.4–5.3)
PROT SERPL-MCNC: 6.5 G/DL (ref 6.4–8.3)
RBC # BLD AUTO: 3.47 10E6/UL (ref 3.8–5.2)
SODIUM SERPL-SCNC: 137 MMOL/L (ref 135–145)
TRICHOMONAS, WET PREP: ABNORMAL
WBC # BLD AUTO: 1.9 10E3/UL (ref 4–11)
WBC'S/HIGH POWER FIELD, WET PREP: ABNORMAL
YEAST, WET PREP: ABNORMAL

## 2024-06-05 PROCEDURE — 87799 DETECT AGENT NOS DNA QUANT: CPT | Performed by: EMERGENCY MEDICINE

## 2024-06-05 PROCEDURE — G0378 HOSPITAL OBSERVATION PER HR: HCPCS

## 2024-06-05 PROCEDURE — 80053 COMPREHEN METABOLIC PANEL: CPT | Performed by: EMERGENCY MEDICINE

## 2024-06-05 PROCEDURE — 87529 HSV DNA AMP PROBE: CPT | Performed by: EMERGENCY MEDICINE

## 2024-06-05 PROCEDURE — 250N000013 HC RX MED GY IP 250 OP 250 PS 637

## 2024-06-05 PROCEDURE — 87491 CHLMYD TRACH DNA AMP PROBE: CPT | Performed by: EMERGENCY MEDICINE

## 2024-06-05 PROCEDURE — 85025 COMPLETE CBC W/AUTO DIFF WBC: CPT | Performed by: EMERGENCY MEDICINE

## 2024-06-05 PROCEDURE — 99285 EMERGENCY DEPT VISIT HI MDM: CPT | Mod: 25 | Performed by: EMERGENCY MEDICINE

## 2024-06-05 PROCEDURE — 250N000013 HC RX MED GY IP 250 OP 250 PS 637: Performed by: EMERGENCY MEDICINE

## 2024-06-05 PROCEDURE — 99285 EMERGENCY DEPT VISIT HI MDM: CPT | Performed by: EMERGENCY MEDICINE

## 2024-06-05 PROCEDURE — 36415 COLL VENOUS BLD VENIPUNCTURE: CPT

## 2024-06-05 PROCEDURE — 87210 SMEAR WET MOUNT SALINE/INK: CPT | Performed by: EMERGENCY MEDICINE

## 2024-06-05 PROCEDURE — 250N000012 HC RX MED GY IP 250 OP 636 PS 637

## 2024-06-05 PROCEDURE — 36415 COLL VENOUS BLD VENIPUNCTURE: CPT | Performed by: EMERGENCY MEDICINE

## 2024-06-05 RX ORDER — SULFAMETHOXAZOLE AND TRIMETHOPRIM 400; 80 MG/1; MG/1
1 TABLET ORAL
Status: DISCONTINUED | OUTPATIENT
Start: 2024-06-06 | End: 2024-06-09 | Stop reason: HOSPADM

## 2024-06-05 RX ORDER — ONDANSETRON 4 MG/1
4 TABLET, ORALLY DISINTEGRATING ORAL EVERY 6 HOURS PRN
Status: DISCONTINUED | OUTPATIENT
Start: 2024-06-05 | End: 2024-06-09 | Stop reason: HOSPADM

## 2024-06-05 RX ORDER — AMOXICILLIN 250 MG
1 CAPSULE ORAL 2 TIMES DAILY PRN
Status: DISCONTINUED | OUTPATIENT
Start: 2024-06-05 | End: 2024-06-09 | Stop reason: HOSPADM

## 2024-06-05 RX ORDER — ATORVASTATIN CALCIUM 10 MG/1
10 TABLET, FILM COATED ORAL DAILY
Status: DISCONTINUED | OUTPATIENT
Start: 2024-06-06 | End: 2024-06-09 | Stop reason: HOSPADM

## 2024-06-05 RX ORDER — LIDOCAINE HYDROCHLORIDE 20 MG/ML
JELLY TOPICAL EVERY 4 HOURS PRN
Status: DISCONTINUED | OUTPATIENT
Start: 2024-06-05 | End: 2024-06-09 | Stop reason: HOSPADM

## 2024-06-05 RX ORDER — ACETAMINOPHEN 325 MG/10.15ML
650 LIQUID ORAL EVERY 4 HOURS PRN
Status: DISCONTINUED | OUTPATIENT
Start: 2024-06-05 | End: 2024-06-09 | Stop reason: HOSPADM

## 2024-06-05 RX ORDER — PAROXETINE 30 MG/1
30 TABLET, FILM COATED ORAL EVERY EVENING
Status: DISCONTINUED | OUTPATIENT
Start: 2024-06-05 | End: 2024-06-09 | Stop reason: HOSPADM

## 2024-06-05 RX ORDER — TACROLIMUS 1 MG/1
1 CAPSULE ORAL 2 TIMES DAILY
Status: DISCONTINUED | OUTPATIENT
Start: 2024-06-05 | End: 2024-06-05

## 2024-06-05 RX ORDER — ONDANSETRON 2 MG/ML
4 INJECTION INTRAMUSCULAR; INTRAVENOUS EVERY 6 HOURS PRN
Status: DISCONTINUED | OUTPATIENT
Start: 2024-06-05 | End: 2024-06-09 | Stop reason: HOSPADM

## 2024-06-05 RX ORDER — VALGANCICLOVIR 450 MG/1
900 TABLET, FILM COATED ORAL DAILY
Status: DISCONTINUED | OUTPATIENT
Start: 2024-06-06 | End: 2024-06-09 | Stop reason: HOSPADM

## 2024-06-05 RX ORDER — AMOXICILLIN 250 MG
2 CAPSULE ORAL 2 TIMES DAILY PRN
Status: DISCONTINUED | OUTPATIENT
Start: 2024-06-05 | End: 2024-06-09 | Stop reason: HOSPADM

## 2024-06-05 RX ORDER — VALACYCLOVIR HYDROCHLORIDE 1 G/1
1000 TABLET, FILM COATED ORAL ONCE
Qty: 1 TABLET | Refills: 0 | Status: COMPLETED | OUTPATIENT
Start: 2024-06-05 | End: 2024-06-05

## 2024-06-05 RX ORDER — FAMOTIDINE 20 MG/1
40 TABLET, FILM COATED ORAL DAILY
Status: DISCONTINUED | OUTPATIENT
Start: 2024-06-06 | End: 2024-06-09

## 2024-06-05 RX ORDER — VALACYCLOVIR HYDROCHLORIDE 1 G/1
1000 TABLET, FILM COATED ORAL 2 TIMES DAILY
Status: DISCONTINUED | OUTPATIENT
Start: 2024-06-06 | End: 2024-06-06

## 2024-06-05 RX ORDER — OXYCODONE HCL 5 MG/5 ML
5 SOLUTION, ORAL ORAL EVERY 4 HOURS PRN
Status: DISCONTINUED | OUTPATIENT
Start: 2024-06-05 | End: 2024-06-09 | Stop reason: HOSPADM

## 2024-06-05 RX ORDER — OXYCODONE HCL 5 MG/5 ML
5 SOLUTION, ORAL ORAL EVERY 4 HOURS PRN
Status: DISCONTINUED | OUTPATIENT
Start: 2024-06-05 | End: 2024-06-05

## 2024-06-05 RX ORDER — MYCOPHENOLIC ACID 360 MG/1
360 TABLET, DELAYED RELEASE ORAL 2 TIMES DAILY
Status: DISCONTINUED | OUTPATIENT
Start: 2024-06-05 | End: 2024-06-09 | Stop reason: HOSPADM

## 2024-06-05 RX ORDER — DIPHENHYDRAMINE HYDROCHLORIDE AND LIDOCAINE HYDROCHLORIDE AND ALUMINUM HYDROXIDE AND MAGNESIUM HYDRO
10 KIT EVERY 6 HOURS PRN
Status: DISCONTINUED | OUTPATIENT
Start: 2024-06-05 | End: 2024-06-09 | Stop reason: HOSPADM

## 2024-06-05 RX ORDER — ASPIRIN 325 MG
325 TABLET ORAL DAILY
Status: DISCONTINUED | OUTPATIENT
Start: 2024-06-06 | End: 2024-06-09 | Stop reason: HOSPADM

## 2024-06-05 RX ADMIN — PAROXETINE 30 MG: 30 TABLET, FILM COATED ORAL at 23:04

## 2024-06-05 RX ADMIN — OXYCODONE HYDROCHLORIDE 5 MG: 5 SOLUTION ORAL at 18:47

## 2024-06-05 RX ADMIN — OXYCODONE HYDROCHLORIDE 5 MG: 5 SOLUTION ORAL at 22:11

## 2024-06-05 RX ADMIN — MYCOPHENOLIC ACID 360 MG: 360 TABLET, DELAYED RELEASE ORAL at 23:04

## 2024-06-05 RX ADMIN — TACROLIMUS 1.5 MG: 1 CAPSULE ORAL at 22:07

## 2024-06-05 RX ADMIN — VALACYCLOVIR HYDROCHLORIDE 1000 MG: 1 TABLET, FILM COATED ORAL at 19:33

## 2024-06-05 ASSESSMENT — COLUMBIA-SUICIDE SEVERITY RATING SCALE - C-SSRS
6. HAVE YOU EVER DONE ANYTHING, STARTED TO DO ANYTHING, OR PREPARED TO DO ANYTHING TO END YOUR LIFE?: NO
2. HAVE YOU ACTUALLY HAD ANY THOUGHTS OF KILLING YOURSELF IN THE PAST MONTH?: NO
1. IN THE PAST MONTH, HAVE YOU WISHED YOU WERE DEAD OR WISHED YOU COULD GO TO SLEEP AND NOT WAKE UP?: NO

## 2024-06-05 ASSESSMENT — ACTIVITIES OF DAILY LIVING (ADL)
ADLS_ACUITY_SCORE: 38
ADLS_ACUITY_SCORE: 38
ADLS_ACUITY_SCORE: 36
ADLS_ACUITY_SCORE: 38

## 2024-06-05 NOTE — ED PROVIDER NOTES
[place] EMERGENCY DEPARTMENT ([facility])    6/05/24       ED PROVIDER NOTE       History     Chief Complaint   Patient presents with    Mouth Lesions    Wound Check     HPI  Ira Zamora is a 34 year old female who is 6 weeks status post kidney transplant who presents to the Emergency Department today with mouth lesions and a vaginal lesion.    Patient reports that about 2 weeks ago she began to develop painful oral lesions.  There is one along the buccal mucosa on the right side near her molar.  That has gotten more painful, she has been using Orajel and Magic mouthwash which helps minimally.  Then she began to develop another oral lesion along the lower incisors along the buccal mucosa which is also quite painful.  She has had difficulty tolerating orals because drinking or eating is quite painful.  A few days ago she began to notice a vaginal lesion along the left labia which is very large and painful.  She has had some slight vaginal discharge which is brown in color.  She has an IUD in place, and believes her last menstrual period was in April.    Patient denies any fevers or chills, no nasal congestion, or sore throat.  No cough, shortness of breath, or chest pain.  No abdominal pain, no pain over the transplant graft site.  No nausea, vomiting, or diarrhea.  No dysuria or hematuria, patient does note that she is making as much urine as she typically does.  Patient denies any skin nonmucosal lesions.  She did go to an OB/GYN clinic through [facility] 2 days ago for this, she was supposed to start on Valtrex but has not yet picked up the prescription.  She did reportedly have an HSV swab of the vaginal lesion which the patient received notification of today was negative for HSV, I do not see this in care everywhere but that is likely because the culture is not yet finalized and is preliminary.    She messaged her clinic today and was advised to come to the Emergency Department because of ongoing  symptoms.  Of note, she had a serum HSV test from 2024 which was negative for HSV-1 and HSV-2.        Past Medical History:   Diagnosis Date    Anemia in chronic kidney disease     Anxiety     Ascending aorta dilation (H24) 2020: ascending aorta 3.8 cm, aortic sinus 3.6 cm.  Echo: Ao root diam: 3.2 cm, asc Aorta Diam: 3.8 cm    ASCUS with positive high risk HPV cervical     ASCUS with positive high risk HPV cervical 2017    Crohn's disease (H)     ESRD (end stage renal disease) on dialysis (H)     GERD (gastroesophageal reflux disease)     Hidradenitis suppurativa     History of blood transfusion     Hypertension     Juvenile rheumatoid arthritis (H)     Asymptomatic in adulthood    Kidney replaced by transplant 2024    En bloc. Induction w/ Thymoglobulin.    Secondary renal hyperparathyroidism (H24)     Tubulointerstitial nephropathy 2011    kidney biopsy 2011 - Acute and  chronic tubulointerstitial nephropathy.       Past Surgical History:   Procedure Laterality Date    H NEEDLE GUIDE,  KIDNEY BIOPSY      TRANSPLANT KIDNEY RECIPIENT  DONOR N/A 2024    Procedure: Transplant kidney recipient  donor,bilateral uretral stent implantation;  Surgeon: Carlitos Díaz MD;  Location: [facility] OR       Family History   Problem Relation Age of Onset    Endometriosis Mother     Coronary Artery Disease Mother        Social History     Tobacco Use    Smoking status: Never     Passive exposure: Never    Smokeless tobacco: Never   Substance Use Topics    Alcohol use: Not Currently     Comment: Very occasional, last drink two months ago, wine cooler         Past Medical History  Past Medical History:   Diagnosis Date    Anemia in chronic kidney disease     Anxiety     Ascending aorta dilation (H24) 2020: ascending aorta 3.8 cm, aortic sinus 3.6 cm.  Echo: Ao root diam: 3.2 cm, asc Aorta Diam: 3.8 cm    ASCUS with positive high risk HPV  cervical 2017    ASCUS with positive high risk HPV cervical 2017    Crohn's disease (H)     ESRD (end stage renal disease) on dialysis (H)     GERD (gastroesophageal reflux disease)     Hidradenitis suppurativa 2015    History of blood transfusion     Hypertension     Juvenile rheumatoid arthritis (H)     Asymptomatic in adulthood    Kidney replaced by transplant 2024    En bloc. Induction w/ Thymoglobulin.    Secondary renal hyperparathyroidism (H24)     Tubulointerstitial nephropathy 2011    kidney biopsy 2011 - Acute and  chronic tubulointerstitial nephropathy.     Past Surgical History:   Procedure Laterality Date    H NEEDLE GUIDE,  KIDNEY BIOPSY      TRANSPLANT KIDNEY RECIPIENT  DONOR N/A 2024    Procedure: Transplant kidney recipient  donor,bilateral uretral stent implantation;  Surgeon: Carlitos Díaz MD;  Location: [facility] OR     acetaminophen (TYLENOL) 500 MG tablet  aspirin (ASA) 325 MG tablet  atorvastatin (LIPITOR) 10 MG tablet  famotidine (PEPCID) 20 MG tablet  levonorgestrel (KYLEENA) 19.5 MG IUD  magic mouthwash suspension (diphenhydrAMINE, lidocaine, aluminum-magnesium & simethicone)  mycophenolic acid (GENERIC EQUIVALENT) 180 MG EC tablet  PARoxetine (PAXIL) 30 MG tablet  sulfamethoxazole-trimethoprim (BACTRIM) 400-80 MG tablet  tacrolimus (GENERIC EQUIVALENT) 0.5 MG capsule  tacrolimus (GENERIC EQUIVALENT) 1 MG capsule  ustekinumab (STELARA) 90 MG/ML  valACYclovir (VALTREX) 1000 mg tablet  valGANciclovir (VALCYTE) 450 MG tablet      Allergies   Allergen Reactions    Amlodipine Headache and Other (See Comments)     Other Reaction(s): Unknown    Proton Pump Inhibitors Nephrotoxicity     No PPIs due to history of renal failure due to interstitial nephritis    Tegaderm Transparent Dressing (Informational Only) Blisters     Family History  Family History   Problem Relation Age of Onset    Endometriosis Mother     Coronary Artery Disease Mother       Social History   Social History     Tobacco Use    Smoking status: Never     Passive exposure: Never    Smokeless tobacco: Never   Substance Use Topics    Alcohol use: Not Currently     Comment: Very occasional, last drink two months ago, wine cooler    Drug use: Never      Past medical history, past surgical history, medications, allergies, family history, and social history were reviewed with the patient. No additional pertinent items.     A medically appropriate review of systems was performed with pertinent positives and negatives noted in the HPI, and all other systems negative.    Physical Exam   BP: 124/83  Pulse: (!) 127  Temp: 98  F (36.7  C)  Resp: 20  SpO2: 98 %  Physical Exam  Vitals and nursing note reviewed.   Constitutional:       General: She is not in acute distress.     Appearance: She is not diaphoretic.      Comments: Thin adult female, chronically ill-appearing.  Appears to feel unwell but is not toxic   HENT:      Head: Atraumatic.      Mouth/Throat:      Mouth: Mucous membranes are moist.      Pharynx: Oropharynx is clear. No oropharyngeal exudate.      Comments: See pics.  Oral ulcers noted on lower lip near incisors, as well as near mandibular molars near buccal mucosa  Eyes:      General: No scleral icterus.     Pupils: Pupils are equal, round, and reactive to light.   Cardiovascular:      Rate and Rhythm: Tachycardia present.      Pulses: Normal pulses.      Heart sounds: Normal heart sounds. No murmur heard.  Pulmonary:      Effort: No respiratory distress.      Breath sounds: Normal breath sounds.   Abdominal:      General: Bowel sounds are normal.      Palpations: Abdomen is soft.      Tenderness: There is no abdominal tenderness. There is no guarding.      Comments: No tenderness to palpation over transplant graft site, surgical wound is healed   Genitourinary:     Comments: Pelvic exam demonstrates obvious left labial majora ulcerative lesion which is quite painful.  Speculum  examination does show significant cervicitis with friable mucosa with bleeding with gentle swab placement.  There is green to white discharge.  Musculoskeletal:         General: No tenderness.   Skin:     General: Skin is warm.      Findings: No rash.   Neurological:      Mental Status: She is alert.                   ED Course, Procedures, & Data      Procedures                 Results for orders placed or performed during the hospital encounter of 06/05/24   [place] Draw     Status: None    Narrative    The following orders were created for panel order [place] Draw.  Procedure                               Abnormality         Status                     ---------                               -----------         ------                     Extra Blue Top Tube[[id number]]                              Final result               Extra Red Top Tube[[id number]]                               Final result                 Please view results for these tests on the individual orders.   Extra Blue Top Tube     Status: None   Result Value Ref Range    Hold Specimen JIC    Extra Red Top Tube     Status: None   Result Value Ref Range    Hold Specimen JIC    Comprehensive metabolic panel     Status: Abnormal   Result Value Ref Range    Sodium 137 135 - 145 mmol/L    Potassium 4.1 3.4 - 5.3 mmol/L    Carbon Dioxide (CO2) 22 22 - 29 mmol/L    Anion Gap 9 7 - 15 mmol/L    Urea Nitrogen 22.7 (H) 6.0 - 20.0 mg/dL    Creatinine 1.06 (H) 0.51 - 0.95 mg/dL    GFR Estimate 70 >60 mL/min/1.73m2    Calcium 10.0 8.6 - 10.0 mg/dL    Chloride 106 98 - 107 mmol/L    Glucose 124 (H) 70 - 99 mg/dL    Alkaline Phosphatase 82 40 - 150 U/L    AST 19 0 - 45 U/L    ALT 18 0 - 50 U/L    Protein Total 6.5 6.4 - 8.3 g/dL    Albumin 3.8 3.5 - 5.2 g/dL    Bilirubin Total 0.4 <=1.2 mg/dL   CBC with platelets and differential     Status: Abnormal   Result Value Ref Range    WBC Count 1.9 (L) 4.0 - 11.0 10e3/uL    RBC Count 3.47 (L) 3.80 - 5.20 10e6/uL     Hemoglobin 10.7 (L) 11.7 - 15.7 g/dL    Hematocrit 32.9 (L) 35.0 - 47.0 %    MCV 95 78 - 100 fL    MCH 30.8 26.5 - 33.0 pg    MCHC 32.5 31.5 - 36.5 g/dL    RDW 14.0 10.0 - 15.0 %    Platelet Count 202 150 - 450 10e3/uL    % Neutrophils 71 %    % Lymphocytes 8 %    % Monocytes 14 %    % Eosinophils 4 %    % Basophils 1 %    % Immature Granulocytes 2 %    NRBCs per 100 WBC 0 <1 /100    Absolute Neutrophils 1.4 (L) 1.6 - 8.3 10e3/uL    Absolute Lymphocytes 0.2 (L) 0.8 - 5.3 10e3/uL    Absolute Monocytes 0.3 0.0 - 1.3 10e3/uL    Absolute Eosinophils 0.1 0.0 - 0.7 10e3/uL    Absolute Basophils 0.0 0.0 - 0.2 10e3/uL    Absolute Immature Granulocytes 0.0 <=0.4 10e3/uL    Absolute NRBCs 0.0 10e3/uL   Wet prep     Status: Abnormal    Specimen: Urethra; Swab   Result Value Ref Range    Trichomonas Absent Absent    Yeast Absent Absent    Clue Cells Absent Absent    WBCs/high power field 2+ (A) None   CBC with Platelets & Differential     Status: Abnormal    Narrative    The following orders were created for panel order CBC with Platelets & Differential.  Procedure                               Abnormality         Status                     ---------                               -----------         ------                     CBC with platelets and d...[[id number]]  Abnormal            Final result                 Please view results for these tests on the individual orders.     Medications   oxyCODONE (ROXICODONE) solution 5 mg (5 mg Oral $Given 6/5/24 1847)   senna-docusate (SENOKOT-S/PERICOLACE) 8.6-50 MG per tablet 1 tablet (has no administration in time range)     Or   senna-docusate (SENOKOT-S/PERICOLACE) 8.6-50 MG per tablet 2 tablet (has no administration in time range)   ondansetron (ZOFRAN ODT) ODT tab 4 mg (has no administration in time range)     Or   ondansetron (ZOFRAN) injection 4 mg (has no administration in time range)   acetaminophen (TYLENOL) oral liquid 650 mg (has no administration in time range)   oxyCODONE  (ROXICODONE) solution 5 mg (has no administration in time range)   aspirin (ASA) tablet 325 mg (has no administration in time range)   atorvastatin (LIPITOR) tablet 10 mg (has no administration in time range)   famotidine (PEPCID) tablet 40 mg (has no administration in time range)   magic mouthwash suspension (diphenhydrAMINE, lidocaine, aluminum-magnesium & simethicone) (has no administration in time range)   mycophenolic acid (GENERIC EQUIVALENT) EC tablet 360 mg (has no administration in time range)   PARoxetine (PAXIL) tablet 30 mg (has no administration in time range)   sulfamethoxazole-trimethoprim (BACTRIM) 400-80 MG per tablet 1 tablet (has no administration in time range)   tacrolimus (GENERIC EQUIVALENT) capsule 0.5 mg (has no administration in time range)   tacrolimus (GENERIC EQUIVALENT) capsule 1 mg (has no administration in time range)   valACYclovir (VALTREX) tablet 1,000 mg (has no administration in time range)   valGANciclovir (VALCYTE) tablet 900 mg (has no administration in time range)   benzocaine (ORAJEL MAXIMUM STRENGTH) 20 % gel (has no administration in time range)   lidocaine (XYLOCAINE) 2 % external gel (has no administration in time range)   valACYclovir (VALTREX) tablet 1,000 mg (1,000 mg Oral $Given 6/5/24 1933)     Labs Ordered and Resulted from Time of ED Arrival to Time of ED Departure   COMPREHENSIVE METABOLIC PANEL - Abnormal       Result Value    Sodium 137      Potassium 4.1      Carbon Dioxide (CO2) 22      Anion Gap 9      Urea Nitrogen 22.7 (*)     Creatinine 1.06 (*)     GFR Estimate 70      Calcium 10.0      Chloride 106      Glucose 124 (*)     Alkaline Phosphatase 82      AST 19      ALT 18      Protein Total 6.5      Albumin 3.8      Bilirubin Total 0.4     CBC WITH PLATELETS AND DIFFERENTIAL - Abnormal    WBC Count 1.9 (*)     RBC Count 3.47 (*)     Hemoglobin 10.7 (*)     Hematocrit 32.9 (*)     MCV 95      MCH 30.8      MCHC 32.5      RDW 14.0      Platelet Count 202       % Neutrophils 71      % Lymphocytes 8      % Monocytes 14      % Eosinophils 4      % Basophils 1      % Immature Granulocytes 2      NRBCs per 100 WBC 0      Absolute Neutrophils 1.4 (*)     Absolute Lymphocytes 0.2 (*)     Absolute Monocytes 0.3      Absolute Eosinophils 0.1      Absolute Basophils 0.0      Absolute Immature Granulocytes 0.0      Absolute NRBCs 0.0     WET PREPARATION - Abnormal    Trichomonas Absent      Yeast Absent      Clue Cells Absent      WBCs/high power field 2+ (*)    ROUTINE UA WITH MICROSCOPIC   ADENOVIRUS QUANTITATIVE PCR   HERPES SIMPLEX VIRUS 1&2 PCR   CHLAMYDIA TRACHOMATIS/NEISSERIA GONORRHOEAE BY PCR   HERPES SIMPLEX VIRUS 1&2 PCR   URINE CULTURE     No orders to display          Critical care was not performed.     Medical Decision Making  The patient's presentation was of high complexity (a chronic illness severe exacerbation, progression, or side effect of treatment).    The patient's evaluation involved:  review of external note(s) from 2 sources (see separate area of note for details)  review of 1 test result(s) ordered prior to this encounter (see separate area of note for details)  ordering and/or review of 3+ test(s) in this encounter (see separate area of note for details)  discussion of management or test interpretation with another health professional (see separate area of note for details)    The patient's management necessitated high risk (a decision regarding hospitalization).    Assessment & Plan    Patient presents for above complaints.  On my evaluation she is alert, cooperative, appears uncomfortable.  She is tachycardic with a heart rate of 127.  She is afebrile.  Oral lesions were seen in the mouth and do appear ulcerated, quite painful to touch.  The vaginal lesion is also quite large along the left labia majora and is quite painful.  See pics in exam.    We did establish IV access and we did draw blood for laboratory analysis.  CBC shows leukopenia with a  white count of 1.9, consistent with recent values though it has been dropping over the past month or so.  Hemoglobin is 10.7, normal platelet count, CMP shows normal electrolytes, creatinine 1.06, consistent/improved from previous.  Electrolytes and LFTs are within normal limits, adenovirus was also ordered as it appears that this was previously ordered by her transplant clinic as a future order.  We have ordered HSV swabs of both the mouth lesion as well as the vaginal lesion.  Wet prep shows no trichomonas, no clue cells and no yeast.  GC and Chlamydia are pending.  UA is still pending collection at this time.  I have ordered a dose of oral Valtrex as she was supposed to be on this and has not yet picked this up.  She was given oxycodone for pain.  Patient was given IV fluids and analgesics here in the Emergency Department.      Expect that the patient will likely need to consultation with transplant ID, unclear if this is an infectious process or possibly a mucositis potentially related to immunosuppressive/transplant medications.    I have paged the kidney transplant fellow on multiple occasions without response, then paged kidney transplant staff without response.  Subsequently paged the surgery resident.  Plan will be to admit the patient on observation status to the transplant service.    I have reviewed the nursing notes. I have reviewed the findings, diagnosis, plan and need for follow up with the patient.    New Prescriptions    No medications on file       Final diagnoses:   Mouth ulcers   Vaginal ulcer   S/P kidney transplant   Immunosuppressed status (H24)   Leukopenia, unspecified type   This part of the medical record was transcribed by Radha Cabezas, Medical Scribe, from a dictation done by Jose F Jordan MD.      Zohra Kohler MD  [facility] EMERGENCY DEPARTMENT  6/5/2024     Zohra Kohler MD  06/05/24 2044

## 2024-06-05 NOTE — ED TRIAGE NOTES
Arrives ambulatory with mouth sores and wound on her vaginal area. Per patient the mouth sores started a week ago.    Hx kidney transplant 2024     Triage Assessment (Adult)       Row Name 06/05/24 1639          Triage Assessment    Airway WDL WDL        Respiratory WDL    Respiratory WDL WDL        Skin Circulation/Temperature WDL    Skin Circulation/Temperature WDL WDL        Cardiac WDL    Cardiac WDL X;rhythm     Pulse Rate & Regularity tachycardic        Peripheral/Neurovascular WDL    Peripheral Neurovascular WDL WDL        Cognitive/Neuro/Behavioral WDL    Cognitive/Neuro/Behavioral WDL WDL

## 2024-06-06 PROBLEM — Z79.2 ADMINISTRATION OF LONG-TERM PROPHYLACTIC ANTIBIOTICS: Status: ACTIVE | Noted: 2024-06-06

## 2024-06-06 LAB
ALBUMIN SERPL BCG-MCNC: 3.8 G/DL (ref 3.5–5.2)
ALBUMIN UR-MCNC: 10 MG/DL
ALP SERPL-CCNC: 83 U/L (ref 40–150)
ALT SERPL W P-5'-P-CCNC: 12 U/L (ref 0–50)
ANION GAP SERPL CALCULATED.3IONS-SCNC: 12 MMOL/L (ref 7–15)
APPEARANCE UR: CLEAR
AST SERPL W P-5'-P-CCNC: 16 U/L (ref 0–45)
BILIRUB SERPL-MCNC: 0.7 MG/DL
BILIRUB UR QL STRIP: NEGATIVE
BUN SERPL-MCNC: 18.1 MG/DL (ref 6–20)
C TRACH DNA SPEC QL PROBE+SIG AMP: NEGATIVE
CALCIUM SERPL-MCNC: 10.3 MG/DL (ref 8.6–10)
CHLORIDE SERPL-SCNC: 104 MMOL/L (ref 98–107)
CMV DNA SPEC NAA+PROBE-ACNC: NOT DETECTED IU/ML
COLOR UR AUTO: YELLOW
CREAT SERPL-MCNC: 0.93 MG/DL (ref 0.51–0.95)
DEPRECATED HCO3 PLAS-SCNC: 19 MMOL/L (ref 22–29)
EGFRCR SERPLBLD CKD-EPI 2021: 82 ML/MIN/1.73M2
ERYTHROCYTE [DISTWIDTH] IN BLOOD BY AUTOMATED COUNT: 13.8 % (ref 10–15)
GLUCOSE SERPL-MCNC: 94 MG/DL (ref 70–99)
GLUCOSE UR STRIP-MCNC: NEGATIVE MG/DL
HCT VFR BLD AUTO: 33.1 % (ref 35–47)
HGB BLD-MCNC: 11 G/DL (ref 11.7–15.7)
HGB UR QL STRIP: ABNORMAL
HOLD SPECIMEN: NORMAL
HOLD SPECIMEN: NORMAL
HSV1 DNA SPEC QL NAA+PROBE: NOT DETECTED
HSV1 DNA SPEC QL NAA+PROBE: NOT DETECTED
HSV2 DNA SPEC QL NAA+PROBE: NOT DETECTED
HSV2 DNA SPEC QL NAA+PROBE: NOT DETECTED
KETONES UR STRIP-MCNC: NEGATIVE MG/DL
LEUKOCYTE ESTERASE UR QL STRIP: ABNORMAL
Lab: NORMAL
MAGNESIUM SERPL-MCNC: 1.4 MG/DL (ref 1.7–2.3)
MCH RBC QN AUTO: 31 PG (ref 26.5–33)
MCHC RBC AUTO-ENTMCNC: 33.2 G/DL (ref 31.5–36.5)
MCV RBC AUTO: 93 FL (ref 78–100)
MUCOUS THREADS #/AREA URNS LPF: PRESENT /LPF
N GONORRHOEA DNA SPEC QL NAA+PROBE: NEGATIVE
NITRATE UR QL: NEGATIVE
PERFORMING LABORATORY: NORMAL
PH UR STRIP: 5.5 [PH] (ref 5–7)
PHOSPHATE SERPL-MCNC: 1.9 MG/DL (ref 2.5–4.5)
PLATELET # BLD AUTO: 197 10E3/UL (ref 150–450)
POTASSIUM SERPL-SCNC: 4.2 MMOL/L (ref 3.4–5.3)
PROT SERPL-MCNC: 6.9 G/DL (ref 6.4–8.3)
RBC # BLD AUTO: 3.55 10E6/UL (ref 3.8–5.2)
RBC URINE: 2 /HPF
SODIUM SERPL-SCNC: 135 MMOL/L (ref 135–145)
SP GR UR STRIP: 1.03 (ref 1–1.03)
SPECIMEN STATUS: NORMAL
SQUAMOUS EPITHELIAL: 1 /HPF
T PALLIDUM AB SER QL: NONREACTIVE
TEST NAME: NORMAL
UROBILINOGEN UR STRIP-MCNC: NORMAL MG/DL
WBC # BLD AUTO: 1.9 10E3/UL (ref 4–11)
WBC URINE: 6 /HPF

## 2024-06-06 PROCEDURE — 250N000013 HC RX MED GY IP 250 OP 250 PS 637

## 2024-06-06 PROCEDURE — 87389 HIV-1 AG W/HIV-1&-2 AB AG IA: CPT | Performed by: PHYSICIAN ASSISTANT

## 2024-06-06 PROCEDURE — 36415 COLL VENOUS BLD VENIPUNCTURE: CPT | Performed by: PHYSICIAN ASSISTANT

## 2024-06-06 PROCEDURE — 87086 URINE CULTURE/COLONY COUNT: CPT | Performed by: EMERGENCY MEDICINE

## 2024-06-06 PROCEDURE — G0378 HOSPITAL OBSERVATION PER HR: HCPCS

## 2024-06-06 PROCEDURE — 99223 1ST HOSP IP/OBS HIGH 75: CPT | Mod: FS

## 2024-06-06 PROCEDURE — 80180 DRUG SCRN QUAN MYCOPHENOLATE: CPT

## 2024-06-06 PROCEDURE — 87385 HISTOPLASMA CAPSUL AG IA: CPT | Performed by: PHYSICIAN ASSISTANT

## 2024-06-06 PROCEDURE — 81001 URINALYSIS AUTO W/SCOPE: CPT | Performed by: EMERGENCY MEDICINE

## 2024-06-06 PROCEDURE — 250N000013 HC RX MED GY IP 250 OP 250 PS 637: Performed by: SURGERY

## 2024-06-06 PROCEDURE — 250N000009 HC RX 250

## 2024-06-06 PROCEDURE — 83735 ASSAY OF MAGNESIUM: CPT

## 2024-06-06 PROCEDURE — 84100 ASSAY OF PHOSPHORUS: CPT

## 2024-06-06 PROCEDURE — 86658 ENTEROVIRUS ANTIBODY: CPT | Performed by: PHYSICIAN ASSISTANT

## 2024-06-06 PROCEDURE — 85049 AUTOMATED PLATELET COUNT: CPT

## 2024-06-06 PROCEDURE — 84460 ALANINE AMINO (ALT) (SGPT): CPT

## 2024-06-06 PROCEDURE — 87581 M.PNEUMON DNA AMP PROBE: CPT | Performed by: PHYSICIAN ASSISTANT

## 2024-06-06 PROCEDURE — 250N000012 HC RX MED GY IP 250 OP 636 PS 637

## 2024-06-06 PROCEDURE — 86780 TREPONEMA PALLIDUM: CPT | Performed by: PHYSICIAN ASSISTANT

## 2024-06-06 PROCEDURE — 36415 COLL VENOUS BLD VENIPUNCTURE: CPT

## 2024-06-06 PROCEDURE — 99222 1ST HOSP IP/OBS MODERATE 55: CPT | Mod: 24 | Performed by: PHYSICIAN ASSISTANT

## 2024-06-06 PROCEDURE — 0152U NFCT DS DNA UNTRGT NGNRJ SEQ: CPT | Performed by: PHYSICIAN ASSISTANT

## 2024-06-06 PROCEDURE — 250N000013 HC RX MED GY IP 250 OP 250 PS 637: Performed by: PHYSICIAN ASSISTANT

## 2024-06-06 RX ORDER — MAGNESIUM OXIDE 400 MG/1
800 TABLET ORAL 2 TIMES DAILY
Status: DISCONTINUED | OUTPATIENT
Start: 2024-06-06 | End: 2024-06-09

## 2024-06-06 RX ORDER — MINERAL OIL/HYDROPHIL PETROLAT
OINTMENT (GRAM) TOPICAL
Status: DISCONTINUED | OUTPATIENT
Start: 2024-06-06 | End: 2024-06-09 | Stop reason: HOSPADM

## 2024-06-06 RX ADMIN — TACROLIMUS 1.5 MG: 1 CAPSULE ORAL at 09:07

## 2024-06-06 RX ADMIN — ASPIRIN 325 MG ORAL TABLET 325 MG: 325 PILL ORAL at 09:07

## 2024-06-06 RX ADMIN — OXYCODONE HYDROCHLORIDE 5 MG: 5 SOLUTION ORAL at 05:52

## 2024-06-06 RX ADMIN — DIPHENHYDRAMINE HYDROCHLORIDE AND LIDOCAINE HYDROCHLORIDE AND ALUMINUM HYDROXIDE AND MAGNESIUM HYDRO 10 ML: KIT at 09:14

## 2024-06-06 RX ADMIN — MYCOPHENOLIC ACID 360 MG: 360 TABLET, DELAYED RELEASE ORAL at 09:10

## 2024-06-06 RX ADMIN — SODIUM PHOSPHATE, DIBASIC, ANHYDROUS, POTASSIUM PHOSPHATE, MONOBASIC, AND SODIUM PHOSPHATE, MONOBASIC, MONOHYDRATE 500 MG: 852; 155; 130 TABLET, COATED ORAL at 20:37

## 2024-06-06 RX ADMIN — MAGNESIUM OXIDE TAB 400 MG (241.3 MG ELEMENTAL MG) 800 MG: 400 (241.3 MG) TAB at 20:35

## 2024-06-06 RX ADMIN — VALACYCLOVIR HYDROCHLORIDE 1000 MG: 1 TABLET, FILM COATED ORAL at 09:10

## 2024-06-06 RX ADMIN — SULFAMETHOXAZOLE AND TRIMETHOPRIM 1 TABLET: 400; 80 TABLET ORAL at 09:14

## 2024-06-06 RX ADMIN — FAMOTIDINE 40 MG: 20 TABLET ORAL at 09:07

## 2024-06-06 RX ADMIN — TACROLIMUS 1.5 MG: 1 CAPSULE ORAL at 20:38

## 2024-06-06 RX ADMIN — VALGANCICLOVIR HYDROCHLORIDE 900 MG: 450 TABLET ORAL at 09:10

## 2024-06-06 RX ADMIN — MYCOPHENOLIC ACID 360 MG: 360 TABLET, DELAYED RELEASE ORAL at 20:36

## 2024-06-06 RX ADMIN — ATORVASTATIN CALCIUM 10 MG: 10 TABLET, FILM COATED ORAL at 09:08

## 2024-06-06 RX ADMIN — Medication: at 15:01

## 2024-06-06 RX ADMIN — LIDOCAINE HYDROCHLORIDE: 20 JELLY TOPICAL at 09:30

## 2024-06-06 RX ADMIN — PAROXETINE 30 MG: 30 TABLET, FILM COATED ORAL at 20:36

## 2024-06-06 RX ADMIN — OXYCODONE HYDROCHLORIDE 5 MG: 5 SOLUTION ORAL at 14:29

## 2024-06-06 ASSESSMENT — ACTIVITIES OF DAILY LIVING (ADL)
ADLS_ACUITY_SCORE: 41
ADLS_ACUITY_SCORE: 38
ADLS_ACUITY_SCORE: 38
ADLS_ACUITY_SCORE: 41
ADLS_ACUITY_SCORE: 38
ADLS_ACUITY_SCORE: 41
ADLS_ACUITY_SCORE: 38
ADLS_ACUITY_SCORE: 41
ADLS_ACUITY_SCORE: 41
ADLS_ACUITY_SCORE: 38
ADLS_ACUITY_SCORE: 41
ADLS_ACUITY_SCORE: 38
ADLS_ACUITY_SCORE: 41
ADLS_ACUITY_SCORE: 38
ADLS_ACUITY_SCORE: 41
ADLS_ACUITY_SCORE: 38

## 2024-06-06 NOTE — CONSULTS
Care Management Follow Up    Length of Stay (days): 0    Expected Discharge Date:  TBD     Concerns to be Addressed:     MOORE Document  Patient plan of care discussed at interdisciplinary rounds:  [id number]    Anticipated Discharge Disposition:  TBD     Anticipated Discharge Services:    Anticipated Discharge DME:      Patient/family educated on Medicare website which has current facility and service quality ratings:  N/A  Education Provided on the Discharge Plan:  N/A  Patient/Family in Agreement with the Plan:  N/A    Referrals Placed by CM/SW:  Not at this time  Private pay costs discussed: insurance costs out of pocket expenses, co-pays and deductibles    Additional Information:    1440 Due to Ira's Observation status, SW met with her at bedside to introduce self, explain SW role, review the Medicare Outpatient Observation Notice (MOON) and why pt was receiving it. SW provided unsigned MOON document, explained it, then addressed questions and concerns.     1442 Ira signed MOORE document acknowledging its receipt. SW offered to make copy of signed document for pt records. Pt declined offer.      1447 SILVANA faxed MOORE to [facility] ([phone]) and placed MOORE in Ira's chart.          SW/RNCC is no longer following. Please place a new consult should new needs arise while pt is on 1A Obs.     Layne Wright, VILLA, LGSW  ED/OBS   [facility]  Phone: [phone]  Pager: [phone]  Fax: [phone]     After hours [facility] and After Hours [facility] [place]     On-call pager, [phone], 4:00 pm to midnight

## 2024-06-06 NOTE — PLAN OF CARE
Observation Goal Outcome Evaluation:    BP (!) 118/91   Pulse 116   Temp 98  F (36.7  C) (Oral)   Resp 16   LMP 04/03/2024 (Approximate)   SpO2 100%      Overall Patient Progress: no changeOverall Patient Progress: no change    -diagnostic tests and consults completed and resulted: In progress    -vital signs normal or at patient baseline: Progressing    -tolerating oral intake to maintain hydration: Progressing    -adequate pain control on oral analgesics: Progressing    Pt request tacrolimus be pushed out to 2100 per her at home schedule.

## 2024-06-06 NOTE — MEDICATION SCRIBE - ADMISSION MEDICATION HISTORY
Medication Scribe Admission Medication History    Admission medication history is complete. The information provided in this note is only as accurate as the sources available at the time of the update.    Information Source(s): Patient via in-person    Pertinent Information: Pt reported taking medications on PTA medication list as directed, stated Ustekinumab 90 mg/ml was put on hold by her provider, and Valganciclovir 450 mg tabs was put on hold by the team here last night.     Changes made to PTA medication list:  Added: None  Deleted: None  Changed: None    Allergies reviewed with patient and updates made in EHR: yes    Medication History Completed By: Lucia Wolff 6/6/2024 7:28 AM    PTA Med List   Medication Sig Last Dose    acetaminophen (TYLENOL) 500 MG tablet Take 1,000 mg by mouth every 6 hours as needed 6/5/2024 at noon    aspirin (ASA) 325 MG tablet Take 1 tablet (325 mg) by mouth daily 6/5/2024    atorvastatin (LIPITOR) 10 MG tablet Take 1 tablet (10 mg) by mouth daily 6/5/2024    famotidine (PEPCID) 20 MG tablet Take 2 tablets (40 mg) by mouth daily 6/5/2024    levonorgestrel (KYLEENA) 19.5 MG IUD 1 Device by Intrauterine route  at in place    magic mouthwash suspension (diphenhydrAMINE, lidocaine, aluminum-magnesium & simethicone) Swish and swallow 10 mLs in mouth every 6 hours as needed for mouth sores 6/5/2024    mycophenolic acid (GENERIC EQUIVALENT) 180 MG EC tablet Take 2 tablets (360 mg) by mouth 2 times daily 6/5/2024 at am    PARoxetine (PAXIL) 30 MG tablet Take 30 mg by mouth every evening 6/4/2024 at ricardo    sulfamethoxazole-trimethoprim (BACTRIM) 400-80 MG tablet Take 1 tablet by mouth three times a week 6/5/2024 at am    tacrolimus (GENERIC EQUIVALENT) 0.5 MG capsule Take 1 capsule (0.5 mg) by mouth 2 times daily 1.5mg twice daily 6/5/2024 at am    tacrolimus (GENERIC EQUIVALENT) 1 MG capsule Take 1 capsule (1 mg) by mouth 2 times daily 1.5mg twice daily 6/5/2024 at am    ustekinumab  (STELARA) 90 MG/ML Inject 90 mg Subcutaneous once every eight weeks  at on HOLD    valACYclovir (VALTREX) 1000 mg tablet Take 1 tablet (1,000 mg) by mouth 2 times daily for 14 days 6/5/2024 at AM    valGANciclovir (VALCYTE) 450 MG tablet Take 2 tablets (900 mg) by mouth daily  at put on HOLD LN

## 2024-06-06 NOTE — PROGRESS NOTES
Transplant Surgery  Inpatient Daily Progress Note  2024    Assessment & Plan: Ira Zamora is an 34 year old female with history of ESRD on dialysis d/t interstitial nephritis, Crohn's, HTN, GERD, and dilated ascending aorta. S/p  donor en bloc kidney transplant with ureteral stent x2 on 24. Now admitted with painful oral and vaginal lesions and inability to tolerate oral intake.      Kidney transplant, en bloc: Cr 0.9.      Immunosuppressed due to medications:  -cPRA 9, received full Thymo due to en bloc kidney   -Tacrolimus 1.5 mg BID, goal level 8-10. Last level  therapeutic.   -Myfortic 360 mg BID, recently decreased from 540 mg BID outpatient due to oral lesions     Hematology: On  mg daily.   Anemia of chronic disease, ABL due to hematuria: Hgb 10-11  Leukopenia: WBC 1.9. Myfortic recently decreased outpatient. Will hold Bactrim. Consider holding valcyte if no improvement.      Cardiorespiratory:   HTN: -140.      GI/Nutrition:   Diet: Regular diet. Start calorie counts due to minimal PO intake over the last week. Consult dietician and consider nutritional support.   GERD: Started famotidine. NO PPI due to history of interstitial nephritis.     Endocrine: Euglycemic     Fluid/Electrolytes:  Electrolytes WNL.      : Voiding.   Oral/vaginal lesions: Oral lesions x2 weeks, vaginal lesion x 3 days (See ED note for pictures). -Myfortic decreased outpatient recently.   -Gyn examined outpatient recently and ordered Valtrex outpatient that patient didn't start. Received 1 dose on admission prior to HSV swabs coming back negative.  HSV serum negative.   -ED performed pelvic exam on admission with negative wet mount. Chlamydia, gonorrhea, RPR and HSV pending.   -Only possible offending medication Ustekinumab which was last given on 3/1.   -ID consulted and recommended:   - Treponema antibody  - HIV  - coxsackie virus serologies  - M.pneumoniae PCR  - Histoplasma urine antigen    - CMV swab of lesion (ID will order and collect if able)  - Karius test   -Consider biopsy of ulcer if work up is negative  - Lidocaine gel, aquaphor barrier cream  Infectious disease: CMV, adenovirus and EBV pending. Work up for lesions above pending.      Neuro:  Acute post-op pain: Controlled. Discontinued on tylenol, robaxin,and Oxycodone PRN pain.      Prophylaxis: DVT mechanical, Bactrim-on hold due to leukopenia, G6PD pending. Valcyte x6 months due to CMV R-D+.   Disposition: ED, transfer to  when bed available     Transplant coordinator Radha Jorge  [phone]  Donor type: DBD, en bloc   DSA at time of transplant:  NO  Ureteral stent: TWO STENTS  CMV:  Donor + / Recipient -  EBV:  Donor + / Recipient +  Thymoglobulin:  356mg , 6mg/kg    ERICA/Fellow/Resident Provider: Judy Gallardo PA-C [id number]     Faculty: Hernando Brooks MD   _________________________________________________________________  Transplant History: Admitted 6/5/2024 for painful oral and vaginal ulcers.  4/17/2024 (Kidney), Postoperative day: 50     Interval History: History is obtained from the patient  Overnight events: Inadequate oral intake due to odynophagia.     ROS:   A 10-point review of systems was negative except as noted above.    Meds:  Current Facility-Administered Medications   Medication Dose Route Frequency Provider Last Rate Last Admin    aspirin (ASA) tablet 325 mg  325 mg Oral Daily Samia Morgan MD   325 mg at 06/06/24 0907    atorvastatin (LIPITOR) tablet 10 mg  10 mg Oral Daily Samia Morgan MD   10 mg at 06/06/24 0908    famotidine (PEPCID) tablet 40 mg  40 mg Oral Daily Samia Morgan MD   40 mg at 06/06/24 0907    mycophenolic acid (GENERIC EQUIVALENT) EC tablet 360 mg  360 mg Oral BID Samia Morgan MD   360 mg at 06/06/24 0910    PARoxetine (PAXIL) tablet 30 mg  30 mg Oral QPM Samia Morgan MD   30 mg at 06/05/24 4002    sulfamethoxazole-trimethoprim (BACTRIM) 400-80 MG per tablet 1  tablet  1 tablet Oral Once per day on Tuesday Thursday Saturday Samia Morgan MD   1 tablet at 06/06/24 0914    tacrolimus (GENERIC EQUIVALENT) capsule 1.5 mg  1.5 mg Oral BID IS Samia Morgan MD   1.5 mg at 06/06/24 0907    valGANciclovir (VALCYTE) tablet 900 mg  900 mg Oral Daily Judy Gallardo PA-C   900 mg at 06/06/24 0910       Physical Exam:     Admit      Current vitals:   BP (!) 118/91   Pulse 116   Temp 98  F (36.7  C) (Oral)   Resp 16   LMP 04/03/2024 (Approximate)   SpO2 100%          Vital sign ranges:    Temp:  [98  F (36.7  C)-98.6  F (37  C)] 98  F (36.7  C)  Pulse:  [] 116  Resp:  [16-20] 16  BP: (118-146)/(83-91) 118/91  SpO2:  [96 %-100 %] 100 %  Patient Vitals for the past 24 hrs:   BP Temp Temp src Pulse Resp SpO2   06/06/24 1100 (!) 118/91 98  F (36.7  C) Oral 116 16 100 %   06/06/24 0555 -- 98.6  F (37  C) Oral 95 18 100 %   06/06/24 0554 (!) 146/91 -- -- 97 -- --   06/05/24 2300 118/86 98.5  F (36.9  C) Oral 91 17 96 %   06/05/24 2200 -- -- -- 89 -- 98 %   06/05/24 1900 -- -- -- 87 -- 100 %   06/05/24 1638 124/83 98  F (36.7  C) Oral (!) 127 20 98 %     General Appearance: uncomfortable.   Skin: normal  Heart: regular rate and rhythm  Lungs: NLB on room air   Abdomen: The abdomen is flat, and  non-tender, generalized. she is not tender over the kidney graft. The wound is Healing well, well approximated, and without signs of infection.  : de leon is not present.    Extremities: edema: absent.   Neurologic: awake, alert, and oriented. Tremor absent.    Data:   CMP  Recent Labs   Lab 06/06/24  0707 06/05/24  1712 05/31/24  0930    137 142   POTASSIUM 4.2 4.1 4.1   CHLORIDE 104 106 107   CO2 19* 22 24   GLC 94 124* 89   BUN 18.1 22.7* 24.0*   CR 0.93 1.06* 1.14*   GFRESTIMATED 82 70 64   ROBLES 10.3* 10.0 10.3*   MAG 1.4*  --  1.7   PHOS 1.9*  --  2.8   ALBUMIN 3.8 3.8  --    BILITOTAL 0.7 0.4  --    ALKPHOS 83 82  --    AST 16 19  --    ALT 12 18  --   "    CBC  Recent Labs   Lab 06/06/24  0707 06/05/24  1714   HGB 11.0* 10.7*   WBC 1.9* 1.9*    202     COAGSNo lab results found in last 7 days.    Invalid input(s): \"XA\"   Urinalysis  Recent Labs   Lab Test 06/06/24  1113 05/03/24  1600   COLOR Yellow Light Yellow   APPEARANCE Clear Clear   URINEGLC Negative 100*   URINEBILI Negative Negative   URINEKETONE Negative Negative   SG 1.028 1.014   UBLD Small* Negative   URINEPH 5.5 5.5   PROTEIN 10* Negative   NITRITE Negative Negative   LEUKEST Moderate* Trace*   RBCU 2 2   WBCU 6* 4     Virology:  Hepatitis C Antibody   Date Value Ref Range Status   04/17/2024 Nonreactive Nonreactive Final     Comment:     A nonreactive screening test result does not exclude the possibility of exposure to or infection with HCV. Nonreactive screening test results in individuals with prior exposure to HCV may be due to antibody levels below the limit of detection of this assay or lack of reactivity to the HCV antigens used in this assay. Patients with recent HCV infections (<3 months from time of exposure) may have false-negative HCV antibody results due to the time needed for seroconversion (average of 8 to 9 weeks).     BK Virus DNA IU/mL   Date Value Ref Range Status   05/23/2024 Not Detected Not Detected IU/mL Final      "

## 2024-06-06 NOTE — PLAN OF CARE
BP (!) 118/91   Pulse 116   Temp 98  F (36.7  C) (Oral)   Resp 16   LMP 04/03/2024 (Approximate)   SpO2 100%     RN [id number]    Neuro: A&Ox4.   Cardiac: SR. VSS.   Respiratory: on RA.  GI/: Adequate urine output. Last BM PTA.  Diet/appetite: Tolerating regular diet. Appetite poor. Calorie counts.  Activity:  Up ad roya, up to chair and in halls.  Pain: C/o pain at ulcerations (buccal and vaginal). Well-controlled with Oragel and PRN oxycodone 5mg x1.  Skin: See PCS.  LDA's: PIVx2 SL.    Plan: Continue with POC. Notify primary team with changes.

## 2024-06-06 NOTE — CONSULTS
[facility]    Consult Note - Kidney Transplant Service  Date of Admission:  2024  Consult Requested by: ED, Dr. Kohler  Reason for Consult: Mucosal lesions, pain    Assessment & Plan: Surgery   Ira Zamora is an 34 year old female with history of Crohn's, HTN, GERD, and ESRD secondary to interstitial nephritis s/p  donor kidney transplant on 24 (Dr. Díaz) who presents to the ED today due to oral and vaginal sores which have been causing her a significant amount of pain and have limited her ability to eat.    - Will admit to observation for pain control and further workup  - PTA medications        Drains: None     Code Status:  Full    Clinically Significant Risk Factors Present on Admission                # Drug Induced Platelet Defect: home medication list includes an antiplatelet medication      # Anemia: based on hgb <11               # Financial/Environmental Concerns:         The patient's care was discussed with the Chief Resident/Fellow, Dr. Martins.     Samia Morgan, DO  PGY-2 General Surgery   __________________________________________________________________    Chief Complaint   Mucosal lesions    History is obtained from the patient    History of Present Illness   Ira Zamora is an 34 year old female with history of Crohn's, HTN, GERD, and ESRD secondary to interstitial nephritis s/p  donor kidney transplant on 24 (Dr. Díaz) who presents to the ED today due to oral and vaginal sores. She reports that the oral lesions developed first about 2 weeks ago, the day after getting an endoscopy. These are quite painful and do make it difficult to eat and drink. Only has two discreet ulcers, but her entire mouth is sore. She has been using Orajel and Magic mouthwash which help transiently, but overall has only been able to eat about 4 meals in the past week. The vaginal lesion developed a couple days ago and is also painful.  Endorses brown vaginal discharge (or scabbing from her ulcer, she is not quite sure). Has an IUD in place and LMP was toward the end of April. States she is not currently sexually active. She was able to see a gynecologist on 6/3 where a left labia majora ulcer was seen on exam and swabbed for HSV (reportedly came back negative). They recommended topical lidocaine and tub soaks, which do offer pain relief. She denies any other symptoms including fevers, chills, sore throat, cough, shortness of breath, abdominal pain, nausea and vomiting. Has been making a lot of urine.    In the ED she is afebrile, initially tachycardic to 127 (now improved to 90s) and . Labs notable for WBC 1.9, hgb 10.7. Crt is 1.06 (below her baseline of 1.1-1.3). Adenovirus pending. HSV swabs of both oral and vaginal lesions pending. Pelvic exam done by ED provider and chlamydia/gonorrhea tests also pending. She did have a serum HSV test 5/31/2024 which was negative for HSV-1 and HSV-2.       Past Medical History    Past Medical History:   Diagnosis Date    Anemia in chronic kidney disease     Anxiety 2007    Ascending aorta dilation (H24) 2020 2020: ascending aorta 3.8 cm, aortic sinus 3.6 cm. 2024 Echo: Ao root diam: 3.2 cm, asc Aorta Diam: 3.8 cm    ASCUS with positive high risk HPV cervical 2017    ASCUS with positive high risk HPV cervical 07/01/2017    Crohn's disease (H) 2004    ESRD (end stage renal disease) on dialysis (H) 2020    GERD (gastroesophageal reflux disease)     Hidradenitis suppurativa 2015    History of blood transfusion     Hypertension     Juvenile rheumatoid arthritis (H)     Asymptomatic in adulthood    Kidney replaced by transplant 04/17/2024    En bloc. Induction w/ Thymoglobulin.    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       Past Surgical History   Past Surgical History:    Procedure Laterality Date    H NEEDLE GUIDE,  KIDNEY BIOPSY      TRANSPLANT KIDNEY RECIPIENT  DONOR N/A 2024    Procedure: Transplant kidney recipient  donor,bilateral uretral stent implantation;  Surgeon: Carlitos Díaz MD;  Location: [facility] OR       Prior to Admission Medications   I have reviewed this patient's current medications       Physical Exam   Vital Signs: Temp: 98  F (36.7  C) Temp src: Oral BP: 124/83 Pulse: (!) 127   Resp: 20 SpO2: 98 % O2 Device: None (Room air)    Weight: 0 lbs 0 ozNo intake or output data in the 24 hours ending 24  General Appearance: No acute distress  HEENT: 1 ulcer on right side of buccal mucosa and another on internal lower lip near bottom teeth  Respiratory: unlabored breathing on room air  Cardiovascular: regular rate at time of exam  GI: soft, non-tender, well healed surgical incision  Vaginal exam deferred. Photo reviewed.         Data     I have personally reviewed the following data over the past 24 hrs:    1.9 (L)  \   10.7 (L)   / 202     137 106 22.7 (H) /  124 (H)   4.1 22 1.06 (H) \     ALT: 18 AST: 19 AP: 82 TBILI: 0.4   ALB: 3.8 TOT PROTEIN: 6.5 LIPASE: N/A      I have reviewed history, examined patient and discussed plan with the fellow/resident/ERICA.  I concur with the findings in this note.

## 2024-06-06 NOTE — CONSULTS
Brief Consult Note    Ira Zamora is a 33yo with hx of ESRD 2/2 interstitial nephritis s/p pediatric en block kidney transplant 04/2024, admitted to Transplant Surgery service in the setting of 2 weeks of painful oral lesions with recent development of vaginal lesion. Per chart review patient noticed painful vaginal lesion around 5/31, was examined by Gynecology on 6/3 where HSV culture swab was collected from the vaginal lesion; this was reportedly negative, although results not yet available yet through [facility] as they may be a preliminary culture result. She was sent home with topical lidocaine. Wet prep collected on admission negative, GC/CT pending. Recommend adding RPR with reflex titer to assess for syphilis. Less concerned for primary vulvar malignancy given preceding oral lesions and acute nature of lesion development. SJS also a possibility, although per primary team on medication review with Pharmacy no obvious potential offending agent. Agree with plan for ID consultation and will defer ultimate decision on whether to continue valacyclovir pending their assessment of oral lesions. Barring additional infectious workup suspect aphthous ulcer as cause of lesion, recommend supportive cares with continued with vulvar topical lidocaine, Aquaphor, and rayshawn-bottles after voiding PRN for comfort.     Gynecology will sign off at this time, but please do not hesitate to re-consult should new gynecologic concerns arise.     Wei Read MD   Obstetrics & Gynecology, PGY-1  06/06/2024 11:41 AM     The patient was reviewed with Dr. Read.  I agree with the above assessment and plan of care.      Haley Enamorado MD, FACOG

## 2024-06-06 NOTE — CONSULTS
SOLID ORGAN TRANSPLANT INFECTIOUS DISEASES CONSULTATION     Patient:  Ira Zamora   YOB: 1990  Date of Visit:  2024  Date of Admission: 2024  Consult Requester:Hernando Brooks MD          Assessment and Recommendations:   Recommendations:  Stop valacyclovir - numerous HSV tests negative  Continue valganciclovir for CMV ppx for high-risk serodiscordant status  Recommend the following (ordered for you):  - Treponema antibody  - HIV  - coxsackie virus serologies  - M.pneumoniae PCR  - Histoplasma urine antigen   - CMV swab of lesion (ID will order and collect if able)  - Karius test   Possibility of non-infectious causes of ulcerations including: medication-induced, Crohn's, or Behcet's syndrome considered with oral and genital ulcerations  If infectious work up negative, may need outpatient biopsy of ulcers for pathology     Thank you for the consult. Transplant ID will continue to follow with you.     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     Of note, patient has been on valganciclovir for high-risk CMV serodiscordant status. CMV negative. Leukopenia with WBC 1.9K with ANC 1.4K. Prefer to continue prophylactic VGCV due to serodiscordant status currently ~50 days post-transplant. VGCV has activity against other herpes family viruses including HSV and VZV.     Ddx includes infectious, medication induced, and immune mediated conditions. Infectious ddx: CMV (negative serum PCR), HSV (multiple PCR tests of sites negative), M.pneumoniae associated  mucositis (no URI or pneumonia sx), coxsackie virus, VZV (less likely on VGCV), syphilis, HIV (neg 24), oral and vulvar ulcers have been reported with Yersinia enterolitica (no diarrhea).     Additional non-infectious considerations: Crohn's (pt has history of this), Behcet syndrome, phemphigoid, pemphigus vulgaris, erythema multiforme (no target lesions or other rashes). Drug eruption, MPA and tac have both been associated with oral/GI ulcers- MPA dose was recently decreased due to ulcers.      Previous ID Issues:  - Crohn's disease- follows with [facility] on ustekinumab (Stelara) pre-transplant      Other ID issues:  - QTc interval:  503msec on 24  - Bacterial prophylaxis:  none  - Pneumocystis prophylaxis:  Bactrim  - Viral serostatus: CMV D+/R-, EBV D+/R+, HSV 1?/2?, VZV +   - Viral prophylaxis:  Valcyte  - Fungal prophylaxis:  none  - Immunosuppression: Induction with thymo and steroid taper; current: MPA, tac  - Immunization status:  Once >90 days post-transplant will need updated Tdap (last documented ), candidate for VZV  - Gamma globulin status:  unknown  - Isolation status: good hand hygiene           History of Present Illness:   Transplants:  2024 (Kidney), Postoperative day:  50     Ira Zamora is a 34 year old female with history of Crohn's, Hidradenitis suppurativa, Juvenile RA, HTN, 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     Symptoms started about 2 weeks ago with mouth sores (small and one large). Ira notes night sweats that started just before the mouth sores and have increased to nightly. Sores are very painful and have limited ability to eat and drink. Has tried topical lidocaine and magic mouthwash. Has pain in esophagus with swallowing or eating food. Has one swollen lymph node right  submandibular. No other lymphadenopathy that she's noticed.    Has hx of Crohn's disease, last saw GI around September 2023 and last Stelara infusion was 3/1/24. Around time of initial diagnosis with Crohns had severe reflux and canker sores in mouth.     No fevers, chills, abdominal pain, diarrhea, vomiting, rhinorrhea, dyspnea, cough, vaginal discharge, dysuria, joint pain/swelling or erythema, rashes. No sx of hidradenitis suppurativa.     Lives in [place], [place] with her  (Collin) and her parents live in upstairs in the same house. They both have indoor pet cats. No other animal exposures, fish, or birds. No recent travel. International travel to [place] in 2016 and 2018, traveled to [place], [place], [place] in 2009. 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 Not around any young children. No sick contacts.            Past Medical History:     Past Medical History:   Diagnosis Date    Anemia in chronic kidney disease     Anxiety 2007    Ascending aorta dilation (H24) 2020 2020: ascending aorta 3.8 cm, aortic sinus 3.6 cm. 2024 Echo: Ao root diam: 3.2 cm, asc Aorta Diam: 3.8 cm    ASCUS with positive high risk HPV cervical 2017    ASCUS with positive high risk HPV cervical 07/01/2017    Crohn's disease (H) 2004    ESRD (end stage renal disease) on dialysis (H) 2020    GERD (gastroesophageal reflux disease)     Hidradenitis suppurativa 2015    History of blood transfusion     Hypertension     Juvenile rheumatoid arthritis (H)     Asymptomatic in adulthood    Kidney replaced by transplant 04/17/2024    En bloc. Induction w/ Thymoglobulin.    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            Past Surgical History:     Past Surgical History:   Procedure Laterality Date    H NEEDLE GUIDE,  KIDNEY BIOPSY       TRANSPLANT KIDNEY RECIPIENT  DONOR N/A 2024    Procedure: Transplant kidney recipient  donor,bilateral uretral stent implantation;  Surgeon: Carlitos Díaz MD;  Location: [facility] OR            Family History:   Reviewed and non-contributory.   Family History   Problem Relation Age of Onset    Endometriosis Mother     Coronary Artery Disease Mother             Social History:     Social History     Tobacco Use    Smoking status: Never     Passive exposure: Never    Smokeless tobacco: Never   Substance Use Topics    Alcohol use: Not Currently     Comment: Very occasional, last drink two months ago, wine cooler     History   Sexual Activity    Sexual activity: Not on file            Current Medications:     Current Facility-Administered Medications   Medication Dose Route Frequency Provider Last Rate Last Admin    aspirin (ASA) tablet 325 mg  325 mg Oral Daily Samia Morgan MD   325 mg at 24 09    atorvastatin (LIPITOR) tablet 10 mg  10 mg Oral Daily Samia Morgan MD   10 mg at 24 09    famotidine (PEPCID) tablet 40 mg  40 mg Oral Daily Samia Morgan MD   40 mg at 24 09    mycophenolic acid (GENERIC EQUIVALENT) EC tablet 360 mg  360 mg Oral BID Samia Morgan MD   360 mg at 24 0910    PARoxetine (PAXIL) tablet 30 mg  30 mg Oral QPM Samia Morgan MD   30 mg at 24 2304    sulfamethoxazole-trimethoprim (BACTRIM) 400-80 MG per tablet 1 tablet  1 tablet Oral Once per day on  Samia Morgan MD   1 tablet at 24 0914    tacrolimus (GENERIC EQUIVALENT) capsule 1.5 mg  1.5 mg Oral BID IS Samia Morgan MD   1.5 mg at 24 09    valACYclovir (VALTREX) tablet 1,000 mg  1,000 mg Oral BID Samia Morgan MD   1,000 mg at 24 0910    valGANciclovir (VALCYTE) tablet 900 mg  900 mg Oral Daily Samia Morgan MD   900 mg at 24 0910            Allergies:     Allergies   Allergen  "Reactions    Amlodipine Headache and Other (See Comments)     Other Reaction(s): Unknown    Proton Pump Inhibitors Nephrotoxicity     No PPIs due to history of renal failure due to interstitial nephritis    Tegaderm Transparent Dressing (Informational Only) Blisters            Physical Exam:   Vitals were reviewed  Temp:  [98  F (36.7  C)-98.6  F (37  C)] 98.6  F (37  C)  Pulse:  [] 95  Resp:  [17-20] 18  BP: (118-146)/(83-91) 146/91  SpO2:  [96 %-100 %] 100 %    There were no vitals filed for this visit.    Constitutional: Pleasant and cooperative female seen sitting up in bed, in NAD. Awake, alert, interactive.  HEENT: NC/AT, EOMI, sclera clear, conjunctiva normal. Oropharynx with moist membranes, white superficial ulcerations largest measuring about 1cm with erythematous base. Mild right submandibular lymphadenopathy, no right axillary, anterior cervical chain or supraclavicular lymphadenopathy.   Respiratory: No increased work of breathing, CTAB, no crackles or wheezing.  Cardiovascular: RRR, no murmur noted. No peripheral edema.  Skin: Warm, dry, well-perfused. AVF site RUE. No bruising, bleeding, rashes, or lesions on face, arms, or legs.  Musculoskeletal: No swelling or erythema over wrists, elbows, knees, or ankles.   Neurologic: A&O. Answers questions appropriately, speech normal. Moves all extremities spontaneously. No tremor.  Neuropsychiatric: Calm. Affect appropriate to situation.  Vascular access:  PIV x2 CDI, non-tender, no surrounding erythema.           Laboratory Data:     Microbiology:  Culture   Date Value Ref Range Status   05/03/2024 No Growth  Final     No results found for: \"CULT\"    Inflammatory Markers    No lab results found.    Metabolic Studies       Recent Labs   Lab Test 06/06/24  0707 06/05/24  1712 05/31/24  0930 05/23/24  0949 05/20/24  0930 05/09/24  0910 05/06/24  0915 04/17/24  2230 04/17/24  2123 04/17/24  1603 04/17/24  0958    137 142 138  --  139 137   < > 136  -- "  139   POTASSIUM 4.2 4.1 4.1 4.2  --  5.1 5.0   < > 3.7  --  3.4   CHLORIDE 104 106 107 107 105 109* 109*   < >  --   --  95*   CO2 19* 22 24 23  --  22 19*   < >  --   --  33*   ANIONGAP 12 9 11 8  --  8 9   < >  --   --  11   BUN 18.1 22.7* 24.0* 26.8*  --  31.8* 24.4*   < >  --   --  11.6   CR 0.93 1.06* 1.14* 1.14*  --  1.17* 1.17*   < >  --   --  3.11*   GFRESTIMATED 82 70 64 64  --  62 62   < >  --   --  19*   GLC 94 124* 89 75  --  92 90   < > 168*   < > 91   A1C  --   --   --   --   --   --   --   --   --   --  4.7   ROBLES 10.3* 10.0 10.3* 9.9  --  10.3* 10.0   < >  --   --  9.3   PHOS  --   --  2.8 2.9  --  2.7 3.2   < >  --   --   --    MAG  --   --  1.7 1.4*  --  1.6* 1.6*   < >  --   --   --    LACT  --   --   --   --   --   --   --   --  0.8  --   --     < > = values in this interval not displayed.       Hepatic Studies    Recent Labs   Lab Test 06/06/24 0707 06/05/24 1712 05/09/24  0910 04/17/24  0958 09/29/22  1140   BILITOTAL 0.7 0.4 0.4 0.6 0.4   ALKPHOS 83 82 90 55 75   ALBUMIN 3.8 3.8 4.1 4.1 4.4   AST 16 19 17 17 17   ALT 12 18 8 5 12       Pancreatitis testing    Recent Labs   Lab Test 04/17/24  0958   TRIG 142       Hematology Studies      Recent Labs   Lab Test 06/06/24  0707 06/05/24  1714 05/31/24  0930 05/23/24  0949 05/09/24  0910 05/06/24  0915 05/02/24  0920 04/25/24  0930   WBC 1.9* 1.9* 1.7* 1.9*  1.9* 2.8* 3.0*   < > 7.1   ANEU  --   --   --   --   --   --   --  5.8   ALYM  --   --   --   --   --   --   --  0.2*   FAVIAN  --   --   --   --   --   --   --  0.4   AEOS  --   --   --   --   --   --   --  0.2   HGB 11.0* 10.7* 11.3* 10.6* 10.3* 10.0*   < > 8.4*   HCT 33.1* 32.9* 35.1 33.6* 32.9* 31.6*   < > 26.0*    202 219 196 259 236   < > 197    < > = values in this interval not displayed.       Arterial Blood Gas Testing    Recent Labs   Lab Test 04/17/24 2123   PH 7.51*   PCO2 38   PO2 61*   HCO3 31*   O2PER 46.0        Urine Studies     Recent Labs   Lab Test 05/03/24  1600  "04/17/24  1113 09/29/22  1153   URINEPH 5.5 8.5* 8.5*   NITRITE Negative Negative Negative   LEUKEST Trace* Negative Negative   WBCU 4 <1 1       Hepatitis B Testing   Recent Labs   Lab Test 04/17/24  0958 09/29/22  1140   HBCAB Nonreactive Nonreactive   HEPBANG Nonreactive Nonreactive       Hepatitis C Testing     Hepatitis C Antibody   Date Value Ref Range Status   04/17/2024 Nonreactive Nonreactive Final     Comment:     A nonreactive screening test result does not exclude the possibility of exposure to or infection with HCV. Nonreactive screening test results in individuals with prior exposure to HCV may be due to antibody levels below the limit of detection of this assay or lack of reactivity to the HCV antigens used in this assay. Patients with recent HCV infections (<3 months from time of exposure) may have false-negative HCV antibody results due to the time needed for seroconversion (average of 8 to 9 weeks).   09/29/2022 Nonreactive Nonreactive Final       Respiratory Virus Testing    No results found for: \"RS\", \"FLUAG\"    Last check of C difficile  No results found for: \"CDBPCT\"           Imaging:   No new imaging            "

## 2024-06-07 PROBLEM — D72.819 LEUKOPENIA, UNSPECIFIED TYPE: Status: ACTIVE | Noted: 2024-06-07

## 2024-06-07 LAB
ANION GAP SERPL CALCULATED.3IONS-SCNC: 11 MMOL/L (ref 7–15)
BACTERIA UR CULT: NO GROWTH
BUN SERPL-MCNC: 17.1 MG/DL (ref 6–20)
CALCIUM SERPL-MCNC: 9.9 MG/DL (ref 8.6–10)
CHLORIDE SERPL-SCNC: 106 MMOL/L (ref 98–107)
CREAT SERPL-MCNC: 0.93 MG/DL (ref 0.51–0.95)
DEPRECATED HCO3 PLAS-SCNC: 20 MMOL/L (ref 22–29)
EBV DNA SERPL NAA+PROBE-ACNC: NOT DETECTED IU/ML
EGFRCR SERPLBLD CKD-EPI 2021: 82 ML/MIN/1.73M2
ERYTHROCYTE [DISTWIDTH] IN BLOOD BY AUTOMATED COUNT: 13.8 % (ref 10–15)
GLUCOSE SERPL-MCNC: 97 MG/DL (ref 70–99)
HADV DNA # SPEC NAA+PROBE: NOT DETECTED COPIES/ML
HCT VFR BLD AUTO: 32.2 % (ref 35–47)
HGB BLD-MCNC: 10.4 G/DL (ref 11.7–15.7)
HIV 1+2 AB+HIV1 P24 AG SERPL QL IA: NONREACTIVE
HOLD SPECIMEN: NORMAL
MAGNESIUM SERPL-MCNC: 1.5 MG/DL (ref 1.7–2.3)
MCH RBC QN AUTO: 29.8 PG (ref 26.5–33)
MCHC RBC AUTO-ENTMCNC: 32.3 G/DL (ref 31.5–36.5)
MCV RBC AUTO: 92 FL (ref 78–100)
PHOSPHATE SERPL-MCNC: 2.8 MG/DL (ref 2.5–4.5)
PLATELET # BLD AUTO: 205 10E3/UL (ref 150–450)
POTASSIUM SERPL-SCNC: 4.3 MMOL/L (ref 3.4–5.3)
RBC # BLD AUTO: 3.49 10E6/UL (ref 3.8–5.2)
SODIUM SERPL-SCNC: 137 MMOL/L (ref 135–145)
TACROLIMUS BLD-MCNC: 11.6 UG/L (ref 5–15)
TME LAST DOSE: NORMAL H
TME LAST DOSE: NORMAL H
WBC # BLD AUTO: 2.1 10E3/UL (ref 4–11)

## 2024-06-07 PROCEDURE — 80048 BASIC METABOLIC PNL TOTAL CA: CPT | Performed by: PHYSICIAN ASSISTANT

## 2024-06-07 PROCEDURE — 120N000011 HC R&B TRANSPLANT UMMC

## 2024-06-07 PROCEDURE — 99233 SBSQ HOSP IP/OBS HIGH 50: CPT | Performed by: PHYSICIAN ASSISTANT

## 2024-06-07 PROCEDURE — 94642 AEROSOL INHALATION TREATMENT: CPT

## 2024-06-07 PROCEDURE — 99233 SBSQ HOSP IP/OBS HIGH 50: CPT | Mod: FS

## 2024-06-07 PROCEDURE — 85027 COMPLETE CBC AUTOMATED: CPT | Performed by: PHYSICIAN ASSISTANT

## 2024-06-07 PROCEDURE — 84100 ASSAY OF PHOSPHORUS: CPT | Performed by: PHYSICIAN ASSISTANT

## 2024-06-07 PROCEDURE — 250N000013 HC RX MED GY IP 250 OP 250 PS 637: Performed by: PHYSICIAN ASSISTANT

## 2024-06-07 PROCEDURE — G0378 HOSPITAL OBSERVATION PER HR: HCPCS

## 2024-06-07 PROCEDURE — 80197 ASSAY OF TACROLIMUS: CPT | Performed by: PHYSICIAN ASSISTANT

## 2024-06-07 PROCEDURE — 250N000012 HC RX MED GY IP 250 OP 636 PS 637: Performed by: PHYSICIAN ASSISTANT

## 2024-06-07 PROCEDURE — 250N000009 HC RX 250: Mod: JZ | Performed by: PHYSICIAN ASSISTANT

## 2024-06-07 PROCEDURE — 250N000012 HC RX MED GY IP 250 OP 636 PS 637

## 2024-06-07 PROCEDURE — 82955 ASSAY OF G6PD ENZYME: CPT | Performed by: PHYSICIAN ASSISTANT

## 2024-06-07 PROCEDURE — 36415 COLL VENOUS BLD VENIPUNCTURE: CPT | Performed by: PHYSICIAN ASSISTANT

## 2024-06-07 PROCEDURE — 250N000013 HC RX MED GY IP 250 OP 250 PS 637

## 2024-06-07 PROCEDURE — 83735 ASSAY OF MAGNESIUM: CPT | Performed by: PHYSICIAN ASSISTANT

## 2024-06-07 PROCEDURE — 258N000003 HC RX IP 258 OP 636: Performed by: PHYSICIAN ASSISTANT

## 2024-06-07 PROCEDURE — 87799 DETECT AGENT NOS DNA QUANT: CPT | Performed by: PHYSICIAN ASSISTANT

## 2024-06-07 PROCEDURE — 250N000009 HC RX 250

## 2024-06-07 RX ORDER — NYSTATIN 100000/ML
500000 SUSPENSION, ORAL (FINAL DOSE FORM) ORAL 4 TIMES DAILY
Status: DISCONTINUED | OUTPATIENT
Start: 2024-06-07 | End: 2024-06-09 | Stop reason: HOSPADM

## 2024-06-07 RX ORDER — NALOXONE HYDROCHLORIDE 0.4 MG/ML
0.2 INJECTION, SOLUTION INTRAMUSCULAR; INTRAVENOUS; SUBCUTANEOUS
Status: DISCONTINUED | OUTPATIENT
Start: 2024-06-07 | End: 2024-06-09 | Stop reason: HOSPADM

## 2024-06-07 RX ORDER — ALBUTEROL SULFATE 0.83 MG/ML
SOLUTION RESPIRATORY (INHALATION)
Status: DISCONTINUED
Start: 2024-06-07 | End: 2024-06-07 | Stop reason: HOSPADM

## 2024-06-07 RX ORDER — SODIUM CHLORIDE, SODIUM LACTATE, POTASSIUM CHLORIDE, CALCIUM CHLORIDE 600; 310; 30; 20 MG/100ML; MG/100ML; MG/100ML; MG/100ML
INJECTION, SOLUTION INTRAVENOUS CONTINUOUS
Status: DISCONTINUED | OUTPATIENT
Start: 2024-06-07 | End: 2024-06-09 | Stop reason: HOSPADM

## 2024-06-07 RX ORDER — PENTAMIDINE ISETHIONATE 300 MG/300MG
300 INHALANT RESPIRATORY (INHALATION)
Status: COMPLETED | OUTPATIENT
Start: 2024-06-07 | End: 2024-06-07

## 2024-06-07 RX ORDER — NALOXONE HYDROCHLORIDE 0.4 MG/ML
0.4 INJECTION, SOLUTION INTRAMUSCULAR; INTRAVENOUS; SUBCUTANEOUS
Status: DISCONTINUED | OUTPATIENT
Start: 2024-06-07 | End: 2024-06-09 | Stop reason: HOSPADM

## 2024-06-07 RX ORDER — ALBUTEROL SULFATE 0.83 MG/ML
2.5 SOLUTION RESPIRATORY (INHALATION)
Status: COMPLETED | OUTPATIENT
Start: 2024-06-07 | End: 2024-06-07

## 2024-06-07 RX ADMIN — OXYCODONE HYDROCHLORIDE 5 MG: 5 SOLUTION ORAL at 00:13

## 2024-06-07 RX ADMIN — SODIUM CHLORIDE, POTASSIUM CHLORIDE, SODIUM LACTATE AND CALCIUM CHLORIDE: 600; 310; 30; 20 INJECTION, SOLUTION INTRAVENOUS at 10:44

## 2024-06-07 RX ADMIN — VALGANCICLOVIR HYDROCHLORIDE 900 MG: 450 TABLET ORAL at 07:55

## 2024-06-07 RX ADMIN — OXYCODONE HYDROCHLORIDE 5 MG: 5 SOLUTION ORAL at 16:08

## 2024-06-07 RX ADMIN — MAGNESIUM OXIDE TAB 400 MG (241.3 MG ELEMENTAL MG) 800 MG: 400 (241.3 MG) TAB at 07:54

## 2024-06-07 RX ADMIN — ASPIRIN 325 MG ORAL TABLET 325 MG: 325 PILL ORAL at 07:54

## 2024-06-07 RX ADMIN — LIDOCAINE HYDROCHLORIDE: 20 JELLY TOPICAL at 16:08

## 2024-06-07 RX ADMIN — ATORVASTATIN CALCIUM 10 MG: 10 TABLET, FILM COATED ORAL at 07:57

## 2024-06-07 RX ADMIN — ACETAMINOPHEN 650 MG: 325 SOLUTION ORAL at 16:08

## 2024-06-07 RX ADMIN — DIPHENHYDRAMINE HYDROCHLORIDE AND LIDOCAINE HYDROCHLORIDE AND ALUMINUM HYDROXIDE AND MAGNESIUM HYDRO 10 ML: KIT at 18:43

## 2024-06-07 RX ADMIN — ALBUTEROL SULFATE 2.5 MG: 2.5 SOLUTION RESPIRATORY (INHALATION) at 14:05

## 2024-06-07 RX ADMIN — PENTAMIDINE ISETHIONATE 300 MG: 300 INHALANT RESPIRATORY (INHALATION) at 14:33

## 2024-06-07 RX ADMIN — PAROXETINE 30 MG: 30 TABLET, FILM COATED ORAL at 20:22

## 2024-06-07 RX ADMIN — TACROLIMUS 1.5 MG: 1 CAPSULE ORAL at 07:54

## 2024-06-07 RX ADMIN — NYSTATIN 500000 UNITS: 100000 SUSPENSION ORAL at 13:30

## 2024-06-07 RX ADMIN — OXYCODONE HYDROCHLORIDE 5 MG: 5 SOLUTION ORAL at 07:53

## 2024-06-07 RX ADMIN — NYSTATIN 500000 UNITS: 100000 SUSPENSION ORAL at 20:21

## 2024-06-07 RX ADMIN — FAMOTIDINE 40 MG: 20 TABLET ORAL at 07:54

## 2024-06-07 RX ADMIN — MAGNESIUM OXIDE TAB 400 MG (241.3 MG ELEMENTAL MG) 800 MG: 400 (241.3 MG) TAB at 20:22

## 2024-06-07 RX ADMIN — SODIUM PHOSPHATE, DIBASIC, ANHYDROUS, POTASSIUM PHOSPHATE, MONOBASIC, AND SODIUM PHOSPHATE, MONOBASIC, MONOHYDRATE 500 MG: 852; 155; 130 TABLET, COATED ORAL at 07:54

## 2024-06-07 RX ADMIN — NYSTATIN 500000 UNITS: 100000 SUSPENSION ORAL at 16:34

## 2024-06-07 RX ADMIN — MYCOPHENOLIC ACID 360 MG: 360 TABLET, DELAYED RELEASE ORAL at 20:21

## 2024-06-07 RX ADMIN — TACROLIMUS 1.5 MG: 1 CAPSULE ORAL at 18:20

## 2024-06-07 RX ADMIN — MYCOPHENOLIC ACID 360 MG: 360 TABLET, DELAYED RELEASE ORAL at 07:55

## 2024-06-07 ASSESSMENT — ACTIVITIES OF DAILY LIVING (ADL)
ADLS_ACUITY_SCORE: 39
ADLS_ACUITY_SCORE: 41
ADLS_ACUITY_SCORE: 41
ADLS_ACUITY_SCORE: 39
ADLS_ACUITY_SCORE: 41
ADLS_ACUITY_SCORE: 24
ADLS_ACUITY_SCORE: 41
ADLS_ACUITY_SCORE: 39
ADLS_ACUITY_SCORE: 41
ADLS_ACUITY_SCORE: 40
ADLS_ACUITY_SCORE: 41

## 2024-06-07 NOTE — PLAN OF CARE
Observation Goals    diagnostic tests and consults completed and resulted: Not met    -vital signs normal or at patient baseline: Met    -tolerating oral intake to maintain hydration: Progressing    -adequate pain control on oral analgesics: Progressing

## 2024-06-07 NOTE — PROGRESS NOTES
Admitted/transferred from:   Time of arrival on unit 8735  2 RN full  skin assessment completed by Robinson GRIMES  Skin assessment finding: issues found oral and vaginal lesions. Patient also has a healed abdominal incision and right AV fistula.   Interventions/actions: skin interventions Team is aware and medications/diagnostics have been started.      Will continue to monitor.

## 2024-06-07 NOTE — UTILIZATION REVIEW
Admission Status; Secondary Review Determination     Admission Date: 2024  4:43 PM       Under the authority of the Utilization Management Committee, the utilization review process indicated a secondary review on the above patient.  The review outcome is based on review of the medical records, discussions with staff, and applying clinical experience noted on the date of the review.        (x)      Inpatient Status Appropriate - This patient's medical care is consistent with medical management for inpatient care and reasonable inpatient medical practice.       RATIONALE FOR DETERMINATION      Brief clinical presentation, information copied from the chart, abbreviated and edited for relevant content:     Discussed with team. Extended stay expected, unclear etiology of her lesions, unable to adequately take oral intake, looking for supplemental. Work up still in progress.     Ira Zamora is an 34 year old female with history of ESRD on dialysis d/t interstitial nephritis, Crohn's, HTN, GERD, and dilated ascending aorta. S/p  donor en bloc kidney transplant with ureteral stent x2 on 24. Now admitted with painful oral and vaginal lesions and inability to tolerate oral intake.  Oral lesions x2 weeks, vaginal lesion x 3 days.Myfortic decreased outpatient recently. Gyn examined outpatient recently and ordered Valtrex outpatient that patient didn't start. Received 1 dose on admission prior to HSV swabs coming back negative.  HSV serum negative.   ED performed pelvic exam on admission with negative wet mount. Chlamydia, gonorrhea, RPR and HSV pending. Only possible offending medication Ustekinumab which was last given on 3/1.   ID consulted and recommended Treponema antibody, HIV, coxsackie virus serologies, M.pneumoniae PCR, Histoplasma urine antigen, CMV swab of lesion (ID will order and collect if able). Consider biopsy of ulcer if work up is negative      At the time of admission with the information  available to the attending physician, more than 2 nights hospital complex care was anticipated. Also, there was a risk of adverse outcome if patient was treated outpatient or observation. High intensity of services anticipated. Inpatient admission appropriate based on Medicare guidelines.       The information on this document is developed by the utilization review team in order for the business office to ensure compliance.  This only denotes the appropriateness of proper admission status and does not reflect the quality of care rendered.         The definitions of Inpatient Status and Observation Status used in making the determination above are those provided in the CMS Coverage Manual, Chapter 1 and Chapter 6, section 70.4.      Sincerely,      Daysi Portillo MD   Utilization Review/ Case Management  [facility].

## 2024-06-07 NOTE — PLAN OF CARE
BP (!) 129/90 (BP Location: Left arm)   Pulse 105   Temp 98.3  F (36.8  C) (Oral)   Resp 18   LMP 2024 (Approximate)   SpO2 97%     Shift: 5392-6643  Isolation Status: None  VS: HTN on RA, afebrile  Neuro: Aox4  Behaviors: Calm and cooperative  BG: None  Labs: Cr: 0.93, K+: 4.3, Ma.5, Phos 2.8, WBC: 2.1 & Hbg: 10.4  Respiratory: WDL  Cardiac: Sinus Tachy  Pain/Nausea: Denies nausea, pain managed with PRNs  PRN: Magic Mouthwash x1  Diet: Regular  IV Access: L PIV Infusing  Infusion(s): LR @ 75mL/hr  Lines/Drains: R AVF  GI/: Voids spontaneously without difficulty, Last BM PTA  Skin: Healed R Hockeystick incision, Oral lesions, Vaginal lesion, 2 nurse skin check completed  Mobility: Independent  Plan: Continue POC

## 2024-06-07 NOTE — PROGRESS NOTES
Neuro: A&Ox4.   Cardiac: Sinus tachycardia. VSS.   Respiratory: Sating 100 on RA.  GI/: Adequate urine output. No BM reported this shift.   Diet/appetite: Tolerating regular diet. Pt prefers supplement ensure shakes d/t mouth sores and loss of appetite.   Activity:  Independent. Pt remained in bed throughout shift.   Pain: At acceptable level on current regimen. PRN Oxy given X2. PRN tylenol given. Female nurse applied lidocaine gel to vaginal lesion per pt request.   Skin: No new deficits noted.  LDA's: LPIV infusing LR 75 mL/hr    Plan: Continue with POC. Notify primary team with changes.

## 2024-06-07 NOTE — PLAN OF CARE
Overall Patient Progress: no changeOverall Patient Progress: no change    diagnostic tests and consults completed and resulted: Progressing. Result pending for CMV, EBV, and adenovirus    -vital signs normal or at patient baseline: Met: /85   Pulse 96   Temp 98.3  F (36.8  C) (Oral)   Resp 16   LMP 04/03/2024 (Approximate)   SpO2 98%     -tolerating oral intake to maintain hydration: Progressing    -adequate pain control on oral analgesics: Progressing

## 2024-06-07 NOTE — PROGRESS NOTES
VS: BP (!) 127/90 (BP Location: Right arm)   Pulse 96   Temp 99.1  F (37.3  C) (Oral)   Resp 18   LMP 04/03/2024 (Approximate)   SpO2 100%    Neuro: A&O x4, able to make needs known  Cardiac: WDL                Respiratory: On RA, no report of SOB  GI/: WDL  Diet/appetite: Regular diet, calorie count x3 days starts today  Activity: Independent  Pain: Pain managed with prn oxycodone x1 with relief  Skin/drains: No need skin issues noted  Lines: L piv saline locked.  No change to pt's status, continue with POC

## 2024-06-07 NOTE — PROGRESS NOTES
[facility] TR[facility]PL[facility] INFECTIOUS DISEASES PROGRESS NOTE     Patient:  Ira Zamora   YOB: 1990  Date of Visit:  2024  Date of Admission: 2024  Consult Requester:Hernando Brooks MD          Assessment and Recommendations:   Recommendations:  Continue VGCV ppx  Pending: Karius, HIV, coxsackie, M.pneumoniae PCR, CMV qualitative swab, histoplasma urine antigen, adenovirus PCR  Have requested ID follow up appointment  From ID perspective, OK for discharge when medically ready per primary team, do not need to wait for ID work up to finalize  May need biopsy of ulcers if other work-up is unrevealing    Thank you for the consult. Transplant ID will continue to follow with you.     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      Of note, patient has been on valganciclovir for high-risk CMV serodiscordant status. CMV negative. Leukopenia with WBC 1.9K with ANC 1.4K. Prefer to continue prophylactic VGCV due to serodiscordant status currently ~50 days post-transplant. VGCV has activity against other herpes family viruses including HSV and VZV.      Ddx includes infectious, medication induced, and immune mediated conditions. Infectious ddx: CMV (negative serum PCR), HSV (multiple PCR tests of sites negative), M.pneumoniae associated mucositis (no URI or pneumonia sx), coxsackie virus, VZV (less likely on VGCV), EBV, syphilis (negative), HIV (neg 24), oral and vulvar ulcers have been reported with Yersinia enterolitica (no diarrhea).      Additional  non-infectious considerations: Crohn's (pt has history of this), Behcet syndrome, phemphigoid, pemphigus vulgaris, erythema multiforme (no target lesions or other rashes). Drug eruption, Stelara, MPA and tac have been associated with oral/GI ulcers- MPA dose was recently decreased due to ulcers.        Previous ID Issues:  - Crohn's disease- follows with [facility] on ustekinumab (Stelara) pre-transplant       Other ID issues:  - QTc interval:  503msec on 24  - Bacterial prophylaxis:  none  - Pneumocystis prophylaxis:  Bactrim  - Viral serostatus: CMV D+/R-, EBV D+/R+, HSV 1?/2?, VZV +   - Viral prophylaxis:  Valcyte  - Fungal prophylaxis:  none  - Immunosuppression: Induction with thymo and steroid taper; current: MPA, tac  - Immunization status:  Once >90 days post-transplant will need updated Tdap (last documented ), candidate for VZV  - Gamma globulin status:  unknown  - Isolation status: good hand hygiene           Interval History:   Feeling about the same today. No significant changes in symptoms. Ulcers are stable. Some of the topicals have been helpful but short-lived. PO intake is still low. Tolerating clear supplements better than other varieties. Discussed pending work up with patient and her mother.    Her mother reports that she had ulcers in the past and had a biopsy done- she does not recall what the results showed. Wonders if Ira may have same or similar thing.         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      Symptoms started about 2 weeks ago with mouth sores (small  and one large). Ira notes night sweats that started just before the mouth sores and have increased to nightly. Sores are very painful and have limited ability to eat and drink. Has tried topical lidocaine and magic mouthwash. Has pain in esophagus with swallowing or eating food. Has one swollen lymph node right submandibular. No other lymphadenopathy that she's noticed.     Has hx of Crohn's disease, last saw GI around September 2023 and last Stelara infusion was 3/1/24. Around time of initial diagnosis with Crohns had severe reflux and canker sores in mouth.      No fevers, chills, abdominal pain, diarrhea, vomiting, rhinorrhea, dyspnea, cough, vaginal discharge, dysuria, joint pain/swelling or erythema, rashes. No sx of hidradenitis suppurativa.      Lives in [place], [place] with her  (Collin) and her parents live in upstairs in the same house. They both have indoor pet cats. No other animal exposures, fish, or birds. No recent travel. International travel to [place] in 2016 and 2018, traveled to [place], [place], [place] in 2009. 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 Not around any young children. No sick contacts.          Current Medications:     Current Facility-Administered Medications   Medication Dose Route Frequency Provider Last Rate Last Admin    aspirin (ASA) tablet 325 mg  325 mg Oral Daily Samia Morgan MD   325 mg at 06/07/24 0754    atorvastatin (LIPITOR) tablet 10 mg  10 mg Oral Daily Samia Morgan MD   10 mg at 06/07/24 0757    famotidine (PEPCID) tablet 40 mg  40 mg Oral Daily Samia Morgan MD   40 mg at 06/07/24 0754    magnesium oxide (MAG-OX) tablet 800 mg  800 mg Oral BID Judy Gallardo PA-C   800 mg at 06/07/24 0754    mycophenolic acid (GENERIC EQUIVALENT) EC tablet 360 mg  360 mg Oral BID Samia Morgan MD   360 mg at 06/07/24 0755    PARoxetine (PAXIL) tablet 30 mg  30 mg  "Oral QPM Samia Morgan MD   30 mg at 06/06/24 2036    [Held by provider] sulfamethoxazole-trimethoprim (BACTRIM) 400-80 MG per tablet 1 tablet  1 tablet Oral Once per day on Tuesday Thursday Saturday Samia Morgan MD   1 tablet at 06/06/24 0914    tacrolimus (GENERIC EQUIVALENT) capsule 1.5 mg  1.5 mg Oral BID IS Samia Morgan MD   1.5 mg at 06/07/24 0754    valGANciclovir (VALCYTE) tablet 900 mg  900 mg Oral Daily Judy Gallardo PA-C   900 mg at 06/07/24 0755              Physical Exam:   Vitals were reviewed  Temp:  [98  F (36.7  C)-99.1  F (37.3  C)] 98.8  F (37.1  C)  Pulse:  [] 96  Resp:  [16-18] 18  BP: (118-128)/(85-91) 119/87  SpO2:  [98 %-100 %] 100 %    There were no vitals filed for this visit.    Constitutional: Pleasant and cooperative female seen supine in bed, in NAD. Awake, alert, interactive.  HEENT: NC/AT, EOMI, sclera clear, conjunctiva normal, OP with MMM with shallow white ulcerations on buccal mucosa and gingiva.  Respiratory: No increased work of breathing, CTAB, no crackles or wheezing.  Cardiovascular: RRR, no murmur noted. No peripheral edema.  Skin: Warm, dry, well-perfused. No bruising, bleeding, rashes, or lesions on limited exam.  Neurologic: A&O. Answers questions appropriately, speech normal. Moves all extremities spontaneously.  Neuropsychiatric: Calm. Affect appropriate to situation.  Vascular access:  PIV CDI, non-tender, no surrounding erythema.           Laboratory Data:   Microbiology:  Culture   Date Value Ref Range Status   05/03/2024 No Growth  Final     No results found for: \"CULT\"    Inflammatory Markers    No lab results found.    Metabolic Studies       Recent Labs   Lab Test 06/07/24  0604 06/06/24  0707 06/05/24  1712 04/17/24  2230 04/17/24  2123 04/17/24  1603 04/17/24  0958    135 137   < > 136  --  139   POTASSIUM 4.3 4.2 4.1   < > 3.7  --  3.4   CHLORIDE 106 104 106   < >  --   --  95*   CO2 20* 19* 22   < >  --   --  33* " "  ANIONGAP 11 12 9   < >  --   --  11   BUN 17.1 18.1 22.7*   < >  --   --  11.6   CR 0.93 0.93 1.06*   < >  --   --  3.11*   GFRESTIMATED 82 82 70   < >  --   --  19*   GLC 97 94 124*   < > 168*   < > 91   A1C  --   --   --   --   --   --  4.7   ROBLES 9.9 10.3* 10.0   < >  --   --  9.3   PHOS 2.8 1.9*  --    < >  --   --   --    MAG 1.5* 1.4*  --    < >  --   --   --    LACT  --   --   --   --  0.8  --   --     < > = values in this interval not displayed.       Hepatic Studies    Recent Labs   Lab Test 06/06/24  0707   BILITOTAL 0.7   ALKPHOS 83   ALBUMIN 3.8   AST 16   ALT 12       Pancreatitis testing    Recent Labs   Lab Test 04/17/24  0958   TRIG 142       Hematology Studies      Recent Labs   Lab Test 06/07/24  0604 06/06/24  0707 06/05/24  1714 05/02/24  0920 04/25/24  0930   WBC 2.1* 1.9* 1.9*   < > 7.1   ANEU  --   --   --   --  5.8   ALYM  --   --   --   --  0.2*   FAVIAN  --   --   --   --  0.4   AEOS  --   --   --   --  0.2   HGB 10.4* 11.0* 10.7*   < > 8.4*   HCT 32.2* 33.1* 32.9*   < > 26.0*    197 202   < > 197    < > = values in this interval not displayed.       Arterial Blood Gas Testing    Recent Labs   Lab Test 04/17/24  2123   PH 7.51*   PCO2 38   PO2 61*   HCO3 31*   O2PER 46.0        Urine Studies     Recent Labs   Lab Test 06/06/24  1113   URINEPH 5.5   NITRITE Negative   LEUKEST Moderate*   WBCU 6*       Vancomycin Levels     No lab results found.    Invalid input(s): \"VANCO\"    Tobramycin levels     No lab results found.    Gentamicin levels    No lab results found.    CSF testing   No lab results found.    Hepatitis B Testing   Recent Labs   Lab Test 04/17/24  0958 09/29/22  1140   HBCAB Nonreactive Nonreactive   HEPBANG Nonreactive Nonreactive       Hepatitis C Testing     Hepatitis C Antibody   Date Value Ref Range Status   04/17/2024 Nonreactive Nonreactive Final     Comment:     A nonreactive screening test result does not exclude the possibility of exposure to or infection with " "HCV. Nonreactive screening test results in individuals with prior exposure to HCV may be due to antibody levels below the limit of detection of this assay or lack of reactivity to the HCV antigens used in this assay. Patients with recent HCV infections (<3 months from time of exposure) may have false-negative HCV antibody results due to the time needed for seroconversion (average of 8 to 9 weeks).   09/29/2022 Nonreactive Nonreactive Final       Respiratory Virus Testing    No results found for: \"RS\", \"FLUAG\"    Last check of C difficile  No results found for: \"CDBPCT\"           Imaging:   No new imaging            "

## 2024-06-07 NOTE — PROGRESS NOTES
CLINICAL NUTRITION SERVICES - ASSESSMENT NOTE     Nutrition Prescription    RECOMMENDATIONS FOR MDs/PROVIDERS TO ORDER:  Monitor lytes and replace PRN  Consider magic mouthwash for mouth sores or alternative mouth rinse  If pt unable to consume at least 1200 kcals or 50 g protein daily, consider supplemental nutrition support  Malnutrition Status:    Severe malnutrition in the context of acute illness/disease    Recommendations already ordered by Registered Dietitian (RD):  Encourage small, frequent PO attempts of soft foods with protein sources  Snacks/supplements per pt request. Pt has been provided a list of available supplements  Avoid acidic or irritating foods/beverages for mouth sores    Future/Additional Recommendations:  Monitor nutrition related findings and follow up per protocol  Consider daily MVI + minerals     REASON FOR ASSESSMENT  Ira Zamora is a/an 34 year old female assessed by the dietitian for Provider Order - Eval for nutrition needs, megan counts pending. Please order appropraite supplements. She likes the clear but not great for protein.    Patient admitted for painful oral and vaginal lesions and inability to tolerate oral intake.     MEDICAL HISTORY  ESRD on dialysis d/t interstitial nephritis, Crohn's, HTN, GERD, and dilated ascending aorta. S/p  donor en bloc kidney transplant with ureteral stent x2 on 24.     NUTRITION HISTORY  Met with pt at bedside. Pt reports reduced appetite/intake r/t mouth sores. Denies N/V/D/C. Primarily has been tolerating softer foods such as cream of wheat, mashed potatoes and max and cheese. Pt reports she had an Ensure clear last night and tolerated it well. On previous admission, pt has Ensure Max and magic cup. Pt reports she did not like Ensure Max but she did like magic cup. Provided list of available snacks/supplements and reviewed. Pt states she would like to try a Boost soothe at this time, ordered. Discussed that other supplements may  provide more protein than Ensure Clear and Boost soothe. Pt agreeable to order snacks/supplements on her own. Denies recent wt changes that pt has noticed. Discussed importance of PO efforts with protein sources. Pt denied further nutrition related questions/concerns at this time.     CURRENT NUTRITION ORDERS  Diet: Regular    Intake/Tolerance: Intake of  % of 2  meal(s) so far.  Per calorie count:  6/6         Total Kcals: 92         Total Protein: 5g   LABS  Co2 20  Mg 1.5  WBC 2.1  Hgb 10.4  Hematocrit 32.2    MEDICATIONS  Lipitor  Pepcid  Mg- ox  Mycophenolic acid  Tacro  Abx  LR @ 75ml/hr    ANTHROPOMETRICS  Height: 158.8 cm  Most Recent Weight:      IBW: 52.3 kg  There is no height or weight on file to calculate BMI. BMI Category: No recent weight history to assess BMI  Weight History:  Need updated weight. Using wts from 4/23-5/23, wt down 4.1 kg in 1 month(7%)   Wt Readings from Last 20 Encounters:   05/23/24 56.6 kg (124 lb 12.8 oz)   05/06/24 56.2 kg (123 lb 12.8 oz)   05/02/24 57.2 kg (126 lb 1.6 oz)   04/23/24 60.7 kg (133 lb 12.8 oz)   04/22/24 62.1 kg (137 lb)   04/19/24 63.1 kg (139 lb 1.6 oz)   09/29/22 66.6 kg (146 lb 14.4 oz)   07/26/22 68 kg (150 lb)         ASSESSED NUTRITION NEEDS  Dosing Weight: 56.6 kg ( most recent wt from 5/23 )   Estimated Energy Needs: 7594-7781 kcals/day (25 - 35 kcal/kg)  Justification: Maintenance  Estimated Protein Needs: 68-85 grams protein/day (1.2 - 1.5 grams of pro/kg)  Justification: Increased needs  Estimated Fluid Needs:  (1 mL/kcal)   Justification: Maintenance    PHYSICAL FINDINGS  See malnutrition section below.    Calixto Score: 20  Per EMR: Skin  Skin WDL: .WDL except, integrity  Skin Integrity:  (oral lesion)  Device Skin Interventions Taken: No adjustments needed    MALNUTRITION  % Intake: < 75% for > 7 days (moderate)  % Weight Loss: > 5% in 1 month (severe malnutrition)  Subcutaneous Fat Loss: None observed   Muscle Loss: Upper arm (bicep, tricep):   mild  Fluid Accumulation/Edema: None noted  Malnutrition Diagnosis: Severe malnutrition in the context of acute illness/disease    NUTRITION DIAGNOSIS  Inadequate oral intake related to mouth sores as evidenced by patient report of inability to tolerate significant PO      INTERVENTIONS  Implementation  Nutrition Education: will be provided if nutrition education needs arise. , Nutrition Education: Introduced role of RD, and Nutrition education for recommended modifications   Medical food supplement therapy  Modify composition of meals/snacks  Multivitamin/mineral supplement therapy     Goals  Patient to consume % of nutritionally adequate meal trays TID, or the equivalent with supplements/snacks.        Monitoring/Evaluation  Progress toward goals will be monitored and evaluated per protocol.  Jayda Hurley MS, RD, LD, CNSC    6C (beds 5553-4683) + 7C (beds 4541-8329) + ED   Available in [facility] by name or unit dietitian

## 2024-06-07 NOTE — PROGRESS NOTES
Transplant Surgery  Inpatient Daily Progress Note  2024    Assessment & Plan: Ira Zamora is an 34 year old female with history of ESRD on dialysis d/t interstitial nephritis, Crohn's, HTN, GERD, and dilated ascending aorta. S/p  donor en bloc kidney transplant with ureteral stent x2 on 24. Now admitted with painful oral and vaginal lesions and inability to tolerate oral intake.      Kidney transplant, en bloc: Cr 0.93 today      Immunosuppressed due to medications:  -cPRA 9, received full Thymo due to en bloc kidney   -Tacrolimus 1.5 mg BID, goal level 8-10. Last level  therapeutic.  level pending.   -Myfortic 360 mg BID, recently decreased from 540 mg BID outpatient due to oral lesions     Hematology: On  mg daily.   Anemia of chronic disease, ABL due to hematuria: Hgb 10-11  Leukopenia: WBC 2.1 today. Myfortic recently decreased outpatient. Will hold Bactrim. Consider holding valcyte if no improvement.      Cardiorespiratory:   HTN: -140.   H/o prolonged QTc: repeat EKG today.      GI/Nutrition:   Severe malnutrition in the context of acute illness/disease: Regular diet. Start calorie counts due to minimal PO intake over the last week. Ordered supplements. Consulted dietician, awaiting recs.   GERD: Started famotidine. NO PPI due to history of interstitial nephritis in the past, will trial daily PPI at this time.     Endocrine: Euglycemic independent of insulin.      Fluid/Electrolytes:  Hypomagnesemia: Continue Mag ox  Hypophosphatemia: Neutra phos supplement     : Voiding.   Oral/vaginal lesions: Oral lesions x2 weeks, vaginal lesion x 3 days (See ED note for pictures). -Myfortic decreased outpatient recently.   -Gyn examined outpatient recently and ordered Valtrex outpatient that patient didn't start. Received 1 dose on admission prior to HSV swabs coming back negative.  HSV serum negative.   -ED performed pelvic exam on admission with negative wet mount.  Chlamydia, gonorrhea, RPR and HSV negative.   -Only possible offending medication Ustekinumab which was last given on 3/1.   -ID consulted and recommended:   - Treponema antibody, non-reactive  - HIV, pending  - coxsackie virus serologies, pending  - M.pneumoniae PCR, pending  - Histoplasma urine antigen, pending  - CMV swab of lesion, not detected  - Karius test, pending  -Will start nystatin for possible fungal secondary infection   - Consider biopsy of ulcer if work up is negative  - Lidocaine gel, aquaphor barrier cream    Infectious disease: CMV not detected. Adenovirus and EBV pending. Work up for lesions above pending.      Prophylaxis: DVT mechanical, Bactrim-on hold due to leukopenia, G6PD pending. Valcyte x6 months due to CMV R-D+.   Disposition: ED, transfer to  when bed available     Transplant coordinator Radha Jorge  [phone]  Donor type: DBD, en bloc   DSA at time of transplant:  NO  Ureteral stent: TWO STENTS  CMV:  Donor + / Recipient -  EBV:  Donor + / Recipient +  Thymoglobulin:  356mg , 6mg/kg    ERICA/Fellow/Resident Provider: Luanne Corral DO/Judy Gallardo PA-C [id number]     Faculty: Hernando Brooks MD   ________________________________________________________________  Transplant History: Admitted 6/5/2024 for painful oral and vaginal ulcers.  4/17/2024 (Kidney), Postoperative day: 51     Interval History:   No acute events overnight. Lesions painful. Tolerated clear supplements and minimal food.     ROS:   A 10-point review of systems was negative except as noted above.    Meds:  Current Facility-Administered Medications   Medication Dose Route Frequency Provider Last Rate Last Admin    aspirin (ASA) tablet 325 mg  325 mg Oral Daily Samia Morgan MD   325 mg at 06/06/24 0907    atorvastatin (LIPITOR) tablet 10 mg  10 mg Oral Daily Samia Morgan MD   10 mg at 06/06/24 0908    famotidine (PEPCID) tablet 40 mg  40 mg Oral Daily Samia Morgan MD   40 mg at 06/06/24 0907     magnesium oxide (MAG-OX) tablet 800 mg  800 mg Oral BID Judy Gallardo PA-C   800 mg at 06/06/24 2035    mycophenolic acid (GENERIC EQUIVALENT) EC tablet 360 mg  360 mg Oral BID Samia Morgan MD   360 mg at 06/06/24 2036    PARoxetine (PAXIL) tablet 30 mg  30 mg Oral QPM Samia Morgan MD   30 mg at 06/06/24 2036    phosphorus tablet 250 mg (PHOSPHA 250 NEUTRAL) per tablet 500 mg  500 mg Oral BID Judy Gallardo PA-C   500 mg at 06/06/24 2037    [Held by provider] sulfamethoxazole-trimethoprim (BACTRIM) 400-80 MG per tablet 1 tablet  1 tablet Oral Once per day on Tuesday Thursday Saturday Samia Morgan MD   1 tablet at 06/06/24 0914    tacrolimus (GENERIC EQUIVALENT) capsule 1.5 mg  1.5 mg Oral BID IS Samia Morgan MD   1.5 mg at 06/06/24 2038    valGANciclovir (VALCYTE) tablet 900 mg  900 mg Oral Daily Judy Gallardo PA-C   900 mg at 06/06/24 0910       Physical Exam:     Current vitals:   BP (!) 127/90 (BP Location: Right arm)   Pulse 96   Temp 99.1  F (37.3  C) (Oral)   Resp 18   LMP 04/03/2024 (Approximate)   SpO2 100%     Vital sign ranges:    Temp:  [98  F (36.7  C)-99.1  F (37.3  C)] 99.1  F (37.3  C)  Pulse:  [] 96  Resp:  [16-18] 18  BP: (118-128)/(85-91) 127/90  SpO2:  [98 %-100 %] 100 %  Patient Vitals for the past 24 hrs:   BP Temp Temp src Pulse Resp SpO2   06/07/24 0627 (!) 127/90 -- -- -- -- --   06/07/24 0625 (!) 128/91 99.1  F (37.3  C) Oral -- 18 100 %   06/07/24 0015 121/85 -- -- 96 -- --   06/07/24 0013 121/85 98.3  F (36.8  C) Oral 99 16 98 %   06/06/24 1100 (!) 118/91 98  F (36.7  C) Oral 116 16 100 %     General Appearance: uncomfortable.   Skin: normal  Heart: regular rate and rhythm  Lungs: NLB on room air   Abdomen: The abdomen is flat, and  non-tender, generalized. she is not tender over the kidney graft. The wound is Healing well, well approximated, and without signs of infection.  : de leon is not present.    Extremities: edema: absent.  "  Neurologic: awake, alert, and oriented. Tremor absent.    Data:   CMP  Recent Labs   Lab 06/07/24  0604 06/06/24  0707 06/05/24  1712    135 137   POTASSIUM 4.3 4.2 4.1   CHLORIDE 106 104 106   CO2 20* 19* 22   GLC 97 94 124*   BUN 17.1 18.1 22.7*   CR 0.93 0.93 1.06*   GFRESTIMATED 82 82 70   ROBLES 9.9 10.3* 10.0   MAG 1.5* 1.4*  --    PHOS 2.8 1.9*  --    ALBUMIN  --  3.8 3.8   BILITOTAL  --  0.7 0.4   ALKPHOS  --  83 82   AST  --  16 19   ALT  --  12 18     CBC  Recent Labs   Lab 06/07/24  0604 06/06/24  0707   HGB 10.4* 11.0*   WBC 2.1* 1.9*    197     COAGSNo lab results found in last 7 days.    Invalid input(s): \"XA\"   Urinalysis  Recent Labs   Lab Test 06/06/24  1113 05/03/24  1600   COLOR Yellow Light Yellow   APPEARANCE Clear Clear   URINEGLC Negative 100*   URINEBILI Negative Negative   URINEKETONE Negative Negative   SG 1.028 1.014   UBLD Small* Negative   URINEPH 5.5 5.5   PROTEIN 10* Negative   NITRITE Negative Negative   LEUKEST Moderate* Trace*   RBCU 2 2   WBCU 6* 4     Virology:  Hepatitis C Antibody   Date Value Ref Range Status   04/17/2024 Nonreactive Nonreactive Final     Comment:     A nonreactive screening test result does not exclude the possibility of exposure to or infection with HCV. Nonreactive screening test results in individuals with prior exposure to HCV may be due to antibody levels below the limit of detection of this assay or lack of reactivity to the HCV antigens used in this assay. Patients with recent HCV infections (<3 months from time of exposure) may have false-negative HCV antibody results due to the time needed for seroconversion (average of 8 to 9 weeks).     BK Virus DNA IU/mL   Date Value Ref Range Status   05/23/2024 Not Detected Not Detected IU/mL Final      "

## 2024-06-07 NOTE — PROGRESS NOTES
Calorie Count  Intake recorded for: 6/6  Total Kcals: 92 Total Protein: 5g  Kcals from Hospital Food: 92   Protein: 5g  Kcals from Outside Food (average):0 Protein: 0g  # Meals Ordered from Kitchen: 2 meals ordered  # Meals Recorded: 1 meal - 100% 4 oz whole milk, 10% Mac and cheese  # Supplements Recorded: 0

## 2024-06-07 NOTE — PROGRESS NOTES
[facility]   Transplant Nephrology Progress Note  Date of Admission:  6/5/2024  Today's Date: 06/07/2024    Recommendations:  - Would give inhaled pentamidine and hold Bactrim for a month.  - PPI once daily.  - Nystatin swish and swallow.    Assessment & Plan   #  DDKT (pediatric en bloc) : Stable serum creatinine.   - Baseline Creatinine: ~ 1.1-1.3   - Proteinuria: Normal (<0.2 grams)   - Date DSA Last Checked: Mar/2024       Latest DSA: No DSA   - BK Viremia: No   - Kidney Tx Biopsy: No    # Immunosuppression: Tacrolimus immediate release (goal 8-10) and Mycophenolic acid (dose 360 mg every 12 hours)   - Induction with Recent Transplant:  High Intensity Protocol due to severe GERD and concern with steroids    - Continue with intensive monitoring of immunosuppression for efficacy and toxicity.   - 06/07/24  Tacrolimus level: 11.6 (~ 10.2 hour trough)   - Changes: Not at this time    # Infection Prophylaxis:   - PJP: Sulfa/TMP (Bactrim), - Would give inhaled pentamidine and hold Bactrim for a month.  - CMV: Valganciclovir (Valcyte)Patient is CMV IgG Ab discordant (D+/R-)   - HSV: Valganciclovir (Valcyte)    # Mouth Sores  # Vaginal Lesions: present to the ED 6/5/24 due to two weeks of oral lesions and two days of vaginal lesions.   HSV-1 and HSV-2 have been negative.   - Infectious vs medication-induced vs Crohn's   - Transplant Infectious Disease following.     - Start nystatin.    # Hypertension: Controlled;  Goal BP: < 140/90   - Changes: Not at this time    # Leukopenia: Stable, low WBC.      # Anemia in Chronic Renal Disease: Hgb: Stable, low      JUNG: No   - Iron studies: Replete    # Mineral Bone Disorder:    - Secondary renal hyperparathyroidism; PTH level: Minimally elevated ( pg/ml)        On treatment: None  - Vitamin D; level: Low          On supplement: No  - Calcium; level: Normal          On supplement: No  - Phosphorus; level: Normal         On  supplement: Yes, phos tabs 500 mg PO BID    # Hypercalcemia: possibly secondary to decreased PO intake.    # Electrolytes:   - Potassium; level: Normal          On supplement: No  - Magnesium; level: Low           On supplement: Yes, magnesium oxide 800 mg PO BID   - Bicarbonate; level: Low          On supplement: No    # Leukopenia: Stable, low    # Crohns: followed by [facility]. Does occasionally alternate between diarrhea and constipation.  On ustekinumab PTA.              -Will plan to hold for at least 6 weeks post txp. Ok to restart in 2 weeks as long as WBC is stable     # Cardiac/Vascular Disease Risk Factors: Dilated ascending aorta (3.8 cm)  on 2024 ECHO (stable from 2020).     # GERD: Start PPI.  On famotidine.      # Transplant History:  Etiology of Kidney Failure: Interstitial nephritis  Tx: DDKT (pediatric en-bloc)  Transplant: 4/17/2024 (Kidney)  Donor Type: Donation after Brain Death Donor Class: Standard Criteria Donor  Crossmatch at time of Tx: negative  DSA at time of Tx: No  Significant changes in immunosuppression: None  CMV IgG Ab High Risk Discordance (D+/R-): Yes  EBV IgG Ab High Risk Discordance (D+/R-): No  Significant transplant-related complications: None      Recommendations were communicated to the primary team verbally.    Seen and discussed with .    LAURITA Hurt CNP   Pager: [phone]    Interval History   Ms. Yadavs creatinine is 0.93 (06/07 0604); Stable from 0.93 yesterday.  Estimated Creatinine Clearance: 76.2 mL/min (based on SCr of 0.93 mg/dL).   I/O last 3 completed shifts:  In: 360 [P.O.:360]  Out: -    Other significant labs/tests/vitals: SBP 110s - 120s overnight. Afebrile  No acute events overnight.  No chest pain or shortness of breath.  No leg swelling.  No nausea or vomiting.  No diarrhea.  Continues to report pain from mouth ulcers.        Review of Systems   4 point ROS was obtained and negative except as noted in the Interval  History.    MEDICATIONS:  Current Facility-Administered Medications   Medication Dose Route Frequency Provider Last Rate Last Admin    aspirin (ASA) tablet 325 mg  325 mg Oral Daily Samia Morgan MD   325 mg at 24 0754    atorvastatin (LIPITOR) tablet 10 mg  10 mg Oral Daily Samia Morgan MD   10 mg at 24 0757    famotidine (PEPCID) tablet 40 mg  40 mg Oral Daily Samia Morgan MD   40 mg at 24 0754    magnesium oxide (MAG-OX) tablet 800 mg  800 mg Oral BID Judy Gallardo PA-C   800 mg at 24 075    mycophenolic acid (GENERIC EQUIVALENT) EC tablet 360 mg  360 mg Oral BID Samia Morgan MD   360 mg at 24 075    PARoxetine (PAXIL) tablet 30 mg  30 mg Oral QPM Samia Morgan MD   30 mg at 24    [Held by provider] sulfamethoxazole-trimethoprim (BACTRIM) 400-80 MG per tablet 1 tablet  1 tablet Oral Once per day on  Samia Morgan MD   1 tablet at 24 0914    tacrolimus (GENERIC EQUIVALENT) capsule 1.5 mg  1.5 mg Oral BID IS Samia Morgan MD   1.5 mg at 24 075    valGANciclovir (VALCYTE) tablet 900 mg  900 mg Oral Daily Judy Gallardo PA-C   900 mg at 24 0755     Current Facility-Administered Medications   Medication Dose Route Frequency Provider Last Rate Last Admin       Physical Exam   Temp  Av.5  F (36.9  C)  Min: 98  F (36.7  C)  Max: 99.1  F (37.3  C)      Pulse  Av.3  Min: 87  Max: 127 Resp  Av.6  Min: 16  Max: 20  SpO2  Av.9 %  Min: 96 %  Max: 100 %     /87 (BP Location: Left arm, Patient Position: Semi-Guerra's, Cuff Size: Adult Regular)   Pulse 96   Temp 98.8  F (37.1  C) (Oral)   Resp 18   LMP 2024 (Approximate)   SpO2 100%     Admit       GENERAL APPEARANCE: alert and no distress  HENT: oral lesions noted  RESP: lungs clear to auscultation - no rales, rhonchi or wheezes  CV: regular rhythm, normal rate, no rub, no murmur  EDEMA: no LE edema  bilaterally  ABDOMEN: soft, nondistended, nontender, bowel sounds normal  MS: extremities normal - no gross deformities noted, no evidence of inflammation in joints, no muscle tenderness  SKIN: no rash  DIALYSIS ACCESS:  RUE AV fistula + thrill    Data   All labs reviewed by me.  CMP  Recent Labs   Lab 06/07/24  0604 06/06/24  0707 06/05/24  1712 05/31/24  0930    135 137 142   POTASSIUM 4.3 4.2 4.1 4.1   CHLORIDE 106 104 106 107   CO2 20* 19* 22 24   ANIONGAP 11 12 9 11   GLC 97 94 124* 89   BUN 17.1 18.1 22.7* 24.0*   CR 0.93 0.93 1.06* 1.14*   GFRESTIMATED 82 82 70 64   ROBLES 9.9 10.3* 10.0 10.3*   MAG 1.5* 1.4*  --  1.7   PHOS 2.8 1.9*  --  2.8   PROTTOTAL  --  6.9 6.5  --    ALBUMIN  --  3.8 3.8  --    BILITOTAL  --  0.7 0.4  --    ALKPHOS  --  83 82  --    AST  --  16 19  --    ALT  --  12 18  --      CBC  Recent Labs   Lab 06/07/24  0604 06/06/24  0707 06/05/24  1714 05/31/24  0930   HGB 10.4* 11.0* 10.7* 11.3*   WBC 2.1* 1.9* 1.9* 1.7*   RBC 3.49* 3.55* 3.47* 3.70*   HCT 32.2* 33.1* 32.9* 35.1   MCV 92 93 95 95   MCH 29.8 31.0 30.8 30.5   MCHC 32.3 33.2 32.5 32.2   RDW 13.8 13.8 14.0 14.1    197 202 219     INRNo lab results found in last 7 days.  ABGNo lab results found in last 7 days.   Urine Studies  Recent Labs   Lab Test 06/06/24  1113 05/03/24  1600 04/17/24  1113 09/29/22  1153   COLOR Yellow Light Yellow Straw Yellow   APPEARANCE Clear Clear Clear Clear   URINEGLC Negative 100* 70* 100*   URINEBILI Negative Negative Negative Negative   URINEKETONE Negative Negative Negative Negative   SG 1.028 1.014 1.004 1.010   UBLD Small* Negative Trace* Trace*   URINEPH 5.5 5.5 8.5* 8.5*   PROTEIN 10* Negative 100* 100*   NITRITE Negative Negative Negative Negative   LEUKEST Moderate* Trace* Negative Negative   RBCU 2 2 2 <1   WBCU 6* 4 <1 1     No lab results found.  PTH  Recent Labs   Lab Test 05/23/24  0949 04/22/24  0821   PTHI 131* 138*     Iron Studies  Recent Labs   Lab Test 05/23/24  0949  04/22/24  0821   IRON 77 53   * 147*   IRONSAT 38 36   PRICILA 2,181* 2,181*       IMAGING:  All imaging studies reviewed by me.

## 2024-06-08 LAB
ANION GAP SERPL CALCULATED.3IONS-SCNC: 11 MMOL/L (ref 7–15)
BUN SERPL-MCNC: 16.2 MG/DL (ref 6–20)
CALCIUM SERPL-MCNC: 10 MG/DL (ref 8.6–10)
CHLORIDE SERPL-SCNC: 106 MMOL/L (ref 98–107)
CREAT SERPL-MCNC: 0.89 MG/DL (ref 0.51–0.95)
DEPRECATED HCO3 PLAS-SCNC: 20 MMOL/L (ref 22–29)
EGFRCR SERPLBLD CKD-EPI 2021: 87 ML/MIN/1.73M2
ERYTHROCYTE [DISTWIDTH] IN BLOOD BY AUTOMATED COUNT: 13.7 % (ref 10–15)
GLUCOSE SERPL-MCNC: 106 MG/DL (ref 70–99)
HCT VFR BLD AUTO: 32.9 % (ref 35–47)
HGB BLD-MCNC: 10.6 G/DL (ref 11.7–15.7)
M PNEUMO DNA SPEC QL NAA+PROBE: NOT DETECTED
MAGNESIUM SERPL-MCNC: 1.5 MG/DL (ref 1.7–2.3)
MCH RBC QN AUTO: 30 PG (ref 26.5–33)
MCHC RBC AUTO-ENTMCNC: 32.2 G/DL (ref 31.5–36.5)
MCV RBC AUTO: 93 FL (ref 78–100)
PHOSPHATE SERPL-MCNC: 2.2 MG/DL (ref 2.5–4.5)
PLATELET # BLD AUTO: 208 10E3/UL (ref 150–450)
POTASSIUM SERPL-SCNC: 4.1 MMOL/L (ref 3.4–5.3)
RBC # BLD AUTO: 3.53 10E6/UL (ref 3.8–5.2)
SODIUM SERPL-SCNC: 137 MMOL/L (ref 135–145)
TACROLIMUS BLD-MCNC: 10.7 UG/L (ref 5–15)
TME LAST DOSE: NORMAL H
TME LAST DOSE: NORMAL H
WBC # BLD AUTO: 1.8 10E3/UL (ref 4–11)

## 2024-06-08 PROCEDURE — 250N000009 HC RX 250: Performed by: SURGERY

## 2024-06-08 PROCEDURE — 83735 ASSAY OF MAGNESIUM: CPT | Performed by: PHYSICIAN ASSISTANT

## 2024-06-08 PROCEDURE — 250N000012 HC RX MED GY IP 250 OP 636 PS 637

## 2024-06-08 PROCEDURE — 250N000013 HC RX MED GY IP 250 OP 250 PS 637

## 2024-06-08 PROCEDURE — 120N000011 HC R&B TRANSPLANT UMMC

## 2024-06-08 PROCEDURE — C9113 INJ PANTOPRAZOLE SODIUM, VIA: HCPCS | Mod: JZ | Performed by: PHYSICIAN ASSISTANT

## 2024-06-08 PROCEDURE — 250N000013 HC RX MED GY IP 250 OP 250 PS 637: Performed by: PHYSICIAN ASSISTANT

## 2024-06-08 PROCEDURE — 250N000011 HC RX IP 250 OP 636: Mod: JZ | Performed by: SURGERY

## 2024-06-08 PROCEDURE — 85027 COMPLETE CBC AUTOMATED: CPT | Performed by: PHYSICIAN ASSISTANT

## 2024-06-08 PROCEDURE — 250N000009 HC RX 250

## 2024-06-08 PROCEDURE — 84100 ASSAY OF PHOSPHORUS: CPT | Performed by: PHYSICIAN ASSISTANT

## 2024-06-08 PROCEDURE — 258N000003 HC RX IP 258 OP 636: Performed by: PHYSICIAN ASSISTANT

## 2024-06-08 PROCEDURE — 80197 ASSAY OF TACROLIMUS: CPT | Performed by: PHYSICIAN ASSISTANT

## 2024-06-08 PROCEDURE — 99233 SBSQ HOSP IP/OBS HIGH 50: CPT | Mod: FS

## 2024-06-08 PROCEDURE — 80048 BASIC METABOLIC PNL TOTAL CA: CPT | Performed by: PHYSICIAN ASSISTANT

## 2024-06-08 PROCEDURE — 36415 COLL VENOUS BLD VENIPUNCTURE: CPT | Performed by: PHYSICIAN ASSISTANT

## 2024-06-08 PROCEDURE — 250N000011 HC RX IP 250 OP 636: Performed by: PHYSICIAN ASSISTANT

## 2024-06-08 PROCEDURE — 258N000003 HC RX IP 258 OP 636: Performed by: SURGERY

## 2024-06-08 RX ORDER — MAGNESIUM SULFATE HEPTAHYDRATE 40 MG/ML
4 INJECTION, SOLUTION INTRAVENOUS ONCE
Status: COMPLETED | OUTPATIENT
Start: 2024-06-08 | End: 2024-06-08

## 2024-06-08 RX ORDER — TACROLIMUS 1 MG/1
1 CAPSULE ORAL
Status: DISCONTINUED | OUTPATIENT
Start: 2024-06-08 | End: 2024-06-09 | Stop reason: HOSPADM

## 2024-06-08 RX ADMIN — TACROLIMUS 1 MG: 1 CAPSULE ORAL at 18:30

## 2024-06-08 RX ADMIN — LIDOCAINE HYDROCHLORIDE: 20 JELLY TOPICAL at 19:57

## 2024-06-08 RX ADMIN — MAGNESIUM OXIDE TAB 400 MG (241.3 MG ELEMENTAL MG) 800 MG: 400 (241.3 MG) TAB at 08:26

## 2024-06-08 RX ADMIN — MAGNESIUM OXIDE TAB 400 MG (241.3 MG ELEMENTAL MG) 800 MG: 400 (241.3 MG) TAB at 18:30

## 2024-06-08 RX ADMIN — MAGNESIUM SULFATE IN WATER 4 G: 40 INJECTION, SOLUTION INTRAVENOUS at 19:55

## 2024-06-08 RX ADMIN — NYSTATIN 500000 UNITS: 100000 SUSPENSION ORAL at 11:54

## 2024-06-08 RX ADMIN — LIDOCAINE HYDROCHLORIDE: 20 JELLY TOPICAL at 08:38

## 2024-06-08 RX ADMIN — MYCOPHENOLIC ACID 360 MG: 360 TABLET, DELAYED RELEASE ORAL at 19:53

## 2024-06-08 RX ADMIN — ATORVASTATIN CALCIUM 10 MG: 10 TABLET, FILM COATED ORAL at 08:26

## 2024-06-08 RX ADMIN — POTASSIUM PHOSPHATE, MONOBASIC AND POTASSIUM PHOSPHATE, DIBASIC 9 MMOL: 224; 236 INJECTION, SOLUTION, CONCENTRATE INTRAVENOUS at 22:11

## 2024-06-08 RX ADMIN — NYSTATIN 500000 UNITS: 100000 SUSPENSION ORAL at 08:25

## 2024-06-08 RX ADMIN — ASPIRIN 325 MG ORAL TABLET 325 MG: 325 PILL ORAL at 08:27

## 2024-06-08 RX ADMIN — PANTOPRAZOLE SODIUM 40 MG: 40 INJECTION, POWDER, FOR SOLUTION INTRAVENOUS at 08:25

## 2024-06-08 RX ADMIN — SODIUM CHLORIDE, POTASSIUM CHLORIDE, SODIUM LACTATE AND CALCIUM CHLORIDE: 600; 310; 30; 20 INJECTION, SOLUTION INTRAVENOUS at 00:57

## 2024-06-08 RX ADMIN — LIDOCAINE HYDROCHLORIDE: 20 JELLY TOPICAL at 02:22

## 2024-06-08 RX ADMIN — FAMOTIDINE 40 MG: 20 TABLET ORAL at 08:26

## 2024-06-08 RX ADMIN — MYCOPHENOLIC ACID 360 MG: 360 TABLET, DELAYED RELEASE ORAL at 08:26

## 2024-06-08 RX ADMIN — TACROLIMUS 1.5 MG: 1 CAPSULE ORAL at 08:26

## 2024-06-08 RX ADMIN — VALGANCICLOVIR HYDROCHLORIDE 900 MG: 450 TABLET ORAL at 08:26

## 2024-06-08 RX ADMIN — NYSTATIN 500000 UNITS: 100000 SUSPENSION ORAL at 22:11

## 2024-06-08 RX ADMIN — SODIUM CHLORIDE, POTASSIUM CHLORIDE, SODIUM LACTATE AND CALCIUM CHLORIDE: 600; 310; 30; 20 INJECTION, SOLUTION INTRAVENOUS at 14:15

## 2024-06-08 RX ADMIN — PAROXETINE 30 MG: 30 TABLET, FILM COATED ORAL at 19:53

## 2024-06-08 RX ADMIN — LIDOCAINE HYDROCHLORIDE: 20 JELLY TOPICAL at 14:17

## 2024-06-08 RX ADMIN — NYSTATIN 500000 UNITS: 100000 SUSPENSION ORAL at 18:30

## 2024-06-08 ASSESSMENT — ACTIVITIES OF DAILY LIVING (ADL)
ADLS_ACUITY_SCORE: 24

## 2024-06-08 NOTE — PLAN OF CARE
Goal Outcome Evaluation:       /85 (BP Location: Left arm)   Pulse 97   Temp 98.2  F (36.8  C) (Oral)   Resp 16   LMP 04/03/2024 (Approximate)   SpO2 99%     Shift: 3511-9016    VS: VSS  Neuro: Aox4  Cardio: WDL  Respiratory: WDL on RA  GI: BM x1  : Voiding   Skin: oral lesions, vaginal lesion  Diet: Regular   BG: none  LDA:  PIV  Infusions: LR @75ml/hr  Mobility: UAL  Pain/Nausea: pain on labial ulcer  PRN medications: lidocaine gel

## 2024-06-08 NOTE — PROGRESS NOTES
[facility]   Transplant Nephrology Progress Note  Date of Admission:  6/5/2024  Today's Date: 06/08/2024    Recommendations:  - Decrease evening dose of tacrolimus to 1 mg (ordered).  Continue with 1.5 mg every morning.    Assessment & Plan   #  DDKT (pediatric en bloc) : Stable serum creatinine.   - Baseline Creatinine: ~ 1.1-1.3   - Proteinuria: Normal (<0.2 grams)   - Date DSA Last Checked: Mar/2024       Latest DSA: No DSA   - BK Viremia: No   - Kidney Tx Biopsy: No    # Immunosuppression: Tacrolimus immediate release (goal 8-10) and Mycophenolic acid (dose 360 mg every 12 hours)   - Induction with Recent Transplant:  High Intensity Protocol due to severe GERD and concern with steroids    - Continue with intensive monitoring of immunosuppression for efficacy and toxicity.   - 06/08/24  Tacrolimus level: 10.7 (~ 11.6 hour trough)   - Changes: Yes -  Decrease evening dose of tacrolimus to 1 mg (ordered).      # Infection Prophylaxis:   - PJP: Sulfa/TMP (Bactrim) on hold.  Given pentamidine 6/7/24   - CMV: Valganciclovir (Valcyte).  Patient is CMV IgG Ab discordant (D+/R-)   - HSV: Valganciclovir (Valcyte)    # Mouth Sores  # Vaginal Lesions: present to the ED 6/5/24 due to two weeks of oral lesions and two days of vaginal lesions.   HSV-1 and HSV-2 have been negative.   - 05/24/2024  Upper GI endoscopy @ [facility]:  There is a small hiatal hernia and moderate reflux esophagitis. No crohns or ulcers seen.   CMV immunohistochemistry: Negative    - Infectious vs medication-induced vs Crohn's   - Transplant Infectious Disease following.     - Started nystatin.    # Hypertension: Controlled;  Goal BP: < 140/90   - Changes: Not at this time    # Leukopenia: Stable, low WBC.    # Anemia in Chronic Renal Disease: Hgb: Stable, low      JUNG: No   - Iron studies: Replete    # Mineral Bone Disorder:    - Secondary renal hyperparathyroidism; PTH level: Minimally  elevated ( pg/ml)        On treatment: None  - Vitamin D; level: Low          On supplement: No  - Calcium; level: Normal          On supplement: No  - Phosphorus; level: Low (trend for now)        On supplement: No    # Hypercalcemia: possibly secondary to decreased PO intake.    # Electrolytes:   - Potassium; level: Normal          On supplement: No  - Magnesium; level: Low           On supplement: Yes, magnesium oxide 800 mg PO BID   - Bicarbonate; level: Low  (trend for now)      On supplement: No    # Leukopenia: Stable, low    # Crohns: followed by [facility]. Does occasionally alternate between diarrhea and constipation.  On ustekinumab PTA.              -Will plan to hold for at least 6 weeks post txp. Ok to restart in 2 weeks as long as WBC is stable     # Cardiac/Vascular Disease Risk Factors: Dilated ascending aorta (3.8 cm)  on 2024 ECHO (stable from 2020).     # GERD: Start PPI.  On famotidine.    # Transplant History:  Etiology of Kidney Failure: Interstitial nephritis  Tx: DDKT (pediatric en-bloc)  Transplant: 4/17/2024 (Kidney)  Donor Type: Donation after Brain Death Donor Class: Standard Criteria Donor  Crossmatch at time of Tx: negative  DSA at time of Tx: No  Significant changes in immunosuppression: None  CMV IgG Ab High Risk Discordance (D+/R-): Yes  EBV IgG Ab High Risk Discordance (D+/R-): No  Significant transplant-related complications: None      Recommendations were communicated to the primary team verbally.    Discussed with .     LAURITA Hurt CNP   Pager: [phone]    Interval History   Ms. Zamora's creatinine is 0.89 (06/08 0557); Trend down from 0.93 yesterday.  Estimated Creatinine Clearance: 79.6 mL/min (based on SCr of 0.89 mg/dL).   I/O last 3 completed shifts:  In: 507.5 [I.V.:507.5]  Out: -    Other significant labs/tests/vitals: SBP 120s - 130s overnight. Afebrile. ID workup has been negative so far.    No acute events overnight.  No chest pain or shortness of  breath.  No leg swelling.  No nausea or vomiting.  No diarrhea.  Pt reports mouth sores are feeling a little better.  Reports she has been able to drink some ensure and eat soft foods.        Review of Systems   4 point ROS was obtained and negative except as noted in the Interval History.    MEDICATIONS:  Current Facility-Administered Medications   Medication Dose Route Frequency Provider Last Rate Last Admin    aspirin (ASA) tablet 325 mg  325 mg Oral Daily Samia Morgan MD   325 mg at 06/07/24 0754    atorvastatin (LIPITOR) tablet 10 mg  10 mg Oral Daily Samia Morgan MD   10 mg at 06/07/24 0757    famotidine (PEPCID) tablet 40 mg  40 mg Oral Daily Samia Morgan MD   40 mg at 06/07/24 0754    magnesium oxide (MAG-OX) tablet 800 mg  800 mg Oral BID Judy Gallardo PA-C   800 mg at 06/07/24 2022    mycophenolic acid (GENERIC EQUIVALENT) EC tablet 360 mg  360 mg Oral BID Samia Morgan MD   360 mg at 06/07/24 2021    nystatin (MYCOSTATIN) suspension 500,000 Units  500,000 Units Swish & Swallow 4x Daily Judy Gallardo PA-C   500,000 Units at 06/07/24 2021    pantoprazole (PROTONIX) IV push injection 40 mg  40 mg Intravenous Daily with breakfast Judy Gallardo PA-C        PARoxetine (PAXIL) tablet 30 mg  30 mg Oral QPM Samia Morgan MD   30 mg at 06/07/24 2022    [Held by provider] sulfamethoxazole-trimethoprim (BACTRIM) 400-80 MG per tablet 1 tablet  1 tablet Oral Once per day on Tuesday Thursday Saturday Samia Morgan MD   1 tablet at 06/06/24 0914    tacrolimus (GENERIC EQUIVALENT) capsule 1.5 mg  1.5 mg Oral BID IS Samia Morgan MD   1.5 mg at 06/07/24 1820    valGANciclovir (VALCYTE) tablet 900 mg  900 mg Oral Daily Judy Gallardo PA-C   900 mg at 06/07/24 0755     Current Facility-Administered Medications   Medication Dose Route Frequency Provider Last Rate Last Admin    lactated ringers infusion   Intravenous Continuous Judy Gallardo PA-C 75  mL/hr at 24 0057 New Bag at 24 0057       Physical Exam   Temp  Av.5  F (36.9  C)  Min: 98  F (36.7  C)  Max: 99.1  F (37.3  C)      Pulse  Av.3  Min: 87  Max: 127 Resp  Av.6  Min: 16  Max: 20  SpO2  Av.9 %  Min: 96 %  Max: 100 %     /85 (BP Location: Left arm)   Pulse 97   Temp 98.2  F (36.8  C) (Oral)   Resp 16   LMP 2024 (Approximate)   SpO2 99%     Admit       GENERAL APPEARANCE: alert and no distress  HENT: oral lesions noted  RESP: lungs clear to auscultation - no rales, rhonchi or wheezes  CV: regular rhythm, normal rate, no rub, no murmur  EDEMA: no LE edema bilaterally  ABDOMEN: soft, nondistended, nontender, bowel sounds normal  MS: extremities normal - no gross deformities noted, no evidence of inflammation in joints, no muscle tenderness  SKIN: no rash  DIALYSIS ACCESS:  RUE AV fistula + thrill    Data   All labs reviewed by me.  CMP  Recent Labs   Lab 24  0557 24  0604 24  0707 24  1712    137 135 137   POTASSIUM 4.1 4.3 4.2 4.1   CHLORIDE 106 106 104 106   CO2 20* 20* 19* 22   ANIONGAP 11 11 12 9   * 97 94 124*   BUN 16.2 17.1 18.1 22.7*   CR 0.89 0.93 0.93 1.06*   GFRESTIMATED 87 82 82 70   ROBLES 10.0 9.9 10.3* 10.0   MAG 1.5* 1.5* 1.4*  --    PHOS 2.2* 2.8 1.9*  --    PROTTOTAL  --   --  6.9 6.5   ALBUMIN  --   --  3.8 3.8   BILITOTAL  --   --  0.7 0.4   ALKPHOS  --   --  83 82   AST  --   --  16 19   ALT  --   --  12 18     CBC  Recent Labs   Lab 24  0557 24  0604 24  0707 24  1714   HGB 10.6* 10.4* 11.0* 10.7*   WBC 1.8* 2.1* 1.9* 1.9*   RBC 3.53* 3.49* 3.55* 3.47*   HCT 32.9* 32.2* 33.1* 32.9*   MCV 93 92 93 95   MCH 30.0 29.8 31.0 30.8   MCHC 32.2 32.3 33.2 32.5   RDW 13.7 13.8 13.8 14.0    205 197 202     INRNo lab results found in last 7 days.  ABGNo lab results found in last 7 days.   Urine Studies  Recent Labs   Lab Test 24  1113 24  1600 24  1113 22  1153    COLOR Yellow Light Yellow Straw Yellow   APPEARANCE Clear Clear Clear Clear   URINEGLC Negative 100* 70* 100*   URINEBILI Negative Negative Negative Negative   URINEKETONE Negative Negative Negative Negative   SG 1.028 1.014 1.004 1.010   UBLD Small* Negative Trace* Trace*   URINEPH 5.5 5.5 8.5* 8.5*   PROTEIN 10* Negative 100* 100*   NITRITE Negative Negative Negative Negative   LEUKEST Moderate* Trace* Negative Negative   RBCU 2 2 2 <1   WBCU 6* 4 <1 1     No lab results found.  PTH  Recent Labs   Lab Test 05/23/24  0949 04/22/24  0821   PTHI 131* 138*     Iron Studies  Recent Labs   Lab Test 05/23/24  0949 04/22/24  0821   IRON 77 53   * 147*   IRONSAT 38 36   PRICILA 2,181* 2,181*       IMAGING:  All imaging studies reviewed by me.

## 2024-06-08 NOTE — PROGRESS NOTES
Transplant Surgery  Inpatient Daily Progress Note  2024    Assessment & Plan: Ira Zamora is an 34 year old female with history of ESRD on dialysis d/t interstitial nephritis, Crohn's, HTN, GERD, and dilated ascending aorta. S/p  donor en bloc kidney transplant with ureteral stent x2 on 24. Now admitted with painful oral and vaginal lesions and inability to tolerate oral intake.      Kidney transplant, en bloc: Cr 0.89 today      Immunosuppressed due to medications:  -cPRA 9, received full Thymo due to en bloc kidney   -Tacrolimus 1.5 mg BID, goal level 8-10.   -Myfortic 360 mg BID, recently decreased from 540 mg BID outpatient due to oral lesions     Hematology: On  mg daily.   Anemia of chronic disease, ABL due to hematuria: Hgb 10-11  Leukopenia: WBC 2.1 today. Myfortic recently decreased outpatient. Will hold Bactrim. Consider holding valcyte if no improvement.      Cardiorespiratory:   HTN: -140.   H/o prolonged QTc: QTc 464 on      GI/Nutrition:   Severe malnutrition in the context of acute illness/disease: Regular diet. Calorie counts. Ordered supplements. Per nutrition services, consider supplemental nutrition support if pt unable to consume at least 1200 kcals or 50g protein daily.  GERD: On famotidine. NO PPI due to history of interstitial nephritis in the past, will trial daily PPI at this time.     Endocrine: Euglycemic independent of insulin.      Fluid/Electrolytes:  Hypomagnesemia: Continue Mag ox  Hypophosphatemia: Neutra phos supplement     : Voiding.   Oral/vaginal lesions: Oral lesions x2 weeks, vaginal lesion x 3 days (See ED note for pictures). -Myfortic decreased outpatient recently.   -Gyn examined outpatient recently and ordered Valtrex outpatient that patient didn't start. Received 1 dose on admission prior to HSV swabs coming back negative.  HSV serum negative.   -ED performed pelvic exam on admission with negative wet mount. Chlamydia, gonorrhea,  RPR and HSV negative.   -Only possible offending medication Ustekinumab which was last given on 3/1.   -ID consulted and recommended:   - Treponema antibody, non-reactive  - HIV, pending  - coxsackie virus serologies, pending  - M.pneumoniae PCR, pending  - Histoplasma urine antigen, pending  - CMV swab of lesion, not detected  - Karius test, pending  -Oral Nystatin for possible fungal secondary infection   -Consider biopsy of ulcer if work up is negative  -Lidocaine gel, aquaphor barrier cream    Infectious disease: CMV not detected. Adenovirus and EBV pending. Work up for lesions above pending.      Prophylaxis: DVT mechanical, Bactrim-on hold due to leukopenia, G6PD pending. Valcyte x6 months due to CMV R-D+.   Disposition: 7A     Transplant coordinator Radha Jorge  [phone]  Donor type: DBD, en bloc   DSA at time of transplant:  NO  Ureteral stent: TWO STENTS  CMV:  Donor + / Recipient -  EBV:  Donor + / Recipient +  Thymoglobulin:  356mg , 6mg/kg    ERICA/Fellow/Resident Provider: owen     Faculty: Hernando Brooks MD   ________________________________________________________________  Transplant History: Admitted 6/5/2024 for painful oral and vaginal ulcers.  4/17/2024 (Kidney), Postoperative day: 52     Interval History:   No acute events overnight. Lesions still painful. Tolerated clear supplements and minimal food.     ROS:   A 10-point review of systems was negative except as noted above.    Meds:  Current Facility-Administered Medications   Medication Dose Route Frequency Provider Last Rate Last Admin    aspirin (ASA) tablet 325 mg  325 mg Oral Daily Samia Morgan MD   325 mg at 06/08/24 0827    atorvastatin (LIPITOR) tablet 10 mg  10 mg Oral Daily Samia Morgan MD   10 mg at 06/08/24 0826    famotidine (PEPCID) tablet 40 mg  40 mg Oral Daily Samia Morgan MD   40 mg at 06/08/24 0826    magnesium oxide (MAG-OX) tablet 800 mg  800 mg Oral BID Judy Gallardo PA-C   800 mg at  06/08/24 0826    mycophenolic acid (GENERIC EQUIVALENT) EC tablet 360 mg  360 mg Oral BID Samia Morgan MD   360 mg at 06/08/24 0826    nystatin (MYCOSTATIN) suspension 500,000 Units  500,000 Units Swish & Swallow 4x Daily Judy Gallardo PA-C   500,000 Units at 06/08/24 0825    pantoprazole (PROTONIX) IV push injection 40 mg  40 mg Intravenous Daily with breakfast Judy Gallardo PA-C   40 mg at 06/08/24 0825    PARoxetine (PAXIL) tablet 30 mg  30 mg Oral QPM Samia Morgan MD   30 mg at 06/07/24 2022    [Held by provider] sulfamethoxazole-trimethoprim (BACTRIM) 400-80 MG per tablet 1 tablet  1 tablet Oral Once per day on Tuesday Thursday Saturday Samia Morgan MD   1 tablet at 06/06/24 0914    tacrolimus (GENERIC EQUIVALENT) capsule 1.5 mg  1.5 mg Oral BID IS Samia Morgan MD   1.5 mg at 06/08/24 0826    valGANciclovir (VALCYTE) tablet 900 mg  900 mg Oral Daily Judy Gallardo PA-C   900 mg at 06/08/24 0826       Physical Exam:     Current vitals:   BP (!) 124/91 (BP Location: Left arm)   Pulse 109   Temp 98.6  F (37  C) (Oral)   Resp 16   LMP 04/03/2024 (Approximate)   SpO2 97%     Vital sign ranges:    Temp:  [98  F (36.7  C)-98.6  F (37  C)] 98.6  F (37  C)  Pulse:  [] 109  Resp:  [16-18] 16  BP: (120-137)/(85-93) 124/91  SpO2:  [96 %-100 %] 97 %  Patient Vitals for the past 24 hrs:   BP Temp Temp src Pulse Resp SpO2   06/08/24 0934 (!) 124/91 98.6  F (37  C) Oral 109 16 97 %   06/08/24 0630 120/85 98.2  F (36.8  C) Oral 97 16 99 %   06/08/24 0210 (!) 128/90 98.5  F (36.9  C) Oral 104 16 96 %   06/07/24 2156 (!) 133/93 98  F (36.7  C) Oral 100 16 98 %   06/07/24 1726 (!) 129/90 98.3  F (36.8  C) Oral 105 18 97 %   06/07/24 1638 137/89 98.6  F (37  C) Oral 108 18 100 %     General Appearance: comfortable.   Skin: did not inspect oral or genital lesions today  Heart: well perfused  Lungs: NLB on room air   Abdomen: abdomen flat, non-tender. No signs of infection  "over wound.  : de leon is not present.    Extremities: edema: absent.   Neurologic: awake, alert, and oriented. Tremor absent.    Data:   CMP  Recent Labs   Lab 06/08/24  0557 06/07/24  0604 06/06/24  0707 06/05/24  1712 06/05/24  1712    137 135  --  137   POTASSIUM 4.1 4.3 4.2  --  4.1   CHLORIDE 106 106 104  --  106   CO2 20* 20* 19*  --  22   * 97 94  --  124*   BUN 16.2 17.1 18.1  --  22.7*   CR 0.89 0.93 0.93  --  1.06*   GFRESTIMATED 87 82 82  --  70   ROBLES 10.0 9.9 10.3*  --  10.0   MAG 1.5* 1.5* 1.4*   < >  --    PHOS 2.2* 2.8 1.9*   < >  --    ALBUMIN  --   --  3.8  --  3.8   BILITOTAL  --   --  0.7  --  0.4   ALKPHOS  --   --  83  --  82   AST  --   --  16  --  19   ALT  --   --  12  --  18    < > = values in this interval not displayed.     CBC  Recent Labs   Lab 06/08/24  0557 06/07/24  0604   HGB 10.6* 10.4*   WBC 1.8* 2.1*    205     COAGSNo lab results found in last 7 days.    Invalid input(s): \"XA\"   Urinalysis  Recent Labs   Lab Test 06/06/24  1113 05/03/24  1600   COLOR Yellow Light Yellow   APPEARANCE Clear Clear   URINEGLC Negative 100*   URINEBILI Negative Negative   URINEKETONE Negative Negative   SG 1.028 1.014   UBLD Small* Negative   URINEPH 5.5 5.5   PROTEIN 10* Negative   NITRITE Negative Negative   LEUKEST Moderate* Trace*   RBCU 2 2   WBCU 6* 4     Virology:  Hepatitis C Antibody   Date Value Ref Range Status   04/17/2024 Nonreactive Nonreactive Final     Comment:     A nonreactive screening test result does not exclude the possibility of exposure to or infection with HCV. Nonreactive screening test results in individuals with prior exposure to HCV may be due to antibody levels below the limit of detection of this assay or lack of reactivity to the HCV antigens used in this assay. Patients with recent HCV infections (<3 months from time of exposure) may have false-negative HCV antibody results due to the time needed for seroconversion (average of 8 to 9 weeks).     BK " Virus DNA IU/mL   Date Value Ref Range Status   05/23/2024 Not Detected Not Detected IU/mL Final

## 2024-06-08 NOTE — PROGRESS NOTES
Status: Full Code   VS: Tachycardiac and Hypertensive  Neuros: A&OX4  Cardiac: Hypertensive  Respiratory: WNL pt RA  GI/: voiding without difficulty  Diet/Nausea: pt denies nausea, reg diet, calorie count   Skin:  Healed R Hockeystick incision, Oral lesions, Vaginal lesion   LDA: L piv LR infusing @ 75 ml, R AV Fistula  Pain: control with scheduled mouth wash   Activity: ind in room  Plan: continues plan of care

## 2024-06-08 NOTE — PLAN OF CARE
Goal Outcome Evaluation:  Care from 3 pm to 7 pm  A&Ox4. AVSS. Pt with  painful oral and vaginal lesions, PRN pain meds per pt request.   Mag 1.5 and phos 2.2 from this morning; crossover notified; replacements are ordered and it needs to be replaced.Voids spontaneously. BMm x1 this morning. LR is infusing at 75 ml/hr. Pt with unable to take oral intake due to pain; on calorie count. Up ad roya to bathroom. Continue with POC

## 2024-06-08 NOTE — PLAN OF CARE
/87 (BP Location: Left arm)   Pulse 107   Temp 99.2  F (37.3  C) (Oral)   Resp 16   LMP 04/03/2024 (Approximate)   SpO2 99%     Cares from: 0700 - 1530    VS & Pain: VSS except for tachycardic (within range). On RA. C/o vaginal pain managed with PRN lidocaine gel x2-- effective   Neuro: AxO x4  Respiratory: denies shortness of breath   Cardiac: no acute distress noted   Peripheral neurovascular: wdl   GI/: + bowel sounds. No BM this shift. Voids spontaneously    Nutrition: regular diet. Good appetite for breakfast. Poor appetite due to mouth sores. Denies nausea & vomiting   Skin: vaginal/labia ulcer- swelling & mouth ulcer    Musculoskeletal: generalized weakness   Lines: L. PIV- infusing LR @75 mL/hr + R. Fistula   Activity: up ad roya     Plan: continue plan of care    Goal Outcome Evaluation:  Plan of Care Reviewed With: patient, spouse  Overall Patient Progress: improving

## 2024-06-08 NOTE — PROGRESS NOTES
Calorie Count  Intake recorded for: 6/7  Total Kcals: 587 Total Protein: 23g  Kcals from Hospital Food: 587   Protein: 23g  Kcals from Outside Food (average):0 Protein: 0g  # Meals Ordered from Kitchen: 1 meal ordered  # Meals Recorded: 1 meal recorded - 100% 2 saltine packs, 8 oz whole milk; 50% mac and cheese, cream of potato soup  # Supplements Recorded: 100% 1 Ensure Clear

## 2024-06-09 VITALS
TEMPERATURE: 98.4 F | WEIGHT: 119.71 LBS | OXYGEN SATURATION: 100 % | BODY MASS INDEX: 21.53 KG/M2 | SYSTOLIC BLOOD PRESSURE: 134 MMHG | RESPIRATION RATE: 16 BRPM | HEART RATE: 98 BPM | DIASTOLIC BLOOD PRESSURE: 92 MMHG

## 2024-06-09 LAB
ANION GAP SERPL CALCULATED.3IONS-SCNC: 9 MMOL/L (ref 7–15)
ATRIAL RATE - MUSE: 116 BPM
BASOPHILS # BLD AUTO: 0 10E3/UL (ref 0–0.2)
BASOPHILS NFR BLD AUTO: 1 %
BUN SERPL-MCNC: 13.7 MG/DL (ref 6–20)
CALCIUM SERPL-MCNC: 9.6 MG/DL (ref 8.6–10)
CHLORIDE SERPL-SCNC: 107 MMOL/L (ref 98–107)
CREAT SERPL-MCNC: 0.89 MG/DL (ref 0.51–0.95)
DEPRECATED HCO3 PLAS-SCNC: 23 MMOL/L (ref 22–29)
DIASTOLIC BLOOD PRESSURE - MUSE: NORMAL MMHG
EGFRCR SERPLBLD CKD-EPI 2021: 87 ML/MIN/1.73M2
EOSINOPHIL # BLD AUTO: 0 10E3/UL (ref 0–0.7)
EOSINOPHIL NFR BLD AUTO: 3 %
ERYTHROCYTE [DISTWIDTH] IN BLOOD BY AUTOMATED COUNT: 13.6 % (ref 10–15)
G6PD RBC-CCNT: 13.6 U/G HB
GLUCOSE SERPL-MCNC: 112 MG/DL (ref 70–99)
HCT VFR BLD AUTO: 31.5 % (ref 35–47)
HGB BLD-MCNC: 10.1 G/DL (ref 11.7–15.7)
IMM GRANULOCYTES # BLD: 0.1 10E3/UL
IMM GRANULOCYTES NFR BLD: 3 %
INTERPRETATION ECG - MUSE: NORMAL
LYMPHOCYTES # BLD AUTO: 0.2 10E3/UL (ref 0.8–5.3)
LYMPHOCYTES NFR BLD AUTO: 14 %
MAGNESIUM SERPL-MCNC: 2.6 MG/DL (ref 1.7–2.3)
MAGNESIUM SERPL-MCNC: 3 MG/DL (ref 1.7–2.3)
MCH RBC QN AUTO: 30.2 PG (ref 26.5–33)
MCHC RBC AUTO-ENTMCNC: 32.1 G/DL (ref 31.5–36.5)
MCV RBC AUTO: 94 FL (ref 78–100)
MISCELLANEOUS TEST 1 (ARUP): NORMAL
MONOCYTES # BLD AUTO: 0.3 10E3/UL (ref 0–1.3)
MONOCYTES NFR BLD AUTO: 17 %
NEUTROPHILS # BLD AUTO: 0.9 10E3/UL (ref 1.6–8.3)
NEUTROPHILS NFR BLD AUTO: 62 %
NRBC # BLD AUTO: 0 10E3/UL
NRBC BLD AUTO-RTO: 0 /100
P AXIS - MUSE: 55 DEGREES
PHOSPHATE SERPL-MCNC: 2.9 MG/DL (ref 2.5–4.5)
PLATELET # BLD AUTO: 183 10E3/UL (ref 150–450)
POTASSIUM SERPL-SCNC: 4.3 MMOL/L (ref 3.4–5.3)
PR INTERVAL - MUSE: 140 MS
QRS DURATION - MUSE: 76 MS
QT - MUSE: 334 MS
QTC - MUSE: 464 MS
R AXIS - MUSE: -7 DEGREES
RBC # BLD AUTO: 3.34 10E6/UL (ref 3.8–5.2)
SODIUM SERPL-SCNC: 139 MMOL/L (ref 135–145)
SYSTOLIC BLOOD PRESSURE - MUSE: NORMAL MMHG
T AXIS - MUSE: 30 DEGREES
VENTRICULAR RATE- MUSE: 116 BPM
WBC # BLD AUTO: 1.4 10E3/UL (ref 4–11)
WBC # BLD AUTO: 1.4 10E3/UL (ref 4–11)

## 2024-06-09 PROCEDURE — 84100 ASSAY OF PHOSPHORUS: CPT | Performed by: SURGERY

## 2024-06-09 PROCEDURE — 83735 ASSAY OF MAGNESIUM: CPT | Performed by: SURGERY

## 2024-06-09 PROCEDURE — 83735 ASSAY OF MAGNESIUM: CPT | Performed by: PHYSICIAN ASSISTANT

## 2024-06-09 PROCEDURE — 250N000011 HC RX IP 250 OP 636: Performed by: PHYSICIAN ASSISTANT

## 2024-06-09 PROCEDURE — 36415 COLL VENOUS BLD VENIPUNCTURE: CPT | Performed by: SURGERY

## 2024-06-09 PROCEDURE — 250N000009 HC RX 250

## 2024-06-09 PROCEDURE — 258N000003 HC RX IP 258 OP 636: Performed by: PHYSICIAN ASSISTANT

## 2024-06-09 PROCEDURE — 250N000012 HC RX MED GY IP 250 OP 636 PS 637

## 2024-06-09 PROCEDURE — 250N000013 HC RX MED GY IP 250 OP 250 PS 637

## 2024-06-09 PROCEDURE — 250N000013 HC RX MED GY IP 250 OP 250 PS 637: Performed by: PHYSICIAN ASSISTANT

## 2024-06-09 PROCEDURE — 85025 COMPLETE CBC W/AUTO DIFF WBC: CPT | Performed by: PHYSICIAN ASSISTANT

## 2024-06-09 PROCEDURE — 36415 COLL VENOUS BLD VENIPUNCTURE: CPT | Performed by: PHYSICIAN ASSISTANT

## 2024-06-09 PROCEDURE — 99233 SBSQ HOSP IP/OBS HIGH 50: CPT | Mod: FS

## 2024-06-09 PROCEDURE — C9113 INJ PANTOPRAZOLE SODIUM, VIA: HCPCS | Mod: JZ | Performed by: PHYSICIAN ASSISTANT

## 2024-06-09 PROCEDURE — 82374 ASSAY BLOOD CARBON DIOXIDE: CPT | Performed by: PHYSICIAN ASSISTANT

## 2024-06-09 RX ORDER — NYSTATIN 100000/ML
500000 SUSPENSION, ORAL (FINAL DOSE FORM) ORAL 4 TIMES DAILY
Qty: 280 ML | Refills: 0 | Status: SHIPPED | OUTPATIENT
Start: 2024-06-09 | End: 2024-06-17

## 2024-06-09 RX ORDER — FAMOTIDINE 20 MG/1
40 TABLET, FILM COATED ORAL 2 TIMES DAILY
Status: DISCONTINUED | OUTPATIENT
Start: 2024-06-09 | End: 2024-06-09 | Stop reason: HOSPADM

## 2024-06-09 RX ORDER — TACROLIMUS 0.5 MG/1
0.5 CAPSULE ORAL EVERY EVENING
Status: ACTIVE | COMMUNITY
Start: 2024-06-09 | End: 2024-06-18

## 2024-06-09 RX ORDER — PANTOPRAZOLE SODIUM 40 MG/1
40 TABLET, DELAYED RELEASE ORAL DAILY
Status: DISCONTINUED | OUTPATIENT
Start: 2024-06-10 | End: 2024-06-09 | Stop reason: HOSPADM

## 2024-06-09 RX ORDER — MAGNESIUM OXIDE 400 MG/1
800 TABLET ORAL DAILY
Status: DISCONTINUED | OUTPATIENT
Start: 2024-06-10 | End: 2024-06-09 | Stop reason: HOSPADM

## 2024-06-09 RX ORDER — MAGNESIUM OXIDE 400 MG/1
800 TABLET ORAL DAILY
Qty: 60 TABLET | Refills: 0 | Status: SHIPPED | OUTPATIENT
Start: 2024-06-09

## 2024-06-09 RX ORDER — FAMOTIDINE 20 MG/1
40 TABLET, FILM COATED ORAL 2 TIMES DAILY
Qty: 60 TABLET | Refills: 1 | Status: SHIPPED | OUTPATIENT
Start: 2024-06-09 | End: 2024-07-24

## 2024-06-09 RX ORDER — TACROLIMUS 1 MG/1
2 CAPSULE ORAL 2 TIMES DAILY
Status: ACTIVE | COMMUNITY
Start: 2024-06-09 | End: 2024-06-18

## 2024-06-09 RX ORDER — MINERAL OIL/HYDROPHIL PETROLAT
OINTMENT (GRAM) TOPICAL
Qty: 50 G | Refills: 0 | Status: SHIPPED | OUTPATIENT
Start: 2024-06-09 | End: 2024-08-14

## 2024-06-09 RX ADMIN — NYSTATIN 500000 UNITS: 100000 SUSPENSION ORAL at 11:35

## 2024-06-09 RX ADMIN — ATORVASTATIN CALCIUM 10 MG: 10 TABLET, FILM COATED ORAL at 07:52

## 2024-06-09 RX ADMIN — VALGANCICLOVIR HYDROCHLORIDE 900 MG: 450 TABLET ORAL at 07:52

## 2024-06-09 RX ADMIN — ASPIRIN 325 MG ORAL TABLET 325 MG: 325 PILL ORAL at 07:52

## 2024-06-09 RX ADMIN — MAGNESIUM OXIDE TAB 400 MG (241.3 MG ELEMENTAL MG) 800 MG: 400 (241.3 MG) TAB at 11:35

## 2024-06-09 RX ADMIN — ACETAMINOPHEN 650 MG: 325 SOLUTION ORAL at 02:27

## 2024-06-09 RX ADMIN — MYCOPHENOLIC ACID 360 MG: 360 TABLET, DELAYED RELEASE ORAL at 07:52

## 2024-06-09 RX ADMIN — TACROLIMUS 1.5 MG: 1 CAPSULE ORAL at 07:52

## 2024-06-09 RX ADMIN — LIDOCAINE HYDROCHLORIDE: 20 JELLY TOPICAL at 08:33

## 2024-06-09 RX ADMIN — NYSTATIN 500000 UNITS: 100000 SUSPENSION ORAL at 07:52

## 2024-06-09 RX ADMIN — FAMOTIDINE 40 MG: 20 TABLET ORAL at 07:52

## 2024-06-09 RX ADMIN — SODIUM CHLORIDE, POTASSIUM CHLORIDE, SODIUM LACTATE AND CALCIUM CHLORIDE: 600; 310; 30; 20 INJECTION, SOLUTION INTRAVENOUS at 08:02

## 2024-06-09 RX ADMIN — LIDOCAINE HYDROCHLORIDE: 20 JELLY TOPICAL at 12:53

## 2024-06-09 RX ADMIN — PANTOPRAZOLE SODIUM 40 MG: 40 INJECTION, POWDER, FOR SOLUTION INTRAVENOUS at 07:52

## 2024-06-09 ASSESSMENT — ACTIVITIES OF DAILY LIVING (ADL)
ADLS_ACUITY_SCORE: 24

## 2024-06-09 NOTE — PROGRESS NOTES
[facility]   Transplant Nephrology Progress Note  Date of Admission:  6/5/2024  Today's Date: 06/09/2024    Recommendations:  - Labs twice weekly outpatient per protocol.  - Follow-up outpatient with Transplant Infectious Disease.  - Consider GI referral if infectious workup negative.  - Next transplant nephrology appointment with Dr. Ortiz on 6/27.      Assessment & Plan   #  DDKT (pediatric en bloc) : Stable serum creatinine.   - Baseline Creatinine: ~ 1.1-1.3   - Proteinuria: Normal (<0.2 grams)   - Date DSA Last Checked: Mar/2024       Latest DSA: No DSA   - BK Viremia: No   - Kidney Tx Biopsy: No    # Immunosuppression: Tacrolimus immediate release (goal 8-10) and Mycophenolic acid (dose 360 mg every 12 hours)   - Induction with Recent Transplant:  High Intensity Protocol due to severe GERD and concern with steroids    - Continue with intensive monitoring of immunosuppression for efficacy and toxicity.   - Changes: Not at this time    # Infection Prophylaxis:   - PJP: Sulfa/TMP (Bactrim) on hold.  Given pentamidine 6/7/24   - CMV: Valganciclovir (Valcyte).  Patient is CMV IgG Ab discordant (D+/R-)   - HSV: Valganciclovir (Valcyte)    # Mouth Sores  # Vaginal Lesions: present to the ED 6/5/24 due to two weeks of oral lesions and two days of vaginal lesions.   HSV-1 and HSV-2 have been negative.   - 05/24/2024  Upper GI endoscopy @ [facility]:  There is a small hiatal hernia and moderate reflux esophagitis. No crohns or ulcers seen.   CMV immunohistochemistry: Negative    - Infectious vs medication-induced vs Crohn's   - Transplant Infectious Disease following.     - Started nystatin.    # Hypertension: Controlled;  Goal BP: < 140/90   - Changes: Not at this time    # Leukopenia: Stable, low WBC.    # Anemia in Chronic Renal Disease: Hgb: Stable, low      JUNG: No   - Iron studies: Replete    # Mineral Bone Disorder:    - Secondary renal  hyperparathyroidism; PTH level: Minimally elevated ( pg/ml)        On treatment: None  - Vitamin D; level: Low          On supplement: No  - Calcium; level: Normal          On supplement: No  - Phosphorus; level: Normal     On supplement: No      # Electrolytes:   - Potassium; level: Normal          On supplement: No  - Magnesium; level: High           On supplement: Yes, magnesium oxide 800 mg PO BID   - Bicarbonate; level: Normal     On supplement: No    # Leukopenia: Stable, low    # Crohns: followed by [facility]. Does occasionally alternate between diarrhea and constipation.  On ustekinumab PTA.              -Will plan to hold for at least 6 weeks post txp. Ok to restart in 2 weeks as long as WBC is stable     # Cardiac/Vascular Disease Risk Factors: Dilated ascending aorta (3.8 cm)  on 2024 ECHO (stable from 2020).     # GERD: Start PPI.  On famotidine.    # Transplant History:  Etiology of Kidney Failure: Interstitial nephritis  Tx: DDKT (pediatric en-bloc)  Transplant: 4/17/2024 (Kidney)  Donor Type: Donation after Brain Death Donor Class: Standard Criteria Donor  Crossmatch at time of Tx: negative  DSA at time of Tx: No  Significant changes in immunosuppression: None  CMV IgG Ab High Risk Discordance (D+/R-): Yes  EBV IgG Ab High Risk Discordance (D+/R-): No  Significant transplant-related complications: None      Recommendations were communicated to the primary team verbally.    Discussed with .     LAURITA Hurt CNP   Pager: [phone]    Interval History   Pt reports mouth sore pain unchanged.  Ms. Zamora's creatinine is 0.89 (06/09 0603); Stable from 0.89 yesterday.  Estimated Creatinine Clearance: 76.3 mL/min (based on SCr of 0.89 mg/dL).   I/O last 3 completed shifts:  In: 2211.25 [P.O.:300; I.V.:1911.25]  Out: -    Other significant labs/tests/vitals: SBP 110s - 130s overnight. Afebrile.  ANC 0.9.  No acute events overnight.  No chest pain or shortness of breath.  No leg swelling.  No  nausea or vomiting.  No diarrhea.  No fever, sweats or chills.        Review of Systems   4 point ROS was obtained and negative except as noted in the Interval History.    MEDICATIONS:  Current Facility-Administered Medications   Medication Dose Route Frequency Provider Last Rate Last Admin    aspirin (ASA) tablet 325 mg  325 mg Oral Daily Samia Morgan MD   325 mg at 06/08/24 0827    atorvastatin (LIPITOR) tablet 10 mg  10 mg Oral Daily Samia Morgan MD   10 mg at 06/08/24 0826    famotidine (PEPCID) tablet 40 mg  40 mg Oral Daily Samia Morgan MD   40 mg at 06/08/24 0826    magnesium oxide (MAG-OX) tablet 800 mg  800 mg Oral BID Judy Gallardo PA-C   800 mg at 06/08/24 1830    mycophenolic acid (GENERIC EQUIVALENT) EC tablet 360 mg  360 mg Oral BID Samia Morgan MD   360 mg at 06/08/24 1953    nystatin (MYCOSTATIN) suspension 500,000 Units  500,000 Units Swish & Swallow 4x Daily Judy Gallardo PA-C   500,000 Units at 06/08/24 2211    pantoprazole (PROTONIX) IV push injection 40 mg  40 mg Intravenous Daily with breakfast Judy Gallardo PA-C   40 mg at 06/08/24 0825    PARoxetine (PAXIL) tablet 30 mg  30 mg Oral QPM Samia Morgan MD   30 mg at 06/08/24 1953    [Held by provider] sulfamethoxazole-trimethoprim (BACTRIM) 400-80 MG per tablet 1 tablet  1 tablet Oral Once per day on Tuesday Thursday Saturday Samia Morgan MD   1 tablet at 06/06/24 0914    tacrolimus (GENERIC EQUIVALENT) capsule 1 mg  1 mg Oral QPM Jas Gutierrez APRN CNP   1 mg at 06/08/24 1830    tacrolimus (GENERIC EQUIVALENT) capsule 1.5 mg  1.5 mg Oral QAM Jas Gutierrez APRN CNP        valGANciclovir (VALCYTE) tablet 900 mg  900 mg Oral Daily Judy Gallardo PA-C   900 mg at 06/08/24 0826     Current Facility-Administered Medications   Medication Dose Route Frequency Provider Last Rate Last Admin    lactated ringers infusion   Intravenous Continuous Judy Gallardo PA-C 75 mL/hr  at 24 1833 Rate Verify at 24 1833       Physical Exam   Temp  Av.5  F (36.9  C)  Min: 98  F (36.7  C)  Max: 99.1  F (37.3  C)      Pulse  Av.3  Min: 87  Max: 127 Resp  Av.6  Min: 16  Max: 20  SpO2  Av.9 %  Min: 96 %  Max: 100 %     /80 (BP Location: Left arm)   Pulse 102   Temp 98  F (36.7  C) (Oral)   Resp 16   Wt 54.3 kg (119 lb 11.4 oz)   LMP 2024 (Approximate)   SpO2 99%   BMI 21.53 kg/m      Admit       GENERAL APPEARANCE: alert and no distress  HENT: oral lesions noted  RESP: lungs clear to auscultation - no rales, rhonchi or wheezes  CV: regular rhythm, normal rate, no rub, no murmur  EDEMA: no LE edema bilaterally  ABDOMEN: soft, nondistended, nontender, bowel sounds normal  MS: extremities normal - no gross deformities noted, no evidence of inflammation in joints, no muscle tenderness  SKIN: no rash  DIALYSIS ACCESS:  RUE AV fistula + thrill    Data   All labs reviewed by me.  CMP  Recent Labs   Lab 24  0603 24  0008 24  0557 24  0604 24  0707 24  1712 24  1712     --  137 137 135  --  137   POTASSIUM 4.3  --  4.1 4.3 4.2  --  4.1   CHLORIDE 107  --  106 106 104  --  106   CO2 23  --  20* 20* 19*  --  22   ANIONGAP 9  --  11 11 12  --  9   *  --  106* 97 94  --  124*   BUN 13.7  --  16.2 17.1 18.1  --  22.7*   CR 0.89  --  0.89 0.93 0.93  --  1.06*   GFRESTIMATED 87  --  87 82 82  --  70   ROBLES 9.6  --  10.0 9.9 10.3*  --  10.0   MAG 2.6* 3.0* 1.5* 1.5* 1.4*   < >  --    PHOS 2.9  --  2.2* 2.8 1.9*  --   --    PROTTOTAL  --   --   --   --  6.9  --  6.5   ALBUMIN  --   --   --   --  3.8  --  3.8   BILITOTAL  --   --   --   --  0.7  --  0.4   ALKPHOS  --   --   --   --  83  --  82   AST  --   --   --   --  16  --  19   ALT  --   --   --   --  12  --  18    < > = values in this interval not displayed.     CBC  Recent Labs   Lab 24  0603 24  0557 24  0604 24  0707   HGB 10.1* 10.6*  10.4* 11.0*   WBC 1.4*  1.4* 1.8* 2.1* 1.9*   RBC 3.34* 3.53* 3.49* 3.55*   HCT 31.5* 32.9* 32.2* 33.1*   MCV 94 93 92 93   MCH 30.2 30.0 29.8 31.0   MCHC 32.1 32.2 32.3 33.2   RDW 13.6 13.7 13.8 13.8    208 205 197     INRNo lab results found in last 7 days.  ABGNo lab results found in last 7 days.   Urine Studies  Recent Labs   Lab Test 06/06/24  1113 05/03/24  1600 04/17/24  1113 09/29/22  1153   COLOR Yellow Light Yellow Straw Yellow   APPEARANCE Clear Clear Clear Clear   URINEGLC Negative 100* 70* 100*   URINEBILI Negative Negative Negative Negative   URINEKETONE Negative Negative Negative Negative   SG 1.028 1.014 1.004 1.010   UBLD Small* Negative Trace* Trace*   URINEPH 5.5 5.5 8.5* 8.5*   PROTEIN 10* Negative 100* 100*   NITRITE Negative Negative Negative Negative   LEUKEST Moderate* Trace* Negative Negative   RBCU 2 2 2 <1   WBCU 6* 4 <1 1     No lab results found.  PTH  Recent Labs   Lab Test 05/23/24  0949 04/22/24  0821   PTHI 131* 138*     Iron Studies  Recent Labs   Lab Test 05/23/24  0949 04/22/24  0821   IRON 77 53   * 147*   IRONSAT 38 36   PRICILA 2,181* 2,181*       IMAGING:  All imaging studies reviewed by me.

## 2024-06-09 NOTE — PLAN OF CARE
Vitals: stable room air.   Neuro : a x o x 4.   Blood glucose: na  Pain/nausea: applied lidocaine for vaginal ulcer x 1. Oral ulcer managed with benzocaine.   Diet: regular. Calorie counts.   Lines: PIVL  infusing LR @ 75.  : voids well.   GI: x 1.   Drains:na   Skin: mouth and vaginal lesions.   Mobility: up ad roya.   LAB S: RN managed Mag and phos IV replaced. Recheck am labs.

## 2024-06-09 NOTE — DISCHARGE SUMMARY
[facility]    Discharge Summary  Solid Organ Transplant    Date of Admission:  6/5/2024  Date of Discharge:  6/9/2024  Discharging Provider: Skye Judge NP    Discharge Diagnoses   Active Problems:    Immunosuppressed status (H24)    Mouth ulcers    Vaginal ulcer    S/P kidney transplant    Administration of long-term prophylactic antibiotics    Leukopenia, unspecified type     Follow-ups Needed After Discharge   Follow-up Appointments     Adult [facility] Follow-up and recommended labs and tests      [facility] FOLLOW UP:     1. [facility]: Over the next 2 days you will be seen in the   [facility] (ph. [phone], option 3). Your labs will be   drawn at the beginning of your appointment. DO NOT take your medications   prior to having labs drawn. Please bring all your medications with you   from home to take after labs are drawn.      2. Follow up with Transplant Nephrologist,  as scheduled 6/27/24     3.  Follow up with Infectious disease, Dr. Blake 2 weeks from   hospital discharge (6/24/24)    4. Follow up with your primary care provider in ~8 weeks. Patient to   schedule.     Remember to always bring an updated medication list to all appointments.          Call your Transplant Coordinator ([phone]) with questions about   Transplant Center appointment scheduling.     LABS:     CBC, BMP, magnesium, phosphorus, tacrolimus level to be drawn every Monday   and Thursday by home health care nurse if arranged, or at an outpatient   lab.            Appointments on [place] and/or [facility] (with [facility] or [facility]   provider or service). Call [phone] if you haven't heard regarding   these appointments within 7 days of discharge.            Unresulted Labs Ordered in the Past 30 Days of this Admission       Date and Time Order Name Status Description    6/6/2024  2:07 PM Cytomegalovirus Qualitative  PCR Non Bld In process     6/6/2024  2:03 PM Karius Non-Invasive Pathogen Blood Test In process     6/6/2024  2:03 PM Histoplasma Capsulatum Agn Non Blood In process     6/6/2024  2:03 PM Coxsackie B virus antibodies In process         These results will be followed up by transplant team    Discharge Disposition   Discharged to home  Condition at discharge: Good    Hospital Course   Ira Zamora was admitted on 6/5/2024.  The following problems were addressed during her hospitalization:    Kidney transplant, en bloc: Cr stable at 0.89 at discharge.     Immunosuppressed due to medications:  -cPRA 9, inducted with full Thymo due to en bloc kidney   -Tacrolimus 1.5 mg/1mg, goal level 8-10.   -Myfortic 360 mg BID, recently decreased from 540 mg BID outpatient due to oral lesions     Transplant coordinator Radha Jorge  [phone]  Donor type: DBD, en bloc   DSA at time of transplant:  NO  Ureteral stent: TWO STENTS-removed  CMV:  Donor + / Recipient -  EBV:  Donor + / Recipient +  Thymoglobulin:  356mg , 6mg/kg       Hematology: On  mg daily.   Anemia of chronic disease, ABL due to hematuria: Hgb 10-11  Leukopenia: WBC 1.4 today, with ANC 0.9. Myfortic recently decreased outpatient. Held Bactrim. Did not hold valcyte due to CMV discordance,  Will require close follow up      Cardiorespiratory:   HTN: -130.   H/o prolonged QTc: QTc 464 on 6/7     GI/Nutrition:   Severe malnutrition in the context of acute illness/disease: due to painful oral lesions(see below). Calorie counts obtained. Dietician consulted. She was able to consume >1000 kcals/day by discharge. Continue regular diet +nutritional supplements at discharge.    GERD: On famotidine. NO PPI due to history of interstitial nephritis in the past.    Endocrine: Euglycemic independent of insulin.      Fluid/Electrolytes:  Hypomagnesemia: Continue Mag ox 800mg daily at discharge  Hypophosphatemia: phos supplemented PRN     /GYN: Voiding without  retention    Oral/vaginal lesions: Oral lesions x2 weeks, vaginal lesion x 3 days (See ED note for pictures). -Myfortic decreased outpatient recently.   -Gyn examined outpatient recently and ordered Valtrex that patient had not started upon presentation. Received 1 dose on admission prior to HSV swabs coming back negative. 5/31 HSV serum negative.   -ED performed pelvic exam on admission with negative wet mount. Chlamydia, gonorrhea, RPR and HSV negative.   -Only possible offending medication Ustekinumab which was last given on 3/1.   -ID consulted and recommended:   - Treponema antibody, non-reactive  - HIV, negative  - coxsackie virus serologies, pending  - M.pneumoniae PCR,negative  - Histoplasma urine antigen, pending  - CMV swab of lesion, not detected  - HSV (buccal), not detected  - Karius test, pending  -Oral Nystatin for possible fungal secondary infection   -Consider biopsy of ulcer if work up is negative  -Lidocaine gel, aquaphor barrier cream     Infectious disease: CMV, Adenovirus, and EBV neg. Work up for lesions above pending.   6/6-urine cx negative. Plan to follow-up w/ ID outpatient, and labs 2x weekly outpatient until leukopenia resolves.      Consultations This Hospital Stay   INFECTIOUS DISEASE TRANSPLANT SOT ADULT IP CONSULT  GYNECOLOGY IP CONSULT  CARE MANAGEMENT / SOCIAL WORK IP CONSULT  NUTRITION SERVICES ADULT IP CONSULT  NUTRITION SERVICES ADULT IP CONSULT    Code Status   Full Code    Time Spent on this Encounter   I, Skye Judge NP, personally saw the patient today and spent greater than 30 minutes discharging this patient.       Skye Judge NP  [facility]  ______________________________________________________________________    Physical Exam   Temp: 98  F (36.7  C) Temp src: Oral BP: 123/88 Pulse: 95   Resp: 16 SpO2: 95 % O2 Device: None (Room air)    Vitals:    06/08/24 1818   Weight: 54.3 kg (119 lb 11.4 oz)     Vital Signs  with Ranges  Temp:  [98  F (36.7  C)-99.2  F (37.3  C)] 98  F (36.7  C)  Pulse:  [] 95  Resp:  [16] 16  BP: (113-136)/(80-97) 123/88  SpO2:  [95 %-99 %] 95 %  I/O last 3 completed shifts:  In: 2211.25 [P.O.:300; I.V.:1911.25]  Out: -       General Appearance: NAD   Skin: normal  HEENT: Oropharynx with moist membranes, white superficial ulcerations largest measuring about 1cm with erythematous base. Mild right submandibular lymphadenopathy, no right axillary, anterior cervical chain or supraclavicular lymphadenopathy.   Heart: regular rate and rhythm  Lungs: NLB on room air   Abdomen: The abdomen is flat, and  non-tender, generalized. She is not tender over the kidney graft. The wound is Healing well, well approximated, and without signs of infection.  : voiding  Extremities: edema: absent.   Neurologic: awake, alert, and oriented. Tremor absent.    Primary Care Physician   Physician No Ref-Primary    Discharge Orders      Reason for your hospital stay    Ulcerations     Activity    Walk at least four times a day, lift no greater than 10 pounds for 6-8 weeks from the time of surgery.  No driving while taking narcotics or 3 weeks after surgery.     Adult [facility] Follow-up and recommended labs and tests    [facility] FOLLOW UP:     1. [facility]: Over the next 2 days you will be seen in the [facility] (ph. [phone], option 3). Your labs will be drawn at the beginning of your appointment. DO NOT take your medications prior to having labs drawn. Please bring all your medications with you from home to take after labs are drawn.      2. Follow up with Transplant Nephrologist,  as scheduled 6/27/24     3.  Follow up with Infectious disease, Dr. Blake 2 weeks from hospital discharge (6/24/24)    4. Follow up with your primary care provider in ~8 weeks. Patient to schedule.     Remember to always bring an updated medication list to all appointments.         Call your Transplant Coordinator ([phone]) with questions about Transplant Center appointment scheduling.     LABS:     CBC, BMP, magnesium, phosphorus, tacrolimus level to be drawn every Monday and Thursday by home health care nurse if arranged, or at an outpatient lab.            Appointments on [place] and/or [facility] (with [facility] or [facility] provider or service). Call [phone] if you haven't heard regarding these appointments within 7 days of discharge.     Monitor and record    blood pressure daily  weight every day     When to contact your care team    WHEN TO CONTACT YOUR  COORDINATOR:     Transplant Coordinator [phone]     Notify your coordinator if you have pain over your kidney, increased redness or drainage from your incision, fever greater than 100F, decreased urine output or new or increased amount of blood in urine.     Notify your coordinator immediately if you are ever unable to take your immunosuppressive medications for any reason.     If you have URGENT concerns after office hours, please call the hospital switchboard at [phone] and ask to have the organ transplant nurse on-call paged. If you have a life-threatening emergency, go to the nearest emergency room.     Diet    Diet recommendations post-transplant: Heart healthy dietary habits long term (low saturated/trans fat, low sodium). High protein diet x 8 weeks. Practice food safety precautions.       Significant Results and Procedures   Most Recent 3 CBC's:  Recent Labs   Lab Test 06/09/24  0603 06/08/24  0557 06/07/24  0604   WBC 1.4*  1.4* 1.8* 2.1*   HGB 10.1* 10.6* 10.4*   MCV 94 93 92    208 205     Most Recent 3 BMP's:  Recent Labs   Lab Test 06/09/24  0603 06/08/24  0557 06/07/24  0604    137 137   POTASSIUM 4.3 4.1 4.3   CHLORIDE 107 106 106   CO2 23 20* 20*   BUN 13.7 16.2 17.1   CR 0.89 0.89 0.93   ANIONGAP 9 11 11   ROBLES 9.6 10.0 9.9   * 106* 97   ,   Results for orders placed or  performed in visit on 05/23/24   XR Abdomen 1 View    Narrative    EXAMINATION: XR ABDOMEN 1 VIEW 5/23/2024 9:58 AM     COMPARISON: None.    HISTORY: To Assess for JJ URETERAL STENT; Aftercare following organ  transplant    TECHNIQUE: Frontal view of the abdomen.    FINDINGS: Double-J ureteral stents, both of which may be within the  right lower quadrant transplant kidneys extending to the bladder IUD  in place. No abnormally dilated loops of bowel. No pneumatosis or  portal venous gas.       Impression    IMPRESSION: Double-J ureteral stents in appropriate positioning.    I have personally reviewed the examination and initial interpretation  and I agree with the findings.    JYOTI BEAUCHAMP MD         SYSTEM ID:  [id number]       Discharge Medications   Current Discharge Medication List        START taking these medications    Details   benzocaine (ORAJEL MAXIMUM STRENGTH) 20 % GEL gel Take by mouth 4 times daily as needed for mouth sores  Qty: 9 g, Refills: 0    Associated Diagnoses: Mouth ulcers      magnesium oxide (MAG-OX) 400 MG tablet Take 2 tablets (800 mg) by mouth daily  Qty: 60 tablet, Refills: 0    Associated Diagnoses: Hypomagnesemia      mineral oil-hydrophilic petrolatum (AQUAPHOR) external ointment Apply topically every hour as needed for other (barrier cream)  Qty: 50 g, Refills: 0    Associated Diagnoses: Vaginal ulcer      nystatin (MYCOSTATIN) 865342 UNIT/ML suspension Swish and swallow 5 mLs (500,000 Units) in mouth 4 times daily for 14 days  Qty: 280 mL, Refills: 0    Associated Diagnoses: Mouth ulcers           CONTINUE these medications which have CHANGED    Details   famotidine (PEPCID) 20 MG tablet Take 2 tablets (40 mg) by mouth 2 times daily  Qty: 60 tablet, Refills: 1    Associated Diagnoses: Kidney replaced by transplant      !! tacrolimus (GENERIC EQUIVALENT) 0.5 MG capsule Take 1 capsule (0.5 mg) by mouth every evening 1.5mg QPM    Associated Diagnoses: Kidney replaced by transplant       !! tacrolimus (GENERIC EQUIVALENT) 1 MG capsule Take 1 capsule (1 mg) by mouth 2 times daily 1mg in the AM , 1.5mg in the PM or as instructed by transplant team    Associated Diagnoses: Kidney replaced by transplant       !! - Potential duplicate medications found. Please discuss with provider.        CONTINUE these medications which have NOT CHANGED    Details   acetaminophen (TYLENOL) 500 MG tablet Take 1,000 mg by mouth every 6 hours as needed      aspirin (ASA) 325 MG tablet Take 1 tablet (325 mg) by mouth daily  Qty: 30 tablet, Refills: 2    Associated Diagnoses: Kidney replaced by transplant      atorvastatin (LIPITOR) 10 MG tablet Take 1 tablet (10 mg) by mouth daily  Qty: 30 tablet, Refills: 1    Associated Diagnoses: Kidney replaced by transplant      levonorgestrel (KYLEENA) 19.5 MG IUD 1 Device by Intrauterine route      magic mouthwash suspension (diphenhydrAMINE, lidocaine, aluminum-magnesium & simethicone) Swish and swallow 10 mLs in mouth every 6 hours as needed for mouth sores  Qty: 560 mL, Refills: 0    Associated Diagnoses: Herpes; Oral mucosal lesion      mycophenolic acid (GENERIC EQUIVALENT) 180 MG EC tablet Take 2 tablets (360 mg) by mouth 2 times daily  Qty: 120 tablet, Refills: 11    Comments: TXP DT 4/17/2024 (Kidney) TXP Dischg DT 4/21/2024 DX Kidney replaced by transplant Z94.0 [facility] ([place], [place])  Associated Diagnoses: Kidney transplant recipient      PARoxetine (PAXIL) 30 MG tablet Take 30 mg by mouth every evening      ustekinumab (STELARA) 90 MG/ML Inject 90 mg Subcutaneous once every eight weeks      valGANciclovir (VALCYTE) 450 MG tablet Take 2 tablets (900 mg) by mouth daily    Associated Diagnoses: Kidney replaced by transplant           STOP taking these medications       sulfamethoxazole-trimethoprim (BACTRIM) 400-80 MG tablet Comments:   Reason for Stopping:         valACYclovir (VALTREX) 1000 mg tablet Comments:    Reason for Stopping:             Allergies   Allergies   Allergen Reactions    Amlodipine Headache and Other (See Comments)     Other Reaction(s): Unknown    Proton Pump Inhibitors Nephrotoxicity     No PPIs due to history of renal failure due to interstitial nephritis    Tegaderm Transparent Dressing (Informational Only) Blisters    I have reviewed history, examined patient and discussed plan with the fellow/resident/ERICA.    I concur with the findings in this note.    Time spent on discharge activities: 45 minutes.

## 2024-06-09 NOTE — PROGRESS NOTES
Calorie Count  Intake recorded for: 6/8  Total Kcals: 1124 Total Protein: 42g  Kcals from Hospital Food: 1124   Protein: 42g  Kcals from Outside Food (average):0 Protein: 0g  # Meals Ordered from Kitchen: 1 meal ordered  # Meals Recorded: 1 meal recorded - 75% omelet w/ ham and cheddar cheese, cream of wheat w/ brown sugar, toast w/ butter and jelly, 8 oz whole milk, orange sherbet   # Supplements Recorded: 100% 2 Ensure Clear

## 2024-06-10 ENCOUNTER — TELEPHONE (OUTPATIENT)
Dept: TRANSPLANT | Facility: CLINIC | Age: 34
End: 2024-06-10
Payer: MEDICARE

## 2024-06-10 LAB
SCANNED LAB RESULT: NORMAL
SCANNED LAB RESULT: NORMAL

## 2024-06-10 NOTE — TELEPHONE ENCOUNTER
Provider Call: General  Route to LPN    Reason for call: Needs to clarify lab draw frequency     Call back needed? Yes    Return Call Needed  Same as documented in contacts section  When to return call?: Same day: Route High Priority

## 2024-06-10 NOTE — TELEPHONE ENCOUNTER
----- Message from Holli Nelson, LAURITA CNP sent at 6/10/2024 10:47 AM CDT -----  Can we re-order a CMV with next labs on this lady under Cecilia's name? Apparently her sample drawn inpatient froze...    Thanks,  Holli Nelson NP

## 2024-06-10 NOTE — LETTER
OUTPATIENT LABORATORY TEST ORDER     Patient Name: Ira Zamora   YOB: 1990     [facility] MR# [if applicable]: [id number]   Date & Time: 6/11/2024  9:20 AM  Expiration Date: 1 year after date issued      Diagnosis: Kidney Transplant (ICD-10 Z94.0)    Aftercare following organ transplant (ICD-10 Z48.288)    Long term use of medications (ICD-10 Z79.899)    Contact with and (suspected) exposure to other viral communicable    diseases (Z20.828)     We ask your assistance in obtaining the following laboratory tests, which are part of our routine surveillance program for Solid Organ Transplant patients.     Please fax each result to [phone], same day as resulted/available    Critical lab results page [phone]  Monday - Friday 8 am to 5 pm  Evening/Weekend/Holiday communicate Critical labs results [phone]    First 8 weeks post-transplant (4/17/2024 - 6/17/2024)  Labs 2x/week (Monday and Thursday)  CBC with platelets  Basic Metabolic Panel (Sodium, Potassium, Chloride, Creatinine, CO2, Urea Nitrogen, glucose, Calcium)  Tacrolimus/Prograf/ drug level    Labs weekly (Monday)  Phosphorus  Magnesium    Labs every 2 weeks  Mycophenolic Acid Level    Months 2-4 post-transplant (6/18/2024 - 8/18/2024)  Labs 1x weekly (Monday or Tuesday)  CBC with platelets  Basic Metabolic Panel (Sodium, Potassium, Chloride, Creatinine, CO2, Urea Nitrogen, glucose, Calcium)  Tacrolimus/Prograf/ drug level  Labs monthly   Phosphorus  Magnesium  Months 4-7 post-transplant (8/19/2024 - 11/19/2024)  Labs every other week  CBC with platelets  Basic Metabolic Panel (Sodium, Potassium, Chloride, Creatinine, CO2, Urea Nitrogen, glucose, Calcium)  Tacrolimus/Prograf/ drug level  Monthly  Phosphorus  Magnesium  BK (Polyoma Virus) PCR Quantitative/Plasma    Months 7-12 post-transplant (11/20/2024 - 4/17/2025)  Monthly  CBC with platelets  Basic Metabolic Panel (Sodium, Potassium, Chloride, Creatinine,  CO2, Urea Nitrogen, glucose, Calcium)  Tacrolimus/Prograf/ drug level  BK (Polyoma Virus) PCR Quantitative/Plasma  CMV PCR QT    At 2 months post-transplant (Due: 6/17/2024)   DSA PRA (patient to supply  kit)  BK Virus PCR Quantitative/Plasma  Urine protein/creatinine    At month 3 only (Due: 7/17/2024)  BK (Polyoma virus) PCR Quantitative/Plasma    At 4 months post-transplant (Due: 8/17/2024)  DSA PRA (patient to supply )  PTH  Urine protein/creatinine    At 6 months post-transplant (Fasting Labs) (Due: 10/17/2024)  Hepatic panel  Hemoglobin A1c  Uric Acid  Lipid panel  Urine protein/creatinine    At 7 months post-transplant (Due: 11/17/2024)   PRA/DSA (patient to supply )    At 9 months post-transplant (Due: 1/17/2025)   Urine protein/creatinine        At 12 months post-transplant (Fasting labs) Due: 4/17/2025   Hepatic panel  Hemoglobin A1c  Uric Acid  Lipid panel  Urine protein/creatinine  PTH  Vitamin D    If you have any questions please call the Transplant Center at [phone]. All lab results should be faxed to [phone]    .

## 2024-06-10 NOTE — LETTER
PHYSICIAN ORDERS      DATE & TIME ISSUED: Tamy 10, 2024 12:16 PM  PATIENT NAME: Ira Zamora   : 1990     [facility] MR# [if applicable]: [id number]     DIAGNOSIS:  kidney transplanted  ICD-10 CODE: z94.0     Please draw the following lab at next scheduled lab draw:  -CMV Quantitative PCR      Any questions please call: [phone]    Please fax each result same day as resulted/available    Critical lab results page [phone].    .

## 2024-06-11 DIAGNOSIS — B00.9 HERPES: ICD-10-CM

## 2024-06-11 DIAGNOSIS — K13.70 ORAL MUCOSAL LESION: ICD-10-CM

## 2024-06-12 ENCOUNTER — TELEPHONE (OUTPATIENT)
Dept: TRANSPLANT | Facility: CLINIC | Age: 34
End: 2024-06-12
Payer: MEDICARE

## 2024-06-12 DIAGNOSIS — Z48.298 AFTERCARE FOLLOWING ORGAN TRANSPLANT: Primary | ICD-10-CM

## 2024-06-12 DIAGNOSIS — Z94.0 KIDNEY TRANSPLANT RECIPIENT: ICD-10-CM

## 2024-06-12 RX ORDER — MYCOPHENOLIC ACID 180 MG/1
180 TABLET, DELAYED RELEASE ORAL 2 TIMES DAILY
Qty: 60 TABLET | Refills: 11 | Status: SHIPPED | OUTPATIENT
Start: 2024-06-12 | End: 2024-06-17

## 2024-06-12 RX ORDER — PREDNISONE 10 MG/1
10 TABLET ORAL DAILY
Qty: 10 TABLET | Refills: 0 | Status: SHIPPED | OUTPATIENT
Start: 2024-06-12 | End: 2024-06-17

## 2024-06-12 NOTE — TELEPHONE ENCOUNTER
Patient Call: General  Route to LPN    Reason for call:  called in regards of patient had recent transplant at the end of April. Patient had recently been seen at a hospital and was discharged. They are currently having some pain and wondering if there is something they can take. Patient has not eatten due to mouth ulcers and does not want to go back to hospital. The medication that was given when seen at hospital is not assisting them to be able to eat.           Call back needed? Yes    Return Call Needed  Same as documented in contacts section  When to return call?: Same day: Route High Priority

## 2024-06-12 NOTE — TELEPHONE ENCOUNTER
Spoke to patient to go over changes. Discussed plan of care and will have follow up appointment scheduled to discuss care.

## 2024-06-14 ENCOUNTER — INFUSION THERAPY VISIT (OUTPATIENT)
Dept: INFUSION THERAPY | Facility: CLINIC | Age: 34
End: 2024-06-14
Attending: NURSE PRACTITIONER
Payer: MEDICARE

## 2024-06-14 ENCOUNTER — OFFICE VISIT (OUTPATIENT)
Dept: TRANSPLANT | Facility: CLINIC | Age: 34
End: 2024-06-14
Attending: INTERNAL MEDICINE
Payer: MEDICARE

## 2024-06-14 VITALS
OXYGEN SATURATION: 98 % | HEIGHT: 62 IN | HEART RATE: 112 BPM | DIASTOLIC BLOOD PRESSURE: 74 MMHG | BODY MASS INDEX: 20.96 KG/M2 | WEIGHT: 113.9 LBS | RESPIRATION RATE: 16 BRPM | SYSTOLIC BLOOD PRESSURE: 110 MMHG

## 2024-06-14 DIAGNOSIS — B00.9 HERPES: ICD-10-CM

## 2024-06-14 DIAGNOSIS — K13.70 ORAL MUCOSAL LESION: ICD-10-CM

## 2024-06-14 DIAGNOSIS — Z48.298 AFTERCARE FOLLOWING ORGAN TRANSPLANT: ICD-10-CM

## 2024-06-14 DIAGNOSIS — E86.0 DEHYDRATION: Primary | ICD-10-CM

## 2024-06-14 PROCEDURE — 258N000003 HC RX IP 258 OP 636: Mod: JZ | Performed by: NURSE PRACTITIONER

## 2024-06-14 PROCEDURE — G0463 HOSPITAL OUTPT CLINIC VISIT: HCPCS | Mod: 25 | Performed by: NURSE PRACTITIONER

## 2024-06-14 PROCEDURE — 99213 OFFICE O/P EST LOW 20 MIN: CPT | Mod: 24 | Performed by: NURSE PRACTITIONER

## 2024-06-14 PROCEDURE — 96360 HYDRATION IV INFUSION INIT: CPT

## 2024-06-14 RX ORDER — EPINEPHRINE 1 MG/ML
0.3 INJECTION, SOLUTION INTRAMUSCULAR; SUBCUTANEOUS EVERY 5 MIN PRN
OUTPATIENT
Start: 2024-06-14

## 2024-06-14 RX ORDER — ALBUTEROL SULFATE 90 UG/1
1-2 AEROSOL, METERED RESPIRATORY (INHALATION)
Start: 2024-06-14

## 2024-06-14 RX ORDER — ALBUTEROL SULFATE 0.83 MG/ML
2.5 SOLUTION RESPIRATORY (INHALATION)
OUTPATIENT
Start: 2024-06-14

## 2024-06-14 RX ORDER — METHYLPREDNISOLONE SODIUM SUCCINATE 125 MG/2ML
125 INJECTION, POWDER, LYOPHILIZED, FOR SOLUTION INTRAMUSCULAR; INTRAVENOUS
Start: 2024-06-14

## 2024-06-14 RX ORDER — OXYCODONE HYDROCHLORIDE 5 MG/1
5 TABLET ORAL 2 TIMES DAILY PRN
Qty: 8 TABLET | Refills: 0 | Status: SHIPPED | OUTPATIENT
Start: 2024-06-14 | End: 2024-06-19

## 2024-06-14 RX ORDER — DIPHENHYDRAMINE HYDROCHLORIDE 50 MG/ML
50 INJECTION INTRAMUSCULAR; INTRAVENOUS
Start: 2024-06-14

## 2024-06-14 RX ORDER — HEPARIN SODIUM,PORCINE 10 UNIT/ML
5-20 VIAL (ML) INTRAVENOUS DAILY PRN
OUTPATIENT
Start: 2024-06-14

## 2024-06-14 RX ORDER — HEPARIN SODIUM (PORCINE) LOCK FLUSH IV SOLN 100 UNIT/ML 100 UNIT/ML
5 SOLUTION INTRAVENOUS
OUTPATIENT
Start: 2024-06-14

## 2024-06-14 RX ORDER — MEPERIDINE HYDROCHLORIDE 25 MG/ML
25 INJECTION INTRAMUSCULAR; INTRAVENOUS; SUBCUTANEOUS EVERY 30 MIN PRN
OUTPATIENT
Start: 2024-06-14

## 2024-06-14 RX ADMIN — SODIUM CHLORIDE 1000 ML: 9 INJECTION, SOLUTION INTRAVENOUS at 11:37

## 2024-06-14 NOTE — PROGRESS NOTES
Transplant Surgery Progress Note    Transplants:  2024 (Kidney); Postoperative day:  58  S: Ira Zamora is an 34 year old female with history of ESRD on dialysis d/t interstitial nephritis, Crohn's, HTN, GERD, and dilated ascending aorta. S/p  donor en bloc kidney transplant with ureteral stent x2 on 24. Admitted - with painful oral and vaginal lesions and inability to tolerate oral intake.       Here today for follow up on oral/vaginal sores. Seeing ID for this . On Nystatin, orajel and magic mouth wash.   Requesting pain medication for pain related to open sores. Intake has been difficult but she is trying. Ate half of meals yesterday. Drinking protein drinks. Poor fluid intake yesterday, maybe 1.5 L. Does feel dizzy and fatigued today.   Less swelling in cheeks today but oral sores still present x3.   New small sore under L breast.   No F/C.   Urinating with no issues.     Reports one episode of bloody diarrhea yesterday. Hx of Crohn's but no current flare. Alternates between loose and formed stool normally but no further diarrhea since episode.     2024: Upper GI endoscopy @ [facility]:  There is a small hiatal hernia and moderate reflux esophagitis. No crohns or ulcers seen.           Transplant History:    Transplant Type:  DDKT en bloc  Donor Type: Donation after Brain Death   Transplant Date:  2024 (Kidney)   Ureteral Stent:  No- removed    Crossmatch:  negative   DSA at Tx:  No  Baseline Cr:  ~1.1 - 1.3  DeNovo DSA: No    Acute Rejection Hx:  No    Present Maintenance Immunosuppression:  Tacrolimus and Mycophenolic acid    CMV IgG Ab Discordance:  Yes  EBV IgG Ab Discordance:  No    BK Viremia:  No  EBV Viremia:  No    Transplant Coordinator: Radha Jorge     Transplant Office Phone Number: [phone]     Immunosuppressant Medications       Immunosuppressive Agents Disp Start End     mycophenolic acid (GENERIC EQUIVALENT) 180 MG EC tablet 60 tablet  6/12/2024 --    Sig - Route: Take 1 tablet (180 mg) by mouth 2 times daily - Oral    Class: E-Prescribe    Notes to Pharmacy: TXP DT 4/17/2024 (Kidney) TXP Dischg DT 4/21/2024 DX Kidney replaced by transplant Z94.0 TX [facility] ([place], [place])     tacrolimus (GENERIC EQUIVALENT) 0.5 MG capsule -- 6/9/2024 --    Sig - Route: Take 1 capsule (0.5 mg) by mouth every evening 1.5mg QPM - Oral    Class: Historical     tacrolimus (GENERIC EQUIVALENT) 1 MG capsule -- 6/9/2024 --    Sig - Route: Take 1 capsule (1 mg) by mouth 2 times daily 1mg in the AM , 1.5mg in the PM or as instructed by transplant team - Oral    Class: Historical            Possible Immunosuppression-related side effects:   []             headache  []             vivid dreams  []             irritability  []             cognitive difficuties  []             fine tremor  []             nausea  []             diarrhea  []             neuropathy      []             edema  []             renal calcineurin toxicity  []             hyperkalemia  []             post-transplant diabetes  []             decreased appetite  []             increased appetite  []             other:  []             none    Prescription Medications as of 6/14/2024         Rx Number Disp Refills Start End Last Dispensed Date Next Fill Date Owning Pharmacy    acetaminophen (TYLENOL) 500 MG tablet  -- -- 12/7/2020 --       Sig: Take 1,000 mg by mouth every 6 hours as needed    Class: Historical    Route: Oral    aspirin (ASA) 325 MG tablet  30 tablet 2 4/21/2024 --   [facility] - [place], [place] - [address] [facility]    Sig: Take 1 tablet (325 mg) by mouth daily    Class: E-Prescribe    Route: Oral    Renewals       Renewal requests to authorizing provider (Skye Judge NP) <b>prohibited</b>            atorvastatin (LIPITOR) 10 MG tablet  30 tablet 1 4/21/2024 --   [facility]  [address]    Sig: Take 1 tablet (10 mg) by mouth daily    Class: E-Prescribe    Route: Oral    benzocaine (ORAJEL MAXIMUM STRENGTH) 20 % GEL gel  9 g 0 2024 --   [address]    Sig: Take by mouth 4 times daily as needed for mouth sores    Class: E-Prescribe    Route: Mouth/Throat    Renewals       Renewal requests to authorizing provider (Skye Judge NP) <b>prohibited</b>            famotidine (PEPCID) 20 MG tablet  60 tablet 1 2024 --   [address]    Sig: Take 2 tablets (40 mg) by mouth 2 times daily    Class: E-Prescribe    Route: Oral    levonorgestrel (KYLEENA) 19.5 MG IUD  -- -- 2021 --       Si Device by Intrauterine route    Class: Historical    Route: Intrauterine    magic mouthwash suspension (diphenhydrAMINE, lidocaine, aluminum-magnesium & simethicone)  560 mL 0 2024 --   [address]    Sig: Swish and swallow 10 mLs in mouth every 6 hours as needed for mouth sores    Class: E-Prescribe    Route: Swish & Swallow    magnesium oxide (MAG-OX) 400 MG tablet  60 tablet 0 2024 --   [address]    Sig: Take 2 tablets (800 mg) by mouth daily    Class: E-Prescribe    Route: Oral    mineral oil-hydrophilic petrolatum (AQUAPHOR) external ointment  50 g 0 2024 --   [address]    Sig: Apply topically every hour as needed for other (barrier cream)    Class: E-Prescribe    Route: Topical    Renewals       Renewal requests to authorizing provider (Skye Judge NP) <b>prohibited</b>            mycophenolic acid (GENERIC EQUIVALENT) 180 MG EC tablet  60 tablet 11 2024 --   [address]     Sig: Take 1 tablet (180 mg) by mouth 2 times daily    Class: E-Prescribe    Notes to Pharmacy: TXP DT 4/17/2024 (Kidney) TXP Dischg DT 4/21/2024 DX Kidney replaced by transplant Z94.0 TX Center [facility] ([place], [place])    Route: Oral    nystatin (MYCOSTATIN) 518363 UNIT/ML suspension  280 mL 0 6/9/2024 6/23/2024   [facility] - [place], [place] - [address] [facility]    Sig: Swish and swallow 5 mLs (500,000 Units) in mouth 4 times daily for 14 days    Class: E-Prescribe    Route: Swish & Swallow    PARoxetine (PAXIL) 30 MG tablet  -- -- 9/16/2022 --       Sig: Take 30 mg by mouth every evening    Class: Historical    Route: Oral    predniSONE (DELTASONE) 10 MG tablet  10 tablet 0 6/12/2024 6/22/2024   [place], [place] - [id number] [place] [address]    Sig: Take 1 tablet (10 mg) by mouth daily for 10 days    Class: E-Prescribe    Notes to Pharmacy: TXP DT 4/17/2024 (Kidney) TXP Dischg DT 4/21/2024 DX Kidney replaced by transplant Z94.0 TX Center [facility] ([place], [place])    Route: Oral    tacrolimus (GENERIC EQUIVALENT) 0.5 MG capsule  -- -- 6/9/2024 --       Sig: Take 1 capsule (0.5 mg) by mouth every evening 1.5mg QPM    Class: Historical    Route: Oral    tacrolimus (GENERIC EQUIVALENT) 1 MG capsule  -- -- 6/9/2024 --       Sig: Take 1 capsule (1 mg) by mouth 2 times daily 1mg in the AM , 1.5mg in the PM or as instructed by transplant team    Class: Historical    Route: Oral    ustekinumab (STELARA) 90 MG/ML  -- -- 9/28/2021 --       Sig: Inject 90 mg Subcutaneous once every eight weeks    Class: Historical    Route: Subcutaneous    valGANciclovir (VALCYTE) 450 MG tablet  -- -- 5/23/2024 --       Sig: Take 2 tablets (900 mg) by mouth daily    Class: Historical    Route: Oral            O:   Pulse:  [112] 112  Resp:  [16] 16  BP: (110)/(74) 110/74  SpO2:  [98 %] 98 %  General  Appearance: in mild distress. Tearful during visit today.   Skin: x3 oral lesions noted- white base with erythematous ring. L chest with 3mm open wound, red base. No surrounding erythema or drainage.   Heart: tachycardic   Lungs: easy respirations, no audible wheezing.  Abdomen: flat, The wound is dry and intact, without hernia. The abdomen is non-tender. The kidney graft is not tender.  There is no ascites.  Extremities: Tremor absent.   Edema: absent.         Latest Ref Rng & Units 6/9/2024     6:03 AM 6/8/2024     5:57 AM 6/7/2024     6:04 AM 6/6/2024     7:07 AM 6/5/2024     5:14 PM   Transplant Immunosuppression Labs   Creat 0.51 - 0.95 mg/dL 0.89  0.89  0.93  0.93     Urea Nitrogen 6.0 - 20.0 mg/dL 13.7  16.2  17.1  18.1     WBC 4.0 - 11.0 10e3/uL  4.0 - 11.0 10e3/uL 1.4     1.4  1.8  2.1  1.9  1.9    Neutrophil % 62     71        Chemistries:   Recent Labs   Lab Test 06/09/24  0603   BUN 13.7   CR 0.89   GFRESTIMATED 87   *     Lab Results   Component Value Date    A1C 4.7 04/17/2024     Recent Labs   Lab Test 06/06/24  0707   ALBUMIN 3.8   BILITOTAL 0.7   ALKPHOS 83   AST 16   ALT 12     Urine Studies:  Recent Labs   Lab Test 06/06/24  1113   COLOR Yellow   APPEARANCE Clear   URINEGLC Negative   URINEBILI Negative   URINEKETONE Negative   SG 1.028   UBLD Small*   URINEPH 5.5   PROTEIN 10*   NITRITE Negative   LEUKEST Moderate*   RBCU 2   WBCU 6*     No lab results found.  Hematology:   Recent Labs   Lab Test 06/09/24  0603 06/08/24  0557 06/07/24  0604   HGB 10.1* 10.6* 10.4*    208 205   WBC 1.4*  1.4* 1.8* 2.1*     Coags:   Recent Labs   Lab Test 04/17/24  0958 09/29/22  1140   INR 1.20* 1.01     HLA antibodies:   SA1 HI RISK HALLEY   Date Value Ref Range Status   05/23/2024 None  Final     SA1 MOD RISK HALLEY   Date Value Ref Range Status   05/23/2024 None  Final     SA2 HI RISK HALLEY   Date Value Ref Range Status   05/23/2024 None  Final     SA2 MOD RISK HALLEY   Date Value Ref Range Status    05/23/2024 :Torie  Final       Assessment: Ira Zamora is doing fair s/p DDKT:  Issues we addressed during her visit include:    Plan:    1. Graft function: Cr 0.74   2. Immunosuppression Management: No change - Continue tac, myfortic 180 BID and pred 10.   .  Complexity of management:Medium.  Contributing factors:  oral/vaginal sores, dehydration, pain,   3. Dehydration: IVF today in [facility]. Low mag- on supplement.   4. Pain control: Short course of oxycodone provided to help control oral sores pain around mealtimes.   5. Oral/Vaginal sores: ID pending 6/24 but will try to get patient in sooner. Continue nystatin.   6: Bloody diarrhea: 1 episode. Hgb stable. Monitor for now.   7. Patient desires to switch off of MPA.  Dr. Díaz is open to azathioprine but would like nephrology to review. Nephrology appt. Pending Monday.         Case discussed with Dr. Tsai and Dr. Díaz.   Followup: Monday with nephrology.     Total Time: 30 min,   Counselling Time: 10 min.    LAURITA Jacobson

## 2024-06-14 NOTE — NURSING NOTE
"Chief Complaint   Patient presents with    Follow Up     Kidney transplant 4/17/2024       /74 (BP Location: Left arm, Patient Position: Sitting, Cuff Size: Adult Small)   Pulse 112   Resp 16   Ht 1.575 m (5' 2\")   Wt 51.7 kg (113 lb 14.4 oz)   LMP 04/03/2024 (Approximate)   SpO2 98%   BMI 20.83 kg/m      EDU WILLIS RN on 6/14/2024 at 10:33 AM    "

## 2024-06-14 NOTE — LETTER
2024      Ira Zamora  [address] [place] [id number]      Dear Colleague,    Thank you for referring your patient, Ira Zamora, to the [facility]. Please see a copy of my visit note below.    Transplant Surgery Progress Note    Transplants:  2024 (Kidney); Postoperative day:  58  S: Ira Zamora is an 34 year old female with history of ESRD on dialysis d/t interstitial nephritis, Crohn's, HTN, GERD, and dilated ascending aorta. S/p  donor en bloc kidney transplant with ureteral stent x2 on 24. Admitted - with painful oral and vaginal lesions and inability to tolerate oral intake.       Here today for follow up on oral/vaginal sores. Seeing ID for this . On Nystatin, orajel and magic mouth wash.   Requesting pain medication for pain related to open sores. Intake has been difficult but she is trying. Ate half of meals yesterday. Drinking protein drinks. Poor fluid intake yesterday, maybe 1.5 L. Does feel dizzy and fatigued today.   Less swelling in cheeks today but oral sores still present x3.   New small sore under L breast.   No F/C.   Urinating with no issues.     Reports one episode of bloody diarrhea yesterday. Hx of Crohn's but no current flare. Alternates between loose and formed stool normally but no further diarrhea since episode.     2024: Upper GI endoscopy @ [facility]:  There is a small hiatal hernia and moderate reflux esophagitis. No crohns or ulcers seen.           Transplant History:    Transplant Type:  DDKT en bloc  Donor Type: Donation after Brain Death   Transplant Date:  2024 (Kidney)   Ureteral Stent:  No- removed    Crossmatch:  negative   DSA at Tx:  No  Baseline Cr:  ~1.1 - 1.3  DeNovo DSA: No    Acute Rejection Hx:  No    Present Maintenance Immunosuppression:  Tacrolimus and Mycophenolic acid    CMV IgG Ab Discordance:  Yes  EBV IgG Ab Discordance:  No    BK Viremia:  No  EBV Viremia:  No    Transplant  Coordinator: Radha Jorge     Transplant Office Phone Number: [phone]     Immunosuppressant Medications       Immunosuppressive Agents Disp Start End     mycophenolic acid (GENERIC EQUIVALENT) 180 MG EC tablet 60 tablet 6/12/2024 --    Sig - Route: Take 1 tablet (180 mg) by mouth 2 times daily - Oral    Class: E-Prescribe    Notes to Pharmacy: TXP DT 4/17/2024 (Kidney) TXP Dischg DT 4/21/2024 DX Kidney replaced by transplant Z94.0 TX Center [facility] ([place], [place])     tacrolimus (GENERIC EQUIVALENT) 0.5 MG capsule -- 6/9/2024 --    Sig - Route: Take 1 capsule (0.5 mg) by mouth every evening 1.5mg QPM - Oral    Class: Historical     tacrolimus (GENERIC EQUIVALENT) 1 MG capsule -- 6/9/2024 --    Sig - Route: Take 1 capsule (1 mg) by mouth 2 times daily 1mg in the AM , 1.5mg in the PM or as instructed by transplant team - Oral    Class: Historical            Possible Immunosuppression-related side effects:   []             headache  []             vivid dreams  []             irritability  []             cognitive difficuties  []             fine tremor  []             nausea  []             diarrhea  []             neuropathy      []             edema  []             renal calcineurin toxicity  []             hyperkalemia  []             post-transplant diabetes  []             decreased appetite  []             increased appetite  []             other:  []             none    Prescription Medications as of 6/14/2024         Rx Number Disp Refills Start End Last Dispensed Date Next Fill Date Owning Pharmacy    acetaminophen (TYLENOL) 500 MG tablet  -- -- 12/7/2020 --       Sig: Take 1,000 mg by mouth every 6 hours as needed    Class: Historical    Route: Oral    aspirin (ASA) 325 MG tablet  30 tablet 2 4/21/2024 --   [facility] - [place], [place] - [address] [facility]    Sig: Take 1 tablet (325 mg) by mouth daily    Class: E-Prescribe    Route: Oral     Renewals       Renewal requests to authorizing provider (Skye Judge, NP) <b>prohibited</b>            atorvastatin (LIPITOR) 10 MG tablet  30 tablet 1 2024 --   [address]    Sig: Take 1 tablet (10 mg) by mouth daily    Class: E-Prescribe    Route: Oral    benzocaine (ORAJEL MAXIMUM STRENGTH) 20 % GEL gel  9 g 0 2024 --   [address]    Sig: Take by mouth 4 times daily as needed for mouth sores    Class: E-Prescribe    Route: Mouth/Throat    Renewals       Renewal requests to authorizing provider (Skye Judge NP) <b>prohibited</b>            famotidine (PEPCID) 20 MG tablet  60 tablet 1 2024 --   [address]    Sig: Take 2 tablets (40 mg) by mouth 2 times daily    Class: E-Prescribe    Route: Oral    levonorgestrel (KYLEENA) 19.5 MG IUD  -- -- 2021 --       Si Device by Intrauterine route    Class: Historical    Route: Intrauterine    magic mouthwash suspension (diphenhydrAMINE, lidocaine, aluminum-magnesium & simethicone)  560 mL 0 2024 --   [facility] [id number] [place] [address]    Sig: Swish and swallow 10 mLs in mouth every 6 hours as needed for mouth sores    Class: E-Prescribe    Route: Swish & Swallow    magnesium oxide (MAG-OX) 400 MG tablet  60 tablet 0 2024 --   [address]    Sig: Take 2 tablets (800 mg) by mouth daily    Class: E-Prescribe    Route: Oral    mineral oil-hydrophilic petrolatum (AQUAPHOR) external ointment  50 g 0 2024 --   [address]    Sig: Apply topically every hour as needed for other (barrier cream)    Class: E-Prescribe    Route: Topical    Renewals       Renewal requests to authorizing provider  (Skye Judge NP) <b>prohibited</b>            mycophenolic acid (GENERIC EQUIVALENT) 180 MG EC tablet  60 tablet 11 6/12/2024 --   [facility] [address] [facility]    Sig: Take 1 tablet (180 mg) by mouth 2 times daily    Class: E-Prescribe    Notes to Pharmacy: TXP DT 4/17/2024 (Kidney) TXP Dischg DT 4/21/2024 DX Kidney replaced by transplant Z94.0 [facility] ([place], [place])    Route: Oral    nystatin (MYCOSTATIN) 497934 UNIT/ML suspension  280 mL 0 6/9/2024 6/23/2024   [facility] - [place], [place] - [address] [place] [address]    Sig: Swish and swallow 5 mLs (500,000 Units) in mouth 4 times daily for 14 days    Class: E-Prescribe    Route: Swish & Swallow    PARoxetine (PAXIL) 30 MG tablet  -- -- 9/16/2022 --       Sig: Take 30 mg by mouth every evening    Class: Historical    Route: Oral    predniSONE (DELTASONE) 10 MG tablet  10 tablet 0 6/12/2024 6/22/2024   [facility] [address] [facility]    Sig: Take 1 tablet (10 mg) by mouth daily for 10 days    Class: E-Prescribe    Notes to Pharmacy: TXP DT 4/17/2024 (Kidney) TXP Dischg DT 4/21/2024 DX Kidney replaced by transplant Z94.0 [facility] ([place], [place])    Route: Oral    tacrolimus (GENERIC EQUIVALENT) 0.5 MG capsule  -- -- 6/9/2024 --       Sig: Take 1 capsule (0.5 mg) by mouth every evening 1.5mg QPM    Class: Historical    Route: Oral    tacrolimus (GENERIC EQUIVALENT) 1 MG capsule  -- -- 6/9/2024 --       Sig: Take 1 capsule (1 mg) by mouth 2 times daily 1mg in the AM , 1.5mg in the PM or as instructed by transplant team    Class: Historical    Route: Oral    ustekinumab (STELARA) 90 MG/ML  -- -- 9/28/2021 --       Sig: Inject 90 mg Subcutaneous once every eight weeks    Class: Historical    Route: Subcutaneous    valGANciclovir (VALCYTE) 450  MG tablet  -- -- 5/23/2024 --       Sig: Take 2 tablets (900 mg) by mouth daily    Class: Historical    Route: Oral            O:   Pulse:  [112] 112  Resp:  [16] 16  BP: (110)/(74) 110/74  SpO2:  [98 %] 98 %  General Appearance: in mild distress. Tearful during visit today.   Skin: x3 oral lesions noted- white base with erythematous ring. L chest with 3mm open wound, red base. No surrounding erythema or drainage.   Heart: tachycardic   Lungs: easy respirations, no audible wheezing.  Abdomen: flat, The wound is dry and intact, without hernia. The abdomen is non-tender. The kidney graft is not tender.  There is no ascites.  Extremities: Tremor absent.   Edema: absent.         Latest Ref Rng & Units 6/9/2024     6:03 AM 6/8/2024     5:57 AM 6/7/2024     6:04 AM 6/6/2024     7:07 AM 6/5/2024     5:14 PM   Transplant Immunosuppression Labs   Creat 0.51 - 0.95 mg/dL 0.89  0.89  0.93  0.93     Urea Nitrogen 6.0 - 20.0 mg/dL 13.7  16.2  17.1  18.1     WBC 4.0 - 11.0 10e3/uL  4.0 - 11.0 10e3/uL 1.4     1.4  1.8  2.1  1.9  1.9    Neutrophil % 62     71        Chemistries:   Recent Labs   Lab Test 06/09/24  0603   BUN 13.7   CR 0.89   GFRESTIMATED 87   *     Lab Results   Component Value Date    A1C 4.7 04/17/2024     Recent Labs   Lab Test 06/06/24  0707   ALBUMIN 3.8   BILITOTAL 0.7   ALKPHOS 83   AST 16   ALT 12     Urine Studies:  Recent Labs   Lab Test 06/06/24  1113   COLOR Yellow   APPEARANCE Clear   URINEGLC Negative   URINEBILI Negative   URINEKETONE Negative   SG 1.028   UBLD Small*   URINEPH 5.5   PROTEIN 10*   NITRITE Negative   LEUKEST Moderate*   RBCU 2   WBCU 6*     No lab results found.  Hematology:   Recent Labs   Lab Test 06/09/24  0603 06/08/24  0557 06/07/24  0604   HGB 10.1* 10.6* 10.4*    208 205   WBC 1.4*  1.4* 1.8* 2.1*     Coags:   Recent Labs   Lab Test 04/17/24  0958 09/29/22  1140   INR 1.20* 1.01     HLA antibodies:   SA1 HI RISK HALLEY   Date Value Ref Range Status   05/23/2024 None   Final     SA1 MOD RISK HALLEY   Date Value Ref Range Status   05/23/2024 None  Final     SA2 HI RISK HALLEY   Date Value Ref Range Status   05/23/2024 None  Final     SA2 MOD RISK HALLEY   Date Value Ref Range Status   05/23/2024 DR:8  Final       Assessment: Ira Zamora is doing fair s/p DDKT:  Issues we addressed during her visit include:    Plan:    1. Graft function: Cr 0.74   2. Immunosuppression Management: No change - Continue tac, myfortic 180 BID and pred 10.   .  Complexity of management:Medium.  Contributing factors:  oral/vaginal sores, dehydration, pain,   3. Dehydration: IVF today in [facility]. Low mag- on supplement.   4. Pain control: Short course of oxycodone provided to help control oral sores pain around mealtimes.   5. Oral/Vaginal sores: ID pending 6/24 but will try to get patient in sooner. Continue nystatin.   6: Bloody diarrhea: 1 episode. Hgb stable. Monitor for now.   7. Patient desires to switch off of MPA.  Dr. Díaz is open to azathioprine but would like nephrology to review. Nephrology appt. Pending Monday.         Case discussed with Dr. Tsai and Dr. Díaz.   Followup: Monday with nephrology.     Total Time: 30 min,   Counselling Time: 10 min.    LAURITA Jacobson        Again, thank you for allowing me to participate in the care of your patient.        Sincerely,        LAURITA Jacobson CNP

## 2024-06-14 NOTE — PROGRESS NOTES
Nursing Note  Ira Zamora presents today to [facility] for:   Chief Complaint   Patient presents with    Infusion     IV fluids     During today's [facility] appointment, orders from Pratima Macedo NP were completed.  Frequency: once    Progress note:  Patient identification verified by name and date of birth.  Assessment completed.  Vitals recorded in Doc Flowsheets.  Patient was provided with education regarding medication/procedure and possible side effects.  Patient verbalized understanding.     present during visit today: Not Applicable.    Treatment Conditions: Non-applicable.  Premedications: were not ordered.  Drug Waste Record: No  Infusion length and rate:  999 ml/hr., over one hour  Labs: were not ordered for this appointment.  Vascular access: peripheral IV was placed by vascular access nurse.  Is the next appt scheduled? No    Post Infusion Assessment:  Patient tolerated infusion without incident.  Site patent and intact, free from redness, edema or discomfort.  No evidence of extravasations.  Access discontinued per protocol.     Discharge Plan:   Follow up plan of care with: transplant coordinator.  Discharge instructions were reviewed with patient.  Patient/representative verbalized understanding of discharge instructions and all questions answered.  Patient discharged from [facility] in stable condition.    Christina Nicholson RN    Administrations This Visit       sodium chloride 0.9% BOLUS 1,000 mL       Admin Date  06/14/2024 Action  $New Bag Dose  1,000 mL Route  Intravenous Documented By  Christina Nicholson RN                    LMP 04/03/2024 (Approximate)

## 2024-06-17 ENCOUNTER — TELEPHONE (OUTPATIENT)
Dept: INFECTIOUS DISEASES | Facility: CLINIC | Age: 34
End: 2024-06-17
Payer: MEDICARE

## 2024-06-17 ENCOUNTER — TELEPHONE (OUTPATIENT)
Dept: TRANSPLANT | Facility: CLINIC | Age: 34
End: 2024-06-17

## 2024-06-17 ENCOUNTER — VIRTUAL VISIT (OUTPATIENT)
Dept: TRANSPLANT | Facility: CLINIC | Age: 34
End: 2024-06-17
Attending: INTERNAL MEDICINE
Payer: MEDICARE

## 2024-06-17 ENCOUNTER — HOSPITAL ENCOUNTER (OUTPATIENT)
Facility: CLINIC | Age: 34
Discharge: HOME | End: 2024-06-17
Payer: COMMERCIAL

## 2024-06-17 ENCOUNTER — LAB REQUISITION (OUTPATIENT)
Dept: LAB | Facility: CLINIC | Age: 34
End: 2024-06-17
Payer: MEDICARE

## 2024-06-17 VITALS — HEIGHT: 62 IN | WEIGHT: 113 LBS | BODY MASS INDEX: 20.8 KG/M2

## 2024-06-17 DIAGNOSIS — Z48.298 AFTERCARE FOLLOWING ORGAN TRANSPLANT: ICD-10-CM

## 2024-06-17 DIAGNOSIS — E43 SEVERE MALNUTRITION (H): ICD-10-CM

## 2024-06-17 DIAGNOSIS — Z94.0 KIDNEY REPLACED BY TRANSPLANT: Primary | ICD-10-CM

## 2024-06-17 DIAGNOSIS — Z94.0 KIDNEY TRANSPLANT STATUS: ICD-10-CM

## 2024-06-17 DIAGNOSIS — D84.9 IMMUNOSUPPRESSED STATUS (H): ICD-10-CM

## 2024-06-17 DIAGNOSIS — K12.1 MOUTH ULCERS: ICD-10-CM

## 2024-06-17 PROBLEM — D72.810 LYMPHOPENIA: Status: RESOLVED | Noted: 2024-05-23 | Resolved: 2024-06-17

## 2024-06-17 PROBLEM — Z79.2 ADMINISTRATION OF LONG-TERM PROPHYLACTIC ANTIBIOTICS: Status: RESOLVED | Noted: 2024-06-06 | Resolved: 2024-06-17

## 2024-06-17 LAB
ACANTHOCYTES BLD QL SMEAR: ABNORMAL
ANION GAP SERPL CALCULATED.3IONS-SCNC: 11 MMOL/L (ref 7–15)
AUER BODIES BLD QL SMEAR: ABNORMAL
BASO STIPL BLD QL SMEAR: ABNORMAL
BASOPHILS # BLD AUTO: 0 10E3/UL (ref 0–0.2)
BASOPHILS NFR BLD AUTO: 1 %
BITE CELLS BLD QL SMEAR: ABNORMAL
BLISTER CELLS BLD QL SMEAR: ABNORMAL
BUN SERPL-MCNC: 18.6 MG/DL (ref 6–20)
BURR CELLS BLD QL SMEAR: ABNORMAL
CALCIUM SERPL-MCNC: 10.3 MG/DL (ref 8.6–10)
CHLORIDE SERPL-SCNC: 101 MMOL/L (ref 98–107)
CREAT SERPL-MCNC: 0.97 MG/DL (ref 0.51–0.95)
CV B1 NAB TITR SER NT: NORMAL {TITER}
CV B2 NAB TITR SER NT: NORMAL {TITER}
CV B3 NAB TITR SER NT: NORMAL {TITER}
CV B4 NAB TITR SER NT: NORMAL {TITER}
CV B5 NAB TITR SER NT: NORMAL {TITER}
CV B6 NAB TITR SER NT: NORMAL {TITER}
DACRYOCYTES BLD QL SMEAR: ABNORMAL
DEPRECATED HCO3 PLAS-SCNC: 23 MMOL/L (ref 22–29)
EGFRCR SERPLBLD CKD-EPI 2021: 78 ML/MIN/1.73M2
ELLIPTOCYTES BLD QL SMEAR: ABNORMAL
EOSINOPHIL # BLD AUTO: 0 10E3/UL (ref 0–0.7)
EOSINOPHIL NFR BLD AUTO: 1 %
ERYTHROCYTE [DISTWIDTH] IN BLOOD BY AUTOMATED COUNT: 13.2 % (ref 10–15)
FRAGMENTS BLD QL SMEAR: ABNORMAL
GIANT PLATELETS BLD QL SMEAR: ABNORMAL
GLUCOSE SERPL-MCNC: 117 MG/DL (ref 70–99)
HCT VFR BLD AUTO: 31.6 % (ref 35–47)
HGB BLD-MCNC: 10 G/DL (ref 11.7–15.7)
HGB C CRYSTALS: ABNORMAL
HOWELL-JOLLY BOD BLD QL SMEAR: ABNORMAL
IMM GRANULOCYTES # BLD: 0 10E3/UL
IMM GRANULOCYTES NFR BLD: 1 %
LYMPHOCYTES # BLD AUTO: 0.3 10E3/UL (ref 0.8–5.3)
LYMPHOCYTES NFR BLD AUTO: 21 %
MAGNESIUM SERPL-MCNC: 1.5 MG/DL (ref 1.7–2.3)
MCH RBC QN AUTO: 29.9 PG (ref 26.5–33)
MCHC RBC AUTO-ENTMCNC: 31.6 G/DL (ref 31.5–36.5)
MCV RBC AUTO: 95 FL (ref 78–100)
MONOCYTES # BLD AUTO: 0.2 10E3/UL (ref 0–1.3)
MONOCYTES NFR BLD AUTO: 12 %
NEUTROPHILS # BLD AUTO: 1 10E3/UL (ref 1.6–8.3)
NEUTROPHILS NFR BLD AUTO: 63 %
NEUTS HYPERSEG BLD QL SMEAR: ABNORMAL
NRBC # BLD AUTO: 0 10E3/UL
NRBC BLD AUTO-RTO: 0 /100
PATH REV: ABNORMAL
PHOSPHATE SERPL-MCNC: 2.2 MG/DL (ref 2.5–4.5)
PLAT MORPH BLD: ABNORMAL
PLATELET # BLD AUTO: 321 10E3/UL (ref 150–450)
POLYCHROMASIA BLD QL SMEAR: ABNORMAL
POTASSIUM SERPL-SCNC: 4.1 MMOL/L (ref 3.4–5.3)
RBC # BLD AUTO: 3.34 10E6/UL (ref 3.8–5.2)
RBC AGGLUT BLD QL: ABNORMAL
RBC MORPH BLD: ABNORMAL
ROULEAUX BLD QL SMEAR: ABNORMAL
SICKLE CELLS BLD QL SMEAR: ABNORMAL
SMUDGE CELLS BLD QL SMEAR: ABNORMAL
SODIUM SERPL-SCNC: 135 MMOL/L (ref 135–145)
SPHEROCYTES BLD QL SMEAR: ABNORMAL
STOMATOCYTES BLD QL SMEAR: SLIGHT
TACROLIMUS BLD-MCNC: 5.1 UG/L (ref 5–15)
TARGETS BLD QL SMEAR: ABNORMAL
TME LAST DOSE: NORMAL H
TME LAST DOSE: NORMAL H
TOXIC GRANULES BLD QL SMEAR: ABNORMAL
VARIANT LYMPHS BLD QL SMEAR: ABNORMAL
WBC # BLD AUTO: 1.6 10E3/UL (ref 4–11)

## 2024-06-17 PROCEDURE — 80197 ASSAY OF TACROLIMUS: CPT | Mod: ORL | Performed by: INTERNAL MEDICINE

## 2024-06-17 PROCEDURE — 99215 OFFICE O/P EST HI 40 MIN: CPT | Mod: 95 | Performed by: INTERNAL MEDICINE

## 2024-06-17 PROCEDURE — G2211 COMPLEX E/M VISIT ADD ON: HCPCS | Mod: 95 | Performed by: INTERNAL MEDICINE

## 2024-06-17 RX ORDER — SULFAMETHOXAZOLE AND TRIMETHOPRIM 400; 80 MG/1; MG/1
1 TABLET ORAL DAILY
Qty: 90 TABLET | Refills: 2 | Status: SHIPPED | OUTPATIENT
Start: 2024-06-17 | End: 2024-06-17

## 2024-06-17 RX ORDER — PREDNISONE 10 MG/1
10 TABLET ORAL DAILY
Qty: 90 TABLET | Refills: 0 | Status: ON HOLD | OUTPATIENT
Start: 2024-06-17 | End: 2024-07-09

## 2024-06-17 RX ORDER — SULFAMETHOXAZOLE AND TRIMETHOPRIM 400; 80 MG/1; MG/1
1 TABLET ORAL DAILY
Qty: 90 TABLET | Refills: 2 | Status: ON HOLD | OUTPATIENT
Start: 2024-06-17 | End: 2024-07-09

## 2024-06-17 ASSESSMENT — PAIN SCALES - GENERAL: PAINLEVEL: NO PAIN (0)

## 2024-06-17 NOTE — NURSING NOTE
Is the patient currently in the state of [place]? YES    Visit mode:VIDEO    If the visit is dropped, the patient can be reconnected by: VIDEO VISIT: Text to cell phone:   Telephone Information:   Mobile [phone]       Will anyone else be joining the visit? NO  (If patient encounters technical issues they should call [phone]    How would you like to obtain your AVS? MyChart    Are changes needed to the allergy or medication list? No    Are refills needed on medications prescribed by this physician? NO    Reason for visit: [place]

## 2024-06-17 NOTE — PATIENT INSTRUCTIONS
Patient Recommendations:  - Stop mycophenolate and will plan for Belatacept infusions . For now, continue prednisone 10mg daily   -Restart bactrim daily after July 5th  -Stop nystatin    Transplant Patient Information  Your Post Transplant Coordinator is: Radha Jorge  For non urgent items, we encourage you to contact your coordinator/care team online via [employer]  You and your care team can also contact your transplant coordinator Monday - Friday, 8am - 5pm at [phone] (Option 2 to reach the coordinator or Option 4 to schedule an appointment).  After hours for urgent matters, please call [facility] at [phone].

## 2024-06-17 NOTE — PROGRESS NOTES
Virtual Visit Details    Type of service:  Video Visit   Video Start Time: 2:25 PM  Video End Time:2:42 PM    Originating Location (pt. Location): Home    Distant Location (provider location):  Off-site  Platform used for Video Visit: [facility]    TRANSPLANT NEPHROLOGY EARLY POST TRANSPLANT VISIT    Assessment & Plan   #  DDKT (pediatric en bloc) : Stable   - Baseline Creatinine: ~ 0.9-1   - Proteinuria: Normal (<0.2 grams)   - Date DSA Last Checked: May/2024      Latest DSA: No DSA, repeat now    - BK Viremia: No   - Kidney Tx Biopsy: No   - Transplant Ureteral Stent: Removed   - Continue ASA 325mg daily    # Immunosuppression: Tacrolimus immediate release (goal 8-10), Mycophenolic acid (dose 180 mg every 12 hours), and Prednisone (dose 10 mg daily)   - Induction with Recent Transplant:  High Intensity Protocol due to severe GERD and concern with steroids    - Continue with intensive monitoring of immunosuppression for efficacy and toxicity.   - Changes: Yes - Stop MPA, start Belatacept <3m post txp conversion protocol. Can decrease prednisone to 5mg daily at time of 4th infusion, decrease pred to 5mg every other day at time of 5th infusion x1 week, then stop prednisone     -Cannot use AZA due to low WBC, cannot use mTORi due to oral ulcers    # Infection Prophylaxis:   - PJP: Sulfa/TMP (Bactrim) MWF, restart 7/5/24 given no improvement with holding. Received inhaled pentamidine on 6/7/24  - CMV: Valganciclovir (Valcyte). Patient is CMV IgG Ab discordant (D+/R-) and will continue on Valcyte x 6 months, then check CMV PCR monthly until 12 months post transplant.        # Hypertension: Controlled, but low at times;  Goal BP: < 140/90   - Changes: Not at this time.     # Anemia in Chronic Renal Disease: Hgb: Stable, low      JUNG: No   - Iron studies: Replete    # Mineral Bone Disorder:    - Secondary renal hyperparathyroidism; PTH level: Minimally elevated ( pg/ml)        On treatment: None  - Vitamin D; level: Low         On supplement: No  - Calcium; level: Normal        On supplement: No  - Phosphorus; level: Normal        On supplement: No    # Electrolytes:   - Potassium; level: Normal        On supplement: No  - Magnesium; level: Low normal        On supplement: Yes, mag ox 800mg dialy   - Bicarbonate; level: Normal        On supplement: No    # Leukopenia:   -WBC continues to be low, <2k. ANC 0.9 on 6/9/24    # Oral and genital ulcers:   -Ddx is broad but includes MPA induced, Crohn's disease, leishmania, syphilis   -Inpatient workup 6/2024 all negative   -Recommend biopsy of vaginal lesion with gynecology as it is the most prevalent and stop MPA as above but could cancel visit if improving   -Continue orajel PRN, magic mouthwash    # Severe malnutrition:   -Pt is losing weight, 18lbs down from txp date. Weight is down to 113lbs from 124lbs on 5/23/24 due to mouth pain   -If weight decreases further to <110lbs would admit for NG tube and starting tube feeds     # Crohns: followed by [facility]. Does occasionally alternate between diarrhea and constipation.  On ustekinumab PTA, holding due to leukopenia and ulcers     # Cardiac/Vascular Disease Risk Factors: Dilated ascending aorta (3.8 cm)  on 2024 ECHO (stable from 2020).     # GERD:   -Pt should remain off PPI due to concern for AIN.   -Continue famotidine 40mg BID    # Medical Compliance: Yes    # Health Maintenance and Vaccination Review: Not Reviewed    # Transplant History:  Etiology of Kidney Failure: Interstitial nephritis  Tx: DDKT (pediatric en-bloc)  Transplant: 4/17/2024 (Kidney)  Donor Type: Donation after Brain Death Donor Class: Standard Criteria Donor  Crossmatch at time of Tx: negative  DSA at time of Tx: No  Significant changes in immunosuppression: None  CMV IgG Ab High Risk Discordance (D+/R-): Yes  EBV IgG Ab High Risk Discordance (D+/R-): No  Significant transplant-related complications: None    Transplant Office Phone Number: [phone]    Assessment  "and plan was discussed with the patient and she voiced her understanding and agreement.    Return visit: Return in 10 days (on 6/27/2024) for previously scheduled visit.    Yan Quezada MD    The longitudinal plan of care for kidney transplant was addressed during this visit. Due to the added complexity in care, I will continue to support Ira Zamora in the subsequent management of this condition(s) and with the ongoing continuity of care of this condition(s).     I spent 44 minutes on the date of the encounter doing chart review, review of outside records, review of test results, interpretation of tests, patient visit, and documentation    Chief Complaint   Ms. Zamora is a 34 year old here for kidney transplant, immunosuppression management, and hospital follow up.     History of Present Illness   Ira was admitted 6/5-6/9/24 for oral and vaginal ulcers. All testing as to cause was negative.     She has not really been able to eat much with oragel and magic mouthwash. She states that it takes \"a lot of energy\" to eat but has been drinking Boost and water. She feels that her mouth ulcers may be getting a bit better. She says her mouth is less swollen. She has not been having diarrhea.     Home BP:  100s systolic    Problem List   Patient Active Problem List   Diagnosis    Crohn's disease (H)    Anemia in stage 2 chronic kidney disease    Secondary renal hyperparathyroidism (H24)    Long QT interval    Kidney replaced by transplant    Immunosuppressed status (H24)    Gastroesophageal reflux disease without esophagitis    Dehydration    Aftercare following organ transplant    Lymphopenia    Mouth ulcers    Vaginal ulcer    S/P kidney transplant    Administration of long-term prophylactic antibiotics    Leukopenia, unspecified type       Allergies   Allergies   Allergen Reactions    Amlodipine Headache and Other (See Comments)     Other Reaction(s): Unknown    Proton Pump Inhibitors Nephrotoxicity     No PPIs due to " history of renal failure due to interstitial nephritis    Tegaderm Transparent Dressing (Informational Only) Blisters       Medications   Current Outpatient Medications   Medication Sig Dispense Refill    predniSONE (DELTASONE) 10 MG tablet Take 1 tablet (10 mg) by mouth daily for 90 days 90 tablet 0    sulfamethoxazole-trimethoprim (BACTRIM) 400-80 MG tablet Take 1 tablet by mouth daily Start after July 5, 2024 90 tablet 2    acetaminophen (TYLENOL) 500 MG tablet Take 1,000 mg by mouth every 6 hours as needed      aspirin (ASA) 325 MG tablet Take 1 tablet (325 mg) by mouth daily 30 tablet 2    atorvastatin (LIPITOR) 10 MG tablet Take 1 tablet (10 mg) by mouth daily 30 tablet 1    benzocaine (ORAJEL MAXIMUM STRENGTH) 20 % GEL gel Take by mouth 4 times daily as needed for mouth sores 9 g 0    famotidine (PEPCID) 20 MG tablet Take 2 tablets (40 mg) by mouth 2 times daily 60 tablet 1    levonorgestrel (KYLEENA) 19.5 MG IUD 1 Device by Intrauterine route      magic mouthwash suspension (diphenhydrAMINE, lidocaine, aluminum-magnesium & simethicone) Swish and swallow 10 mLs in mouth every 6 hours as needed for mouth sores 560 mL 1    magnesium oxide (MAG-OX) 400 MG tablet Take 2 tablets (800 mg) by mouth daily 60 tablet 0    mineral oil-hydrophilic petrolatum (AQUAPHOR) external ointment Apply topically every hour as needed for other (barrier cream) 50 g 0    oxyCODONE (ROXICODONE) 5 MG tablet Take 1 tablet (5 mg) by mouth 2 times daily as needed for severe pain 8 tablet 0    PARoxetine (PAXIL) 30 MG tablet Take 30 mg by mouth every evening      tacrolimus (GENERIC EQUIVALENT) 0.5 MG capsule Take 0.5 mg by mouth every evening Total dose 1mg in AM and 1.5mg in PM      tacrolimus (GENERIC EQUIVALENT) 1 MG capsule Take 1 mg by mouth 2 times daily Total dose 1mg in AM and 1.5mg in PM      ustekinumab (STELARA) 90 MG/ML Inject 90 mg Subcutaneous once every eight weeks      valGANciclovir (VALCYTE) 450 MG tablet Take 2 tablets  "(900 mg) by mouth daily       No current facility-administered medications for this visit.     Medications Discontinued During This Encounter   Medication Reason    mycophenolic acid (GENERIC EQUIVALENT) 180 MG EC tablet     predniSONE (DELTASONE) 10 MG tablet Reorder (No AVS)    sulfamethoxazole-trimethoprim (BACTRIM) 400-80 MG tablet     nystatin (MYCOSTATIN) 102163 UNIT/ML suspension          Physical Exam   Vital Signs: Ht 1.575 m (5' 2\")   Wt 51.3 kg (113 lb)   LMP 04/03/2024 (Approximate)   BMI 20.67 kg/m      GENERAL APPEARANCE: alert and no distress  HENT: no obvious abnormalities on appearance  RESP: breathing appears unremarkable with normal rate, no audible wheezing or cough and no apparent shortness of breath with conversation  MS: extremities normal - no gross deformities noted  SKIN: no apparent rash and normal skin tone  NEURO: speech is clear with no obvious neurological deficits  PSYCH: mentation appears normal and affect normal     Data         Latest Ref Rng & Units 6/9/2024     6:03 AM 6/9/2024    12:08 AM 6/8/2024     5:57 AM   Renal   Sodium 135 - 145 mmol/L 139   137    K 3.4 - 5.3 mmol/L 4.3   4.1    Cl 98 - 107 mmol/L 107   106    Cl (external) 98 - 107 mmol/L 107   106    CO2 22 - 29 mmol/L 23   20    Urea Nitrogen 6.0 - 20.0 mg/dL 13.7   16.2    Creatinine 0.51 - 0.95 mg/dL 0.89   0.89    Glucose 70 - 99 mg/dL 112   106    Calcium 8.6 - 10.0 mg/dL 9.6   10.0    Magnesium 1.7 - 2.3 mg/dL 2.6  3.0  1.5          Latest Ref Rng & Units 6/9/2024     6:03 AM 6/8/2024     5:57 AM 6/7/2024     6:04 AM   Bone Health   Phosphorus 2.5 - 4.5 mg/dL 2.9  2.2  2.8          Latest Ref Rng & Units 6/9/2024     6:03 AM 6/8/2024     5:57 AM 6/7/2024     6:04 AM   Heme   WBC 4.0 - 11.0 10e3/uL  4.0 - 11.0 10e3/uL 1.4     1.4  1.8  2.1    Hgb 11.7 - 15.7 g/dL 10.1  10.6  10.4    Plt 150 - 450 10e3/uL 183  208  205          Latest Ref Rng & Units 6/6/2024     7:07 AM 6/5/2024     5:12 PM 5/9/2024     9:10 AM "   Liver   AP 40 - 150 U/L 83  82  90    TBili <=1.2 mg/dL 0.7  0.4  0.4    ALT 0 - 50 U/L 12  18  8    AST 0 - 45 U/L 16  19  17    Tot Protein 6.4 - 8.3 g/dL 6.9  6.5  6.7    Albumin 3.5 - 5.2 g/dL 3.8  3.8  4.1          Latest Ref Rng & Units 4/17/2024     9:58 AM   Pancreas   A1C <5.7 % 4.7          Latest Ref Rng & Units 5/23/2024     9:49 AM 4/22/2024     8:21 AM   Iron studies   Iron 37 - 145 ug/dL 77  53    Iron Sat Index 15 - 46 % 38  36    Ferritin 6 - 175 ng/mL 2,181  2,181          Latest Ref Rng & Units 4/17/2024     9:58 AM 9/29/2022    11:40 AM   [facility] Virology   EBV CAPSID ANTIBODY IGG No detectable antibody. Positive  Positive      Failed to redirect to the Timeline version of the [employer].  Recent Labs   Lab Test 04/25/24  0930 05/02/24  0920 05/23/24  0949 05/31/24  0930 06/07/24  0647 06/08/24  0557   DOSTAC 4/24/2024  --  5/22/2024 5/30/2024  --   --    TACROL 9.6   < > 9.2 8.7 11.6 10.7    < > = values in this interval not displayed.     Recent Labs   Lab Test 04/23/24  0735   DOSMPA 4/22/2024   9:00 PM   MPACID 2.20   MPAG 107.5*

## 2024-06-17 NOTE — TELEPHONE ENCOUNTER
DATE:  6/17/2024     TIME OF RECEIPT FROM LAB:  10:24 AM   [place]     ORDERING PROVIDER: Angel    LAB TEST:  WBC    LAB VALUE:  1.6    *Manual diff is processing.

## 2024-06-17 NOTE — LETTER
6/17/2024      Ira Zamora  [address] [place] [address]  [place] [address]      Dear Colleague,    Thank you for referring your patient, Ira Zamora, to the [facility]. Please see a copy of my visit note below.    Virtual Visit Details    Type of service:  Video Visit   Video Start Time: 2:25 PM  Video End Time:2:42 PM    Originating Location (pt. Location): Home    Distant Location (provider location):  Off-site  Platform used for Video Visit: [facility]    TRANSPLANT NEPHROLOGY EARLY POST TRANSPLANT VISIT    Assessment & Plan  #  DDKT (pediatric en bloc) : Stable   - Baseline Creatinine: ~ 0.9-1   - Proteinuria: Normal (<0.2 grams)   - Date DSA Last Checked: May/2024      Latest DSA: No DSA, repeat now    - BK Viremia: No   - Kidney Tx Biopsy: No   - Transplant Ureteral Stent: Removed   - Continue ASA 325mg daily    # Immunosuppression: Tacrolimus immediate release (goal 8-10), Mycophenolic acid (dose 180 mg every 12 hours), and Prednisone (dose 10 mg daily)   - Induction with Recent Transplant:  High Intensity Protocol due to severe GERD and concern with steroids    - Continue with intensive monitoring of immunosuppression for efficacy and toxicity.   - Changes: Yes - Stop MPA, start Belatacept <3m post txp conversion protocol. Can decrease prednisone to 5mg daily at time of 4th infusion, decrease pred to 5mg every other day at time of 5th infusion x1 week, then stop prednisone     -Cannot use AZA due to low WBC, cannot use mTORi due to oral ulcers    # Infection Prophylaxis:   - PJP: Sulfa/TMP (Bactrim) MWF, restart 7/5/24 given no improvement with holding. Received inhaled pentamidine on 6/7/24  - CMV: Valganciclovir (Valcyte). Patient is CMV IgG Ab discordant (D+/R-) and will continue on Valcyte x 6 months, then check CMV PCR monthly until 12 months post transplant.        # Hypertension: Controlled, but low at times;  Goal BP: < 140/90   - Changes: Not at this time.     # Anemia in Chronic Renal  Disease: Hgb: Stable, low      JUNG: No   - Iron studies: Replete    # Mineral Bone Disorder:    - Secondary renal hyperparathyroidism; PTH level: Minimally elevated ( pg/ml)        On treatment: None  - Vitamin D; level: Low        On supplement: No  - Calcium; level: Normal        On supplement: No  - Phosphorus; level: Normal        On supplement: No    # Electrolytes:   - Potassium; level: Normal        On supplement: No  - Magnesium; level: Low normal        On supplement: Yes, mag ox 800mg dialy   - Bicarbonate; level: Normal        On supplement: No    # Leukopenia:   -WBC continues to be low, <2k. ANC 0.9 on 6/9/24    # Oral and genital ulcers:   -Ddx is broad but includes MPA induced, Crohn's disease, leishmania, syphilis   -Inpatient workup 6/2024 all negative   -Recommend biopsy of vaginal lesion with gynecology as it is the most prevalent and stop MPA as above but could cancel visit if improving   -Continue orajel PRN, magic mouthwash    # Severe malnutrition:   -Pt is losing weight, 18lbs down from txp date. Weight is down to 113lbs from 124lbs on 5/23/24 due to mouth pain   -If weight decreases further to <110lbs would admit for NG tube and starting tube feeds     # Crohns: followed by [facility]. Does occasionally alternate between diarrhea and constipation.  On ustekinumab PTA, holding due to leukopenia and ulcers     # Cardiac/Vascular Disease Risk Factors: Dilated ascending aorta (3.8 cm)  on 2024 ECHO (stable from 2020).     # GERD:   -Pt should remain off PPI due to concern for AIN.   -Continue famotidine 40mg BID    # Medical Compliance: Yes    # Health Maintenance and Vaccination Review: Not Reviewed    # Transplant History:  Etiology of Kidney Failure: Interstitial nephritis  Tx: DDKT (pediatric en-bloc)  Transplant: 4/17/2024 (Kidney)  Donor Type: Donation after Brain Death Donor Class: Standard Criteria Donor  Crossmatch at time of Tx: negative  DSA at time of Tx: No  Significant changes in  "immunosuppression: None  CMV IgG Ab High Risk Discordance (D+/R-): Yes  EBV IgG Ab High Risk Discordance (D+/R-): No  Significant transplant-related complications: None    Transplant Office Phone Number: [phone]    Assessment and plan was discussed with the patient and she voiced her understanding and agreement.    Return visit: Return in 10 days (on 6/27/2024) for previously scheduled visit.    Yan Quezada MD    The longitudinal plan of care for kidney transplant was addressed during this visit. Due to the added complexity in care, I will continue to support Ira Zamora in the subsequent management of this condition(s) and with the ongoing continuity of care of this condition(s).     I spent 44 minutes on the date of the encounter doing chart review, review of outside records, review of test results, interpretation of tests, patient visit, and documentation    Chief Complaint  Ms. Zamora is a 34 year old here for kidney transplant, immunosuppression management, and hospital follow up.     History of Present Illness  Ira was admitted 6/5-6/9/24 for oral and vaginal ulcers. All testing as to cause was negative.     She has not really been able to eat much with oragel and magic mouthwash. She states that it takes \"a lot of energy\" to eat but has been drinking Boost and water. She feels that her mouth ulcers may be getting a bit better. She says her mouth is less swollen. She has not been having diarrhea.     Home BP:  100s systolic    Problem List  Patient Active Problem List   Diagnosis     Crohn's disease (H)     Anemia in stage 2 chronic kidney disease     Secondary renal hyperparathyroidism (H24)     Long QT interval     Kidney replaced by transplant     Immunosuppressed status (H24)     Gastroesophageal reflux disease without esophagitis     Dehydration     Aftercare following organ transplant     Lymphopenia     Mouth ulcers     Vaginal ulcer     S/P kidney transplant     Administration of long-term " prophylactic antibiotics     Leukopenia, unspecified type       Allergies  Allergies   Allergen Reactions     Amlodipine Headache and Other (See Comments)     Other Reaction(s): Unknown     Proton Pump Inhibitors Nephrotoxicity     No PPIs due to history of renal failure due to interstitial nephritis     Tegaderm Transparent Dressing (Informational Only) Blisters       Medications  Current Outpatient Medications   Medication Sig Dispense Refill     predniSONE (DELTASONE) 10 MG tablet Take 1 tablet (10 mg) by mouth daily for 90 days 90 tablet 0     sulfamethoxazole-trimethoprim (BACTRIM) 400-80 MG tablet Take 1 tablet by mouth daily Start after July 5, 2024 90 tablet 2     acetaminophen (TYLENOL) 500 MG tablet Take 1,000 mg by mouth every 6 hours as needed       aspirin (ASA) 325 MG tablet Take 1 tablet (325 mg) by mouth daily 30 tablet 2     atorvastatin (LIPITOR) 10 MG tablet Take 1 tablet (10 mg) by mouth daily 30 tablet 1     benzocaine (ORAJEL MAXIMUM STRENGTH) 20 % GEL gel Take by mouth 4 times daily as needed for mouth sores 9 g 0     famotidine (PEPCID) 20 MG tablet Take 2 tablets (40 mg) by mouth 2 times daily 60 tablet 1     levonorgestrel (KYLEENA) 19.5 MG IUD 1 Device by Intrauterine route       magic mouthwash suspension (diphenhydrAMINE, lidocaine, aluminum-magnesium & simethicone) Swish and swallow 10 mLs in mouth every 6 hours as needed for mouth sores 560 mL 1     magnesium oxide (MAG-OX) 400 MG tablet Take 2 tablets (800 mg) by mouth daily 60 tablet 0     mineral oil-hydrophilic petrolatum (AQUAPHOR) external ointment Apply topically every hour as needed for other (barrier cream) 50 g 0     oxyCODONE (ROXICODONE) 5 MG tablet Take 1 tablet (5 mg) by mouth 2 times daily as needed for severe pain 8 tablet 0     PARoxetine (PAXIL) 30 MG tablet Take 30 mg by mouth every evening       tacrolimus (GENERIC EQUIVALENT) 0.5 MG capsule Take 0.5 mg by mouth every evening Total dose 1mg in AM and 1.5mg in PM    "    tacrolimus (GENERIC EQUIVALENT) 1 MG capsule Take 1 mg by mouth 2 times daily Total dose 1mg in AM and 1.5mg in PM       ustekinumab (STELARA) 90 MG/ML Inject 90 mg Subcutaneous once every eight weeks       valGANciclovir (VALCYTE) 450 MG tablet Take 2 tablets (900 mg) by mouth daily       No current facility-administered medications for this visit.     Medications Discontinued During This Encounter   Medication Reason     mycophenolic acid (GENERIC EQUIVALENT) 180 MG EC tablet      predniSONE (DELTASONE) 10 MG tablet Reorder (No AVS)     sulfamethoxazole-trimethoprim (BACTRIM) 400-80 MG tablet      nystatin (MYCOSTATIN) 965961 UNIT/ML suspension          Physical Exam  Vital Signs: Ht 1.575 m (5' 2\")   Wt 51.3 kg (113 lb)   LMP 04/03/2024 (Approximate)   BMI 20.67 kg/m      GENERAL APPEARANCE: alert and no distress  HENT: no obvious abnormalities on appearance  RESP: breathing appears unremarkable with normal rate, no audible wheezing or cough and no apparent shortness of breath with conversation  MS: extremities normal - no gross deformities noted  SKIN: no apparent rash and normal skin tone  NEURO: speech is clear with no obvious neurological deficits  PSYCH: mentation appears normal and affect normal     Data        Latest Ref Rng & Units 6/9/2024     6:03 AM 6/9/2024    12:08 AM 6/8/2024     5:57 AM   Renal   Sodium 135 - 145 mmol/L 139   137    K 3.4 - 5.3 mmol/L 4.3   4.1    Cl 98 - 107 mmol/L 107   106    Cl (external) 98 - 107 mmol/L 107   106    CO2 22 - 29 mmol/L 23   20    Urea Nitrogen 6.0 - 20.0 mg/dL 13.7   16.2    Creatinine 0.51 - 0.95 mg/dL 0.89   0.89    Glucose 70 - 99 mg/dL 112   106    Calcium 8.6 - 10.0 mg/dL 9.6   10.0    Magnesium 1.7 - 2.3 mg/dL 2.6  3.0  1.5          Latest Ref Rng & Units 6/9/2024     6:03 AM 6/8/2024     5:57 AM 6/7/2024     6:04 AM   Bone Health   Phosphorus 2.5 - 4.5 mg/dL 2.9  2.2  2.8          Latest Ref Rng & Units 6/9/2024     6:03 AM 6/8/2024     5:57 AM " 6/7/2024     6:04 AM   Heme   WBC 4.0 - 11.0 10e3/uL  4.0 - 11.0 10e3/uL 1.4     1.4  1.8  2.1    Hgb 11.7 - 15.7 g/dL 10.1  10.6  10.4    Plt 150 - 450 10e3/uL 183  208  205          Latest Ref Rng & Units 6/6/2024     7:07 AM 6/5/2024     5:12 PM 5/9/2024     9:10 AM   Liver   AP 40 - 150 U/L 83  82  90    TBili <=1.2 mg/dL 0.7  0.4  0.4    ALT 0 - 50 U/L 12  18  8    AST 0 - 45 U/L 16  19  17    Tot Protein 6.4 - 8.3 g/dL 6.9  6.5  6.7    Albumin 3.5 - 5.2 g/dL 3.8  3.8  4.1          Latest Ref Rng & Units 4/17/2024     9:58 AM   Pancreas   A1C <5.7 % 4.7          Latest Ref Rng & Units 5/23/2024     9:49 AM 4/22/2024     8:21 AM   Iron studies   Iron 37 - 145 ug/dL 77  53    Iron Sat Index 15 - 46 % 38  36    Ferritin 6 - 175 ng/mL 2,181  2,181          Latest Ref Rng & Units 4/17/2024     9:58 AM 9/29/2022    11:40 AM   [facility] T[facility] Virology   EBV CAPSID ANTIBODY IGG No detectable antibody. Positive  Positive      Failed to redirect to the Timeline version of the [employer].  Recent Labs   Lab Test 04/25/24  0930 05/02/24  0920 05/23/24  0949 05/31/24  0930 06/07/24  0647 06/08/24  0557   DOSTAC 4/24/2024  --  5/22/2024 5/30/2024  --   --    TACROL 9.6   < > 9.2 8.7 11.6 10.7    < > = values in this interval not displayed.     Recent Labs   Lab Test 04/23/24  0735   DOSMPA 4/22/2024   9:00 PM   MPACID 2.20   MPAG 107.5*          Again, thank you for allowing me to participate in the care of your patient.        Sincerely,        Yan Quezada MD

## 2024-06-17 NOTE — TELEPHONE ENCOUNTER
Called patient to offer sooner appointment this afternoon, she is not able to attend. Patient has appointment on 6/24 with Dr Calderon, but will keep on cancellation list in interim.      ----- Message from Irena Calderon MD sent at 6/14/2024  2:12 PM CDT -----  Regarding: RE: pending appt  Sorry, I am booked out. Forwarding to our nurses, if there are sooner appts and cancellations.   Thanks  AB  ----- Message -----  From: Pratima Macedo APRN CNP  Sent: 6/14/2024  11:29 AM CDT  To: Irena Calderon MD  Subject: pending appt                                     Hello!   This patient is scheduled to see you 6/24 for follow up on her inpatient ID consult for oral and vaginal sores.  She's 58 days post DDKT.   Dr. Brooks would like her to be seen early next week if possible. She's having trouble eating and drinking due to the sores.     Any chance you're able to accommodate this?     Thank you!  Pratima

## 2024-06-18 DIAGNOSIS — Z94.0 KIDNEY REPLACED BY TRANSPLANT: Chronic | ICD-10-CM

## 2024-06-18 RX ORDER — VALGANCICLOVIR 450 MG/1
900 TABLET, FILM COATED ORAL DAILY
Qty: 60 TABLET | Refills: 3 | Status: ON HOLD | OUTPATIENT
Start: 2024-06-18 | End: 2024-07-09

## 2024-06-18 RX ORDER — TACROLIMUS 1 MG/1
2 CAPSULE ORAL 2 TIMES DAILY
Qty: 120 CAPSULE | Refills: 11 | Status: ON HOLD | OUTPATIENT
Start: 2024-06-18 | End: 2024-07-09

## 2024-06-18 RX ORDER — TACROLIMUS 0.5 MG/1
0.5 CAPSULE ORAL 2 TIMES DAILY
Qty: 60 CAPSULE | Refills: 11 | Status: ON HOLD | OUTPATIENT
Start: 2024-06-18 | End: 2024-07-09

## 2024-06-18 NOTE — TELEPHONE ENCOUNTER
Increased tac to 2.5mg twice daily. LVM for patient to return call. Also placed referral for ob/gyn for vaginal lesion. Working on obtaining sherrie for patient

## 2024-06-19 ENCOUNTER — TELEPHONE (OUTPATIENT)
Dept: TRANSPLANT | Facility: CLINIC | Age: 34
End: 2024-06-19
Payer: MEDICARE

## 2024-06-19 DIAGNOSIS — K13.70 ORAL MUCOSAL LESION: ICD-10-CM

## 2024-06-19 DIAGNOSIS — D84.9 IMMUNOSUPPRESSED STATUS (H): Primary | ICD-10-CM

## 2024-06-19 DIAGNOSIS — Z94.0 KIDNEY REPLACED BY TRANSPLANT: ICD-10-CM

## 2024-06-19 RX ORDER — OXYCODONE HYDROCHLORIDE 5 MG/1
5 TABLET ORAL 2 TIMES DAILY PRN
Qty: 8 TABLET | Refills: 0 | Status: SHIPPED | OUTPATIENT
Start: 2024-06-19 | End: 2024-06-27

## 2024-06-19 NOTE — TELEPHONE ENCOUNTER
Post Kidney and Pancreas Transplant Team Conference  Date: 6/19/2024  Transplant Coordinator: Radha Jorge     Attendees:  []  Dr. Ortiz [x] Roxanne Betancur, RN [x] Betty Valencia LPN     [x]  Dr. Arellano [x] Radha Jorge, RN [] Diamond Atkinson LPN    [x] Dr. Quezada [x] Otilia Prather RN    [x] Dr. Tsai [x] Geneva Dennis, RN [] Frida Harris RN   [] Dr. Burgess [] Shantelle Ko, RN    [] Dr. Díaz [] Jael Olson RN    []  Dr. Patel [] Nia Pryor RN    [x] Dr. Brooks [] Mao Mahan RN    [x] Nicole Kowalski, NP [] Mary Lou Corcoran RN    [x] Pratima Macedo NP [] Brenda Zayas RN        Verbal Plan Read Back:   Tac goal = 8-10  Recommend starting sherrie  Goal of weaning off prednisone    Routed to RN Coordinator   Betty Valencia LPN

## 2024-06-20 RX ORDER — ALBUTEROL SULFATE 90 UG/1
1-2 AEROSOL, METERED RESPIRATORY (INHALATION)
Status: CANCELLED
Start: 2024-06-20

## 2024-06-20 RX ORDER — ALBUTEROL SULFATE 0.83 MG/ML
2.5 SOLUTION RESPIRATORY (INHALATION)
Status: CANCELLED | OUTPATIENT
Start: 2024-06-20

## 2024-06-20 RX ORDER — DIPHENHYDRAMINE HYDROCHLORIDE 50 MG/ML
50 INJECTION INTRAMUSCULAR; INTRAVENOUS
Status: CANCELLED
Start: 2024-06-20

## 2024-06-20 RX ORDER — MEPERIDINE HYDROCHLORIDE 25 MG/ML
25 INJECTION INTRAMUSCULAR; INTRAVENOUS; SUBCUTANEOUS EVERY 30 MIN PRN
Status: CANCELLED | OUTPATIENT
Start: 2024-06-20

## 2024-06-20 RX ORDER — EPINEPHRINE 1 MG/ML
0.3 INJECTION, SOLUTION, CONCENTRATE INTRAVENOUS EVERY 5 MIN PRN
Status: CANCELLED | OUTPATIENT
Start: 2024-06-20

## 2024-06-20 RX ORDER — HEPARIN SODIUM,PORCINE 10 UNIT/ML
5-20 VIAL (ML) INTRAVENOUS DAILY PRN
Status: CANCELLED | OUTPATIENT
Start: 2024-06-20

## 2024-06-20 RX ORDER — HEPARIN SODIUM (PORCINE) LOCK FLUSH IV SOLN 100 UNIT/ML 100 UNIT/ML
5 SOLUTION INTRAVENOUS
Status: CANCELLED | OUTPATIENT
Start: 2024-06-20

## 2024-06-20 RX ORDER — METHYLPREDNISOLONE SODIUM SUCCINATE 125 MG/2ML
125 INJECTION, POWDER, LYOPHILIZED, FOR SOLUTION INTRAMUSCULAR; INTRAVENOUS
Status: CANCELLED
Start: 2024-06-20

## 2024-06-24 ENCOUNTER — OFFICE VISIT (OUTPATIENT)
Dept: INFECTIOUS DISEASES | Facility: CLINIC | Age: 34
DRG: 987 | End: 2024-06-24
Attending: STUDENT IN AN ORGANIZED HEALTH CARE EDUCATION/TRAINING PROGRAM
Payer: MEDICARE

## 2024-06-24 VITALS
BODY MASS INDEX: 20.8 KG/M2 | SYSTOLIC BLOOD PRESSURE: 95 MMHG | HEART RATE: 118 BPM | DIASTOLIC BLOOD PRESSURE: 67 MMHG | TEMPERATURE: 97.9 F | HEIGHT: 62 IN | OXYGEN SATURATION: 99 % | WEIGHT: 113 LBS

## 2024-06-24 DIAGNOSIS — K12.1 MOUTH ULCERS: ICD-10-CM

## 2024-06-24 DIAGNOSIS — N76.5 VAGINAL ULCER: Primary | ICD-10-CM

## 2024-06-24 DIAGNOSIS — Z94.0 KIDNEY REPLACED BY TRANSPLANT: Chronic | ICD-10-CM

## 2024-06-24 DIAGNOSIS — K21.9 GASTROESOPHAGEAL REFLUX DISEASE WITHOUT ESOPHAGITIS: ICD-10-CM

## 2024-06-24 PROCEDURE — 99213 OFFICE O/P EST LOW 20 MIN: CPT | Performed by: STUDENT IN AN ORGANIZED HEALTH CARE EDUCATION/TRAINING PROGRAM

## 2024-06-24 PROCEDURE — 99417 PROLNG OP E/M EACH 15 MIN: CPT | Performed by: STUDENT IN AN ORGANIZED HEALTH CARE EDUCATION/TRAINING PROGRAM

## 2024-06-24 PROCEDURE — 99215 OFFICE O/P EST HI 40 MIN: CPT | Mod: 24 | Performed by: STUDENT IN AN ORGANIZED HEALTH CARE EDUCATION/TRAINING PROGRAM

## 2024-06-24 ASSESSMENT — PAIN SCALES - GENERAL: PAINLEVEL: MODERATE PAIN (5)

## 2024-06-24 NOTE — LETTER
2024       RE: Ira Zamora  [address]  [place] [address]     Dear Colleague,    Thank you for referring your patient, Ira Zamora, to the [facility] INFECTIOUS DISEASE CLINIC [place] [facility]. Please see a copy of my visit note below.              SOLID ORGAN TRANSPLANT INFECTIOUS DISEASES CLINIC NOTE     Patient:  Ira Zamora   YOB: 1990  Date of Visit:  2024          Assessment and Recommendations:   Recommendations:  Suspect ulcers due to MPA. Will wait to see if they resolve now that it has been stopped. If does not resolve, plan for biopsy as the next step. Behcets and Crohns are possibilities     Dr. Irena Calderon  Division of Infectious Disease and International Medicine  [facility]  Contact me in vocera     Face to face time 28 mins. 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     ID work up included swab of the lesion for HSV, NP Mycoplasma pneumoniae PCR, blood adenovirus PCR, CMV PCR, EBV PCR, HIV, syphilis, Coxsackie ab, chlamydia/gonorrhea PCR of cervix, Karius test, urine histo antigen which were negative. This makes infectious cause unlikely.    Suspect ulcers are due to MPA. Will wait to see if they resolve now that it has been stopped (a week ago). If it does not resolve, plan for biopsy as the next step.     Previous ID Issues:  - Crohn's disease- follows with [facility] on ustekinumab (Stelara) pre-transplant, last dose 3/2024     Other ID issues:  - QTc interval:  503msec  on 24  - Bacterial prophylaxis:  none  - Pneumocystis prophylaxis:  inhaled pentamidine due to leukopenia when bactrim was held, starting Bactrim   - Viral serostatus: CMV D+/R-, EBV D+/R+, HSV 1?/2?, VZV +   - Viral prophylaxis:  Valcyte  - Fungal prophylaxis:  none  - Immunosuppression: Induction with thymo and steroid taper; current: MPA (on hold for the past week) tac  - Immunization status:  to discuss next visit   - Gamma globulin status:  unknown  - Isolation status: good hand hygiene         History of Present Illness:   Adopted from initial consult note:  This is a follow up after hospital discharge. First time seeing patient. Seen by my colleague as an inpatient.     Transplants:  2024 (Kidney), Postoperative day:  68     Ira Zamora is a 34 year old female with history of Crohn's (rcvd ustekinumab on 3/1/24), Hidradenitis suppurativa, Juvenile RA, HTN, ESRD 2/2 interstitial nephritis PPIs s/p  donor kidney transplant on 24 induction with thymoglobulin and steroid taper,  immunosuppressed with MPA and TAC who presented to ED on 24 with oral and vulvar sores.      Symptoms started beginning of  with mouth sores (small and one large). Ira notes night sweats that started just before the mouth sores and have increased to nightly. Sores are very painful and have limited ability to eat and drink. Had tried topical lidocaine and magic mouthwash. Has pain in esophagus with swallowing or eating food. This is not new for her and was related to GERD but she had to stop PPI since it caused the interstitial nephritis and therefore GERD has become worse after transplant.     2 weeks later developed vaginal sores. Urinating burns the sore.      Has hx of Crohn's disease, last saw GI around 2023 and last Stelara infusion was 3/1/24. Around time of initial diagnosis with Crohns had severe reflux and canker sores in mouth.     No fevers, chills, abdominal pain, diarrhea,  vomiting, rhinorrhea, dyspnea, cough, vaginal discharge, dysuria, joint pain/swelling or erythema, rashes. No sx of hidradenitis suppurativa.   Has some fresh blood in stool at the end since a week ago - has no pain      Lives in [place], [place] with her  (Collin) and her parents live in upstairs in the same house. They both have indoor pet cats. No other animal exposures, fish, or birds. No recent travel. International travel to [place] in 2016 and 2018, traveled to [place], [place], [place] in 2009. Currently employed by [employer] and works remotely, previously worked as a pharmacy tech. Not around any young children. No sick contacts.     Myfortic dose was reduced from 540 bid to 360 bid trixie few days prior to admission.     She was hospitalized 6/5 to 6/9 for these sores. ID work up included swab of the lesion for HSV, NP Mycoplasma pneumoniae PCR, blood adenovirus PCR, CMV PCR, EBV PCR, BK PCR, HIV, syphilis, Coxsackie ab, chlamydia/gonorrhea PCR of cervix, Karius test, urine histo antigen which were negative.     Myfortic was stopped a week ago. Planning to start beltacept instead     She reports that she was able to eat something after the dose of myfortic was reduced. The pain has eased a little with stopping myfortic. But there is still considerable pain.  reports that she has not slept in weeks. The oxycodone 5 mg seemed to help in the beginning but not that much anymore.     Leukopenic since May          Current Medications:     Current Outpatient Medications   Medication Sig Dispense Refill    acetaminophen (TYLENOL) 500 MG tablet Take 1,000 mg by mouth every 6 hours as needed      aspirin (ASA) 325 MG tablet Take 1 tablet (325 mg) by mouth daily 30 tablet 2    atorvastatin (LIPITOR) 10 MG tablet Take 1 tablet (10 mg) by mouth daily 30 tablet 1    benzocaine (ORAJEL MAXIMUM STRENGTH) 20 % GEL gel Take by mouth 4 times daily as needed for mouth sores 9 g 0     "famotidine (PEPCID) 20 MG tablet Take 2 tablets (40 mg) by mouth 2 times daily 60 tablet 1    levonorgestrel (KYLEENA) 19.5 MG IUD 1 Device by Intrauterine route      magic mouthwash suspension (diphenhydrAMINE, lidocaine, aluminum-magnesium & simethicone) Swish and swallow 10 mLs in mouth every 6 hours as needed for mouth sores 560 mL 1    magnesium oxide (MAG-OX) 400 MG tablet Take 2 tablets (800 mg) by mouth daily 60 tablet 0    mineral oil-hydrophilic petrolatum (AQUAPHOR) external ointment Apply topically every hour as needed for other (barrier cream) 50 g 0    oxyCODONE (ROXICODONE) 5 MG tablet Take 1 tablet (5 mg) by mouth 2 times daily as needed for severe pain 8 tablet 0    PARoxetine (PAXIL) 30 MG tablet Take 30 mg by mouth every evening      predniSONE (DELTASONE) 10 MG tablet Take 1 tablet (10 mg) by mouth daily for 90 days 90 tablet 0    sulfamethoxazole-trimethoprim (BACTRIM) 400-80 MG tablet Take 1 tablet by mouth daily Start after July 5, 2024 90 tablet 2    tacrolimus (GENERIC EQUIVALENT) 0.5 MG capsule Take 1 capsule (0.5 mg) by mouth 2 times daily 2.5mg twice daily 60 capsule 11    tacrolimus (GENERIC EQUIVALENT) 1 MG capsule Take 2 capsules (2 mg) by mouth 2 times daily 2.5mg twice daily 120 capsule 11    ustekinumab (STELARA) 90 MG/ML Inject 90 mg Subcutaneous once every eight weeks      valGANciclovir (VALCYTE) 450 MG tablet Take 2 tablets (900 mg) by mouth daily 60 tablet 3            Physical Exam:   BP 95/67   Pulse 118   Temp 97.9  F (36.6  C) (Oral)   Ht 1.575 m (5' 2\")   Wt 51.3 kg (113 lb)   SpO2 99%   BMI 20.67 kg/m      Constitutional: Patient in no distress, thin   Eyes: pale, not jaundiced  Neck: no lymphadenopathy  Oral cavity: large deep ulcers close to right molar and midline behind lower lip   Vagina: left inner labia huge deep ulcer +  Skin: no rash  Extremities: no pedal edema  Psych: Alert and oriented x 3           Laboratory Data:   Microbiology:  Culture   Date Value " Ref Range Status   06/06/2024 No Growth  Final   05/03/2024 No Growth  Final       Metabolic Studies       Recent Labs   Lab Test 06/20/24  0935 06/17/24  0920 06/13/24  0925 06/09/24 0603 06/09/24  0008 06/08/24 0557 04/17/24  2230 04/17/24  2123 04/17/24  1603 04/17/24  0958   NA  --  135  --  139  --  137   < > 136  --  139   POTASSIUM  --  4.1  --  4.3  --  4.1   < > 3.7  --  3.4   CHLORIDE 102 101 105 107  --  106   < >  --   --  95*   CO2  --  23  --  23  --  20*   < >  --   --  33*   ANIONGAP  --  11  --  9  --  11   < >  --   --  11   BUN  --  18.6  --  13.7  --  16.2   < >  --   --  11.6   CR  --  0.97*  --  0.89  --  0.89   < >  --   --  3.11*   GFRESTIMATED  --  78  --  87  --  87   < >  --   --  19*   GLC  --  117*  --  112*  --  106*   < > 168*   < > 91   A1C  --   --   --   --   --   --   --   --   --  4.7   ROBLES  --  10.3*  --  9.6  --  10.0   < >  --   --  9.3   PHOS  --  2.2*  --  2.9  --  2.2*   < >  --   --   --    MAG  --  1.5*  --  2.6*   < > 1.5*   < >  --   --   --    LACT  --   --   --   --   --   --   --  0.8  --   --     < > = values in this interval not displayed.       Hepatic Studies    Recent Labs   Lab Test 06/06/24  0707   BILITOTAL 0.7   ALKPHOS 83   ALBUMIN 3.8   AST 16   ALT 12         Hematology Studies      Recent Labs   Lab Test 06/17/24  0920 06/09/24 0603 06/08/24  0557 05/02/24  0920 04/25/24  0930   WBC 1.6* 1.4*  1.4* 1.8*   < > 7.1   ANEU  --   --   --   --  5.8   ALYM  --   --   --   --  0.2*   FAIVAN  --   --   --   --  0.4   AEOS  --   --   --   --  0.2   HGB 10.0* 10.1* 10.6*   < > 8.4*   HCT 31.6* 31.5* 32.9*   < > 26.0*    183 208   < > 197    < > = values in this interval not displayed.            Imaging:   No new imaging        Sincerely,    Irena Calderon MD

## 2024-06-24 NOTE — NURSING NOTE
"Chief Complaint   Patient presents with    Follow Up     BP 95/67   Pulse 118   Temp 97.9  F (36.6  C) (Oral)   Ht 1.575 m (5' 2\")   Wt 51.3 kg (113 lb)   SpO2 99%   BMI 20.67 kg/m    Hubert Lomeli MA on 6/24/2024 at 3:41 PM    "

## 2024-06-24 NOTE — PROGRESS NOTES
SOLID ORGAN TRANSPLANT INFECTIOUS DISEASES CLINIC NOTE     Patient:  Ira Zamora   YOB: 1990  Date of Visit:  2024          Assessment and Recommendations:   Recommendations:  Suspect ulcers due to MPA. Will wait to see if they resolve now that it has been stopped. If does not resolve, plan for biopsy as the next step. Behcets and Crohns are possibilities     RTC 1 month     Dr. Irena Calderon  Division of Infectious Disease and International Medicine  [facility]  Contact me in anjana     Face to face time 28 mins. 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     ID work up included swab of the lesion for HSV, NP Mycoplasma pneumoniae PCR, blood adenovirus PCR, CMV PCR, EBV PCR, HIV, syphilis, Coxsackie ab, chlamydia/gonorrhea PCR of cervix, Karius test, urine histo antigen which were negative. This makes infectious cause unlikely.    Suspect ulcers are due to MPA. Will wait to see if they resolve now that it has been stopped (a week ago). If it does not resolve, plan for biopsy as the next step.     Previous ID Issues:  - Crohn's disease- follows with [facility] on ustekinumab (Stelara) pre-transplant, last dose 3/2024     Other ID issues:  - QTc interval:  503msec on 24  - Bacterial prophylaxis:  none  - Pneumocystis prophylaxis:  inhaled pentamidine due to leukopenia when bactrim was held, starting Bactrim   - Viral serostatus: CMV D+/R-, EBV D+/R+, HSV 1?/2?, VZV +   - Viral prophylaxis:  Valcyte  - Fungal prophylaxis:  none  - Immunosuppression:  Induction with thymo and steroid taper; current: MPA (on hold for the past week) tac  - Immunization status:  to discuss next visit   - Gamma globulin status:  unknown  - Isolation status: good hand hygiene         History of Present Illness:   Adopted from initial consult note:  This is a follow up after hospital discharge. First time seeing patient. Seen by my colleague as an inpatient.     Transplants:  2024 (Kidney), Postoperative day:  68     Ira Zamora is a 34 year old female with history of Crohn's (rcvd ustekinumab on 3/1/24), Hidradenitis suppurativa, Juvenile RA, HTN, ESRD 2/2 interstitial nephritis PPIs s/p  donor kidney transplant on 24 induction with thymoglobulin and steroid taper,  immunosuppressed with MPA and TAC who presented to ED on 24 with oral and vulvar sores.      Symptoms started beginning of  with mouth sores (small and one large). Ira notes night sweats that started just before the mouth sores and have increased to nightly. Sores are very painful and have limited ability to eat and drink. Had tried topical lidocaine and magic mouthwash. Has pain in esophagus with swallowing or eating food. This is not new for her and was related to GERD but she had to stop PPI since it caused the interstitial nephritis and therefore GERD has become worse after transplant.     2 weeks later developed vaginal sores. Urinating burns the sore.      Has hx of Crohn's disease, last saw GI around 2023 and last Stelara infusion was 3/1/24. Around time of initial diagnosis with Crohns had severe reflux and canker sores in mouth.     No fevers, chills, abdominal pain, diarrhea, vomiting, rhinorrhea, dyspnea, cough, vaginal discharge, dysuria, joint pain/swelling or erythema, rashes. No sx of hidradenitis suppurativa.   Has some fresh blood in stool at the end since a week ago - has no pain      Lives in [place], [place] with her  (Collin) and her parents live in upstairs in  the same house. They both have indoor pet cats. No other animal exposures, fish, or birds. No recent travel. International travel to [place] in 2016 and 2018, traveled to [place], [place], [place] in 2009. Currently employed by [employer] and works remotely, previously worked as a pharmacy tech. Not around any young children. No sick contacts.     Myfortic dose was reduced from 540 bid to 360 bid trixie few days prior to admission.     She was hospitalized 6/5 to 6/9 for these sores. ID work up included swab of the lesion for HSV, NP Mycoplasma pneumoniae PCR, blood adenovirus PCR, CMV PCR, EBV PCR, BK PCR, HIV, syphilis, Coxsackie ab, chlamydia/gonorrhea PCR of cervix, Karius test, urine histo antigen which were negative.     Myfortic was stopped a week ago. Planning to start beltacept instead     She reports that she was able to eat something after the dose of myfortic was reduced. The pain has eased a little with stopping myfortic. But there is still considerable pain.  reports that she has not slept in weeks. The oxycodone 5 mg seemed to help in the beginning but not that much anymore.     Leukopenic since May          Current Medications:     Current Outpatient Medications   Medication Sig Dispense Refill    acetaminophen (TYLENOL) 500 MG tablet Take 1,000 mg by mouth every 6 hours as needed      aspirin (ASA) 325 MG tablet Take 1 tablet (325 mg) by mouth daily 30 tablet 2    atorvastatin (LIPITOR) 10 MG tablet Take 1 tablet (10 mg) by mouth daily 30 tablet 1    benzocaine (ORAJEL MAXIMUM STRENGTH) 20 % GEL gel Take by mouth 4 times daily as needed for mouth sores 9 g 0    famotidine (PEPCID) 20 MG tablet Take 2 tablets (40 mg) by mouth 2 times daily 60 tablet 1    levonorgestrel (KYLEENA) 19.5 MG IUD 1 Device by Intrauterine route      magic mouthwash suspension (diphenhydrAMINE, lidocaine, aluminum-magnesium & simethicone) Swish and swallow 10 mLs in mouth every 6 hours  "as needed for mouth sores 560 mL 1    magnesium oxide (MAG-OX) 400 MG tablet Take 2 tablets (800 mg) by mouth daily 60 tablet 0    mineral oil-hydrophilic petrolatum (AQUAPHOR) external ointment Apply topically every hour as needed for other (barrier cream) 50 g 0    oxyCODONE (ROXICODONE) 5 MG tablet Take 1 tablet (5 mg) by mouth 2 times daily as needed for severe pain 8 tablet 0    PARoxetine (PAXIL) 30 MG tablet Take 30 mg by mouth every evening      predniSONE (DELTASONE) 10 MG tablet Take 1 tablet (10 mg) by mouth daily for 90 days 90 tablet 0    sulfamethoxazole-trimethoprim (BACTRIM) 400-80 MG tablet Take 1 tablet by mouth daily Start after July 5, 2024 90 tablet 2    tacrolimus (GENERIC EQUIVALENT) 0.5 MG capsule Take 1 capsule (0.5 mg) by mouth 2 times daily 2.5mg twice daily 60 capsule 11    tacrolimus (GENERIC EQUIVALENT) 1 MG capsule Take 2 capsules (2 mg) by mouth 2 times daily 2.5mg twice daily 120 capsule 11    ustekinumab (STELARA) 90 MG/ML Inject 90 mg Subcutaneous once every eight weeks      valGANciclovir (VALCYTE) 450 MG tablet Take 2 tablets (900 mg) by mouth daily 60 tablet 3            Physical Exam:   BP 95/67   Pulse 118   Temp 97.9  F (36.6  C) (Oral)   Ht 1.575 m (5' 2\")   Wt 51.3 kg (113 lb)   SpO2 99%   BMI 20.67 kg/m      Constitutional: Patient in no distress, thin   Eyes: pale, not jaundiced  Neck: no lymphadenopathy  Oral cavity: large deep ulcers close to right molar and midline behind lower lip   Vagina: left inner labia huge deep ulcer +  Skin: no rash  Extremities: no pedal edema  Psych: Alert and oriented x 3           Laboratory Data:   Microbiology:  Culture   Date Value Ref Range Status   06/06/2024 No Growth  Final   05/03/2024 No Growth  Final       Metabolic Studies       Recent Labs   Lab Test 06/20/24  0935 06/17/24  0920 06/13/24  0925 06/09/24  0603 06/09/24  0008 06/08/24  0557 04/17/24  2230 04/17/24  2123 04/17/24  1603 04/17/24  0958   NA  --  135  --  139  " --  137   < > 136  --  139   POTASSIUM  --  4.1  --  4.3  --  4.1   < > 3.7  --  3.4   CHLORIDE 102 101 105 107  --  106   < >  --   --  95*   CO2  --  23  --  23  --  20*   < >  --   --  33*   ANIONGAP  --  11  --  9  --  11   < >  --   --  11   BUN  --  18.6  --  13.7  --  16.2   < >  --   --  11.6   CR  --  0.97*  --  0.89  --  0.89   < >  --   --  3.11*   GFRESTIMATED  --  78  --  87  --  87   < >  --   --  19*   GLC  --  117*  --  112*  --  106*   < > 168*   < > 91   A1C  --   --   --   --   --   --   --   --   --  4.7   ROBLES  --  10.3*  --  9.6  --  10.0   < >  --   --  9.3   PHOS  --  2.2*  --  2.9  --  2.2*   < >  --   --   --    MAG  --  1.5*  --  2.6*   < > 1.5*   < >  --   --   --    LACT  --   --   --   --   --   --   --  0.8  --   --     < > = values in this interval not displayed.       Hepatic Studies    Recent Labs   Lab Test 06/06/24  0707   BILITOTAL 0.7   ALKPHOS 83   ALBUMIN 3.8   AST 16   ALT 12         Hematology Studies      Recent Labs   Lab Test 06/17/24  0920 06/09/24  0603 06/08/24  0557 05/02/24  0920 04/25/24  0930   WBC 1.6* 1.4*  1.4* 1.8*   < > 7.1   ANEU  --   --   --   --  5.8   ALYM  --   --   --   --  0.2*   FAVIAN  --   --   --   --  0.4   AEOS  --   --   --   --  0.2   HGB 10.0* 10.1* 10.6*   < > 8.4*   HCT 31.6* 31.5* 32.9*   < > 26.0*    183 208   < > 197    < > = values in this interval not displayed.            Imaging:   No new imaging

## 2024-06-25 DIAGNOSIS — Z94.0 KIDNEY REPLACED BY TRANSPLANT: ICD-10-CM

## 2024-06-25 DIAGNOSIS — D84.9 IMMUNOSUPPRESSED STATUS (H): Primary | ICD-10-CM

## 2024-06-25 NOTE — TELEPHONE ENCOUNTER
Sandra therapy plan updated, ID submitted. Reached out to scheduling team and patient with details.

## 2024-06-26 ENCOUNTER — APPOINTMENT (OUTPATIENT)
Dept: GENERAL RADIOLOGY | Facility: CLINIC | Age: 34
End: 2024-06-26
Attending: EMERGENCY MEDICINE
Payer: MEDICARE

## 2024-06-26 ENCOUNTER — TELEPHONE (OUTPATIENT)
Dept: TRANSPLANT | Facility: CLINIC | Age: 34
End: 2024-06-26
Payer: MEDICARE

## 2024-06-26 ENCOUNTER — HOSPITAL ENCOUNTER (EMERGENCY)
Facility: CLINIC | Age: 34
Discharge: LEFT BEFORE DISCHARGE | End: 2024-06-26
Attending: EMERGENCY MEDICINE | Admitting: EMERGENCY MEDICINE
Payer: MEDICARE

## 2024-06-26 VITALS
DIASTOLIC BLOOD PRESSURE: 88 MMHG | HEART RATE: 76 BPM | SYSTOLIC BLOOD PRESSURE: 136 MMHG | BODY MASS INDEX: 20.2 KG/M2 | OXYGEN SATURATION: 93 % | TEMPERATURE: 97.5 F | RESPIRATION RATE: 20 BRPM | WEIGHT: 110.45 LBS

## 2024-06-26 DIAGNOSIS — E86.0 DEHYDRATION: ICD-10-CM

## 2024-06-26 DIAGNOSIS — R06.00 DYSPNEA, UNSPECIFIED TYPE: ICD-10-CM

## 2024-06-26 DIAGNOSIS — Z48.298 AFTERCARE FOLLOWING ORGAN TRANSPLANT: Primary | ICD-10-CM

## 2024-06-26 DIAGNOSIS — R07.9 CHEST PAIN, UNSPECIFIED TYPE: ICD-10-CM

## 2024-06-26 LAB
ALBUMIN SERPL BCG-MCNC: 3.6 G/DL (ref 3.5–5.2)
ALP SERPL-CCNC: 95 U/L (ref 40–150)
ALT SERPL W P-5'-P-CCNC: 49 U/L (ref 0–50)
ANION GAP SERPL CALCULATED.3IONS-SCNC: 12 MMOL/L (ref 7–15)
AST SERPL W P-5'-P-CCNC: 28 U/L (ref 0–45)
ATRIAL RATE - MUSE: 78 BPM
BASOPHILS # BLD AUTO: 0 10E3/UL (ref 0–0.2)
BASOPHILS NFR BLD AUTO: 0 %
BILIRUB SERPL-MCNC: 0.4 MG/DL
BUN SERPL-MCNC: 26.3 MG/DL (ref 6–20)
CALCIUM SERPL-MCNC: 10.5 MG/DL (ref 8.6–10)
CHLORIDE SERPL-SCNC: 102 MMOL/L (ref 98–107)
CREAT SERPL-MCNC: 1.02 MG/DL (ref 0.51–0.95)
D DIMER PPP FEU-MCNC: 0.66 UG/ML FEU (ref 0–0.5)
DEPRECATED HCO3 PLAS-SCNC: 22 MMOL/L (ref 22–29)
DIASTOLIC BLOOD PRESSURE - MUSE: NORMAL MMHG
EGFRCR SERPLBLD CKD-EPI 2021: 74 ML/MIN/1.73M2
EOSINOPHIL # BLD AUTO: 0 10E3/UL (ref 0–0.7)
EOSINOPHIL NFR BLD AUTO: 0 %
ERYTHROCYTE [DISTWIDTH] IN BLOOD BY AUTOMATED COUNT: 13.7 % (ref 10–15)
FLUAV RNA SPEC QL NAA+PROBE: NEGATIVE
FLUBV RNA RESP QL NAA+PROBE: NEGATIVE
GLUCOSE SERPL-MCNC: 136 MG/DL (ref 70–99)
HCG INTACT+B SERPL-ACNC: <1 MIU/ML
HCT VFR BLD AUTO: 32.6 % (ref 35–47)
HGB BLD-MCNC: 10.1 G/DL (ref 11.7–15.7)
HOLD SPECIMEN: NORMAL
IMM GRANULOCYTES # BLD: 0 10E3/UL
IMM GRANULOCYTES NFR BLD: 2 %
INTERPRETATION ECG - MUSE: NORMAL
LIPASE SERPL-CCNC: 14 U/L (ref 13–60)
LYMPHOCYTES # BLD AUTO: 0.2 10E3/UL (ref 0.8–5.3)
LYMPHOCYTES NFR BLD AUTO: 8 %
MCH RBC QN AUTO: 29.9 PG (ref 26.5–33)
MCHC RBC AUTO-ENTMCNC: 31 G/DL (ref 31.5–36.5)
MCV RBC AUTO: 96 FL (ref 78–100)
MONOCYTES # BLD AUTO: 0.3 10E3/UL (ref 0–1.3)
MONOCYTES NFR BLD AUTO: 11 %
NEUTROPHILS # BLD AUTO: 1.9 10E3/UL (ref 1.6–8.3)
NEUTROPHILS NFR BLD AUTO: 79 %
NRBC # BLD AUTO: 0 10E3/UL
NRBC BLD AUTO-RTO: 0 /100
NT-PROBNP SERPL-MCNC: 1484 PG/ML (ref 0–450)
P AXIS - MUSE: 40 DEGREES
PLATELET # BLD AUTO: 282 10E3/UL (ref 150–450)
POTASSIUM SERPL-SCNC: 5 MMOL/L (ref 3.4–5.3)
PR INTERVAL - MUSE: 138 MS
PROT SERPL-MCNC: 6.8 G/DL (ref 6.4–8.3)
QRS DURATION - MUSE: 80 MS
QT - MUSE: 376 MS
QTC - MUSE: 428 MS
R AXIS - MUSE: 0 DEGREES
RBC # BLD AUTO: 3.38 10E6/UL (ref 3.8–5.2)
RSV RNA SPEC NAA+PROBE: NEGATIVE
SARS-COV-2 RNA RESP QL NAA+PROBE: NEGATIVE
SODIUM SERPL-SCNC: 136 MMOL/L (ref 135–145)
SYSTOLIC BLOOD PRESSURE - MUSE: NORMAL MMHG
T AXIS - MUSE: 29 DEGREES
TROPONIN T SERPL HS-MCNC: <6 NG/L
VENTRICULAR RATE- MUSE: 78 BPM
WBC # BLD AUTO: 2.4 10E3/UL (ref 4–11)

## 2024-06-26 PROCEDURE — 87637 SARSCOV2&INF A&B&RSV AMP PRB: CPT | Performed by: EMERGENCY MEDICINE

## 2024-06-26 PROCEDURE — 71046 X-RAY EXAM CHEST 2 VIEWS: CPT

## 2024-06-26 PROCEDURE — 93010 ELECTROCARDIOGRAM REPORT: CPT | Performed by: EMERGENCY MEDICINE

## 2024-06-26 PROCEDURE — 71046 X-RAY EXAM CHEST 2 VIEWS: CPT | Mod: 26 | Performed by: STUDENT IN AN ORGANIZED HEALTH CARE EDUCATION/TRAINING PROGRAM

## 2024-06-26 PROCEDURE — 99285 EMERGENCY DEPT VISIT HI MDM: CPT | Mod: 25 | Performed by: EMERGENCY MEDICINE

## 2024-06-26 PROCEDURE — 258N000003 HC RX IP 258 OP 636: Performed by: EMERGENCY MEDICINE

## 2024-06-26 PROCEDURE — 83690 ASSAY OF LIPASE: CPT | Performed by: EMERGENCY MEDICINE

## 2024-06-26 PROCEDURE — 99285 EMERGENCY DEPT VISIT HI MDM: CPT | Performed by: EMERGENCY MEDICINE

## 2024-06-26 PROCEDURE — 250N000009 HC RX 250: Performed by: EMERGENCY MEDICINE

## 2024-06-26 PROCEDURE — 85025 COMPLETE CBC W/AUTO DIFF WBC: CPT | Performed by: EMERGENCY MEDICINE

## 2024-06-26 PROCEDURE — 36415 COLL VENOUS BLD VENIPUNCTURE: CPT | Performed by: EMERGENCY MEDICINE

## 2024-06-26 PROCEDURE — 250N000013 HC RX MED GY IP 250 OP 250 PS 637: Performed by: EMERGENCY MEDICINE

## 2024-06-26 PROCEDURE — 85379 FIBRIN DEGRADATION QUANT: CPT | Performed by: EMERGENCY MEDICINE

## 2024-06-26 PROCEDURE — 96360 HYDRATION IV INFUSION INIT: CPT | Performed by: EMERGENCY MEDICINE

## 2024-06-26 PROCEDURE — 80053 COMPREHEN METABOLIC PANEL: CPT | Performed by: EMERGENCY MEDICINE

## 2024-06-26 PROCEDURE — 93005 ELECTROCARDIOGRAM TRACING: CPT | Performed by: EMERGENCY MEDICINE

## 2024-06-26 PROCEDURE — 84484 ASSAY OF TROPONIN QUANT: CPT | Performed by: EMERGENCY MEDICINE

## 2024-06-26 PROCEDURE — 96361 HYDRATE IV INFUSION ADD-ON: CPT | Performed by: EMERGENCY MEDICINE

## 2024-06-26 PROCEDURE — 83880 ASSAY OF NATRIURETIC PEPTIDE: CPT | Performed by: EMERGENCY MEDICINE

## 2024-06-26 PROCEDURE — 84702 CHORIONIC GONADOTROPIN TEST: CPT | Performed by: EMERGENCY MEDICINE

## 2024-06-26 RX ORDER — METHYLPREDNISOLONE SODIUM SUCCINATE 125 MG/2ML
125 INJECTION, POWDER, LYOPHILIZED, FOR SOLUTION INTRAMUSCULAR; INTRAVENOUS
Start: 2024-06-26

## 2024-06-26 RX ORDER — MAGNESIUM HYDROXIDE/ALUMINUM HYDROXICE/SIMETHICONE 120; 1200; 1200 MG/30ML; MG/30ML; MG/30ML
15 SUSPENSION ORAL ONCE
Status: COMPLETED | OUTPATIENT
Start: 2024-06-26 | End: 2024-06-26

## 2024-06-26 RX ORDER — HEPARIN SODIUM (PORCINE) LOCK FLUSH IV SOLN 100 UNIT/ML 100 UNIT/ML
5 SOLUTION INTRAVENOUS
OUTPATIENT
Start: 2024-06-26

## 2024-06-26 RX ORDER — ALBUTEROL SULFATE 90 UG/1
1-2 AEROSOL, METERED RESPIRATORY (INHALATION)
Start: 2024-06-26

## 2024-06-26 RX ORDER — LIDOCAINE HYDROCHLORIDE 20 MG/ML
10 SOLUTION OROPHARYNGEAL ONCE
Status: COMPLETED | OUTPATIENT
Start: 2024-06-26 | End: 2024-06-26

## 2024-06-26 RX ORDER — DIPHENHYDRAMINE HYDROCHLORIDE 50 MG/ML
50 INJECTION INTRAMUSCULAR; INTRAVENOUS
Start: 2024-06-26

## 2024-06-26 RX ORDER — MEPERIDINE HYDROCHLORIDE 25 MG/ML
25 INJECTION INTRAMUSCULAR; INTRAVENOUS; SUBCUTANEOUS EVERY 30 MIN PRN
OUTPATIENT
Start: 2024-06-26

## 2024-06-26 RX ORDER — ALBUTEROL SULFATE 0.83 MG/ML
2.5 SOLUTION RESPIRATORY (INHALATION)
OUTPATIENT
Start: 2024-06-26

## 2024-06-26 RX ORDER — HEPARIN SODIUM,PORCINE 10 UNIT/ML
5-20 VIAL (ML) INTRAVENOUS DAILY PRN
OUTPATIENT
Start: 2024-06-26

## 2024-06-26 RX ORDER — EPINEPHRINE 1 MG/ML
0.3 INJECTION, SOLUTION, CONCENTRATE INTRAVENOUS EVERY 5 MIN PRN
OUTPATIENT
Start: 2024-06-26

## 2024-06-26 RX ORDER — OXYCODONE HYDROCHLORIDE 5 MG/1
5 TABLET ORAL ONCE
Status: COMPLETED | OUTPATIENT
Start: 2024-06-26 | End: 2024-06-26

## 2024-06-26 RX ADMIN — ALUMINUM HYDROXIDE, MAGNESIUM HYDROXIDE, AND SIMETHICONE 15 ML: 1200; 120; 1200 SUSPENSION ORAL at 17:31

## 2024-06-26 RX ADMIN — SODIUM CHLORIDE 1000 ML: 9 INJECTION, SOLUTION INTRAVENOUS at 17:40

## 2024-06-26 RX ADMIN — OXYCODONE HYDROCHLORIDE 5 MG: 5 TABLET ORAL at 19:22

## 2024-06-26 RX ADMIN — LIDOCAINE HYDROCHLORIDE 10 ML: 20 SOLUTION OROPHARYNGEAL at 17:32

## 2024-06-26 ASSESSMENT — ACTIVITIES OF DAILY LIVING (ADL)
ADLS_ACUITY_SCORE: 37
ADLS_ACUITY_SCORE: 35
ADLS_ACUITY_SCORE: 37

## 2024-06-26 NOTE — ED TRIAGE NOTES
Pt hx kidney tx. Tx team referred pt to come to ED for pain with swallowing for last month from sores. Pain comes after pt eats. Mouth sores from medication pt was taking.        Triage Assessment (Adult)       Row Name 06/26/24 1619          Triage Assessment    Airway WDL WDL        Respiratory WDL    Respiratory WDL X;rhythm/pattern     Rhythm/Pattern, Respiratory shortness of breath        Skin Circulation/Temperature WDL    Skin Circulation/Temperature WDL WDL        Cardiac WDL    Cardiac WDL X;rhythm     Pulse Rate & Regularity tachycardic        Peripheral/Neurovascular WDL    Peripheral Neurovascular WDL WDL        Cognitive/Neuro/Behavioral WDL    Cognitive/Neuro/Behavioral WDL WDL

## 2024-06-26 NOTE — TELEPHONE ENCOUNTER
Patient is very unwell. Her  states she is unable to eat or drink and now her mouth lesions appear to be affecting her swallowing.  states patient continues to lose weight. Patient is very scared to come back into the hospital which is why she has not returned calls to schedule sherrie conversion infusion appointments. RNCC will reach out to provider for guidance.    Spoke with Dr. Yasir Breaux who is inpatient provider. She feels that given timeline of events and getting EGD we should try to arrange outpatient IV fluids in [facility] tomorrow as well as have Dr. Ortiz see patient in clinic to determine admission. Patient has outpatient egd scheduled for this Friday. Labs tomorrow as well. Creatinine is stable. RNCC will try to get IV fluids at tomorrow's visit.

## 2024-06-26 NOTE — ED PROVIDER NOTES
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  Patient states that she initially had pain only with eating.  She now states that the pain is constant.  It does not radiate.  She also feels short of breath today.  She denies any cough.  No leg pain or swelling.  No fever.  She has had oral ulcers and vaginal ulcers that have improved after discontinuation of Myfortic but now has substernal chest pain that began with eating.  There is concern for esophagitis.  She had an EGD in May at [facility] and had grade C esophagitis.  She is on famotidine.  Patient was sent by transplant team thinking she needs more urgent EGD and possible initiation of TPN as she has lost 20 pounds over the past month per her report.    Per chart review, patient was admitted from 06/05 to 06/09/24 with painful oral and vaginal lesions and inability to tolerate oral intake. Pelvic exam on admission with negative wet mount. Negative Chlamydia, gonorrhea, RPR and HSV. Negative ID work-up, including lesion for HSV, NP Mycoplasma pneumoniae PCR, blood adenovirus PCR, CMV PCR, EBV PCR, HIV, syphilis, Coxsackie ab, chlamydia/gonorrhea PCR of cervix, Karius test, urine histo antigen. Suspect ulcers are due to MPA. She was planned for biopsy.    Past Medical History  Past Medical History:   Diagnosis Date    Anemia in chronic kidney disease     Anxiety 2007    Ascending aorta dilation (H24) 2020 2020: ascending aorta 3.8 cm, aortic sinus 3.6 cm. 2024 Echo: Ao root diam: 3.2 cm, asc Aorta Diam: 3.8 cm    ASCUS with positive high risk HPV cervical  2017    ASCUS with positive high risk HPV cervical 2017    Crohn's disease (H)     ESRD (end stage renal disease) on dialysis (H)     GERD (gastroesophageal reflux disease)     Hidradenitis suppurativa 2015    History of blood transfusion     Hypertension     Juvenile rheumatoid arthritis (H)     Asymptomatic in adulthood    Kidney replaced by transplant 2024    En bloc. Induction w/ Thymoglobulin.    Secondary renal hyperparathyroidism (H24)     Tubulointerstitial nephropathy 2011    kidney biopsy 2011 - Acute and  chronic tubulointerstitial nephropathy.     Past Surgical History:   Procedure Laterality Date    H NEEDLE GUIDE,  KIDNEY BIOPSY      TRANSPLANT KIDNEY RECIPIENT  DONOR N/A 2024    Procedure: Transplant kidney recipient  donor,bilateral uretral stent implantation;  Surgeon: Carlitos Díaz MD;  Location: [facility] OR     acetaminophen (TYLENOL) 500 MG tablet  aspirin (ASA) 325 MG tablet  atorvastatin (LIPITOR) 10 MG tablet  benzocaine (ORAJEL MAXIMUM STRENGTH) 20 % GEL gel  famotidine (PEPCID) 20 MG tablet  levonorgestrel (KYLEENA) 19.5 MG IUD  magic mouthwash suspension (diphenhydrAMINE, lidocaine, aluminum-magnesium & simethicone)  magnesium oxide (MAG-OX) 400 MG tablet  mineral oil-hydrophilic petrolatum (AQUAPHOR) external ointment  PARoxetine (PAXIL) 30 MG tablet  predniSONE (DELTASONE) 10 MG tablet  sulfamethoxazole-trimethoprim (BACTRIM) 400-80 MG tablet  tacrolimus (GENERIC EQUIVALENT) 0.5 MG capsule  tacrolimus (GENERIC EQUIVALENT) 1 MG capsule  ustekinumab (STELARA) 90 MG/ML  valGANciclovir (VALCYTE) 450 MG tablet      Allergies   Allergen Reactions    Amlodipine Headache and Other (See Comments)     Other Reaction(s): Unknown    Proton Pump Inhibitors Nephrotoxicity     No PPIs due to history of renal failure due to interstitial nephritis    Tegaderm Transparent Dressing (Informational Only) Blisters     Family History  Family History   Problem  Relation Age of Onset    Endometriosis Mother     Coronary Artery Disease Mother      Social History   Social History     Tobacco Use    Smoking status: Never     Passive exposure: Never    Smokeless tobacco: Never   Substance Use Topics    Alcohol use: Not Currently     Comment: Very occasional, last drink two months ago, wine cooler    Drug use: Never      A medically appropriate review of systems was performed with pertinent positives and negatives noted in the HPI, and all other systems negative.    Physical Exam   BP: 117/85  Pulse: 104  Temp: 97.6  F (36.4  C)  Resp: 20  Weight: 50.1 kg (110 lb 7.2 oz)  SpO2: 94 %  Physical Exam  Vitals and nursing note reviewed.   Constitutional:       General: She is in acute distress.   HENT:      Head: Normocephalic and atraumatic.      Mouth/Throat:      Mouth: Mucous membranes are moist.      Comments: Mild erythema and superficial wound lower lip x 1 as well as right buccal cheek x 1.  Eyes:      Pupils: Pupils are equal, round, and reactive to light.   Cardiovascular:      Rate and Rhythm: Normal rate and regular rhythm.   Pulmonary:      Effort: Pulmonary effort is normal.      Breath sounds: Normal breath sounds.   Abdominal:      General: Abdomen is flat.      Tenderness: There is no abdominal tenderness.   Musculoskeletal:         General: Normal range of motion.      Cervical back: Normal range of motion.   Skin:     General: Skin is warm and dry.   Neurological:      General: No focal deficit present.      Mental Status: She is alert.      Motor: No weakness.      Coordination: Coordination normal.       Weight down 14 pounds over the past month.    ED Course, Procedures, & Data      Procedures            EKG Interpretation:      Interpreted by VALENTINO MCCULLOUGH MD, MD  Time reviewed: 1735  Symptoms at time of EKG: Chest pain  Rhythm: normal sinus   Rate: 78  Axis: Normal  Ectopy: none  Conduction: normal  ST Segments/ T Waves: No acute ischemic changes  Q Waves:  none  LVH pattern  Comparison to prior: Unchanged    Clinical Impression: no acute changes                 Results for orders placed or performed during the hospital encounter of 06/26/24   XR Chest 2 Views     Status: None    Narrative    EXAM: XR CHEST 2 VIEWS 6/26/2024 5:50 PM    INDICATION: Chest pain    COMPARISON: Chest radiograph 4/17/2024    TECHNIQUE: Upright PA and lateral views of the chest.    FINDINGS:     Trachea is midline. Cardiac silhouette is normal in size. No focal  pulmonary consolidation. No pleural effusion or pneumothorax. Bones  and soft tissues are unremarkable. Nonspecific mild streaky perihilar  opacities      Impression    IMPRESSION:  Mild nonspecific streaky perihilar opacities, may  represent atelectasis/edema. No confluent consolidation.     I have personally reviewed the examination and initial interpretation  and I agree with the findings.    HOMERO REAGAN MD         SYSTEM ID:  [id number]   [facility]     Status: None    Narrative    The following orders were created for panel order [facility].  Procedure                               Abnormality         Status                     ---------                               -----------         ------                     Extra Blue Top Tube[[id number]]                              Final result               Extra Red Top Tube[[id number]]                               Final result               Extra Green Top (Lithium...[[id number]]                      Final result               Extra Purple Top Tube[[id number]]                            Final result                 Please view results for these tests on the individual orders.   Extra Blue Top Tube     Status: None   Result Value Ref Range    Hold Specimen [facility]    Extra Red Top Tube     Status: None   Result Value Ref Range    Hold Specimen JIC    Extra Green Top (Lithium Heparin) Tube     Status: None   Result Value Ref Range    Hold Specimen [facility]    Extra Purple Top Tube     Status:  None   Result Value Ref Range    Hold Specimen [facility]    Comprehensive metabolic panel     Status: Abnormal   Result Value Ref Range    Sodium 136 135 - 145 mmol/L    Potassium 5.0 3.4 - 5.3 mmol/L    Carbon Dioxide (CO2) 22 22 - 29 mmol/L    Anion Gap 12 7 - 15 mmol/L    Urea Nitrogen 26.3 (H) 6.0 - 20.0 mg/dL    Creatinine 1.02 (H) 0.51 - 0.95 mg/dL    GFR Estimate 74 >60 mL/min/1.73m2    Calcium 10.5 (H) 8.6 - 10.0 mg/dL    Chloride 102 98 - 107 mmol/L    Glucose 136 (H) 70 - 99 mg/dL    Alkaline Phosphatase 95 40 - 150 U/L    AST 28 0 - 45 U/L    ALT 49 0 - 50 U/L    Protein Total 6.8 6.4 - 8.3 g/dL    Albumin 3.6 3.5 - 5.2 g/dL    Bilirubin Total 0.4 <=1.2 mg/dL   Lipase     Status: Normal   Result Value Ref Range    Lipase 14 13 - 60 U/L   Troponin T, High Sensitivity     Status: Normal   Result Value Ref Range    Troponin T, High Sensitivity <6 <=14 ng/L   Symptomatic Influenza A/B, RSV, & SARS-CoV2 PCR (COVID-19) Nose     Status: Normal    Specimen: Nose; Swab   Result Value Ref Range    Influenza A PCR Negative Negative    Influenza B PCR Negative Negative    RSV PCR Negative Negative    SARS CoV2 PCR Negative Negative    Narrative    Testing was performed using the Xpert Xpress CoV2/Flu/RSV Assay on the [employer] GeneXpert Instrument. This test should be ordered for the detection of SARS-CoV-2, influenza, and RSV viruses in individuals who meet clinical and/or epidemiological criteria. Test performance is unknown in asymptomatic patients. This test is for in vitro diagnostic use under the FDA EUA for laboratories certified under CLIA to perform high or moderate complexity testing. This test has not been FDA cleared or approved. A negative result does not rule out the presence of PCR inhibitors in the specimen or target RNA in concentration below the limit of detection for the assay. If only one viral target is positive but coinfection with multiple targets is suspected, the sample should be re-tested with  another FDA cleared, approved, or authorized test, if coinfection would change clinical management. This test was validated by the [facility]. These laboratories are certified under the Clinical Laboratory Improvement Amendments of 1988 (CLIA-88) as qualified to perform high complexity laboratory testing.   Nt probnp inpatient (BNP)     Status: Abnormal   Result Value Ref Range    N terminal Pro BNP Inpatient 1,484 (H) 0 - 450 pg/mL   D dimer quantitative     Status: Abnormal   Result Value Ref Range    D-Dimer Quantitative 0.66 (H) 0.00 - 0.50 ug/mL FEU    Narrative    This D-dimer assay is intended for use in conjunction with a clinical pretest probability assessment model to exclude pulmonary embolism (PE) and deep venous thrombosis (DVT) in outpatients suspected of PE or DVT. The cut-off value is 0.50 ug/mL FEU.   CBC with platelets and differential     Status: Abnormal   Result Value Ref Range    WBC Count 2.4 (L) 4.0 - 11.0 10e3/uL    RBC Count 3.38 (L) 3.80 - 5.20 10e6/uL    Hemoglobin 10.1 (L) 11.7 - 15.7 g/dL    Hematocrit 32.6 (L) 35.0 - 47.0 %    MCV 96 78 - 100 fL    MCH 29.9 26.5 - 33.0 pg    MCHC 31.0 (L) 31.5 - 36.5 g/dL    RDW 13.7 10.0 - 15.0 %    Platelet Count 282 150 - 450 10e3/uL    % Neutrophils 79 %    % Lymphocytes 8 %    % Monocytes 11 %    % Eosinophils 0 %    % Basophils 0 %    % Immature Granulocytes 2 %    NRBCs per 100 WBC 0 <1 /100    Absolute Neutrophils 1.9 1.6 - 8.3 10e3/uL    Absolute Lymphocytes 0.2 (L) 0.8 - 5.3 10e3/uL    Absolute Monocytes 0.3 0.0 - 1.3 10e3/uL    Absolute Eosinophils 0.0 0.0 - 0.7 10e3/uL    Absolute Basophils 0.0 0.0 - 0.2 10e3/uL    Absolute Immature Granulocytes 0.0 <=0.4 10e3/uL    Absolute NRBCs 0.0 10e3/uL   HCG quantitative pregnancy     Status: Normal   Result Value Ref Range    hCG Quantitative <1 <5 mIU/mL   EKG 12-lead, tracing only     Status: None   Result Value Ref Range    Systolic Blood Pressure  mmHg    Diastolic Blood  Pressure  mmHg    Ventricular Rate 78 BPM    Atrial Rate 78 BPM    KS Interval 138 ms    QRS Duration 80 ms     ms    QTc 428 ms    P Axis 40 degrees    R AXIS 0 degrees    T Axis 29 degrees    Interpretation ECG       Sinus rhythm  Voltage criteria for left ventricular hypertrophy  Abnormal ECG  Unconfirmed report - interpretation of this ECG is computer generated - see medical record for final interpretation  Confirmed by - EMERGENCY ROOM, PHYSICIAN ([id number]),  AMADOU FAIRBANKS ([id number]) on 6/26/2024 8:05:54 PM     CBC with platelets differential     Status: Abnormal    Narrative    The following orders were created for panel order CBC with platelets differential.  Procedure                               Abnormality         Status                     ---------                               -----------         ------                     CBC with platelets and d...[[id number]]  Abnormal            Final result                 Please view results for these tests on the individual orders.     Medications   alum & mag hydroxide-simethicone (MAALOX) suspension 15 mL (15 mLs Oral $Given 6/26/24 1731)   lidocaine (viscous) (XYLOCAINE) 2 % solution 10 mL (10 mLs Mouth/Throat $Given 6/26/24 1732)   sodium chloride 0.9% BOLUS 1,000 mL (0 mLs Intravenous Stopped 6/26/24 1913)   oxyCODONE (ROXICODONE) tablet 5 mg (5 mg Oral $Given 6/26/24 1922)     Labs Ordered and Resulted from Time of ED Arrival to Time of ED Departure   COMPREHENSIVE METABOLIC PANEL - Abnormal       Result Value    Sodium 136      Potassium 5.0      Carbon Dioxide (CO2) 22      Anion Gap 12      Urea Nitrogen 26.3 (*)     Creatinine 1.02 (*)     GFR Estimate 74      Calcium 10.5 (*)     Chloride 102      Glucose 136 (*)     Alkaline Phosphatase 95      AST 28      ALT 49      Protein Total 6.8      Albumin 3.6      Bilirubin Total 0.4     NT PROBNP INPATIENT - Abnormal    N terminal Pro BNP Inpatient 1,484 (*)    D DIMER QUANTITATIVE - Abnormal     D-Dimer Quantitative 0.66 (*)    CBC WITH PLATELETS AND DIFFERENTIAL - Abnormal    WBC Count 2.4 (*)     RBC Count 3.38 (*)     Hemoglobin 10.1 (*)     Hematocrit 32.6 (*)     MCV 96      MCH 29.9      MCHC 31.0 (*)     RDW 13.7      Platelet Count 282      % Neutrophils 79      % Lymphocytes 8      % Monocytes 11      % Eosinophils 0      % Basophils 0      % Immature Granulocytes 2      NRBCs per 100 WBC 0      Absolute Neutrophils 1.9      Absolute Lymphocytes 0.2 (*)     Absolute Monocytes 0.3      Absolute Eosinophils 0.0      Absolute Basophils 0.0      Absolute Immature Granulocytes 0.0      Absolute NRBCs 0.0     LIPASE - Normal    Lipase 14     TROPONIN T, HIGH SENSITIVITY - Normal    Troponin T, High Sensitivity <6     INFLUENZA A/B, RSV, & SARS-COV2 PCR - Normal    Influenza A PCR Negative      Influenza B PCR Negative      RSV PCR Negative      SARS CoV2 PCR Negative     HCG QUANTITATIVE PREGNANCY - Normal    hCG Quantitative <1       XR Chest 2 Views   Final Result   IMPRESSION:  Mild nonspecific streaky perihilar opacities, may   represent atelectasis/edema. No confluent consolidation.       I have personally reviewed the examination and initial interpretation   and I agree with the findings.      HOMERO REAGAN MD            SYSTEM ID:  [id number]         Patient signed out AGAINST MEDICAL ADVICE prior to CTA chest PE protocol being performed.    Critical care was not performed.     Medical Decision Making  The patient's presentation was of moderate complexity (an undiagnosed new problem with uncertain prognosis).    The patient's evaluation involved:  review of external note(s) from 3+ sources (recent emergency department encounter, hospital admission to transplant surgery, transplant nephrology notes from today)  review of 3+ test result(s) ordered prior to this encounter (see separate area of note for details)  ordering and/or review of 3+ test(s) in this encounter (see separate area of note for  details)  independent interpretation of testing performed by another health professional (EKG interpreted by me with above results)  discussion of management or test interpretation with another health professional (see separate area of note for details)    The patient's management necessitated high risk (a decision regarding hospitalization).    Assessment & Plan    34 year old female with recent kidney transplant approximately 2 months ago to the emergency department with chest pain.  The pain began while eating over a week ago and now has been constant.  Today, she also endorses shortness of air.  Differential includes esophagitis, pulmonary embolism, ACS, COVID, influenza, pneumonia, pneumothorax.  The symptoms were preceded by oral and vaginal ulcers which have now improved after her Myfortic was discontinued 9 days ago.  Patient is EKG does not reveal any acute ischemic change and troponin is normal so not suspect ACS.  Her BNP is elevated but there is no pulmonary edema on her chest radiograph.  Her D-dimer is mildly elevated at 0.66.  PE protocol chest CT ordered but never performed before the patient signed out AGAINST MEDICAL ADVICE.  She did note some improvement with GI cocktail including viscous lidocaine.  Plan have been discussed prior to patient leaving to admit her for further diagnostic evaluation and treatment as she has lost at least 14 pounds over the past month when she began having oral ulcers and now chest pain that peers to be related to eating suggestive of esophageal origin.    I have reviewed the nursing notes. I have reviewed the findings, diagnosis, plan and need for follow up with the patient.    Discharge Medication List as of 6/26/2024  9:33 PM          Final diagnoses:   Chest pain, unspecified type   Dyspnea, unspecified type       Chart documentation was completed with Dragon voice-recognition software. Even though reviewed, this chart may still contain some grammatical, spelling,  and word errors.     [facility] EMERGENCY DEPARTMENT  6/26/2024     Ryan Rodas MD  06/27/24 1154

## 2024-06-26 NOTE — TELEPHONE ENCOUNTER
Patient Call: General  Route to LPN    Reason for call:  called to touch base on patient not being able to eat and has questions about appointment for tomorrow.     Call back needed? Yes    Return Call Needed  Same as documented in contacts section  When to return call?: Same day: Route High Priority

## 2024-06-26 NOTE — TELEPHONE ENCOUNTER
After  with the patient, she feels comfortable proceeding to the ER to be evaluated due to pain in her esophagus and inability to eat/drink for several days. Patient will go to ER now.

## 2024-06-27 ENCOUNTER — ALLIED HEALTH/NURSE VISIT (OUTPATIENT)
Dept: TRANSPLANT | Facility: CLINIC | Age: 34
DRG: 987 | End: 2024-06-27
Attending: INTERNAL MEDICINE
Payer: MEDICARE

## 2024-06-27 ENCOUNTER — OFFICE VISIT (OUTPATIENT)
Dept: PHARMACY | Facility: CLINIC | Age: 34
End: 2024-06-27
Payer: MEDICARE

## 2024-06-27 ENCOUNTER — INFUSION THERAPY VISIT (OUTPATIENT)
Dept: INFUSION THERAPY | Facility: CLINIC | Age: 34
DRG: 987 | End: 2024-06-27
Attending: INTERNAL MEDICINE
Payer: MEDICARE

## 2024-06-27 ENCOUNTER — TELEPHONE (OUTPATIENT)
Dept: TRANSPLANT | Facility: CLINIC | Age: 34
End: 2024-06-27

## 2024-06-27 ENCOUNTER — HOSPITAL ENCOUNTER (INPATIENT)
Facility: CLINIC | Age: 34
LOS: 12 days | Discharge: HOME | DRG: 987 | End: 2024-07-09
Attending: TRANSPLANT SURGERY | Admitting: TRANSPLANT SURGERY
Payer: MEDICARE

## 2024-06-27 VITALS
SYSTOLIC BLOOD PRESSURE: 124 MMHG | TEMPERATURE: 98.2 F | RESPIRATION RATE: 20 BRPM | DIASTOLIC BLOOD PRESSURE: 81 MMHG | OXYGEN SATURATION: 100 % | HEART RATE: 99 BPM

## 2024-06-27 VITALS
TEMPERATURE: 98 F | HEART RATE: 85 BPM | DIASTOLIC BLOOD PRESSURE: 85 MMHG | WEIGHT: 115.1 LBS | BODY MASS INDEX: 21.05 KG/M2 | SYSTOLIC BLOOD PRESSURE: 128 MMHG | OXYGEN SATURATION: 98 %

## 2024-06-27 DIAGNOSIS — N18.31 ANEMIA IN STAGE 3A CHRONIC KIDNEY DISEASE (H): ICD-10-CM

## 2024-06-27 DIAGNOSIS — D84.9 IMMUNOSUPPRESSED STATUS (H): Chronic | ICD-10-CM

## 2024-06-27 DIAGNOSIS — K21.9 GASTROESOPHAGEAL REFLUX DISEASE WITHOUT ESOPHAGITIS: ICD-10-CM

## 2024-06-27 DIAGNOSIS — Z48.298 AFTERCARE FOLLOWING ORGAN TRANSPLANT: ICD-10-CM

## 2024-06-27 DIAGNOSIS — E83.42 HYPOMAGNESEMIA: ICD-10-CM

## 2024-06-27 DIAGNOSIS — Z78.9 TAKES DIETARY SUPPLEMENTS: ICD-10-CM

## 2024-06-27 DIAGNOSIS — D63.1 ANEMIA IN STAGE 2 CHRONIC KIDNEY DISEASE: ICD-10-CM

## 2024-06-27 DIAGNOSIS — K13.70 ORAL MUCOSAL LESION: ICD-10-CM

## 2024-06-27 DIAGNOSIS — R63.8 INADEQUATE ORAL INTAKE: ICD-10-CM

## 2024-06-27 DIAGNOSIS — Z94.0 KIDNEY REPLACED BY TRANSPLANT: ICD-10-CM

## 2024-06-27 DIAGNOSIS — D84.9 IMMUNOSUPPRESSED STATUS (H): ICD-10-CM

## 2024-06-27 DIAGNOSIS — E43 SEVERE MALNUTRITION (H): ICD-10-CM

## 2024-06-27 DIAGNOSIS — E78.5 DYSLIPIDEMIA: ICD-10-CM

## 2024-06-27 DIAGNOSIS — Z29.89 NEED FOR PNEUMOCYSTIS PROPHYLAXIS: ICD-10-CM

## 2024-06-27 DIAGNOSIS — D63.1 ANEMIA IN STAGE 3A CHRONIC KIDNEY DISEASE (H): ICD-10-CM

## 2024-06-27 DIAGNOSIS — K22.10 ULCER OF ESOPHAGUS WITHOUT BLEEDING: ICD-10-CM

## 2024-06-27 DIAGNOSIS — Z98.890 OTHER SPECIFIED POSTPROCEDURAL STATES: ICD-10-CM

## 2024-06-27 DIAGNOSIS — N76.6 VULVAR ULCER: Primary | ICD-10-CM

## 2024-06-27 DIAGNOSIS — K50.919 CROHN'S DISEASE WITH COMPLICATION, UNSPECIFIED GASTROINTESTINAL TRACT LOCATION (H): ICD-10-CM

## 2024-06-27 DIAGNOSIS — N76.6 VULVAR ULCERATION: ICD-10-CM

## 2024-06-27 DIAGNOSIS — Z20.828 CONTACT WITH AND (SUSPECTED) EXPOSURE TO OTHER VIRAL COMMUNICABLE DISEASES: ICD-10-CM

## 2024-06-27 DIAGNOSIS — K12.1 MOUTH ULCERS: ICD-10-CM

## 2024-06-27 DIAGNOSIS — E86.0 DEHYDRATION: Primary | ICD-10-CM

## 2024-06-27 DIAGNOSIS — Z94.0 KIDNEY REPLACED BY TRANSPLANT: Primary | Chronic | ICD-10-CM

## 2024-06-27 DIAGNOSIS — N18.2 ANEMIA IN STAGE 2 CHRONIC KIDNEY DISEASE: ICD-10-CM

## 2024-06-27 DIAGNOSIS — N90.89 VULVAR LESION: ICD-10-CM

## 2024-06-27 DIAGNOSIS — R13.10 ODYNOPHAGIA: ICD-10-CM

## 2024-06-27 DIAGNOSIS — N18.2 CKD (CHRONIC KIDNEY DISEASE) STAGE 2, GFR 60-89 ML/MIN: ICD-10-CM

## 2024-06-27 DIAGNOSIS — E83.39 HYPOPHOSPHATEMIA: ICD-10-CM

## 2024-06-27 DIAGNOSIS — Z48.298 AFTERCARE FOLLOWING ORGAN TRANSPLANT: Primary | ICD-10-CM

## 2024-06-27 DIAGNOSIS — Z79.899 ENCOUNTER FOR LONG-TERM CURRENT USE OF MEDICATION: ICD-10-CM

## 2024-06-27 PROBLEM — N18.9 ANEMIA IN CHRONIC RENAL DISEASE: Status: ACTIVE | Noted: 2024-06-27

## 2024-06-27 LAB
ANION GAP SERPL CALCULATED.3IONS-SCNC: 10 MMOL/L (ref 7–15)
BASOPHILS # BLD AUTO: 0 10E3/UL (ref 0–0.2)
BASOPHILS NFR BLD AUTO: 0 %
BUN SERPL-MCNC: 19.4 MG/DL (ref 6–20)
CALCIUM SERPL-MCNC: 9.6 MG/DL (ref 8.6–10)
CHLORIDE SERPL-SCNC: 109 MMOL/L (ref 98–107)
CMV DNA SPEC NAA+PROBE-ACNC: NOT DETECTED IU/ML
CREAT SERPL-MCNC: 1.04 MG/DL (ref 0.51–0.95)
DEPRECATED HCO3 PLAS-SCNC: 19 MMOL/L (ref 22–29)
EGFRCR SERPLBLD CKD-EPI 2021: 72 ML/MIN/1.73M2
EOSINOPHIL # BLD AUTO: 0 10E3/UL (ref 0–0.7)
EOSINOPHIL NFR BLD AUTO: 0 %
ERYTHROCYTE [DISTWIDTH] IN BLOOD BY AUTOMATED COUNT: 13.8 % (ref 10–15)
GLUCOSE SERPL-MCNC: 98 MG/DL (ref 70–99)
HCT VFR BLD AUTO: 28.1 % (ref 35–47)
HGB BLD-MCNC: 8.5 G/DL (ref 11.7–15.7)
IMM GRANULOCYTES # BLD: 0.1 10E3/UL
IMM GRANULOCYTES NFR BLD: 4 %
LYMPHOCYTES # BLD AUTO: 0.3 10E3/UL (ref 0.8–5.3)
LYMPHOCYTES NFR BLD AUTO: 11 %
MAGNESIUM SERPL-MCNC: 1.5 MG/DL (ref 1.7–2.3)
MCH RBC QN AUTO: 29.8 PG (ref 26.5–33)
MCHC RBC AUTO-ENTMCNC: 30.2 G/DL (ref 31.5–36.5)
MCV RBC AUTO: 99 FL (ref 78–100)
MONOCYTES # BLD AUTO: 0.4 10E3/UL (ref 0–1.3)
MONOCYTES NFR BLD AUTO: 19 %
NEUTROPHILS # BLD AUTO: 1.5 10E3/UL (ref 1.6–8.3)
NEUTROPHILS NFR BLD AUTO: 66 %
NRBC # BLD AUTO: 0 10E3/UL
NRBC BLD AUTO-RTO: 0 /100
PHOSPHATE SERPL-MCNC: 2.4 MG/DL (ref 2.5–4.5)
PLATELET # BLD AUTO: 253 10E3/UL (ref 150–450)
POTASSIUM SERPL-SCNC: 4.1 MMOL/L (ref 3.4–5.3)
RBC # BLD AUTO: 2.85 10E6/UL (ref 3.8–5.2)
SODIUM SERPL-SCNC: 138 MMOL/L (ref 135–145)
TACROLIMUS BLD-MCNC: 7.1 UG/L (ref 5–15)
TME LAST DOSE: NORMAL H
TME LAST DOSE: NORMAL H
WBC # BLD AUTO: 2.3 10E3/UL (ref 4–11)

## 2024-06-27 PROCEDURE — 36415 COLL VENOUS BLD VENIPUNCTURE: CPT

## 2024-06-27 PROCEDURE — 86833 HLA CLASS II HIGH DEFIN QUAL: CPT

## 2024-06-27 PROCEDURE — G2211 COMPLEX E/M VISIT ADD ON: HCPCS | Performed by: INTERNAL MEDICINE

## 2024-06-27 PROCEDURE — 250N000009 HC RX 250

## 2024-06-27 PROCEDURE — 85025 COMPLETE CBC W/AUTO DIFF WBC: CPT

## 2024-06-27 PROCEDURE — 99606 MTMS BY PHARM EST 15 MIN: CPT | Performed by: PHARMACIST

## 2024-06-27 PROCEDURE — 82374 ASSAY BLOOD CARBON DIOXIDE: CPT

## 2024-06-27 PROCEDURE — 258N000003 HC RX IP 258 OP 636

## 2024-06-27 PROCEDURE — S5010 5% DEXTROSE AND 0.45% SALINE: HCPCS

## 2024-06-27 PROCEDURE — 258N000003 HC RX IP 258 OP 636: Performed by: TRANSPLANT SURGERY

## 2024-06-27 PROCEDURE — 82565 ASSAY OF CREATININE: CPT

## 2024-06-27 PROCEDURE — 999N000127 HC STATISTIC PERIPHERAL IV START W US GUIDANCE

## 2024-06-27 PROCEDURE — 120N000011 HC R&B TRANSPLANT UMMC

## 2024-06-27 PROCEDURE — 80197 ASSAY OF TACROLIMUS: CPT

## 2024-06-27 PROCEDURE — 999N000128 HC STATISTIC PERIPHERAL IV START W/O US GUIDANCE

## 2024-06-27 PROCEDURE — 86832 HLA CLASS I HIGH DEFIN QUAL: CPT

## 2024-06-27 PROCEDURE — 99607 MTMS BY PHARM ADDL 15 MIN: CPT | Performed by: PHARMACIST

## 2024-06-27 PROCEDURE — 87799 DETECT AGENT NOS DNA QUANT: CPT

## 2024-06-27 PROCEDURE — 250N000013 HC RX MED GY IP 250 OP 250 PS 637: Performed by: PHYSICIAN ASSISTANT

## 2024-06-27 PROCEDURE — 99417 PROLNG OP E/M EACH 15 MIN: CPT | Performed by: INTERNAL MEDICINE

## 2024-06-27 PROCEDURE — 99213 OFFICE O/P EST LOW 20 MIN: CPT | Performed by: INTERNAL MEDICINE

## 2024-06-27 PROCEDURE — 250N000012 HC RX MED GY IP 250 OP 636 PS 637

## 2024-06-27 PROCEDURE — 84100 ASSAY OF PHOSPHORUS: CPT

## 2024-06-27 PROCEDURE — 250N000009 HC RX 250: Performed by: TRANSPLANT SURGERY

## 2024-06-27 PROCEDURE — 250N000011 HC RX IP 250 OP 636: Performed by: TRANSPLANT SURGERY

## 2024-06-27 PROCEDURE — 258N000003 HC RX IP 258 OP 636: Performed by: INTERNAL MEDICINE

## 2024-06-27 PROCEDURE — 250N000011 HC RX IP 250 OP 636

## 2024-06-27 PROCEDURE — 83735 ASSAY OF MAGNESIUM: CPT

## 2024-06-27 PROCEDURE — 99215 OFFICE O/P EST HI 40 MIN: CPT | Mod: 24 | Performed by: INTERNAL MEDICINE

## 2024-06-27 PROCEDURE — 99207 PR NO CHARGE COORDINATED CARE PS: CPT

## 2024-06-27 RX ORDER — ASPIRIN 81 MG/1
81 TABLET, CHEWABLE ORAL DAILY
Status: DISCONTINUED | OUTPATIENT
Start: 2024-06-28 | End: 2024-07-09

## 2024-06-27 RX ORDER — DIPHENHYDRAMINE HYDROCHLORIDE AND LIDOCAINE HYDROCHLORIDE AND ALUMINUM HYDROXIDE AND MAGNESIUM HYDRO
10 KIT EVERY 6 HOURS PRN
Status: DISCONTINUED | OUTPATIENT
Start: 2024-06-27 | End: 2024-06-29

## 2024-06-27 RX ORDER — MAGNESIUM SULFATE HEPTAHYDRATE 40 MG/ML
4 INJECTION, SOLUTION INTRAVENOUS ONCE
Status: COMPLETED | OUTPATIENT
Start: 2024-06-27 | End: 2024-06-27

## 2024-06-27 RX ORDER — LIDOCAINE 40 MG/G
CREAM TOPICAL
Status: DISCONTINUED | OUTPATIENT
Start: 2024-06-27 | End: 2024-07-02

## 2024-06-27 RX ORDER — METHYLPREDNISOLONE SODIUM SUCCINATE 40 MG/ML
8 INJECTION, POWDER, LYOPHILIZED, FOR SOLUTION INTRAMUSCULAR; INTRAVENOUS EVERY 24 HOURS
Status: DISCONTINUED | OUTPATIENT
Start: 2024-06-27 | End: 2024-06-29

## 2024-06-27 RX ORDER — OXYCODONE HCL 5 MG/5 ML
5 SOLUTION, ORAL ORAL EVERY 4 HOURS PRN
Status: DISCONTINUED | OUTPATIENT
Start: 2024-06-27 | End: 2024-07-08

## 2024-06-27 RX ORDER — SUCRALFATE ORAL 1 G/10ML
1 SUSPENSION ORAL
Status: DISCONTINUED | OUTPATIENT
Start: 2024-06-27 | End: 2024-06-28

## 2024-06-27 RX ORDER — METHYLPREDNISOLONE SODIUM SUCCINATE 125 MG/2ML
125 INJECTION, POWDER, LYOPHILIZED, FOR SOLUTION INTRAMUSCULAR; INTRAVENOUS
Status: CANCELLED
Start: 2024-07-02

## 2024-06-27 RX ORDER — NALOXONE HYDROCHLORIDE 0.4 MG/ML
0.4 INJECTION, SOLUTION INTRAMUSCULAR; INTRAVENOUS; SUBCUTANEOUS
Status: DISCONTINUED | OUTPATIENT
Start: 2024-06-27 | End: 2024-07-09 | Stop reason: HOSPADM

## 2024-06-27 RX ORDER — HEPARIN SODIUM,PORCINE 10 UNIT/ML
5-20 VIAL (ML) INTRAVENOUS DAILY PRN
Status: CANCELLED | OUTPATIENT
Start: 2024-07-02

## 2024-06-27 RX ORDER — NALOXONE HYDROCHLORIDE 0.4 MG/ML
0.2 INJECTION, SOLUTION INTRAMUSCULAR; INTRAVENOUS; SUBCUTANEOUS
Status: DISCONTINUED | OUTPATIENT
Start: 2024-06-27 | End: 2024-07-09 | Stop reason: HOSPADM

## 2024-06-27 RX ORDER — DEXTROSE MONOHYDRATE AND SODIUM CHLORIDE 5; .45 G/100ML; G/100ML
INJECTION, SOLUTION INTRAVENOUS CONTINUOUS
Status: DISPENSED | OUTPATIENT
Start: 2024-06-27 | End: 2024-06-29

## 2024-06-27 RX ORDER — HEPARIN SODIUM,PORCINE 10 UNIT/ML
5-20 VIAL (ML) INTRAVENOUS DAILY PRN
OUTPATIENT
Start: 2024-06-27

## 2024-06-27 RX ORDER — EPINEPHRINE 1 MG/ML
0.3 INJECTION, SOLUTION INTRAMUSCULAR; SUBCUTANEOUS EVERY 5 MIN PRN
OUTPATIENT
Start: 2024-06-27

## 2024-06-27 RX ORDER — LIDOCAINE HYDROCHLORIDE 20 MG/ML
JELLY TOPICAL EVERY 6 HOURS PRN
Status: DISCONTINUED | OUTPATIENT
Start: 2024-06-27 | End: 2024-06-27

## 2024-06-27 RX ORDER — ALBUTEROL SULFATE 90 UG/1
1-2 AEROSOL, METERED RESPIRATORY (INHALATION)
Status: CANCELLED
Start: 2024-07-02

## 2024-06-27 RX ORDER — HEPARIN SODIUM (PORCINE) LOCK FLUSH IV SOLN 100 UNIT/ML 100 UNIT/ML
5 SOLUTION INTRAVENOUS
Status: CANCELLED | OUTPATIENT
Start: 2024-07-02

## 2024-06-27 RX ORDER — ONDANSETRON 2 MG/ML
4 INJECTION INTRAMUSCULAR; INTRAVENOUS EVERY 6 HOURS PRN
Status: DISCONTINUED | OUTPATIENT
Start: 2024-06-27 | End: 2024-07-02

## 2024-06-27 RX ORDER — LIDOCAINE HYDROCHLORIDE 20 MG/ML
JELLY TOPICAL EVERY 4 HOURS PRN
Status: DISCONTINUED | OUTPATIENT
Start: 2024-06-27 | End: 2024-07-09

## 2024-06-27 RX ORDER — DIPHENHYDRAMINE HYDROCHLORIDE 50 MG/ML
50 INJECTION INTRAMUSCULAR; INTRAVENOUS
Status: CANCELLED
Start: 2024-07-02

## 2024-06-27 RX ORDER — MORPHINE SULFATE 2 MG/ML
2 INJECTION, SOLUTION INTRAMUSCULAR; INTRAVENOUS EVERY 4 HOURS PRN
Status: CANCELLED | OUTPATIENT
Start: 2024-06-27

## 2024-06-27 RX ORDER — METHYLPREDNISOLONE SODIUM SUCCINATE 125 MG/2ML
125 INJECTION, POWDER, LYOPHILIZED, FOR SOLUTION INTRAMUSCULAR; INTRAVENOUS
Start: 2024-06-27

## 2024-06-27 RX ORDER — MEPERIDINE HYDROCHLORIDE 25 MG/ML
25 INJECTION INTRAMUSCULAR; INTRAVENOUS; SUBCUTANEOUS EVERY 30 MIN PRN
OUTPATIENT
Start: 2024-06-27

## 2024-06-27 RX ORDER — ALBUTEROL SULFATE 0.83 MG/ML
2.5 SOLUTION RESPIRATORY (INHALATION)
OUTPATIENT
Start: 2024-06-27

## 2024-06-27 RX ORDER — DIPHENHYDRAMINE HYDROCHLORIDE 50 MG/ML
50 INJECTION INTRAMUSCULAR; INTRAVENOUS
Start: 2024-06-27

## 2024-06-27 RX ORDER — HEPARIN SODIUM (PORCINE) LOCK FLUSH IV SOLN 100 UNIT/ML 100 UNIT/ML
5 SOLUTION INTRAVENOUS
OUTPATIENT
Start: 2024-06-27

## 2024-06-27 RX ORDER — EPINEPHRINE 1 MG/ML
0.3 INJECTION, SOLUTION INTRAMUSCULAR; SUBCUTANEOUS EVERY 5 MIN PRN
Status: CANCELLED | OUTPATIENT
Start: 2024-07-02

## 2024-06-27 RX ORDER — DEXTROSE MONOHYDRATE AND SODIUM CHLORIDE 5; .45 G/100ML; G/100ML
INJECTION, SOLUTION INTRAVENOUS CONTINUOUS
Status: DISCONTINUED | OUTPATIENT
Start: 2024-06-27 | End: 2024-06-27

## 2024-06-27 RX ORDER — TACROLIMUS 0.5 MG/1
2.5 CAPSULE ORAL
Status: DISCONTINUED | OUTPATIENT
Start: 2024-06-27 | End: 2024-06-27

## 2024-06-27 RX ORDER — MEPERIDINE HYDROCHLORIDE 25 MG/ML
25 INJECTION INTRAMUSCULAR; INTRAVENOUS; SUBCUTANEOUS EVERY 30 MIN PRN
Status: CANCELLED | OUTPATIENT
Start: 2024-07-02

## 2024-06-27 RX ORDER — ALBUTEROL SULFATE 0.83 MG/ML
2.5 SOLUTION RESPIRATORY (INHALATION)
Status: CANCELLED | OUTPATIENT
Start: 2024-07-02

## 2024-06-27 RX ORDER — ALBUTEROL SULFATE 90 UG/1
1-2 AEROSOL, METERED RESPIRATORY (INHALATION)
Start: 2024-06-27

## 2024-06-27 RX ORDER — TACROLIMUS 0.5 MG/1
1.5 CAPSULE ORAL
Status: DISCONTINUED | OUTPATIENT
Start: 2024-06-27 | End: 2024-06-29

## 2024-06-27 RX ADMIN — DIPHENHYDRAMINE HYDROCHLORIDE AND LIDOCAINE HYDROCHLORIDE AND ALUMINUM HYDROXIDE AND MAGNESIUM HYDRO 10 ML: KIT at 19:43

## 2024-06-27 RX ADMIN — OXYCODONE HYDROCHLORIDE 5 MG: 5 SOLUTION ORAL at 16:22

## 2024-06-27 RX ADMIN — FAMOTIDINE 40 MG: 10 INJECTION, SOLUTION INTRAVENOUS at 15:40

## 2024-06-27 RX ADMIN — LIDOCAINE HYDROCHLORIDE: 20 JELLY TOPICAL at 16:19

## 2024-06-27 RX ADMIN — METHYLPREDNISOLONE SODIUM SUCCINATE 8 MG: 40 INJECTION, POWDER, FOR SOLUTION INTRAMUSCULAR; INTRAVENOUS at 15:39

## 2024-06-27 RX ADMIN — DEXTROSE AND SODIUM CHLORIDE: 5; 450 INJECTION, SOLUTION INTRAVENOUS at 15:39

## 2024-06-27 RX ADMIN — SODIUM CHLORIDE 1000 ML: 9 INJECTION, SOLUTION INTRAVENOUS at 08:39

## 2024-06-27 RX ADMIN — TACROLIMUS 1.5 MG: 1 CAPSULE ORAL at 18:47

## 2024-06-27 RX ADMIN — MAGNESIUM SULFATE IN WATER 4 G: 40 INJECTION, SOLUTION INTRAVENOUS at 19:42

## 2024-06-27 RX ADMIN — SUCRALFATE 1 G: 1 SUSPENSION ORAL at 18:45

## 2024-06-27 RX ADMIN — SUCRALFATE 1 G: 1 SUSPENSION ORAL at 22:20

## 2024-06-27 RX ADMIN — POTASSIUM PHOSPHATE, MONOBASIC POTASSIUM PHOSPHATE, DIBASIC 9 MMOL: 224; 236 INJECTION, SOLUTION, CONCENTRATE INTRAVENOUS at 21:06

## 2024-06-27 ASSESSMENT — ACTIVITIES OF DAILY LIVING (ADL)
ADLS_ACUITY_SCORE: 29
ADLS_ACUITY_SCORE: 29
ADLS_ACUITY_SCORE: 37
ADLS_ACUITY_SCORE: 29
ADLS_ACUITY_SCORE: 22
ADLS_ACUITY_SCORE: 29
ADLS_ACUITY_SCORE: 37
ADLS_ACUITY_SCORE: 29

## 2024-06-27 ASSESSMENT — PAIN SCALES - GENERAL: PAINLEVEL: MODERATE PAIN (5)

## 2024-06-27 NOTE — PROGRESS NOTES
Infusion Nursing Note:  Ira Zamora presents today for Patient presents with:  Infusion: Fluids, labs      Patient seen by provider today: No   present during visit today: No    Note: During [facility]'s [facility] appointment, orders from Dr. Yasir Breaux  were completed.  Patient identification verified by name and date of birth.  Assessment completed.  Vitals recorded in Doc Flowsheets.  Patient was provided with education regarding medication/procedure and possible side effects.  Patient verbalized understanding.    Frequency: one time  Labs: drawn per open orders  Premedications:none  Infusion Rate/Length: Normal saline  infused at 999* mL/hr. Total infusion time of approximately 1 hour     Administrations This Visit       sodium chloride 0.9% BOLUS 1,000 mL       Admin Date  06/27/2024 Action  $New Bag Dose  1,000 mL Route  Intravenous Documented By  Breanne Syed, AMANDA                    Intravenous Access:  Labs drawn without difficulty.  Peripheral IV placed.    Treatment Conditions:  Not Applicable.      Post Infusion Assessment:  Patient tolerated infusion without incident.  Site patent and intact, free from redness, edema or discomfort.  No evidence of extravasations.  Access discontinued per protocol.       Discharge Plan:   Discharge instructions reviewed with: Patient.  AVS to patient via memory lane syndicationsHART.  Patient will return   Future Appointments   Date Time Provider Department Center   6/27/2024  9:30 AM [facility]   6/27/2024 10:00 AM Kirk Patel, [facility]   6/27/2024 11:30 AM Neno Ortiz MD [facility]   6/27/2024 12:00 PM Bayron Hernandez MSW [facility]   7/24/2024 10:00 AM Amalia Fernandez MD [facility]   7/24/2024  1:00 PM Irena Calderon MD [facility]   9/3/2024  9:15 AM [facility]   9/3/2024  9:30 AM Rachel Lima MD [facility]   10/22/2024  8:45 AM [facility]   10/22/2024  9:00 AM Neno Ortiz  MD [facility]   2/3/2025  2:30 PM  LAB [facility]   2/3/2025  3:05 PM Neno Ortiz MD [facility]   4/22/2025 10:30 AM  LAB [facility]   4/22/2025 11:00 AM Neno Ortiz MD [facility]      for next appointment.   Patient discharged in stable condition accompanied by: self.  Departure Mode: Ambulatory    There were no vitals taken for this visit.  Breanne Syed RN on 6/27/2024 at 8:54 AM  .

## 2024-06-27 NOTE — PATIENT INSTRUCTIONS
"Recommendations from today's MTM visit:                                                      Recommend B12 sublingual tablets 1000mcg daily (can be higher than that, make sure they are sublingual tablets). For mouth sores  Talk to Dr. Ortiz about Sucralfate for esophageal pain.  Talk to Dr. Ortiz about reducing aspirin to 81mg daily. If safe.     Follow-up: 9/3     It was great speaking with you today.  I value your experience and would be very thankful for your time in providing feedback in our clinic survey. In the next few days, you may receive an email or text message from [employer] with a link to a survey related to your  clinical pharmacist.\"     To schedule another MTM appointment, please call the clinic directly or you may call the MTM scheduling line at [phone] or toll-free at [phone].     My Clinical Pharmacist's contact information:                                                      Please feel free to contact me with any questions or concerns you have.      Kirk Patel, PharmD  MTM Pharmacist    Phone: [phone]     "

## 2024-06-27 NOTE — PROGRESS NOTES
Admitted/transferred from: home   2 RN full   skin assessment completed by Cody Corona, AMANDA and Jelly LEON  Skin assessment finding: Skin tag under abdomen fold, old healed incision on abdomen, soft tissue mass on left major labia, mouth ulcer - healed  Interventions/actions: applied lidocaine gel on soft mass tissue and encouraging mouth rinse after meals      Bedside Emergency Equipment Present:  Suction Regulator: Yes  Suction Canister: Yes  Tubing between Regulator and Canister: Yes  O2 Regulator with Tree: Yes  Ambu Bag: Yes

## 2024-06-27 NOTE — PROGRESS NOTES
TRANSPLANT NEPHROLOGY CLINIC VISIT     Assessment & Plan   # DDKT: CKD Stage 2 - Stable   Pediatric en-bloc kidneys   - Baseline Creatinine: ~ 0.9-1.1   - Proteinuria: Normal (<0.2 grams)   - DSA Hx: No DSA   - Last cPRA: 9%   - BK Viremia: No   - Kidney Tx Biopsy Hx: No biopsy history.    # Immunosuppression: Tacrolimus immediate release (goal 8-10) and Prednisone (dose 10 mg daily)   - Induction with Recent Transplant:  High Intensity Protocol   - Continue with intensive monitoring of immunosuppression for efficacy and toxicity.   - Historical Changes in Immunosuppression:  Stopped mycophenolate due to oral and vaginal ulcers.   - Changes: Not at this time, but plan was to start belatacept in place of stopped mycophenolate.    # Infection Prevention:      - PJP: Inhaled pentamidine; Okay to restart Bactrim on 7/5/24  - CMV: Valganciclovir (Valcyte); Patient is CMV IgG Ab discordant (D+/R-) and will continue on Valcyte x 6 months, then check CMV PCR monthly until 12 months post transplant.   - CMV IgG Ab High Risk Discordance (D+/R-): Yes  - EBV IgG Ab High Risk Discordance (D+/R-): No    # Blood Pressure: Controlled;  Goal BP: < 140/90   - Changes: No    # Elevated Blood Glucose: Glucose generally running ~ 90-130s  Last HbA1c: 4.7%    # Anemia in Chronic Renal Disease: Hgb: Stable      JUNG: No   - Iron studies: Replete    # Leukopenia: Trend up, moderately low WBC at ~ 2.4 with last check, but had been running closer to ~ 1.3-2.1 over the last month.  Likely medication related.  Negative CMV PCR.    # Mineral Bone Disorder:    - Secondary renal hyperparathyroidism; PTH level: Minimally elevated ( pg/ml)        On treatment: None  - Vitamin D; level: Normal        On supplement: No  - Calcium; level: High        On supplement: No    # Electrolytes:   - Potassium; level: High normal        On supplement: No  - Magnesium; level: Stable low        On supplement: Yes  - Bicarbonate; level: Normal        On  supplement: No    # GERD/Dysphagia: Patient with apparent severe heartburn/GERD symptoms, worsening over the last week or so.  Patient underwent EGD 5/24/24 which was mostly unremarkable with only finding of minimal non-specific chronic inflammation in the stomach (negative H. pylori), but jessee esophageal findings.  In addition, recently had significant oral ulcers.  Now with patient unable to eat and minimal fluid intake.  She hasn't been able to take her medications in the last day due to pain with swallowing.  Her weight is down ~ 20 lbs, although up ~ 5 lbs with IV fluids the last day.  Patient is on famotidine, but unable to use PPI due to probable underlying kidney disease (interstitial nephritis) from previous PPI use.   - Will plan to admit patient to the hospital.  Due to lack of an available bed immediately, will send patient to ER.   - Recommend consulting GI and would think she needs an EGD.    # Oral and Genital Ulcers: Stable, but ongoing symptoms. Differential diagnosis is broad, but includes MPA induced, Crohn's disease, leishmania, and syphilis.  HSV and CMV testing negative.  Patient is now off MPA.   - Further work up to be done inpatient.     # Severe Malnutrition: Patient's weight is down ~ 20 lbs since transplant, although up a bit after IV fluids.  She hasn't been eating for the last week and minimal oral intake overall due to GERD/dysphagia symptoms.  Now unable to take oral medications.   - Will plan hospital admission.              - May need to consider NG tube and starting tube feeds.    # Other Significant PMH:   - Crohn's Disease: Appears to be stable.  Previously was on ustekinumab prior to kidney transplant, but hasn't restarted it due to ongoing oral and vaginal ulcers.  Does occasionally alternate between diarrhea and constipation, but no issues at this time.  Followed by GI and [facility].   - Cardiac/Vascular Disease Risk Factors: Dilated ascending aorta (3.8 cm)  on 2024 ECHO (stable  from 2020).     # Skin Cancer Risk:    - Discussed sun protection and recommend regular follow up with Dermatology.    # Transplant History:  Etiology of Kidney Failure: Interstitial nephritis  Tx: DDKT - Pediatric en bloc  Transplant: 4/17/2024 (Kidney)  Significant transplant-related complications:  Oral and vaginal ulcers    Transplant Office Phone Number: [phone]    Assessment and plan was discussed with the patient and she voiced her understanding and agreement.    Return visit: Return for previously scheduled visit.    Neno Ortiz MD    I spent a total of 80 minutes on the date of the encounter doing chart review, performing a history and physical exam, completing documentation and any further activities as noted above.    The longitudinal plan of care for the diagnosis(es)/condition(s) as documented were addressed during this visit. Due to the added complexity in care, I will continue to support Ira in the subsequent management and with ongoing continuity of care.      Chief Complaint   Ms. Zamora is a 34 year old here for kidney transplant and immunosuppression management.     History of Present Illness    Ms. Zamora reports feeling poorly overall.  Since last clinic visit:   Patient continues to have severe gastroesophageal relux/heartburn symptoms.  She has ongoing burning chest pain, felt secondary to heartburn.  Some associated shortness of breath when taking a deep breath.  No associated nausea, vomiting or diarrhea.  Some epigastric pain.  Symptoms temporarily improve taking Magic mouthwash with lidocaine, but that only lasts for ~ 5-10 minutes and the pain returns.  She was previously hospitalized for oral and vaginal ulcers with immunosuppression changed stopping mycophenolate.  Worsening heartburn symptoms over the last week, especially over the last day or two.  Patient is unable to take her medications today due to pain with swallowing.  Also not able to eat for several days or drink  much fluids.  Patient was in the ER yesterday for symptoms, but treatment took too long for them and they left.  Chest pain or shortness of breath: Yes, but more related to heartburn symptoms.  Lower extremity swelling: No  Weight change: Yes; Patient had lost considerable weight with oral ulcers, but up ~ 5 lbs in the last day after receiving IV fluids x 2.  Nausea and vomiting: No  Diarrhea: No  Heartburn symptoms: Yes  Fever, sweats or chills: No  Urinary complaints: No, but some irritation due to vaginal ulcer, as well as pain with walking.    Home BP: Not checked    Problem List   Patient Active Problem List   Diagnosis    Crohn's disease (H)    CKD (chronic kidney disease) stage 2, GFR 60-89 ml/min    Secondary renal hyperparathyroidism (H24)    Long QT interval    Kidney replaced by transplant    Immunosuppressed status (H24)    Gastroesophageal reflux disease without esophagitis    Dehydration    Aftercare following organ transplant    Mouth ulcers    Vaginal ulcer    Leukopenia, unspecified type    Severe malnutrition (H24)    Need for pneumocystis prophylaxis    Anemia in chronic renal disease    Hypomagnesemia       Allergies   Allergies   Allergen Reactions    Amlodipine Headache and Other (See Comments)     Other Reaction(s): Unknown    Proton Pump Inhibitors Nephrotoxicity     No PPIs due to history of renal failure due to interstitial nephritis    Tegaderm Transparent Dressing (Informational Only) Blisters       Medications   Current Outpatient Medications   Medication Sig Dispense Refill    acetaminophen (TYLENOL) 500 MG tablet Take 1,000 mg by mouth every 6 hours as needed      aspirin (ASA) 325 MG tablet Take 1 tablet (325 mg) by mouth daily 30 tablet 2    atorvastatin (LIPITOR) 10 MG tablet Take 1 tablet (10 mg) by mouth daily 30 tablet 1    benzocaine (ORAJEL MAXIMUM STRENGTH) 20 % GEL gel Take by mouth 4 times daily as needed for mouth sores 9 g 0    famotidine (PEPCID) 20 MG tablet Take 2  tablets (40 mg) by mouth 2 times daily 60 tablet 1    levonorgestrel (KYLEENA) 19.5 MG IUD 1 Device by Intrauterine route      magic mouthwash suspension (diphenhydrAMINE, lidocaine, aluminum-magnesium & simethicone) Swish and swallow 10 mLs in mouth every 6 hours as needed for mouth sores 560 mL 1    magnesium oxide (MAG-OX) 400 MG tablet Take 2 tablets (800 mg) by mouth daily 60 tablet 0    mineral oil-hydrophilic petrolatum (AQUAPHOR) external ointment Apply topically every hour as needed for other (barrier cream) 50 g 0    PARoxetine (PAXIL) 30 MG tablet Take 30 mg by mouth every evening      predniSONE (DELTASONE) 10 MG tablet Take 1 tablet (10 mg) by mouth daily for 90 days 90 tablet 0    tacrolimus (GENERIC EQUIVALENT) 0.5 MG capsule Take 1 capsule (0.5 mg) by mouth 2 times daily 2.5mg twice daily 60 capsule 11    tacrolimus (GENERIC EQUIVALENT) 1 MG capsule Take 2 capsules (2 mg) by mouth 2 times daily 2.5mg twice daily 120 capsule 11    valGANciclovir (VALCYTE) 450 MG tablet Take 2 tablets (900 mg) by mouth daily 60 tablet 3    sulfamethoxazole-trimethoprim (BACTRIM) 400-80 MG tablet Take 1 tablet by mouth daily Start after July 5, 2024 (Patient not taking: Reported on 6/27/2024) 90 tablet 2    ustekinumab (STELARA) 90 MG/ML Inject 90 mg Subcutaneous once every eight weeks (Patient not taking: Reported on 6/27/2024)       No current facility-administered medications for this visit.     There are no discontinued medications.    Physical Exam   Vital Signs: /85 (BP Location: Left arm, Patient Position: Semi-Guerra's, Cuff Size: Adult Small)   Pulse 85   Temp 98  F (36.7  C) (Oral)   Wt 52.2 kg (115 lb 1.6 oz)   SpO2 98%   BMI 21.05 kg/m      GENERAL APPEARANCE: alert, but in mild distress and somewhat ill appearing  HENT: a couple of small ulcers  RESP: lungs clear to auscultation - no rales, rhonchi or wheezes  CV: regular rhythm, normal rate, no rub, no murmur  EDEMA: no LE edema  bilaterally  ABDOMEN: soft, nondistended, diffuse TTP, bowel sounds normal  MS: extremities normal - no gross deformities noted, no evidence of inflammation in joints, no muscle tenderness  SKIN: no rash  TX KIDNEY: normal  DIALYSIS ACCESS: none    Data         Latest Ref Rng & Units 6/26/2024     5:06 PM 6/20/2024     9:35 AM 6/17/2024     9:20 AM   Renal   Sodium 135 - 145 mmol/L 136   135    Na (external) 136 - 145 mmol/L  136        K 3.4 - 5.3 mmol/L 5.0   4.1    K (external) 3.5 - 5.1 mmol/L  3.9        Cl 98 - 107 mmol/L 102  102     101    Cl (external) 98 - 107 mmol/L 102  102     101    CO2 22 - 29 mmol/L 22   23    CO2 (external) 22 - 29 mmol/L  23        Urea Nitrogen 6.0 - 20.0 mg/dL 26.3   18.6    BUN (external) 6 - 20 mg/dL  22        Creatinine 0.51 - 0.95 mg/dL 1.02   0.97    Cr (external) 0.50 - 0.80 mg/dL  0.79        Glucose 70 - 99 mg/dL 136   117    Glucose (external) 70 - 99 mg/dL  130        Calcium 8.6 - 10.0 mg/dL 10.5   10.3    Ca (external) 8.0 - 10.0 mg/dL  9.9        Magnesium 1.7 - 2.3 mg/dL   1.5    Mg (external) 1.6 - 2.6 mg/dL  1.5            This result is from an external source.         Latest Ref Rng & Units 6/20/2024     9:35 AM 6/17/2024     9:20 AM 6/13/2024     9:25 AM   Bone Health   Phosphorus 2.5 - 4.5 mg/dL  2.2     Phos (external) 2.6 - 4.5 mg/dL 1.7      2.6        This result is from an external source.         Latest Ref Rng & Units 6/26/2024     5:06 PM 6/20/2024     9:35 AM 6/17/2024     9:20 AM   Heme   WBC 4.0 - 11.0 10e3/uL 2.4   1.6    WBC (external) 4.5 - 11.0 Thou/cmm  4.5 - 11.0 thou/cu mm  1.8     1.8     Hgb 11.7 - 15.7 g/dL 10.1   10.0    Hgb (external) 12.0 - 16.0 g/dL  12.0 - 16.0 g/dL  9.5     9.5     Plt 150 - 450 10e3/uL 282   321    Plt (external) 140 - 440 Thou/cmm  140 - 440 thou/cu mm  261     261           Latest Ref Rng & Units 6/26/2024     5:06 PM 6/6/2024     7:07 AM 6/5/2024     5:12 PM   Liver   AP 40 - 150 U/L 95  83  82    TBili <=1.2  mg/dL 0.4  0.7  0.4    ALT 0 - 50 U/L 49  12  18    AST 0 - 45 U/L 28  16  19    Tot Protein 6.4 - 8.3 g/dL 6.8  6.9  6.5    Albumin 3.5 - 5.2 g/dL 3.6  3.8  3.8          Latest Ref Rng & Units 6/26/2024     5:06 PM 4/17/2024     9:58 AM   Pancreas   A1C <5.7 %  4.7    Lipase (Roche) 13 - 60 U/L 14           Latest Ref Rng & Units 5/23/2024     9:49 AM 4/22/2024     8:21 AM   Iron studies   Iron 37 - 145 ug/dL 77  53    Iron Sat Index 15 - 46 % 38  36    Ferritin 6 - 175 ng/mL 2,181  2,181          Latest Ref Rng & Units 4/17/2024     9:58 AM 9/29/2022    11:40 AM   [facility] Vi[facility]   EBV CAPSID ANTIBODY IGG No detectable antibody. Positive  Positive      Failed to redirect to the Timeline version of the [employer].  Recent Labs   Lab Test 05/23/24  0949 05/31/24  0930 06/07/24  0647 06/08/24  0557 06/17/24  0920   DOSTAC 5/22/2024 5/30/2024  --   --  6/16/2024   TACROL 9.2 8.7 11.6 10.7 5.1     Recent Labs   Lab Test 04/23/24  0735   DOSMPA 4/22/2024   9:00 PM   MPACID 2.20   MPAG 107.5*

## 2024-06-27 NOTE — CONSULTS
[facility]  GASTROENTEROLOGY CONSULTATION      Date of Admission:     (Not on file)  Requesting physician: Sid Bales MD             Reason for Consultation:   We were asked by Dr. Desai of transplant surgery  to evaluate this patient with odynophagia     History is obtained from the patient         ASSESSMENT AND RECOMMENDATIONS:   Assessment:  Ira Zamora is a 34 year old female with a history of ESRD due to interstitial nephritis s/p  donor kidney transplant (24), Crohn's disease on Stelara, and recent oral/vaginal ulcers thought to be due to mycophenolate who presents with odynophagia.     # Odynophagia   # Immunosuppressed status   # Heartburn   # Recent grade C esophagitis   Patient with recent heartburn and grade C esophagitis on outside EGD 2024. Also with recent oral and vaginal ulcers thought to be due to mycophenolate which are improving after discontinuing medicaiton. Now presenting with one week of odynphagia limiting PO intake.  Given immunosuppressed status, concern for infectious esophagitis (CMV, HSV, candida) vs medication side effect vs pill esophagitis.Plan for upper endoscopy to further evaluate today. Of note, patient unable to take PPI due to previous interstitial nephritis so has been on famotidine and sucralfate.       Recommendations  - NPO for plan of upper endoscopy today   - Continue famotidine BID  - holding sucralfate to avoid obscuring visualization for EGD today   - Further recommendations to follow in procedure note today         Thank you for involving us in this patient's care. Please do not hesitate to contact the GI service with any questions or concerns.     Pt care plan discussed with Dr. Miranda, GI staff physician.    Bayron Carney MD  Gastroenterology Fellow    -------------------------------------------------------------------------------------------------------------------           History of Present Illness:   Ira Zamora  is a 34 year old female with a history of ESRD due to interstitial nephritis s/p  donor kidney transplant (24), Crohn's disease on Stelara, and recent oral/vaginal ulcers thought to be due to mycophenolate who presents with odynophagia.     Patient was seen in the ED day prior to admission for chest pain with eating. Improved with GI cocktail and had planned for CT scan but patient left prior to imaging being complete. Then seen in nephrology clinic yesterday noting heartburn symptoms, shortness of breath, epigastric pain. Also with recent oral and vaginal ulcers which have somewhat improved after stopping mycophenolate. She has lost weight due to painful mouth sores limiting intake. On famotidine but not on PPI due to history of interstitial nephritis.     Patient notes severe pain with swallowing for the past week. She has heartubrn last month and had EGD with [facility] but symptoms this week are different. Found relief with viscous lidocaine.     Admission - with oral and vaginal ulcers. HSV swab negative. Pelvic exam with negative wet mount, chlamydia, gonorrhea, PRP and HSV.     Current immunosuppression: tacrolimus and prednisone. Plan to start belatcept in place of mycophenolate but has not started yet.     Regarding Crohn's disease, patient follows with [facility]. On ustekinumab prior to kidney transplant, last dose ~3/1/24. Reports being well controlled for last 5 years. Typically has 1-2 soft bowel movements daily, less recently as she has not bee eating.     Recent endoscopies:   EGD 24 at [facility] - for reflux   Grade C esophagitis, small hiatal hernia. Normal stomach and duodenum.     Pathology Results:   A: ESOPHAGUS, BIOPSY:           1. Normal squamous mucosa           2. Negative for reflux changes and eosinophilic esophagitis           3. Negative for columnar mucosa     B: DUODENUM, BIOPSY:           1. Normal duodenal mucosa           2. Negative for celiac disease and other enteropathy            3. CMV immunohistochemistry: Negative     C: STOMACH, BIOPSY:           1. Minimal non-specific chronic inflammation (see comment)               a. Sampling: Antral and body mucosae           2. Negative for atrophic gastritis           3. Negative for Helicobacter (Helicobacter immunohistochemistry )           4. CMV immunohistochemistry: Negative             Past Medical History:   Reviewed and edited as appropriate  Past Medical History:   Diagnosis Date    Anemia in chronic kidney disease     Anxiety 2007    Ascending aorta dilation (H24) 2020: ascending aorta 3.8 cm, aortic sinus 3.6 cm.  Echo: Ao root diam: 3.2 cm, asc Aorta Diam: 3.8 cm    ASCUS with positive high risk HPV cervical     ASCUS with positive high risk HPV cervical 2017    Crohn's disease (H)     ESRD (end stage renal disease) on dialysis (H)     GERD (gastroesophageal reflux disease)     Hidradenitis suppurativa 2015    History of blood transfusion     Hypertension     Juvenile rheumatoid arthritis (H)     Asymptomatic in adulthood    Kidney replaced by transplant 2024    En bloc. Induction w/ Thymoglobulin.    Secondary renal hyperparathyroidism (H24)     Tubulointerstitial nephropathy 2011    kidney biopsy 2011 - Acute and  chronic tubulointerstitial nephropathy.            Past Surgical History:   Reviewed and edited as appropriate   Past Surgical History:   Procedure Laterality Date    H NEEDLE GUIDE,  KIDNEY BIOPSY      TRANSPLANT KIDNEY RECIPIENT  DONOR N/A 2024    Procedure: Transplant kidney recipient  donor,bilateral uretral stent implantation;  Surgeon: Carlitos Díaz MD;  Location:  OR              Social History:   Reviewed and edited as appropriate  Social History     Socioeconomic History    Marital status:      Spouse name: Not on file    Number of children: Not on file    Years of education: Not on file    Highest education level: Not on  file   Occupational History    Not on file   Tobacco Use    Smoking status: Never     Passive exposure: Never    Smokeless tobacco: Never   Substance and Sexual Activity    Alcohol use: Not Currently     Comment: Very occasional, last drink two months ago, wine cooler    Drug use: Never    Sexual activity: Not on file   Other Topics Concern    Not on file   Social History Narrative    Not on file     Social Determinants of Health     Financial Resource Strain: Low Risk  (6/3/2024)    Received from [facility]    Financial Resource Strain     Difficulty of Paying Living Expenses: 3     Difficulty of Paying Living Expenses: Not on file   Food Insecurity: No Food Insecurity (6/3/2024)    Received from [facility]    Food Insecurity     Worried About Running Out of Food in the Last Year: 1   Transportation Needs: No Transportation Needs (6/3/2024)    [facility]    Transportation Needs     Lack of Transportation (Medical): 1   Physical Activity: Not on file   Stress: Not on file   Social Connections: Socially Integrated (6/3/2024)    [facility]    Social Connections     Frequency of Communication with Friends and Family: 0   Interpersonal Safety: Not on file   Housing Stability: Low Risk  (6/3/2024)    Received from [facility]    Housing Stability     Unable to Pay for Housing in the Last Year: 1            Family History:   Reviewed and edited as appropriate  Family History   Problem Relation Age of Onset    Endometriosis Mother     Coronary Artery Disease Mother             Allergies:   Reviewed and edited as appropriate     Allergies   Allergen Reactions    Amlodipine Headache and Other (See Comments)     Other Reaction(s): Unknown    Proton Pump Inhibitors Nephrotoxicity     No PPIs due to history of renal failure due to  interstitial nephritis    Tegaderm Transparent Dressing (Informational Only) Blisters              Review of Systems:   A complete 10 point review of systems was obtained.  Please see the HPI for pertinent positives and negatives.    All other systems were reviewed and were found to be negative.            Physical Exam:   There were no vitals taken for this visit.  Wt:   Wt Readings from Last 2 Encounters:   06/27/24 52.2 kg (115 lb 1.6 oz)   06/26/24 50.1 kg (110 lb 7.2 oz)      Constitutional: no acute distress, appears fatigued.   Eyes: Sclera anicteric/injected  Ears/nose/mouth/throat: healing ulcer on inside of lower lip and back of right cheek   CV: No edema  Respiratory: Unlabored breathing  Abd:  Nondistended,nontender, no peritoneal signs  Skin: warm, perfused, no jaundice  Neuro: AAO x 3,   Psych: Normal affect  MSK: No gross deformities         Data:   Labs and imaging below were independently reviewed and interpreted    BMP  Recent Labs   Lab 06/26/24  1706      POTASSIUM 5.0   CHLORIDE 102   ROBLES 10.5*   CO2 22   BUN 26.3*   CR 1.02*   *     CBC  Recent Labs   Lab 06/26/24  1706   WBC 2.4*   RBC 3.38*   HGB 10.1*   HCT 32.6*   MCV 96   MCH 29.9   MCHC 31.0*   RDW 13.7        INRNo lab results found in last 7 days.  LFTs  Recent Labs   Lab 06/26/24  1706   ALKPHOS 95   AST 28   ALT 49   BILITOTAL 0.4   PROTTOTAL 6.8   ALBUMIN 3.6      PANC  Recent Labs   Lab 06/26/24  1706   LIPASE 14

## 2024-06-27 NOTE — LETTER
6/27/2024      Ira Zamora  [address] [place] [address]  [place] [address]      Dear Colleague,    Thank you for referring your patient, Ira Zamora, to the [facility]. Please see a copy of my visit note below.    TRANSPLANT NEPHROLOGY CLINIC VISIT     Assessment & Plan  # DDKT: CKD Stage 2 - Stable   Pediatric en-bloc kidneys   - Baseline Creatinine: ~ 0.9-1.1   - Proteinuria: Normal (<0.2 grams)   - DSA Hx: No DSA   - Last cPRA: 9%   - BK Viremia: No   - Kidney Tx Biopsy Hx: No biopsy history.    # Immunosuppression: Tacrolimus immediate release (goal 8-10) and Prednisone (dose 10 mg daily)   - Induction with Recent Transplant:  High Intensity Protocol   - Continue with intensive monitoring of immunosuppression for efficacy and toxicity.   - Historical Changes in Immunosuppression:  Stopped mycophenolate due to oral and vaginal ulcers.   - Changes: Not at this time, but plan was to start belatacept in place of stopped mycophenolate.    # Infection Prevention:      - PJP: Inhaled pentamidine; Okay to restart Bactrim on 7/5/24  - CMV: Valganciclovir (Valcyte); Patient is CMV IgG Ab discordant (D+/R-) and will continue on Valcyte x 6 months, then check CMV PCR monthly until 12 months post transplant.   - CMV IgG Ab High Risk Discordance (D+/R-): Yes  - EBV IgG Ab High Risk Discordance (D+/R-): No    # Blood Pressure: Controlled;  Goal BP: < 140/90   - Changes: No    # Elevated Blood Glucose: Glucose generally running ~ 90-130s  Last HbA1c: 4.7%    # Anemia in Chronic Renal Disease: Hgb: Stable      JUNG: No   - Iron studies: Replete    # Leukopenia: Trend up, moderately low WBC at ~ 2.4 with last check, but had been running closer to ~ 1.3-2.1 over the last month.  Likely medication related.  Negative CMV PCR.    # Mineral Bone Disorder:    - Secondary renal hyperparathyroidism; PTH level: Minimally elevated ( pg/ml)        On treatment: None  - Vitamin D; level: Normal        On supplement: No  -  Calcium; level: High        On supplement: No    # Electrolytes:   - Potassium; level: High normal        On supplement: No  - Magnesium; level: Stable low        On supplement: Yes  - Bicarbonate; level: Normal        On supplement: No    # GERD/Dysphagia: Patient with apparent severe heartburn/GERD symptoms, worsening over the last week or so.  Patient underwent EGD 5/24/24 which was mostly unremarkable with only finding of minimal non-specific chronic inflammation in the stomach (negative H. pylori), but jessee esophageal findings.  In addition, recently had significant oral ulcers.  Now with patient unable to eat and minimal fluid intake.  She hasn't been able to take her medications in the last day due to pain with swallowing.  Her weight is down ~ 20 lbs, although up ~ 5 lbs with IV fluids the last day.  Patient is on famotidine, but unable to use PPI due to probable underlying kidney disease (interstitial nephritis) from previous PPI use.   - Will plan to admit patient to the hospital.  Due to lack of an available bed immediately, will send patient to ER.   - Recommend consulting GI and would think she needs an EGD.    # Oral and Genital Ulcers: Stable, but ongoing symptoms. Differential diagnosis is broad, but includes MPA induced, Crohn's disease, leishmania, and syphilis.  HSV and CMV testing negative.  Patient is now off MPA.   - Further work up to be done inpatient.     # Severe Malnutrition: Patient's weight is down ~ 20 lbs since transplant, although up a bit after IV fluids.  She hasn't been eating for the last week and minimal oral intake overall due to GERD/dysphagia symptoms.  Now unable to take oral medications.   - Will plan hospital admission.              - May need to consider NG tube and starting tube feeds.    # Other Significant PMH:   - Crohn's Disease: Appears to be stable.  Previously was on ustekinumab prior to kidney transplant, but hasn't restarted it due to ongoing oral and vaginal  ulcers.  Does occasionally alternate between diarrhea and constipation, but no issues at this time.  Followed by GI and [facility].   - Cardiac/Vascular Disease Risk Factors: Dilated ascending aorta (3.8 cm)  on 2024 ECHO (stable from 2020).     # Skin Cancer Risk:    - Discussed sun protection and recommend regular follow up with Dermatology.    # Transplant History:  Etiology of Kidney Failure: Interstitial nephritis  Tx: DDKT - Pediatric en bloc  Transplant: 4/17/2024 (Kidney)  Significant transplant-related complications:  Oral and vaginal ulcers    Transplant Office Phone Number: [phone]    Assessment and plan was discussed with the patient and she voiced her understanding and agreement.    Return visit: Return for previously scheduled visit.    Neno Ortiz MD    I spent a total of 80 minutes on the date of the encounter doing chart review, performing a history and physical exam, completing documentation and any further activities as noted above.    The longitudinal plan of care for the diagnosis(es)/condition(s) as documented were addressed during this visit. Due to the added complexity in care, I will continue to support Ira in the subsequent management and with ongoing continuity of care.      Chief Complaint  Ms. Zamora is a 34 year old here for kidney transplant and immunosuppression management.     History of Present Illness   Ms. Zamora reports feeling poorly overall.  Since last clinic visit:   Patient continues to have severe gastroesophageal relux/heartburn symptoms.  She has ongoing burning chest pain, felt secondary to heartburn.  Some associated shortness of breath when taking a deep breath.  No associated nausea, vomiting or diarrhea.  Some epigastric pain.  Symptoms temporarily improve taking Magic mouthwash with lidocaine, but that only lasts for ~ 5-10 minutes and the pain returns.  She was previously hospitalized for oral and vaginal ulcers with immunosuppression changed stopping  mycophenolate.  Worsening heartburn symptoms over the last week, especially over the last day or two.  Patient is unable to take her medications today due to pain with swallowing.  Also not able to eat for several days or drink much fluids.  Patient was in the ER yesterday for symptoms, but treatment took too long for them and they left.  Chest pain or shortness of breath: Yes, but more related to heartburn symptoms.  Lower extremity swelling: No  Weight change: Yes; Patient had lost considerable weight with oral ulcers, but up ~ 5 lbs in the last day after receiving IV fluids x 2.  Nausea and vomiting: No  Diarrhea: No  Heartburn symptoms: Yes  Fever, sweats or chills: No  Urinary complaints: No, but some irritation due to vaginal ulcer, as well as pain with walking.    Home BP: Not checked    Problem List  Patient Active Problem List   Diagnosis     Crohn's disease (H)     CKD (chronic kidney disease) stage 2, GFR 60-89 ml/min     Secondary renal hyperparathyroidism (H24)     Long QT interval     Kidney replaced by transplant     Immunosuppressed status (H24)     Gastroesophageal reflux disease without esophagitis     Dehydration     Aftercare following organ transplant     Mouth ulcers     Vaginal ulcer     Leukopenia, unspecified type     Severe malnutrition (H24)     Need for pneumocystis prophylaxis     Anemia in chronic renal disease     Hypomagnesemia       Allergies  Allergies   Allergen Reactions     Amlodipine Headache and Other (See Comments)     Other Reaction(s): Unknown     Proton Pump Inhibitors Nephrotoxicity     No PPIs due to history of renal failure due to interstitial nephritis     Tegaderm Transparent Dressing (Informational Only) Blisters       Medications  Current Outpatient Medications   Medication Sig Dispense Refill     acetaminophen (TYLENOL) 500 MG tablet Take 1,000 mg by mouth every 6 hours as needed       aspirin (ASA) 325 MG tablet Take 1 tablet (325 mg) by mouth daily 30 tablet 2      atorvastatin (LIPITOR) 10 MG tablet Take 1 tablet (10 mg) by mouth daily 30 tablet 1     benzocaine (ORAJEL MAXIMUM STRENGTH) 20 % GEL gel Take by mouth 4 times daily as needed for mouth sores 9 g 0     famotidine (PEPCID) 20 MG tablet Take 2 tablets (40 mg) by mouth 2 times daily 60 tablet 1     levonorgestrel (KYLEENA) 19.5 MG IUD 1 Device by Intrauterine route       magic mouthwash suspension (diphenhydrAMINE, lidocaine, aluminum-magnesium & simethicone) Swish and swallow 10 mLs in mouth every 6 hours as needed for mouth sores 560 mL 1     magnesium oxide (MAG-OX) 400 MG tablet Take 2 tablets (800 mg) by mouth daily 60 tablet 0     mineral oil-hydrophilic petrolatum (AQUAPHOR) external ointment Apply topically every hour as needed for other (barrier cream) 50 g 0     PARoxetine (PAXIL) 30 MG tablet Take 30 mg by mouth every evening       predniSONE (DELTASONE) 10 MG tablet Take 1 tablet (10 mg) by mouth daily for 90 days 90 tablet 0     tacrolimus (GENERIC EQUIVALENT) 0.5 MG capsule Take 1 capsule (0.5 mg) by mouth 2 times daily 2.5mg twice daily 60 capsule 11     tacrolimus (GENERIC EQUIVALENT) 1 MG capsule Take 2 capsules (2 mg) by mouth 2 times daily 2.5mg twice daily 120 capsule 11     valGANciclovir (VALCYTE) 450 MG tablet Take 2 tablets (900 mg) by mouth daily 60 tablet 3     sulfamethoxazole-trimethoprim (BACTRIM) 400-80 MG tablet Take 1 tablet by mouth daily Start after July 5, 2024 (Patient not taking: Reported on 6/27/2024) 90 tablet 2     ustekinumab (STELARA) 90 MG/ML Inject 90 mg Subcutaneous once every eight weeks (Patient not taking: Reported on 6/27/2024)       No current facility-administered medications for this visit.     There are no discontinued medications.    Physical Exam  Vital Signs: /85 (BP Location: Left arm, Patient Position: Semi-Guerra's, Cuff Size: Adult Small)   Pulse 85   Temp 98  F (36.7  C) (Oral)   Wt 52.2 kg (115 lb 1.6 oz)   SpO2 98%   BMI 21.05 kg/m      GENERAL  APPEARANCE: alert, but in mild distress and somewhat ill appearing  HENT: a couple of small ulcers  RESP: lungs clear to auscultation - no rales, rhonchi or wheezes  CV: regular rhythm, normal rate, no rub, no murmur  EDEMA: no LE edema bilaterally  ABDOMEN: soft, nondistended, diffuse TTP, bowel sounds normal  MS: extremities normal - no gross deformities noted, no evidence of inflammation in joints, no muscle tenderness  SKIN: no rash  TX KIDNEY: normal  DIALYSIS ACCESS: none    Data        Latest Ref Rng & Units 6/26/2024     5:06 PM 6/20/2024     9:35 AM 6/17/2024     9:20 AM   Renal   Sodium 135 - 145 mmol/L 136   135    Na (external) 136 - 145 mmol/L  136        K 3.4 - 5.3 mmol/L 5.0   4.1    K (external) 3.5 - 5.1 mmol/L  3.9        Cl 98 - 107 mmol/L 102  102     101    Cl (external) 98 - 107 mmol/L 102  102     101    CO2 22 - 29 mmol/L 22   23    CO2 (external) 22 - 29 mmol/L  23        Urea Nitrogen 6.0 - 20.0 mg/dL 26.3   18.6    BUN (external) 6 - 20 mg/dL  22        Creatinine 0.51 - 0.95 mg/dL 1.02   0.97    Cr (external) 0.50 - 0.80 mg/dL  0.79        Glucose 70 - 99 mg/dL 136   117    Glucose (external) 70 - 99 mg/dL  130        Calcium 8.6 - 10.0 mg/dL 10.5   10.3    Ca (external) 8.0 - 10.0 mg/dL  9.9        Magnesium 1.7 - 2.3 mg/dL   1.5    Mg (external) 1.6 - 2.6 mg/dL  1.5            This result is from an external source.         Latest Ref Rng & Units 6/20/2024     9:35 AM 6/17/2024     9:20 AM 6/13/2024     9:25 AM   Bone Health   Phosphorus 2.5 - 4.5 mg/dL  2.2     Phos (external) 2.6 - 4.5 mg/dL 1.7      2.6        This result is from an external source.         Latest Ref Rng & Units 6/26/2024     5:06 PM 6/20/2024     9:35 AM 6/17/2024     9:20 AM   Heme   WBC 4.0 - 11.0 10e3/uL 2.4   1.6    WBC (external) 4.5 - 11.0 Thou/cmm  4.5 - 11.0 thou/cu mm  1.8     1.8     Hgb 11.7 - 15.7 g/dL 10.1   10.0    Hgb (external) 12.0 - 16.0 g/dL  12.0 - 16.0 g/dL  9.5     9.5     Plt 150 - 450  10e3/uL 282   321    Plt (external) 140 - 440 Thou/cmm  140 - 440 thou/cu mm  261     261           Latest Ref Rng & Units 6/26/2024     5:06 PM 6/6/2024     7:07 AM 6/5/2024     5:12 PM   Liver   AP 40 - 150 U/L 95  83  82    TBili <=1.2 mg/dL 0.4  0.7  0.4    ALT 0 - 50 U/L 49  12  18    AST 0 - 45 U/L 28  16  19    Tot Protein 6.4 - 8.3 g/dL 6.8  6.9  6.5    Albumin 3.5 - 5.2 g/dL 3.6  3.8  3.8          Latest Ref Rng & Units 6/26/2024     5:06 PM 4/17/2024     9:58 AM   Pancreas   A1C <5.7 %  4.7    Lipase (Roche) 13 - 60 U/L 14           Latest Ref Rng & Units 5/23/2024     9:49 AM 4/22/2024     8:21 AM   Iron studies   Iron 37 - 145 ug/dL 77  53    Iron Sat Index 15 - 46 % 38  36    Ferritin 6 - 175 ng/mL 2,181  2,181          Latest Ref Rng & Units 4/17/2024     9:58 AM 9/29/2022    11:40 AM   [facility] Txp Virology   EBV CAPSID ANTIBODY IGG No detectable antibody. Positive  Positive      Failed to redirect to the Timeline version of the [employer].  Recent Labs   Lab Test 05/23/24  0949 05/31/24  0930 06/07/24  0647 06/08/24  0557 06/17/24  0920   DOSTAC 5/22/2024 5/30/2024  --   --  6/16/2024   TACROL 9.2 8.7 11.6 10.7 5.1     Recent Labs   Lab Test 04/23/24  0735   DOSMPA 4/22/2024   9:00 PM   MPACID 2.20   MPAG 107.5*          Again, thank you for allowing me to participate in the care of your patient.        Sincerely,        Neno Ortiz MD

## 2024-06-27 NOTE — PHARMACY-ADMISSION MEDICATION HISTORY
Pharmacist Admission Medication History    Admission medication history is complete. The information provided in this note is only as accurate as the sources available at the time of the update.    Information Source(s): Patient via phone    Pertinent Information: last took tacrolimus 2.5 mg yesterday at 10:30PM    Changes made to PTA medication list:  Added: None  Deleted: None  Changed: None    Allergies reviewed with patient and updates made in EHR: no    Medication History Completed By: Johanna Forrester RPH 6/27/2024 4:05 PM    PTA Med List   Medication Sig Last Dose    acetaminophen (TYLENOL) 500 MG tablet Take 1,000 mg by mouth every 6 hours as needed Unknown    aspirin (ASA) 325 MG tablet Take 1 tablet (325 mg) by mouth daily 6/26/2024    atorvastatin (LIPITOR) 10 MG tablet Take 1 tablet (10 mg) by mouth daily 6/26/2024    benzocaine (ORAJEL MAXIMUM STRENGTH) 20 % GEL gel Take by mouth 4 times daily as needed for mouth sores 6/27/2024    famotidine (PEPCID) 20 MG tablet Take 2 tablets (40 mg) by mouth 2 times daily 6/26/2024 at pm    levonorgestrel (KYLEENA) 19.5 MG IUD 1 Device by Intrauterine route     magic mouthwash suspension (diphenhydrAMINE, lidocaine, aluminum-magnesium & simethicone) Swish and swallow 10 mLs in mouth every 6 hours as needed for mouth sores Past Week    magnesium oxide (MAG-OX) 400 MG tablet Take 2 tablets (800 mg) by mouth daily 6/26/2024    mineral oil-hydrophilic petrolatum (AQUAPHOR) external ointment Apply topically every hour as needed for other (barrier cream) Unknown    PARoxetine (PAXIL) 30 MG tablet Take 30 mg by mouth every evening 6/26/2024 at pm    predniSONE (DELTASONE) 10 MG tablet Take 1 tablet (10 mg) by mouth daily for 90 days 6/26/2024    tacrolimus (GENERIC EQUIVALENT) 0.5 MG capsule Take 1 capsule (0.5 mg) by mouth 2 times daily 2.5mg twice daily 6/26/2024 at 2230    tacrolimus (GENERIC EQUIVALENT) 1 MG capsule Take 2 capsules (2 mg) by mouth 2 times daily 2.5mg  twice daily 6/26/2024 at 2230    valGANciclovir (VALCYTE) 450 MG tablet Take 2 tablets (900 mg) by mouth daily 6/26/2024

## 2024-06-27 NOTE — TELEPHONE ENCOUNTER
[facility] infusion called stated the Pt is pretty sick and thinks the Pt should see nephrology sooner then her appt for today. Pt thinks she should be admitted to the hospital.

## 2024-06-27 NOTE — PATIENT INSTRUCTIONS
Patient Recommendations:  -     Transplant Patient Information  Your Post Transplant Coordinator is: Radha Jorge  For non urgent items, we encourage you to contact your coordinator/care team online via [employer]  You and your care team can also contact your transplant coordinator Monday - Friday, 8am - 5pm at [phone] (Option 2 to reach the coordinator or Option 4 to schedule an appointment).  After hours for urgent matters, please call [facility] at [phone].

## 2024-06-27 NOTE — PLAN OF CARE
Goal Outcome Evaluation:    Shift: 2685-1217  VS: VSS on RA   Pain: 8/10 when patient swallows or drink fluid, and pain when patient has to void (bending forward). Given PRN oxycodone 5 mg one time with poor results  Neuro: alert and oriented x 4   Cardiac: WNL  Respiratory:  on RA   Diet/Appetite:  Regular diet, not eating due to esophagous pain   /GI: Voiding on hat, no BM this shift   LDA's: One PIV infusing D5 and 0.45%NaCl at 90 mL/hr   Skin: soft tissue mass on left labia major.   Activity: SBA   Procedures: NPO after midnight  Pertinent Labs/: Creatine 1.04, Mag 1.5, and Phos 2.4      Plan: Continue to monitor         Plan of Care Reviewed With: patient, spouse    Overall Patient Progress: no changeOverall Patient Progress: no change

## 2024-06-27 NOTE — PROGRESS NOTES
Post Transplant Patient Social Work Assessment -Outpatient    Patient Name: Ira Zamora  : 1990  Age: 34 year old  MRN: [id number]  Date of transplant: 2024    Patient known to this writer from follow up in the transplant program. Seen today to update assessment.      Presenting Information   Living Situation: Ira and her  live in their home in [place], [place].   Functional Status:   Cultural/Language/Spiritual Considerations: N/A    Support System  Primary Support Person: , Collin  Other support:  parents, siblings    Health Care Directive  Decision Maker: Self   Alternate Decision Maker: Collin   Health Care Directive: Provided education    Mental Health/Coping:   History of Mental Health: No hx or current concerns  History of Chemical Health: No hx or current concerns  Current status: Pt reports she will likely be readmitted to the hospital.   Coping: Pt relies on family for support  Services Needed/Recommended: Pt reports she will need an extension on her FMLA paperwork.    Financial   Income: wages/salary  Impact of transplant on income: Pt will need to take time off work. Requesting assistance with extending her FMLA due to re-hospitalization.   Insurance and medication coverage: [facility] [employer]   Financial concerns: none reported   Resources needed: TBD      Education provided by SILVANA: Social Work role outpatient setting and psychosocial support    Assessment and recommendations and plan:  SILVANA met with pt, her  and dad at bedside. Pt reports she will likely be admitted to the hospital for higher level of care.

## 2024-06-27 NOTE — NURSING NOTE
Chief Complaint   Patient presents with    RECHECK     Tool a pill about an hour ago and now it hurts when she swallows. Hasn't eaten in 4 days.      Vitals:    06/27/24 1122   BP: 128/85   BP Location: Left arm   Patient Position: Semi-Guerra's   Cuff Size: Adult Small   Pulse: 85   Temp: 98  F (36.7  C)   TempSrc: Oral   SpO2: 98%   Weight: 52.2 kg (115 lb 1.6 oz)       BP Readings from Last 3 Encounters:   06/27/24 128/85   06/27/24 124/81   06/26/24 136/88       /85 (BP Location: Left arm, Patient Position: Semi-Guerra's, Cuff Size: Adult Small)   Pulse 85   Temp 98  F (36.7  C) (Oral)   Wt 52.2 kg (115 lb 1.6 oz)   SpO2 98%   BMI 21.05 kg/m       Sunshine Heymans

## 2024-06-27 NOTE — CONSULTS
[facility]  Transplant Nephrology Consult Note  Date of Admission:  6/27/2024  Today's Date: 06/27/2024  Requesting physician: Sid Desai MD    Reason for Consult:  History of Kidney Transplant    Recommendations:   - Appreciate GI & GYN assistance. EGD today and vulvar bx Mon   - Will review immune suppression changes based on biopsy findings.    Assessment & Plan   # DDKT: CKD Stage 2 - Stable serum creatinine. No indication for dialysis.   Pediatric en-bloc kidneys   - Baseline Creatinine: ~ 0.9-1.1   - Proteinuria: Normal (<0.2 grams)   - DSA Hx: No DSA   - Last cPRA: 9%   - BK Viremia: No   - Kidney Tx Biopsy Hx: No biopsy history.      # Immunosuppression Prior to Admission: Tacrolimus immediate release (goal 8-10) and Prednisone (dose 10 mg daily)   - Present Immunosuppression: Sublingual Tacrolimus immediate release (goal 8-10) and Methylprednisolone (dose 8 mg daily)   - Patient is in an immunosuppressed state and will continue to monitor for efficacy and toxicity of immunosuppression medications.   - Induction with Recent Transplant:  High Intensity Protocol   - Continue with intensive monitoring of immunosuppression for efficacy and toxicity.   - Historical Changes in Immunosuppression:  Stopped mycophenolate due to oral and vaginal ulcers.   - Changes: Not at this time, but plan was to start belatacept in place of stopped mycophenolate.    # Infection Prevention:      - PJP: Inhaled pentamidine; Okay to restart Bactrim on 7/5/24  - CMV: Valganciclovir (Valcyte); Patient is CMV IgG Ab discordant (D+/R-) and will continue on Valcyte x 6 months, then check CMV PCR monthly until 12 months post transplant.   - CMV IgG Ab High Risk Discordance (D+/R-): Yes  - EBV IgG Ab High Risk Discordance (D+/R-): No    # Blood Pressure: Controlled;  Goal BP: < 140/90   - Changes: No    # Anemia in Chronic Renal Disease: Hgb: Stable, low      JUNG: No   - Iron studies:  Replete    # Leukopenia: Stable, low  Likely medication related.  Negative CMV PCR.    # Mineral Bone Disorder:    - Secondary renal hyperparathyroidism; PTH level: Minimally elevated ( pg/ml)        On treatment: None  - Vitamin D; level: Normal         On supplement: No  - Calcium; level: High          On supplement: No  - Phosphorus; level: Normal         On supplement: No    # Electrolytes:   - Potassium; level: Normal         On supplement: No  - Magnesium; level: High         On supplement: No  - Bicarbonate; level: Normal         On supplement: No  - Sodium; level: Normal    # GERD/Dysphagia: Patient with apparent severe heartburn/GERD symptoms, worsening over the last week or so.  Patient underwent EGD 5/24/24 which was mostly unremarkable with only finding of minimal non-specific chronic inflammation in the stomach (negative H. pylori), but jessee esophageal findings.  In addition, recently had significant oral ulcers.  Now with patient unable to eat and minimal fluid intake.  She hasn't been able to take her medications in the last day due to pain with swallowing.  Her weight is down ~ 20 lbs, although up ~ 5 lbs with IV fluids the last day.  Patient is on famotidine, but unable to use PPI due to probable underlying kidney disease (interstitial nephritis) from previous PPI use.   -  GI following    # Oral and Genital Ulcers: Stable, but ongoing symptoms. Differential diagnosis is broad, but includes MPA induced, Crohn's disease, leishmania, and syphilis.  HSV and CMV testing negative.  Patient is now off mycophenolic acid.       # Severe Malnutrition: Patient's weight is down ~ 20 lbs since transplant, although up a bit after IV fluids.  She hasn't been eating for the last week and minimal oral intake overall due to GERD/dysphagia symptoms.  Now unable to take oral medications.              - May need to consider NG tube and starting tube feeds.    # Other Significant PMH:   - Crohn's Disease: Appears  to be stable.  Previously was on ustekinumab prior to kidney transplant, but hasn't restarted it due to ongoing oral and vaginal ulcers.  Does occasionally alternate between diarrhea and constipation, but no issues at this time.  Followed by GI and [facility].   - Cardiac/Vascular Disease Risk Factors: Dilated ascending aorta (3.8 cm)  on 2024 ECHO (stable from 2020).       # Transplant History:  Etiology of Kidney Failure: Interstitial nephritis  Tx: DDKT - Pediatric en bloc  Transplant: 4/17/2024 (Kidney)  Significant transplant-related complications:  Oral and vaginal ulcers         Recommendations were communicated to the primary team via this note.    Seen and discussed with Dr. Adan Gutierrez, APRN [facility]  Transplant Nephrology  Contact information available via [facility] Paging/Directory        Physician Attestation     I saw and evaluated Ira Zamora as part of a shared APRN/PA visit.     I personally reviewed the vital signs, medications, labs, and imaging.    I personally provided a substantive portion of care for this patient and I approve the care plan as written by the ERICA.  I was involved with Medical Decision Making including: Please see A&P for additional details of medical decision making.  MANAGEMENT DISCUSSED with the following over the past 24 hours: continue IVF, sublingual tacrolimus, solumedrol 8 while off MMF, initial plan was to transition to belatacept, will adjust IS based on EGD and vulvar bx results     Rachel Arellano MD  Date of Service (when I saw the patient): 06/28/24      History of Present Illness  Ira Zamora is a 34 year old female with ESRD secondary to interstitial nephritis which was thought to be induced by PPI s/p pediatric en-bloc kidney transplant 4/2024 who was recently admitted (6/5-6/9) with two weeks of oral lesions and two days of vaginal lesions. Negative for HSV-1 and HSV-2.  She presented to transplant nephrology clinic yesterday with ongoing severe GERD symptoms  and was admitted 24 for being unable to take her pills or drink sufficient fluids d/t pain with swallowing.  The serum creatinine today of 1.00 is stable with baseline of ~ 0.9-1.1. EGD today.    SBP 120s - 130s overnight. Afebrile. No chest pain or shortness of breath. No leg swelling. No nausea or vomiting.        Review of Systems   4 point ROS was obtained and negative except as noted in the Interval History.    MEDICATIONS:  Current Facility-Administered Medications   Medication Dose Route Frequency Provider Last Rate Last Admin    sodium chloride (PF) 0.9% PF flush 3 mL  3 mL Intracatheter Q8H Jordy Carvalho MD         Current Facility-Administered Medications   Medication Dose Route Frequency Provider Last Rate Last Admin    dextrose 5% and 0.45% NaCl infusion   Intravenous Continuous Jordy Carvalho MD           Physical Exam   Temp  Av.9  F (36.6  C)  Min: 97.5  F (36.4  C)  Max: 98.3  F (36.8  C)      Pulse  Av.9  Min: 76  Max: 118 Resp  Av  Min: 16  Max: 20  SpO2  Av.1 %  Min: 93 %  Max: 100 %     Pulse 84   Temp 98.3  F (36.8  C) (Oral)   Resp 19   SpO2 98%     Admit       GENERAL APPEARANCE: alert and no distress  RESP: lungs clear to auscultation - no rales, rhonchi or wheezes  CV: regular rhythm, normal rate, no rub, no murmur  EDEMA: no LE edema bilaterally  ABDOMEN: soft, nondistended, nontender, bowel sounds normal  MS: extremities normal - no gross deformities noted, no evidence of inflammation in joints, no muscle tenderness  SKIN: no rash  DIALYSIS ACCESS: right arm fistula +bruit/+thrill    Data   All labs reviewed by me.  CMP  Recent Labs   Lab 24  1706      POTASSIUM 5.0   CHLORIDE 102   CO2 22   ANIONGAP 12   *   BUN 26.3*   CR 1.02*   GFRESTIMATED 74   ROBLES 10.5*   PROTTOTAL 6.8   ALBUMIN 3.6   BILITOTAL 0.4   ALKPHOS 95   AST 28   ALT 49     CBC  Recent Labs   Lab 24  1706   HGB 10.1*   WBC 2.4*   RBC 3.38*   HCT 32.6*   MCV 96    MCH 29.9   MCHC 31.0*   RDW 13.7        INRNo lab results found in last 7 days.  ABGNo lab results found in last 7 days.   Urine Studies  Recent Labs   Lab Test 06/06/24  1113 05/03/24  1600 04/17/24  1113 09/29/22  1153   COLOR Yellow Light Yellow Straw Yellow   APPEARANCE Clear Clear Clear Clear   URINEGLC Negative 100* 70* 100*   URINEBILI Negative Negative Negative Negative   URINEKETONE Negative Negative Negative Negative   SG 1.028 1.014 1.004 1.010   UBLD Small* Negative Trace* Trace*   URINEPH 5.5 5.5 8.5* 8.5*   PROTEIN 10* Negative 100* 100*   NITRITE Negative Negative Negative Negative   LEUKEST Moderate* Trace* Negative Negative   RBCU 2 2 2 <1   WBCU 6* 4 <1 1     No lab results found.  PTH  Recent Labs   Lab Test 05/23/24  0949 04/22/24  0821   PTHI 131* 138*     Iron Studies  Recent Labs   Lab Test 05/23/24  0949 04/22/24  0821   IRON 77 53   * 147*   IRONSAT 38 36   PRICILA 2,181* 2,181*       IMAGING:  All imaging studies reviewed by me.    Past Medical History    I have reviewed this patient's medical history and updated it with pertinent information if needed.   Past Medical History:   Diagnosis Date    Anemia in chronic kidney disease     Anxiety 2007    Ascending aorta dilation (H24) 2020 2020: ascending aorta 3.8 cm, aortic sinus 3.6 cm. 2024 Echo: Ao root diam: 3.2 cm, asc Aorta Diam: 3.8 cm    ASCUS with positive high risk HPV cervical 2017    ASCUS with positive high risk HPV cervical 07/01/2017    Crohn's disease (H) 2004    ESRD (end stage renal disease) on dialysis (H) 2020    GERD (gastroesophageal reflux disease)     Hidradenitis suppurativa 2015    History of blood transfusion     Hypertension     Juvenile rheumatoid arthritis (H)     Asymptomatic in adulthood    Kidney replaced by transplant 04/17/2024    En bloc. Induction w/ Thymoglobulin.    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       Past Surgical History   I have reviewed this patient's surgical history and updated it with pertinent information if needed.  Past Surgical History:   Procedure Laterality Date    H NEEDLE GUIDE,  KIDNEY BIOPSY      TRANSPLANT KIDNEY RECIPIENT  DONOR N/A 2024    Procedure: Transplant kidney recipient  donor,bilateral uretral stent implantation;  Surgeon: Carlitos Díaz MD;  Location:  OR       Family History   I have reviewed this patient's family history and updated it with pertinent information if needed.   Family History   Problem Relation Age of Onset    Endometriosis Mother     Coronary Artery Disease Mother        Social History   I have reviewed this patient's social history and updated it with pertinent information if needed. Ira Zamora  reports that she has never smoked. She has never been exposed to tobacco smoke. She has never used smokeless tobacco. She reports that she does not currently use alcohol. She reports that she does not use drugs.    Prior to Admission Medications   Medications Prior to Admission   Medication Sig Dispense Refill Last Dose    acetaminophen (TYLENOL) 500 MG tablet Take 1,000 mg by mouth every 6 hours as needed       aspirin (ASA) 325 MG tablet Take 1 tablet (325 mg) by mouth daily 30 tablet 2     atorvastatin (LIPITOR) 10 MG tablet Take 1 tablet (10 mg) by mouth daily 30 tablet 1     benzocaine (ORAJEL MAXIMUM STRENGTH) 20 % GEL gel Take by mouth 4 times daily as needed for mouth sores 9 g 0     famotidine (PEPCID) 20 MG tablet Take 2 tablets (40 mg) by mouth 2 times daily 60 tablet 1     levonorgestrel (KYLEENA) 19.5 MG IUD 1 Device by Intrauterine route       magic mouthwash suspension (diphenhydrAMINE, lidocaine, aluminum-magnesium & simethicone) Swish and swallow 10 mLs in mouth every 6 hours as needed for mouth sores 560 mL 1     magnesium oxide (MAG-OX) 400 MG tablet Take 2 tablets (800 mg) by  mouth daily 60 tablet 0     mineral oil-hydrophilic petrolatum (AQUAPHOR) external ointment Apply topically every hour as needed for other (barrier cream) 50 g 0     PARoxetine (PAXIL) 30 MG tablet Take 30 mg by mouth every evening       predniSONE (DELTASONE) 10 MG tablet Take 1 tablet (10 mg) by mouth daily for 90 days 90 tablet 0     sulfamethoxazole-trimethoprim (BACTRIM) 400-80 MG tablet Take 1 tablet by mouth daily Start after July 5, 2024 (Patient not taking: Reported on 6/27/2024) 90 tablet 2     tacrolimus (GENERIC EQUIVALENT) 0.5 MG capsule Take 1 capsule (0.5 mg) by mouth 2 times daily 2.5mg twice daily 60 capsule 11     tacrolimus (GENERIC EQUIVALENT) 1 MG capsule Take 2 capsules (2 mg) by mouth 2 times daily 2.5mg twice daily 120 capsule 11     ustekinumab (STELARA) 90 MG/ML Inject 90 mg Subcutaneous once every eight weeks (Patient not taking: Reported on 6/27/2024)       valGANciclovir (VALCYTE) 450 MG tablet Take 2 tablets (900 mg) by mouth daily 60 tablet 3

## 2024-06-27 NOTE — PATIENT INSTRUCTIONS
Dear Ira Zamora    Thank you for choosing [facility] ([facility]) for your infusion.  The following information is a summary of our appointment as well as important reminders.        If you are a transplant patient and require transplant education, please click on this link: [email or web address].    We look forward in seeing you on your next appointment here at [facility] ([facility]).  Please don t hesitate to call us at [phone] to reschedule any of your appointments or to speak with one of the [facility] registered nurses.  It was a pleasure taking care of you today.    Sincerely,    [facility]  [facility]  [address] [place] [address]  [address]  Phone:  [phone]

## 2024-06-27 NOTE — H&P
[facility]    History and Physical - Transplant Surgery Service       Date of Admission:  2024      Assessment and Plan:  34 year old female with a past history of a DDKT (Pediatric en bloc) done on 2024 secondary to Interstitial nephritis (PPI). Admitted due to odynophagia to solid and liquids that has been worsening over the last week + severe epigastric pain.      - GI consult for EGD  - Gynecology consult for left labia majora mass first noticed 3/7. Spoke to Gyne Team at 6:35 PM, and will see her tomorrow AM. They saw pictures of the mass, and suspect Bartholin Cyst, which can be extremely painful, but will assess and confirm 2024.   - acetaminophen to help with odynophagia   - ERICA prescribed Oxycodone, and will re-address tomorrow AM   - magic mouthwash for mouth ulcers  - Carafatr (Sucralfate) for heartburn symptoms   - regular diet as tolerated   - mouth rinses post eating   - Lidocaine gel to help with the vulvar pain       The patient's care was discussed with the fellow who will discuss with the team.     Jordy Carvalho MD  [facility]  All communications related to this note should be expressed to resident/ERICA on call for this team at the time of your communication.  ______________________________________________________________________    Chief Complaint   Odynophagia to solid and liquids that has been worsening over the last week + severe epigastric pain. Previously admitted for oral ulcers in the past due to mycophenolate, which was replaced with Belatacept. Today's pain is similar to previous oral ulcers but including the chest + epigastric pain related to heartburn.     History is obtained from the patient    History of Present Illness   Ira Zamora is a 34 year old female with a past history of a  donor KT (Pediatric en bloc) done on 2024 secondary to Interstitial  nephritis (PPI).   Significant transplant-related complications:  Oral and vaginal ulcers secondary to mycophenolate, which was stopped and replaced with Belatacept.     Her odynophagia to solid and liquids that has been worsening over the last week. She hasn't be able to take her medications for 2 days. She last consumed solid food ([employer]) June 25, 2024, and liquids were last consumed yesterday at 11 PM. A little belching/ burping that is painful.   Yesterday, she visited the ER, but left before she received CT scan result. She was seen by ED and was given oxycodone and a peppermint solution, which the patient is requesting now as it helped with the burning pain of esophagus.      Of note: She underwent an EGD on May 24, 2024, which was unremarkable with minimal non-specific chronic inflammation in the stomach (negative H. Pylori). The esophagus had normal findings, but had significant oral ulcers.     Pain now is not existent (on swallowing saliva), but is exacerbated on extremely cold or hot food/drink. Tapping on chest and withholding food/drink improves the pain. When the pain is at it worst 9/10.     **Patient believes her symptoms began the same time as she started prednisone 10-days ago.     Only medication taken today was 1 mg Tacrolimus.   She skipped:   Second dose of tacrolimus   Famotidine   Aspirin   Prednisone    Mg oxide   .           Past Medical History    Past Medical History:   Diagnosis Date    Anemia in chronic kidney disease     Anxiety 2007    Ascending aorta dilation (H24) 2020 2020: ascending aorta 3.8 cm, aortic sinus 3.6 cm. 2024 Echo: Ao root diam: 3.2 cm, asc Aorta Diam: 3.8 cm    ASCUS with positive high risk HPV cervical 2017    ASCUS with positive high risk HPV cervical 07/01/2017    Crohn's disease (H) 2004    ESRD (end stage renal disease) on dialysis (H) 2020    GERD (gastroesophageal reflux disease)     Hidradenitis suppurativa 2015    History of blood transfusion      Hypertension     Juvenile rheumatoid arthritis (H)     Asymptomatic in adulthood    Kidney replaced by transplant 2024    En bloc. Induction w/ Thymoglobulin.    Secondary renal hyperparathyroidism (H24)     Tubulointerstitial nephropathy 2011    kidney biopsy 2011 - Acute and  chronic tubulointerstitial nephropathy.       Past Surgical History   Past Surgical History:   Procedure Laterality Date    H NEEDLE GUIDE,  KIDNEY BIOPSY      TRANSPLANT KIDNEY RECIPIENT  DONOR N/A 2024    Procedure: Transplant kidney recipient  donor,bilateral uretral stent implantation;  Surgeon: Carlitos Díaz MD;  Location: [facility] OR       Prior to Admission Medications   Cannot display prior to admission medications because the patient has not been admitted in this contact.        Review of Systems    Review Of Systems  Skin: negative  Eyes: negative  Ears/Nose/Throat: negative  Respiratory: some shortness of breath when taking a deep breath related to heartburn.   Cardiovascular: Some chest pain related to the heartburn; no LE, UL edema.   Gastrointestinal: negative for, nausea, vomiting, and diarrhea. Positive for heartburn and reflux. Intermittent epigastric pain   Genitourinary: positive for irritation while voiding. Vaginal mass on inferior left labia majora (6 o'clock). Tender to touch, erthematous, no bleeding, preventing the patient from walking, sitting or moving due to pain.   Musculoskeletal: negative  Neurologic: negative   Psychiatric: positive for sleep disturbance, and feeling anxious, patient crying when asked about her mental state. Offered psych assessment, but refused for now.   Hematologic/Lymphatic/Immunologic: positive for fever, night sweats, and weight loss. Negative for fever and chills.   Endocrine: positive for  and night sweats   Weight: Patient had lost considerable weight with oral ulcers, but up ~ 5 lbs in the last day after receiving IV fluids x 2 in the ED.         Social History   I have reviewed this patient's social history and updated it with pertinent information if needed.  Social History     Tobacco Use    Smoking status: Never     Passive exposure: Never    Smokeless tobacco: Never   Substance Use Topics    Alcohol use: Not Currently     Comment: Very occasional, last drink two months ago, wine cooler    Drug use: Never         Family History   I have reviewed this patient's family history and updated it with pertinent information if needed.  Family History   Problem Relation Age of Onset    Endometriosis Mother     Coronary Artery Disease Mother          Allergies   Allergies   Allergen Reactions    Amlodipine Headache and Other (See Comments)     Other Reaction(s): Unknown    Proton Pump Inhibitors Nephrotoxicity     No PPIs due to history of renal failure due to interstitial nephritis    Tegaderm Transparent Dressing (Informational Only) Blisters        Physical Exam   Vital Signs: Pulse 84   Temp 98.3  F (36.8  C) (Oral)   Resp 19   SpO2 98%                          GENERAL APPEARANCE: alert, tired looking and tearful.   HENT: several mouth ulcers, largest one on the inner right cheek.   RESP: lungs clear to auscultation - no rales, rhonchi or wheezes  CV: regular rhythm, normal rate, no rub, no murmur  EDEMA: no LE edema bilaterally  ABDOMEN: soft, nondistended, striae, non-tender in all quadrants, no masses palpated   MS: extremities normal - no gross deformities noted, no evidence of inflammation in joints, no muscle tenderness  SKIN: no rash  TX KIDNEY: normal  DIALYSIS ACCESS: none  Genitourinary: extremely tender vulva and perineal area. Soft tissue mass noted on the left labia majora that is erythematous and as per patient only noticed recently, tender to touch, photo taken of the mass.                 I have reviewed history, examined patient and discussed plan with the fellow/resident/ERICA.    I concur with the findings in this note.    Time spent  on admission activities: 45 minutes.

## 2024-06-27 NOTE — LETTER
6/27/2024      RE: Ira Zamora  [address] [facility]  [place] [address]       TRANSPLANT NEPHROLOGY CLINIC VISIT     Assessment & Plan  # DDKT: CKD Stage 2 - Stable   Pediatric en-bloc kidneys   - Baseline Creatinine: ~ 0.9-1.1   - Proteinuria: Normal (<0.2 grams)   - DSA Hx: No DSA   - Last cPRA: 9%   - BK Viremia: No   - Kidney Tx Biopsy Hx: No biopsy history.    # Immunosuppression: Tacrolimus immediate release (goal 8-10) and Prednisone (dose 10 mg daily)   - Induction with Recent Transplant:  High Intensity Protocol   - Continue with intensive monitoring of immunosuppression for efficacy and toxicity.   - Historical Changes in Immunosuppression:  Stopped mycophenolate due to oral and vaginal ulcers.   - Changes: Not at this time, but plan was to start belatacept in place of stopped mycophenolate.    # Infection Prevention:      - PJP: Inhaled pentamidine; Okay to restart Bactrim on 7/5/24  - CMV: Valganciclovir (Valcyte); Patient is CMV IgG Ab discordant (D+/R-) and will continue on Valcyte x 6 months, then check CMV PCR monthly until 12 months post transplant.   - CMV IgG Ab High Risk Discordance (D+/R-): Yes  - EBV IgG Ab High Risk Discordance (D+/R-): No    # Blood Pressure: Controlled;  Goal BP: < 140/90   - Changes: No    # Elevated Blood Glucose: Glucose generally running ~ 90-130s  Last HbA1c: 4.7%    # Anemia in Chronic Renal Disease: Hgb: Stable      JUNG: No   - Iron studies: Replete    # Leukopenia: Trend up, moderately low WBC at ~ 2.4 with last check, but had been running closer to ~ 1.3-2.1 over the last month.  Likely medication related.  Negative CMV PCR.    # Mineral Bone Disorder:    - Secondary renal hyperparathyroidism; PTH level: Minimally elevated ( pg/ml)        On treatment: None  - Vitamin D; level: Normal        On supplement: No  - Calcium; level: High        On supplement: No    # Electrolytes:   - Potassium; level: High normal        On supplement: No  - Magnesium; level: Stable  low        On supplement: Yes  - Bicarbonate; level: Normal        On supplement: No    # GERD/Dysphagia: Patient with apparent severe heartburn/GERD symptoms, worsening over the last week or so.  Patient underwent EGD 5/24/24 which was mostly unremarkable with only finding of minimal non-specific chronic inflammation in the stomach (negative H. pylori), but jessee esophageal findings.  In addition, recently had significant oral ulcers.  Now with patient unable to eat and minimal fluid intake.  She hasn't been able to take her medications in the last day due to pain with swallowing.  Her weight is down ~ 20 lbs, although up ~ 5 lbs with IV fluids the last day.  Patient is on famotidine, but unable to use PPI due to probable underlying kidney disease (interstitial nephritis) from previous PPI use.   - Will plan to admit patient to the hospital.  Due to lack of an available bed immediately, will send patient to ER.   - Recommend consulting GI and would think she needs an EGD.    # Oral and Genital Ulcers: Stable, but ongoing symptoms. Differential diagnosis is broad, but includes MPA induced, Crohn's disease, leishmania, and syphilis.  HSV and CMV testing negative.  Patient is now off MPA.   - Further work up to be done inpatient.     # Severe Malnutrition: Patient's weight is down ~ 20 lbs since transplant, although up a bit after IV fluids.  She hasn't been eating for the last week and minimal oral intake overall due to GERD/dysphagia symptoms.  Now unable to take oral medications.   - Will plan hospital admission.              - May need to consider NG tube and starting tube feeds.    # Other Significant PMH:   - Crohn's Disease: Appears to be stable.  Previously was on ustekinumab prior to kidney transplant, but hasn't restarted it due to ongoing oral and vaginal ulcers.  Does occasionally alternate between diarrhea and constipation, but no issues at this time.  Followed by GI and [facility].   - Cardiac/Vascular Disease  Risk Factors: Dilated ascending aorta (3.8 cm)  on 2024 ECHO (stable from 2020).     # Skin Cancer Risk:    - Discussed sun protection and recommend regular follow up with Dermatology.    # Transplant History:  Etiology of Kidney Failure: Interstitial nephritis  Tx: DDKT - Pediatric en bloc  Transplant: 4/17/2024 (Kidney)  Significant transplant-related complications:  Oral and vaginal ulcers    Transplant Office Phone Number: [phone]    Assessment and plan was discussed with the patient and she voiced her understanding and agreement.    Return visit: Return for previously scheduled visit.    Neno Ortiz MD    I spent a total of 80 minutes on the date of the encounter doing chart review, performing a history and physical exam, completing documentation and any further activities as noted above.    The longitudinal plan of care for the diagnosis(es)/condition(s) as documented were addressed during this visit. Due to the added complexity in care, I will continue to support Ira in the subsequent management and with ongoing continuity of care.      Chief Complaint  Ms. Zamora is a 34 year old here for kidney transplant and immunosuppression management.     History of Present Illness   Ms. Zamora reports feeling poorly overall.  Since last clinic visit:   Patient continues to have severe gastroesophageal relux/heartburn symptoms.  She has ongoing burning chest pain, felt secondary to heartburn.  Some associated shortness of breath when taking a deep breath.  No associated nausea, vomiting or diarrhea.  Some epigastric pain.  Symptoms temporarily improve taking Magic mouthwash with lidocaine, but that only lasts for ~ 5-10 minutes and the pain returns.  She was previously hospitalized for oral and vaginal ulcers with immunosuppression changed stopping mycophenolate.  Worsening heartburn symptoms over the last week, especially over the last day or two.  Patient is unable to take her medications today due to pain  with swallowing.  Also not able to eat for several days or drink much fluids.  Patient was in the ER yesterday for symptoms, but treatment took too long for them and they left.  Chest pain or shortness of breath: Yes, but more related to heartburn symptoms.  Lower extremity swelling: No  Weight change: Yes; Patient had lost considerable weight with oral ulcers, but up ~ 5 lbs in the last day after receiving IV fluids x 2.  Nausea and vomiting: No  Diarrhea: No  Heartburn symptoms: Yes  Fever, sweats or chills: No  Urinary complaints: No, but some irritation due to vaginal ulcer, as well as pain with walking.    Home BP: Not checked    Problem List  Patient Active Problem List   Diagnosis     Crohn's disease (H)     CKD (chronic kidney disease) stage 2, GFR 60-89 ml/min     Secondary renal hyperparathyroidism (H24)     Long QT interval     Kidney replaced by transplant     Immunosuppressed status (H24)     Gastroesophageal reflux disease without esophagitis     Dehydration     Aftercare following organ transplant     Mouth ulcers     Vaginal ulcer     Leukopenia, unspecified type     Severe malnutrition (H24)     Need for pneumocystis prophylaxis     Anemia in chronic renal disease     Hypomagnesemia       Allergies  Allergies   Allergen Reactions     Amlodipine Headache and Other (See Comments)     Other Reaction(s): Unknown     Proton Pump Inhibitors Nephrotoxicity     No PPIs due to history of renal failure due to interstitial nephritis     Tegaderm Transparent Dressing (Informational Only) Blisters       Medications  Current Outpatient Medications   Medication Sig Dispense Refill     acetaminophen (TYLENOL) 500 MG tablet Take 1,000 mg by mouth every 6 hours as needed       aspirin (ASA) 325 MG tablet Take 1 tablet (325 mg) by mouth daily 30 tablet 2     atorvastatin (LIPITOR) 10 MG tablet Take 1 tablet (10 mg) by mouth daily 30 tablet 1     benzocaine (ORAJEL MAXIMUM STRENGTH) 20 % GEL gel Take by mouth 4 times  daily as needed for mouth sores 9 g 0     famotidine (PEPCID) 20 MG tablet Take 2 tablets (40 mg) by mouth 2 times daily 60 tablet 1     levonorgestrel (KYLEENA) 19.5 MG IUD 1 Device by Intrauterine route       magic mouthwash suspension (diphenhydrAMINE, lidocaine, aluminum-magnesium & simethicone) Swish and swallow 10 mLs in mouth every 6 hours as needed for mouth sores 560 mL 1     magnesium oxide (MAG-OX) 400 MG tablet Take 2 tablets (800 mg) by mouth daily 60 tablet 0     mineral oil-hydrophilic petrolatum (AQUAPHOR) external ointment Apply topically every hour as needed for other (barrier cream) 50 g 0     PARoxetine (PAXIL) 30 MG tablet Take 30 mg by mouth every evening       predniSONE (DELTASONE) 10 MG tablet Take 1 tablet (10 mg) by mouth daily for 90 days 90 tablet 0     tacrolimus (GENERIC EQUIVALENT) 0.5 MG capsule Take 1 capsule (0.5 mg) by mouth 2 times daily 2.5mg twice daily 60 capsule 11     tacrolimus (GENERIC EQUIVALENT) 1 MG capsule Take 2 capsules (2 mg) by mouth 2 times daily 2.5mg twice daily 120 capsule 11     valGANciclovir (VALCYTE) 450 MG tablet Take 2 tablets (900 mg) by mouth daily 60 tablet 3     sulfamethoxazole-trimethoprim (BACTRIM) 400-80 MG tablet Take 1 tablet by mouth daily Start after July 5, 2024 (Patient not taking: Reported on 6/27/2024) 90 tablet 2     ustekinumab (STELARA) 90 MG/ML Inject 90 mg Subcutaneous once every eight weeks (Patient not taking: Reported on 6/27/2024)       No current facility-administered medications for this visit.     There are no discontinued medications.    Physical Exam  Vital Signs: /85 (BP Location: Left arm, Patient Position: Semi-Guerra's, Cuff Size: Adult Small)   Pulse 85   Temp 98  F (36.7  C) (Oral)   Wt 52.2 kg (115 lb 1.6 oz)   SpO2 98%   BMI 21.05 kg/m      GENERAL APPEARANCE: alert, but in mild distress and somewhat ill appearing  HENT: a couple of small ulcers  RESP: lungs clear to auscultation - no rales, rhonchi or  wheezes  CV: regular rhythm, normal rate, no rub, no murmur  EDEMA: no LE edema bilaterally  ABDOMEN: soft, nondistended, diffuse TTP, bowel sounds normal  MS: extremities normal - no gross deformities noted, no evidence of inflammation in joints, no muscle tenderness  SKIN: no rash  TX KIDNEY: normal  DIALYSIS ACCESS: none    Data        Latest Ref Rng & Units 6/26/2024     5:06 PM 6/20/2024     9:35 AM 6/17/2024     9:20 AM   Renal   Sodium 135 - 145 mmol/L 136   135    Na (external) 136 - 145 mmol/L  136        K 3.4 - 5.3 mmol/L 5.0   4.1    K (external) 3.5 - 5.1 mmol/L  3.9        Cl 98 - 107 mmol/L 102  102     101    Cl (external) 98 - 107 mmol/L 102  102     101    CO2 22 - 29 mmol/L 22   23    CO2 (external) 22 - 29 mmol/L  23        Urea Nitrogen 6.0 - 20.0 mg/dL 26.3   18.6    BUN (external) 6 - 20 mg/dL  22        Creatinine 0.51 - 0.95 mg/dL 1.02   0.97    Cr (external) 0.50 - 0.80 mg/dL  0.79        Glucose 70 - 99 mg/dL 136   117    Glucose (external) 70 - 99 mg/dL  130        Calcium 8.6 - 10.0 mg/dL 10.5   10.3    Ca (external) 8.0 - 10.0 mg/dL  9.9        Magnesium 1.7 - 2.3 mg/dL   1.5    Mg (external) 1.6 - 2.6 mg/dL  1.5            This result is from an external source.         Latest Ref Rng & Units 6/20/2024     9:35 AM 6/17/2024     9:20 AM 6/13/2024     9:25 AM   Bone Health   Phosphorus 2.5 - 4.5 mg/dL  2.2     Phos (external) 2.6 - 4.5 mg/dL 1.7      2.6        This result is from an external source.         Latest Ref Rng & Units 6/26/2024     5:06 PM 6/20/2024     9:35 AM 6/17/2024     9:20 AM   Heme   WBC 4.0 - 11.0 10e3/uL 2.4   1.6    WBC (external) 4.5 - 11.0 Thou/cmm  4.5 - 11.0 thou/cu mm  1.8     1.8     Hgb 11.7 - 15.7 g/dL 10.1   10.0    Hgb (external) 12.0 - 16.0 g/dL  12.0 - 16.0 g/dL  9.5     9.5     Plt 150 - 450 10e3/uL 282   321    Plt (external) 140 - 440 Thou/cmm  140 - 440 thou/cu mm  261     261           Latest Ref Rng & Units 6/26/2024     5:06 PM 6/6/2024     7:07  AM 6/5/2024     5:12 PM   Liver   AP 40 - 150 U/L 95  83  82    TBili <=1.2 mg/dL 0.4  0.7  0.4    ALT 0 - 50 U/L 49  12  18    AST 0 - 45 U/L 28  16  19    Tot Protein 6.4 - 8.3 g/dL 6.8  6.9  6.5    Albumin 3.5 - 5.2 g/dL 3.6  3.8  3.8          Latest Ref Rng & Units 6/26/2024     5:06 PM 4/17/2024     9:58 AM   Pancreas   A1C <5.7 %  4.7    Lipase (Roche) 13 - 60 U/L 14           Latest Ref Rng & Units 5/23/2024     9:49 AM 4/22/2024     8:21 AM   Iron studies   Iron 37 - 145 ug/dL 77  53    Iron Sat Index 15 - 46 % 38  36    Ferritin 6 - 175 ng/mL 2,181  2,181          Latest Ref Rng & Units 4/17/2024     9:58 AM 9/29/2022    11:40 AM   [facility] Txp Virology   EBV CAPSID ANTIBODY IGG No detectable antibody. Positive  Positive      Failed to redirect to the Timeline version of the [employer].  Recent Labs   Lab Test 05/23/24  0949 05/31/24  0930 06/07/24  0647 06/08/24  0557 06/17/24  0920   DOSTAC 5/22/2024 5/30/2024  --   --  6/16/2024   TACROL 9.2 8.7 11.6 10.7 5.1     Recent Labs   Lab Test 04/23/24  0735   DOSMPA 4/22/2024   9:00 PM   MPACID 2.20   MPAG 107.5*        Neno Ortiz MD

## 2024-06-27 NOTE — PROGRESS NOTES
Medication Therapy Management (MTM) Encounter    ASSESSMENT:                            Medication Adherence/Access: No issues identified. Still taking meds despite esophageal pain.    Kidney Transplant:    Stable.     GERD/ esophagititis/ Mouth sores:   Patient should avoid PPIs due to hx of interstitial nephritis. Consider sucralfate as an option, patient to speak with Dr. Ortiz about this today. Aditionally, some data to suggest Vitamin B12 sublingual tablets may be effective at reducing and preventing canker sores. Pt may try this, although her's are likely drug induced. If possible, would recommend reducing aspirin to 81mg daily as well to reduce GI upset.      Supplements:   Stable.     Crohns disease/ Loose stools:   Stelara every 8 weeks. Hold at least 6 weeks post txp per Dr. Quezada.   Maybe a little better post transplant. Stools normal.       Hyperlipidemia   Stable.    PLAN:                            Pt to...  Recommend B12 sublingual tablets 1000mcg daily (can be higher than that, make sure they are sublingual tablets). For mouth sores  Talk to Dr. Ortiz about Sucralfate for esophageal pain.  Talk to Dr. Ortiz about reducing aspirin to 81mg daily. If safe.     Follow-up: 9/3    SUBJECTIVE/OBJECTIVE:                          Ira Zamora is a 34 year old female seen for a follow-up visit. Patient was accompanied by Collin.      Reason for visit: 2 months post txp.    Allergies/ADRs: Reviewed in chart  Past Medical History: Reviewed in chart  Tobacco: She reports that she has never smoked. She has never been exposed to tobacco smoke. She has never used smokeless tobacco.  Alcohol: not currently using     Medication Adherence/Access: no missed doses recently.     Kidney Transplant:    Tacrolimus 2.5 mg twice daily  Has been of Mycophenolate Mofetil since last Monday. Belatacept starting.   Prednisone 10mg daily.   Pt reports GERD, GI pain, canker sores.  Transplant date: 4/14/24  Vascular Prophylaxis:  Aspirin 325mg daily. Denies gastrointestinal bleed sx.  CMV prophylaxis: Valcyte 450 mg every other day Donor (+), Recipient (-), treat 6 months post tx   PJP prophylaxis: Bactrim SS daily- holding until July 5th, had a dose of pentamidine  Tx Coordinator: Radha Jorge, Tx MD: Dr. Ortiz, Using Med Card: Yes  Immunizations: annual flu shot 2023; Pneumovax 23:  2021; Prevnar 20: 2023; TDaP:  2012; Shingrix: unknown, HBV: immunity per last titers, COVID: Primary x 3 and Bivalent x 1    Estimated Creatinine Clearance: 61.5 mL/min (A) (based on SCr of 1.02 mg/dL (H)).     GERD/ esophagititis/ Mouth sores:   Famotidine 40 mg twice daily  Taking Tums 2-3 daily. At least 2-3 x daily.   PPI induced interstitial nephritis. Wanting to know what she can take for her GERD sx. Has not been able to eat or drink for the past week or so on and off.   Patient reports heartburn all day. Eats then has immense pain.   Has canker sores as well.     Supplements:   Magnesium 800mg daily   Lab Results   Component Value Date    MAG 1.5 (L) 06/17/2024    MAG 2.6 (H) 06/09/2024     Crohns disease/ Loose stools:   Stelara every 8 weeks. Hold at least 6 weeks post txp per Dr. Quezada.   Maybe a little better post transplant. Stools normal.       Hyperlipidemia   Atorvastatin 10mg daily  Patient reports general aches and pains.      Today's Vitals: There were no vitals taken for this visit.  ----------------  Post Discharge Medication Reconciliation Status: discharge medications reconciled and changed, per note/orders.    I spent 20 minutes with this patient today. I offer these suggestions for consideration by Dr. Ortiz. A copy of the visit note was provided to the patient's provider(s).    A summary of these recommendations was sent via [employer].    Kirk Patel, PharmD  MTM Pharmacist    Phone: [phone]      Medication Therapy Recommendations  Aftercare following organ transplant    Current Medication: aspirin (ASA) 325 MG tablet    Rationale: Undesirable effect - Adverse medication event - Safety   Recommendation: Decrease Dose   Status: Contact Provider - Awaiting Response         Gastroesophageal reflux disease without esophagitis    Rationale: Synergistic therapy - Needs additional medication therapy - Indication   Recommendation: Start Medication - SUCRALFATE   Status: Contact Provider - Awaiting Response         Mouth ulcers    Rationale: Untreated condition - Needs additional medication therapy - Indication   Recommendation: Start Medication - B-12 100-5000 MCG Subl   Status: Accepted - no CPA Needed

## 2024-06-28 PROBLEM — R63.8 INADEQUATE ORAL INTAKE: Status: ACTIVE | Noted: 2024-06-28

## 2024-06-28 PROBLEM — R13.10 ODYNOPHAGIA: Status: ACTIVE | Noted: 2024-06-28

## 2024-06-28 PROBLEM — N90.89 VULVAR LESION: Status: ACTIVE | Noted: 2024-06-28

## 2024-06-28 LAB
ANION GAP SERPL CALCULATED.3IONS-SCNC: 9 MMOL/L (ref 7–15)
BASOPHILS # BLD AUTO: 0 10E3/UL (ref 0–0.2)
BASOPHILS NFR BLD AUTO: 0 %
BUN SERPL-MCNC: 14.4 MG/DL (ref 6–20)
CALCIUM SERPL-MCNC: 9.8 MG/DL (ref 8.6–10)
CHLORIDE SERPL-SCNC: 105 MMOL/L (ref 98–107)
CREAT SERPL-MCNC: 1 MG/DL (ref 0.51–0.95)
DEPRECATED HCO3 PLAS-SCNC: 23 MMOL/L (ref 22–29)
EGFRCR SERPLBLD CKD-EPI 2021: 75 ML/MIN/1.73M2
EOSINOPHIL # BLD AUTO: 0 10E3/UL (ref 0–0.7)
EOSINOPHIL NFR BLD AUTO: 1 %
ERYTHROCYTE [DISTWIDTH] IN BLOOD BY AUTOMATED COUNT: 13.8 % (ref 10–15)
GLUCOSE SERPL-MCNC: 125 MG/DL (ref 70–99)
HADV DNA # SPEC NAA+PROBE: NOT DETECTED COPIES/ML
HCT VFR BLD AUTO: 29.5 % (ref 35–47)
HGB BLD-MCNC: 9.1 G/DL (ref 11.7–15.7)
IMM GRANULOCYTES # BLD: 0.1 10E3/UL
IMM GRANULOCYTES NFR BLD: 2 %
INR PPP: 1.25 (ref 0.85–1.15)
LYMPHOCYTES # BLD AUTO: 0.4 10E3/UL (ref 0.8–5.3)
LYMPHOCYTES NFR BLD AUTO: 12 %
MAGNESIUM SERPL-MCNC: 2.7 MG/DL (ref 1.7–2.3)
MAGNESIUM SERPL-MCNC: 2.8 MG/DL (ref 1.7–2.3)
MCH RBC QN AUTO: 30 PG (ref 26.5–33)
MCHC RBC AUTO-ENTMCNC: 30.8 G/DL (ref 31.5–36.5)
MCV RBC AUTO: 97 FL (ref 78–100)
MONOCYTES # BLD AUTO: 0.6 10E3/UL (ref 0–1.3)
MONOCYTES NFR BLD AUTO: 19 %
NEUTROPHILS # BLD AUTO: 1.9 10E3/UL (ref 1.6–8.3)
NEUTROPHILS NFR BLD AUTO: 66 %
NRBC # BLD AUTO: 0 10E3/UL
NRBC BLD AUTO-RTO: 0 /100
PHOSPHATE SERPL-MCNC: 3.4 MG/DL (ref 2.5–4.5)
PLATELET # BLD AUTO: 248 10E3/UL (ref 150–450)
POTASSIUM SERPL-SCNC: 4.4 MMOL/L (ref 3.4–5.3)
RBC # BLD AUTO: 3.03 10E6/UL (ref 3.8–5.2)
SODIUM SERPL-SCNC: 137 MMOL/L (ref 135–145)
TACROLIMUS BLD-MCNC: 7.7 UG/L (ref 5–15)
TME LAST DOSE: NORMAL H
TME LAST DOSE: NORMAL H
UPPER GI ENDOSCOPY: NORMAL
WBC # BLD AUTO: 2.9 10E3/UL (ref 4–11)

## 2024-06-28 PROCEDURE — 0DB58ZX EXCISION OF ESOPHAGUS, VIA NATURAL OR ARTIFICIAL OPENING ENDOSCOPIC, DIAGNOSTIC: ICD-10-PCS | Performed by: INTERNAL MEDICINE

## 2024-06-28 PROCEDURE — 250N000013 HC RX MED GY IP 250 OP 250 PS 637

## 2024-06-28 PROCEDURE — 88341 IMHCHEM/IMCYTCHM EA ADD ANTB: CPT | Mod: 26 | Performed by: PATHOLOGY

## 2024-06-28 PROCEDURE — 250N000011 HC RX IP 250 OP 636

## 2024-06-28 PROCEDURE — 250N000013 HC RX MED GY IP 250 OP 250 PS 637: Performed by: PHYSICIAN ASSISTANT

## 2024-06-28 PROCEDURE — 88312 SPECIAL STAINS GROUP 1: CPT | Mod: 26 | Performed by: PATHOLOGY

## 2024-06-28 PROCEDURE — 36415 COLL VENOUS BLD VENIPUNCTURE: CPT | Performed by: TRANSPLANT SURGERY

## 2024-06-28 PROCEDURE — 99222 1ST HOSP IP/OBS MODERATE 55: CPT | Mod: 24 | Performed by: INTERNAL MEDICINE

## 2024-06-28 PROCEDURE — 88305 TISSUE EXAM BY PATHOLOGIST: CPT | Mod: 26 | Performed by: PATHOLOGY

## 2024-06-28 PROCEDURE — 99222 1ST HOSP IP/OBS MODERATE 55: CPT | Mod: 4UV | Performed by: OBSTETRICS & GYNECOLOGY

## 2024-06-28 PROCEDURE — 250N000011 HC RX IP 250 OP 636: Performed by: INTERNAL MEDICINE

## 2024-06-28 PROCEDURE — 250N000009 HC RX 250

## 2024-06-28 PROCEDURE — 258N000003 HC RX IP 258 OP 636

## 2024-06-28 PROCEDURE — 0DB28ZX EXCISION OF MIDDLE ESOPHAGUS, VIA NATURAL OR ARTIFICIAL OPENING ENDOSCOPIC, DIAGNOSTIC: ICD-10-PCS | Performed by: INTERNAL MEDICINE

## 2024-06-28 PROCEDURE — 80048 BASIC METABOLIC PNL TOTAL CA: CPT

## 2024-06-28 PROCEDURE — 83735 ASSAY OF MAGNESIUM: CPT

## 2024-06-28 PROCEDURE — 80197 ASSAY OF TACROLIMUS: CPT

## 2024-06-28 PROCEDURE — S5010 5% DEXTROSE AND 0.45% SALINE: HCPCS

## 2024-06-28 PROCEDURE — 250N000012 HC RX MED GY IP 250 OP 636 PS 637

## 2024-06-28 PROCEDURE — 84100 ASSAY OF PHOSPHORUS: CPT

## 2024-06-28 PROCEDURE — 88342 IMHCHEM/IMCYTCHM 1ST ANTB: CPT | Mod: 26 | Performed by: PATHOLOGY

## 2024-06-28 PROCEDURE — 87529 HSV DNA AMP PROBE: CPT

## 2024-06-28 PROCEDURE — 88341 IMHCHEM/IMCYTCHM EA ADD ANTB: CPT | Mod: TC | Performed by: INTERNAL MEDICINE

## 2024-06-28 PROCEDURE — G0500 MOD SEDAT ENDO SERVICE >5YRS: HCPCS | Performed by: INTERNAL MEDICINE

## 2024-06-28 PROCEDURE — 36415 COLL VENOUS BLD VENIPUNCTURE: CPT

## 2024-06-28 PROCEDURE — 99232 SBSQ HOSP IP/OBS MODERATE 35: CPT | Mod: 24 | Performed by: TRANSPLANT SURGERY

## 2024-06-28 PROCEDURE — 99153 MOD SED SAME PHYS/QHP EA: CPT | Performed by: INTERNAL MEDICINE

## 2024-06-28 PROCEDURE — 43239 EGD BIOPSY SINGLE/MULTIPLE: CPT | Performed by: INTERNAL MEDICINE

## 2024-06-28 PROCEDURE — 250N000009 HC RX 250: Performed by: INTERNAL MEDICINE

## 2024-06-28 PROCEDURE — 120N000011 HC R&B TRANSPLANT UMMC

## 2024-06-28 PROCEDURE — 250N000013 HC RX MED GY IP 250 OP 250 PS 637: Performed by: NURSE PRACTITIONER

## 2024-06-28 PROCEDURE — 85004 AUTOMATED DIFF WBC COUNT: CPT

## 2024-06-28 PROCEDURE — 85610 PROTHROMBIN TIME: CPT | Performed by: STUDENT IN AN ORGANIZED HEALTH CARE EDUCATION/TRAINING PROGRAM

## 2024-06-28 PROCEDURE — 99223 1ST HOSP IP/OBS HIGH 75: CPT | Mod: 24

## 2024-06-28 PROCEDURE — 83735 ASSAY OF MAGNESIUM: CPT | Performed by: TRANSPLANT SURGERY

## 2024-06-28 RX ORDER — FENTANYL CITRATE 50 UG/ML
INJECTION, SOLUTION INTRAMUSCULAR; INTRAVENOUS PRN
Status: DISCONTINUED | OUTPATIENT
Start: 2024-06-28 | End: 2024-06-29

## 2024-06-28 RX ORDER — CALCIUM CARBONATE 500 MG/1
500 TABLET, CHEWABLE ORAL 3 TIMES DAILY PRN
Status: DISCONTINUED | OUTPATIENT
Start: 2024-06-28 | End: 2024-07-09 | Stop reason: HOSPADM

## 2024-06-28 RX ORDER — SUCRALFATE ORAL 1 G/10ML
1 SUSPENSION ORAL
Status: DISCONTINUED | OUTPATIENT
Start: 2024-06-28 | End: 2024-07-09

## 2024-06-28 RX ORDER — LIDOCAINE HYDROCHLORIDE 20 MG/ML
5 SOLUTION OROPHARYNGEAL
Status: DISCONTINUED | OUTPATIENT
Start: 2024-06-28 | End: 2024-07-09 | Stop reason: HOSPADM

## 2024-06-28 RX ORDER — PAROXETINE 30 MG/1
30 TABLET, FILM COATED ORAL EVERY EVENING
Status: DISCONTINUED | OUTPATIENT
Start: 2024-06-28 | End: 2024-07-09 | Stop reason: HOSPADM

## 2024-06-28 RX ORDER — HYDROXYZINE HYDROCHLORIDE 25 MG/1
25 TABLET, FILM COATED ORAL EVERY 6 HOURS PRN
Status: DISCONTINUED | OUTPATIENT
Start: 2024-06-28 | End: 2024-07-09 | Stop reason: HOSPADM

## 2024-06-28 RX ORDER — SILVER SULFADIAZINE 10 MG/G
CREAM TOPICAL 2 TIMES DAILY
Status: DISCONTINUED | OUTPATIENT
Start: 2024-06-28 | End: 2024-06-28

## 2024-06-28 RX ORDER — VALGANCICLOVIR 450 MG/1
900 TABLET, FILM COATED ORAL DAILY
Status: DISCONTINUED | OUTPATIENT
Start: 2024-06-28 | End: 2024-06-29

## 2024-06-28 RX ORDER — SILVER SULFADIAZINE 10 MG/G
CREAM TOPICAL 2 TIMES DAILY
Status: DISCONTINUED | OUTPATIENT
Start: 2024-06-28 | End: 2024-07-09 | Stop reason: HOSPADM

## 2024-06-28 RX ORDER — HYDROXYZINE HYDROCHLORIDE 25 MG/1
50 TABLET, FILM COATED ORAL EVERY 6 HOURS PRN
Status: DISCONTINUED | OUTPATIENT
Start: 2024-06-28 | End: 2024-07-09 | Stop reason: HOSPADM

## 2024-06-28 RX ADMIN — DIPHENHYDRAMINE HYDROCHLORIDE AND LIDOCAINE HYDROCHLORIDE AND ALUMINUM HYDROXIDE AND MAGNESIUM HYDRO 10 ML: KIT at 22:09

## 2024-06-28 RX ADMIN — OXYCODONE HYDROCHLORIDE 5 MG: 5 SOLUTION ORAL at 00:26

## 2024-06-28 RX ADMIN — PAROXETINE 30 MG: 30 TABLET, FILM COATED ORAL at 20:18

## 2024-06-28 RX ADMIN — SUCRALFATE 1 G: 1 SUSPENSION ORAL at 22:09

## 2024-06-28 RX ADMIN — DIPHENHYDRAMINE HYDROCHLORIDE AND LIDOCAINE HYDROCHLORIDE AND ALUMINUM HYDROXIDE AND MAGNESIUM HYDRO 10 ML: KIT at 15:55

## 2024-06-28 RX ADMIN — TACROLIMUS 1.5 MG: 1 CAPSULE ORAL at 08:14

## 2024-06-28 RX ADMIN — SILVER SULFADIAZINE: 10 CREAM TOPICAL at 20:18

## 2024-06-28 RX ADMIN — HYDROXYZINE HYDROCHLORIDE 25 MG: 25 TABLET ORAL at 08:31

## 2024-06-28 RX ADMIN — DEXTROSE AND SODIUM CHLORIDE: 5; 450 INJECTION, SOLUTION INTRAVENOUS at 17:14

## 2024-06-28 RX ADMIN — VALGANCICLOVIR HYDROCHLORIDE 900 MG: 450 TABLET ORAL at 13:39

## 2024-06-28 RX ADMIN — METHYLPREDNISOLONE SODIUM SUCCINATE 8 MG: 40 INJECTION, POWDER, FOR SOLUTION INTRAMUSCULAR; INTRAVENOUS at 15:29

## 2024-06-28 RX ADMIN — CALCIUM CARBONATE (ANTACID) CHEW TAB 500 MG 500 MG: 500 CHEW TAB at 06:48

## 2024-06-28 RX ADMIN — SUCRALFATE 1 G: 1 SUSPENSION ORAL at 15:35

## 2024-06-28 RX ADMIN — TACROLIMUS 1.5 MG: 1 CAPSULE ORAL at 18:22

## 2024-06-28 RX ADMIN — FAMOTIDINE 40 MG: 10 INJECTION, SOLUTION INTRAVENOUS at 15:29

## 2024-06-28 RX ADMIN — OXYCODONE HYDROCHLORIDE 5 MG: 5 SOLUTION ORAL at 18:22

## 2024-06-28 RX ADMIN — SILVER SULFADIAZINE: 10 CREAM TOPICAL at 13:40

## 2024-06-28 RX ADMIN — ASPIRIN 81 MG CHEWABLE TABLET 81 MG: 81 TABLET CHEWABLE at 08:14

## 2024-06-28 RX ADMIN — DIPHENHYDRAMINE HYDROCHLORIDE AND LIDOCAINE HYDROCHLORIDE AND ALUMINUM HYDROXIDE AND MAGNESIUM HYDRO 10 ML: KIT at 03:51

## 2024-06-28 RX ADMIN — OXYCODONE HYDROCHLORIDE 5 MG: 5 SOLUTION ORAL at 13:40

## 2024-06-28 ASSESSMENT — ACTIVITIES OF DAILY LIVING (ADL)
ADLS_ACUITY_SCORE: 27
ADLS_ACUITY_SCORE: 27
ADLS_ACUITY_SCORE: 29
ADLS_ACUITY_SCORE: 27
ADLS_ACUITY_SCORE: 29
ADLS_ACUITY_SCORE: 26
ADLS_ACUITY_SCORE: 27
ADLS_ACUITY_SCORE: 26
ADLS_ACUITY_SCORE: 29
ADLS_ACUITY_SCORE: 26
ADLS_ACUITY_SCORE: 27
DEPENDENT_IADLS:: INDEPENDENT
ADLS_ACUITY_SCORE: 26
ADLS_ACUITY_SCORE: 27
ADLS_ACUITY_SCORE: 26
ADLS_ACUITY_SCORE: 27
ADLS_ACUITY_SCORE: 26

## 2024-06-28 NOTE — PROGRESS NOTES
CLINICAL NUTRITION SERVICES - ASSESSMENT NOTE     Nutrition Prescription    RECOMMENDATIONS FOR MDs/PROVIDERS TO ORDER:  Recommend PN for nutrition support if unable to advance diet within next 24-48 hours    Malnutrition Status:    Severe malnutrition in the context of acute illness    Recommendations already ordered by Registered Dietitian (RD):  Supplements:  PRN supplements per pt request pending diet advancement  -Pt interested in Boost Soothe, Magic Cups, Ensure Clear, and [employer]     Future/Additional Recommendations:  Pt does NOT want a FT - she shares she wouldn't be able to tolerate d/t sores.    Recommend peripheral PN if anticipated need for PN is <1-2 weeks.  Peripheral PN via PIV: Clinimix E (5% Dex/4.25% AA) @ 63 mL/hr (1.5 L/day) daily + 250 mL 20% IV lipids 6x/week (M-Sat) = 75 g dex (1.04 mg/kg/min) + 64 g AA (1.3 g pro/kg) = 940 kcal (19 kcal/kg, or 94% minimum assessed kcal needs) and 45.5% kcal from fat     TPN recs if anticipated need for >2 weeks:  Dosing weight:  50 kg  Access: Would need central line placement (currently only has PIV)    Initial parameters (per day)  Volume:  1200 mL or per PharmD discretion pending fluid status  Dextrose: 110 g (1.5 g pro/kg)  AA: 75 g  Lipids: 250 mL 20%, 5 days per week (M-F)    Dextrose titration:   Monitor lytes and if within acceptable parameters (Mg++ > or = 1.5, K+ is > or = 3, and PO4 > or = 1.9), increase dextrose by 25 g/day to goal of 160 g dextrose.    Additives: 10 mL Infuvite +  5 mL MTE-4 daily, [+ thiamine 100 mg daily x 5 days]    **DELETE BELOW PORTION FOR TPN CHANGE ORDER    Goal PN provides 160 g dextrose, 75 g AA, and 250 mL 20% lipids 5 days per week for total provision of 1201 Kcals (24 Kcals/kg), 75 g/kg protein, GIR 2.2 mg/kg/minute, and 29.7% fat kcals on average daily.        REASON FOR ASSESSMENT  Ira Zamora is a/an 34 year old female assessed by the dietitian for Provider Order - Malnutrtion     NUTRITION HISTORY  Pt seen by  Clinical Nutrition earlier this month and noted to have reduced appetite/intake r/t mouth sores - pt started on Boost Soothe.    No food allergies.    Met with pt & pt's  at bedside. She shares she hasn't had a solid meal in over a month, due to mouth sores (these are now improving) but now has developed odynophagia and inability to swallow due to this. She's been doing Boost Soothe at home, likes the strawberry kiwi flavor. Has been trying smoothies, white hot chocolate, and pureed yogurt/veggie pouches. Symptoms progressed to point that now she cannot tolerate any PO.    CURRENT NUTRITION ORDERS  Diet: NPO for EGD  Intake/Tolerance: Pt reports current inability to swallow d/t pain/swelling    LABS  Labs reviewed  Electrolytes  Potassium (mmol/L)   Date Value   06/28/2024 4.4   06/27/2024 4.1   06/26/2024 5.0     Potassium POCT (mmol/L)   Date Value   04/17/2024 3.7     Phosphorus (mg/dL)   Date Value   06/28/2024 3.4   06/27/2024 2.4 (L)   06/17/2024 2.2 (L)   06/09/2024 2.9   06/08/2024 2.2 (L)    Blood Glucose  Glucose (mg/dL)   Date Value   06/28/2024 125 (H)   06/27/2024 98   06/26/2024 136 (H)   06/17/2024 117 (H)   06/09/2024 112 (H)     GLUCOSE BY METER POCT (mg/dL)   Date Value   04/21/2024 84   04/21/2024 92   04/20/2024 86   04/20/2024 84   04/20/2024 95     Hemoglobin A1C (%)   Date Value   04/17/2024 4.7    Inflammatory Markers  WBC Count (10e3/uL)   Date Value   06/28/2024 2.9 (L)   06/27/2024 2.3 (L)   06/26/2024 2.4 (L)     Albumin (g/dL)   Date Value   06/26/2024 3.6   06/06/2024 3.8   06/05/2024 3.8      Magnesium (mg/dL)   Date Value   06/28/2024 2.8 (H)   06/28/2024 2.7 (H)   06/27/2024 1.5 (L)     Sodium (mmol/L)   Date Value   06/28/2024 137   06/27/2024 138   06/26/2024 136    Renal  Urea Nitrogen (mg/dL)   Date Value   06/28/2024 14.4   06/27/2024 19.4   06/26/2024 26.3 (H)     Creatinine (mg/dL)   Date Value   06/28/2024 1.00 (H)   06/27/2024 1.04 (H)   06/26/2024 1.02 (H)      "Additional  Triglycerides (mg/dL)   Date Value   04/17/2024 142     Ketones Urine (mg/dL)   Date Value   06/06/2024 Negative        MEDICATIONS  Medications reviewed  -D5W + 1/2 NS @ 90 mL/hr    ANTHROPOMETRICS  Height:   Ht Readings from Last 1 Encounters:   06/24/24 1.575 m (5' 2\")   Most Recent Weight: 50.2 kg (110 lb 11.2 oz)    IBW: 50 kg   UBW: 125 lbs - pt would like to regain to this  BMI: 20.25 kg/m2; Normal BMI  Weight History: ~11.3% wt loss over past month. Pt reports 110 lbs is lowest wt in her adult life.  Wt Readings from Last 20 Encounters:   06/28/24 50.2 kg (110 lb 11.2 oz)   06/27/24 52.2 kg (115 lb 1.6 oz)   06/26/24 50.1 kg (110 lb 7.2 oz)   06/24/24 51.3 kg (113 lb)   06/17/24 51.3 kg (113 lb)   06/14/24 51.7 kg (113 lb 14.4 oz)   06/08/24 54.3 kg (119 lb 11.4 oz)   05/23/24 56.6 kg (124 lb 12.8 oz)   05/06/24 56.2 kg (123 lb 12.8 oz)   05/02/24 57.2 kg (126 lb 1.6 oz)   04/23/24 60.7 kg (133 lb 12.8 oz)   04/22/24 62.1 kg (137 lb)   04/19/24 63.1 kg (139 lb 1.6 oz)   09/29/22 66.6 kg (146 lb 14.4 oz)   07/26/22 68 kg (150 lb)   Dosing Weight: 50 kg (actual, based on lowest wt this admit of 50.2 kg on 6/28)    ASSESSED NUTRITION NEEDS  Estimated Energy Needs: 4984-2714 kcals/day (25 - 30 kcals/kg if EN/PO); 5777-6496 kcals/day (20 - 25 kcals/kg if PN)  Justification: Maintenance  Estimated Protein Needs: 60-75 grams protein/day (1.2 - 1.5 grams of pro/kg)  Justification: Hypercatabolism with acute illness  Estimated Fluid Needs: 1 mL/kcal   Justification: Maintenance or Per provider pending fluid status    PHYSICAL FINDINGS  See malnutrition section below.  -Skin/lips: pallor  -AV fistula on R upper arm    MALNUTRITION  % Intake: </= 50% for >/= 5 days (severe)  % Weight Loss: > 5% in 1 month (severe)  Subcutaneous Fat Loss: Facial region:  Moderate  Muscle Loss: Temporal:  Mild, Thoracic region (clavicle, acromium bone, deltoid, trapezius, pectoral):  Moderate, Upper arm (bicep, tricep):  " Mild, Upper leg (quadricep, hamstring):  Mild, and Posterior calf:  Mild  Fluid Accumulation/Edema: None noted  Malnutrition Diagnosis: Severe malnutrition in the context of acute illness    NUTRITION DIAGNOSIS  Predicted inadequate nutrient intake (pro/kcal) related to current inability to swallow and unclear nutrition POC with pt still undergoing diagnostic work-up.    INTERVENTIONS  Implementation  -Nutrition Education: Provided education on RD role. Reviewed nutrition supplements and smoothie recipes for once diet advanced. Pt reports tolerating soft/pureed foods best with persistent mouth sores.   -Medical food supplement therapy - PRN  -Parenteral Nutrition/IV Fluids - Recs     Goals  Diet adv v nutrition support within 2-3 days.     Monitoring/Evaluation  Progress toward goals will be monitored and evaluated per protocol.  Laura Anderson RD, LD  Available on [employer] - can search by name or unit Dietitian  **Clinical Nutrition is no longer available via pager

## 2024-06-28 NOTE — PROVIDER NOTIFICATION
General surgery cross-cover paged: KENDAL Zamora On 7A, Rm. [id number], Kidney team  Pt. Requesting prn IV pain meds d/t Oxy. Painful to swallow d/t esophageal pain. Pt. given prn Magic mouthwash with some relief. Can you please order prn IV analgesic?  Thanks,  Jael [phone]    MD called back and will talk to senior resident before ordering IV analgesic.

## 2024-06-28 NOTE — DISCHARGE SUMMARY
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 Presented with severe odynophagia and inability to take oral medications.    Hospital Course   ***        Significant Results and Procedures   6/28/24 EGD:  Large esophageal ulceration from 27-30 cm from incisors. A couple small ulcers seen as well. Biopsies taken at ulcer edge and ulcer center. Mild esophagitis seen at GE junction, biopsied. Normal appearing stomach and duodenum.       Pending Results   These results will be followed up by ***  Unresulted Labs Ordered in the Past 30 Days of this Admission       Date and Time Order Name Status Description    6/28/2024 11:05 AM Surgical Pathology Exam In process             Code Status   Full    Primary Care Physician   BRIDGET PARKS    {Do you want to add a physical exam section?:316354}    Time Spent on this Encounter   {How much time did you spend on discharge?:59276852}    Discharge Disposition   {                 :6169936::\"Discharged to home\"}  Condition at discharge: {CONDITION:297946::\"Stable\"}    Consultations This Hospital Stay   GI LUMINAL ADULT IP CONSULT  NURSING TO CONSULT FOR VASCULAR ACCESS CARE IP CONSULT  GYNECOLOGY IP CONSULT  NUTRITION SERVICES ADULT IP CONSULT  GYNECOLOGY IP CONSULT  NURSING TO CONSULT FOR VASCULAR ACCESS CARE IP CONSULT    Discharge Orders  ***  No discharge procedures on file.  Discharge Medications  " ***  Current Discharge Medication List        CONTINUE these medications which have NOT CHANGED    Details   acetaminophen (TYLENOL) 500 MG tablet Take 1,000 mg by mouth every 6 hours as needed      aspirin (ASA) 325 MG tablet Take 1 tablet (325 mg) by mouth daily  Qty: 30 tablet, Refills: 2    Associated Diagnoses: Kidney replaced by transplant      atorvastatin (LIPITOR) 10 MG tablet Take 1 tablet (10 mg) by mouth daily  Qty: 30 tablet, Refills: 1    Associated Diagnoses: Kidney replaced by transplant      benzocaine (ORAJEL MAXIMUM STRENGTH) 20 % GEL gel Take by mouth 4 times daily as needed for mouth sores  Qty: 9 g, Refills: 0    Associated Diagnoses: Mouth ulcers      famotidine (PEPCID) 20 MG tablet Take 2 tablets (40 mg) by mouth 2 times daily  Qty: 60 tablet, Refills: 1    Associated Diagnoses: Kidney replaced by transplant      levonorgestrel (KYLEENA) 19.5 MG IUD 1 Device by Intrauterine route      magic mouthwash suspension (diphenhydrAMINE, lidocaine, aluminum-magnesium & simethicone) Swish and swallow 10 mLs in mouth every 6 hours as needed for mouth sores  Qty: 560 mL, Refills: 1    Associated Diagnoses: Herpes; Oral mucosal lesion      magnesium oxide (MAG-OX) 400 MG tablet Take 2 tablets (800 mg) by mouth daily  Qty: 60 tablet, Refills: 0    Associated Diagnoses: Hypomagnesemia      mineral oil-hydrophilic petrolatum (AQUAPHOR) external ointment Apply topically every hour as needed for other (barrier cream)  Qty: 50 g, Refills: 0    Associated Diagnoses: Vaginal ulcer      PARoxetine (PAXIL) 30 MG tablet Take 30 mg by mouth every evening      predniSONE (DELTASONE) 10 MG tablet Take 1 tablet (10 mg) by mouth daily for 90 days  Qty: 90 tablet, Refills: 0    Associated Diagnoses: Aftercare following organ transplant      !! tacrolimus (GENERIC EQUIVALENT) 0.5 MG capsule Take 1 capsule (0.5 mg) by mouth 2 times daily 2.5mg twice daily  Qty: 60 capsule, Refills: 11    Comments: TXP DT 4/17/2024 (Kidney)  TXP Dischg DT 4/21/2024 DX Kidney replaced by transplant Z94.0 TX [facility] ([place], [place])  Associated Diagnoses: Kidney replaced by transplant      !! tacrolimus (GENERIC EQUIVALENT) 1 MG capsule Take 2 capsules (2 mg) by mouth 2 times daily 2.5mg twice daily  Qty: 120 capsule, Refills: 11    Comments: TXP DT 4/17/2024 (Kidney) TXP Dischg DT 4/21/2024 DX Kidney replaced by transplant Z94.0 TX [facility] ([place], [place])  Associated Diagnoses: Kidney replaced by transplant      valGANciclovir (VALCYTE) 450 MG tablet Take 2 tablets (900 mg) by mouth daily  Qty: 60 tablet, Refills: 3    Associated Diagnoses: Kidney replaced by transplant      sulfamethoxazole-trimethoprim (BACTRIM) 400-80 MG tablet Take 1 tablet by mouth daily Start after July 5, 2024  Qty: 90 tablet, Refills: 2    Associated Diagnoses: Aftercare following organ transplant      ustekinumab (STELARA) 90 MG/ML Inject 90 mg Subcutaneous once every eight weeks       !! - Potential duplicate medications found. Please discuss with provider.        Allergies   Allergies   Allergen Reactions    Amlodipine Headache and Other (See Comments)     Other Reaction(s): Unknown    Proton Pump Inhibitors Nephrotoxicity     No PPIs due to history of renal failure due to interstitial nephritis    Tegaderm Transparent Dressing (Informational Only) Blisters     Data   {What data do you want to display?:666237}

## 2024-06-28 NOTE — PROGRESS NOTES
Brief GI note:     Called by admitting team regarding patient being directly admitted for workup of odynophagia from nephrology clinic. Patient not yet at the hospital at the time of call.     Recent kidney transplant now with heartburn symptoms and unable to tolerate PO intake due to odynophagia and mouth sores. EGD 1 month ago for reflux symptoms showed grade C esophagitis at OSH.     Given new odynophagia, EGD would be next step for evaluation. Unable to take PPI due to interstitial nephritis history per nephrology. Concern for infectious vs reflux esophagitis.     - continue famotidine  - INR added for AM labs  - NPO at midnight for anticipated EGD.     Full GI consult to follow in AM.     Bayron Carney MD   GI fellow.

## 2024-06-28 NOTE — CONSULTS
Care Management Initial Consult    General Information  Assessment completed with: VM-chart review, Other (pt was seen in clinic yesterday by writer),    Type of CM/SW Visit: Initial Assessment    Primary Care Provider verified and updated as needed:     Readmission within the last 30 days: other (see comments) (Pt was seen in clinic yesterday for post visit and sent to hospital)      Reason for Consult: discharge planning  Advance Care Planning: Advance Care Planning Reviewed: no concerns identified          Communication Assessment  Patient's communication style: spoken language (English or Bilingual)    Hearing Difficulty or Deaf: no   Wear Glasses or Blind: yes    Cognitive  Cognitive/Neuro/Behavioral: .WDL except  Level of Consciousness: somnolent  Arousal Level: arouses to voice  Orientation: oriented x 4  Mood/Behavior: calm, cooperative  Best Language: 0 - No aphasia  Speech: logical    Living Environment:   People in home: spouse     Current living Arrangements: house      Able to return to prior arrangements: yes       Family/Social Support:  Care provided by: self  Provides care for: no one  Marital Status:   , Parent(s)          Description of Support System: Involved, Supportive    Support Assessment: Adequate family and caregiver support    Current Resources:   Patient receiving home care services:       Community Resources: None  Equipment currently used at home: none  Supplies currently used at home:      Employment/Financial:  Employment Status: employed full-time        Financial Concerns: none           Does the patient's insurance plan have a 3 day qualifying hospital stay waiver?  No    Lifestyle & Psychosocial Needs:  Social Determinants of Health     Food Insecurity: No Food Insecurity (6/3/2024)    Received from [facility]    Food Insecurity     Worried About Running Out of Food in the Last Year: 1   Depression: Not at risk (6/17/2024)    PHQ-2      PHQ-2 Score: 0   Housing Stability: Low Risk  (6/3/2024)    Received from [facility]    Housing Stability     Unable to Pay for Housing in the Last Year: 1   Tobacco Use: Low Risk  (6/27/2024)    Patient History     Smoking Tobacco Use: Never     Smokeless Tobacco Use: Never     Passive Exposure: Never   Financial Resource Strain: Low Risk  (6/3/2024)    Received from [facility]    Financial Resource Strain     Difficulty of Paying Living Expenses: 3     Difficulty of Paying Living Expenses: Not on file   Alcohol Use: Not on file   Transportation Needs: No Transportation Needs (6/3/2024)    [facility] from [facility]    Transportation Needs     Lack of Transportation (Medical): 1   Physical Activity: Not on file   Interpersonal Safety: Not on file   Stress: Not on file   Social Connections: Socially Integrated (6/3/2024)    Received from [facility]    Social Connections     Frequency of Communication with Friends and Family: 0   Health Literacy: Not on file       Functional Status:  Prior to admission patient needed assistance:   Dependent ADLs:: Independent  Dependent IADLs:: Independent       Mental Health Status:  Mental Health Status: No Current Concerns       Chemical Dependency Status:  Chemical Dependency Status: No Current Concerns             Values/Beliefs:  Spiritual, Cultural Beliefs, Zoroastrianism Practices, Values that affect care: no               Additional Information:  SW attempted to meet with patient but patient was in the operating room undergoing upper endoscopy. Writer met with patient, pt's  and pt's dad yesterday see clinic visit note for further details. Pt had denied additional needs at that time. She made mention of needing extension on [place] paperwork now due to readmission.       Bayron Hernandez, MSW, [facility]  Outpatient  Kidney/Pancreas/Auto Islet Transplant Program  Ph: [phone]

## 2024-06-28 NOTE — OR NURSING
Patient had EGD with biopsies.  Patient tolerated procedure under conscious sedation and 2liters nasal cannula.  Report called to 7A RN, Hanane.  Patient transferred back to floor in stable condition

## 2024-06-28 NOTE — UTILIZATION REVIEW
"  Admission Status; Secondary Review Determination         Under the authority of the Utilization Management Committee, the utilization review process indicated a secondary review on the above patient.  The review outcome is based on review of the medical records, discussions with staff, and applying clinical experience noted on the date of the review.        (xxx)      Inpatient Status Appropriate - This patient's medical care is consistent with medical management for inpatient care and reasonable inpatient medical practice.      () Observation Status Appropriate - This patient does not meet hospital inpatient criteria and is placed in observation status. If this patient's primary payer is Medicare and was admitted as an inpatient, Condition Code 44 should be used and patient status changed to \"observation\".   () Admission Status NOT Appropriate - This patient's medical care is not consistent with medical management for Inpatient or Observation Status.          RATIONALE FOR DETERMINATION       34 year old female with a past history of a DDKT (Pediatric en bloc) done on April 17, 2024 secondary to Interstitial nephritis (PPI). Admitted due to odynophagia to solid and liquids that has been worsening over the last week + severe epigastric pain. She has so severe pain that she is not tolerating oral administration of her immunosuppressing transplant drugs. She underwent EGD today notable for a large esophageal ulcer with no bleeding and no stigmata of recent bleeding. Also some small esophageal ulcers. Overall plan is for PPI. No evidence of bleeding. No Use of parenteral opiates. Given her inability of tolerating her immune suppression medications she is receiving IV medications and thus would agree with inpatient status for med administration and IVF fluids.       The severity of illness, intensity of service provided, expected LOS and risk for adverse outcome make the care complex, high risk and appropriate for " hospital admission.        The information on this document is developed by the utilization review team in order for the business office to ensure compliance.  This only denotes the appropriateness of proper admission status and does not reflect the quality of care rendered.         The definitions of Inpatient Status and Observation Status used in making the determination above are those provided in the CMS Coverage Manual, Chapter 1 and Chapter 6, section 70.4.      Sincerely,     Dominic Arteaga DO  Physician Advisor  Utilization Review/ Case Management  [facility].

## 2024-06-28 NOTE — CONSULTS
"Gynecology Consult Note    Reason for Consult: vulvar lesion    HPI:   Ira Zamora is a 34 year old  with a history of Crohn's disease and recent renal transplant (d/t interstitial nephritis) 2024. Currently admitted to the Transplant Surgery service for odynophagia (undergoing EGD with GI today), with a recent admission -2024 for ulcers in her mouth (thought to be caused by mycophenolate). Also presented with an ulcerative lesion on the left posterior/medial labia.    At the time of GYN team evaluation, the patient notes that the lesion has been gradually worsening/enlarging since three weeks after her transplant. Thinks something \"sloughed off\" the top of it overnight. Nothing like this had ever happened prior to that time. She was evaluated in an outpatient GYN clinic ([facility]) for this concern 6/3/2024, at which time supportive cares were initiated, and since that time, she has been using topical lidocaine and sitz baths with some relief. States that the lesion makes urination and ambulation very painful. Confirms she has not had any prior biopsies or procedures for the lesion.    Per chart review, during the patient's prior admission, Infectious Disease consultants recommended broad workup that was negative testing for HSV, RPR, GC/CT, HIV, coxsackie, mycoplasma, histoplasma, Karius panel (2024). Patient denies history of pelvic infections.    OBHx:   OB History    Para Term  AB Living   0 0 0 0 0 0   SAB IAB Ectopic Multiple Live Births   0 0 0 0 0       GynHx:   - LMP: amenorrheic on LNG-IUD (placed \"a few years ago\" per patient report)   - Menses: currently amenorrheic as above, reports prior irregular periods and PCOS diagnosis  - Pap smear history:    - Patient reports apparent ASCUS Pap with HPV positivity in 2017, followed by colposcopy and benign biopsies   - 2018: NILM, HPV negative   - 2022: NILM, HPV negative  - Sexually activity: not discussed,  " at bedside  - Contraception: LNG-IUD as above  - History of STDS/UTIs: denies as above      Past medical history:   Past Medical History:   Diagnosis Date    Anemia in chronic kidney disease     Anxiety 2007    Ascending aorta dilation (H24) 2020: ascending aorta 3.8 cm, aortic sinus 3.6 cm.  Echo: Ao root diam: 3.2 cm, asc Aorta Diam: 3.8 cm    ASCUS with positive high risk HPV cervical     ASCUS with positive high risk HPV cervical 2017    Crohn's disease (H)     ESRD (end stage renal disease) on dialysis (H)     GERD (gastroesophageal reflux disease)     Hidradenitis suppurativa     History of blood transfusion     Hypertension     Juvenile rheumatoid arthritis (H)     Asymptomatic in adulthood    Kidney replaced by transplant 2024    En bloc. Induction w/ Thymoglobulin.    Secondary renal hyperparathyroidism (H24)     Tubulointerstitial nephropathy 2011    kidney biopsy 2011 - Acute and  chronic tubulointerstitial nephropathy.       Past surgical history:   Past Surgical History:   Procedure Laterality Date    H NEEDLE GUIDE,  KIDNEY BIOPSY      TRANSPLANT KIDNEY RECIPIENT  DONOR N/A 2024    Procedure: Transplant kidney recipient  donor,bilateral uretral stent implantation;  Surgeon: Carlitos Díaz MD;  Location: [facility] OR       Medications:  Current Facility-Administered Medications   Medication Dose Route Frequency Provider Last Rate Last Admin    aspirin (ASA) chewable tablet 81 mg  81 mg Oral Daily Sina Olivier MD   81 mg at 24 0814    calcium carbonate (TUMS) chewable tablet 500 mg  500 mg Oral TID PRN Holli Nelson, APRN CNP   500 mg at 24 0648    dextrose 5% and 0.45% NaCl infusion   Intravenous Continuous Jordy Carvalho MD 90 mL/hr at 24 0352 Restarted at 24 0352    famotidine (PEPCID) injection 40 mg  40 mg Intravenous Q24H Sina Olivier MD   40 mg at 24 1540    hydrOXYzine HCl  (ATARAX) tablet 25 mg  25 mg Oral Q6H PRN Holli Nelson APRN CNP   25 mg at 06/28/24 0831    Or    hydrOXYzine HCl (ATARAX) tablet 50 mg  50 mg Oral Q6H PRN Holli Nelson APRN CNP        lidocaine (LMX4) cream   Topical Q1H PRN Jordy Carvalho MD        lidocaine (XYLOCAINE) 2 % external gel   Topical Q4H PRN Sina Olivier MD   Given at 06/27/24 1619    lidocaine 1 % 1 mL  1 mL Other Q1H PRN Jordy Carvalho MD        magic mouthwash suspension (diphenhydramine, lidocaine, aluminum-magnesium & simethicone)  10 mL Swish & Swallow Q6H PRN Rosita French PA-C   10 mL at 06/28/24 0351    methylPREDNISolone sodium succinate (SOLU-MEDROL) injection 8 mg  8 mg Intravenous Q24H Sina Olivier MD   8 mg at 06/27/24 1539    naloxone (NARCAN) injection 0.2 mg  0.2 mg Intravenous Q2 Min PRN Sid Desai MD        Or    naloxone (NARCAN) injection 0.4 mg  0.4 mg Intravenous Q2 Min PRN Sid Desai MD        Or    naloxone (NARCAN) injection 0.2 mg  0.2 mg Intramuscular Q2 Min PRN Sid Desai MD        Or    naloxone (NARCAN) injection 0.4 mg  0.4 mg Intramuscular Q2 Min PRN Sid Desai MD        ondansetron (ZOFRAN) injection 4 mg  4 mg Intravenous Q6H PRN Jordy Carvalho MD        oxyCODONE (ROXICODONE) solution 5 mg  5 mg Oral Q4H PRN Rosita French PA-C   5 mg at 06/28/24 0026    PARoxetine (PAXIL) tablet 30 mg  30 mg Oral QPM Holli Nelson APRN CNP        sodium chloride (PF) 0.9% PF flush 3 mL  3 mL Intracatheter Q1H PRN Jordy Carvalho MD        sodium chloride (PF) 0.9% PF flush 3 mL  3 mL Intracatheter Q8H Jordy Carvalho MD   3 mL at 06/27/24 1618    [Held by provider] sucralfate (CARAFATE) suspension 1 g  1 g Oral 4x Daily AC & HS Rosita French PA-C   1 g at 06/27/24 2220    tacrolimus (GENERIC EQUIVALENT) PO capsule for SUBLINGUAL use 1.5 mg  1.5 mg Sublingual BID IS Jordy Carvalho MD   1.5 mg at 06/28/24 0814       Allergies:  Allergies    Allergen Reactions    Amlodipine Headache and Other (See Comments)     Other Reaction(s): Unknown    Proton Pump Inhibitors Nephrotoxicity     No PPIs due to history of renal failure due to interstitial nephritis    Tegaderm Transparent Dressing (Informational Only) Blisters       Social Hx:   Social History     Tobacco Use    Smoking status: Never     Passive exposure: Never    Smokeless tobacco: Never   Substance Use Topics    Alcohol use: Not Currently     Comment: Very occasional, last drink two months ago, wine cooler    Drug use: Never        Family History:   family history includes Coronary Artery Disease in her mother; Endometriosis in her mother.    ROS:   Negative except per HPI    Objective:   BP (!) 136/93 (BP Location: Left arm)   Pulse 93   Temp 98  F (36.7  C) (Oral)   Resp 16   Wt 50.2 kg (110 lb 11.2 oz)   SpO2 99%   BMI 20.25 kg/m    Constitutional: Alert female, no acute distress  Cardiovascular: Regular rate, well perfused  Respiratory: NWOB on room air  Gastrointestinal: Non-distended; further exam deferred  Pelvic Exam: Normal-appearing right labia majora/minora. Approximately 2 cm x 2cm pitted, ulcerated lesion on the medial aspect of the left labia (unclear if majora vs minora given significant enema and distortion of anatomy). No vaginal bleeding or significant discharge noted (patient reports remaining residue from lidocaine gel). Exquisitely tender to touch.      Labs/Imaging:  Results for orders placed or performed during the hospital encounter of 06/27/24   Basic metabolic panel     Status: Abnormal   Result Value Ref Range    Sodium 138 135 - 145 mmol/L    Potassium 4.1 3.4 - 5.3 mmol/L    Chloride 109 (H) 98 - 107 mmol/L    Carbon Dioxide (CO2) 19 (L) 22 - 29 mmol/L    Anion Gap 10 7 - 15 mmol/L    Urea Nitrogen 19.4 6.0 - 20.0 mg/dL    Creatinine 1.04 (H) 0.51 - 0.95 mg/dL    GFR Estimate 72 >60 mL/min/1.73m2    Calcium 9.6 8.6 - 10.0 mg/dL    Glucose 98 70 - 99 mg/dL    Magnesium     Status: Abnormal   Result Value Ref Range    Magnesium 1.5 (L) 1.7 - 2.3 mg/dL   Phosphorus     Status: Abnormal   Result Value Ref Range    Phosphorus 2.4 (L) 2.5 - 4.5 mg/dL   CBC with platelets and differential     Status: Abnormal   Result Value Ref Range    WBC Count 2.3 (L) 4.0 - 11.0 10e3/uL    RBC Count 2.85 (L) 3.80 - 5.20 10e6/uL    Hemoglobin 8.5 (L) 11.7 - 15.7 g/dL    Hematocrit 28.1 (L) 35.0 - 47.0 %    MCV 99 78 - 100 fL    MCH 29.8 26.5 - 33.0 pg    MCHC 30.2 (L) 31.5 - 36.5 g/dL    RDW 13.8 10.0 - 15.0 %    Platelet Count 253 150 - 450 10e3/uL    % Neutrophils 66 %    % Lymphocytes 11 %    % Monocytes 19 %    % Eosinophils 0 %    % Basophils 0 %    % Immature Granulocytes 4 %    NRBCs per 100 WBC 0 <1 /100    Absolute Neutrophils 1.5 (L) 1.6 - 8.3 10e3/uL    Absolute Lymphocytes 0.3 (L) 0.8 - 5.3 10e3/uL    Absolute Monocytes 0.4 0.0 - 1.3 10e3/uL    Absolute Eosinophils 0.0 0.0 - 0.7 10e3/uL    Absolute Basophils 0.0 0.0 - 0.2 10e3/uL    Absolute Immature Granulocytes 0.1 <=0.4 10e3/uL    Absolute NRBCs 0.0 10e3/uL   Magnesium     Status: Abnormal   Result Value Ref Range    Magnesium 2.7 (H) 1.7 - 2.3 mg/dL   Basic metabolic panel     Status: Abnormal   Result Value Ref Range    Sodium 137 135 - 145 mmol/L    Potassium 4.4 3.4 - 5.3 mmol/L    Chloride 105 98 - 107 mmol/L    Carbon Dioxide (CO2) 23 22 - 29 mmol/L    Anion Gap 9 7 - 15 mmol/L    Urea Nitrogen 14.4 6.0 - 20.0 mg/dL    Creatinine 1.00 (H) 0.51 - 0.95 mg/dL    GFR Estimate 75 >60 mL/min/1.73m2    Calcium 9.8 8.6 - 10.0 mg/dL    Glucose 125 (H) 70 - 99 mg/dL   Magnesium     Status: Abnormal   Result Value Ref Range    Magnesium 2.8 (H) 1.7 - 2.3 mg/dL   Phosphorus     Status: Normal   Result Value Ref Range    Phosphorus 3.4 2.5 - 4.5 mg/dL   Tacrolimus by Tandem Mass Spectrometry     Status: None   Result Value Ref Range    Tacrolimus by Tandem Mass Spectrometry 7.7 5.0 - 15.0 ug/L    Tacrolimus Last Dose Date       Tacrolimus Last Dose Time      Narrative    This test was developed and its performance characteristics determined by the [facility],  Special Chemistry Laboratory. It has not been cleared or approved by the FDA. The laboratory is regulated under CLIA as qualified to perform high-complexity testing. This test is used for clinical purposes. It should not be regarded as investigational or for research.   INR     Status: Abnormal   Result Value Ref Range    INR 1.25 (H) 0.85 - 1.15   CBC with platelets and differential     Status: Abnormal   Result Value Ref Range    WBC Count 2.9 (L) 4.0 - 11.0 10e3/uL    RBC Count 3.03 (L) 3.80 - 5.20 10e6/uL    Hemoglobin 9.1 (L) 11.7 - 15.7 g/dL    Hematocrit 29.5 (L) 35.0 - 47.0 %    MCV 97 78 - 100 fL    MCH 30.0 26.5 - 33.0 pg    MCHC 30.8 (L) 31.5 - 36.5 g/dL    RDW 13.8 10.0 - 15.0 %    Platelet Count 248 150 - 450 10e3/uL    % Neutrophils 66 %    % Lymphocytes 12 %    % Monocytes 19 %    % Eosinophils 1 %    % Basophils 0 %    % Immature Granulocytes 2 %    NRBCs per 100 WBC 0 <1 /100    Absolute Neutrophils 1.9 1.6 - 8.3 10e3/uL    Absolute Lymphocytes 0.4 (L) 0.8 - 5.3 10e3/uL    Absolute Monocytes 0.6 0.0 - 1.3 10e3/uL    Absolute Eosinophils 0.0 0.0 - 0.7 10e3/uL    Absolute Basophils 0.0 0.0 - 0.2 10e3/uL    Absolute Immature Granulocytes 0.1 <=0.4 10e3/uL    Absolute NRBCs 0.0 10e3/uL   CBC with platelets differential     Status: Abnormal    Narrative    The following orders were created for panel order CBC with platelets differential.  Procedure                               Abnormality         Status                     ---------                               -----------         ------                     CBC with platelets and d...[[id number]]  Abnormal            Final result                 Please view results for these tests on the individual orders.   CBC with platelets differential     Status: Abnormal    Narrative    The following  orders were created for panel order CBC with platelets differential.  Procedure                               Abnormality         Status                     ---------                               -----------         ------                     CBC with platelets and d...[[id number]]  Abnormal            Final result                 Please view results for these tests on the individual orders.          Assessment/Plan:   Ira Zamora is a 34 year old  with a history of Crohn's disease admitted to Transplant Surgery for odynophagia after renal transplant for interstitial nephritis in 2024. Post-operative course notable for oral mucosal lesions thought due to mycophenolate (now discontinued; she is taking tacrolimus), also presenting with an exquisitely painful left labial ulceration that has persisted/worsening since about three weeks after transplant. GYN consulted for management. Common infectious etiologies have been ruled out, biopsy indicated to assess for malignant process. Manifestation of Crohn's disease is a possibility.    Recommendations:  - Continue supportive care (topical lidocaine, sitz baths); consider adding a barrier cream (Silavdene BID and providing rayshawn-bottle for use with urination  - GYN team will work to coordinate add-on OR case for EUA and vulvar biopsies (risks including bleeding, infections, and damage to surrounding tissue reviewed), which will guide further management of this lesion   - Offered patient opportunity for biopsy during planned EGD today, prefers to defer (do one thing at a time)    Discussed with Dr. Love.    Frida Davis MD, PGY-4  10:59 AM  [facility] Obstetrics & Gynecology Residency        Addendum:  Patient scheduled for pelvic exam under anesthesia, left vulvar biopsy in [facility] main OR with Dr. Roxanne Montenegro at 1000 on . Please make the patient NPO at 020 in preparation for this procedure. Primary team and bedside RN notified that this procedure  is scheduled via [employer].    Frida Davis MD, PGY-4  11:38 AM  [facility] Obstetrics & Gynecology Residency

## 2024-06-28 NOTE — PLAN OF CARE
VS: BP (!) 136/93 (BP Location: Left arm)   Pulse 93   Temp 98  F (36.7  C) (Oral)   Resp 16   Wt 50.2 kg (110 lb 11.2 oz)   SpO2 99%   BMI 20.25 kg/m      Cares: 2300 - 0730     Neuro: Aox4   VS: VSS   Cardiac: slightly hypertensive 130/90s, afebrile.   Respiratory: WDL on RA, denies SOB.   GI/: voiding adequately, LBM 6/27  Skin: labia mass, x2 mouth sores (back right jaw area and bottom front)  Diet: NPO since 0000   Labs: awaiting AM lab results   BG: none   LDA: Left PIV - D5 1/2 NS @ 90mL/hr, Right AVF    Mobility: SBA  Pain/Nausea: esophageal pain, denies nausea   PRN medications: oxy x1, tums x1, magic mouthwash x1  Plan of Care: plan for possible EGD today. Continue with current POC and update MD with any changes.

## 2024-06-28 NOTE — PLAN OF CARE
BP (!) 135/98   Pulse 112   Temp 98  F (36.7  C) (Oral)   Resp 23   Wt 50.2 kg (110 lb 11.2 oz)   SpO2 98%   BMI 20.25 kg/m    0827-5177  Patient hypertensive on RA, afebrile. Left arm and esophageal pain present, oxycodone x1. No complaints of nausea. Tolerating easy to chew diet with no appetite. PIV saline locked. Right AVF. Voiding spontaneously. No BM.  Up SBA. EGD completed, tolerated well. Biopsies taken, awaiting results. Plan for vulva biopsy on 7/2 at 1000.   Will continue with POC and notify MD with changes or concerns.

## 2024-06-28 NOTE — PROCEDURES
Gastroenterology Endoscopy Suite Brief Operative Note    Procedure:  Upper endoscopy   Post-operative diagnosis:  Esophageal ulcers   Staff Physician:  Dr. Tamy Miranda   Fellow/Assistant(s):  Dr. Bayron Carney    Specimens:  Please see final procedure note for further details.   Findings:  Large esophageal ulceration from 27-30 cm from incisors. A couple small ulcers seen as well. Biopsies taken at ulcer edge (Jar 3) and ulcer center (Jar 2). Mild esophagitis seen at GE junction, biopsied (Jar 1). Normal appearing stomach and duodenum.    Complications:  None.   Condition:  Stable   Recommendations  Diet:  Return to previous diet  PPI:  N/A  Anti-coagulants/platelets:  N/A  Octreotide:  N/A  Discharge Planning:   Return patient to hospital hu.   Ok for viscous lidocaine, sucralfate, and famotidine from GI perspective.   Await biopsy results.

## 2024-06-28 NOTE — PROGRESS NOTES
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 On this admission, patient still complaining of mouth ulcers epigastric pain, difficulty eating, drinking and swallowing. In addition, she has a new vulvar painful soft tissue mass causing irritation while voiding and pain on walking.     Plan:   Swab mouth ulcers for HSV  GI consult today for possible EGD  Gyne consult today for vulvar soft tissue mass   Continue to swish and swallow     Graft function:   S/p DDKT on April 17, 2024 with good renal function apart from immunosuppressive GI side effects  (Mycophenolate stopped 6/17 and replaced with Belatacept).      Graft function:  Kidney: graft has good function.   Cr= 1.00; K= 4.4; BUN= 14.4; GFR= 75  Passing urine with slight irritation due to vulvar mass, otherwise no burning sensation while voiding.       Immunosuppressed status secondary to medications:   MMF stopped 6/17 due to GI symptoms of severe mouth and vaginal ulcers.     Currently on the following medications because of odynophagia:  Tacrolimus sublingual   Methylprednisolone      Hematology:   Anemia of chronic disease from immunosuppressant medications:   Hb= 9.1 (8.5); No palpitations, no SOB, no chest pain, no weakness   WBC = 2.9 (2.3)      Cardiorespiratory:   HR= 93   RR= 16  BP= 136/93   On continuous IV fluids (5% dextrose and 0.45% NS) infusion as NPO.     GI/Nutrition:   Diet: Currently NPO awaiting for possible EGD from GI consult   Epigastric pain secondary to heartburn symptoms still present this am.        Endocrine: steroid hyperglycemia.     Fluid/Electrolytes:   Na = 137   K = 4.4   Ca = 9.8   Mg = 2.8 (Mg oxide held)  Phosphorus = 3.4     :   Gyne consult this am as described above.   Lidocaine external gel to help with pain.     Infectious disease: Afebrile. CMV not detected.     Prophylaxis: Patient  mobilizing, not on DVT prophylaxis.     Disposition: Team will re-assess after GI consult and upon return to tolerating regular diet.      ERICA/Fellow/Resident Provider: Jordy Jose (Resident) Pager [phone]      _________________________________________________________________    Interval History: History is obtained from the patient  Overnight events: Patient still complaining of epigastric pain, unable to eat or drink. She was given oxycodone and magic mouthwash as it soothes the mouth ulcers. Still experiencing irritation while she voids from the vulvar mass. Feeling anxious this am and Atarax was prescribed.         Meds:  Current Facility-Administered Medications   Medication Dose Route Frequency Provider Last Rate Last Admin    aspirin (ASA) chewable tablet 81 mg  81 mg Oral Daily Sina Olivier MD   81 mg at 06/28/24 0814    famotidine (PEPCID) injection 40 mg  40 mg Intravenous Q24H Sina Olivier MD   40 mg at 06/27/24 1540    methylPREDNISolone sodium succinate (SOLU-MEDROL) injection 8 mg  8 mg Intravenous Q24H Sina Olivier MD   8 mg at 06/27/24 1539    sodium chloride (PF) 0.9% PF flush 3 mL  3 mL Intracatheter Q8H Jordy Jose MD   3 mL at 06/27/24 1618    [Held by provider] sucralfate (CARAFATE) suspension 1 g  1 g Oral 4x Daily AC & HS Rosita French PA-C   1 g at 06/27/24 2220    tacrolimus (GENERIC EQUIVALENT) PO capsule for SUBLINGUAL use 1.5 mg  1.5 mg Sublingual BID IS Jordy Jose MD   1.5 mg at 06/28/24 0814       Physical Exam:     Admit Weight: 52.6 kg (115 lb 14.4 oz)    Current vitals:   BP (!) 136/93 (BP Location: Left arm)   Pulse 93   Temp 98  F (36.7  C) (Oral)   Resp 16   Wt 50.2 kg (110 lb 11.2 oz)   SpO2 99%   BMI 20.25 kg/m      Vital sign ranges:    Temp:  [98  F (36.7  C)-98.6  F (37  C)] 98  F (36.7  C)  Pulse:  [62-99] 93  Resp:  [16-20] 16  BP: (124-136)/(81-93) 136/93  SpO2:  [98 %-100 %] 99 %        Data:   CMP  Recent Labs   Lab  06/28/24  0602 06/28/24  0105 06/27/24  1357 06/26/24  1706 06/26/24  1706     --  138  --  136   POTASSIUM 4.4  --  4.1  --  5.0   CHLORIDE 105  --  109*  --  102   CO2 23  --  19*  --  22   *  --  98  --  136*   BUN 14.4  --  19.4  --  26.3*   CR 1.00*  --  1.04*  --  1.02*   GFRESTIMATED 75  --  72  --  74   ROBLES 9.8  --  9.6  --  10.5*   MAG 2.8* 2.7* 1.5*   < >  --    PHOS 3.4  --  2.4*  --   --    LIPASE  --   --   --   --  14   ALBUMIN  --   --   --   --  3.6   BILITOTAL  --   --   --   --  0.4   ALKPHOS  --   --   --   --  95   AST  --   --   --   --  28   ALT  --   --   --   --  49    < > = values in this interval not displayed.     CBC  Recent Labs   Lab 06/28/24  0602 06/27/24  1357   HGB 9.1* 8.5*   WBC 2.9* 2.3*    253

## 2024-06-28 NOTE — PLAN OF CARE
/87 (BP Location: Left arm)   Pulse 89   Temp 98.6  F (37  C) (Oral)   Resp 16   Wt 52.6 kg (115 lb 14.4 oz)   SpO2 99%   BMI 21.20 kg/m        7276-5298  Neuro:  Pt. alert & Ox4  Behavior: Pt. calm and cooperative with cares.   Activity:  Pt. up with SBA.  Vital: AVSS on RA  BG: N/A  LDAs:  MIV at 90cc/hour stopped d/t K+ Phos. infusing into left PIV. Right AV fistula.  Cardiac: WNL  Respiratory: LS clear bilat.  GI/: Pt. voiding spontaneously, no stools this shift.   Skin: Left labia majora with soft tissue mass.   Pain/Nausea:  Mouth pain treated with prn Magic Mouthwash x1. Pt. denies nausea. Pt. Requesting IV pain meds d/t pain when swallowing Oxy., MD paged and stated she'll talk with senior resident before ordering IV analgesic.    Diet: Regular, NPO at midnight.  Labs/Imaging:  See chart for results.    Plan: Possible EGD tomorrow, GYN to consult for labial mass tomorrow. Continue to follow POC and notify team with change in status.

## 2024-06-29 LAB
ANION GAP SERPL CALCULATED.3IONS-SCNC: 11 MMOL/L (ref 7–15)
BASOPHILS # BLD AUTO: 0 10E3/UL (ref 0–0.2)
BASOPHILS NFR BLD AUTO: 0 %
BUN SERPL-MCNC: 9.2 MG/DL (ref 6–20)
CALCIUM SERPL-MCNC: 9.6 MG/DL (ref 8.6–10)
CHLORIDE SERPL-SCNC: 105 MMOL/L (ref 98–107)
CREAT SERPL-MCNC: 0.94 MG/DL (ref 0.51–0.95)
CYSTATIN C (ROCHE): 1.5 MG/L (ref 0.6–1)
DEPRECATED HCO3 PLAS-SCNC: 19 MMOL/L (ref 22–29)
EGFRCR SERPLBLD CKD-EPI 2021: 81 ML/MIN/1.73M2
EOSINOPHIL # BLD AUTO: 0.1 10E3/UL (ref 0–0.7)
EOSINOPHIL NFR BLD AUTO: 2 %
ERYTHROCYTE [DISTWIDTH] IN BLOOD BY AUTOMATED COUNT: 14 % (ref 10–15)
GFR SERPL CREATININE-BSD FRML MDRD: 47 ML/MIN/1.73M2
GLUCOSE BLDC GLUCOMTR-MCNC: 119 MG/DL (ref 70–99)
GLUCOSE SERPL-MCNC: 120 MG/DL (ref 70–99)
HCT VFR BLD AUTO: 29.4 % (ref 35–47)
HGB BLD-MCNC: 8.7 G/DL (ref 11.7–15.7)
HSV1 DNA SPEC QL NAA+PROBE: NOT DETECTED
HSV2 DNA SPEC QL NAA+PROBE: NOT DETECTED
IMM GRANULOCYTES # BLD: 0 10E3/UL
IMM GRANULOCYTES NFR BLD: 1 %
LYMPHOCYTES # BLD AUTO: 0.3 10E3/UL (ref 0.8–5.3)
LYMPHOCYTES NFR BLD AUTO: 9 %
MAGNESIUM SERPL-MCNC: 1.8 MG/DL (ref 1.7–2.3)
MCH RBC QN AUTO: 29.5 PG (ref 26.5–33)
MCHC RBC AUTO-ENTMCNC: 29.6 G/DL (ref 31.5–36.5)
MCV RBC AUTO: 100 FL (ref 78–100)
MONOCYTES # BLD AUTO: 0.5 10E3/UL (ref 0–1.3)
MONOCYTES NFR BLD AUTO: 14 %
NEUTROPHILS # BLD AUTO: 2.3 10E3/UL (ref 1.6–8.3)
NEUTROPHILS NFR BLD AUTO: 74 %
NRBC # BLD AUTO: 0 10E3/UL
NRBC BLD AUTO-RTO: 0 /100
PATH REPORT.COMMENTS IMP SPEC: NORMAL
PATH REPORT.FINAL DX SPEC: NORMAL
PATH REPORT.GROSS SPEC: NORMAL
PATH REPORT.MICROSCOPIC SPEC OTHER STN: NORMAL
PATH REPORT.RELEVANT HX SPEC: NORMAL
PHOSPHATE SERPL-MCNC: 2.6 MG/DL (ref 2.5–4.5)
PHOTO IMAGE: NORMAL
PLATELET # BLD AUTO: 221 10E3/UL (ref 150–450)
POTASSIUM SERPL-SCNC: 4.2 MMOL/L (ref 3.4–5.3)
RBC # BLD AUTO: 2.95 10E6/UL (ref 3.8–5.2)
SODIUM SERPL-SCNC: 135 MMOL/L (ref 135–145)
TRIGL SERPL-MCNC: 156 MG/DL
WBC # BLD AUTO: 3.2 10E3/UL (ref 4–11)

## 2024-06-29 PROCEDURE — 258N000003 HC RX IP 258 OP 636

## 2024-06-29 PROCEDURE — 83735 ASSAY OF MAGNESIUM: CPT

## 2024-06-29 PROCEDURE — 3E0336Z INTRODUCTION OF NUTRITIONAL SUBSTANCE INTO PERIPHERAL VEIN, PERCUTANEOUS APPROACH: ICD-10-PCS | Performed by: NURSE PRACTITIONER

## 2024-06-29 PROCEDURE — 250N000013 HC RX MED GY IP 250 OP 250 PS 637: Performed by: PHYSICIAN ASSISTANT

## 2024-06-29 PROCEDURE — B4185 PARENTERAL SOL 10 GM LIPIDS: HCPCS | Performed by: TRANSPLANT SURGERY

## 2024-06-29 PROCEDURE — S5010 5% DEXTROSE AND 0.45% SALINE: HCPCS | Performed by: TRANSPLANT SURGERY

## 2024-06-29 PROCEDURE — 250N000013 HC RX MED GY IP 250 OP 250 PS 637: Performed by: NURSE PRACTITIONER

## 2024-06-29 PROCEDURE — 80048 BASIC METABOLIC PNL TOTAL CA: CPT

## 2024-06-29 PROCEDURE — 250N000011 HC RX IP 250 OP 636: Performed by: TRANSPLANT SURGERY

## 2024-06-29 PROCEDURE — 250N000011 HC RX IP 250 OP 636: Performed by: NURSE PRACTITIONER

## 2024-06-29 PROCEDURE — 36415 COLL VENOUS BLD VENIPUNCTURE: CPT

## 2024-06-29 PROCEDURE — 250N000009 HC RX 250

## 2024-06-29 PROCEDURE — 84478 ASSAY OF TRIGLYCERIDES: CPT | Performed by: NURSE PRACTITIONER

## 2024-06-29 PROCEDURE — 84100 ASSAY OF PHOSPHORUS: CPT

## 2024-06-29 PROCEDURE — 258N000003 HC RX IP 258 OP 636: Performed by: TRANSPLANT SURGERY

## 2024-06-29 PROCEDURE — 258N000002 HC RX IP 258 OP 250: Performed by: TRANSPLANT SURGERY

## 2024-06-29 PROCEDURE — 999N000248 HC STATISTIC IV INSERT WITH US BY RN

## 2024-06-29 PROCEDURE — 82610 CYSTATIN C: CPT | Performed by: TRANSPLANT SURGERY

## 2024-06-29 PROCEDURE — 85025 COMPLETE CBC W/AUTO DIFF WBC: CPT

## 2024-06-29 PROCEDURE — 99232 SBSQ HOSP IP/OBS MODERATE 35: CPT | Mod: 24 | Performed by: NURSE PRACTITIONER

## 2024-06-29 PROCEDURE — 250N000009 HC RX 250: Performed by: TRANSPLANT SURGERY

## 2024-06-29 PROCEDURE — 120N000011 HC R&B TRANSPLANT UMMC

## 2024-06-29 PROCEDURE — S5010 5% DEXTROSE AND 0.45% SALINE: HCPCS

## 2024-06-29 PROCEDURE — 250N000012 HC RX MED GY IP 250 OP 636 PS 637: Performed by: NURSE PRACTITIONER

## 2024-06-29 PROCEDURE — 250N000013 HC RX MED GY IP 250 OP 250 PS 637

## 2024-06-29 RX ORDER — TACROLIMUS 0.5 MG/1
0.5 CAPSULE ORAL
Status: DISCONTINUED | OUTPATIENT
Start: 2024-06-29 | End: 2024-07-01

## 2024-06-29 RX ORDER — FLUCONAZOLE 40 MG/ML
200 POWDER, FOR SUSPENSION ORAL DAILY
Status: DISCONTINUED | OUTPATIENT
Start: 2024-06-30 | End: 2024-07-01

## 2024-06-29 RX ORDER — PREDNISONE 5 MG/ML
10 SOLUTION ORAL DAILY
Status: DISCONTINUED | OUTPATIENT
Start: 2024-06-29 | End: 2024-07-01

## 2024-06-29 RX ORDER — TACROLIMUS 0.5 MG/1
1 CAPSULE ORAL
Status: DISCONTINUED | OUTPATIENT
Start: 2024-06-29 | End: 2024-07-01

## 2024-06-29 RX ORDER — PREDNISONE 5 MG/ML
10 SOLUTION ORAL DAILY
Status: DISCONTINUED | OUTPATIENT
Start: 2024-06-30 | End: 2024-06-29

## 2024-06-29 RX ORDER — VALGANCICLOVIR 450 MG/1
450 TABLET, FILM COATED ORAL DAILY
Status: DISCONTINUED | OUTPATIENT
Start: 2024-06-30 | End: 2024-07-04

## 2024-06-29 RX ORDER — SODIUM CHLORIDE 450 MG/100ML
INJECTION, SOLUTION INTRAVENOUS CONTINUOUS
Status: ACTIVE | OUTPATIENT
Start: 2024-06-29 | End: 2024-07-02

## 2024-06-29 RX ORDER — DEXTROSE MONOHYDRATE 100 MG/ML
INJECTION, SOLUTION INTRAVENOUS CONTINUOUS PRN
Status: DISCONTINUED | OUTPATIENT
Start: 2024-06-29 | End: 2024-07-09 | Stop reason: HOSPADM

## 2024-06-29 RX ORDER — FLUCONAZOLE 40 MG/ML
400 POWDER, FOR SUSPENSION ORAL DAILY
Status: DISCONTINUED | OUTPATIENT
Start: 2024-06-29 | End: 2024-06-29

## 2024-06-29 RX ADMIN — SUCRALFATE 1 G: 1 SUSPENSION ORAL at 15:49

## 2024-06-29 RX ADMIN — VALGANCICLOVIR HYDROCHLORIDE 900 MG: 450 TABLET ORAL at 08:13

## 2024-06-29 RX ADMIN — SUCRALFATE 1 G: 1 SUSPENSION ORAL at 21:10

## 2024-06-29 RX ADMIN — SUCRALFATE 1 G: 1 SUSPENSION ORAL at 09:11

## 2024-06-29 RX ADMIN — FAMOTIDINE 40 MG: 10 INJECTION, SOLUTION INTRAVENOUS at 21:05

## 2024-06-29 RX ADMIN — DEXTROSE AND SODIUM CHLORIDE: 5; 450 INJECTION, SOLUTION INTRAVENOUS at 15:49

## 2024-06-29 RX ADMIN — Medication 5 ML: at 17:52

## 2024-06-29 RX ADMIN — Medication 5 ML: at 08:12

## 2024-06-29 RX ADMIN — OXYCODONE HYDROCHLORIDE 5 MG: 5 SOLUTION ORAL at 22:08

## 2024-06-29 RX ADMIN — SUCRALFATE 1 G: 1 SUSPENSION ORAL at 06:16

## 2024-06-29 RX ADMIN — SILVER SULFADIAZINE: 10 CREAM TOPICAL at 08:12

## 2024-06-29 RX ADMIN — Medication 5 ML: at 11:36

## 2024-06-29 RX ADMIN — Medication 5 ML: at 21:00

## 2024-06-29 RX ADMIN — TACROLIMUS 0.5 MG: 0.5 CAPSULE ORAL at 17:49

## 2024-06-29 RX ADMIN — DEXTROSE AND SODIUM CHLORIDE: 5; 450 INJECTION, SOLUTION INTRAVENOUS at 03:33

## 2024-06-29 RX ADMIN — EPOETIN ALFA-EPBX 4000 UNITS: 10000 INJECTION, SOLUTION INTRAVENOUS; SUBCUTANEOUS at 17:50

## 2024-06-29 RX ADMIN — Medication 5 ML: at 15:49

## 2024-06-29 RX ADMIN — TACROLIMUS 1 MG: 1 CAPSULE ORAL at 09:11

## 2024-06-29 RX ADMIN — FLUCONAZOLE 400 MG: 40 POWDER, FOR SUSPENSION ORAL at 11:28

## 2024-06-29 RX ADMIN — PREDNISONE 10 MG: 5 SOLUTION ORAL at 11:28

## 2024-06-29 RX ADMIN — ASPIRIN 81 MG CHEWABLE TABLET 81 MG: 81 TABLET CHEWABLE at 08:12

## 2024-06-29 RX ADMIN — ASCORBIC ACID, VITAMIN A PALMITATE, CHOLECALCIFEROL, THIAMINE HYDROCHLORIDE, RIBOFLAVIN-5 PHOSPHATE SODIUM, PYRIDOXINE HYDROCHLORIDE, NIACINAMIDE, DEXPANTHENOL, ALPHA-TOCOPHEROL ACETATE, VITAMIN K1, FOLIC ACID, BIOTIN, CYANOCOBALAMIN: 200; 3300; 200; 6; 3.6; 6; 40; 15; 10; 150; 600; 60; 5 INJECTION, SOLUTION INTRAVENOUS at 21:06

## 2024-06-29 RX ADMIN — FAMOTIDINE 40 MG: 10 INJECTION, SOLUTION INTRAVENOUS at 09:11

## 2024-06-29 RX ADMIN — SODIUM CHLORIDE: 4.5 INJECTION, SOLUTION INTRAVENOUS at 21:07

## 2024-06-29 RX ADMIN — PAROXETINE 30 MG: 30 TABLET, FILM COATED ORAL at 21:05

## 2024-06-29 RX ADMIN — OLIVE OIL AND SOYBEAN OIL 250 ML: 16; 4 INJECTION, EMULSION INTRAVENOUS at 21:06

## 2024-06-29 ASSESSMENT — ACTIVITIES OF DAILY LIVING (ADL)
ADLS_ACUITY_SCORE: 27

## 2024-06-29 NOTE — PLAN OF CARE
Goal Outcome Evaluation:      Plan of Care Reviewed With: patient    Overall Patient Progress: no changeOverall Patient Progress: no change     Vitals: Blood pressure 113/75, pulse 82, temperature 98.7  F (37.1  C), temperature source Oral, resp. rate 16, weight 50.2 kg (110 lb 11.2 oz), SpO2 100%.  Time: 1928-0468  Neuro: A/O x 4, calls appropriately and makes needs known.  Activity: up ad roya   Pain and N/V: Sore mouth managed with lidocaine (viscous) solution. Denies n/v.  GI/: No BM this shift, Voiding spontaneously. AUOP.   Cardiac: Denies cardiac chest pain.   Diet: easy to chew diet. Poor PO intake due to pain.   Respiratory: Denies SOB, on RA  Lines/Drains: PIV infusing d5 in 1/2 NS. HD line.   Incisions/Skin: WDL, No new deficit.   Lab: Reviewed.  New changes this shift: No significant changes this shift, Continue POC.

## 2024-06-29 NOTE — PLAN OF CARE
/83 (BP Location: Left arm)   Pulse 103   Temp 98.6  F (37  C) (Oral)   Resp 16   Wt 50.2 kg (110 lb 11.2 oz)   SpO2 100%   BMI 20.25 kg/m    0499-0163  Patient vss, intermittently tachycardic on RA, afebrile. BG monitoring to begin following start of TPN. Oral pain managed with lidocaine viscous q2 hours. Vaginal pain managed with rest, no relief from therapies offered. No complaints of nausea. Tolerating level  7 diet with poor appetite, to begin TPN + lipids this evening. PIV running MIVF. Voiding spontaneously, incontinent x1. No BM. Up with SBA.  Plan for TPN + lipids. Vulva biopsy scheduled for 7/2 1000. Will continue with POC and notify MD with changes or concerns.

## 2024-06-29 NOTE — PROGRESS NOTES
/67 (BP Location: Left arm)   Pulse 99   Temp 99.4  F (37.4  C) (Oral)   Resp 16   Wt 50.2 kg (110 lb 11.2 oz)   SpO2 96%   BMI 20.25 kg/m      Shift: 9007-5688  Isolation Status: None  VS: Tachycardic otherwise VSS on RA, afebrile  Neuro: Aox4  Behaviors: anxious, cooperative  BG: N/A  Labs: Phos 3.4, Mg 2.8 (no replacements needed, redraw in AM)  Respiratory: WDL  Cardiac: Tachycardic  Pain/Nausea: Reports throat & vaginal pain due to ulcers. Denies nausea.  PRN: Oxycodone 1, Magic mouthwash x2  Diet: Easy to chew, poor intake (pt states she has an appetite but eating causes too much pain)  IV Access: L PIV, R AV fistula  Infusion(s): D5 0.45% NS @ 90ml/hr  GI/: Voiding, LBM 6/27  Skin: Labial ulcer/mass  Mobility: UAL/SBA  Events/Education: Had EGD this morning.  Plan: Biopsy of vaginal mass on 7/2.

## 2024-06-29 NOTE — PROGRESS NOTES
[facility]  Transplant Nephrology Consult Note  Date of Admission:  6/27/2024  Today's Date: 06/29/2024  Requesting physician: Sid Desai MD    Reason for Consult:  History of Kidney Transplant    Recommendations:   - agree with starting diflucan with Tacrolimus reduction  -Procrit 4000 units  -Renally adjust Valcyte to 450mg daily, based upon Cystatin C of 47  -Agree with starting PPN    Assessment & Plan   # DDKT: CKD Stage 3 - Stable serum creatinine, Cystatin C today=47.   No indication for dialysis.    Pediatric en-bloc kidneys   - Baseline Creatinine: ~ 0.9-1.1   - Proteinuria: Normal (<0.2 grams)   - DSA Hx: No DSA   - Last cPRA: 9%   - BK Viremia: No   - Kidney Tx Biopsy Hx: No biopsy history.      # Immunosuppression Prior to Admission: Tacrolimus immediate release (goal 8-10) and Prednisone (dose 10 mg daily)   - Present Immunosuppression: Sublingual Tacrolimus immediate release (goal 8-10) and Methylprednisolone (dose 8 mg daily)   - Patient is in an immunosuppressed state and will continue to monitor for efficacy and toxicity of immunosuppression medications.   - Induction with Recent Transplant:  High Intensity Protocol   - Continue with intensive monitoring of immunosuppression for efficacy and toxicity.   - Historical Changes in Immunosuppression:  Stopped mycophenolate due to oral and vaginal ulcers.   - Changes: Not at this time, but plan  to start belatacept 7/2 in place of stopped mycophenolate.    # Infection Prevention:      - PJP: Inhaled pentamidine; Okay to restart Bactrim on 7/5/24  - CMV: Valganciclovir (Valcyte); Patient is CMV IgG Ab discordant (D+/R-) and will continue on Valcyte x 6 months, then check CMV PCR monthly until 12 months post transplant.   - CMV IgG Ab High Risk Discordance (D+/R-): Yes  - EBV IgG Ab High Risk Discordance (D+/R-): No    # Blood Pressure: Hypotensive;  Goal BP: < 140/90   - Changes: No, asymptomatic. continue  to trend.    # Anemia in Chronic Renal Disease: Hgb: Stable, low      JUNG: No-Give Retacrit 4000units today (6/29).   - Iron studies: Replete    # Leukopenia: Stable, low  Likely medication related.  Negative CMV PCR.    # Mineral Bone Disorder:    - Secondary renal hyperparathyroidism; PTH level: Minimally elevated ( pg/ml)        On treatment: None  - Vitamin D; level: Normal         On supplement: No  - Calcium; level: High          On supplement: No  - Phosphorus; level: Normal         On supplement: No    # Electrolytes:   - Potassium; level: Normal         On supplement: No  - Magnesium; level: Low         On supplement: No  - Bicarbonate; level: Low         On supplement: No  - Sodium; level: Normal    # GERD/Dysphagia/Esophagitis: Patient with apparent severe heartburn/GERD symptoms, worsening over the last week or so.  Patient underwent EGD 5/24/24 which was mostly unremarkable with only finding of minimal non-specific chronic inflammation in the stomach (negative H. pylori), but jessee esophageal findings.  In addition, recently had significant oral ulcers.  Now with patient unable to eat and minimal fluid intake.  She hasn't been able to take her medications in the last day due to pain with swallowing.  Her weight is down ~ 20 lbs, although up ~ 5 lbs with IV fluids the last day.  Patient is on famotidine, but unable to use PPI due to probable underlying kidney disease (interstitial nephritis) from previous PPI use. Continue Sucralfate.   -  GI follow - EGD 6/28/25  Esophageal ulcer with no bleeding and no stigmata of recent bleeding. Biopsied                         - LA Grade B esophagitis with no bleeding. Biopsied.                          - Z-line, 32 cm from the incisors.                          - Friable gastric mucosa.                          - Normal examined duodenum.     # Oral and Genital Ulcers: Stable, but ongoing symptoms. Differential diagnosis is broad, but includes MPA induced,  Crohn's disease, leishmania, and syphilis.  HSV and CMV testing negative.  Patient is now off mycophenolic acid.  -Gyn consult, Plan for left vulvar biopsy 7/2       # Severe Malnutrition: Patient's weight is down ~ 20 lbs since transplant, although up a bit after IV fluids.  She hasn't been eating for the last week and minimal oral intake overall due to GERD/dysphagia symptoms.  Now unable to take oral medications.              - Declining EN supplement. Agreeable to start PPN today. .    # Other Significant PMH:   - Crohn's Disease: Appears to be stable.  Previously was on ustekinumab prior to kidney transplant, but hasn't restarted it due to ongoing oral and vaginal ulcers.  Does occasionally alternate between diarrhea and constipation, but no issues at this time.  Followed by GI and [facility].   - Cardiac/Vascular Disease Risk Factors: Dilated ascending aorta (3.8 cm)  on 2024 ECHO (stable from 2020).       # Transplant History:  Etiology of Kidney Failure: Interstitial nephritis  Tx: DDKT - Pediatric en bloc  Transplant: 4/17/2024 (Kidney)  Significant transplant-related complications:  Oral and vaginal ulcers, esophagitis         Recommendations were communicated to the primary team via this note.    Seen and discussed with Dr.Spong Skye Judge NP  Transplant Nephrology  Contact information available via [facility] Paging/Directory          History of Present Illness  Ira Zamora is a 34 year old female with ESRD secondary to interstitial nephritis which was thought to be induced by PPI s/p pediatric en-bloc kidney transplant 4/2024 who was recently admitted (6/5-6/9) with two weeks of oral lesions and two days of vaginal lesions. Negative for HSV-1 and HSV-2.  She presented to transplant nephrology clinic yesterday with ongoing severe GERD symptoms and was admitted 06/27/24 for being unable to take her pills or drink sufficient fluids d/t pain with swallowing.       The serum creatinine today is 0.9 (1.00)  which is stable with baseline of ~ 0.9-1.1.     SBP 90s - 110s overnight. Afebrile. No chest pain or shortness of breath. No leg swelling. No nausea or vomiting.  Continued discomfort with ambulation due to vaginal lesions. Reporting burning with urination due to the lesions. Not tolerating po intake. Only took a few bites of pudding today due to odynophagia.      Review of Systems   4 point ROS was obtained and negative except as noted in the Interval History.    MEDICATIONS:  Current Facility-Administered Medications   Medication Dose Route Frequency Provider Last Rate Last Admin    aspirin (ASA) chewable tablet 81 mg  81 mg Oral Daily Sina Olivier MD   81 mg at 24 0814    famotidine (PEPCID) injection 40 mg  40 mg Intravenous Q24H Sina Olivier MD   40 mg at 24 1529    methylPREDNISolone sodium succinate (SOLU-MEDROL) injection 8 mg  8 mg Intravenous Q24H Sina Olivier MD   8 mg at 24 1529    PARoxetine (PAXIL) tablet 30 mg  30 mg Oral QPM Holli Nelson APRN CNP   30 mg at 24    silver sulfADIAZINE (SILVADENE) 1 % cream   Topical BID Frida Davis MD   Given at 24    sodium chloride (PF) 0.9% PF flush 3 mL  3 mL Intracatheter Q8H Jordy Carvalho MD   3 mL at 24 1535    sucralfate (CARAFATE) suspension 1 g  1 g Oral 4x Daily AC & HS Holli Nelson APRN CNP   1 g at 24 0616    tacrolimus (GENERIC EQUIVALENT) PO capsule for SUBLINGUAL use 1.5 mg  1.5 mg Sublingual BID IS Jordy Carvalho MD   1.5 mg at 24 1822    valGANciclovir (VALCYTE) tablet 900 mg  900 mg Oral Daily Holli Nelson APRN CNP   900 mg at 24 1339     Current Facility-Administered Medications   Medication Dose Route Frequency Provider Last Rate Last Admin    dextrose 5% and 0.45% NaCl infusion   Intravenous Continuous Jordy Carvalho MD 90 mL/hr at 24 0333 New Bag at 24 0333       Physical Exam   Temp  Av.9  F (36.6  C)   Min: 97.5  F (36.4  C)  Max: 98.3  F (36.8  C)      Pulse  Av.9  Min: 76  Max: 118 Resp  Av  Min: 16  Max: 20  SpO2  Av.1 %  Min: 93 %  Max: 100 %     /75 (BP Location: Left arm)   Pulse 82   Temp 98.7  F (37.1  C) (Oral)   Resp 16   Wt 50.2 kg (110 lb 11.2 oz)   SpO2 100%   BMI 20.25 kg/m      Admit       GENERAL APPEARANCE: alert and no distress  HEENT: resolving oral lesions.   RESP: lungs clear to auscultation - no rales, rhonchi or wheezes  CV: regular rhythm, normal rate, no rub, no murmur  EDEMA: +1 LE edema bilaterally  ABDOMEN: soft, nondistended, nontender, bowel sounds normal  MS: extremities normal - no gross deformities noted, no evidence of inflammation in joints, no muscle tenderness  SKIN: no rash  DIALYSIS ACCESS: right arm fistula +bruit/+thrill    Data   All labs reviewed by me.  CMP  Recent Labs   Lab 24  0616 24  0602 24  0105 24  1357 24  1706    137  --  138 136   POTASSIUM 4.2 4.4  --  4.1 5.0   CHLORIDE 105 105  --  109* 102   CO2 19* 23  --  19* 22   ANIONGAP 11 9  --  10 12   * 125*  --  98 136*   BUN 9.2 14.4  --  19.4 26.3*   CR 0.94 1.00*  --  1.04* 1.02*   GFRESTIMATED 81 75  --  72 74   ROBLES 9.6 9.8  --  9.6 10.5*   MAG 1.8 2.8* 2.7* 1.5*  --    PHOS 2.6 3.4  --  2.4*  --    PROTTOTAL  --   --   --   --  6.8   ALBUMIN  --   --   --   --  3.6   BILITOTAL  --   --   --   --  0.4   ALKPHOS  --   --   --   --  95   AST  --   --   --   --  28   ALT  --   --   --   --  49     CBC  Recent Labs   Lab 24  0616 24  0602 24  1357 24  1706   HGB 8.7* 9.1* 8.5* 10.1*   WBC 3.2* 2.9* 2.3* 2.4*   RBC 2.95* 3.03* 2.85* 3.38*   HCT 29.4* 29.5* 28.1* 32.6*    97 99 96   MCH 29.5 30.0 29.8 29.9   MCHC 29.6* 30.8* 30.2* 31.0*   RDW 14.0 13.8 13.8 13.7    248 253 282     INR  Recent Labs   Lab 24  0602   INR 1.25*     ABGNo lab results found in last 7 days.   Urine Studies  Recent Labs   Lab  Test 06/06/24  1113 05/03/24  1600 04/17/24  1113 09/29/22  1153   COLOR Yellow Light Yellow Straw Yellow   APPEARANCE Clear Clear Clear Clear   URINEGLC Negative 100* 70* 100*   URINEBILI Negative Negative Negative Negative   URINEKETONE Negative Negative Negative Negative   SG 1.028 1.014 1.004 1.010   UBLD Small* Negative Trace* Trace*   URINEPH 5.5 5.5 8.5* 8.5*   PROTEIN 10* Negative 100* 100*   NITRITE Negative Negative Negative Negative   LEUKEST Moderate* Trace* Negative Negative   RBCU 2 2 2 <1   WBCU 6* 4 <1 1     No lab results found.  PTH  Recent Labs   Lab Test 05/23/24  0949 04/22/24  0821   PTHI 131* 138*     Iron Studies  Recent Labs   Lab Test 05/23/24  0949 04/22/24  0821   IRON 77 53   * 147*   IRONSAT 38 36   PRICILA 2,181* 2,181*       IMAGING:  All imaging studies reviewed by me.    Past Medical History    I have reviewed this patient's medical history and updated it with pertinent information if needed.   Past Medical History:   Diagnosis Date    Anemia in chronic kidney disease     Anxiety 2007    Ascending aorta dilation (H24) 2020 2020: ascending aorta 3.8 cm, aortic sinus 3.6 cm. 2024 Echo: Ao root diam: 3.2 cm, asc Aorta Diam: 3.8 cm    ASCUS with positive high risk HPV cervical 2017    ASCUS with positive high risk HPV cervical 07/01/2017    Crohn's disease (H) 2004    ESRD (end stage renal disease) on dialysis (H) 2020    GERD (gastroesophageal reflux disease)     Hidradenitis suppurativa 2015    History of blood transfusion     Hypertension     Juvenile rheumatoid arthritis (H)     Asymptomatic in adulthood    Kidney replaced by transplant 04/17/2024    En bloc. Induction w/ Thymoglobulin.    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       Past Surgical History   I have reviewed this patient's surgical history and updated it with pertinent information if  needed.  Past Surgical History:   Procedure Laterality Date    H NEEDLE GUIDE,  KIDNEY BIOPSY      TRANSPLANT KIDNEY RECIPIENT  DONOR N/A 2024    Procedure: Transplant kidney recipient  donor,bilateral uretral stent implantation;  Surgeon: Carlitos Díaz MD;  Location:  OR       Family History   I have reviewed this patient's family history and updated it with pertinent information if needed.   Family History   Problem Relation Age of Onset    Endometriosis Mother     Coronary Artery Disease Mother        Social History   I have reviewed this patient's social history and updated it with pertinent information if needed. Ira Zamora  reports that she has never smoked. She has never been exposed to tobacco smoke. She has never used smokeless tobacco. She reports that she does not currently use alcohol. She reports that she does not use drugs.    Prior to Admission Medications   Medications Prior to Admission   Medication Sig Dispense Refill Last Dose    acetaminophen (TYLENOL) 500 MG tablet Take 1,000 mg by mouth every 6 hours as needed   Unknown    aspirin (ASA) 325 MG tablet Take 1 tablet (325 mg) by mouth daily 30 tablet 2 2024    atorvastatin (LIPITOR) 10 MG tablet Take 1 tablet (10 mg) by mouth daily 30 tablet 1 2024    benzocaine (ORAJEL MAXIMUM STRENGTH) 20 % GEL gel Take by mouth 4 times daily as needed for mouth sores 9 g 0 2024    famotidine (PEPCID) 20 MG tablet Take 2 tablets (40 mg) by mouth 2 times daily 60 tablet 1 2024 at pm    levonorgestrel (KYLEENA) 19.5 MG IUD 1 Device by Intrauterine route       magic mouthwash suspension (diphenhydrAMINE, lidocaine, aluminum-magnesium & simethicone) Swish and swallow 10 mLs in mouth every 6 hours as needed for mouth sores 560 mL 1 Past Week    magnesium oxide (MAG-OX) 400 MG tablet Take 2 tablets (800 mg) by mouth daily 60 tablet 0 2024    mineral oil-hydrophilic petrolatum (AQUAPHOR) external ointment Apply  topically every hour as needed for other (barrier cream) 50 g 0 Unknown    PARoxetine (PAXIL) 30 MG tablet Take 30 mg by mouth every evening   6/26/2024 at pm    predniSONE (DELTASONE) 10 MG tablet Take 1 tablet (10 mg) by mouth daily for 90 days 90 tablet 0 6/26/2024    tacrolimus (GENERIC EQUIVALENT) 0.5 MG capsule Take 1 capsule (0.5 mg) by mouth 2 times daily 2.5mg twice daily 60 capsule 11 6/26/2024 at 2230    tacrolimus (GENERIC EQUIVALENT) 1 MG capsule Take 2 capsules (2 mg) by mouth 2 times daily 2.5mg twice daily 120 capsule 11 6/26/2024 at 2230    valGANciclovir (VALCYTE) 450 MG tablet Take 2 tablets (900 mg) by mouth daily 60 tablet 3 6/26/2024    sulfamethoxazole-trimethoprim (BACTRIM) 400-80 MG tablet Take 1 tablet by mouth daily Start after July 5, 2024 90 tablet 2     ustekinumab (STELARA) 90 MG/ML Inject 90 mg Subcutaneous once every eight weeks (Patient not taking: Reported on 6/27/2024)

## 2024-06-29 NOTE — PROGRESS NOTES
"CLINICAL NUTRITION SERVICES - BRIEF NOTE     Nutrition Prescription    RECOMMENDATIONS FOR MDs/PROVIDERS TO ORDER:  Adjust IV fluids when PPN starts tonight.  PPN will run @ 63 mL/hr    If requires PPN > 2 weeks, recommend longer term nutrition support plan     Recommendations already ordered by Registered Dietitian (RD):  1. Sent change order request to pharmacist, start PPN:     Peripheral Clinimix (5% Dex/4.25% AA) @ 63 mL/hr (1.5 L/day) daily + 250 mL 20% IV lipids 6x/week (M-Sat) = 75 g dex (1.04 mg/kg/min) + 64 g AA (1.3 g pro/kg) = 940 kcal (19 kcal/kg, or 94% minimum assessed kcal needs) and 45.5% kcal from fat per dosing weight 50 kg     Difficult to meet full nutrition needs with PPN due to osmolarity restrictions    2. TG level add-on       *See RD note on 6/28 for nutrition assessment note    *Received consult: Pharmacy/Nutrition to start and manage TPN (Line Type: \"Standard Peripheral Catheter -REQUIRES peripheral formula; Indication: \"Severe odynophagia\")    EVALUATION OF THE PROGRESS TOWARD GOALS   Diet: Easy to Chew Diet (level 7), PRN supplements    Intake: ongoing poor PO intake due to mouth pain       NEW FINDINGS   Labs:   - K+, Mg++, Phos WNL  - (6/26): Alk Phos, ALT, AST, and Tbili WNL    Meds: D5 IV fluids @ 90 mL/hr since 6/27     INTERVENTIONS  See above    Monitoring/Evaluation  Progress toward goals will be monitored and evaluated per protocol.      Diane Glass RD, , LD  Available on [employer] Clinical Dietitian [place]     "

## 2024-06-29 NOTE — PROGRESS NOTES
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 On this admission, patient still complaining of mouth ulcers epigastric pain, difficulty eating, drinking and swallowing. In addition, she has a new vulvar painful soft tissue mass causing irritation while voiding and pain on walking.     Plan:   Fluconazole for esophageal ulcer, await pathology report  Gyne for EUA on Tuesday  Belatacept on Tuesday  Start PPN today  Continue swish and swallow     Graft function:   S/p DDKT on April 17, 2024 with good renal function apart from immunosuppressive GI side effects  (Mycophenolate stopped 6/17 and replaced with Belatacept).      Graft function:  Kidney: graft has good function.   Cr= 1.00; K= 4.4; BUN= 14.4; GFR= 75  Passing urine with slight irritation due to vulvar mass, otherwise no burning sensation while voiding.       Immunosuppressed status secondary to medications:   MMF stopped 6/17 due to GI symptoms of severe mouth and vaginal ulcers.     Currently on the following medications because of odynophagia:  Tacrolimus sublingual   Methylprednisolone      Hematology:   Anemia of chronic disease from immunosuppressant medications:   Hb= 9.1 (8.5); No palpitations, no SOB, no chest pain, no weakness   WBC = 2.9 (2.3)      Cardiorespiratory:   HR= 93   RR= 16  BP= 136/93   On continuous IV fluids (5% dextrose and 0.45% NS) infusion as NPO.     GI/Nutrition:   Diet: Currently NPO awaiting for possible EGD from GI consult   Epigastric pain secondary to heartburn symptoms still present this am.        Endocrine: steroid hyperglycemia.     Fluid/Electrolytes:   Na = 137   K = 4.4   Ca = 9.8   Mg = 2.8 (Mg oxide held)  Phosphorus = 3.4     :   Gyne consult this am as described above.   Lidocaine external gel to help with pain.     Infectious disease: Afebrile. CMV not detected.     Prophylaxis:  Patient mobilizing, not on DVT prophylaxis.     Disposition: Team will re-assess after GI consult and upon return to tolerating regular diet.      ERICA/Fellow/Resident Provider: Ming Jerry MD  Transplant Surgery Fellow    _________________________________________________________________    Interval History: History is obtained from the patient  Overnight events: NAEO        Meds:  Current Facility-Administered Medications   Medication Dose Route Frequency Provider Last Rate Last Admin    aspirin (ASA) chewable tablet 81 mg  81 mg Oral Daily Sina Olivier MD   81 mg at 06/29/24 0812    famotidine (PEPCID) injection 40 mg  40 mg Intravenous Q12H Sid Desai MD        fluconazole (DIFLUCAN) suspension 400 mg  400 mg Oral Daily Holli Nelson APRN CNP        lipids plant base (CLINOLIPID) 20 % infusion 250 mL  250 mL Intravenous Once per day on Monday Tuesday Wednesday Thursday Friday Saturday Sid Desai MD        methylPREDNISolone sodium succinate (SOLU-MEDROL) injection 8 mg  8 mg Intravenous Q24H Sina Olivier MD   8 mg at 06/28/24 1529    PARoxetine (PAXIL) tablet 30 mg  30 mg Oral QPM Holli Nelson APRN CNP   30 mg at 06/28/24 2018    silver sulfADIAZINE (SILVADENE) 1 % cream   Topical BID Frida Davis MD   Given at 06/29/24 0812    sodium chloride (PF) 0.9% PF flush 3 mL  3 mL Intracatheter Q8H Jordy Carvalho MD   3 mL at 06/29/24 0814    sucralfate (CARAFATE) suspension 1 g  1 g Oral 4x Daily AC & HS Holli Nelson APRN CNP   1 g at 06/29/24 0616    tacrolimus (GENERIC EQUIVALENT) PO capsule for SUBLINGUAL use 0.5 mg  0.5 mg Sublingual QPM Holli Nelson APRN CNP        tacrolimus (GENERIC EQUIVALENT) PO capsule for SUBLINGUAL use 1 mg  1 mg Sublingual QAM Holli Nelson APRN CNP        valGANciclovir (VALCYTE) tablet 900 mg  900 mg Oral Daily Holli Nelson APRN CNP   900 mg at 06/29/24 0813       Physical Exam:     Admit Weight: 52.6 kg (115 lb  14.4 oz)    Current vitals:   /75 (BP Location: Left arm)   Pulse 82   Temp 98.7  F (37.1  C) (Oral)   Resp 16   Wt 50.2 kg (110 lb 11.2 oz)   SpO2 100%   BMI 20.25 kg/m      Vital sign ranges:    Temp:  [98.4  F (36.9  C)-100.3  F (37.9  C)] 98.7  F (37.1  C)  Pulse:  [] 82  Resp:  [16-27] 16  BP: ()/() 113/75  SpO2:  [95 %-100 %] 100 %        Data:   CMP  Recent Labs   Lab 06/29/24  0616 06/28/24  0602 06/27/24  1357 06/26/24  1706    137   < > 136   POTASSIUM 4.2 4.4   < > 5.0   CHLORIDE 105 105   < > 102   CO2 19* 23   < > 22   * 125*   < > 136*   BUN 9.2 14.4   < > 26.3*   CR 0.94 1.00*   < > 1.02*   GFRESTIMATED 81 75   < > 74   ROBLES 9.6 9.8   < > 10.5*   MAG 1.8 2.8*   < >  --    PHOS 2.6 3.4   < >  --    LIPASE  --   --   --  14   ALBUMIN  --   --   --  3.6   BILITOTAL  --   --   --  0.4   ALKPHOS  --   --   --  95   AST  --   --   --  28   ALT  --   --   --  49    < > = values in this interval not displayed.     CBC  Recent Labs   Lab 06/29/24  0616 06/28/24  0602   HGB 8.7* 9.1*   WBC 3.2* 2.9*    248

## 2024-06-30 LAB
ALBUMIN SERPL BCG-MCNC: 2.9 G/DL (ref 3.5–5.2)
ALP SERPL-CCNC: 68 U/L (ref 40–150)
ALT SERPL W P-5'-P-CCNC: 16 U/L (ref 0–50)
ANION GAP SERPL CALCULATED.3IONS-SCNC: 7 MMOL/L (ref 7–15)
AST SERPL W P-5'-P-CCNC: 15 U/L (ref 0–45)
BASOPHILS # BLD AUTO: 0 10E3/UL (ref 0–0.2)
BASOPHILS NFR BLD AUTO: 0 %
BILIRUB DIRECT SERPL-MCNC: <0.2 MG/DL (ref 0–0.3)
BILIRUB SERPL-MCNC: 0.2 MG/DL
BUN SERPL-MCNC: 13 MG/DL (ref 6–20)
CALCIUM SERPL-MCNC: 9.5 MG/DL (ref 8.6–10)
CHLORIDE SERPL-SCNC: 107 MMOL/L (ref 98–107)
CREAT SERPL-MCNC: 0.92 MG/DL (ref 0.51–0.95)
DEPRECATED HCO3 PLAS-SCNC: 22 MMOL/L (ref 22–29)
EGFRCR SERPLBLD CKD-EPI 2021: 83 ML/MIN/1.73M2
EOSINOPHIL # BLD AUTO: 0.2 10E3/UL (ref 0–0.7)
EOSINOPHIL NFR BLD AUTO: 6 %
ERYTHROCYTE [DISTWIDTH] IN BLOOD BY AUTOMATED COUNT: 13.9 % (ref 10–15)
GLUCOSE BLDC GLUCOMTR-MCNC: 108 MG/DL (ref 70–99)
GLUCOSE BLDC GLUCOMTR-MCNC: 120 MG/DL (ref 70–99)
GLUCOSE BLDC GLUCOMTR-MCNC: 120 MG/DL (ref 70–99)
GLUCOSE SERPL-MCNC: 103 MG/DL (ref 70–99)
HCT VFR BLD AUTO: 27.4 % (ref 35–47)
HGB BLD-MCNC: 8.2 G/DL (ref 11.7–15.7)
IMM GRANULOCYTES # BLD: 0 10E3/UL
IMM GRANULOCYTES NFR BLD: 1 %
INR PPP: 1.27 (ref 0.85–1.15)
LYMPHOCYTES # BLD AUTO: 0.3 10E3/UL (ref 0.8–5.3)
LYMPHOCYTES NFR BLD AUTO: 12 %
MAGNESIUM SERPL-MCNC: 1.5 MG/DL (ref 1.7–2.3)
MCH RBC QN AUTO: 30 PG (ref 26.5–33)
MCHC RBC AUTO-ENTMCNC: 29.9 G/DL (ref 31.5–36.5)
MCV RBC AUTO: 100 FL (ref 78–100)
MONOCYTES # BLD AUTO: 0.3 10E3/UL (ref 0–1.3)
MONOCYTES NFR BLD AUTO: 12 %
NEUTROPHILS # BLD AUTO: 1.9 10E3/UL (ref 1.6–8.3)
NEUTROPHILS NFR BLD AUTO: 69 %
NRBC # BLD AUTO: 0 10E3/UL
NRBC BLD AUTO-RTO: 0 /100
PHOSPHATE SERPL-MCNC: 2.8 MG/DL (ref 2.5–4.5)
PLATELET # BLD AUTO: 203 10E3/UL (ref 150–450)
POTASSIUM SERPL-SCNC: 4.2 MMOL/L (ref 3.4–5.3)
PREALB SERPL-MCNC: 14.3 MG/DL (ref 20–40)
PROT SERPL-MCNC: 5.4 G/DL (ref 6.4–8.3)
RBC # BLD AUTO: 2.73 10E6/UL (ref 3.8–5.2)
SODIUM SERPL-SCNC: 136 MMOL/L (ref 135–145)
TACROLIMUS BLD-MCNC: 8.5 UG/L (ref 5–15)
TME LAST DOSE: NORMAL H
TME LAST DOSE: NORMAL H
WBC # BLD AUTO: 2.8 10E3/UL (ref 4–11)

## 2024-06-30 PROCEDURE — 250N000012 HC RX MED GY IP 250 OP 636 PS 637: Performed by: NURSE PRACTITIONER

## 2024-06-30 PROCEDURE — 250N000013 HC RX MED GY IP 250 OP 250 PS 637: Performed by: NURSE PRACTITIONER

## 2024-06-30 PROCEDURE — 99024 POSTOP FOLLOW-UP VISIT: CPT | Performed by: NURSE PRACTITIONER

## 2024-06-30 PROCEDURE — 250N000013 HC RX MED GY IP 250 OP 250 PS 637: Performed by: PHYSICIAN ASSISTANT

## 2024-06-30 PROCEDURE — 250N000009 HC RX 250: Performed by: TRANSPLANT SURGERY

## 2024-06-30 PROCEDURE — 36415 COLL VENOUS BLD VENIPUNCTURE: CPT | Performed by: TRANSPLANT SURGERY

## 2024-06-30 PROCEDURE — 84100 ASSAY OF PHOSPHORUS: CPT

## 2024-06-30 PROCEDURE — 83735 ASSAY OF MAGNESIUM: CPT

## 2024-06-30 PROCEDURE — 250N000013 HC RX MED GY IP 250 OP 250 PS 637: Performed by: TRANSPLANT SURGERY

## 2024-06-30 PROCEDURE — 82248 BILIRUBIN DIRECT: CPT | Performed by: TRANSPLANT SURGERY

## 2024-06-30 PROCEDURE — 250N000011 HC RX IP 250 OP 636: Performed by: NURSE PRACTITIONER

## 2024-06-30 PROCEDURE — 80197 ASSAY OF TACROLIMUS: CPT | Performed by: TRANSPLANT SURGERY

## 2024-06-30 PROCEDURE — 85025 COMPLETE CBC W/AUTO DIFF WBC: CPT

## 2024-06-30 PROCEDURE — 999N000128 HC STATISTIC PERIPHERAL IV START W/O US GUIDANCE

## 2024-06-30 PROCEDURE — 250N000009 HC RX 250

## 2024-06-30 PROCEDURE — 85610 PROTHROMBIN TIME: CPT | Performed by: TRANSPLANT SURGERY

## 2024-06-30 PROCEDURE — 84134 ASSAY OF PREALBUMIN: CPT | Performed by: TRANSPLANT SURGERY

## 2024-06-30 PROCEDURE — 80053 COMPREHEN METABOLIC PANEL: CPT | Performed by: TRANSPLANT SURGERY

## 2024-06-30 PROCEDURE — 250N000013 HC RX MED GY IP 250 OP 250 PS 637

## 2024-06-30 PROCEDURE — 120N000011 HC R&B TRANSPLANT UMMC

## 2024-06-30 RX ORDER — MAGNESIUM SULFATE HEPTAHYDRATE 40 MG/ML
2 INJECTION, SOLUTION INTRAVENOUS ONCE
Status: COMPLETED | OUTPATIENT
Start: 2024-06-30 | End: 2024-06-30

## 2024-06-30 RX ORDER — FAMOTIDINE 40 MG/5ML
40 POWDER, FOR SUSPENSION ORAL 2 TIMES DAILY
Status: DISCONTINUED | OUTPATIENT
Start: 2024-06-30 | End: 2024-07-01

## 2024-06-30 RX ADMIN — PAROXETINE 30 MG: 30 TABLET, FILM COATED ORAL at 19:51

## 2024-06-30 RX ADMIN — SUCRALFATE 1 G: 1 SUSPENSION ORAL at 22:00

## 2024-06-30 RX ADMIN — HYDROXYZINE HYDROCHLORIDE 25 MG: 25 TABLET ORAL at 18:26

## 2024-06-30 RX ADMIN — OXYCODONE HYDROCHLORIDE 5 MG: 5 SOLUTION ORAL at 22:04

## 2024-06-30 RX ADMIN — Medication 5 ML: at 04:21

## 2024-06-30 RX ADMIN — FAMOTIDINE 40 MG: 40 POWDER, FOR SUSPENSION ORAL at 07:40

## 2024-06-30 RX ADMIN — FAMOTIDINE 40 MG: 40 POWDER, FOR SUSPENSION ORAL at 19:52

## 2024-06-30 RX ADMIN — PREDNISONE 10 MG: 5 SOLUTION ORAL at 07:37

## 2024-06-30 RX ADMIN — Medication 5 ML: at 16:59

## 2024-06-30 RX ADMIN — CALCIUM CARBONATE (ANTACID) CHEW TAB 500 MG 500 MG: 500 CHEW TAB at 21:36

## 2024-06-30 RX ADMIN — SUCRALFATE 1 G: 1 SUSPENSION ORAL at 16:50

## 2024-06-30 RX ADMIN — Medication 5 ML: at 22:00

## 2024-06-30 RX ADMIN — Medication 5 ML: at 07:37

## 2024-06-30 RX ADMIN — VALGANCICLOVIR HYDROCHLORIDE 450 MG: 450 TABLET ORAL at 07:39

## 2024-06-30 RX ADMIN — TACROLIMUS 0.5 MG: 0.5 CAPSULE ORAL at 18:18

## 2024-06-30 RX ADMIN — FLUCONAZOLE 200 MG: 40 POWDER, FOR SUSPENSION ORAL at 07:38

## 2024-06-30 RX ADMIN — SUCRALFATE 1 G: 1 SUSPENSION ORAL at 06:05

## 2024-06-30 RX ADMIN — MAGNESIUM SULFATE HEPTAHYDRATE 2 G: 2 INJECTION, SOLUTION INTRAVENOUS at 07:19

## 2024-06-30 RX ADMIN — Medication 5 ML: at 19:51

## 2024-06-30 RX ADMIN — TACROLIMUS 1 MG: 1 CAPSULE ORAL at 07:38

## 2024-06-30 RX ADMIN — ASPIRIN 81 MG CHEWABLE TABLET 81 MG: 81 TABLET CHEWABLE at 07:39

## 2024-06-30 RX ADMIN — ASCORBIC ACID, VITAMIN A PALMITATE, CHOLECALCIFEROL, THIAMINE HYDROCHLORIDE, RIBOFLAVIN-5 PHOSPHATE SODIUM, PYRIDOXINE HYDROCHLORIDE, NIACINAMIDE, DEXPANTHENOL, ALPHA-TOCOPHEROL ACETATE, VITAMIN K1, FOLIC ACID, BIOTIN, CYANOCOBALAMIN: 200; 3300; 200; 6; 3.6; 6; 40; 15; 10; 150; 600; 60; 5 INJECTION, SOLUTION INTRAVENOUS at 20:44

## 2024-06-30 ASSESSMENT — ACTIVITIES OF DAILY LIVING (ADL)
ADLS_ACUITY_SCORE: 27
ADLS_ACUITY_SCORE: 26
ADLS_ACUITY_SCORE: 26
ADLS_ACUITY_SCORE: 27
ADLS_ACUITY_SCORE: 26
ADLS_ACUITY_SCORE: 27
ADLS_ACUITY_SCORE: 26
ADLS_ACUITY_SCORE: 27

## 2024-06-30 NOTE — PROGRESS NOTES
[facility]  Transplant Nephrology Consult Note  Date of Admission:  6/27/2024  Today's Date: 06/30/2024  Requesting physician: Sid Desai MD    Reason for Consult:  History of Kidney Transplant    Recommendations:   -recommend daily weights  -continue to monitor tac with introduction of fluconazole.     Assessment & Plan   # DDKT: CKD Stage 3 - Stable serum creatinine, at baseline 0.9, Cystatin C 6/29=47.   No indication for dialysis.    Pediatric en-bloc kidneys   - Baseline Creatinine: ~ 0.9-1.1   - Proteinuria: Normal (<0.2 grams)   - DSA Hx: No DSA   - Last cPRA: 9%   - BK Viremia: No   - Kidney Tx Biopsy Hx: No biopsy history.    # Immunosuppression Prior to Admission: Tacrolimus immediate release (goal 8-10) and Prednisone (dose 10 mg daily)   - Present Immunosuppression: Sublingual Tacrolimus immediate release (goal 8-10) and Methylprednisolone (dose 8 mg daily)   - Patient is in an immunosuppressed state and will continue to monitor for efficacy and toxicity of immunosuppression medications.   - Induction with Recent Transplant:  High Intensity Protocol   - Continue with intensive monitoring of immunosuppression for efficacy and toxicity.   - Historical Changes in Immunosuppression:  Stopped mycophenolate due to oral and vaginal ulcers.   - Changes: Not at this time, but plan  to start belatacept 7/2 in place of stopped mycophenolate.    # Infection Prevention:      - PJP: Inhaled pentamidine; Okay to restart Bactrim on 7/5/24  - CMV: Valganciclovir (Valcyte); Patient is CMV IgG Ab discordant (D+/R-) and will continue on Valcyte x 6 months, then check CMV PCR monthly until 12 months post transplant.   - CMV IgG Ab High Risk Discordance (D+/R-): Yes  - EBV IgG Ab High Risk Discordance (D+/R-): No    # Blood Pressure: Controlled, but low at times;  Goal BP: < 140/90   - Changes: No, asymptomatic. continue to trend.    # Anemia in Chronic Renal Disease: Hgb:  Stable, low      JUNG: Yes- Retacrit 4000units (6/29).   - Iron studies: Replete    # Leukopenia: Stable, low Likely medication related.  Negative CMV PCR.    # Mineral Bone Disorder:    - Secondary renal hyperparathyroidism; PTH level: Minimally elevated ( pg/ml)        On treatment: None  - Vitamin D; level: Normal         On supplement: No  - Calcium; level: High          On supplement: No  - Phosphorus; level: Normal         On supplement: No    # Electrolytes:   - Potassium; level: Normal         On supplement: No  - Magnesium; level: Low         On supplement: Yes, received 2gm IV today  - Bicarbonate; level: Low         On supplement: No  - Sodium; level: Normal    # GERD/Dysphagia/Esophagitis: Patient with apparent severe heartburn/GERD symptoms, worsening over the last week or so.  Patient underwent EGD 5/24/24 which was mostly unremarkable with only finding of minimal non-specific chronic inflammation in the stomach (negative H. pylori), but jessee esophageal findings.  In addition, recently had significant oral ulcers.  Now with patient unable to eat and minimal fluid intake.  She hasn't been able to take her medications in the last day due to pain with swallowing.  Her weight is down ~ 20 lbs, although up ~ 5 lbs with IV fluids the last day.  Patient is on famotidine, but unable to use PPI due to probable underlying kidney disease (interstitial nephritis) from previous PPI use. Continue Sucralfate.   - EGD 6/28/25  Esophageal ulcer with no bleeding and no stigmata of recent bleeding. Biopsied                         - LA Grade B esophagitis with no bleeding. Biopsied.                          - Z-line, 32 cm from the incisors.                          - Friable gastric mucosa.                          - Normal examined duodenum.     # Oral and Genital Ulcers: Stable, but ongoing symptoms. Differential diagnosis is broad, but includes MPA induced, Crohn's disease, leishmania, and syphilis.  HSV and  CMV testing negative.  Patient is now off mycophenolic acid.  -Gyn consult, Plan for left vulvar biopsy 7/2       # Severe Malnutrition: Patient's weight is down ~ 20 lbs since transplant, although up a bit after IV fluids.  She hasn't been eating for the last week and minimal oral intake overall due to GERD/dysphagia symptoms.  Now unable to take oral medications.              - Declining EN supplement.started PPN 6/29 .    # Other Significant PMH:   - Crohn's Disease: Appears to be stable.  Previously was on ustekinumab prior to kidney transplant, but hasn't restarted it due to ongoing oral and vaginal ulcers.  Does occasionally alternate between diarrhea and constipation, but no issues at this time.  Followed by GI and [facility].   - Cardiac/Vascular Disease Risk Factors: Dilated ascending aorta (3.8 cm)  on 2024 ECHO (stable from 2020).       # Transplant History:  Etiology of Kidney Failure: Interstitial nephritis  Tx: DDKT - Pediatric en bloc  Transplant: 4/17/2024 (Kidney)  Significant transplant-related complications:  Oral and vaginal ulcers, esophagitis         Recommendations were communicated to the primary team via this note.    Seen and discussed with Dr.Spong Skye Judge, NP  Transplant Nephrology  Contact information available via [facility] Paging/Directory       Ms. Puckett creatinine is 0.92 (06/30 0516); Stable.  Good urine output.  Other significant labs/tests/vitals: -120. Afebrile  No events overnight. Continues to have poor po intake due to odynophagia. Continued discomfort with ambulation due to vaginal lesions.   No chest pain or shortness of breath.  No leg swelling.  No nausea and vomiting.  Denies diarrhea.   No fever, sweats or chills.       Review of Systems   4 point ROS was obtained and negative except as noted in the Interval History.    MEDICATIONS:  Current Facility-Administered Medications   Medication Dose Route Frequency Provider Last Rate Last Admin    aspirin (ASA)  chewable tablet 81 mg  81 mg Oral Daily Sina Olivier MD   81 mg at 06/30/24 0739    famotidine (PEPCID) suspension 40 mg  40 mg Oral BID Holli Nelson APRN CNP   40 mg at 06/30/24 0740    fluconazole (DIFLUCAN) suspension 200 mg  200 mg Oral Daily Sid Desai MD   200 mg at 06/30/24 0738    lipids plant base (CLINOLIPID) 20 % infusion 250 mL  250 mL Intravenous Once per day on Monday Tuesday Wednesday Thursday Friday Saturday Sid Desai MD 20.8 mL/hr at 06/29/24 2106 250 mL at 06/29/24 2106    PARoxetine (PAXIL) tablet 30 mg  30 mg Oral QPM Holli Nelson APRN CNP   30 mg at 06/29/24 2105    predniSONE (DELTASONE) solution 10 mg  10 mg Oral Daily Holli Nelson APRN CNP   10 mg at 06/30/24 0737    silver sulfADIAZINE (SILVADENE) 1 % cream   Topical BID Frida Davis MD   Given at 06/29/24 0812    sodium chloride (PF) 0.9% PF flush 3 mL  3 mL Intracatheter Q8H Jordy Carvalho MD   3 mL at 06/29/24 0814    sucralfate (CARAFATE) suspension 1 g  1 g Oral 4x Daily AC & HS Holli Nelson APRN CNP   1 g at 06/30/24 0605    tacrolimus (GENERIC EQUIVALENT) PO capsule for SUBLINGUAL use 0.5 mg  0.5 mg Sublingual QPM Holli Nelson APRN CNP   0.5 mg at 06/29/24 1749    tacrolimus (GENERIC EQUIVALENT) PO capsule for SUBLINGUAL use 1 mg  1 mg Sublingual QAM Holli Nelson APRN CNP   1 mg at 06/30/24 0738    valGANciclovir (VALCYTE) tablet 450 mg  450 mg Oral Daily Sid Desai MD   450 mg at 06/30/24 0739     Current Facility-Administered Medications   Medication Dose Route Frequency Provider Last Rate Last Admin    dextrose 10% infusion   Intravenous Continuous PRN Sid Desai MD        parenteral nutrition - Clinimix E   PERIPHERAL LINE IV TPN CONTINUOUS Sid Desai MD        parenteral nutrition - Clinimix E   PERIPHERAL LINE IV TPN CONTINUOUS Sid Desai MD 63 mL/hr at 06/29/24 2106 New Bag at 06/29/24 2106    sodium chloride 0.45% infusion   Intravenous  Continuous Sid Desai MD 27 mL/hr at 24 New Bag at 24       Physical Exam   Temp  Av.9  F (36.6  C)  Min: 97.5  F (36.4  C)  Max: 98.3  F (36.8  C)      Pulse  Av.9  Min: 76  Max: 118 Resp  Av  Min: 16  Max: 20  SpO2  Av.1 %  Min: 93 %  Max: 100 %     BP (!) 137/95 (BP Location: Left arm)   Pulse 84   Temp 98  F (36.7  C) (Oral)   Resp 16   Wt 50.2 kg (110 lb 11.2 oz)   SpO2 100%   BMI 20.25 kg/m      Admit       GENERAL APPEARANCE: alert and no distress  HEENT: resolving oral lesions.   RESP: lungs clear to auscultation - no rales, rhonchi or wheezes  CV: regular rhythm, normal rate, no rub, no murmur  EDEMA: no LE edema bilaterally  ABDOMEN: soft, nondistended, nontender, bowel sounds normal  MS: extremities normal - no gross deformities noted, no evidence of inflammation in joints, no muscle tenderness  SKIN: no rash  DIALYSIS ACCESS: right arm fistula +bruit/+thrill    Data   All labs reviewed by me.  CMP  Recent Labs   Lab 24  1200 24  0516 24  2312 24  0616 24  0602 24  0105 24  1357 24  1706 24  1706   NA  --  136  --  135 137  --  138  --  136   POTASSIUM  --  4.2  --  4.2 4.4  --  4.1  --  5.0   CHLORIDE  --  107  --  105 105  --  109*  --  102   CO2  --  22  --  19* 23  --  19*  --  22   ANIONGAP  --  7  --  11 9  --  10  --  12   * 103* 119* 120* 125*  --  98  --  136*   BUN  --  13.0  --  9.2 14.4  --  19.4  --  26.3*   CR  --  0.92  --  0.94 1.00*  --  1.04*  --  1.02*   GFRESTIMATED  --  83  --  81 75  --  72  --  74   ROBLES  --  9.5  --  9.6 9.8  --  9.6  --  10.5*   MAG  --  1.5*  --  1.8 2.8* 2.7* 1.5*   < >  --    PHOS  --  2.8  --  2.6 3.4  --  2.4*  --   --    PROTTOTAL  --  5.4*  --   --   --   --   --   --  6.8   ALBUMIN  --  2.9*  --   --   --   --   --   --  3.6   BILITOTAL  --  0.2  --   --   --   --   --   --  0.4   ALKPHOS  --  68  --   --   --   --   --   --  95   AST  --  15  --    --   --   --   --   --  28   ALT  --  16  --   --   --   --   --   --  49    < > = values in this interval not displayed.     CBC  Recent Labs   Lab 06/30/24  0516 06/29/24  0616 06/28/24  0602 06/27/24  1357   HGB 8.2* 8.7* 9.1* 8.5*   WBC 2.8* 3.2* 2.9* 2.3*   RBC 2.73* 2.95* 3.03* 2.85*   HCT 27.4* 29.4* 29.5* 28.1*    100 97 99   MCH 30.0 29.5 30.0 29.8   MCHC 29.9* 29.6* 30.8* 30.2*   RDW 13.9 14.0 13.8 13.8    221 248 253     INR  Recent Labs   Lab 06/30/24  0516 06/28/24  0602   INR 1.27* 1.25*     ABGNo lab results found in last 7 days.   Urine Studies  Recent Labs   Lab Test 06/06/24  1113 05/03/24  1600 04/17/24  1113 09/29/22  1153   COLOR Yellow Light Yellow Straw Yellow   APPEARANCE Clear Clear Clear Clear   URINEGLC Negative 100* 70* 100*   URINEBILI Negative Negative Negative Negative   URINEKETONE Negative Negative Negative Negative   SG 1.028 1.014 1.004 1.010   UBLD Small* Negative Trace* Trace*   URINEPH 5.5 5.5 8.5* 8.5*   PROTEIN 10* Negative 100* 100*   NITRITE Negative Negative Negative Negative   LEUKEST Moderate* Trace* Negative Negative   RBCU 2 2 2 <1   WBCU 6* 4 <1 1     No lab results found.  PTH  Recent Labs   Lab Test 05/23/24  0949 04/22/24  0821   PTHI 131* 138*     Iron Studies  Recent Labs   Lab Test 05/23/24  0949 04/22/24  0821   IRON 77 53   * 147*   IRONSAT 38 36   PRICILA 2,181* 2,181*       IMAGING:  All imaging studies reviewed by me.    Past Medical History    I have reviewed this patient's medical history and updated it with pertinent information if needed.   Past Medical History:   Diagnosis Date    Anemia in chronic kidney disease     Anxiety 2007    Ascending aorta dilation (H24) 2020 2020: ascending aorta 3.8 cm, aortic sinus 3.6 cm. 2024 Echo: Ao root diam: 3.2 cm, asc Aorta Diam: 3.8 cm    ASCUS with positive high risk HPV cervical 2017    ASCUS with positive high risk HPV cervical 07/01/2017    Crohn's disease (H) 2004    ESRD (end stage renal  disease) on dialysis (H)     GERD (gastroesophageal reflux disease)     Hidradenitis suppurativa     History of blood transfusion     Hypertension     Juvenile rheumatoid arthritis (H)     Asymptomatic in adulthood    Kidney replaced by transplant 2024    En bloc. Induction w/ Thymoglobulin.    Secondary renal hyperparathyroidism (H24)     Tubulointerstitial nephropathy 2011    kidney biopsy 2011 - Acute and  chronic tubulointerstitial nephropathy.       Past Surgical History   I have reviewed this patient's surgical history and updated it with pertinent information if needed.  Past Surgical History:   Procedure Laterality Date    H NEEDLE GUIDE,  KIDNEY BIOPSY      TRANSPLANT KIDNEY RECIPIENT  DONOR N/A 2024    Procedure: Transplant kidney recipient  donor,bilateral uretral stent implantation;  Surgeon: Carlitos Díaz MD;  Location:  OR       Family History   I have reviewed this patient's family history and updated it with pertinent information if needed.   Family History   Problem Relation Age of Onset    Endometriosis Mother     Coronary Artery Disease Mother        Social History   I have reviewed this patient's social history and updated it with pertinent information if needed. Ira Zamora  reports that she has never smoked. She has never been exposed to tobacco smoke. She has never used smokeless tobacco. She reports that she does not currently use alcohol. She reports that she does not use drugs.    Prior to Admission Medications   Medications Prior to Admission   Medication Sig Dispense Refill Last Dose    acetaminophen (TYLENOL) 500 MG tablet Take 1,000 mg by mouth every 6 hours as needed   Unknown    aspirin (ASA) 325 MG tablet Take 1 tablet (325 mg) by mouth daily 30 tablet 2 2024    atorvastatin (LIPITOR) 10 MG tablet Take 1 tablet (10 mg) by mouth daily 30 tablet 1 2024    benzocaine (ORAJEL MAXIMUM STRENGTH) 20 % GEL gel Take by mouth 4  times daily as needed for mouth sores 9 g 0 6/27/2024    famotidine (PEPCID) 20 MG tablet Take 2 tablets (40 mg) by mouth 2 times daily 60 tablet 1 6/26/2024 at pm    levonorgestrel (KYLEENA) 19.5 MG IUD 1 Device by Intrauterine route       magic mouthwash suspension (diphenhydrAMINE, lidocaine, aluminum-magnesium & simethicone) Swish and swallow 10 mLs in mouth every 6 hours as needed for mouth sores 560 mL 1 Past Week    magnesium oxide (MAG-OX) 400 MG tablet Take 2 tablets (800 mg) by mouth daily 60 tablet 0 6/26/2024    mineral oil-hydrophilic petrolatum (AQUAPHOR) external ointment Apply topically every hour as needed for other (barrier cream) 50 g 0 Unknown    PARoxetine (PAXIL) 30 MG tablet Take 30 mg by mouth every evening   6/26/2024 at pm    predniSONE (DELTASONE) 10 MG tablet Take 1 tablet (10 mg) by mouth daily for 90 days 90 tablet 0 6/26/2024    tacrolimus (GENERIC EQUIVALENT) 0.5 MG capsule Take 1 capsule (0.5 mg) by mouth 2 times daily 2.5mg twice daily 60 capsule 11 6/26/2024 at 2230    tacrolimus (GENERIC EQUIVALENT) 1 MG capsule Take 2 capsules (2 mg) by mouth 2 times daily 2.5mg twice daily 120 capsule 11 6/26/2024 at 2230    valGANciclovir (VALCYTE) 450 MG tablet Take 2 tablets (900 mg) by mouth daily 60 tablet 3 6/26/2024    sulfamethoxazole-trimethoprim (BACTRIM) 400-80 MG tablet Take 1 tablet by mouth daily Start after July 5, 2024 90 tablet 2     ustekinumab (STELARA) 90 MG/ML Inject 90 mg Subcutaneous once every eight weeks (Patient not taking: Reported on 6/27/2024)

## 2024-06-30 NOTE — PLAN OF CARE
Goal Outcome Evaluation:      Plan of Care Reviewed With: patient    Overall Patient Progress: no changeOverall Patient Progress: no change     Vitals: Blood pressure 112/77, pulse 83, temperature 98.4  F (36.9  C), temperature source Oral, resp. rate 16, weight 50.2 kg (110 lb 11.2 oz), SpO2 100%.  Time: 9320-2615  Neuro: A/O x 4, calls appropriately and makes needs known.  Activity: up ad roya   Pain and N/V: Sore mouth managed with lidocaine (viscous) solution. Denies n/v.  GI/: No BM this shift, Voiding spontaneously. AUOP.   Cardiac: Denies cardiac chest pain.   Diet: easy to chew diet. Poor PO intake due to pain. PPN + LIPIDS infusing.   Respiratory: Denies SOB, on RA  Lines/Drains: L PIV x2 infusing 1/2 NS, PPN and lipids. R AV fistula.   Incisions/Skin: WDL, No new deficit.   Lab: Reviewed.  New changes this shift: No significant changes this shift, Continue POC.

## 2024-06-30 NOTE — PROGRESS NOTES
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 On this admission, patient still complaining of mouth ulcers epigastric pain, difficulty eating, drinking and swallowing. In addition, she has a new vulvar painful soft tissue mass causing irritation while voiding and pain on walking.     Plan:   Continue fluconazole for esophageal ulcer, await pathology report  Gyne for EUA on Tuesday  Belatacept on Tuesday, however if ulcer is candida related, may resume MMF  Continue PPN today  Continue swish and swallow   __________________________________    Graft function:   S/p DDKT on April 17, 2024 with good renal function apart from immunosuppressive GI side effects  (Mycophenolate stopped 6/17 and replaced with Belatacept).      Graft function:  Kidney: graft has good function.   Cr= 1.00; K= 4.4; BUN= 14.4; GFR= 75  Passing urine with slight irritation due to vulvar mass, otherwise no burning sensation while voiding.       Immunosuppressed status secondary to medications:   MMF stopped 6/17 due to GI symptoms of severe mouth and vaginal ulcers.     Currently on the following medications because of odynophagia:  Tacrolimus sublingual   Methylprednisolone      Hematology:   Anemia of chronic disease from immunosuppressant medications:   Hb= 9.1 (8.5); No palpitations, no SOB, no chest pain, no weakness   WBC = 2.9 (2.3)      Cardiorespiratory:   HR= 93   RR= 16  BP= 136/93   On continuous IV fluids (5% dextrose and 0.45% NS) infusion as NPO.     GI/Nutrition:   Diet: Currently NPO awaiting for possible EGD from GI consult   Epigastric pain secondary to heartburn symptoms still present this am.        Endocrine: steroid hyperglycemia.     Fluid/Electrolytes:   Na = 137   K = 4.4   Ca = 9.8   Mg = 2.8 (Mg oxide held)  Phosphorus = 3.4     :   Gyne consult this am as described above.   Lidocaine  external gel to help with pain.     Infectious disease: Afebrile. CMV not detected.     Prophylaxis: Patient mobilizing, not on DVT prophylaxis.     Disposition: Team will re-assess after GI consult and upon return to tolerating regular diet.      ERICA/Fellow/Resident Provider: Ming Jerry MD  Transplant Surgery Fellow    _________________________________________________________________    Interval History: History is obtained from the patient  Overnight events: NAEO        Meds:  Current Facility-Administered Medications   Medication Dose Route Frequency Provider Last Rate Last Admin    aspirin (ASA) chewable tablet 81 mg  81 mg Oral Daily Sina Olivier MD   81 mg at 06/30/24 0739    famotidine (PEPCID) suspension 40 mg  40 mg Oral BID Holli Nelson APRN CNP   40 mg at 06/30/24 0740    fluconazole (DIFLUCAN) suspension 200 mg  200 mg Oral Daily Sid Desai MD   200 mg at 06/30/24 0738    lipids plant base (CLINOLIPID) 20 % infusion 250 mL  250 mL Intravenous Once per day on Monday Tuesday Wednesday Thursday Friday Saturday Sid Desai MD 20.8 mL/hr at 06/29/24 2106 250 mL at 06/29/24 2106    PARoxetine (PAXIL) tablet 30 mg  30 mg Oral QPM Holli Nelson APRN CNP   30 mg at 06/29/24 2105    predniSONE (DELTASONE) solution 10 mg  10 mg Oral Daily Holli Nelson APRN CNP   10 mg at 06/30/24 0737    silver sulfADIAZINE (SILVADENE) 1 % cream   Topical BID Frida Davis MD   Given at 06/29/24 0812    sodium chloride (PF) 0.9% PF flush 3 mL  3 mL Intracatheter Q8H Jordy Carvalho MD   3 mL at 06/29/24 0814    sucralfate (CARAFATE) suspension 1 g  1 g Oral 4x Daily AC & HS Holli Nelson APRN CNP   1 g at 06/30/24 0605    tacrolimus (GENERIC EQUIVALENT) PO capsule for SUBLINGUAL use 0.5 mg  0.5 mg Sublingual QPM Holli Nelson, APRN CNP   0.5 mg at 06/29/24 1749    tacrolimus (GENERIC EQUIVALENT) PO capsule for SUBLINGUAL use 1 mg  1 mg Sublingual QAM Holli Nelson,  APRN CNP   1 mg at 06/30/24 0738    valGANciclovir (VALCYTE) tablet 450 mg  450 mg Oral Daily Sid Desai MD   450 mg at 06/30/24 0739       Physical Exam:     Admit Weight: 52.6 kg (115 lb 14.4 oz)    Current vitals:   /77 (BP Location: Left arm)   Pulse 83   Temp 98.4  F (36.9  C) (Oral)   Resp 16   Wt 50.2 kg (110 lb 11.2 oz)   SpO2 100%   BMI 20.25 kg/m      Vital sign ranges:    Temp:  [97.8  F (36.6  C)-98.6  F (37  C)] 98.4  F (36.9  C)  Pulse:  [] 83  Resp:  [16] 16  BP: (110-128)/(74-85) 112/77  SpO2:  [98 %-100 %] 100 %        Data:   CMP  Recent Labs   Lab 06/30/24  0516 06/29/24  2312 06/29/24  0616 06/27/24  1357 06/26/24  1706     --  135   < > 136   POTASSIUM 4.2  --  4.2   < > 5.0   CHLORIDE 107  --  105   < > 102   CO2 22  --  19*   < > 22   * 119* 120*   < > 136*   BUN 13.0  --  9.2   < > 26.3*   CR 0.92  --  0.94   < > 1.02*   GFRESTIMATED 83  --  81   < > 74   ROBELS 9.5  --  9.6   < > 10.5*   MAG 1.5*  --  1.8   < >  --    PHOS 2.8  --  2.6   < >  --    LIPASE  --   --   --   --  14   ALBUMIN 2.9*  --   --   --  3.6   BILITOTAL 0.2  --   --   --  0.4   ALKPHOS 68  --   --   --  95   AST 15  --   --   --  28   ALT 16  --   --   --  49    < > = values in this interval not displayed.     CBC  Recent Labs   Lab 06/30/24  0516 06/29/24  0616   HGB 8.2* 8.7*   WBC 2.8* 3.2*    221

## 2024-06-30 NOTE — PLAN OF CARE
BP (!) 137/95 (BP Location: Left arm)   Pulse 84   Temp 98  F (36.7  C) (Oral)   Resp 16   Wt 50.2 kg (110 lb 11.2 oz)   SpO2 100%   BMI 20.25 kg/m    3681-6195  Patient intermittently hypertensive on RA, afebrile. . Oral pain managed with lidocaine. Vaginal pain best managed with rest. No complaints of nausea. Tolerating level  7 diet with no appetite, on PPN. PIV x2. Right AVF.  Voiding spontaneously. Up with SBA.  Plan for vulva biopsy 7/2 1000. Will continue with POC and notify MD with changes or concerns.

## 2024-07-01 LAB
ALBUMIN SERPL BCG-MCNC: 3.2 G/DL (ref 3.5–5.2)
ALP SERPL-CCNC: 73 U/L (ref 40–150)
ALT SERPL W P-5'-P-CCNC: 13 U/L (ref 0–50)
ANION GAP SERPL CALCULATED.3IONS-SCNC: 9 MMOL/L (ref 7–15)
AST SERPL W P-5'-P-CCNC: 18 U/L (ref 0–45)
BILIRUB SERPL-MCNC: 0.2 MG/DL
BUN SERPL-MCNC: 21.4 MG/DL (ref 6–20)
CALCIUM SERPL-MCNC: 9.9 MG/DL (ref 8.6–10)
CHLORIDE SERPL-SCNC: 104 MMOL/L (ref 98–107)
CREAT SERPL-MCNC: 0.9 MG/DL (ref 0.51–0.95)
DEPRECATED HCO3 PLAS-SCNC: 22 MMOL/L (ref 22–29)
EGFRCR SERPLBLD CKD-EPI 2021: 86 ML/MIN/1.73M2
ERYTHROCYTE [DISTWIDTH] IN BLOOD BY AUTOMATED COUNT: 14.1 % (ref 10–15)
GLUCOSE BLDC GLUCOMTR-MCNC: 102 MG/DL (ref 70–99)
GLUCOSE BLDC GLUCOMTR-MCNC: 111 MG/DL (ref 70–99)
GLUCOSE BLDC GLUCOMTR-MCNC: 133 MG/DL (ref 70–99)
GLUCOSE SERPL-MCNC: 102 MG/DL (ref 70–99)
HCT VFR BLD AUTO: 30.5 % (ref 35–47)
HGB BLD-MCNC: 9.1 G/DL (ref 11.7–15.7)
INR PPP: 1.2 (ref 0.85–1.15)
MAGNESIUM SERPL-MCNC: 1.7 MG/DL (ref 1.7–2.3)
MCH RBC QN AUTO: 29.4 PG (ref 26.5–33)
MCHC RBC AUTO-ENTMCNC: 29.8 G/DL (ref 31.5–36.5)
MCV RBC AUTO: 98 FL (ref 78–100)
PHOSPHATE SERPL-MCNC: 3.2 MG/DL (ref 2.5–4.5)
PLATELET # BLD AUTO: 206 10E3/UL (ref 150–450)
POTASSIUM SERPL-SCNC: 4.3 MMOL/L (ref 3.4–5.3)
PREALB SERPL-MCNC: 17.6 MG/DL (ref 20–40)
PROT SERPL-MCNC: 6 G/DL (ref 6.4–8.3)
RBC # BLD AUTO: 3.1 10E6/UL (ref 3.8–5.2)
SODIUM SERPL-SCNC: 135 MMOL/L (ref 135–145)
TACROLIMUS BLD-MCNC: 9.2 UG/L (ref 5–15)
TME LAST DOSE: NORMAL H
TME LAST DOSE: NORMAL H
WBC # BLD AUTO: 2.6 10E3/UL (ref 4–11)

## 2024-07-01 PROCEDURE — 99223 1ST HOSP IP/OBS HIGH 75: CPT | Mod: 24 | Performed by: INTERNAL MEDICINE

## 2024-07-01 PROCEDURE — 999N000128 HC STATISTIC PERIPHERAL IV START W/O US GUIDANCE

## 2024-07-01 PROCEDURE — 250N000013 HC RX MED GY IP 250 OP 250 PS 637: Performed by: NURSE PRACTITIONER

## 2024-07-01 PROCEDURE — 250N000012 HC RX MED GY IP 250 OP 636 PS 637: Performed by: NURSE PRACTITIONER

## 2024-07-01 PROCEDURE — 99207 PR SC NO CHARGE VISIT/PATIENT NOT SEEN: CPT

## 2024-07-01 PROCEDURE — 86036 ANCA SCREEN EACH ANTIBODY: CPT

## 2024-07-01 PROCEDURE — 86038 ANTINUCLEAR ANTIBODIES: CPT

## 2024-07-01 PROCEDURE — 85027 COMPLETE CBC AUTOMATED: CPT | Performed by: NURSE PRACTITIONER

## 2024-07-01 PROCEDURE — B4185 PARENTERAL SOL 10 GM LIPIDS: HCPCS | Performed by: TRANSPLANT SURGERY

## 2024-07-01 PROCEDURE — 99418 PROLNG IP/OBS E/M EA 15 MIN: CPT | Performed by: INTERNAL MEDICINE

## 2024-07-01 PROCEDURE — 250N000013 HC RX MED GY IP 250 OP 250 PS 637: Performed by: TRANSPLANT SURGERY

## 2024-07-01 PROCEDURE — 84134 ASSAY OF PREALBUMIN: CPT | Performed by: TRANSPLANT SURGERY

## 2024-07-01 PROCEDURE — 250N000009 HC RX 250: Performed by: TRANSPLANT SURGERY

## 2024-07-01 PROCEDURE — 250N000013 HC RX MED GY IP 250 OP 250 PS 637: Performed by: PHYSICIAN ASSISTANT

## 2024-07-01 PROCEDURE — 36415 COLL VENOUS BLD VENIPUNCTURE: CPT | Performed by: TRANSPLANT SURGERY

## 2024-07-01 PROCEDURE — 83735 ASSAY OF MAGNESIUM: CPT

## 2024-07-01 PROCEDURE — 84100 ASSAY OF PHOSPHORUS: CPT

## 2024-07-01 PROCEDURE — 99233 SBSQ HOSP IP/OBS HIGH 50: CPT | Mod: 24

## 2024-07-01 PROCEDURE — 250N000009 HC RX 250

## 2024-07-01 PROCEDURE — 80197 ASSAY OF TACROLIMUS: CPT | Performed by: TRANSPLANT SURGERY

## 2024-07-01 PROCEDURE — 120N000011 HC R&B TRANSPLANT UMMC

## 2024-07-01 PROCEDURE — 250N000013 HC RX MED GY IP 250 OP 250 PS 637

## 2024-07-01 PROCEDURE — 85610 PROTHROMBIN TIME: CPT | Performed by: TRANSPLANT SURGERY

## 2024-07-01 PROCEDURE — 80053 COMPREHEN METABOLIC PANEL: CPT | Performed by: TRANSPLANT SURGERY

## 2024-07-01 RX ORDER — TACROLIMUS 0.5 MG/1
1.5 CAPSULE ORAL
Status: DISCONTINUED | OUTPATIENT
Start: 2024-07-01 | End: 2024-07-01

## 2024-07-01 RX ORDER — TACROLIMUS 1 MG/1
3 CAPSULE ORAL
Status: DISCONTINUED | OUTPATIENT
Start: 2024-07-01 | End: 2024-07-03

## 2024-07-01 RX ORDER — MYCOPHENOLIC ACID 180 MG/1
540 TABLET, DELAYED RELEASE ORAL
Status: DISCONTINUED | OUTPATIENT
Start: 2024-07-01 | End: 2024-07-03

## 2024-07-01 RX ORDER — MAGNESIUM HYDROXIDE/ALUMINUM HYDROXICE/SIMETHICONE 120; 1200; 1200 MG/30ML; MG/30ML; MG/30ML
30 SUSPENSION ORAL EVERY 4 HOURS PRN
Status: DISCONTINUED | OUTPATIENT
Start: 2024-07-01 | End: 2024-07-09 | Stop reason: HOSPADM

## 2024-07-01 RX ORDER — PREDNISONE 10 MG/1
10 TABLET ORAL DAILY
Status: DISCONTINUED | OUTPATIENT
Start: 2024-07-02 | End: 2024-07-01

## 2024-07-01 RX ORDER — FAMOTIDINE 20 MG/1
40 TABLET, FILM COATED ORAL 2 TIMES DAILY
Status: DISCONTINUED | OUTPATIENT
Start: 2024-07-01 | End: 2024-07-09 | Stop reason: HOSPADM

## 2024-07-01 RX ADMIN — FAMOTIDINE 40 MG: 20 TABLET ORAL at 20:47

## 2024-07-01 RX ADMIN — TACROLIMUS 3 MG: 1 CAPSULE ORAL at 18:15

## 2024-07-01 RX ADMIN — SUCRALFATE 1 G: 1 SUSPENSION ORAL at 16:40

## 2024-07-01 RX ADMIN — Medication 5 ML: at 20:47

## 2024-07-01 RX ADMIN — ASPIRIN 81 MG CHEWABLE TABLET 81 MG: 81 TABLET CHEWABLE at 08:29

## 2024-07-01 RX ADMIN — Medication 5 ML: at 08:36

## 2024-07-01 RX ADMIN — OXYCODONE HYDROCHLORIDE 5 MG: 5 SOLUTION ORAL at 22:28

## 2024-07-01 RX ADMIN — OXYCODONE HYDROCHLORIDE 5 MG: 5 SOLUTION ORAL at 03:45

## 2024-07-01 RX ADMIN — VALGANCICLOVIR HYDROCHLORIDE 450 MG: 450 TABLET ORAL at 08:30

## 2024-07-01 RX ADMIN — Medication 5 ML: at 18:43

## 2024-07-01 RX ADMIN — PREDNISONE 10 MG: 5 SOLUTION ORAL at 08:29

## 2024-07-01 RX ADMIN — SUCRALFATE 1 G: 1 SUSPENSION ORAL at 21:40

## 2024-07-01 RX ADMIN — SUCRALFATE 1 G: 1 SUSPENSION ORAL at 05:02

## 2024-07-01 RX ADMIN — SUCRALFATE 1 G: 1 SUSPENSION ORAL at 09:57

## 2024-07-01 RX ADMIN — TACROLIMUS 1.5 MG: 1 CAPSULE ORAL at 08:30

## 2024-07-01 RX ADMIN — ASCORBIC ACID, VITAMIN A PALMITATE, CHOLECALCIFEROL, THIAMINE HYDROCHLORIDE, RIBOFLAVIN-5 PHOSPHATE SODIUM, PYRIDOXINE HYDROCHLORIDE, NIACINAMIDE, DEXPANTHENOL, ALPHA-TOCOPHEROL ACETATE, VITAMIN K1, FOLIC ACID, BIOTIN, CYANOCOBALAMIN: 200; 3300; 200; 6; 3.6; 6; 40; 15; 10; 150; 600; 60; 5 INJECTION, SOLUTION INTRAVENOUS at 21:53

## 2024-07-01 RX ADMIN — FAMOTIDINE 40 MG: 40 POWDER, FOR SUSPENSION ORAL at 08:30

## 2024-07-01 RX ADMIN — Medication 5 ML: at 13:50

## 2024-07-01 RX ADMIN — OLIVE OIL AND SOYBEAN OIL 250 ML: 16; 4 INJECTION, EMULSION INTRAVENOUS at 21:52

## 2024-07-01 RX ADMIN — MYCOPHENOLIC ACID 540 MG: 180 TABLET, DELAYED RELEASE ORAL at 18:15

## 2024-07-01 RX ADMIN — Medication 5 ML: at 22:34

## 2024-07-01 RX ADMIN — PAROXETINE 30 MG: 30 TABLET, FILM COATED ORAL at 23:53

## 2024-07-01 RX ADMIN — Medication 5 ML: at 05:02

## 2024-07-01 RX ADMIN — Medication 5 ML: at 16:45

## 2024-07-01 RX ADMIN — HYDROXYZINE HYDROCHLORIDE 50 MG: 25 TABLET ORAL at 22:31

## 2024-07-01 ASSESSMENT — ACTIVITIES OF DAILY LIVING (ADL)
ADLS_ACUITY_SCORE: 26
ADLS_ACUITY_SCORE: 25
ADLS_ACUITY_SCORE: 26
ADLS_ACUITY_SCORE: 26
ADLS_ACUITY_SCORE: 25
ADLS_ACUITY_SCORE: 26
ADLS_ACUITY_SCORE: 25
ADLS_ACUITY_SCORE: 26
ADLS_ACUITY_SCORE: 25
ADLS_ACUITY_SCORE: 26
ADLS_ACUITY_SCORE: 25
ADLS_ACUITY_SCORE: 26
ADLS_ACUITY_SCORE: 25
ADLS_ACUITY_SCORE: 26
ADLS_ACUITY_SCORE: 25
ADLS_ACUITY_SCORE: 26
ADLS_ACUITY_SCORE: 25

## 2024-07-01 NOTE — PLAN OF CARE
Status: Full Code   VS: VSS  Neuros: A&OX4  Cardiac: WNL  Respiratory: WNL pt RA  GI/: voiding without difficulty  Diet/Nausea: pt rebekah nausea, Easy to chew diet, BS checks q6hrs   Skin: WNL    LDA: PIV x2, PPN infusing at 63 mls/hr  Pain: control with Oxy and oral Lidocaine   Activity: SBA/ind  New changes this shift: no new change this shift   Plan: Plan for mass biopsy 7/2, continues plan of care              Goal Outcome Evaluation:      Plan of Care Reviewed With: patient, spouse    Overall Patient Progress: no changeOverall Patient Progress: no change

## 2024-07-01 NOTE — PROGRESS NOTES
GASTROENTEROLOGY BRIEF SIGN-OFF  NOTE  GI Luminal Service      Date of Admission: 2024  Reason for Admission: Odynophagia, Oral and Vaginal Ulcers    The patient was not seen or examined today. This note is a product of chart review.     ASSESSMENT:  Ira Zamora is a 34 year old female with a history of ESRD due to interstitial nephritis s/p  donor kidney transplant (24), Crohn's disease on Stelara, and recent oral/vaginal ulcers thought to be due to mycophenolate who presents with odynophagia.        # Odynophagia  # Esophageal Ulcers  # Non-bleeding Esophagitis, LA Grade B  # Status-post Kidney Transplant 2024  # Immunosuppressed Status  Patient with recent heartburn and grade C esophagitis on outside EGD 2024. Also with recent oral and vaginal ulcers thought to be due to mycophenolate which are improving after discontinuing medication. Now presenting with one week of odynphagia limiting oral intake. Given immunosuppressed status, concern for infectious esophagitis (CMV, HSV, candida) vs medication side effect vs pill esophagitis, so proceeded with upper endoscopy to further evaluate.     Of note, patient unable to take PPI due to previous interstitial nephritis so has been on famotidine and sucralfate.     Patient is status-post EGD on 2024 with non-bleeding esophageal ulcers seen which are likely the cause of the patient's odynophagia, most likely medication adverse effect (also had oral and vaginal ulcers) versus pill-induced esophagitis/ulcer. Additionally EGD reports LA Grade B esophagitis with no bleeding, friable gastric mucosa, and normal examined duodenum. Pathology of the esophagus negative for dysplasia or malignancy, no evidence of infection, and biopsies consistent with ulcer with associated granulation tissue, fibrinopurulent debris, acute and chronic inflammation. Discussed with GI Pathology Dr. Sherman who agrees with this pathological assessment.         Recommendations:  -- Continue Famotidine 40 mg IV BID.   - When able to tolerate PO, okay to transition to Famotidine 40 mg PO BID for 2-3 months.  -- Consider Sucralfate QID to help with ulcer healing.   -- Consider Viscous lidocaine for symptomatic treatment of odynophagia.      -- Please consider following an antireflux regimen for both meals and after taking pills. This includes:  - Do not lie down for at least 3 to 4 hours after meals and after taking pills.  - Raise the head of the bed 6 to 8 inches (35-45 degrees).  - Avoid foods and liquids that cause symptoms.  - Avoid cigarettes and other tobacco products.    -- Diet per primary team.  - While patient remains symptomatic, consider liquids and soft foods until improvement in symptoms.     -- Continue Supportive Cares  - Adequate volume resuscitation with IVF, pRBCs.  - Monitor Hemoglobin closely. Transfuse to keep Hgb > 7 g/dL from GI standpoint.   - Monitor Platelets closely. Keep PLT > 50 10e3/uL from GI standpoint.  - Maintain hemodynamics: MAP >65 mmHg and HR <100.  - Monitor and optimize electrolytes.  - Monitor and optimize nutrition. --> Diet per primary team. Appreciate RD nutrition recs.   - Reposition every 30 minutes while awake.  -  Encourage Ambulation as able: 4-6 walks per day.   -- Analgesics/antiemetics per primary team.  -- Avoid NSAIDs.  -- If the patient experiences overt GI bleeding with hemodynamic instability, please page the GI Luminal Service (listed on [facility]).       Outpatient:  -- No outpatient GI clinic follow-up necessary.      The inpatient gastroenterology service will sign off at this time. Thank you for allowing us to participate in the care of this patient. Please do not hesitate to page the GI service with any questions or concerns.     Discussed with Dr. Migue Recinos, GI Luminal Service Staff Physician.    Haley Kaur PA-C  Inpatient Gastroenterology Service  [facility]  Pager:  [phone]  Text Page

## 2024-07-01 NOTE — PLAN OF CARE
/89 (BP Location: Right arm)   Pulse 87   Temp 98.2  F (36.8  C) (Oral)   Resp 16   Wt 50.2 kg (110 lb 11.2 oz)   SpO2 98%   BMI 20.25 kg/m      Shift: 7720-3323  VS: Stable on RA, afebrile.  Neuro: AOx4  BG: Q6  Labs: WDL  Respiratory: WDL  Pain/Nausea/PRN: Oral viscous lidocaine given x2 for mouth/esophagus pain.   Diet: PPN running at 63/hr, y-sited to 0.45 NS at 27/hr for total of 90 mL fluid per hour. Easy to chew diet with supplements, poor appetite. NPO at midnight for procedure tomorrow.  LDA: L PIV - running PPN. R AVF.   GI/: UAL to bathroom, voiding/saving. No BM today.  Skin: L arm bruised from PIV's and blood draws.   Mobility: UAL  Plan: NPO at midnight for procedure tomorrow.     Handoff given to following RN.

## 2024-07-01 NOTE — PROGRESS NOTES
Transplant Surgery  Inpatient Daily Progress Note  07/01/2024    Assessment & Plan:   34 year old female with PMHx of interstitial nephritis secondary to PPIs s/p kidney transplant (pediatric en bloc). Admitted with mouth ulcers, severe odynophagia, difficulty eating, drinking and swallowing. In addition, she has a new vulvar painful soft tissue mass causing irritation while voiding and pain on walking.     Graft function:   Kidney: Cr 0.9, stable.    Immunosuppressed status secondary to medications:   -MMF stopped 6/17 d/t ulcers. Restart Myfortic 540mg BID today.  -Prednisone 10mg, STOP today  -Tacrolimus goal level 8-10, switched back to pills today.    Hematology:   Anemia of chronic disease: Hgb 9.1, stable.    Cardiorespiratory: No acute issues.     GI/Nutrition:   Severe malnutrition in the context of acute illness: Soft diet with minimal intake. Will try supplements today. Remains on PPN. NPO after 02:00 for vulvar biopsy tomorrow.  Odynophagia, severe: Secondary to oral and esophageal ulcers. Topical lidocaine PRN. Limiting oral intake.  Esophageal ulcer: Single large cratered ulcer noted on EGD on 6/28. Pathology negative for malignancy, and HSV, CMV, and fungal stains negative, no signs of MMF toxicity. On H2 blocker. PPI contraindicated due to history of ESRD from interstitial nephritis. ID consult to commend on possible infectious causes.    Endocrine: No acute issues.     Fluid/Electrolytes: Continue IVF until adequate oral intake.    :   Ulceration, left labia: OB/GYN consulted. Plan for EUA and biopsy on 7/2. Lidocaine external gel to help with pain.     Infectious disease: Afebrile. CMV not detected.     Prophylaxis: Patient mobilizing, not on DVT prophylaxis.     Disposition: 7A      ERICA/Fellow/Resident Provider: Holli Nelson NP     Staff: Glenis Vann MD     _________________________________________________________________    Interval History: History is obtained from the  patient  Overnight events: Continued odynophagia and labial pain. Willing to try pills today. Oral intake remains low.    Meds:  Current Facility-Administered Medications   Medication Dose Route Frequency Provider Last Rate Last Admin    aspirin (ASA) chewable tablet 81 mg  81 mg Oral Daily Sina Olivier MD   81 mg at 07/01/24 0829    famotidine (PEPCID) suspension 40 mg  40 mg Oral BID Holli Nelson APRN CNP   40 mg at 07/01/24 0830    lipids plant base (CLINOLIPID) 20 % infusion 250 mL  250 mL Intravenous Once per day on Monday Tuesday Wednesday Thursday Friday Saturday Sid Desai MD 20.8 mL/hr at 06/29/24 2106 250 mL at 06/29/24 2106    PARoxetine (PAXIL) tablet 30 mg  30 mg Oral QPM Holli Nelson APRN CNP   30 mg at 06/30/24 1951    predniSONE (DELTASONE) solution 10 mg  10 mg Oral Daily Holli Nelson APRN CNP   10 mg at 07/01/24 0829    silver sulfADIAZINE (SILVADENE) 1 % cream   Topical BID Frida Davis MD   Given at 06/29/24 0812    sodium chloride (PF) 0.9% PF flush 3 mL  3 mL Intracatheter Q8H Jordy Carvalho MD   3 mL at 06/30/24 1650    sucralfate (CARAFATE) suspension 1 g  1 g Oral 4x Daily AC & HS Holli Nelson APRN CNP   1 g at 07/01/24 0502    tacrolimus (GENERIC EQUIVALENT) PO capsule for SUBLINGUAL use 1.5 mg  1.5 mg Sublingual BID IS Holli Nelson APRN CNP   1.5 mg at 07/01/24 0830    valGANciclovir (VALCYTE) tablet 450 mg  450 mg Oral Daily Sid Desai MD   450 mg at 07/01/24 0830       Physical Exam:     Admit Weight: 52.6 kg (115 lb 14.4 oz)    Current vitals:   /80 (BP Location: Right arm, Patient Position: Semi-Guerra's, Cuff Size: Adult Regular)   Pulse 73   Temp 98.5  F (36.9  C) (Oral)   Resp 16   Wt 50.2 kg (110 lb 11.2 oz)   SpO2 98%   BMI 20.25 kg/m      Vital sign ranges:    Temp:  [97.9  F (36.6  C)-98.5  F (36.9  C)] 98.5  F (36.9  C)  Pulse:  [73-88] 73  Resp:  [16] 16  BP: (103-137)/(46-95) 131/80  SpO2:  [98  %-100 %] 98 %    Gen: NAD  ENT: Mouth ulcers  CV: Perfused  Resp: Unlabored, RA  Abd: Soft  Ext: No edema  Neuro: A&Ox4    Data:   CMP  Recent Labs   Lab 07/01/24  0618 07/01/24  0507 06/30/24  1200 06/30/24  0516 06/27/24  1357 06/26/24  1706     --   --  136   < > 136   POTASSIUM 4.3  --   --  4.2   < > 5.0   CHLORIDE 104  --   --  107   < > 102   CO2 22  --   --  22   < > 22   * 102*   < > 103*   < > 136*   BUN 21.4*  --   --  13.0   < > 26.3*   CR 0.90  --   --  0.92   < > 1.02*   GFRESTIMATED 86  --   --  83   < > 74   ROBLES 9.9  --   --  9.5   < > 10.5*   MAG 1.7  --   --  1.5*   < >  --    PHOS 3.2  --   --  2.8   < >  --    LIPASE  --   --   --   --   --  14   ALBUMIN 3.2*  --   --  2.9*  --  3.6   BILITOTAL 0.2  --   --  0.2  --  0.4   ALKPHOS 73  --   --  68  --  95   AST 18  --   --  15  --  28   ALT 13  --   --  16  --  49    < > = values in this interval not displayed.     CBC  Recent Labs   Lab 07/01/24 0618 06/30/24  0516   HGB 9.1* 8.2*   WBC 2.6* 2.8*    203

## 2024-07-01 NOTE — PROGRESS NOTES
[facility]  Transplant Nephrology Progress Note  Date of Admission:  6/27/2024  Today's Date: 07/01/2024  Requesting physician: Sid Desai MD    Recommendations:   - Would change methylprednisolone to IV MMF since there is no indication of mycophenolic acid toxicity on biopsy and viral stains negative.    Assessment & Plan   # DDKT: CKD Stage 3 - Stable serum creatinine, at baseline 0.9, Cystatin C 6/29=47.   No indication for dialysis.    Pediatric en-bloc kidneys   - Baseline Creatinine: ~ 0.9-1.1   - Proteinuria: Normal (<0.2 grams)   - DSA Hx: No DSA   - Last cPRA: 9%   - BK Viremia: No   - Kidney Tx Biopsy Hx: No biopsy history.    # Immunosuppression Prior to Admission: Tacrolimus immediate release (goal 8-10) and Prednisone (dose 10 mg daily)   - Present Immunosuppression: Sublingual Tacrolimus immediate release (goal 8-10) and Methylprednisolone (dose 8 mg daily)   - Patient is in an immunosuppressed state and will continue to monitor for efficacy and toxicity of immunosuppression medications.   - Induction with Recent Transplant:  High Intensity Protocol   - Continue with intensive monitoring of immunosuppression for efficacy and toxicity.   - Historical Changes in Immunosuppression:  Stopped mycophenolate due to oral and vaginal ulcers.   - 07/01/24  Tacrolimus level: 9.2 (~ 12 hour trough)   - Changes: Yes - Would change methylprednisolone to IV MMF since there is no indication of mycophenolic acid toxicity on biopsy and viral stains negative.    # Infection Prevention:      - PJP: Inhaled pentamidine; Okay to restart Bactrim on 7/5/24  - CMV: Valganciclovir (Valcyte); Patient is CMV IgG Ab discordant (D+/R-) and will continue on Valcyte x 6 months, then check CMV PCR monthly until 12 months post transplant.   - CMV IgG Ab High Risk Discordance (D+/R-): Yes  - EBV IgG Ab High Risk Discordance (D+/R-): No    # Blood Pressure: Controlled, but low at times;   Goal BP: < 140/90   - Changes: No, asymptomatic. continue to trend.    # Anemia in Chronic Renal Disease: Hgb: Stable, low      JUNG: Yes- Retacrit 4000units (6/29).   - Iron studies: Replete    # Leukopenia: Stable, low Likely medication related.  Negative CMV PCR.    # Mineral Bone Disorder:    - Secondary renal hyperparathyroidism; PTH level: Minimally elevated ( pg/ml)        On treatment: None  - Vitamin D; level: Normal         On supplement: No  - Calcium; level: Normal          On supplement: No  - Phosphorus; level: Normal         On supplement: No    # Electrolytes:   - Potassium; level: Normal         On supplement: No  - Magnesium; level: Normal         On supplement: No  - Bicarbonate; level: Normal         On supplement: No  - Sodium; level: Normal    # GERD/Dysphagia/Esophagitis: Patient with apparent severe heartburn/GERD symptoms, worsening over the last week or so.  Patient underwent EGD 5/24/24 which was mostly unremarkable with only finding of minimal non-specific chronic inflammation in the stomach (negative H. pylori), but jessee esophageal findings.  In addition, recently had significant oral ulcers.  Now with patient unable to eat and minimal fluid intake.  She hasn't been able to take her medications in the last day due to pain with swallowing.  Her weight is down ~ 20 lbs, although up ~ 5 lbs with IV fluids the last day.  Patient is on famotidine, but unable to use PPI due to probable underlying kidney disease (interstitial nephritis) from previous PPI use. Continue Sucralfate.   - EGD 6/28/25  Esophageal ulcer with no bleeding and no stigmata of recent bleeding. Biopsied                         - LA Grade B esophagitis with no bleeding. Biopsied.                          - Z-line, 32 cm from the incisors.                          - Friable gastric mucosa.                          - Normal examined duodenum.  - HSV and CMV immunostains are negative. PAS stains are negative for fungal  organisms.      # Oral and Genital Ulcers: Stable, but ongoing symptoms. Differential diagnosis is broad, but includes MPA induced, Crohn's disease, leishmania, and syphilis.  HSV and CMV testing negative.  Patient was off mycophenolic acid 6/14-7/1 without any improvement  -Gyn consult, Plan for left vulvar biopsy 7/2       # Severe Malnutrition: Patient's weight is down ~ 20 lbs since transplant, although up a bit after IV fluids.  She hasn't been eating for the last week and minimal oral intake overall due to GERD/dysphagia symptoms.  Now unable to take oral medications.              - Declining EN supplement.started PPN 6/29 .    # Other Significant PMH:   - Crohn's Disease: Appears to be stable.  Previously was on ustekinumab prior to kidney transplant, but hasn't restarted it due to ongoing oral and vaginal ulcers.  Does occasionally alternate between diarrhea and constipation, but no issues at this time.  Followed by GI and [facility].   - Cardiac/Vascular Disease Risk Factors: Dilated ascending aorta (3.8 cm)  on 2024 ECHO (stable from 2020).       # Transplant History:  Etiology of Kidney Failure: Interstitial nephritis  Tx: DDKT - Pediatric en bloc  Transplant: 4/17/2024 (Kidney)  Significant transplant-related complications:  Oral and vaginal ulcers, esophagitis         Recommendations were communicated to the primary team via this note.    Seen and discussed with .    Jas Gutierrez, LAURITA CNP  Transplant Nephrology  Contact information available via [facility] Paging/Directory          Physician Attestation     I saw and evaluated Ira Zamora as part of a shared APRN/PA visit.     I personally reviewed the vital signs, medications, labs, and imaging.    I personally provided a substantive portion of care for this patient and I approve the care plan as written by the ERICA.  I was involved with Medical Decision Making including: Please see A&P for additional details of medical decision making.  MANAGEMENT  DISCUSSED with the following over the past 24 hours: switch iv solumedrol to iv  mg q12h as no evidence for MPA toxicity on path and no improvement after 2 weeks off MMF, will f/up vulvar ulcer bx results 7/2     Rachel Arellano MD  Date of Service (when I saw the patient): 07/01/24      Interval History  Ms. Yadavs creatinine is 0.90 (07/01 0618); Stable from 0.92 yesterday.  Estimated Creatinine Clearance: 69.8 mL/min (based on SCr of 0.9 mg/dL).   No intake/output data recorded.   Other significant labs/tests/vitals: SBP 100s - 130s overnight. Afebrile   - HSV and CMV immunostains are negative. PAS stains are negative for fungal organisms.    No acute events overnight.  No chest pain or shortness of breath.  No leg swelling.  No nausea or vomiting.  No diarrhea.  Pt reports esophagus pain is tolerable.      Review of Systems   4 point ROS was obtained and negative except as noted in the Interval History.    MEDICATIONS:  Current Facility-Administered Medications   Medication Dose Route Frequency Provider Last Rate Last Admin    aspirin (ASA) chewable tablet 81 mg  81 mg Oral Daily Sina Olivier MD   81 mg at 06/30/24 0739    famotidine (PEPCID) suspension 40 mg  40 mg Oral BID Holli Nelson APRN CNP   40 mg at 06/30/24 1952    lipids plant base (CLINOLIPID) 20 % infusion 250 mL  250 mL Intravenous Once per day on Monday Tuesday Wednesday Thursday Friday Saturday Sid Desai MD 20.8 mL/hr at 06/29/24 2106 250 mL at 06/29/24 2106    PARoxetine (PAXIL) tablet 30 mg  30 mg Oral QPM Holli Nelson APRN CNP   30 mg at 06/30/24 1951    predniSONE (DELTASONE) solution 10 mg  10 mg Oral Daily Holli Nelson APRN CNP   10 mg at 06/30/24 0737    silver sulfADIAZINE (SILVADENE) 1 % cream   Topical BID Frida Davis MD   Given at 06/29/24 0812    sodium chloride (PF) 0.9% PF flush 3 mL  3 mL Intracatheter Q8H Jordy Carvalho MD   3 mL at 06/30/24 1650    sucralfate (CARAFATE)  suspension 1 g  1 g Oral 4x Daily AC & HS Holli Nelson APRN CNP   1 g at 24 0502    tacrolimus (GENERIC EQUIVALENT) PO capsule for SUBLINGUAL use 1.5 mg  1.5 mg Sublingual BID IS Holli Nelson APRN CNP        valGANciclovir (VALCYTE) tablet 450 mg  450 mg Oral Daily Sid Desai MD   450 mg at 24 0739     Current Facility-Administered Medications   Medication Dose Route Frequency Provider Last Rate Last Admin    dextrose 10% infusion   Intravenous Continuous PRN Sid Desai MD        parenteral nutrition - Clinimix E   PERIPHERAL LINE IV TPN CONTINUOUS Sid Desai MD 63 mL/hr at 24 2044 New Bag at 24 204    sodium chloride 0.45% infusion   Intravenous Continuous Sid Desai MD   Paused at 24 1500       Physical Exam   Temp  Av.9  F (36.6  C)  Min: 97.5  F (36.4  C)  Max: 98.3  F (36.8  C)      Pulse  Av.9  Min: 76  Max: 118 Resp  Av  Min: 16  Max: 20  SpO2  Av.1 %  Min: 93 %  Max: 100 %     /80 (BP Location: Right arm, Patient Position: Semi-Guerra's, Cuff Size: Adult Regular)   Pulse 73   Temp 98.5  F (36.9  C) (Oral)   Resp 16   Wt 50.2 kg (110 lb 11.2 oz)   SpO2 98%   BMI 20.25 kg/m      Admit       GENERAL APPEARANCE: alert and no distress  HEENT: resolving oral lesions.   RESP: lungs clear to auscultation - no rales, rhonchi or wheezes  CV: regular rhythm, normal rate, no rub, no murmur  EDEMA: no LE edema bilaterally  ABDOMEN: soft, nondistended, nontender, bowel sounds normal  MS: extremities normal - no gross deformities noted, no evidence of inflammation in joints, no muscle tenderness  SKIN: no rash  DIALYSIS ACCESS: right arm fistula +bruit/+thrill    Data   All labs reviewed by me.  CMP  Recent Labs   Lab 24  0618 24  0507 24  2255 24  1822 24  1200 24  0516 24  2312 24  0616 24  0602 24  1357 24  1706     --   --   --   --  136  --  135 137    < > 136   POTASSIUM 4.3  --   --   --   --  4.2  --  4.2 4.4   < > 5.0   CHLORIDE 104  --   --   --   --  107  --  105 105   < > 102   CO2 22  --   --   --   --  22  --  19* 23   < > 22   ANIONGAP 9  --   --   --   --  7  --  11 9   < > 12   * 102* 108* 120*   < > 103*   < > 120* 125*   < > 136*   BUN 21.4*  --   --   --   --  13.0  --  9.2 14.4   < > 26.3*   CR 0.90  --   --   --   --  0.92  --  0.94 1.00*   < > 1.02*   GFRESTIMATED 86  --   --   --   --  83  --  81 75   < > 74   ROBLES 9.9  --   --   --   --  9.5  --  9.6 9.8   < > 10.5*   MAG 1.7  --   --   --   --  1.5*  --  1.8 2.8*   < >  --    PHOS 3.2  --   --   --   --  2.8  --  2.6 3.4   < >  --    PROTTOTAL 6.0*  --   --   --   --  5.4*  --   --   --   --  6.8   ALBUMIN 3.2*  --   --   --   --  2.9*  --   --   --   --  3.6   BILITOTAL 0.2  --   --   --   --  0.2  --   --   --   --  0.4   ALKPHOS 73  --   --   --   --  68  --   --   --   --  95   AST 18  --   --   --   --  15  --   --   --   --  28   ALT 13  --   --   --   --  16  --   --   --   --  49    < > = values in this interval not displayed.     CBC  Recent Labs   Lab 07/01/24 0618 06/30/24 0516 06/29/24 0616 06/28/24  0602   HGB 9.1* 8.2* 8.7* 9.1*   WBC 2.6* 2.8* 3.2* 2.9*   RBC 3.10* 2.73* 2.95* 3.03*   HCT 30.5* 27.4* 29.4* 29.5*   MCV 98 100 100 97   MCH 29.4 30.0 29.5 30.0   MCHC 29.8* 29.9* 29.6* 30.8*   RDW 14.1 13.9 14.0 13.8    203 221 248     INR  Recent Labs   Lab 07/01/24  0618 06/30/24  0516 06/28/24  0602   INR 1.20* 1.27* 1.25*     ABGNo lab results found in last 7 days.   Urine Studies  Recent Labs   Lab Test 06/06/24  1113 05/03/24  1600 04/17/24  1113 09/29/22  1153   COLOR Yellow Light Yellow Straw Yellow   APPEARANCE Clear Clear Clear Clear   URINEGLC Negative 100* 70* 100*   URINEBILI Negative Negative Negative Negative   URINEKETONE Negative Negative Negative Negative   SG 1.028 1.014 1.004 1.010   UBLD Small* Negative Trace* Trace*   URINEPH 5.5 5.5 8.5* 8.5*    PROTEIN 10* Negative 100* 100*   NITRITE Negative Negative Negative Negative   LEUKEST Moderate* Trace* Negative Negative   RBCU 2 2 2 <1   WBCU 6* 4 <1 1     No lab results found.  PTH  Recent Labs   Lab Test 05/23/24  0949 04/22/24  0821   PTHI 131* 138*     Iron Studies  Recent Labs   Lab Test 05/23/24  0949 04/22/24  0821   IRON 77 53   * 147*   IRONSAT 38 36   PRICILA 2,181* 2,181*       IMAGING:  All imaging studies reviewed by me.

## 2024-07-01 NOTE — CONSULTS
Transplant Infectious Diseases Inpatient Consultation      Ira Zamora MRN# [id number]   YOB: 1990 Age: 34 year old   Date of Admission and time: 6/27/2024  1:24 PM     Reason for consult: Genital and Oral ulcers         Recommendations:   Agree with obtaining biopsy tomorrow  From ID perspective, would send for fungal stains and culture, AFB stains and culture, as well as HSV, VZV, EBV, and CMV PCRs from tissue. I have ordered these for you and they will be available for the procedure.   Would ask pathologist to do fungal and AFB stains on tissue   I think it would be reasonable to ask for rheumatology input to determine whether this could be autoimmune, given Chron's disease   Continue VGCV and fluconazole for prophylaxis  Re-starting TMP/SMX SS daily on 7/5.     Transplant infectious diseases will continue to follow with you.     Attestation:  Total duration of visit including chart review, reviewing labs and imaging, interviewing and examining the patient, documentation, and sending communication to the primary treating team, all at the same day of this encounter, is: 95 minutes.       Janee Bashir MD  Transplant Infectious Diseases Attending  [phone]          Synopsis of Clinical Presentation and Course   Transplant Summary  Transplants:  4/17/2024 (Kidney); POD  75.  Coordinator Radha Jorge  Reason for Transplantation: Interstitial nephritis   Current Immunosuppression: MMF d/kamini on 6/17 due to ulcers. Restarting on 7/2/24. Tacrolimus (goal 8-10). Prednisone 10 mg/day (d/kamini 7/2/2024)     Ira Zamora is a 34 year old female with history of DDKT (10/17/24) and Chron's diases on Stelara (last dose 3/24), with course c/b aphthous ulcers in her mouth, esophageal ulcer, and labial ulcers. She initially developed aphthous oral ulcers in late May She was admitted on 6/5-6/9 due to oral and vaginal ulcers. Extensive infectious work-up negative (HSV swabs, pelvic exa, G/C and chlaydia,  RPR). There was concern for MMF as a cause of her oral ulcers, so this was discontinued. Since discontinuing MMF, her oral ulcers have improved. However, her labial ulcer has not improved. She developed worsening odynophagia, which prompted admission on 6/27/24. EGD from 6/30/24 showed 3 esophageal ulcers. We are being consulted for additional assistance with work-up of infectious causes of her ulcers.         Active Problems and Infectious Diseases Issues:   Oral ulcers  Labial ulcer  Esophageal ulcers  History of Chron's disease, on chronic Stelara (last in 3/2024)  At this point, extensive infectious work-up has been negative. This includes HSV PCR from blood, swabs of oral (6/5 and 6/28) and vulvar lesions (6/5). HIV, coxsackie IgG, Mycoplasma pneumoniae PCR, blood adenovirus PCR, urine histo antigen, GC/chlamydia, treponemal testing all negative. There is a rare syndrome called ulcus vulvae acutum (Lipschütz ulcer), which is a painful and necrotic self-healing ulcer that occurs in young women. Most typically, it is associated with primary EBV, but she is EBV IgG positive and has been on VGCV so this would be unlikely. Occasionally other viral infections (mumps, influenza, CMV, and adenovirus) can cause this. There have also been occasional reports of disseminated histoplasmosis causing vulvar ulcers. Thus, I think it is reasonable to send EBV and CMV PCRs from the biopsy as well as fungal and AFB cultures, even though these are unlikely.     Overall, I think this is looking less likely to be infectious. It could still be related to MMF, although it seems that her most recent esophageal ulcers started after discontinuing MMF, which makes this less likely. There are also autoimmune conditions, including Chron's and Behcet's that are associated with oral and  ulcers. Since she has known Chron's, I do think it would be worth a consult to rheumatology to have the weigh in. This would help us to know whether  targeted immunosuppression may be necessary.     Transplant Checklist  - QTc interval: 464 on 6/7/24  - Pneumocystis prophylaxis: Inhaled pentamidine (last 6/7/24). Held TMP/SMX due to leukopenia. Planning to restart TMP/SMX on 7/5/24.   - Bacterial prophylaxis:  None  - Fungal prophylaxis: Fluconazole  - Serostatus & viral prophylaxis: CMV D+/R-, HSV ?, EBV D+/R+, VZV +, Valgancyclovir  - Immunization status:  N/A until 3 months after transplant   - Gamma globulin status: No lab results found.  - Antibiotic allergies: None        Old Problems and Infectious Diseases Issues:   None           History of Present Illness    Ira Zamora is a 34 year old female with history of DDKT (10/17/24) and Chron's diases on [facility], with course c/b aphthous ulcers in her mouth, esophageal ulcer, and labial ulcers. She initially developed aphthous oral ulcers in late May. Her timeline is as follows:     5/30/24: Presents to [facility] ED with 1 week history of swollen gums and mouth sore. Had low-grade fever to 100.9. Treated with Magic Mouthwash.   6/3/24: Presents to [facility]YN with 2-3 day history of labial ulcer. Treated with topical lidocaine and soaks. Given Valtrex, but did not start this  6/5-6/9/24: Admitted for work-up of oral and vaginal ulcers. Seen by [facility]. Work-up included negative GC/chlamydia, treponema antibody, HIV, Coxackie virus, M pneumoniae PCR, urine histo, CMV and HSV swabs of lesion (buccal), and Karius test (low-level of CMV only).  Mid-July: MMF stopped in case this was contributing to ulcers.   6/24/24: Seen by [facility], who felt ulcers were most likely 2/2 MMF. Planned to watchfully wait for a few weeks to see if things improved.   6/27/24: Admitted with ~1 week history of odynophagia and inability to consume solid food.     Since admission, she has been seen by GYN and GI. GI did EGD on 6/28, which showed 3 esophageal ulcers. Biopsies were negative for CMV or HSV viral inclusions. No evidence of malignancy. Per  GYN, she is going to undergo biopsy tomorrow.      Exposure History   Demographics: Has lived in [place] entire life. Currently lives with parents and  in [facility].   Travel: Traveled to the [place] 2009. [place] in 2016 and 2018.   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 No gardening. Has no children and is not around young children.   Animals: Has 2 indoor cats. No fish/birds/other animals.     ROS:   -: No eye symptoms, skin rashes, joint pains, fever, diarrhea, blood in stool  +: does get night sweats.             Review of Systems:      As mentioned in the HPI otherwise negative by reviewing constitutional symptoms, central and peripheral neurological systems, respiratory system, cardiac system, GI system,  system, musculoskeletal, skin, allergy, and lymphatics.                  Past Medical History:     Past Medical History:   Diagnosis Date    Anemia in chronic kidney disease     Anxiety 2007    Ascending aorta dilation (H24) 2020 2020: ascending aorta 3.8 cm, aortic sinus 3.6 cm. 2024 Echo: Ao root diam: 3.2 cm, asc Aorta Diam: 3.8 cm    ASCUS with positive high risk HPV cervical 2017    ASCUS with positive high risk HPV cervical 07/01/2017    Crohn's disease (H) 2004    ESRD (end stage renal disease) on dialysis (H) 2020    GERD (gastroesophageal reflux disease)     Hidradenitis suppurativa 2015    History of blood transfusion     Hypertension     Juvenile rheumatoid arthritis (H)     Asymptomatic in adulthood    Kidney replaced by transplant 04/17/2024    En bloc. Induction w/ Thymoglobulin.    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            Past Surgical History:     Past Surgical History:   Procedure Laterality Date    ESOPHAGOSCOPY, GASTROSCOPY, DUODENOSCOPY (EGD),  COMBINED N/A 2024    Procedure: ESOPHAGOGASTRODUODENOSCOPY, WITH BIOPSY;  Surgeon: Tamy Miranda MD;  Location: [facility]    H NEEDLE GUIDE,  KIDNEY BIOPSY      TRANSPLANT KIDNEY RECIPIENT  DONOR N/A 2024    Procedure: Transplant kidney recipient  donor,bilateral uretral stent implantation;  Surgeon: Carlitos Díaz MD;  Location:  OR            Social History:     Social History     Tobacco Use    Smoking status: Never     Passive exposure: Never    Smokeless tobacco: Never   Substance Use Topics    Alcohol use: Not Currently     Comment: Very occasional, last drink two months ago, wine cooler            Family History:   I have reviewed this patient's family history  Family History   Problem Relation Age of Onset    Endometriosis Mother     Coronary Artery Disease Mother             Immunizations:     Immunization History   Administered Date(s) Administered    COVID-19 Bivalent 12+ (Pfizer) 2022    COVID-19 MONOVALENT 12+ (Pfizer) 2021, 02/10/2021, 2021    COVID-19 Monovalent 12+ (Pfizer ) 2022    HPV Quadrivalent 10/28/2008, 2011, 10/28/2011    HepB, Unspecified 2002    Hepatitis A (ADULT 19+) 2019, 2019    Hepatitis B, Peds 2002    Influenza (intradermal) 2021    Influenza Vaccine 18-64 (Flublok) 10/13/2023    Pneumo Conj 13-V (2010&after) 10/23/2018    Pneumococcal 20 valent Conjugate (Prevnar 20) 2023    Pneumococcal 23 valent 2021    Pneumococcal, Unspecified 2018, 2021    TDAP (Adacel,Boostrix) 10/23/2012    Td (Adult), Adsorbed 2004, 2006            Allergies:     Allergies   Allergen Reactions    Amlodipine Headache and Other (See Comments)     Other Reaction(s): Unknown    Proton Pump Inhibitors Nephrotoxicity     No PPIs due to history of renal failure due to interstitial nephritis    Tegaderm Transparent Dressing (Informational Only) Blisters              Medications:   Medications that Require Transfusion:   Current Facility-Administered Medications   Medication Dose Route Frequency Provider Last Rate Last Admin    dextrose 10% infusion   Intravenous Continuous PRN Sid Desai MD        parenteral nutrition - Clinimix E   PERIPHERAL LINE IV TPN CONTINUOUS Sid Desai MD        parenteral nutrition - Clinimix E   PERIPHERAL LINE IV TPN CONTINUOUS Sid Desai MD 63 mL/hr at 07/01/24 0838 Rate Verify at 07/01/24 0838    sodium chloride 0.45% infusion   Intravenous Continuous Sid Desai MD 27 mL/hr at 07/01/24 1432 Restarted at 07/01/24 1432     Scheduled Medications:   Current Facility-Administered Medications   Medication Dose Route Frequency Provider Last Rate Last Admin    aspirin (ASA) chewable tablet 81 mg  81 mg Oral Daily Sina Olivier MD   81 mg at 07/01/24 0829    famotidine (PEPCID) tablet 40 mg  40 mg Oral BID Holli Nelson APRN CNP        lipids plant base (CLINOLIPID) 20 % infusion 250 mL  250 mL Intravenous Once per day on Monday Tuesday Wednesday Thursday Friday Saturday Sid Desai MD 20.8 mL/hr at 06/29/24 2106 250 mL at 06/29/24 2106    mycophenolic acid (GENERIC EQUIVALENT) EC tablet 540 mg  540 mg Oral BID IS Holli Nelson APRN CNP        PARoxetine (PAXIL) tablet 30 mg  30 mg Oral QPM Holli Nelson APRN CNP   30 mg at 06/30/24 1951    silver sulfADIAZINE (SILVADENE) 1 % cream   Topical BID Frida Davis MD   Given at 06/29/24 0812    sodium chloride (PF) 0.9% PF flush 3 mL  3 mL Intracatheter Q8H Jordy Carvalho MD   3 mL at 06/30/24 1650    sucralfate (CARAFATE) suspension 1 g  1 g Oral 4x Daily AC & HS Holli Nelson APRN CNP   1 g at 07/01/24 1640    tacrolimus (GENERIC EQUIVALENT) capsule 3 mg  3 mg Oral BID IS Holli Nelson APRN CNP        valGANciclovir (VALCYTE) tablet 450 mg  450 mg Oral Daily Sid Desai MD   450 mg at 07/01/24 0830          Physical Exam     Physical Exam      Vital signs:  /89 (BP Location: Right arm)   Pulse 87   Temp 98.2  F (36.8  C) (Oral)   Resp 16   Wt 50.2 kg (110 lb 11.2 oz)   SpO2 98%   BMI 20.25 kg/m      GENERAL: alert and no distress  HEENT: Mouth with small aphthous ulcer on R posterior oral mucosa. Resolving ulcer under lower lip.   NECK: no adenopathy, no asymmetry, masses, or scars  RESP: lungs clear to auscultation - no rales, rhonchi or wheezes  CV: regular rate and rhythm, normal S1 S2, no S3 or S4, no murmur, click or rub, no peripheral edema  ABDOMEN: soft, nontender, no hepatosplenomegaly, no masses and bowel sounds normal   (female): 4 cm ulcerating lesion on L side of labia. Tender to palpation. No pus.   MS: no gross musculoskeletal defects noted, no edema      Data     Data   Laboratory data and imaging listed below was reviewed prior to this encounter.     Microbiology:    Culture   Date Value Ref Range Status   06/06/2024 No Growth  Final   05/03/2024 No Growth  Final   , CD4: No lab results found. and HIV RNA: No lab results found., Hepatitis B Testing:   Recent Labs   Lab Test 04/17/24  0958 09/29/22  1140   HBCAB Nonreactive Nonreactive   HEPBANG Nonreactive Nonreactive   , Hepatitis C Testing:   Hepatitis C Antibody   Date Value Ref Range Status   04/17/2024 Nonreactive Nonreactive Final     Comment:     A nonreactive screening test result does not exclude the possibility of exposure to or infection with HCV. Nonreactive screening test results in individuals with prior exposure to HCV may be due to antibody levels below the limit of detection of this assay or lack of reactivity to the HCV antigens used in this assay. Patients with recent HCV infections (<3 months from time of exposure) may have false-negative HCV antibody results due to the time needed for seroconversion (average of 8 to 9 weeks).   09/29/2022 Nonreactive Nonreactive Final   ,  HSV 1/2 IgG: No lab results found., HVS 1/2 PCR: No lab results found., VZV  PCR: No lab results found., and VZV IgG:   Recent Labs   Lab Test 09/29/22  1140 09/14/21  1435   VZVIGGIV 1,687.0 2,203.0*   VZVIGG Positive Positive*   , Quantitative CMV PCR:   Recent Labs   Lab Test 06/27/24  0829 06/05/24  2232 05/31/24  0930 04/23/24  0735 04/17/24  0958   CMVQNT Not Detected Not Detected Not Detected Not Detected Not Detected    and CMV IgG:   Recent Labs   Lab Test 04/17/24  0958 09/29/22  1140   CMVIGGIV <0.20 <0.20   CMVIGG No detectable antibody. No detectable antibody.   , and EBV EA IgG: No lab results found.                    Janee Bashir MD  [facility]  Contact information available via [facility] Paging/Directory     07/01/2024

## 2024-07-02 ENCOUNTER — ANESTHESIA (OUTPATIENT)
Dept: SURGERY | Facility: CLINIC | Age: 34
DRG: 987 | End: 2024-07-02
Payer: MEDICARE

## 2024-07-02 ENCOUNTER — ANESTHESIA EVENT (OUTPATIENT)
Dept: SURGERY | Facility: CLINIC | Age: 34
DRG: 987 | End: 2024-07-02
Payer: MEDICARE

## 2024-07-02 LAB
ANION GAP SERPL CALCULATED.3IONS-SCNC: 8 MMOL/L (ref 7–15)
BUN SERPL-MCNC: 23.5 MG/DL (ref 6–20)
CALCIUM SERPL-MCNC: 10 MG/DL (ref 8.6–10)
CHLORIDE SERPL-SCNC: 102 MMOL/L (ref 98–107)
CREAT SERPL-MCNC: 0.96 MG/DL (ref 0.51–0.95)
DEPRECATED HCO3 PLAS-SCNC: 24 MMOL/L (ref 22–29)
EGFRCR SERPLBLD CKD-EPI 2021: 79 ML/MIN/1.73M2
ERYTHROCYTE [DISTWIDTH] IN BLOOD BY AUTOMATED COUNT: 13.8 % (ref 10–15)
GLUCOSE SERPL-MCNC: 124 MG/DL (ref 70–99)
HCT VFR BLD AUTO: 29.2 % (ref 35–47)
HGB BLD-MCNC: 9.2 G/DL (ref 11.7–15.7)
HOLD SPECIMEN: NORMAL
HSV1 DNA SPEC QL NAA+PROBE: NOT DETECTED
HSV2 DNA SPEC QL NAA+PROBE: NOT DETECTED
MAGNESIUM SERPL-MCNC: 1.4 MG/DL (ref 1.7–2.3)
MCH RBC QN AUTO: 30 PG (ref 26.5–33)
MCHC RBC AUTO-ENTMCNC: 31.5 G/DL (ref 31.5–36.5)
MCV RBC AUTO: 95 FL (ref 78–100)
PHOSPHATE SERPL-MCNC: 3.6 MG/DL (ref 2.5–4.5)
PLATELET # BLD AUTO: 202 10E3/UL (ref 150–450)
POTASSIUM SERPL-SCNC: 4.4 MMOL/L (ref 3.4–5.3)
RBC # BLD AUTO: 3.07 10E6/UL (ref 3.8–5.2)
SODIUM SERPL-SCNC: 134 MMOL/L (ref 135–145)
TACROLIMUS BLD-MCNC: 10.1 UG/L (ref 5–15)
TME LAST DOSE: NORMAL H
TME LAST DOSE: NORMAL H
WBC # BLD AUTO: 2.8 10E3/UL (ref 4–11)

## 2024-07-02 PROCEDURE — 250N000013 HC RX MED GY IP 250 OP 250 PS 637

## 2024-07-02 PROCEDURE — 84295 ASSAY OF SERUM SODIUM: CPT

## 2024-07-02 PROCEDURE — 87799 DETECT AGENT NOS DNA QUANT: CPT | Performed by: INTERNAL MEDICINE

## 2024-07-02 PROCEDURE — 99233 SBSQ HOSP IP/OBS HIGH 50: CPT | Mod: 24

## 2024-07-02 PROCEDURE — 250N000013 HC RX MED GY IP 250 OP 250 PS 637: Performed by: PHYSICIAN ASSISTANT

## 2024-07-02 PROCEDURE — 80197 ASSAY OF TACROLIMUS: CPT | Performed by: TRANSPLANT SURGERY

## 2024-07-02 PROCEDURE — 88342 IMHCHEM/IMCYTCHM 1ST ANTB: CPT | Mod: TC | Performed by: OBSTETRICS & GYNECOLOGY

## 2024-07-02 PROCEDURE — 272N000001 HC OR GENERAL SUPPLY STERILE: Performed by: OBSTETRICS & GYNECOLOGY

## 2024-07-02 PROCEDURE — 87529 HSV DNA AMP PROBE: CPT | Performed by: INTERNAL MEDICINE

## 2024-07-02 PROCEDURE — 83516 IMMUNOASSAY NONANTIBODY: CPT

## 2024-07-02 PROCEDURE — 88312 SPECIAL STAINS GROUP 1: CPT | Mod: 26 | Performed by: STUDENT IN AN ORGANIZED HEALTH CARE EDUCATION/TRAINING PROGRAM

## 2024-07-02 PROCEDURE — 87798 DETECT AGENT NOS DNA AMP: CPT | Performed by: OBSTETRICS & GYNECOLOGY

## 2024-07-02 PROCEDURE — 87529 HSV DNA AMP PROBE: CPT | Performed by: OBSTETRICS & GYNECOLOGY

## 2024-07-02 PROCEDURE — 87798 DETECT AGENT NOS DNA AMP: CPT | Performed by: INTERNAL MEDICINE

## 2024-07-02 PROCEDURE — 370N000017 HC ANESTHESIA TECHNICAL FEE, PER MIN: Performed by: OBSTETRICS & GYNECOLOGY

## 2024-07-02 PROCEDURE — 83876 ASSAY MYELOPEROXIDASE: CPT

## 2024-07-02 PROCEDURE — 87496 CYTOMEG DNA AMP PROBE: CPT | Performed by: OBSTETRICS & GYNECOLOGY

## 2024-07-02 PROCEDURE — 250N000009 HC RX 250

## 2024-07-02 PROCEDURE — 88305 TISSUE EXAM BY PATHOLOGIST: CPT | Mod: 26 | Performed by: STUDENT IN AN ORGANIZED HEALTH CARE EDUCATION/TRAINING PROGRAM

## 2024-07-02 PROCEDURE — 85048 AUTOMATED LEUKOCYTE COUNT: CPT | Performed by: NURSE PRACTITIONER

## 2024-07-02 PROCEDURE — 250N000011 HC RX IP 250 OP 636: Performed by: OBSTETRICS & GYNECOLOGY

## 2024-07-02 PROCEDURE — 87205 SMEAR GRAM STAIN: CPT | Performed by: INTERNAL MEDICINE

## 2024-07-02 PROCEDURE — 999N000141 HC STATISTIC PRE-PROCEDURE NURSING ASSESSMENT: Performed by: OBSTETRICS & GYNECOLOGY

## 2024-07-02 PROCEDURE — 250N000012 HC RX MED GY IP 250 OP 636 PS 637: Performed by: NURSE PRACTITIONER

## 2024-07-02 PROCEDURE — 36415 COLL VENOUS BLD VENIPUNCTURE: CPT | Performed by: TRANSPLANT SURGERY

## 2024-07-02 PROCEDURE — 250N000012 HC RX MED GY IP 250 OP 636 PS 637

## 2024-07-02 PROCEDURE — 250N000011 HC RX IP 250 OP 636: Performed by: PHYSICIAN ASSISTANT

## 2024-07-02 PROCEDURE — 99233 SBSQ HOSP IP/OBS HIGH 50: CPT | Mod: 24 | Performed by: INTERNAL MEDICINE

## 2024-07-02 PROCEDURE — 710N000010 HC RECOVERY PHASE 1, LEVEL 2, PER MIN: Performed by: OBSTETRICS & GYNECOLOGY

## 2024-07-02 PROCEDURE — 250N000025 HC SEVOFLURANE, PER MIN: Performed by: OBSTETRICS & GYNECOLOGY

## 2024-07-02 PROCEDURE — 258N000002 HC RX IP 258 OP 250: Performed by: TRANSPLANT SURGERY

## 2024-07-02 PROCEDURE — 56605 BIOPSY OF VULVA/PERINEUM: CPT | Performed by: NURSE ANESTHETIST, CERTIFIED REGISTERED

## 2024-07-02 PROCEDURE — 99223 1ST HOSP IP/OBS HIGH 75: CPT | Mod: 24 | Performed by: INTERNAL MEDICINE

## 2024-07-02 PROCEDURE — 83735 ASSAY OF MAGNESIUM: CPT

## 2024-07-02 PROCEDURE — 250N000009 HC RX 250: Performed by: ANESTHESIOLOGY

## 2024-07-02 PROCEDURE — 250N000011 HC RX IP 250 OP 636

## 2024-07-02 PROCEDURE — 56605 BIOPSY OF VULVA/PERINEUM: CPT | Mod: GC | Performed by: OBSTETRICS & GYNECOLOGY

## 2024-07-02 PROCEDURE — 87102 FUNGUS ISOLATION CULTURE: CPT | Performed by: OBSTETRICS & GYNECOLOGY

## 2024-07-02 PROCEDURE — 56605 BIOPSY OF VULVA/PERINEUM: CPT | Performed by: ANESTHESIOLOGY

## 2024-07-02 PROCEDURE — 250N000013 HC RX MED GY IP 250 OP 250 PS 637: Performed by: NURSE PRACTITIONER

## 2024-07-02 PROCEDURE — 120N000011 HC R&B TRANSPLANT UMMC

## 2024-07-02 PROCEDURE — 250N000013 HC RX MED GY IP 250 OP 250 PS 637: Performed by: TRANSPLANT SURGERY

## 2024-07-02 PROCEDURE — 87206 SMEAR FLUORESCENT/ACID STAI: CPT | Performed by: OBSTETRICS & GYNECOLOGY

## 2024-07-02 PROCEDURE — 258N000003 HC RX IP 258 OP 636: Performed by: ANESTHESIOLOGY

## 2024-07-02 PROCEDURE — B4185 PARENTERAL SOL 10 GM LIPIDS: HCPCS

## 2024-07-02 PROCEDURE — 250N000011 HC RX IP 250 OP 636: Performed by: ANESTHESIOLOGY

## 2024-07-02 PROCEDURE — 88342 IMHCHEM/IMCYTCHM 1ST ANTB: CPT | Mod: 26 | Performed by: STUDENT IN AN ORGANIZED HEALTH CARE EDUCATION/TRAINING PROGRAM

## 2024-07-02 PROCEDURE — 0UBMXZX EXCISION OF VULVA, EXTERNAL APPROACH, DIAGNOSTIC: ICD-10-PCS | Performed by: OBSTETRICS & GYNECOLOGY

## 2024-07-02 PROCEDURE — 84100 ASSAY OF PHOSPHORUS: CPT

## 2024-07-02 PROCEDURE — 360N000075 HC SURGERY LEVEL 2, PER MIN: Performed by: OBSTETRICS & GYNECOLOGY

## 2024-07-02 PROCEDURE — 87116 MYCOBACTERIA CULTURE: CPT | Performed by: OBSTETRICS & GYNECOLOGY

## 2024-07-02 RX ORDER — HYDROMORPHONE HCL IN WATER/PF 6 MG/30 ML
0.2 PATIENT CONTROLLED ANALGESIA SYRINGE INTRAVENOUS EVERY 5 MIN PRN
Status: DISCONTINUED | OUTPATIENT
Start: 2024-07-02 | End: 2024-07-02 | Stop reason: HOSPADM

## 2024-07-02 RX ORDER — OXYCODONE HYDROCHLORIDE 5 MG/1
5 TABLET ORAL
Status: CANCELLED | OUTPATIENT
Start: 2024-07-02

## 2024-07-02 RX ORDER — ONDANSETRON 4 MG/1
4 TABLET, ORALLY DISINTEGRATING ORAL EVERY 30 MIN PRN
Status: CANCELLED | OUTPATIENT
Start: 2024-07-02

## 2024-07-02 RX ORDER — BUPIVACAINE HYDROCHLORIDE 2.5 MG/ML
INJECTION, SOLUTION INFILTRATION; PERINEURAL PRN
Status: DISCONTINUED | OUTPATIENT
Start: 2024-07-02 | End: 2024-07-02 | Stop reason: HOSPADM

## 2024-07-02 RX ORDER — MAGNESIUM OXIDE 400 MG/1
400 TABLET ORAL DAILY
Status: DISCONTINUED | OUTPATIENT
Start: 2024-07-02 | End: 2024-07-09 | Stop reason: HOSPADM

## 2024-07-02 RX ORDER — DEXAMETHASONE SODIUM PHOSPHATE 4 MG/ML
4 INJECTION, SOLUTION INTRA-ARTICULAR; INTRALESIONAL; INTRAMUSCULAR; INTRAVENOUS; SOFT TISSUE
Status: CANCELLED | OUTPATIENT
Start: 2024-07-02

## 2024-07-02 RX ORDER — ACETAMINOPHEN 325 MG/1
650 TABLET ORAL EVERY 6 HOURS
Status: DISCONTINUED | OUTPATIENT
Start: 2024-07-05 | End: 2024-07-09

## 2024-07-02 RX ORDER — ESTRADIOL 0.1 MG/G
0.5 CREAM VAGINAL DAILY
Status: DISCONTINUED | OUTPATIENT
Start: 2024-07-02 | End: 2024-07-09 | Stop reason: HOSPADM

## 2024-07-02 RX ORDER — DEXAMETHASONE SODIUM PHOSPHATE 4 MG/ML
INJECTION, SOLUTION INTRA-ARTICULAR; INTRALESIONAL; INTRAMUSCULAR; INTRAVENOUS; SOFT TISSUE PRN
Status: DISCONTINUED | OUTPATIENT
Start: 2024-07-02 | End: 2024-07-02

## 2024-07-02 RX ORDER — OXYCODONE HYDROCHLORIDE 10 MG/1
10 TABLET ORAL
Status: CANCELLED | OUTPATIENT
Start: 2024-07-02

## 2024-07-02 RX ORDER — DEXAMETHASONE SODIUM PHOSPHATE 4 MG/ML
4 INJECTION, SOLUTION INTRA-ARTICULAR; INTRALESIONAL; INTRAMUSCULAR; INTRAVENOUS; SOFT TISSUE
Status: DISCONTINUED | OUTPATIENT
Start: 2024-07-02 | End: 2024-07-02 | Stop reason: HOSPADM

## 2024-07-02 RX ORDER — ONDANSETRON 2 MG/ML
4 INJECTION INTRAMUSCULAR; INTRAVENOUS EVERY 30 MIN PRN
Status: CANCELLED | OUTPATIENT
Start: 2024-07-02

## 2024-07-02 RX ORDER — SODIUM CHLORIDE, SODIUM LACTATE, POTASSIUM CHLORIDE, CALCIUM CHLORIDE 600; 310; 30; 20 MG/100ML; MG/100ML; MG/100ML; MG/100ML
INJECTION, SOLUTION INTRAVENOUS CONTINUOUS
Status: DISCONTINUED | OUTPATIENT
Start: 2024-07-02 | End: 2024-07-02 | Stop reason: HOSPADM

## 2024-07-02 RX ORDER — PROCHLORPERAZINE MALEATE 5 MG
10 TABLET ORAL EVERY 6 HOURS PRN
Status: DISCONTINUED | OUTPATIENT
Start: 2024-07-02 | End: 2024-07-09 | Stop reason: HOSPADM

## 2024-07-02 RX ORDER — POLYETHYLENE GLYCOL 3350 17 G/17G
17 POWDER, FOR SOLUTION ORAL DAILY PRN
Status: DISCONTINUED | OUTPATIENT
Start: 2024-07-03 | End: 2024-07-09 | Stop reason: HOSPADM

## 2024-07-02 RX ORDER — NALOXONE HYDROCHLORIDE 0.4 MG/ML
0.1 INJECTION, SOLUTION INTRAMUSCULAR; INTRAVENOUS; SUBCUTANEOUS
Status: DISCONTINUED | OUTPATIENT
Start: 2024-07-02 | End: 2024-07-02 | Stop reason: HOSPADM

## 2024-07-02 RX ORDER — FENTANYL CITRATE 50 UG/ML
50 INJECTION, SOLUTION INTRAMUSCULAR; INTRAVENOUS EVERY 5 MIN PRN
Status: DISCONTINUED | OUTPATIENT
Start: 2024-07-02 | End: 2024-07-02 | Stop reason: HOSPADM

## 2024-07-02 RX ORDER — ONDANSETRON 4 MG/1
4 TABLET, ORALLY DISINTEGRATING ORAL EVERY 30 MIN PRN
Status: DISCONTINUED | OUTPATIENT
Start: 2024-07-02 | End: 2024-07-02 | Stop reason: HOSPADM

## 2024-07-02 RX ORDER — FENTANYL CITRATE 50 UG/ML
INJECTION, SOLUTION INTRAMUSCULAR; INTRAVENOUS PRN
Status: DISCONTINUED | OUTPATIENT
Start: 2024-07-02 | End: 2024-07-02

## 2024-07-02 RX ORDER — FENTANYL CITRATE 50 UG/ML
25 INJECTION, SOLUTION INTRAMUSCULAR; INTRAVENOUS EVERY 5 MIN PRN
Status: DISCONTINUED | OUTPATIENT
Start: 2024-07-02 | End: 2024-07-02 | Stop reason: HOSPADM

## 2024-07-02 RX ORDER — LIDOCAINE HYDROCHLORIDE 20 MG/ML
INJECTION, SOLUTION INFILTRATION; PERINEURAL PRN
Status: DISCONTINUED | OUTPATIENT
Start: 2024-07-02 | End: 2024-07-02

## 2024-07-02 RX ORDER — BISACODYL 10 MG
10 SUPPOSITORY, RECTAL RECTAL DAILY PRN
Status: DISCONTINUED | OUTPATIENT
Start: 2024-07-02 | End: 2024-07-09 | Stop reason: HOSPADM

## 2024-07-02 RX ORDER — NALOXONE HYDROCHLORIDE 0.4 MG/ML
0.1 INJECTION, SOLUTION INTRAMUSCULAR; INTRAVENOUS; SUBCUTANEOUS
Status: CANCELLED | OUTPATIENT
Start: 2024-07-02

## 2024-07-02 RX ORDER — LIDOCAINE 40 MG/G
CREAM TOPICAL
Status: DISCONTINUED | OUTPATIENT
Start: 2024-07-02 | End: 2024-07-02 | Stop reason: HOSPADM

## 2024-07-02 RX ORDER — PROPOFOL 10 MG/ML
INJECTION, EMULSION INTRAVENOUS PRN
Status: DISCONTINUED | OUTPATIENT
Start: 2024-07-02 | End: 2024-07-02

## 2024-07-02 RX ORDER — AMOXICILLIN 250 MG
1 CAPSULE ORAL 2 TIMES DAILY
Status: DISCONTINUED | OUTPATIENT
Start: 2024-07-02 | End: 2024-07-09 | Stop reason: HOSPADM

## 2024-07-02 RX ORDER — ONDANSETRON 2 MG/ML
4 INJECTION INTRAMUSCULAR; INTRAVENOUS EVERY 30 MIN PRN
Status: DISCONTINUED | OUTPATIENT
Start: 2024-07-02 | End: 2024-07-02 | Stop reason: HOSPADM

## 2024-07-02 RX ORDER — ONDANSETRON 2 MG/ML
4 INJECTION INTRAMUSCULAR; INTRAVENOUS EVERY 6 HOURS PRN
Status: DISCONTINUED | OUTPATIENT
Start: 2024-07-02 | End: 2024-07-09 | Stop reason: HOSPADM

## 2024-07-02 RX ORDER — ACETAMINOPHEN 325 MG/1
975 TABLET ORAL ONCE
Status: COMPLETED | OUTPATIENT
Start: 2024-07-02 | End: 2024-07-02

## 2024-07-02 RX ORDER — LIDOCAINE 40 MG/G
CREAM TOPICAL
Status: DISCONTINUED | OUTPATIENT
Start: 2024-07-02 | End: 2024-07-09 | Stop reason: HOSPADM

## 2024-07-02 RX ORDER — ACETAMINOPHEN 325 MG/1
975 TABLET ORAL EVERY 6 HOURS
Status: COMPLETED | OUTPATIENT
Start: 2024-07-02 | End: 2024-07-05

## 2024-07-02 RX ORDER — ONDANSETRON 4 MG/1
4 TABLET, ORALLY DISINTEGRATING ORAL EVERY 6 HOURS PRN
Status: DISCONTINUED | OUTPATIENT
Start: 2024-07-02 | End: 2024-07-09 | Stop reason: HOSPADM

## 2024-07-02 RX ORDER — HYDROMORPHONE HCL IN WATER/PF 6 MG/30 ML
0.4 PATIENT CONTROLLED ANALGESIA SYRINGE INTRAVENOUS EVERY 5 MIN PRN
Status: DISCONTINUED | OUTPATIENT
Start: 2024-07-02 | End: 2024-07-02 | Stop reason: HOSPADM

## 2024-07-02 RX ORDER — HYDROXYZINE HYDROCHLORIDE 25 MG/1
25 TABLET, FILM COATED ORAL EVERY 6 HOURS PRN
Status: DISCONTINUED | OUTPATIENT
Start: 2024-07-02 | End: 2024-07-07

## 2024-07-02 RX ORDER — MAGNESIUM SULFATE HEPTAHYDRATE 40 MG/ML
2 INJECTION, SOLUTION INTRAVENOUS ONCE
Status: COMPLETED | OUTPATIENT
Start: 2024-07-02 | End: 2024-07-02

## 2024-07-02 RX ADMIN — OLIVE OIL AND SOYBEAN OIL 250 ML: 16; 4 INJECTION, EMULSION INTRAVENOUS at 20:26

## 2024-07-02 RX ADMIN — SODIUM CHLORIDE, POTASSIUM CHLORIDE, SODIUM LACTATE AND CALCIUM CHLORIDE: 600; 310; 30; 20 INJECTION, SOLUTION INTRAVENOUS at 12:08

## 2024-07-02 RX ADMIN — FAMOTIDINE 40 MG: 20 TABLET ORAL at 08:01

## 2024-07-02 RX ADMIN — ACETAMINOPHEN 975 MG: 325 TABLET, FILM COATED ORAL at 09:28

## 2024-07-02 RX ADMIN — SUCRALFATE 1 G: 1 SUSPENSION ORAL at 06:00

## 2024-07-02 RX ADMIN — ONDANSETRON 4 MG: 2 INJECTION INTRAMUSCULAR; INTRAVENOUS at 12:50

## 2024-07-02 RX ADMIN — ACETAMINOPHEN 975 MG: 325 TABLET, FILM COATED ORAL at 15:56

## 2024-07-02 RX ADMIN — PHENYLEPHRINE HYDROCHLORIDE 100 MCG: 10 INJECTION INTRAVENOUS at 12:19

## 2024-07-02 RX ADMIN — TACROLIMUS 3 MG: 1 CAPSULE ORAL at 08:01

## 2024-07-02 RX ADMIN — MAGNESIUM SULFATE HEPTAHYDRATE: 500 INJECTION, SOLUTION INTRAMUSCULAR; INTRAVENOUS at 20:25

## 2024-07-02 RX ADMIN — PHENYLEPHRINE HYDROCHLORIDE 100 MCG: 10 INJECTION INTRAVENOUS at 12:25

## 2024-07-02 RX ADMIN — SUCRALFATE 1 G: 1 SUSPENSION ORAL at 15:57

## 2024-07-02 RX ADMIN — SODIUM CHLORIDE, POTASSIUM CHLORIDE, SODIUM LACTATE AND CALCIUM CHLORIDE: 600; 310; 30; 20 INJECTION, SOLUTION INTRAVENOUS at 12:55

## 2024-07-02 RX ADMIN — SUCRALFATE 1 G: 1 SUSPENSION ORAL at 21:46

## 2024-07-02 RX ADMIN — TACROLIMUS 3 MG: 1 CAPSULE ORAL at 17:53

## 2024-07-02 RX ADMIN — MAGNESIUM SULFATE HEPTAHYDRATE 2 G: 2 INJECTION, SOLUTION INTRAVENOUS at 15:25

## 2024-07-02 RX ADMIN — Medication 5 ML: at 01:16

## 2024-07-02 RX ADMIN — PROPOFOL 100 MG: 10 INJECTION, EMULSION INTRAVENOUS at 12:12

## 2024-07-02 RX ADMIN — MIDAZOLAM 2 MG: 1 INJECTION INTRAMUSCULAR; INTRAVENOUS at 12:04

## 2024-07-02 RX ADMIN — PHENYLEPHRINE HYDROCHLORIDE 100 MCG: 10 INJECTION INTRAVENOUS at 12:40

## 2024-07-02 RX ADMIN — ASPIRIN 81 MG CHEWABLE TABLET 81 MG: 81 TABLET CHEWABLE at 08:01

## 2024-07-02 RX ADMIN — DOCUSATE SODIUM 50 MG AND SENNOSIDES 8.6 MG 1 TABLET: 8.6; 5 TABLET, FILM COATED ORAL at 20:29

## 2024-07-02 RX ADMIN — VALGANCICLOVIR HYDROCHLORIDE 450 MG: 450 TABLET ORAL at 08:01

## 2024-07-02 RX ADMIN — Medication 5 ML: at 18:02

## 2024-07-02 RX ADMIN — MAGNESIUM OXIDE TAB 400 MG (241.3 MG ELEMENTAL MG) 400 MG: 400 (241.3 MG) TAB at 15:25

## 2024-07-02 RX ADMIN — ACETAMINOPHEN 975 MG: 325 TABLET, FILM COATED ORAL at 21:45

## 2024-07-02 RX ADMIN — PAROXETINE 30 MG: 30 TABLET, FILM COATED ORAL at 20:29

## 2024-07-02 RX ADMIN — MYCOPHENOLIC ACID 540 MG: 180 TABLET, DELAYED RELEASE ORAL at 08:01

## 2024-07-02 RX ADMIN — Medication 5 ML: at 08:03

## 2024-07-02 RX ADMIN — Medication 100 MG: at 13:09

## 2024-07-02 RX ADMIN — LIDOCAINE HYDROCHLORIDE 60 MG: 20 INJECTION, SOLUTION INFILTRATION; PERINEURAL at 12:12

## 2024-07-02 RX ADMIN — PHENYLEPHRINE HYDROCHLORIDE 200 MCG: 10 INJECTION INTRAVENOUS at 12:31

## 2024-07-02 RX ADMIN — HYDROXYZINE HYDROCHLORIDE 50 MG: 25 TABLET ORAL at 16:02

## 2024-07-02 RX ADMIN — Medication 5 ML: at 21:45

## 2024-07-02 RX ADMIN — MYCOPHENOLIC ACID 540 MG: 180 TABLET, DELAYED RELEASE ORAL at 17:54

## 2024-07-02 RX ADMIN — Medication 30 MG: at 12:12

## 2024-07-02 RX ADMIN — DEXAMETHASONE SODIUM PHOSPHATE 4 MG: 4 INJECTION, SOLUTION INTRA-ARTICULAR; INTRALESIONAL; INTRAMUSCULAR; INTRAVENOUS; SOFT TISSUE at 12:33

## 2024-07-02 RX ADMIN — Medication 5 ML: at 15:54

## 2024-07-02 RX ADMIN — Medication 5 ML: at 06:00

## 2024-07-02 RX ADMIN — SILVER SULFADIAZINE: 10 CREAM TOPICAL at 20:30

## 2024-07-02 RX ADMIN — FAMOTIDINE 40 MG: 20 TABLET ORAL at 20:30

## 2024-07-02 RX ADMIN — FENTANYL CITRATE 100 MCG: 50 INJECTION INTRAMUSCULAR; INTRAVENOUS at 12:10

## 2024-07-02 RX ADMIN — SODIUM CHLORIDE: 4.5 INJECTION, SOLUTION INTRAVENOUS at 06:00

## 2024-07-02 ASSESSMENT — ACTIVITIES OF DAILY LIVING (ADL)
ADLS_ACUITY_SCORE: 25

## 2024-07-02 NOTE — PROGRESS NOTES
[facility]  Transplant Nephrology Progress Note  Date of Admission:  6/27/2024  Today's Date: 07/02/2024  Requesting physician: Sid Desai MD    Recommendations:   - Would give magnesium oxide 400 mg PO daily  - Follow-up biopsy results.      Assessment & Plan   # DDKT: CKD Stage 3 - Stable serum creatinine, at baseline 0.9, Cystatin C 6/29=47.   No indication for dialysis.    Pediatric en-bloc kidneys   - Baseline Creatinine: ~ 0.9-1.1   - Proteinuria: Normal (<0.2 grams)   - DSA Hx: No DSA   - Last cPRA: 9%   - BK Viremia: No   - Kidney Tx Biopsy Hx: No biopsy history.    # Immunosuppression : Tacrolimus immediate release (goal 8-10) and Mycophenolic acid (dose 540 mg every 12 hours)  nue to monitor for efficacy and toxicity of immunosuppression medications.   - Induction with Recent Transplant:  High Intensity Protocol   - Continue with intensive monitoring of immunosuppression for efficacy and toxicity.   - Historical Changes in Immunosuppression:  Previously stopped mycophenolate due to oral and vaginal ulcers, but endoscopy did not indicate mycophenolic acid toxicity.   - 07/02/24  Tacrolimus level: 10.1 (~ 13.5 hour trough)   - Changes: Not at this time    # Infection Prevention:      - PJP: Inhaled pentamidine; Okay to restart Bactrim on 7/5/24  - CMV: Valganciclovir (Valcyte); Patient is CMV IgG Ab discordant (D+/R-) and will continue on Valcyte x 6 months, then check CMV PCR monthly until 12 months post transplant.   - CMV IgG Ab High Risk Discordance (D+/R-): Yes  - EBV IgG Ab High Risk Discordance (D+/R-): No    # Blood Pressure: Controlled, but low at times;  Goal BP: < 140/90   - Changes: No, asymptomatic. continue to trend.    # Anemia in Chronic Renal Disease: Hgb: Stable, low      JUNG: Yes- Retacrit 4000units (6/29).   - Iron studies: Replete    # Leukopenia: Stable, low Likely medication related.  Negative CMV PCR.    # Mineral Bone Disorder:    -  Secondary renal hyperparathyroidism; PTH level: Minimally elevated ( pg/ml)        On treatment: None  - Vitamin D; level: Normal         On supplement: No  - Calcium; level: Normal          On supplement: No  - Phosphorus; level: Normal         On supplement: No    # Electrolytes:   - Potassium; level: Normal         On supplement: No  - Magnesium; level: Low          On supplement: No . Would give magnesium oxide 400 mg PO daily  - Bicarbonate; level: Normal         On supplement: No  - Sodium; level: Low    # Hyponatremia: Decreased serum sodium.  Likely secondary to decreased PO intake.    # GERD/Dysphagia/Esophagitis: Patient with apparent severe heartburn/GERD symptoms, worsening over the last week or so.  Patient underwent EGD 5/24/24 which was mostly unremarkable with only finding of minimal non-specific chronic inflammation in the stomach (negative H. pylori), but jessee esophageal findings.  In addition, recently had significant oral ulcers.  Now with patient unable to eat and minimal fluid intake.  She hasn't been able to take her medications in the last day due to pain with swallowing.  Her weight is down ~ 20 lbs, although up ~ 5 lbs with IV fluids the last day.  Patient is on famotidine, but unable to use PPI due to probable underlying kidney disease (interstitial nephritis) from previous PPI use. Continue Sucralfate.   - EGD 6/28/25  Esophageal ulcer with no bleeding and no stigmata of recent bleeding. Biopsied                         - LA Grade B esophagitis with no bleeding. Biopsied.                          - Z-line, 32 cm from the incisors.                          - Friable gastric mucosa.                          - Normal examined duodenum.  - HSV and CMV immunostains are negative. PAS stains are negative for fungal organisms.      # Oral and Genital Ulcers: Stable, but ongoing symptoms. Differential diagnosis is broad, but includes MPA induced, Crohn's disease, leishmania, and syphilis.   HSV and CMV testing negative.  Patient was off mycophenolic acid 6/14-7/1 without any improvement  - 07/02/24 Biopsy of vulva (result pending)      # Severe Malnutrition: Patient's weight is down ~ 20 lbs since transplant, although up a bit after IV fluids.  She hasn't been eating for the last week and minimal oral intake overall due to GERD/dysphagia symptoms.  Now unable to take oral medications.              - Declining EN supplement.started PPN 6/29 .    # Other Significant PMH:   - Crohn's Disease: Appears to be stable.  Previously was on ustekinumab prior to kidney transplant, but hasn't restarted it due to ongoing oral and vaginal ulcers.  Does occasionally alternate between diarrhea and constipation, but no issues at this time.  Followed by GI and [facility].   - Cardiac/Vascular Disease Risk Factors: Dilated ascending aorta (3.8 cm)  on 2024 ECHO (stable from 2020).       # Transplant History:  Etiology of Kidney Failure: Interstitial nephritis  Tx: DDKT - Pediatric en bloc  Transplant: 4/17/2024 (Kidney)  Significant transplant-related complications:  Oral and vaginal ulcers, esophagitis       Recommendations were communicated to the primary team via this note.    Discussed with .    Jas Gutierrez, APRN CNP  Transplant Nephrology  Contact information available via [facility] Paging/Directory        Physician Attestation     I saw and evaluated Ira Zamora as part of a shared APRN/PA visit.     I personally reviewed the vital signs, medications, labs, and imaging.    I personally provided a substantive portion of care for this patient and I approve the care plan as written by the ERICA.  I was involved with Medical Decision Making including: Please see A&P for additional details of medical decision making.  MANAGEMENT DISCUSSED with the following over the past 24 hours: persistent oral pain, tolertaing po meds. Continue tacrolimus SL and MPA. f/up vulvar bx path results pending.      Rachel Arellano,  MD  Date of Service (when I saw the patient): 7/2/24      Interval History  Resumed mycophenolic acid yesterday.  Biopsy of vulva today.  Ms. Zamora's creatinine is 0.96 (07/02 0751); Increased from 0.90 yesterday.  Estimated Creatinine Clearance: 69.8 mL/min (based on SCr of 0.9 mg/dL).   I/O last 3 completed shifts:  In: 697.4 [I.V.:27]  Out: 1300 [Urine:1300]   Other significant labs/tests/vitals: SBP mostly 130s overnight. Afebrile  No acute events overnight.  No chest pain or shortness of breath.  No leg swelling.  No nausea or vomiting.  No diarrhea.  Ongoing esophageal discomfort.        Review of Systems   4 point ROS was obtained and negative except as noted in the Interval History.    MEDICATIONS:  Current Facility-Administered Medications   Medication Dose Route Frequency Provider Last Rate Last Admin    aspirin (ASA) chewable tablet 81 mg  81 mg Oral Daily Sina Olivier MD   81 mg at 07/01/24 0829    famotidine (PEPCID) tablet 40 mg  40 mg Oral BID Holli Nelson APRN CNP   40 mg at 07/01/24 2047    lipids plant base (CLINOLIPID) 20 % infusion 250 mL  250 mL Intravenous Once per day on Monday Tuesday Wednesday Thursday Friday Saturday Sid Desai MD 20.8 mL/hr at 07/01/24 2152 250 mL at 07/01/24 2152    mycophenolic acid (GENERIC EQUIVALENT) EC tablet 540 mg  540 mg Oral BID IS Holli Nelson APRN CNP   540 mg at 07/01/24 1815    PARoxetine (PAXIL) tablet 30 mg  30 mg Oral QPM Holli Nelson APRN CNP   30 mg at 07/01/24 2353    silver sulfADIAZINE (SILVADENE) 1 % cream   Topical BID Frida Davis MD   Given at 06/29/24 0812    sodium chloride (PF) 0.9% PF flush 3 mL  3 mL Intracatheter Q8H Jordy Carvalho MD   3 mL at 06/30/24 1650    sucralfate (CARAFATE) suspension 1 g  1 g Oral 4x Daily AC & HS AlturaHolli mckinley APRN CNP   1 g at 07/02/24 0600    tacrolimus (GENERIC EQUIVALENT) capsule 3 mg  3 mg Oral BID IS Holli Nelson APRN CNP   3 mg at 07/01/24 5951     valGANciclovir (VALCYTE) tablet 450 mg  450 mg Oral Daily Sid Desai MD   450 mg at 24 0830     Current Facility-Administered Medications   Medication Dose Route Frequency Provider Last Rate Last Admin    dextrose 10% infusion   Intravenous Continuous PRN Sid Desai MD        parenteral nutrition - Clinimix E   PERIPHERAL LINE IV TPN CONTINUOUS Sid Desai MD 63 mL/hr at 24 2153 New Bag at 24 2153    sodium chloride 0.45% infusion   Intravenous Continuous Sdi Desai MD 27 mL/hr at 24 0600 New Bag at 24 0600       Physical Exam   Temp  Av.9  F (36.6  C)  Min: 97.5  F (36.4  C)  Max: 98.3  F (36.8  C)      Pulse  Av.9  Min: 76  Max: 118 Resp  Av  Min: 16  Max: 20  SpO2  Av.1 %  Min: 93 %  Max: 100 %     /89 (BP Location: Left arm)   Pulse 98   Temp 98.4  F (36.9  C) (Oral)   Resp 18   Wt 50.2 kg (110 lb 11.2 oz)   SpO2 98%   BMI 20.25 kg/m      Admit       GENERAL APPEARANCE: alert and no distress  HEENT: resolving oral lesions.   RESP: lungs clear to auscultation - no rales, rhonchi or wheezes  CV: regular rhythm, normal rate, no rub, no murmur  EDEMA: no LE edema bilaterally  ABDOMEN: soft, nondistended, nontender, bowel sounds normal  MS: extremities normal - no gross deformities noted, no evidence of inflammation in joints, no muscle tenderness  SKIN: no rash  DIALYSIS ACCESS: right arm fistula +bruit/+thrill    Data   All labs reviewed by me.  CMP  Recent Labs   Lab 24  1643 24  1037 24  0618 24  0507 24  1200 24  0516 24  2312 24  0616 24  0602 24  1357 24  1706   NA  --   --  135  --   --  136  --  135 137   < > 136   POTASSIUM  --   --  4.3  --   --  4.2  --  4.2 4.4   < > 5.0   CHLORIDE  --   --  104  --   --  107  --  105 105   < > 102   CO2  --   --  22  --   --  22  --  19* 23   < > 22   ANIONGAP  --   --  9  --   --  7  --  11 9   < > 12   * 111* 102* 102*    < > 103*   < > 120* 125*   < > 136*   BUN  --   --  21.4*  --   --  13.0  --  9.2 14.4   < > 26.3*   CR  --   --  0.90  --   --  0.92  --  0.94 1.00*   < > 1.02*   GFRESTIMATED  --   --  86  --   --  83  --  81 75   < > 74   ROBLES  --   --  9.9  --   --  9.5  --  9.6 9.8   < > 10.5*   MAG  --   --  1.7  --   --  1.5*  --  1.8 2.8*   < >  --    PHOS  --   --  3.2  --   --  2.8  --  2.6 3.4   < >  --    PROTTOTAL  --   --  6.0*  --   --  5.4*  --   --   --   --  6.8   ALBUMIN  --   --  3.2*  --   --  2.9*  --   --   --   --  3.6   BILITOTAL  --   --  0.2  --   --  0.2  --   --   --   --  0.4   ALKPHOS  --   --  73  --   --  68  --   --   --   --  95   AST  --   --  18  --   --  15  --   --   --   --  28   ALT  --   --  13  --   --  16  --   --   --   --  49    < > = values in this interval not displayed.     CBC  Recent Labs   Lab 07/01/24 0618 06/30/24 0516 06/29/24  0616 06/28/24  0602   HGB 9.1* 8.2* 8.7* 9.1*   WBC 2.6* 2.8* 3.2* 2.9*   RBC 3.10* 2.73* 2.95* 3.03*   HCT 30.5* 27.4* 29.4* 29.5*   MCV 98 100 100 97   MCH 29.4 30.0 29.5 30.0   MCHC 29.8* 29.9* 29.6* 30.8*   RDW 14.1 13.9 14.0 13.8    203 221 248     INR  Recent Labs   Lab 07/01/24 0618 06/30/24  0516 06/28/24  0602   INR 1.20* 1.27* 1.25*     ABGNo lab results found in last 7 days.   Urine Studies  Recent Labs   Lab Test 06/06/24  1113 05/03/24  1600 04/17/24  1113 09/29/22  1153   COLOR Yellow Light Yellow Straw Yellow   APPEARANCE Clear Clear Clear Clear   URINEGLC Negative 100* 70* 100*   URINEBILI Negative Negative Negative Negative   URINEKETONE Negative Negative Negative Negative   SG 1.028 1.014 1.004 1.010   UBLD Small* Negative Trace* Trace*   URINEPH 5.5 5.5 8.5* 8.5*   PROTEIN 10* Negative 100* 100*   NITRITE Negative Negative Negative Negative   LEUKEST Moderate* Trace* Negative Negative   RBCU 2 2 2 <1   WBCU 6* 4 <1 1     No lab results found.  PTH  Recent Labs   Lab Test 05/23/24  0949 04/22/24  0821   PTHI 131* 138*     Iron  Studies  Recent Labs   Lab Test 05/23/24  0949 04/22/24  0821   IRON 77 53   * 147*   IRONSAT 38 36   PRICILA 2,181* 2,181*       IMAGING:  All imaging studies reviewed by me.

## 2024-07-02 NOTE — PROGRESS NOTES
BP (!) 145/85 (BP Location: Left arm)   Pulse 93   Temp 98.3  F (36.8  C) (Oral)   Resp 18   Wt 50.2 kg (110 lb 11.2 oz)   SpO2 99%   BMI 20.25 kg/m      [id number]    Patient A&Ox4, on room air, independent, hypertensive but OVSS. NPO at 0000 for vulvar biopsy on 7/2. Pain stated 7/10, atarax given x1, lidocaine solution given x4, and oxycodone given x1. Patient had pain in her PIV during her TPN infusion, the PIV was removed and replaced, new PIV is tolerated well. TPN is infusing at 63 mL/hr and lipids are infusing at 20.8 mL/hr. Oral ulcers are improving, still has significant pain with esophageal ulcers though. Family all at bedside today, all supportive and attentive,  is staying the night. Continue POC.

## 2024-07-02 NOTE — PROGRESS NOTES
Transplant Infectious Diseases Inpatient Consultation      Ira Zamora MRN# [id number]   YOB: 1990 Age: 34 year old   Date of Admission and time: 6/27/2024  1:24 PM     Reason for consult: Genital and Oral ulcers         Recommendations:   Will follow-up with biopsy results.   Have called GYN and confirmed that they will send the following: fungal stains and culture, AFB stains and culture, as well as HSV, VZV, EBV, and CMV PCRs from tissue. I have ordered these.   Would ask pathologist to do fungal and AFB stains on tissue   Agree with rheumatology consult to discuss whether this could be an autoimmune issue    Continue VGCV and fluconazole for prophylaxis  Re-starting TMP/SMX SS daily on 7/5.     Transplant infectious diseases will continue to follow with you.     Attestation:  Total duration of visit including chart review, reviewing labs and imaging, interviewing and examining the patient, documentation, and sending communication to the primary treating team, all at the same day of this encounter, is: 52 minutes       Janee Bashir MD  Transplant Infectious Diseases Attending  [phone]          Synopsis of Clinical Presentation and Course   Transplant Summary  Transplants:  4/17/2024 (Kidney); POD  76.  Coordinator Radha Jorge  Reason for Transplantation: Interstitial nephritis   Current Immunosuppression: MMF d/kamini on 6/17 due to ulcers. Restarting on 7/2/24. Tacrolimus (goal 8-10). Prednisone 10 mg/day (d/kamini 7/2/2024)     Ira Zamora is a 34 year old female with history of DDKT (10/17/24) and Chron's diases on Stelara (last dose 3/24), with course c/b aphthous ulcers in her mouth, esophageal ulcer, and labial ulcers. She initially developed aphthous oral ulcers in late May She was admitted on 6/5-6/9 due to oral and vaginal ulcers. Extensive infectious work-up negative (HSV swabs, pelvic exa, G/C and chlaydia, RPR). There was concern for MMF as a cause of her oral ulcers, so this  was discontinued. Since discontinuing MMF, her oral ulcers have improved. However, her labial ulcer has not improved. She developed worsening odynophagia, which prompted admission on 6/27/24. EGD from 6/30/24 showed 3 esophageal ulcers. We are being consulted for additional assistance with work-up of infectious causes of her ulcers.         Active Problems and Infectious Diseases Issues:   Oral ulcers  Labial ulcer  Esophageal ulcers  History of Chron's disease, on chronic Stelara (last in 3/2024)  At this point, extensive infectious work-up has been negative. This includes HSV PCR from blood, swabs of oral (6/5 and 6/28) and vulvar lesions (6/5). HIV, coxsackie IgG, Mycoplasma pneumoniae PCR, blood adenovirus PCR, urine histo antigen, GC/chlamydia, treponemal testing all negative. There is a rare syndrome called ulcus vulvae acutum (Lipschütz ulcer), which is a painful and necrotic self-healing ulcer that occurs in young women. Most typically, it is associated with primary EBV, but she is EBV IgG positive and has been on VGCV so this would be unlikely. Occasionally other viral infections (mumps, influenza, CMV, and adenovirus) can cause this, but we have essentially ruled out CMV, adenovirus, and influenza at this point. There have also been occasional reports of disseminated histoplasmosis causing vulvar ulcers. Thus, I think it is reasonable to send EBV, VZV, and CMV PCRs from the biopsy as well as fungal and AFB cultures, even though these are unlikely.     Overall, I think this is looking less likely to be infectious. It could still be related to MMF, although it seems that her most recent esophageal ulcers started after discontinuing MMF, which makes this less likely. There are also autoimmune conditions, including Chron's and Behcet's that are associated with oral and  ulcers. Behcet's is typically associated with uveitis, which she doesn't have any symptoms that I could elicit. Since she has known Chron's,  I will be interested to hear Rheumatology's thoughts regarding her case. This would help us to know whether targeted immunosuppression may be necessary.     Transplant Checklist  - QTc interval: 464 on 6/7/24  - Pneumocystis prophylaxis: Inhaled pentamidine (last 6/7/24). Held TMP/SMX due to leukopenia. Planning to restart TMP/SMX on 7/5/24.   - Bacterial prophylaxis:  None  - Fungal prophylaxis: Fluconazole  - Serostatus & viral prophylaxis: CMV D+/R-, HSV ?, EBV D+/R+, VZV +, Valgancyclovir  - Immunization status:  N/A until 3 months after transplant   - Gamma globulin status: No lab results found.  - Antibiotic allergies: None        Old Problems and Infectious Diseases Issues:   None           History of Present Illness    Ira Zamora is a 34 year old female with history of DDKT (10/17/24) and Chron's diases on [facility], with course c/b aphthous ulcers in her mouth, esophageal ulcer, and labial ulcers. She initially developed aphthous oral ulcers in late May. Her timeline is as follows:     [facility]30/24: [facility] with 1 week history of swollen gums and mouth sore. Had low-grade fever to 100.9. Treated with Magic Mouthwash.   6/3/24: Presents to [facility] with 2-3 day history of labial ulcer. Treated with topical lidocaine and soaks. Given Valtrex, but did not start this  6/5-6/9/24: Admitted for work-up of oral and vaginal ulcers. Seen by [facility]. Work-up included negative GC/chlamydia, treponema antibody, HIV, Coxackie virus, M pneumoniae PCR, urine histo, CMV and HSV swabs of lesion (buccal), and Karius test (low-level of CMV only).  Mid-July: MMF stopped in case this was contributing to ulcers.   6/24/24: Seen by [facility], who felt ulcers were most likely 2/2 MMF. Planned to watchfully wait for a few weeks to see if things improved.   6/27/24: Admitted with ~1 week history of odynophagia and inability to consume solid food.     Since admission, she has been seen by GYN and GI. GI did EGD on 6/28, which showed  3 esophageal ulcers. Biopsies were negative for CMV or HSV viral inclusions. No evidence of malignancy.          Interval Events   Underwent biopsy of vulvar lesion with GYN in OR today. No new issues have occurred since. She states that the biopsy went well. Rheumatology consulted and pending their evaluation.          Immunizations:     Immunization History   Administered Date(s) Administered    COVID-19 Bivalent 12+ (Pfizer) 09/23/2022    COVID-19 MONOVALENT 12+ (Pfizer) 01/21/2021, 02/10/2021, 09/16/2021    COVID-19 Monovalent 12+ (Pfizer 2022) 02/25/2022    HPV Quadrivalent 10/28/2008, 06/20/2011, 10/28/2011    HepB, Unspecified 12/30/2002    Hepatitis A (ADULT 19+) 02/19/2019, 08/19/2019    Hepatitis B, Peds 12/30/2002    Influenza (intradermal) 09/14/2021    Influenza Vaccine 18-64 (Flublok) 10/13/2023    Pneumo Conj 13-V (2010&after) 10/23/2018    Pneumococcal 20 valent Conjugate (Prevnar 20) 11/17/2023    Pneumococcal 23 valent 11/22/2021    Pneumococcal, Unspecified 12/18/2018, 11/22/2021    TDAP (Adacel,Boostrix) 10/23/2012    Td (Adult), Adsorbed 04/12/2004, 01/01/2006            Allergies:     Allergies   Allergen Reactions    Amlodipine Headache and Other (See Comments)     Other Reaction(s): Unknown    Omeprazole Other (See Comments)     All PPIs per patient    Proton Pump Inhibitors Nephrotoxicity     No PPIs due to history of renal failure due to interstitial nephritis    Tegaderm Transparent Dressing (Informational Only) Blisters             Medications:   Medications that Require Transfusion:   Current Facility-Administered Medications   Medication Dose Route Frequency Provider Last Rate Last Admin    dextrose 10% infusion   Intravenous Continuous PRN Sid Desai MD        parenteral nutrition - Clinimix E   PERIPHERAL LINE IV TPN CONTINUOUS Sid Desai MD 63 mL/hr at 07/01/24 2153 New Bag at 07/01/24 2153    sodium chloride 0.45% infusion   Intravenous Continuous Sid Desai MD 27 mL/hr  "at 07/02/24 0600 New Bag at 07/02/24 0600     Scheduled Medications:   Current Facility-Administered Medications   Medication Dose Route Frequency Provider Last Rate Last Admin    aspirin (ASA) chewable tablet 81 mg  81 mg Oral Daily Sina Olivier MD   81 mg at 07/02/24 0801    famotidine (PEPCID) tablet 40 mg  40 mg Oral BID Holli Nelson APRN CNP   40 mg at 07/02/24 0801    lipids plant base (CLINOLIPID) 20 % infusion 250 mL  250 mL Intravenous Once per day on Monday Tuesday Wednesday Thursday Friday Saturday Sid Desai MD 20.8 mL/hr at 07/01/24 2152 250 mL at 07/01/24 2152    mycophenolic acid (GENERIC EQUIVALENT) EC tablet 540 mg  540 mg Oral BID IS Holli Nelson APRN CNP   540 mg at 07/02/24 0801    PARoxetine (PAXIL) tablet 30 mg  30 mg Oral QPM Holli Nelson APRN CNP   30 mg at 07/01/24 2353    silver sulfADIAZINE (SILVADENE) 1 % cream   Topical BID Frida Davis MD   Given at 06/29/24 0812    sodium chloride (PF) 0.9% PF flush 3 mL  3 mL Intracatheter Q8H Jordy Carvalho MD   3 mL at 06/30/24 1650    sucralfate (CARAFATE) suspension 1 g  1 g Oral 4x Daily AC & HS Holli Nelson APRN CNP   1 g at 07/02/24 0600    tacrolimus (GENERIC EQUIVALENT) capsule 3 mg  3 mg Oral BID IS Holli Nelson APRN CNP   3 mg at 07/02/24 0801    valGANciclovir (VALCYTE) tablet 450 mg  450 mg Oral Daily Sid Desai MD   450 mg at 07/02/24 0801          Physical Exam     Physical Exam     Vital signs:  /89 (BP Location: Left arm)   Pulse 98   Temp 98.4  F (36.9  C) (Oral)   Resp 18   Ht 1.575 m (5' 2\")   Wt 49.6 kg (109 lb 6.4 oz)   SpO2 98%   BMI 20.01 kg/m      GENERAL: alert and no distress  RESP: Breathing comfortably on room air. No accessory muscle use.   CV: Appears well-perfused.    (female): Not examined today.   MS: no gross musculoskeletal defects noted, no edema  Sink: No visible rashes on face, arms       Data     Data   Laboratory data and imaging " listed below was reviewed prior to this encounter.     Microbiology:    Culture   Date Value Ref Range Status   06/06/2024 No Growth  Final   05/03/2024 No Growth  Final   , CD4: No lab results found. and HIV RNA: No lab results found., Hepatitis B Testing:   Recent Labs   Lab Test 04/17/24  0958 09/29/22  1140   HBCAB Nonreactive Nonreactive   HEPBANG Nonreactive Nonreactive   , Hepatitis C Testing:   Hepatitis C Antibody   Date Value Ref Range Status   04/17/2024 Nonreactive Nonreactive Final     Comment:     A nonreactive screening test result does not exclude the possibility of exposure to or infection with HCV. Nonreactive screening test results in individuals with prior exposure to HCV may be due to antibody levels below the limit of detection of this assay or lack of reactivity to the HCV antigens used in this assay. Patients with recent HCV infections (<3 months from time of exposure) may have false-negative HCV antibody results due to the time needed for seroconversion (average of 8 to 9 weeks).   09/29/2022 Nonreactive Nonreactive Final   ,  HSV 1/2 IgG: No lab results found., HVS 1/2 PCR: No lab results found., VZV PCR: No lab results found., and VZV IgG:   Recent Labs   Lab Test 09/29/22  1140 09/14/21  1435   VZVIGGIV 1,687.0 2,203.0*   VZVIGG Positive Positive*   , Quantitative CMV PCR:   Recent Labs   Lab Test 06/27/24  0829 06/05/24  2232 05/31/24  0930 04/23/24  0735 04/17/24  0958   CMVQNT Not Detected Not Detected Not Detected Not Detected Not Detected    and CMV IgG:   Recent Labs   Lab Test 04/17/24  0958 09/29/22  1140   CMVIGGIV <0.20 <0.20   CMVIGG No detectable antibody. No detectable antibody.   , and EBV EA IgG: No lab results found.                    Janee Bashir MD  [facility]  Contact information available via [facility] Paging/Directory     07/02/2024

## 2024-07-02 NOTE — ANESTHESIA POSTPROCEDURE EVALUATION
Patient: Ira Zamora    Procedure: Procedure(s):  EXAM UNDER ANESTHESIA, PELVIS  BIOPSY, VULVA       Anesthesia Type:  General    Note:  Disposition: Inpatient   Postop Pain Control: Uneventful            Sign Out: Well controlled pain   PONV: No   Neuro/Psych: Uneventful            Sign Out: Acceptable/Baseline neuro status   Airway/Respiratory: Uneventful            Sign Out: Acceptable/Baseline resp. status   CV/Hemodynamics: Uneventful            Sign Out: Acceptable CV status; No obvious hypovolemia; No obvious fluid overload   Other NRE: NONE   DID A NON-ROUTINE EVENT OCCUR? No       Last vitals:  Vitals Value Taken Time   /59 07/02/24 1345   Temp 36.8  C (98.2  F) 07/02/24 1335   Pulse 87 07/02/24 1357   Resp 21 07/02/24 1357   SpO2 95 % 07/02/24 1357   Vitals shown include unfiled device data.    Electronically Signed By: Jesus Brownlee MD  July 2, 2024  1:58 PM

## 2024-07-02 NOTE — PLAN OF CARE
"BP (!) 156/74 (BP Location: Right arm)   Pulse 85   Temp 98.2  F (36.8  C) (Oral)   Resp 17   Ht 1.575 m (5' 2\")   Wt 49.6 kg (109 lb 6.4 oz)   SpO2 96%   BMI 20.01 kg/m    Shift: 3594-3811  Isolation Status: standard  VS: stable on room air, afebrile  Neuro: Aox4  Behaviors: calm, able to make needs known  BG: daily   Labs: mag 1.4 (replaced), creat 0.96  Respiratory: WDL  Cardiac: WDL  Pain/Nausea: esophageal pain, 3/10 vulvar pain from biopsies; denies nausea   PRN: lidocaine solution x1  Diet: had been NPO, now on clears (no appetite)  IV Access: L PIV x2   Infusion(s): PPN + lipids, magnesium  Lines/Drains: none  GI/: no BM on shift, voiding in hat   Skin: vulva ulcerations, some blood on pad changed x1  Mobility: SBA   Events/Education: OR for biopsies of vulvar lesions back up to 7A @1430  Plan: continue POC and notify team of changes        "

## 2024-07-02 NOTE — PROGRESS NOTES
Care Management Follow Up    Length of Stay (days): 5    Expected Discharge Date: 2024     Concerns to be Addressed:       Patient plan of care discussed at interdisciplinary rounds: Yes    Anticipated Discharge Disposition:       Anticipated Discharge Services:  Not applicable at this time  Anticipated Discharge DME:  Not applicable at this time    Patient/family educated on Medicare website which has current facility and service quality ratings:  Not applicable at this time. Family expressed no questions regarding ESRD medicare.  Education Provided on the Discharge Plan:  Mental health resources, [facility], ESRD  Patient/Family in Agreement with the Plan:  Yes    Referrals Placed by CM/SW:  [place] paperwork needing to be updated, Follow-up education needed by team specific to GI questions  Private pay costs discussed: Not applicable    Additional Information:  MSW met with Ira and  at bedside to offer support, monitor post transplant needs, and provide education. Ira became tearful when prompted to discuss her well being. MSW provided supportive counseling via active listening, validation, and identifying strengths. Ira verbalized symptoms of irritability, grief, and anxiety specific to her post tx complications. MSW offered referrals to speak to therapist due to complexity of Ira's health and mental coping. Ira declined additional support at this time though verbalized plan to reach out in the future as needed. Ira added that she felt most of her complicated processing is coming from her GI symptoms. Ira verbalized that she did not feel her GI testing was adequately explained to her and would like an additional GI consult. MSW advocated for Ira to the team specific to GI follow-up. HEBERT De La Garza verbalized plan to follow up with education. Lastly, Ira's  noted that her [facility] paperwork would  on Friday and needed to update her paperwork. MSW directed Ira to email her [facility] paperwork to the  team member that previously helped her to complete it. Ira sent email and verbalized plans to reach out in the future as needed.    VILLA Arndt, SW  Clinical       [facility]  Kidney, Pancreas, Auto-Islet   [address] [place] [address] [place] [id number]  [email or web address]  Office: [phone]  Fax: [phone]

## 2024-07-02 NOTE — ANESTHESIA CARE TRANSFER NOTE
Patient: Ira Zamora    Procedure: Procedure(s):  EXAM UNDER ANESTHESIA, PELVIS  BIOPSY, VULVA       Diagnosis: Vulvar ulceration [N76.6]  Diagnosis Additional Information: No value filed.    Anesthesia Type:   General     Note:    Oropharynx: oropharynx clear of all foreign objects and spontaneously breathing  Level of Consciousness: drowsy  Oxygen Supplementation: nasal cannula  Level of Supplemental Oxygen (L/min / FiO2): 3  Independent Airway: airway patency satisfactory and stable  Dentition: dentition unchanged  Vital Signs Stable: post-procedure vital signs reviewed and stable  Report to RN Given: handoff report given  Patient transferred to: PACU  Comments: Pt extubated in the OR without incident or complications. Pt VSS upon arrival to the PACU. Pt has no c/o pain/N/V. Pt care report given to receiving RN.   Handoff Report: Identifed the Patient, Identified the Reponsible Provider, Reviewed the pertinent medical history, Discussed the surgical course, Reviewed Intra-OP anesthesia mangement and issues during anesthesia, Set expectations for post-procedure period and Allowed opportunity for questions and acknowledgement of understanding      Vitals:  Vitals Value Taken Time   BP     Temp     Pulse 100 07/02/24 1336   Resp     SpO2 100 % 07/02/24 1336   Vitals shown include unfiled device data.    Electronically Signed By: LAURITA Ponce CRNA  July 2, 2024  1:36 PM

## 2024-07-02 NOTE — CONSULTS
RHEUMATOLOGY CONSULT NOTE     Ira Zamora MRN# [id number]   Age: 34 year old YOB: 1990     Date of Admission:  6/27/2024  Reason for consult: Work-up for rheumatologic cause of esophageal, vulvar, and oral ulcers.     Assessment and Plan:   Discussion:     The patient is a 34-year-old woman with a history of renal transplant (4/2024 DDKT 2/2 interstitial nephritis; started on tacrolimus and MMF for immunosuppression),Crohn's disease (previously treated with Stelara, last dose 3/2024) who presented with one week of odynophagia and poor oral intake in the setting of ~1 month of oral and vulvar ulcers. EGD 6/28 demonstrated 3 esophageal ulcers, which were biopsied. The extensive infectious disease work-up (see ID note) was negative, and rheumatology was consulted.     The patient has a history of Crohn's disease, but this current presentation is less likely attributable to Crohn's disease, given the extra-GI involvement and the lack of granulomas on pathologic evaluation of the ulcers. Moreover, the patient reports her Crohn's disease was well-controlled (with diarrhea as her predominant symptom). 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 However, the vulvar biopsy is pending, which may aid in the diagnosis.     Although Bechet's is a working diagnosis, additional rheumatologic diseases are on the differential. SLE and vasculitides can have similar presentations. Her kidney disease (interstitial nephritis, bx 10/7/2011) had negative serology work-up at the time (negative BRAVO, anti-dsDNA, anti-GBM, ANCA, RF), but it has been almost 13 years, so it would be worth repeating the labs (ordered by our team). Given the negative infectious disease work-up, it is very unlikely to be an infection  and more likely to be rheumatologic. Therefore, we recommend prednisone 20mg daily. Additionally, we recommend Magic Mouthwash prn for oral/esophageal ulcer symptom relief.     For long term treatment of her Crohn's and potential Bechet's, an anti-TNF may be beneficial for both (however, she previously tried infliximab (stopped being effective) and adalimumab (never beneficial)). However, other diagnoses need to be ruled out before deciding on definitive treatment for the possible Bechet's.     #Esophageal ulcers  #Oral Ulcers  #Vulvar ulcer  #Odynophagia  #Immunosuppression 2/2 renal transplant  #Hx Crohn's disease    Recommendations:  -- Pending labs (MPO, Proteinase 3, BRAVO, ANCA) and vulvar bx  -- Prednisone 20mg daily  -- Magic Mouthwash prn    The patient was seen and staffed with Dr. French.     Mary Lou Schwarz, MS4    I was present with the medical student who took the history and acted as a scribe. I have verified the history, reviewed imaging and laboratory data, and personally performed the physical exam. I formulated the assessment and plan. H/o Crohn's and ENRRIQUE, recent DDKT now with progressive oral/esophageal ulceration with odynophagia, plus painful progressive labial ulcer. Meticulous infectious workup negative, and I doubt connective tissue disease, adverse medication effect, or Crohn's ileitis. 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 Oral corticosteroid therapy may be ramped up prn poor response to initial low dose in combination with topical Magic Mouthwash does not result in significant relief after 24-48 hours.    Carlitos French M.D.  Staff Rheumatologist, [facility]  Pager [phone]           Chief Complaint:   Ulcers and odynophagia         History of Present Illness:     The patient is a 34-year-old woman with a history of renal transplant (4/2024 DDKT 2/2 interstitial nephritis; started on tacrolimus and MMF for immunosuppression),Crohn's disease (diarrhea as prominent symptom; previously  "treated with Stelara, last dose 3/2024), ascending aortic dilation (stable since dx), and juvenile rheumatoid arthritis (asymptomatic in adulthood per chart review) who presented with one week of odynophagia and poor oral intake.     In late May 2024, the patient initially had low-grade fevers with swollen gums and a mouth sore. Around 1 week later she developed a painful labial ulcer that looked \"like a canker sore\". She was hospitalized 6/5-6/9 for the ulcers, but infectious work-up (HSV, CMV, Adenovirus, EBV,  Mycoplasma pneumoniae, HIV, syphilis, Coxsackie ab, chlamydia/gonorrhea ,Karius test, and urine histo antigen) was negative. In mid-June, 10mg daily prednisone was started. MMF was discontinued 6/17 (due to concern that the ulcers were an adverse reaction to MMF). 1 week later she developed esophageal pain. Initially drinks/food with cold temperatures would incite the pain, but eventually the pain occurred with all solids and liquids. She had poor oral intake, and she presented to the ED on 6/26/24. She was admitted, and EGD on 6/28 demonstrated 3 esophageal ulcers, and biopsies showed no evidence of malignancy, HSV, CMV, or fungal infections. The patient is underwent a vulvar biopsy 7/2.     Given the negative infectious work-up, rheumatology was consulted to determine whether the ulcers may be due to an underlying autoimmune/inflammatory cause.     When interviewed, the patient reports no joint pain or rashes. She reports the prednisone was \"somewhat helpful,\" and her mouth sores feel better. However, the vulvar and esophageal lesions are still very painful. She does not have dry, red, or painful eyes. She reports her Crohn's disease is well-controlled on Stelara (previously tried Humira (which was ineffective) and infliximab (stopped being effective)).            Past Medical History:     Past Medical History:   Diagnosis Date    Anemia in chronic kidney disease     Anxiety 2007    Ascending aorta " dilation (H24) 2020: ascending aorta 3.8 cm, aortic sinus 3.6 cm.  Echo: Ao root diam: 3.2 cm, asc Aorta Diam: 3.8 cm    ASCUS with positive high risk HPV cervical     ASCUS with positive high risk HPV cervical 2017    Crohn's disease (H)     ESRD (end stage renal disease) on dialysis (H)     GERD (gastroesophageal reflux disease)     Hidradenitis suppurativa 2015    History of blood transfusion     Hypertension     Juvenile rheumatoid arthritis (H)     Asymptomatic in adulthood    Kidney replaced by transplant 2024    En bloc. Induction w/ Thymoglobulin.    Secondary renal hyperparathyroidism (H24)     Tubulointerstitial nephropathy 2011    kidney biopsy 2011 - Acute and  chronic tubulointerstitial nephropathy.             Past Surgical History:     Past Surgical History:   Procedure Laterality Date    ESOPHAGOSCOPY, GASTROSCOPY, DUODENOSCOPY (EGD), COMBINED N/A 2024    Procedure: ESOPHAGOGASTRODUODENOSCOPY, WITH BIOPSY;  Surgeon: Tamy Miranda MD;  Location:  GI    H NEEDLE GUIDE,  KIDNEY BIOPSY      TRANSPLANT KIDNEY RECIPIENT  DONOR N/A 2024    Procedure: Transplant kidney recipient  donor,bilateral uretral stent implantation;  Surgeon: Carlitos Díaz MD;  Location:  OR            Social History:     Social History     Socioeconomic History    Marital status:      Spouse name: Not on file    Number of children: Not on file    Years of education: Not on file    Highest education level: Not on file   Occupational History    Not on file   Tobacco Use    Smoking status: Never     Passive exposure: Never    Smokeless tobacco: Never   Substance and Sexual Activity    Alcohol use: Not Currently     Comment: Very occasional, last drink two months ago, wine cooler    Drug use: Never    Sexual activity: Not on file   Other Topics Concern    Not on file   Social History Narrative    Not on file     Social Determinants  of Health     Financial Resource Strain: Low Risk  (6/3/2024)    Received from [facility]    Financial Resource Strain     Difficulty of Paying Living Expenses: 3     Difficulty of Paying Living Expenses: Not on file   Food Insecurity: No Food Insecurity (6/3/2024)    Received from [facility]    Food Insecurity     Worried About Running Out of Food in the Last Year: 1   Transportation Needs: No Transportation Needs (6/3/2024)    Received from [facility]    Transportation Needs     Lack of Transportation (Medical): 1   Physical Activity: Not on file   Stress: Not on file   Social Connections: Socially Integrated (6/3/2024)    Received from [facility]    Social Connections     Frequency of Communication with Friends and Family: 0   Interpersonal Safety: Not on file   Housing Stability: Low Risk  (6/3/2024)    Received from [facility]    Housing Stability     Unable to Pay for Housing in the Last Year: 1             Family History:     Family History   Problem Relation Age of Onset    Endometriosis Mother     Coronary Artery Disease Mother              Immunizations:     Most Recent Immunizations   Administered Date(s) Administered    COVID-19 Bivalent 12+ (Pfizer) 09/23/2022    COVID-19 MONOVALENT 12+ (Pfizer) 09/16/2021    COVID-19 Monovalent 12+ (Pfizer 2022) 02/25/2022    HPV Quadrivalent 10/28/2011    HepB, Unspecified 12/30/2002    Hepatitis A (ADULT 19+) 08/19/2019    Hepatitis B, Peds 12/30/2002    Influenza (intradermal) 09/14/2021    Influenza Vaccine 18-64 (Flublok) 10/13/2023    Pneumo Conj 13-V (2010&after) 10/23/2018    Pneumococcal 20 valent Conjugate (Prevnar 20) 11/17/2023    Pneumococcal 23 valent 11/22/2021    Pneumococcal, Unspecified 11/22/2021    TDAP (Adacel,Boostrix) 10/23/2012    Td (Adult), Adsorbed 01/01/2006              Allergies:     Allergies   Allergen Reactions    Amlodipine Headache and Other (See Comments)     Other Reaction(s): Unknown    Omeprazole Other (See Comments)     All PPIs per patient    Proton Pump Inhibitors Nephrotoxicity     No PPIs due to history of renal failure due to interstitial nephritis    Tegaderm Transparent Dressing (Informational Only) Blisters             Medications:     Medications Prior to Admission   Medication Sig Dispense Refill Last Dose    acetaminophen (TYLENOL) 500 MG tablet Take 1,000 mg by mouth every 6 hours as needed   Unknown    aspirin (ASA) 325 MG tablet Take 1 tablet (325 mg) by mouth daily 30 tablet 2 6/26/2024    atorvastatin (LIPITOR) 10 MG tablet Take 1 tablet (10 mg) by mouth daily 30 tablet 1 6/26/2024    benzocaine (ORAJEL MAXIMUM STRENGTH) 20 % GEL gel Take by mouth 4 times daily as needed for mouth sores 9 g 0 6/27/2024    famotidine (PEPCID) 20 MG tablet Take 2 tablets (40 mg) by mouth 2 times daily 60 tablet 1 6/26/2024 at pm    levonorgestrel (KYLEENA) 19.5 MG IUD 1 Device by Intrauterine route       magic mouthwash suspension (diphenhydrAMINE, lidocaine, aluminum-magnesium & simethicone) Swish and swallow 10 mLs in mouth every 6 hours as needed for mouth sores 560 mL 1 Past Week    magnesium oxide (MAG-OX) 400 MG tablet Take 2 tablets (800 mg) by mouth daily 60 tablet 0 6/26/2024    mineral oil-hydrophilic petrolatum (AQUAPHOR) external ointment Apply topically every hour as needed for other (barrier cream) 50 g 0 Unknown    PARoxetine (PAXIL) 30 MG tablet Take 30 mg by mouth every evening   6/26/2024 at pm    predniSONE (DELTASONE) 10 MG tablet Take 1 tablet (10 mg) by mouth daily for 90 days 90 tablet 0 6/26/2024    tacrolimus (GENERIC EQUIVALENT) 0.5 MG capsule Take 1 capsule (0.5 mg) by mouth 2 times daily 2.5mg twice daily 60 capsule 11 6/26/2024 at 2230    tacrolimus (GENERIC EQUIVALENT) 1 MG capsule Take 2 capsules (2 mg) by mouth 2 times daily 2.5mg twice daily  120 capsule 11 6/26/2024 at 2230    valGANciclovir (VALCYTE) 450 MG tablet Take 2 tablets (900 mg) by mouth daily 60 tablet 3 6/26/2024    sulfamethoxazole-trimethoprim (BACTRIM) 400-80 MG tablet Take 1 tablet by mouth daily Start after July 5, 2024 90 tablet 2     ustekinumab (STELARA) 90 MG/ML Inject 90 mg Subcutaneous once every eight weeks (Patient not taking: Reported on 6/27/2024)          Current Facility-Administered Medications   Medication Dose Route Frequency Provider Last Rate Last Admin    alum & mag hydroxide-simethicone (MAALOX) suspension 30 mL  30 mL Oral Q4H PRN Holli Nelson APRN CNP        aspirin (ASA) chewable tablet 81 mg  81 mg Oral Daily Sina Olivier MD   81 mg at 07/02/24 0801    calcium carbonate (TUMS) chewable tablet 500 mg  500 mg Oral TID PRN Holli Nelson APRN CNP   500 mg at 06/30/24 2136    dextrose 10% infusion   Intravenous Continuous PRN Sid Desai MD        famotidine (PEPCID) tablet 40 mg  40 mg Oral BID Holli Nelson APRN CNP   40 mg at 07/02/24 0801    hydrOXYzine HCl (ATARAX) tablet 25 mg  25 mg Oral Q6H PRN Holli Nelson APRN CNP   25 mg at 06/30/24 1826    Or    hydrOXYzine HCl (ATARAX) tablet 50 mg  50 mg Oral Q6H PRN Holli Nelson APRN CNP   50 mg at 07/01/24 2231    lactated ringers infusion   Intravenous Continuous Jesus Brownlee MD        lidocaine (LMX4) cream   Topical Q1H PRN Jesus Brownlee MD        lidocaine (LMX4) cream   Topical Q1H PRN Jordy Carvalho MD        lidocaine (viscous) (XYLOCAINE) 2 % solution 5 mL  5 mL Mouth/Throat Q2H PRN Smith Davidson MD   5 mL at 07/02/24 0803    lidocaine (XYLOCAINE) 2 % external gel   Topical Q4H PRN Sina Olivier MD   Given at 06/27/24 1619    lidocaine 1 % 0.1-1 mL  0.1-1 mL Other Q1H PRN Jesus Brownlee MD        lidocaine 1 % 1 mL  1 mL Other Q1H PRN Jordy Carvalho MD        lipids plant base (CLINOLIPID) 20 % infusion 250 mL  250 mL Intravenous Once  per day on Monday Tuesday Wednesday Thursday Friday Saturday Sid Desai MD 20.8 mL/hr at 07/01/24 2152 250 mL at 07/01/24 2152    mycophenolic acid (GENERIC EQUIVALENT) EC tablet 540 mg  540 mg Oral BID IS Holli Nelson APRN CNP   540 mg at 07/02/24 0801    naloxone (NARCAN) injection 0.2 mg  0.2 mg Intravenous Q2 Min PRN Sid Desai MD        Or    naloxone (NARCAN) injection 0.4 mg  0.4 mg Intravenous Q2 Min PRN Sid Desai MD        Or    naloxone (NARCAN) injection 0.2 mg  0.2 mg Intramuscular Q2 Min PRN Sid Desai MD        Or    naloxone (NARCAN) injection 0.4 mg  0.4 mg Intramuscular Q2 Min PRN Sid Desai MD        ondansetron (ZOFRAN) injection 4 mg  4 mg Intravenous Q6H PRN Jordy Carvalho MD        oxyCODONE (ROXICODONE) solution 5 mg  5 mg Oral Q4H PRN Rosita French PA-C   5 mg at 07/01/24 2228    parenteral nutrition - ADULT compounded formula   PERIPHERAL LINE IV TPN CONTINUOUS Sid Desai MD        parenteral nutrition - Clinimix E   PERIPHERAL LINE IV TPN CONTINUOUS Sid Desai MD 63 mL/hr at 07/01/24 2153 New Bag at 07/01/24 2153    PARoxetine (PAXIL) tablet 30 mg  30 mg Oral QPM Holli Nelson APRN CNP   30 mg at 07/01/24 2353    silver sulfADIAZINE (SILVADENE) 1 % cream   Topical BID Frida Davis MD   Given at 06/29/24 0812    sodium chloride (PF) 0.9% PF flush 3 mL  3 mL Intracatheter Q8H Jesus Brownlee MD        sodium chloride (PF) 0.9% PF flush 3 mL  3 mL Intracatheter q1 min prn Jesus Brownlee MD        sodium chloride (PF) 0.9% PF flush 3 mL  3 mL Intracatheter Q1H PRN Jordy Carvalho MD        sodium chloride (PF) 0.9% PF flush 3 mL  3 mL Intracatheter Q8H Jordy Carvalho MD   3 mL at 06/30/24 1650    sodium chloride 0.45% infusion   Intravenous Continuous Sid Desai MD 27 mL/hr at 07/02/24 0600 New Bag at 07/02/24 0600    sucralfate (CARAFATE) suspension 1 g  1 g Oral 4x Daily AC & HS Holli Nelson, APRN CNP   1 g at  "07/02/24 0600    tacrolimus (GENERIC EQUIVALENT) capsule 3 mg  3 mg Oral BID IS oHlli Nelson APRN CNP   3 mg at 07/02/24 0801    valGANciclovir (VALCYTE) tablet 450 mg  450 mg Oral Daily Sid Desai MD   450 mg at 07/02/24 0801            Review of Systems:   Complete 12 point ROS completed and negative unless mentioned in HPI.          Physical Exam:   /89 (BP Location: Left arm)   Pulse 98   Temp 98.4  F (36.9  C) (Oral)   Resp 18   Ht 1.575 m (5' 2\")   Wt 49.6 kg (109 lb 6.4 oz)   SpO2 98%   BMI 20.01 kg/m      Constitutional: Alert and oriented, appears uncomfortable (frequent repositioning)  Skin: Shallow erosions of medial inner lower lip and R buccal mucosa (smaller and less erythematous compared to images from 6/5). Vulvar exam deferred given recent procedure and bandages. No nail pitting, rash, nodules or lesions  Eyes: white sclera  Resp: breathing comfortably on room air  CV: No LE edema  Neuro: grossly nl cranial nerves  Psych: nl judgement, orientation, memory, affect.  MSK: The elbow, wrist, MCP/PIP/DIP,  knee, and ankle joints were examined and found normal. No active synovitis or altered joint anatomy. Full joint ROM.           Data:   Labs and imaging reviewed. Discussed in A/P where relevant.           "

## 2024-07-02 NOTE — ANESTHESIA PROCEDURE NOTES
Airway       Patient location during procedure: OR       Procedure Start/Stop Times: 7/2/2024 12:15 PM  Staff -        Other Anesthesia Staff: Carlos Enrique Decker       Performed By: SRNA  Consent for Airway        Urgency: elective  Indications and Patient Condition       Indications for airway management: rayshawn-procedural       Induction type:intravenous       Mask difficulty assessment: 1 - vent by mask    Final Airway Details       Final airway type: endotracheal airway       Successful airway: ETT - single  Endotracheal Airway Details        ETT size (mm): 7.0       Cuffed: yes       Cuff volume (mL): 6       Successful intubation technique: direct laryngoscopy       DL Blade Type: Baltazar 2       Grade View of Cords: 1       Adjucts: stylet       Position: Right       Measured from: gums/teeth       Secured at (cm): 20       Bite block used: None    Post intubation assessment        Placement verified by: capnometry, equal breath sounds and chest rise        Number of attempts at approach: 1       Number of other approaches attempted: 0       Secured with: tape       Ease of procedure: easy       Dentition: Intact and Unchanged    Medication(s) Administered   Medication Administration Time: 7/2/2024 12:15 PM

## 2024-07-02 NOTE — ANESTHESIA PREPROCEDURE EVALUATION
Anesthesia Pre-Procedure Evaluation    Patient: Ira Zamora   MRN: [id number] : 1990        Procedure : Procedure(s):  EXAM UNDER ANESTHESIA, PELVIS  BIOPSY, VULVA          Past Medical History:   Diagnosis Date     Anemia in chronic kidney disease      Anxiety 2007     Ascending aorta dilation (H24) 2020: ascending aorta 3.8 cm, aortic sinus 3.6 cm.  Echo: Ao root diam: 3.2 cm, asc Aorta Diam: 3.8 cm     ASCUS with positive high risk HPV cervical     ASCUS with positive high risk HPV cervical 2017     Crohn's disease (H)      ESRD (end stage renal disease) on dialysis (H)      GERD (gastroesophageal reflux disease)      Hidradenitis suppurativa      History of blood transfusion      Hypertension      Juvenile rheumatoid arthritis (H)     Asymptomatic in adulthood     Kidney replaced by transplant 2024    En bloc. Induction w/ Thymoglobulin.     Secondary renal hyperparathyroidism (H24)      Tubulointerstitial nephropathy 2011    kidney biopsy 2011 - Acute and  chronic tubulointerstitial nephropathy.      Past Surgical History:   Procedure Laterality Date     ESOPHAGOSCOPY, GASTROSCOPY, DUODENOSCOPY (EGD), COMBINED N/A 2024    Procedure: ESOPHAGOGASTRODUODENOSCOPY, WITH BIOPSY;  Surgeon: Tamy Miranda MD;  Location: [facility] GI     H NEEDLE GUIDE,  KIDNEY BIOPSY       TRANSPLANT KIDNEY RECIPIENT  DONOR N/A 2024    Procedure: Transplant kidney recipient  donor,bilateral uretral stent implantation;  Surgeon: Carlitos Díaz MD;  Location: [facility] OR      Allergies   Allergen Reactions     Amlodipine Headache and Other (See Comments)     Other Reaction(s): Unknown     Omeprazole Other (See Comments)     All PPIs per patient     Proton Pump Inhibitors Nephrotoxicity     No PPIs due to history of renal failure due to interstitial nephritis     Tegaderm Transparent Dressing (Informational Only) Blisters      Social History      Tobacco Use     Smoking status: Never     Passive exposure: Never     Smokeless tobacco: Never   Substance Use Topics     Alcohol use: Not Currently     Comment: Very occasional, last drink two months ago, wine cooler      Wt Readings from Last 1 Encounters:   07/02/24 49.6 kg (109 lb 6.4 oz)        Anesthesia Evaluation   Pt has had prior anesthetic. Type: General.        ROS/MED HX  ENT/Pulmonary:       Neurologic:       Cardiovascular:     (+)  hypertension- -   -  - -                                      METS/Exercise Tolerance:     Hematologic:       Musculoskeletal:       GI/Hepatic:     (+) GERD, Asymptomatic on medication,                  Renal/Genitourinary:     (+) renal disease,    Pt has history of transplant, date: 2/24,        Endo:       Psychiatric/Substance Use:       Infectious Disease:       Malignancy:       Other:          Physical Exam    Airway        Mallampati: I    Neck ROM: full     Respiratory Devices and Support         Dental       (+) Minor Abnormalities - some fillings, tiny chips      Cardiovascular   cardiovascular exam normal          Pulmonary   pulmonary exam normal            OUTSIDE LABS:  CBC:   Lab Results   Component Value Date    WBC 2.8 (L) 07/02/2024    WBC 2.6 (L) 07/01/2024    HGB 9.2 (L) 07/02/2024    HGB 9.1 (L) 07/01/2024    HCT 29.2 (L) 07/02/2024    HCT 30.5 (L) 07/01/2024     07/02/2024     07/01/2024     BMP:   Lab Results   Component Value Date     (L) 07/02/2024     07/01/2024    POTASSIUM 4.4 07/02/2024    POTASSIUM 4.3 07/01/2024    CHLORIDE 102 07/02/2024    CHLORIDE 104 07/01/2024    CO2 24 07/02/2024    CO2 22 07/01/2024    BUN 23.5 (H) 07/02/2024    BUN 21.4 (H) 07/01/2024    CR 0.96 (H) 07/02/2024    CR 0.90 07/01/2024     (H) 07/02/2024     (H) 07/01/2024     COAGS:   Lab Results   Component Value Date    PTT 27 04/17/2024    INR 1.20 (H) 07/01/2024     POC:   Lab Results   Component Value Date    HCGS  Negative 09/29/2022     HEPATIC:   Lab Results   Component Value Date    ALBUMIN 3.2 (L) 07/01/2024    PROTTOTAL 6.0 (L) 07/01/2024    ALT 13 07/01/2024    AST 18 07/01/2024    ALKPHOS 73 07/01/2024    BILITOTAL 0.2 07/01/2024     OTHER:   Lab Results   Component Value Date    PH 7.51 (H) 04/17/2024    LACT 0.8 04/17/2024    A1C 4.7 04/17/2024    ROBLES 10.0 07/02/2024    PHOS 3.6 07/02/2024    MAG 1.4 (L) 07/02/2024    LIPASE 14 06/26/2024       Anesthesia Plan    ASA Status:  3    NPO Status:  NPO Appropriate    Anesthesia Type: General.     - Airway: ETT   Induction: Intravenous.   Maintenance: Balanced.   Techniques and Equipment:     - Lines/Monitors: 2nd IV     Consents    Anesthesia Plan(s) and associated risks, benefits, and realistic alternatives discussed. Questions answered and patient/representative(s) expressed understanding.     - Discussed: Risks, Benefits and Alternatives for BOTH SEDATION and the PROCEDURE were discussed     - Discussed with:  Patient       Use of blood products discussed: Yes.     - Discussed with: Patient.     Postoperative Care    Pain management: IV analgesics.   PONV prophylaxis: Ondansetron (or other 5HT-3)     Comments:             Jesus Brownlee MD    I have reviewed the pertinent notes and labs in the chart from the past 30 days and (re)examined the patient.  Any updates or changes from those notes are reflected in this note.

## 2024-07-02 NOTE — PROGRESS NOTES
Transplant Surgery  Inpatient Daily Progress Note  07/02/2024    Assessment & Plan:   34 year old female with PMHx of interstitial nephritis secondary to PPIs s/p kidney transplant (pediatric en bloc). Admitted with mouth ulcers, severe odynophagia, difficulty eating, drinking and swallowing. In addition, she has a new vulvar painful soft tissue mass causing irritation while voiding and pain on walking.     Graft function:   Kidney: Cr 0.9, stable.    Immunosuppressed status secondary to medications:   -MMF stopped 6/17 d/t ulcers. Restart Myfortic 540mg BID 7/1.  -Prednisone 10mg stopped 7/1 with restart of Myfortic.  -Tacrolimus goal level 8-10, switched back to pills today.    Hematology:   Anemia of chronic disease: Hgb 9.1, stable.    Cardiorespiratory: No acute issues.     GI/Nutrition:   Severe malnutrition in the context of acute illness: Soft diet with minimal intake. Will try supplements today. Remains on PPN. NPO after 02:00 for vulvar biopsy today.  Odynophagia, severe: Secondary to oral and esophageal ulcers. Topical lidocaine PRN. Limiting oral intake.  Esophageal ulcer: Single large cratered ulcer noted on EGD on 6/28. Pathology negative for malignancy, and HSV, CMV, and fungal stains negative, no signs of MMF toxicity. On H2 blocker. PPI contraindicated due to history of ESRD from interstitial nephritis. ID consult to commend on possible infectious causes. ID recommending Rheumatology consult to evaluate for autoimmune etiology given hx.     Endocrine: No acute issues.     Fluid/Electrolytes: Continue IVF until adequate oral intake.    :   Ulceration, left labia: OB/GYN consulted. Plan for EUA and biopsy on 7/2. Lidocaine external gel to help with pain.     Infectious disease: Afebrile. CMV not detected.     Prophylaxis: Patient mobilizing, not on DVT prophylaxis.     Disposition: 7A      ERICA/Fellow/Resident Provider: KEKE Bates      Staff: Glenis Vann MD      _________________________________________________________________    Interval History: History is obtained from the patient  Overnight events: Continued odynophagia and labial pain. Tolerating pills. No change in symptoms per patient. Oral intake remains low.    Meds:  Current Facility-Administered Medications   Medication Dose Route Frequency Provider Last Rate Last Admin    acetaminophen (TYLENOL) tablet 975 mg  975 mg Oral Q6H Radha Mariscal MD        Followed by    [START ON 7/5/2024] acetaminophen (TYLENOL) tablet 650 mg  650 mg Oral Q6H Radha Mariscal MD        aspirin (ASA) chewable tablet 81 mg  81 mg Oral Daily Radha Mariscal MD   81 mg at 07/02/24 0801    conjugated estrogens (PREMARIN) cream 0.5 g  0.5 g Vaginal Daily Radha Mariscal MD        famotidine (PEPCID) tablet 40 mg  40 mg Oral BID Radha Mariscal MD   40 mg at 07/02/24 0801    lipids plant base (CLINOLIPID) 20 % infusion 250 mL  250 mL Intravenous Once per day on Monday Tuesday Wednesday Thursday Friday Saturday Radha Mariscal MD 20.8 mL/hr at 07/01/24 2152 250 mL at 07/01/24 2152    magnesium oxide (MAG-OX) tablet 400 mg  400 mg Oral Daily Rosita French PA-C        magnesium sulfate 2 g in 50 mL sterile water intermittent infusion  2 g Intravenous Once Rosita French PA-C        mycophenolic acid (GENERIC EQUIVALENT) EC tablet 540 mg  540 mg Oral BID IS Radha Mariscal MD   540 mg at 07/02/24 0801    PARoxetine (PAXIL) tablet 30 mg  30 mg Oral QPM Radha Mariscal MD   30 mg at 07/01/24 2353    senna-docusate (SENOKOT-S/PERICOLACE) 8.6-50 MG per tablet 1 tablet  1 tablet Oral BID Radha Mariscal MD        silver sulfADIAZINE (SILVADENE) 1 % cream   Topical BID Radha Mariscal MD   Given at 06/29/24 0812    sodium chloride (PF) 0.9% PF flush 3 mL  3 mL Intracatheter Q8H Radha Mariscal MD        sodium chloride (PF) 0.9% PF flush 3 mL  3 mL Intracatheter Q8H Radha Mariscal MD   3 mL at 06/30/24 1650    sucralfate (CARAFATE) suspension 1 g  1 g Oral 4x  "Daily AC & HS Radha Mariscal MD   1 g at 07/02/24 0600    tacrolimus (GENERIC EQUIVALENT) capsule 3 mg  3 mg Oral BID IS Radha Mariscal MD   3 mg at 07/02/24 0801    valGANciclovir (VALCYTE) tablet 450 mg  450 mg Oral Daily Radha Mariscal MD   450 mg at 07/02/24 0801       Physical Exam:     Admit Weight: 52.6 kg (115 lb 14.4 oz)    Current vitals:   BP (!) 156/74 (BP Location: Right arm)   Pulse 85   Temp 98.2  F (36.8  C) (Oral)   Resp 17   Ht 1.575 m (5' 2\")   Wt 49.6 kg (109 lb 6.4 oz)   SpO2 96%   BMI 20.01 kg/m      Vital sign ranges:    Temp:  [98  F (36.7  C)-98.7  F (37.1  C)] 98.2  F (36.8  C)  Pulse:  [] 85  Resp:  [16-21] 17  BP: (123-156)/(59-91) 156/74  SpO2:  [96 %-100 %] 96 %    Gen: NAD  ENT: Mouth ulcers  CV: Perfused  Resp: Unlabored, RA  Abd: Soft  Ext: No edema  Neuro: A&Ox4    Data:   CMP  Recent Labs   Lab 07/02/24  0751 07/01/24  1643 07/01/24  1037 07/01/24  0618 06/30/24  1200 06/30/24  0516 06/27/24  1357 06/26/24  1706   *  --   --  135  --  136   < > 136   POTASSIUM 4.4  --   --  4.3  --  4.2   < > 5.0   CHLORIDE 102  --   --  104  --  107   < > 102   CO2 24  --   --  22  --  22   < > 22   * 133*   < > 102*   < > 103*   < > 136*   BUN 23.5*  --   --  21.4*  --  13.0   < > 26.3*   CR 0.96*  --   --  0.90  --  0.92   < > 1.02*   GFRESTIMATED 79  --   --  86  --  83   < > 74   ROBLES 10.0  --   --  9.9  --  9.5   < > 10.5*   MAG 1.4*  --   --  1.7  --  1.5*   < >  --    PHOS 3.6  --   --  3.2  --  2.8   < >  --    LIPASE  --   --   --   --   --   --   --  14   ALBUMIN  --   --   --  3.2*  --  2.9*  --  3.6   BILITOTAL  --   --   --  0.2  --  0.2  --  0.4   ALKPHOS  --   --   --  73  --  68  --  95   AST  --   --   --  18  --  15  --  28   ALT  --   --   --  13  --  16  --  49    < > = values in this interval not displayed.     CBC  Recent Labs   Lab 07/02/24  0751 07/01/24  0618   HGB 9.2* 9.1*   WBC 2.8* 2.6*    206     "

## 2024-07-02 NOTE — OP NOTE
Operative Note     Pre-operative diagnosis: Vulvar ulceration [N76.6]    Post-operative diagnosis: Same, s/p below procedure(s)    Procedure(s):  EXAM UNDER ANESTHESIA, PELVIS  BIOPSY, VULVA    Surgeons and Role:     * Roxanne Montenegro MD - Primary    Anesthesia: General, local with 0.25 % marcaine      IVF:  500   EBL: 5    Drains: None    Specimen(s):  ID Type Source Tests Collected by Time Destination   1 : Left Labia Majora Tissue Vulva SURGICAL PATHOLOGY EXAM Roxanne Montenegro MD 2024 12:38 PM    A : Left Labia Majora #2 Tissue Vulva AFB CULTURE AND STAIN NON BLOOD, FUNGAL OR YEAST CULTURE ROUTINE, SALOME BARR VIRUS QUALITATIVE PCR, CYTOMEGALOVIRUS QUALITATIVE PCR, LABORATORY MISCELLANEOUS ORDER Roxanne Montenegro MD 2024 12:49 PM    B : Left Labia Majora #3 Tissue Vulva AFB CULTURE AND STAIN NON BLOOD, FUNGAL OR YEAST CULTURE ROUTINE, SALOME BARR VIRUS QUALITATIVE PCR, CYTOMEGALOVIRUS QUALITATIVE PCR, LABORATORY MISCELLANEOUS ORDER Roxanne Montenegro MD 2024 12:50 PM        Findings:  Normal-appearing right labia majora/minora. Approximately 3.5 cm x 2.5 cm pitted, ulcerated lesion located on the medial aspect of the left labia majora with significant edema. Ulcer bed with friable mucosa. Lower 1.5 cm of labia minor edematous. Image attached in media tab. Normal appearing cervix with IUD strings visible at cervical os, normal appearing vaginal mucosa, no vaginal ulcers.    Complications: none    Indication: Ira Zamora Is a 34 year old  with h/o interstitial nephritis s/p kidney transplant (2024) and Chron's disease currently admitted with transplant surgery for mouth ulcers and severe odynophagia. Was found to have a new vulvar painful soft tissue mass starting about three week after transplant. Common infectious etiologies were previously ruled out. Given immunosuppressed state recommendations were made for biopsy of the lesion to assess for malignant process and for further infectious  workup. Risks, benefits, and alternatives to the procedure were discussed with the patient who elected to proceed. All questions were answered and an informed consent was obtained.     Description of Procedure:  Patient was brought to the OR where anesthesia as above was administered. She was placed in dorsal lithotomy position and prepped and draped in the usual fashion. 16 ml local anesthetic was injected under all dissection beds. Three biopsies were excised using 15 blade along the medial aspect of the ulcer (a single 1 cm biopsy and two 0.5 cm biopsies). Two 5 mm punch biopsies were then taken along the medial aspect to fulfill infectious workup tissue needs. The specimen was sent to pathology. Hemostasis was obtained with cautery and sutures as needed. The defect was then closed using running sutures of 4-0 Vicryl. The area was hemostatic at the end of the procedure.The patient was returned supine. All counts were correct. No complications were noted. The patient tolerated the procedure well and was taken to the recovery room in stable condition.  Dr. Montenegro was present for the entire procedure.     Radha Mariscal MD  Obstetrics, Gynecology & Women's Health  Resident, PGY-1  07/02/2024 1:25 PM      I was present and scrubbed throughout the procedure,  I agree with the note above  Roxanne Montenegro MD

## 2024-07-02 NOTE — PLAN OF CARE
Goal Outcome Evaluation:      Plan of Care Reviewed With: patient    Overall Patient Progress: no changeOverall Patient Progress: no change     [id number]   A&O x4, Afebrile. VSS on RA. Denies shortness of breath or chest pain. C/o of ongoing significant pain w/ swallowing. Pt rated 7/10. Given Lidocaine 2% x2. Up in room ad roya voiding. LBM 6/29 per pt report. +Flatus.  in room. L PIV infusing TPN/Lipids. Pt NPO since midnight for procedure. Continue POC

## 2024-07-03 LAB
ANA SER QL IF: NEGATIVE
ANCA AB PATTERN SER IF-IMP: NORMAL
ANION GAP SERPL CALCULATED.3IONS-SCNC: 7 MMOL/L (ref 7–15)
BUN SERPL-MCNC: 27.8 MG/DL (ref 6–20)
C-ANCA TITR SER IF: NORMAL {TITER}
CALCIUM SERPL-MCNC: 9.6 MG/DL (ref 8.6–10)
CHLORIDE SERPL-SCNC: 106 MMOL/L (ref 98–107)
CREAT SERPL-MCNC: 1.03 MG/DL (ref 0.51–0.95)
DEPRECATED HCO3 PLAS-SCNC: 23 MMOL/L (ref 22–29)
EGFRCR SERPLBLD CKD-EPI 2021: 73 ML/MIN/1.73M2
ERYTHROCYTE [DISTWIDTH] IN BLOOD BY AUTOMATED COUNT: 14 % (ref 10–15)
GLUCOSE BLDC GLUCOMTR-MCNC: 122 MG/DL (ref 70–99)
GLUCOSE SERPL-MCNC: 122 MG/DL (ref 70–99)
HCT VFR BLD AUTO: 28.7 % (ref 35–47)
HGB BLD-MCNC: 8.9 G/DL (ref 11.7–15.7)
MAGNESIUM SERPL-MCNC: 2.4 MG/DL (ref 1.7–2.3)
MCH RBC QN AUTO: 30.2 PG (ref 26.5–33)
MCHC RBC AUTO-ENTMCNC: 31 G/DL (ref 31.5–36.5)
MCV RBC AUTO: 97 FL (ref 78–100)
PHOSPHATE SERPL-MCNC: 3.9 MG/DL (ref 2.5–4.5)
PLATELET # BLD AUTO: 183 10E3/UL (ref 150–450)
POTASSIUM SERPL-SCNC: 4.8 MMOL/L (ref 3.4–5.3)
RBC # BLD AUTO: 2.95 10E6/UL (ref 3.8–5.2)
SODIUM SERPL-SCNC: 136 MMOL/L (ref 135–145)
TACROLIMUS BLD-MCNC: 15.5 UG/L (ref 5–15)
TME LAST DOSE: ABNORMAL H
TME LAST DOSE: ABNORMAL H
WBC # BLD AUTO: 3 10E3/UL (ref 4–11)

## 2024-07-03 PROCEDURE — 99233 SBSQ HOSP IP/OBS HIGH 50: CPT | Mod: 24 | Performed by: INTERNAL MEDICINE

## 2024-07-03 PROCEDURE — 250N000009 HC RX 250: Performed by: TRANSPLANT SURGERY

## 2024-07-03 PROCEDURE — 99232 SBSQ HOSP IP/OBS MODERATE 35: CPT | Mod: 24 | Performed by: STUDENT IN AN ORGANIZED HEALTH CARE EDUCATION/TRAINING PROGRAM

## 2024-07-03 PROCEDURE — 99207 PR SC NO CHARGE VISIT/PATIENT NOT SEEN: CPT

## 2024-07-03 PROCEDURE — 250N000013 HC RX MED GY IP 250 OP 250 PS 637: Performed by: PHYSICIAN ASSISTANT

## 2024-07-03 PROCEDURE — 250N000009 HC RX 250

## 2024-07-03 PROCEDURE — 250N000012 HC RX MED GY IP 250 OP 636 PS 637

## 2024-07-03 PROCEDURE — 250N000012 HC RX MED GY IP 250 OP 636 PS 637: Performed by: PHYSICIAN ASSISTANT

## 2024-07-03 PROCEDURE — 250N000013 HC RX MED GY IP 250 OP 250 PS 637

## 2024-07-03 PROCEDURE — 99233 SBSQ HOSP IP/OBS HIGH 50: CPT | Mod: 24

## 2024-07-03 PROCEDURE — 84100 ASSAY OF PHOSPHORUS: CPT

## 2024-07-03 PROCEDURE — 120N000011 HC R&B TRANSPLANT UMMC

## 2024-07-03 PROCEDURE — 82374 ASSAY BLOOD CARBON DIOXIDE: CPT

## 2024-07-03 PROCEDURE — 85027 COMPLETE CBC AUTOMATED: CPT

## 2024-07-03 PROCEDURE — 80197 ASSAY OF TACROLIMUS: CPT

## 2024-07-03 PROCEDURE — B4185 PARENTERAL SOL 10 GM LIPIDS: HCPCS

## 2024-07-03 PROCEDURE — 36415 COLL VENOUS BLD VENIPUNCTURE: CPT

## 2024-07-03 PROCEDURE — 83735 ASSAY OF MAGNESIUM: CPT

## 2024-07-03 RX ORDER — TACROLIMUS 1 MG/1
1 CAPSULE ORAL ONCE
Status: COMPLETED | OUTPATIENT
Start: 2024-07-03 | End: 2024-07-03

## 2024-07-03 RX ORDER — MYCOPHENOLIC ACID 180 MG/1
360 TABLET, DELAYED RELEASE ORAL
Status: DISCONTINUED | OUTPATIENT
Start: 2024-07-03 | End: 2024-07-04

## 2024-07-03 RX ORDER — PREDNISONE 20 MG/1
20 TABLET ORAL DAILY
Status: CANCELLED | OUTPATIENT
Start: 2024-07-03

## 2024-07-03 RX ORDER — TACROLIMUS 1 MG/1
2 CAPSULE ORAL
Status: DISCONTINUED | OUTPATIENT
Start: 2024-07-04 | End: 2024-07-06

## 2024-07-03 RX ORDER — PREDNISONE 20 MG/1
20 TABLET ORAL DAILY
Status: DISCONTINUED | OUTPATIENT
Start: 2024-07-03 | End: 2024-07-05 | Stop reason: ALTCHOICE

## 2024-07-03 RX ADMIN — Medication 5 ML: at 09:23

## 2024-07-03 RX ADMIN — FAMOTIDINE 40 MG: 20 TABLET ORAL at 19:07

## 2024-07-03 RX ADMIN — FAMOTIDINE 40 MG: 20 TABLET ORAL at 09:24

## 2024-07-03 RX ADMIN — Medication 5 ML: at 03:09

## 2024-07-03 RX ADMIN — MAGNESIUM SULFATE HEPTAHYDRATE: 500 INJECTION, SOLUTION INTRAMUSCULAR; INTRAVENOUS at 21:07

## 2024-07-03 RX ADMIN — TACROLIMUS 1 MG: 1 CAPSULE ORAL at 19:05

## 2024-07-03 RX ADMIN — Medication 5 ML: at 19:04

## 2024-07-03 RX ADMIN — HYDROXYZINE HYDROCHLORIDE 50 MG: 25 TABLET ORAL at 09:36

## 2024-07-03 RX ADMIN — Medication 5 ML: at 21:06

## 2024-07-03 RX ADMIN — ACETAMINOPHEN 975 MG: 325 TABLET, FILM COATED ORAL at 16:15

## 2024-07-03 RX ADMIN — SUCRALFATE 1 G: 1 SUSPENSION ORAL at 06:34

## 2024-07-03 RX ADMIN — SUCRALFATE 1 G: 1 SUSPENSION ORAL at 09:24

## 2024-07-03 RX ADMIN — OXYCODONE HYDROCHLORIDE 5 MG: 5 SOLUTION ORAL at 22:34

## 2024-07-03 RX ADMIN — TACROLIMUS 3 MG: 1 CAPSULE ORAL at 09:23

## 2024-07-03 RX ADMIN — Medication 5 ML: at 13:44

## 2024-07-03 RX ADMIN — ACETAMINOPHEN 975 MG: 325 TABLET, FILM COATED ORAL at 03:07

## 2024-07-03 RX ADMIN — PREDNISONE 20 MG: 20 TABLET ORAL at 11:48

## 2024-07-03 RX ADMIN — MAGNESIUM OXIDE TAB 400 MG (241.3 MG ELEMENTAL MG) 400 MG: 400 (241.3 MG) TAB at 09:23

## 2024-07-03 RX ADMIN — OLIVE OIL AND SOYBEAN OIL 250 ML: 16; 4 INJECTION, EMULSION INTRAVENOUS at 21:07

## 2024-07-03 RX ADMIN — Medication 5 ML: at 11:40

## 2024-07-03 RX ADMIN — MYCOPHENOLIC ACID 360 MG: 180 TABLET, DELAYED RELEASE ORAL at 19:05

## 2024-07-03 RX ADMIN — PAROXETINE 30 MG: 30 TABLET, FILM COATED ORAL at 19:07

## 2024-07-03 RX ADMIN — Medication 5 ML: at 16:10

## 2024-07-03 RX ADMIN — ACETAMINOPHEN 975 MG: 325 TABLET, FILM COATED ORAL at 22:30

## 2024-07-03 RX ADMIN — SUCRALFATE 1 G: 1 SUSPENSION ORAL at 22:30

## 2024-07-03 RX ADMIN — MYCOPHENOLIC ACID 540 MG: 180 TABLET, DELAYED RELEASE ORAL at 10:55

## 2024-07-03 RX ADMIN — SUCRALFATE 1 G: 1 SUSPENSION ORAL at 16:10

## 2024-07-03 RX ADMIN — Medication 5 ML: at 06:35

## 2024-07-03 RX ADMIN — ASPIRIN 81 MG CHEWABLE TABLET 81 MG: 81 TABLET CHEWABLE at 09:23

## 2024-07-03 RX ADMIN — ACETAMINOPHEN 975 MG: 325 TABLET, FILM COATED ORAL at 09:23

## 2024-07-03 RX ADMIN — DOCUSATE SODIUM 50 MG AND SENNOSIDES 8.6 MG 1 TABLET: 8.6; 5 TABLET, FILM COATED ORAL at 09:24

## 2024-07-03 RX ADMIN — VALGANCICLOVIR HYDROCHLORIDE 450 MG: 450 TABLET ORAL at 09:23

## 2024-07-03 ASSESSMENT — ACTIVITIES OF DAILY LIVING (ADL)
ADLS_ACUITY_SCORE: 25

## 2024-07-03 NOTE — PLAN OF CARE
Goal Outcome Evaluation:      Plan of Care Reviewed With: patient, spouse    Overall Patient Progress: no changeOverall Patient Progress: no change    Temp: 98  F (36.7  C) Temp src: Oral BP: 123/77 Pulse: 85   Resp: 16 SpO2: 100 % O2 Device: None (Room air)     Pt A&O X4, able to communicate needs. Pt teary about being here and voiced how she wants to go home. VSS, on RA. Denies SOB at rest and with exertion. Mouth and esophogeal pain shortly relieved with lidocaine solution. Appetite still poor due to pain. Pt mainly taking sips of water and getting continues PPN at 90ml/hr. Biopsy sites with a small blood tinged drainage. Pt up ad roya in room.

## 2024-07-03 NOTE — PROGRESS NOTES
[facility]  Transplant Nephrology Progress Note  Date of Admission:  6/27/2024  Today's Date: 07/03/2024  Requesting physician: Sid Desai MD    Recommendations:   - Ok with increasing prednisone to 20 mg daily (per rheumatology recs) with concern for Behcet's.  Would decrease mycophenolic acid to 360 mg BID while on this dose   - Pt would like to work on discharge with home care.  - follow up final path on vulvar biopsy and pending infectious w/up (EBV, VZV, AFB, CMV pcr)  - MPO and PR3 Ab pending    Assessment & Plan   # DDKT: CKD Stage 3 - Stable serum creatinine, at baseline 0.9, Cystatin C 6/29=47.   No indication for dialysis.    Pediatric en-bloc kidneys   - Baseline Creatinine: ~ 0.9-1.1   - Proteinuria: Normal (<0.2 grams)   - DSA Hx: No DSA   - Last cPRA: 9%   - BK Viremia: No   - Kidney Tx Biopsy Hx: No biopsy history.      # Immunosuppression Prior to Admission: Tacrolimus immediate release (goal 8-10) and Mycophenolic acid (dose 540 mg every 12 hours)   - Present Immunosuppression: Tacrolimus immediate release (goal 8-10), Mycophenolic acid (dose 360 mg every 12 hours), and Prednisone (dose 20 mg daily)   - Induction with Recent Transplant:  High Intensity Protocol   - Continue with intensive monitoring of immunosuppression for efficacy and toxicity.   - Historical Changes in Immunosuppression: Previously stopped mycophenolate due to oral and vaginal ulcers, but endoscopy did not indicate mycophenolic acid toxicity.  Started prednisone 20 mg daily d/t concern for Bechet's disease and mycophenolic acid dose reduced accordingly.   - Patient is in an immunosuppressed state and will continue to monitor for efficacy and toxicity of immunosuppression medications.   - Changes: Yes - Ok with increasing prednisone to 20 mg daily (per rheumatology recs).  Would decrease mycophenolic acid to 360 mg BID.      # Infection Prevention:      - PJP: Inhaled pentamidine;  Okay to restart Bactrim on 7/5/24  - CMV: Valganciclovir (Valcyte); Patient is CMV IgG Ab discordant (D+/R-) and will continue on Valcyte x 6 months, then check CMV PCR monthly until 12 months post transplant.   - CMV IgG Ab High Risk Discordance (D+/R-): Yes  - EBV IgG Ab High Risk Discordance (D+/R-): No    # Blood Pressure: Controlled;  Goal BP: < 140/90   - Changes: No    # Anemia in Chronic Renal Disease: Hgb: Stable, low      JUNG: Yes- Retacrit 4000units (6/29).   - Iron studies: Replete    # Leukopenia: Stable, low Likely medication related.  Negative CMV PCR.    # Mineral Bone Disorder:    - Secondary renal hyperparathyroidism; PTH level: Minimally elevated ( pg/ml)        On treatment: None  - Vitamin D; level: Normal         On supplement: No  - Calcium; level: Normal          On supplement: No  - Phosphorus; level: Normal         On supplement: No    # Electrolytes:   - Potassium; level: Normal         On supplement: No  - Magnesium; level: High (received magnesium sulfate yesterday)         On supplement: Yes, magnesium oxide 400 mg PO daily  - Bicarbonate; level: Normal         On supplement: No  - Sodium; level: Normal      # Concern for Behcet's   # Oral and Genital Ulcers  # Severe Malnutrition  # GERD/Dysphagia/Esophagitis: Patient with apparent severe heartburn/GERD symptoms, worsening over the last week or so prior to admission.  Patient underwent EGD 5/24/24 which was mostly unremarkable with only finding of minimal non-specific chronic inflammation in the stomach (negative H. pylori), but jessee esophageal findings.  In addition, recently had significant oral ulcers.  Now with patient unable to eat and minimal fluid intake.  She hasn't been able to take her medications in the last day due to pain with swallowing.  Wt down with decreased PO intake.  Patient is on famotidine, but unable to use PPI due to probable underlying kidney disease (interstitial nephritis) from previous PPI use.  Continue Sucralfate.    - EGD 6/28/25  Esophageal ulcer with no bleeding and no stigmata of recent bleeding. Biopsies: HSV and CMV immunostains are negative. PAS stains are negative for fungal organisms.                          - LA Grade B esophagitis with no bleeding. Biopsied.                          - Z-line, 32 cm from the incisors.                          - Friable gastric mucosa.                          - Normal examined duodenum.  - HSV and CMV immunostains are negative. PAS stains are negative for fungal organisms.    - Patient was off mycophenolic acid 6/14-7/1 without any improvement  - 6/29/24 Started TPN  - 07/02/24 Biopsy of vulva (result pending)  - Ok with increasing prednisone to 20 mg daily (per rheumatology recs) with concern for Bachet's.  Would decrease mycophenolic acid to 360 mg BID.        # Other Significant PMH:   - Crohn's Disease: Appears to be stable.  Previously was on ustekinumab prior to kidney transplant, but hasn't restarted it due to ongoing oral and vaginal ulcers.  Does occasionally alternate between diarrhea and constipation, but no issues at this time.  Followed by GI and [facility].   - Cardiac/Vascular Disease Risk Factors: Dilated ascending aorta (3.8 cm)  on 2024 ECHO (stable from 2020).       # Transplant History:  Etiology of Kidney Failure: Interstitial nephritis  Tx: DDKT - Pediatric en bloc  Transplant: 4/17/2024 (Kidney)  Significant transplant-related complications:  Oral and vaginal ulcers, esophagitis       Recommendations were communicated to the primary team via this note.    Discussed with .    Jas Gutierrez, APRN CNP  Transplant Nephrology  Contact information available via [facility] Paging/Directory        Physician Attestation     I saw and evaluated Ira Zamora as part of a shared APRN/PA visit.     I personally reviewed the vital signs, medications, labs, and imaging.    I personally provided a substantive portion of care for this patient and I  approve the care plan as written by the ERICA.  I was involved with Medical Decision Making including: Please see A&P for additional details of medical decision making.  MANAGEMENT DISCUSSED with the following over the past 24 hours: persistent oral pain due to esophageal ulcers tolerating pills but poor po intake, on PPN, concern for Behcet's per Rheum, ok to start prednisone 20 mg po and reduce MPA to 360 mg po bid, f/up vulvar bx result and infectious w/up     Rachel Arellano MD  Date of Service (when I saw the patient): 07/03/24      Interval History  Ms. Puckett creatinine is 1.03 (07/03 0638); Increased from 0.96 yesterday.  Estimated Creatinine Clearance: 60.3 mL/min (A) (based on SCr of 1.03 mg/dL (H)).   I/O last 3 completed shifts:  In: 1371.8 [P.O.:200; I.V.:500]  Out: 2550 [Urine:2550]   Other significant labs/tests/vitals: SBP 100s - 110s overnight. Afebrile  No acute events overnight.  No chest pain or shortness of breath.  No leg swelling.  No nausea or vomiting.  Continues to have esophageal pain, but is able to tolerate pills.      Review of Systems   4 point ROS was obtained and negative except as noted in the Interval History.    MEDICATIONS:  Current Facility-Administered Medications   Medication Dose Route Frequency Provider Last Rate Last Admin    acetaminophen (TYLENOL) tablet 975 mg  975 mg Oral Q6H Radha Mariscal MD   975 mg at 07/03/24 0307    Followed by    [START ON 7/5/2024] acetaminophen (TYLENOL) tablet 650 mg  650 mg Oral Q6H Radha Mariscal MD        aspirin (ASA) chewable tablet 81 mg  81 mg Oral Daily Radha Mariscal MD   81 mg at 07/02/24 0801    estradiol (ESTRACE) cream 0.5 g  0.5 g Vaginal Daily Frida Davis MD        famotidine (PEPCID) tablet 40 mg  40 mg Oral BID Radha Mariscal MD   40 mg at 07/02/24 2030    lipids plant base (CLINOLIPID) 20 % infusion 250 mL  250 mL Intravenous Once per day on Monday Tuesday Wednesday Thursday Friday Saturday Radha Mariscal MD 20.8 mL/hr at  "24 250 mL at 24    magnesium oxide (MAG-OX) tablet 400 mg  400 mg Oral Daily Rosita French PA-C   400 mg at 24 1525    mycophenolic acid (GENERIC EQUIVALENT) EC tablet 540 mg  540 mg Oral BID IS Radha Mariscal MD   540 mg at 24 1754    PARoxetine (PAXIL) tablet 30 mg  30 mg Oral QPM Radha Mariscal MD   30 mg at 24    senna-docusate (SENOKOT-S/PERICOLACE) 8.6-50 MG per tablet 1 tablet  1 tablet Oral BID Radha Mariscal MD   1 tablet at 24    silver sulfADIAZINE (SILVADENE) 1 % cream   Topical BID Radha Mariscal MD   Given at 24 2030    sodium chloride (PF) 0.9% PF flush 3 mL  3 mL Intracatheter Q8H Radha Mariscal MD   3 mL at 24 0635    sodium chloride (PF) 0.9% PF flush 3 mL  3 mL Intracatheter Q8H Radha Mariscal MD   3 mL at 24 1650    sucralfate (CARAFATE) suspension 1 g  1 g Oral 4x Daily AC & HS Radha Mariscal MD   1 g at 24 0634    tacrolimus (GENERIC EQUIVALENT) capsule 3 mg  3 mg Oral BID IS Radha Mariscal MD   3 mg at 24 1753    valGANciclovir (VALCYTE) tablet 450 mg  450 mg Oral Daily Radha Mariscal MD   450 mg at 24 0801     Current Facility-Administered Medications   Medication Dose Route Frequency Provider Last Rate Last Admin    dextrose 10% infusion   Intravenous Continuous PRN Radha Mariscal MD        parenteral nutrition - ADULT compounded formula   PERIPHERAL LINE IV TPN CONTINUOUS Radha Mariscal MD 90 mL/hr at 24 New Bag at 24       Physical Exam   Temp  Av.9  F (36.6  C)  Min: 97.5  F (36.4  C)  Max: 98.3  F (36.8  C)      Pulse  Av.9  Min: 76  Max: 118 Resp  Av  Min: 16  Max: 20  SpO2  Av.1 %  Min: 93 %  Max: 100 %     /74 (BP Location: Left arm)   Pulse 66   Temp 97.9  F (36.6  C) (Oral)   Resp 17   Ht 1.575 m (5' 2\")   Wt 49.6 kg (109 lb 6.4 oz)   SpO2 93%   BMI 20.01 kg/m      Admit       GENERAL APPEARANCE: alert and no distress  HEENT: resolving oral " lesions.   RESP: lungs clear to auscultation - no rales, rhonchi or wheezes  CV: regular rhythm, normal rate, no rub, no murmur  EDEMA: no LE edema bilaterally  ABDOMEN: soft, nondistended, nontender, bowel sounds normal  MS: extremities normal - no gross deformities noted, no evidence of inflammation in joints, no muscle tenderness  SKIN: no rash  DIALYSIS ACCESS: right arm fistula +bruit/+thrill    Data   All labs reviewed by me.  CMP  Recent Labs   Lab 07/03/24  0638 07/02/24  0751 07/01/24  1643 07/01/24  1037 07/01/24  0618 06/30/24  1200 06/30/24  0516 06/27/24  1357 06/26/24  1706    134*  --   --  135  --  136   < > 136   POTASSIUM 4.8 4.4  --   --  4.3  --  4.2   < > 5.0   CHLORIDE 106 102  --   --  104  --  107   < > 102   CO2 23 24  --   --  22  --  22   < > 22   ANIONGAP 7 8  --   --  9  --  7   < > 12   * 124* 133* 111* 102*   < > 103*   < > 136*   BUN 27.8* 23.5*  --   --  21.4*  --  13.0   < > 26.3*   CR 1.03* 0.96*  --   --  0.90  --  0.92   < > 1.02*   GFRESTIMATED 73 79  --   --  86  --  83   < > 74   ROBLES 9.6 10.0  --   --  9.9  --  9.5   < > 10.5*   MAG 2.4* 1.4*  --   --  1.7  --  1.5*   < >  --    PHOS 3.9 3.6  --   --  3.2  --  2.8   < >  --    PROTTOTAL  --   --   --   --  6.0*  --  5.4*  --  6.8   ALBUMIN  --   --   --   --  3.2*  --  2.9*  --  3.6   BILITOTAL  --   --   --   --  0.2  --  0.2  --  0.4   ALKPHOS  --   --   --   --  73  --  68  --  95   AST  --   --   --   --  18  --  15  --  28   ALT  --   --   --   --  13  --  16  --  49    < > = values in this interval not displayed.     CBC  Recent Labs   Lab 07/03/24  0638 07/02/24  0751 07/01/24  0618 06/30/24  0516   HGB 8.9* 9.2* 9.1* 8.2*   WBC 3.0* 2.8* 2.6* 2.8*   RBC 2.95* 3.07* 3.10* 2.73*   HCT 28.7* 29.2* 30.5* 27.4*   MCV 97 95 98 100   MCH 30.2 30.0 29.4 30.0   MCHC 31.0* 31.5 29.8* 29.9*   RDW 14.0 13.8 14.1 13.9    202 206 203     INR  Recent Labs   Lab 07/01/24  0618 06/30/24  0516 06/28/24  0602   INR  1.20* 1.27* 1.25*     ABGNo lab results found in last 7 days.   Urine Studies  Recent Labs   Lab Test 06/06/24  1113 05/03/24  1600 04/17/24  1113 09/29/22  1153   COLOR Yellow Light Yellow Straw Yellow   APPEARANCE Clear Clear Clear Clear   URINEGLC Negative 100* 70* 100*   URINEBILI Negative Negative Negative Negative   URINEKETONE Negative Negative Negative Negative   SG 1.028 1.014 1.004 1.010   UBLD Small* Negative Trace* Trace*   URINEPH 5.5 5.5 8.5* 8.5*   PROTEIN 10* Negative 100* 100*   NITRITE Negative Negative Negative Negative   LEUKEST Moderate* Trace* Negative Negative   RBCU 2 2 2 <1   WBCU 6* 4 <1 1     No lab results found.  PTH  Recent Labs   Lab Test 05/23/24  0949 04/22/24  0821   PTHI 131* 138*     Iron Studies  Recent Labs   Lab Test 05/23/24  0949 04/22/24  0821   IRON 77 53   * 147*   IRONSAT 38 36   PRICILA 2,181* 2,181*       IMAGING:  All imaging studies reviewed by me.

## 2024-07-03 NOTE — PROGRESS NOTES
Gastroenterology [facility] Note  GI Luminal Service      The patient was not seen or examined today. This note is a product of chart review.     I was messaged by Rosita French PA-C on 7/3/2024 regarding their patient Ira Zamora with the following specific question and concerns with my answered opinion as follows:  In the setting of recent Esophageal ulcer on EGD, transplant staff wondering if NJ would be okay?  - Answered: no contraindication from a GI Luminal Standpoint for NJ tube to optimize nutrition.   2.   Also staff wanted to check gastrin level. Would that be recommended? --> Inquired what the clinical indication was for checking gastrin, for which Rosita replied Eval possible gastrinoma.   - Answered: no, gastrin level is not indicated. Stomach and duodenum were completely normal on EGD. The GI Luminal Service does not recommend this and it is not indicated.      I did not see and personally evaluate the patient. I was able to access to the patient s medical  records/chart and reviewed the chart related to the specific question.    I provided a general opinion for their consideration as the in-person treating provider of this patient. My conversation with Rosita French PA-C and opinion are not intended to take the place or supersede the clinical judgement of the in-person treating provider Rosita and the transplant team, which should always be utilized to provide the most appropriate care to meet the unique needs of the patient.     The inpatient gastroenterology service will remain signed off at this time. Thank you for allowing us to participate in the care of this patient. Please do not hesitate to page the GI service with any questions or concerns.     Plan discussed with Dr. Migue Recinos, GI Luminal Staff Physician.    Haley Kaur PA-C  Inpatient Gastroenterology Service  [facility]  Pager: *[phone]  Text Page

## 2024-07-03 NOTE — PLAN OF CARE
"/67 (BP Location: Left arm)   Pulse 73   Temp 98.6  F (37  C) (Oral)   Resp 16   Ht 1.575 m (5' 2\")   Wt 49.6 kg (109 lb 6.4 oz)   SpO2 97%   BMI 20.01 kg/m       Shift: 5884-8234  Isolation Status: none  VS: softer pressure on room air, afebrile  Neuro: Aox4  Behaviors: calm and cooperative   BG: daily  Labs: Mag 1.4 (replaced)  Respiratory: WDL  Cardiac: WDL  Pain/Nausea: esophageal pain, 4/10 vulvar pain, denies nausea, pain managed with scheduled Tyl   PRN: Lidocaine solution x3, atarax x1  Diet: Mechanical soft, poor appetite, PPN at 90 mL/hr  IV Access: L. PIV x2  Infusion(s): PPN, Mag replaced (recheck in AM)  Lines/Drains: none  GI/: no BM this shift, voiding adequately via hat  Skin: vulva ulcerations, pad changed x1 with minimal bleeding   Mobility: SBA  Plan: Continue plan of care and notify team of any changes                          "

## 2024-07-03 NOTE — PROGRESS NOTES
Rheumatology Progress Note    Ira Zamora MRN# [id number]   Age: 34 year old YOB: 1990       Assessment & Recommendations:   ASSESSMENT:  Ira Zamora is a 34 year old with history of renal transplant (4/2024 DDKT 2/2 interstitial nephritis; started on tacrolimus and MMF for immunosuppression),Crohn's disease (previously treated with Stelara, last dose 3/2024) who presented with one week of odynophagia and poor oral intake in the setting of ~1 month of oral and vulvar ulcers. EGD 6/28 demonstrated 3 esophageal ulcers, which were biopsied. The extensive infectious disease work-up (see ID note) was negative, and rheumatology was consulted.     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 BRAVO and ANCA were negative, but vulvar biopsy, MPO and proteinase 3 are still pending. In terms of patient's current immunosuppression, patient is on tacrolimus, MMF, and prednisone (increased for suspicion of Behcets). Will discuss with transplant team over the coming days about azathioprine as alternative to MMF. Patient reports similar esophageal pain to yesterday, however it has only been a short time since the prednisone was increased to 20mg. Therefore, we will continue to monitor clinically before considering changing the regimen (switch to IV steroids vs increase dose vs keep current regimen).    RECOMMENDATIONS:  -- Continue prednisone 20mg daily  -- Pending MPO and proteinase 3 labs  -- Pending Vulvar biopsy results     Case seen and discussed with Dr. Mcfarland who agrees with above assessment and plan.   Mary Lou Schwarz, MS4    Staff Addendum:  This patient was interviewed and examined in the presence of the medical student who was acting as a scribe. I have personally verified  "the history and performed the physical exam and medical decision making.I have reviewed the patients history as well as the available lab and imaging studies      Yasmany Mcfarland MD  Rheumatology attending     35 MINUTES SPENT BY ME on the date of service doing chart review, history, exam, documentation & further activities per the note.        Subjective     No acute events overnight, nursing notes reviewed. Patient reports that the esophageal pain is similar to yesterday. She reports her mouth ulcers are almost healed, and her vulvar ulcer is slightly better. She has no additional concerns or ulcers. Discussed prior treatments (previously on azathioprine prior to her renal failure, but did not experience side effects and does not recall abnormal labs).       ROS     A 10 point ROS was performed with pertinent findings listed above.      Objective     PHYSICAL EXAM  /87 (BP Location: Left arm)   Pulse 84   Temp 98.6  F (37  C) (Oral)   Resp 18   Ht 1.575 m (5' 2\")   Wt 50.7 kg (111 lb 11.2 oz)   SpO2 99%   BMI 20.43 kg/m    Wt Readings from Last 4 Encounters:   07/03/24 50.7 kg (111 lb 11.2 oz)   06/27/24 52.2 kg (115 lb 1.6 oz)   06/26/24 50.1 kg (110 lb 7.2 oz)   06/24/24 51.3 kg (113 lb)       Constitutional: Alert and oriented, appears uncomfortable (frequent repositioning)  Skin: Slightly improved shallow erosions of medial inner lower lip and R buccal mucosa. Vulvar exam deferred given recent procedure and bandages. No nail pitting, rash, nodules or lesions  Eyes: white sclera  Resp: breathing comfortably on room air  CV: No LE edema  Neuro: grossly nl cranial nerves  Psych: nl judgement, orientation, memory, affect.  MSK: The wrist, MCP/PIP/DIP,  joints were examined and found normal. No active synovitis or altered joint anatomy. Full joint ROM.      DATA:    CBC:  Recent Labs   Lab Test 07/03/24  0638 07/02/24  0751 07/01/24  0618   WBC 3.0* 2.8* 2.6*   RBC 2.95* 3.07* 3.10*   HGB 8.9* " "9.2* 9.1*   HCT 28.7* 29.2* 30.5*   MCV 97 95 98   MCH 30.2 30.0 29.4   MCHC 31.0* 31.5 29.8*   RDW 14.0 13.8 14.1    202 206       BMP:  Recent Labs   Lab Test 07/03/24  0638 07/02/24  0751 07/01/24  1643 07/01/24  1037 07/01/24  0618    134*  --   --  135   POTASSIUM 4.8 4.4  --   --  4.3   CHLORIDE 106 102  --   --  104   CO2 23 24  --   --  22   ANIONGAP 7 8  --   --  9   * 124* 133*   < > 102*   BUN 27.8* 23.5*  --   --  21.4*   CR 1.03* 0.96*  --   --  0.90   GFRESTIMATED 73 79  --   --  86   ROBLES 9.6 10.0  --   --  9.9    < > = values in this interval not displayed.       LFT:  Recent Labs   Lab Test 07/01/24  0618 06/30/24  0516 06/26/24  1706   PROTTOTAL 6.0* 5.4* 6.8   ALBUMIN 3.2* 2.9* 3.6   BILITOTAL 0.2 0.2 0.4   ALKPHOS 73 68 95   AST 18 15 28   ALT 13 16 49       No results found for: \"CKTOTAL\"  No results found for: \"TSH\"  No results found for: \"URIC\"    Inflammatory markers  No results found for: \"CRP\"  No results found for: \"SED\"  Ferritin   Date Value Ref Range Status   05/23/2024 2,181 (H) 6 - 175 ng/mL Final   04/22/2024 2,181 (H) 6 - 175 ng/mL Final       UA RESULTS:  Recent Labs   Lab Test 06/06/24  1113 05/03/24  1600 04/17/24  1113   COLOR Yellow Light Yellow Straw   APPEARANCE Clear Clear Clear   URINEGLC Negative 100* 70*   URINEBILI Negative Negative Negative   URINEKETONE Negative Negative Negative   SG 1.028 1.014 1.004   UBLD Small* Negative Trace*   URINEPH 5.5 5.5 8.5*   PROTEIN 10* Negative 100*   NITRITE Negative Negative Negative   LEUKEST Moderate* Trace* Negative   RBCU 2 2 2   WBCU 6* 4 <1         Autoimmunity labs:     No results found for: \"RHF\"  No results found for: \"CCPIGG\"  No results found for: \"ANCA\"  No results found for: \"V8XQPKM\"  No results found for: \"C8NSEKO\"  No results found for: \"JAH\"  No results found for: \"DNA\"  No results found for: \"RNPIGG\", \"SMIGG\", \"SSAIGG\", \"SSBIGG\", \"SCLIGG\"    IMAGING:    "

## 2024-07-03 NOTE — PROGRESS NOTES
Transplant Infectious Diseases Inpatient Consultation      Ira Zamora MRN# [id number]   YOB: 1990 Age: 34 year old   Date of Admission and time: 6/27/2024  1:24 PM     Reason for consult: Genital and Oral ulcers         Recommendations:   Will follow-up on pathology from biopsy results and additional infectious studies (HSV, VZV, EBV, CMV PCRs; fungal/bacterial/AFB cultures)  Appreciate rheumatology input. Per their discussion, this appears most consistent with Behcet's recommended treating with prednisone.    Continue VGCV and fluconazole for prophylaxis  Re-starting TMP/SMX SS daily on 7/5 for PJP PPx    Transplant infectious diseases will continue to follow peripherally while Ira is in the hospital. She has follow-up appointment already scheduled with Dr. Calderon on 7/24 that she can keep as planned.     Dr. Davis is covering the service tomorrow (7/4/24) and will not plan on seeing Ira. If she is still in the hospital on Friday (7/5), I will plan on seeing her to review updated ID studies.       Attestation:  Total duration of visit including chart review, reviewing labs and imaging, interviewing and examining the patient, documentation, and sending communication to the primary treating team, all at the same day of this encounter, is: 58 minutes       Janee Bashir MD  Transplant Infectious Diseases Attending  [phone]          Synopsis of Clinical Presentation and Course   Transplant Summary  Transplants:  4/17/2024 (Kidney); POD  77.  Coordinator Radha Jorge  Reason for Transplantation: Interstitial nephritis   Current Immunosuppression: MMF d/kamini on 6/17 due to ulcers. Restarting on 7/2/24. Tacrolimus (goal 8-10). Prednisone 10 mg/day (d/kamini 7/2/2024)     Ira Zamora is a 34 year old female with history of DDKT (4/17/24) and Chron's diases on Stelara (last dose 3/24), with course c/b aphthous ulcers in her mouth, esophageal ulcer, and labial ulcers. She initially developed  aphthous oral ulcers in late May She was admitted on 6/5-6/9 due to oral and vaginal ulcers. Extensive infectious work-up negative (HSV swabs, pelvic exa, G/C and chlaydia, RPR). There was concern for MMF as a cause of her oral ulcers, so this was discontinued. Since discontinuing MMF, her oral ulcers have improved. However, her labial ulcer has not improved. She developed worsening odynophagia, which prompted admission on 6/27/24. EGD from 6/30/24 showed 3 esophageal ulcers. We are being consulted for additional assistance with work-up of infectious causes of her ulcers.         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 This includes HSV PCR from blood, swabs of oral (6/5 and 6/28) and vulvar lesions (6/5, 7/2). HIV, coxsackie IgG, Mycoplasma pneumoniae PCR, blood adenovirus PCR, urine histo antigen, GC/chlamydia, treponemal testing all negative. There is a rare syndrome called ulcus vulvae acutum (Lipschütz ulcer), which is a painful and necrotic self-healing ulcer that occurs in young women. Most typically, it is associated with primary EBV, but she is EBV IgG positive and has been on VGCV so this would be unlikely. Occasionally other viral infections (mumps, influenza, CMV, and adenovirus) can cause this, but we have essentially ruled out CMV, adenovirus, and influenza at this point. There have also been occasional reports of disseminated histoplasmosis causing vulvar ulcers. Her vulvar biopsy from 7/2 has VZV, HSV, CMV, and EBV PCR pending as well as fungal, AFB, and bacterial cultures in case there is an infectious component.     In discussion with rheumatology, her biopsy results are most consistent with Behcet's, but additional rheumatologic diseases are under consideration (SLE and  vasculitis). She has been started on prednisone per rheumatology with additional rheumatology studies pending.     Transplant Checklist  - QTc interval: 464 on 6/7/24  - Pneumocystis prophylaxis: Inhaled pentamidine (last 6/7/24). Held TMP/SMX due to leukopenia. Planning to restart TMP/SMX on 7/5/24.   - Bacterial prophylaxis:  None  - Fungal prophylaxis: Fluconazole  - Serostatus & viral prophylaxis: CMV D+/R-, HSV ?, EBV D+/R+, VZV +, Valgancyclovir  - Immunization status:  N/A until 3 months after transplant   - Gamma globulin status: No lab results found.  - Antibiotic allergies: None        Old Problems and Infectious Diseases Issues:   None           History of Present Illness    Ira Zamora is a 34 year old female with history of DDKT (10/17/24) and Chron's diases on [facility], with course c/b aphthous ulcers in her mouth, esophageal ulcer, and labial ulcers. She initially developed aphthous oral ulcers in late May. Her timeline is as follows:     5/30/24: Presents to [facility] ED with 1 week history of swollen gums and mouth sore. Had low-grade fever to 100.9. Treated with Magic Mouthwash.   6/3/24: [facility] to [facility] with 2-3 day history of labial ulcer. Treated with topical lidocaine and soaks. Given Valtrex, but did not start this  6/5-6/9/24: Admitted for work-up of oral and vaginal ulcers. Seen by [facility]. Work-up included negative GC/chlamydia, treponema antibody, HIV, Coxackie virus, M pneumoniae PCR, urine histo, CMV and HSV swabs of lesion (buccal), and Karius test (low-level of CMV only).  Mid-July: MMF stopped in case this was contributing to ulcers.   6/24/24: Seen by [facility], who felt ulcers were most likely 2/2 MMF. Planned to watchfully wait for a few weeks to see if things improved.   6/27/24: Admitted with ~1 week history of odynophagia and inability to consume solid food.     Since admission, she has been seen by GYN and GI. GI did EGD on 6/28, which showed 3 esophageal ulcers. Biopsies were  negative for CMV or HSV viral inclusions. No evidence of malignancy.          Interval Events   Seen by rheumatology yesterday, who feel that her presentation and biopsy results most consistent with Behcet's. Additional autoimmune work-up currently ongoing.   Started on steroids this morning  Hoping to discharge from the hospital once her PO intake improves.          Immunizations:     Immunization History   Administered Date(s) Administered    COVID-19 Bivalent 12+ (Pfizer) 09/23/2022    COVID-19 MONOVALENT 12+ (Pfizer) 01/21/2021, 02/10/2021, 09/16/2021    COVID-19 Monovalent 12+ (Pfizer 2022) 02/25/2022    HPV Quadrivalent 10/28/2008, 06/20/2011, 10/28/2011    HepB, Unspecified 12/30/2002    Hepatitis A (ADULT 19+) 02/19/2019, 08/19/2019    Hepatitis B, Peds 12/30/2002    Influenza (intradermal) 09/14/2021    Influenza Vaccine 18-64 (Flublok) 10/13/2023    Pneumo Conj 13-V (2010&after) 10/23/2018    Pneumococcal 20 valent Conjugate (Prevnar 20) 11/17/2023    Pneumococcal 23 valent 11/22/2021    Pneumococcal, Unspecified 12/18/2018, 11/22/2021    TDAP (Adacel,Boostrix) 10/23/2012    Td (Adult), Adsorbed 04/12/2004, 01/01/2006            Allergies:     Allergies   Allergen Reactions    Amlodipine Headache and Other (See Comments)     Other Reaction(s): Unknown    Omeprazole Other (See Comments)     All PPIs per patient    Proton Pump Inhibitors Nephrotoxicity     No PPIs due to history of renal failure due to interstitial nephritis    Tegaderm Transparent Dressing (Informational Only) Blisters             Medications:   Medications that Require Transfusion:   Current Facility-Administered Medications   Medication Dose Route Frequency Provider Last Rate Last Admin    dextrose 10% infusion   Intravenous Continuous PRN Radha Mariscal MD        parenteral nutrition - ADULT compounded formula   PERIPHERAL LINE IV TPN CONTINUOUS Radha Mariscal MD 90 mL/hr at 07/02/24 2025 New Bag at 07/02/24 2025     Scheduled Medications:    Current Facility-Administered Medications   Medication Dose Route Frequency Provider Last Rate Last Admin    acetaminophen (TYLENOL) tablet 975 mg  975 mg Oral Q6H Radha Mariscal MD   975 mg at 07/03/24 0923    Followed by    [START ON 7/5/2024] acetaminophen (TYLENOL) tablet 650 mg  650 mg Oral Q6H Radha Mariscal MD        aspirin (ASA) chewable tablet 81 mg  81 mg Oral Daily Radha Mariscal MD   81 mg at 07/03/24 0923    estradiol (ESTRACE) cream 0.5 g  0.5 g Vaginal Daily Frida Davis MD        famotidine (PEPCID) tablet 40 mg  40 mg Oral BID Radha Mariscal MD   40 mg at 07/03/24 0924    lipids plant base (CLINOLIPID) 20 % infusion 250 mL  250 mL Intravenous Once per day on Monday Tuesday Wednesday Thursday Friday Saturday Radha Mariscal MD 20.8 mL/hr at 07/02/24 2026 250 mL at 07/02/24 2026    magnesium oxide (MAG-OX) tablet 400 mg  400 mg Oral Daily Rosita French PA-C   400 mg at 07/03/24 0923    mycophenolic acid (GENERIC EQUIVALENT) EC tablet 540 mg  540 mg Oral BID IS Radha Mariscal MD   540 mg at 07/02/24 1754    PARoxetine (PAXIL) tablet 30 mg  30 mg Oral QPM Radha Mariscal MD   30 mg at 07/02/24 2029    senna-docusate (SENOKOT-S/PERICOLACE) 8.6-50 MG per tablet 1 tablet  1 tablet Oral BID Radha Mariscal MD   1 tablet at 07/03/24 0924    silver sulfADIAZINE (SILVADENE) 1 % cream   Topical BID Radha Mariscal MD   Given at 07/02/24 2030    sodium chloride (PF) 0.9% PF flush 3 mL  3 mL Intracatheter Q8H Radha Mariscal MD   3 mL at 07/03/24 0635    sodium chloride (PF) 0.9% PF flush 3 mL  3 mL Intracatheter Q8H Radha Mariscal MD   3 mL at 07/03/24 0926    sucralfate (CARAFATE) suspension 1 g  1 g Oral 4x Daily AC & HS Radha Mariscal MD   1 g at 07/03/24 0924    tacrolimus (GENERIC EQUIVALENT) capsule 3 mg  3 mg Oral BID IS Radha Mariscal MD   3 mg at 07/03/24 0923    valGANciclovir (VALCYTE) tablet 450 mg  450 mg Oral Daily Radha Mariscal MD   450 mg at 07/03/24 0923          Physical Exam     Physical Exam  "    Vital signs:  BP (!) 129/92 (BP Location: Left arm)   Pulse 89   Temp 98  F (36.7  C) (Oral)   Resp 16   Ht 1.575 m (5' 2\")   Wt 50.7 kg (111 lb 11.2 oz)   SpO2 100%   BMI 20.43 kg/m      GENERAL: alert and no distress. Lying in bed and talking with .   RESP: Breathing comfortably on room air. No accessory muscle use.   CV: Appears well-perfused.    (female): Not examined today.   MS: no gross musculoskeletal defects noted, no edema  Sink: No visible rashes on face, arms       Data     Data   Laboratory data and imaging listed below was reviewed prior to this encounter.     Microbiology:    Culture   Date Value Ref Range Status   06/06/2024 No Growth  Final   05/03/2024 No Growth  Final   , CD4: No lab results found. and HIV RNA: No lab results found., Hepatitis B Testing:   Recent Labs   Lab Test 04/17/24  0958 09/29/22  1140   HBCAB Nonreactive Nonreactive   HEPBANG Nonreactive Nonreactive   , Hepatitis C Testing:   Hepatitis C Antibody   Date Value Ref Range Status   04/17/2024 Nonreactive Nonreactive Final     Comment:     A nonreactive screening test result does not exclude the possibility of exposure to or infection with HCV. Nonreactive screening test results in individuals with prior exposure to HCV may be due to antibody levels below the limit of detection of this assay or lack of reactivity to the HCV antigens used in this assay. Patients with recent HCV infections (<3 months from time of exposure) may have false-negative HCV antibody results due to the time needed for seroconversion (average of 8 to 9 weeks).   09/29/2022 Nonreactive Nonreactive Final   ,  HSV 1/2 IgG: No lab results found., HVS 1/2 PCR: No lab results found., VZV PCR: No lab results found., and VZV IgG:   Recent Labs   Lab Test 09/29/22  1140 09/14/21  1435   VZVIGGIV 1,687.0 2,203.0*   VZVIGG Positive Positive*   , Quantitative CMV PCR:   Recent Labs   Lab Test 06/27/24  0829 06/05/24  2232 05/31/24  0930 " 04/23/24  0735 04/17/24  0958   CMVQNT Not Detected Not Detected Not Detected Not Detected Not Detected    and CMV IgG:   Recent Labs   Lab Test 04/17/24  0958 09/29/22  1140   CMVIGGIV <0.20 <0.20   CMVIGG No detectable antibody. No detectable antibody.   , and EBV EA IgG: No lab results found.                    Janee Bashir MD  [facility]  Contact information available via [facility] Paging/Directory     07/03/2024

## 2024-07-03 NOTE — PROGRESS NOTES
Transplant Surgery  Inpatient Daily Progress Note  07/03/2024    Assessment & Plan:   34 year old female with PMHx of interstitial nephritis secondary to PPIs s/p kidney transplant (pediatric en bloc). Admitted with mouth ulcers, severe odynophagia, difficulty eating, drinking and swallowing. In addition, she has a new vulvar painful soft tissue mass causing irritation while voiding and pain on walking.     Graft function:   Kidney: Cr 0.9, stable.    Immunosuppressed status secondary to medications:   -Myfortic 540mg BID 7/1. Reduce Myfortic 360 mg BID with starting prednisone.  -Start prednisone 20 mg daily - for possible Bechet's    -Tacrolimus goal level 8-10     Hematology:   Anemia of chronic disease: Hgb ~8-9, stable.    Cardiorespiratory: No acute issues.     GI/Nutrition:   Severe malnutrition in the context of acute illness: Soft diet with supplements. Minimal intake. Remains on PPN. Calorie counts.  Odynophagia, severe: Secondary to oral and esophageal ulcers. Topical lidocaine PRN. Minimall intake.  Esophageal ulcer: Single large cratered ulcer noted on EGD on 6/28. Pathology negative for malignancy, and HSV, CMV, and fungal stains negative, no signs of MMF toxicity. On H2 blocker. PPI contraindicated due to history of ESRD from interstitial nephritis. ID consult to evaluate for possible infectious causes. ID recommended Rheumatology consult. Possible Bechet's. Other diagnoses need to be ruled out before deciding on definitive treatment for the possible Bechet's. Pending labs (MPO, Proteinase 3, BRAVO, ANCA) and vulvar bx. Start prednisone 20 mg daily.     Endocrine: No acute issues.     Fluid/Electrolytes: Continue PPN @90 ml/hr until adequate oral intake.    :   Ulceration, left labia: OB/GYN consulted. Plan for EUA and biopsy on 7/2. Lidocaine external gel to help with pain. Follow up labs, biopsy. Start prednisone for possible Bechet's.     Infectious disease: Afebrile. CMV not detected.      Prophylaxis: Patient mobilizing, not on DVT prophylaxis.     Disposition: 7A    ERICA/Fellow/Resident Provider: KEKE Bates      Staff: Glenis Vann MD     _________________________________________________________________    Interval History: History is obtained from the patient  Overnight events: Continued odynophagia and labial pain. No change in symptoms per patient. Tolerating pills. No oral intake d/t pain    Meds:  Current Facility-Administered Medications   Medication Dose Route Frequency Provider Last Rate Last Admin    acetaminophen (TYLENOL) tablet 975 mg  975 mg Oral Q6H Radha Mariscal MD   975 mg at 07/03/24 0307    Followed by    [START ON 7/5/2024] acetaminophen (TYLENOL) tablet 650 mg  650 mg Oral Q6H Radha Mariscal MD        aspirin (ASA) chewable tablet 81 mg  81 mg Oral Daily Radha Mariscal MD   81 mg at 07/02/24 0801    estradiol (ESTRACE) cream 0.5 g  0.5 g Vaginal Daily Frida Davis MD        famotidine (PEPCID) tablet 40 mg  40 mg Oral BID Radha Mariscal MD   40 mg at 07/02/24 2030    lipids plant base (CLINOLIPID) 20 % infusion 250 mL  250 mL Intravenous Once per day on Monday Tuesday Wednesday Thursday Friday Saturday Radha Mariscal MD 20.8 mL/hr at 07/02/24 2026 250 mL at 07/02/24 2026    magnesium oxide (MAG-OX) tablet 400 mg  400 mg Oral Daily Rosita French PA-C   400 mg at 07/02/24 1525    mycophenolic acid (GENERIC EQUIVALENT) EC tablet 540 mg  540 mg Oral BID IS Radha Mariscal MD   540 mg at 07/02/24 1754    PARoxetine (PAXIL) tablet 30 mg  30 mg Oral QPM Radha Mariscal MD   30 mg at 07/02/24 2029    senna-docusate (SENOKOT-S/PERICOLACE) 8.6-50 MG per tablet 1 tablet  1 tablet Oral BID Radha Mariscal MD   1 tablet at 07/02/24 2029    silver sulfADIAZINE (SILVADENE) 1 % cream   Topical BID Radha Mariscal MD   Given at 07/02/24 2030    sodium chloride (PF) 0.9% PF flush 3 mL  3 mL Intracatheter Q8H Radha Mariscal MD   3 mL at 07/02/24 2146    sodium chloride (PF) 0.9% PF flush  "3 mL  3 mL Intracatheter Q8H Radha Mariscal MD   3 mL at 06/30/24 1650    sucralfate (CARAFATE) suspension 1 g  1 g Oral 4x Daily AC & HS Radha Mariscal MD   1 g at 07/02/24 2146    tacrolimus (GENERIC EQUIVALENT) capsule 3 mg  3 mg Oral BID IS Radha Mariscal MD   3 mg at 07/02/24 1753    valGANciclovir (VALCYTE) tablet 450 mg  450 mg Oral Daily Radha Mariscal MD   450 mg at 07/02/24 0801       Physical Exam:     Admit Weight: 52.6 kg (115 lb 14.4 oz)    Current vitals:   /74 (BP Location: Left arm)   Pulse 66   Temp 97.9  F (36.6  C) (Oral)   Resp 17   Ht 1.575 m (5' 2\")   Wt 49.6 kg (109 lb 6.4 oz)   SpO2 93%   BMI 20.01 kg/m      Vital sign ranges:    Temp:  [97.6  F (36.4  C)-98.6  F (37  C)] 97.9  F (36.6  C)  Pulse:  [] 66  Resp:  [16-21] 17  BP: (105-156)/(59-81) 119/74  SpO2:  [93 %-100 %] 93 %    Gen: NAD  ENT: Mouth ulcers  CV: Perfused  Resp: Unlabored, RA  Abd: Soft  Ext: No edema  Neuro: A&Ox4    Data:   CMP  Recent Labs   Lab 07/02/24  0751 07/01/24  1643 07/01/24  1037 07/01/24  0618 06/30/24  1200 06/30/24  0516 06/27/24  1357 06/26/24  1706   *  --   --  135  --  136   < > 136   POTASSIUM 4.4  --   --  4.3  --  4.2   < > 5.0   CHLORIDE 102  --   --  104  --  107   < > 102   CO2 24  --   --  22  --  22   < > 22   * 133*   < > 102*   < > 103*   < > 136*   BUN 23.5*  --   --  21.4*  --  13.0   < > 26.3*   CR 0.96*  --   --  0.90  --  0.92   < > 1.02*   GFRESTIMATED 79  --   --  86  --  83   < > 74   ROBLES 10.0  --   --  9.9  --  9.5   < > 10.5*   MAG 1.4*  --   --  1.7  --  1.5*   < >  --    PHOS 3.6  --   --  3.2  --  2.8   < >  --    LIPASE  --   --   --   --   --   --   --  14   ALBUMIN  --   --   --  3.2*  --  2.9*  --  3.6   BILITOTAL  --   --   --  0.2  --  0.2  --  0.4   ALKPHOS  --   --   --  73  --  68  --  95   AST  --   --   --  18  --  15  --  28   ALT  --   --   --  13  --  16  --  49    < > = values in this interval not displayed.     CBC  Recent Labs   Lab " 07/02/24  0751 07/01/24  0618   HGB 9.2* 9.1*   WBC 2.8* 2.6*    206

## 2024-07-03 NOTE — PLAN OF CARE
"/67 (BP Location: Left arm)   Pulse 73   Temp 98.6  F (37  C) (Oral)   Resp 16   Ht 1.575 m (5' 2\")   Wt 49.6 kg (109 lb 6.4 oz)   SpO2 97%   BMI 20.01 kg/m      2300 - 0730    AVSS on RA. Mechanical soft diet, poor PO d/t mouth sores and esophageal pain. Daily BG. UAL.     Pain managed w/ scheduled tylenol, PO lidocaine x2. Vulvar biopsy site had minimal bleeding, pad changed x1.     PPN continuous @90, lipids @20.8.  "

## 2024-07-04 LAB
ANION GAP SERPL CALCULATED.3IONS-SCNC: 8 MMOL/L (ref 7–15)
BUN SERPL-MCNC: 31.1 MG/DL (ref 6–20)
CALCIUM SERPL-MCNC: 9.7 MG/DL (ref 8.6–10)
CHLORIDE SERPL-SCNC: 109 MMOL/L (ref 98–107)
CREAT SERPL-MCNC: 0.99 MG/DL (ref 0.51–0.95)
DEPRECATED HCO3 PLAS-SCNC: 20 MMOL/L (ref 22–29)
EGFRCR SERPLBLD CKD-EPI 2021: 76 ML/MIN/1.73M2
ERYTHROCYTE [DISTWIDTH] IN BLOOD BY AUTOMATED COUNT: 14.1 % (ref 10–15)
GLUCOSE BLDC GLUCOMTR-MCNC: 101 MG/DL (ref 70–99)
GLUCOSE BLDC GLUCOMTR-MCNC: 118 MG/DL (ref 70–99)
GLUCOSE SERPL-MCNC: 97 MG/DL (ref 70–99)
HCT VFR BLD AUTO: 26.9 % (ref 35–47)
HGB BLD-MCNC: 8.8 G/DL (ref 11.7–15.7)
MAGNESIUM SERPL-MCNC: 2.1 MG/DL (ref 1.7–2.3)
MCH RBC QN AUTO: 30.8 PG (ref 26.5–33)
MCHC RBC AUTO-ENTMCNC: 32.7 G/DL (ref 31.5–36.5)
MCV RBC AUTO: 94 FL (ref 78–100)
PHOSPHATE SERPL-MCNC: 3.1 MG/DL (ref 2.5–4.5)
PLATELET # BLD AUTO: 163 10E3/UL (ref 150–450)
POTASSIUM SERPL-SCNC: 4.6 MMOL/L (ref 3.4–5.3)
RBC # BLD AUTO: 2.86 10E6/UL (ref 3.8–5.2)
SODIUM SERPL-SCNC: 137 MMOL/L (ref 135–145)
TACROLIMUS BLD-MCNC: 9.2 UG/L (ref 5–15)
TME LAST DOSE: NORMAL H
TME LAST DOSE: NORMAL H
WBC # BLD AUTO: 2.6 10E3/UL (ref 4–11)

## 2024-07-04 PROCEDURE — 36415 COLL VENOUS BLD VENIPUNCTURE: CPT

## 2024-07-04 PROCEDURE — 250N000009 HC RX 250: Performed by: TRANSPLANT SURGERY

## 2024-07-04 PROCEDURE — 250N000009 HC RX 250

## 2024-07-04 PROCEDURE — 83735 ASSAY OF MAGNESIUM: CPT

## 2024-07-04 PROCEDURE — 99233 SBSQ HOSP IP/OBS HIGH 50: CPT | Mod: 24 | Performed by: INTERNAL MEDICINE

## 2024-07-04 PROCEDURE — 85027 COMPLETE CBC AUTOMATED: CPT

## 2024-07-04 PROCEDURE — 80048 BASIC METABOLIC PNL TOTAL CA: CPT

## 2024-07-04 PROCEDURE — 250N000013 HC RX MED GY IP 250 OP 250 PS 637

## 2024-07-04 PROCEDURE — 250N000013 HC RX MED GY IP 250 OP 250 PS 637: Performed by: PHYSICIAN ASSISTANT

## 2024-07-04 PROCEDURE — 250N000012 HC RX MED GY IP 250 OP 636 PS 637: Performed by: NURSE PRACTITIONER

## 2024-07-04 PROCEDURE — 120N000011 HC R&B TRANSPLANT UMMC

## 2024-07-04 PROCEDURE — 80197 ASSAY OF TACROLIMUS: CPT

## 2024-07-04 PROCEDURE — 250N000012 HC RX MED GY IP 250 OP 636 PS 637: Performed by: PHYSICIAN ASSISTANT

## 2024-07-04 PROCEDURE — 84100 ASSAY OF PHOSPHORUS: CPT

## 2024-07-04 PROCEDURE — 999N000127 HC STATISTIC PERIPHERAL IV START W US GUIDANCE

## 2024-07-04 PROCEDURE — B4185 PARENTERAL SOL 10 GM LIPIDS: HCPCS

## 2024-07-04 RX ORDER — VALGANCICLOVIR 450 MG/1
450 TABLET, FILM COATED ORAL DAILY
Status: DISCONTINUED | OUTPATIENT
Start: 2024-07-05 | End: 2024-07-05 | Stop reason: DRUGHIGH

## 2024-07-04 RX ORDER — VALGANCICLOVIR 450 MG/1
900 TABLET, FILM COATED ORAL DAILY
Status: DISCONTINUED | OUTPATIENT
Start: 2024-07-05 | End: 2024-07-04

## 2024-07-04 RX ORDER — AZATHIOPRINE 50 MG/1
100 TABLET ORAL DAILY
Status: DISCONTINUED | OUTPATIENT
Start: 2024-07-04 | End: 2024-07-05

## 2024-07-04 RX ADMIN — PAROXETINE 30 MG: 30 TABLET, FILM COATED ORAL at 20:11

## 2024-07-04 RX ADMIN — AZATHIOPRINE 100 MG: 50 TABLET ORAL at 14:22

## 2024-07-04 RX ADMIN — VALGANCICLOVIR HYDROCHLORIDE 450 MG: 450 TABLET ORAL at 08:39

## 2024-07-04 RX ADMIN — SUCRALFATE 1 G: 1 SUSPENSION ORAL at 10:12

## 2024-07-04 RX ADMIN — Medication 5 ML: at 06:26

## 2024-07-04 RX ADMIN — ACETAMINOPHEN 975 MG: 325 TABLET, FILM COATED ORAL at 16:06

## 2024-07-04 RX ADMIN — Medication 5 ML: at 18:35

## 2024-07-04 RX ADMIN — FAMOTIDINE 40 MG: 20 TABLET ORAL at 20:11

## 2024-07-04 RX ADMIN — MYCOPHENOLIC ACID 360 MG: 180 TABLET, DELAYED RELEASE ORAL at 08:39

## 2024-07-04 RX ADMIN — ACETAMINOPHEN 975 MG: 325 TABLET, FILM COATED ORAL at 10:12

## 2024-07-04 RX ADMIN — Medication 5 ML: at 16:07

## 2024-07-04 RX ADMIN — SUCRALFATE 1 G: 1 SUSPENSION ORAL at 06:54

## 2024-07-04 RX ADMIN — SUCRALFATE 1 G: 1 SUSPENSION ORAL at 16:07

## 2024-07-04 RX ADMIN — ACETAMINOPHEN 975 MG: 325 TABLET, FILM COATED ORAL at 03:31

## 2024-07-04 RX ADMIN — Medication 5 ML: at 08:45

## 2024-07-04 RX ADMIN — Medication 5 ML: at 22:14

## 2024-07-04 RX ADMIN — MAGNESIUM OXIDE TAB 400 MG (241.3 MG ELEMENTAL MG) 400 MG: 400 (241.3 MG) TAB at 08:39

## 2024-07-04 RX ADMIN — OXYCODONE HYDROCHLORIDE 5 MG: 5 SOLUTION ORAL at 16:08

## 2024-07-04 RX ADMIN — TACROLIMUS 2 MG: 1 CAPSULE ORAL at 18:58

## 2024-07-04 RX ADMIN — Medication 5 ML: at 20:54

## 2024-07-04 RX ADMIN — ASPIRIN 81 MG CHEWABLE TABLET 81 MG: 81 TABLET CHEWABLE at 08:39

## 2024-07-04 RX ADMIN — Medication 5 ML: at 03:13

## 2024-07-04 RX ADMIN — Medication 5 ML: at 13:29

## 2024-07-04 RX ADMIN — OLIVE OIL AND SOYBEAN OIL 250 ML: 16; 4 INJECTION, EMULSION INTRAVENOUS at 20:12

## 2024-07-04 RX ADMIN — MAGNESIUM SULFATE HEPTAHYDRATE: 500 INJECTION, SOLUTION INTRAMUSCULAR; INTRAVENOUS at 20:12

## 2024-07-04 RX ADMIN — PREDNISONE 20 MG: 20 TABLET ORAL at 08:39

## 2024-07-04 RX ADMIN — SUCRALFATE 1 G: 1 SUSPENSION ORAL at 22:14

## 2024-07-04 RX ADMIN — FAMOTIDINE 40 MG: 20 TABLET ORAL at 08:39

## 2024-07-04 RX ADMIN — TACROLIMUS 2 MG: 1 CAPSULE ORAL at 08:39

## 2024-07-04 ASSESSMENT — ACTIVITIES OF DAILY LIVING (ADL)
ADLS_ACUITY_SCORE: 25
ADLS_ACUITY_SCORE: 24
ADLS_ACUITY_SCORE: 25
ADLS_ACUITY_SCORE: 25
ADLS_ACUITY_SCORE: 24
ADLS_ACUITY_SCORE: 25
ADLS_ACUITY_SCORE: 25
ADLS_ACUITY_SCORE: 24
ADLS_ACUITY_SCORE: 25
ADLS_ACUITY_SCORE: 24
ADLS_ACUITY_SCORE: 25
ADLS_ACUITY_SCORE: 24
ADLS_ACUITY_SCORE: 25
ADLS_ACUITY_SCORE: 25
ADLS_ACUITY_SCORE: 24
ADLS_ACUITY_SCORE: 25

## 2024-07-04 NOTE — PLAN OF CARE
"/75 (BP Location: Left arm)   Pulse 66   Temp 97.4  F (36.3  C) (Oral)   Resp 16   Ht 1.575 m (5' 2\")   Wt 50.7 kg (111 lb 11.2 oz)   SpO2 100%   BMI 20.43 kg/m      Shift: [id number]  VS: VSS, afebrile  Neuro: Aox4  BG: Q6: 101  Labs: AM labs pending   Respiratory: RA  Cardiac: WDL  Pain/Nausea: Discomfort related to esophagus (lidocaine solution given). Denied nausea.   Diet: Mechanical/Dental Soft   IV Access: PIV SL   Infusion(s): TPN @ 90 mL/hr. Lipids 20.8  GI/: Voiding. BM x1.   Skin: Mouth sores. Labia-vulvar mass   Mobility: SBA  Plan: Continue with POC and notify team of any changes.    "

## 2024-07-04 NOTE — PLAN OF CARE
"Goal Outcome Evaluation:    Plan of Care Reviewed With: patient  Overall Patient Progress: no change    /84 (BP Location: Left arm)   Pulse 80   Temp 98.1  F (36.7  C) (Oral)   Resp 16   Ht 1.575 m (5' 2\")   Wt 50.4 kg (111 lb 3.2 oz)   SpO2 100%   BMI 20.34 kg/m      9992-0234. BG q6hr. VSS on RA, afebrile. Patient endorsing pain 7/10 in esophageal area due to ulcers. PRN lidocaine solution given x4, oxy x1. Denies nausea. PIV infusing PPN. Mechanical soft diet with poor appetite, patient is not eating. LBM 7/4. Voiding, not saving. MMF switched to imuran today. Plan for [place] placement on 7/5 for tube feeds. Continue with plan of care and update team with any changes.     "

## 2024-07-04 NOTE — PROGRESS NOTES
Transplant Surgery  Inpatient Daily Progress Note  07/04/2024    Assessment & Plan: 34 year old female with PMHx of interstitial nephritis secondary to PPIs s/p kidney transplant (pediatric en bloc). Admitted with mouth ulcers, severe odynophagia, difficulty eating, drinking and swallowing. In addition, she has a new vulvar painful soft tissue mass causing irritation while voiding and pain on walking.     Plan:   - need for small bore nasoduodenal tube, ordered x-ray feeding tube + nutritional services   - requested from nurse to give next Lidocaine prior to x-ray feeding tube   - Rhuematology asked over Vocera: switch from MMF to AZA to help with the mouth/esophageal ulcers and can be used as an immunosuppressant   - Nephrology confirmed Azathioprine 2 mg/kg and recommended to decrease prednisolone to 5 mg      Graft function:   Kidney: Cr 0.99, stable.     Immunosuppression management:   -Myfortic 540mg BID 7/1. Reduce Myfortic 360 mg BID with starting prednisone.  -Start prednisone 20 mg daily - for possible Bechet's    -Tacrolimus goal level 8-10     Neuro/Psych:   Pain: still c/o odynophagia, no improvement since yesterday, NPO because of the pain.     Hematology:   Anemia of chronic disease: Hgb ~8-9, stable.     Cardiorespiratory: No acute issues.     GI/Nutrition:   Severe malnutrition in the context of acute illness: Soft diet with supplements. Minimal intake. Remains on PPN. Calorie counts.  Odynophagia, severe: Secondary to oral and esophageal ulcers. Topical lidocaine PRN. Minimall intake.  Esophageal ulcer: Single large cratered ulcer noted on EGD on 6/28. Pathology negative for malignancy, and HSV, CMV, and fungal stains negative, no signs of MMF toxicity. On H2 blocker. PPI contraindicated due to history of ESRD from interstitial nephritis. ID consult to evaluate for possible infectious causes. ID recommended Rheumatology consult. Possible Bechet's. Other diagnoses need to be ruled out before deciding  on definitive treatment for the possible Bechet's. Pending labs (MPO, Proteinase 3, BRAVO, ANCA) and vulvar bx. Start prednisone 20 mg daily.      Endocrine: No acute issues.     Fluid/Electrolytes: Continue PPN @90 ml/hr until adequate oral intake.     : Ulceration, left labia: OB/GYN consulted. Plan for EUA and biopsy on 7/2. Lidocaine external gel to help with pain. Follow up labs, biopsy. Start prednisone for possible Bechet's.     Infectious disease: Afebrile. CMV not detected.     Prophylaxis: Patient mobilizing, not on DVT prophylaxis.     Disposition: 7A       ERICA/Fellow/Resident Provider: Jordy Jose, Plastic Surgery resident  Faculty: Sid Desai MD    _________________________________________________________________  Transplant History:   4/17/2024 (Kidney), Postoperative day: 78     Interval History: History is obtained from the patient  Overnight events: Patient wasn't able to drink ensure yesterday due to esophageal ulcer pain. Her vulvar pain is well controlled. Patient was requesting to increase the dose of Lidocaine or the the frequency to help with eating/drinking as it seems to wear-off fast, but pharmacy confirmed she is at her max dose and frequency and worries about toxicity.   She has been ambulating fine with no issues.     ROS:   A 10-point review of systems was negative except as noted above.    Meds:  Current Facility-Administered Medications   Medication Dose Route Frequency Provider Last Rate Last Admin    acetaminophen (TYLENOL) tablet 975 mg  975 mg Oral Q6H Radha Mariscal MD   975 mg at 07/04/24 0331    Followed by    [START ON 7/5/2024] acetaminophen (TYLENOL) tablet 650 mg  650 mg Oral Q6H Radha Mariscal MD        aspirin (ASA) chewable tablet 81 mg  81 mg Oral Daily Radha Mariscal MD   81 mg at 07/03/24 0923    estradiol (ESTRACE) cream 0.5 g  0.5 g Vaginal Daily Frida Davis MD        famotidine (PEPCID) tablet 40 mg  40 mg Oral BID Radha Mariscal MD   40 mg at 07/03/24 5952  "   lipids plant base (CLINOLIPID) 20 % infusion 250 mL  250 mL Intravenous Once per day on Monday Tuesday Wednesday Thursday Friday Saturday Radha Mariscal MD 20.8 mL/hr at 07/03/24 2107 250 mL at 07/03/24 2107    magnesium oxide (MAG-OX) tablet 400 mg  400 mg Oral Daily Rosita French PA-C   400 mg at 07/03/24 0923    mycophenolic acid (GENERIC EQUIVALENT) EC tablet 360 mg  360 mg Oral BID IS Rosita French PA-C   360 mg at 07/03/24 1905    PARoxetine (PAXIL) tablet 30 mg  30 mg Oral QPM Radha Mariscal MD   30 mg at 07/03/24 1907    predniSONE (DELTASONE) tablet 20 mg  20 mg Oral Daily Rosita French PA-C   20 mg at 07/03/24 1148    senna-docusate (SENOKOT-S/PERICOLACE) 8.6-50 MG per tablet 1 tablet  1 tablet Oral BID Radha Mariscal MD   1 tablet at 07/03/24 0924    silver sulfADIAZINE (SILVADENE) 1 % cream   Topical BID Radha Mariscal MD   Given at 07/02/24 2030    sodium chloride (PF) 0.9% PF flush 3 mL  3 mL Intracatheter Q8H Radha Mariscal MD   3 mL at 07/03/24 2230    sodium chloride (PF) 0.9% PF flush 3 mL  3 mL Intracatheter Q8H Radha Mariscal MD   3 mL at 07/03/24 1610    sucralfate (CARAFATE) suspension 1 g  1 g Oral 4x Daily AC & HS Radha Mariscal MD   1 g at 07/04/24 0654    tacrolimus (GENERIC EQUIVALENT) capsule 2 mg  2 mg Oral BID IS Rosita French PA-C        valGANciclovir (VALCYTE) tablet 450 mg  450 mg Oral Daily Radha Mariscal MD   450 mg at 07/03/24 0923       Physical Exam:     Admit Weight: 52.6 kg (115 lb 14.4 oz)    Current vitals:   /75 (BP Location: Left arm)   Pulse 66   Temp 97.4  F (36.3  C) (Oral)   Resp 16   Ht 1.575 m (5' 2\")   Wt 50.4 kg (111 lb 3.2 oz)   SpO2 100%   BMI 20.34 kg/m      Vital sign ranges:    Temp:  [97.4  F (36.3  C)-98.6  F (37  C)] 97.4  F (36.3  C)  Pulse:  [66-95] 66  Resp:  [16-18] 16  BP: (112-136)/(75-92) 136/75  SpO2:  [98 %-100 %] 100 %    General Appearance: in mild distress and pain.   Skin: Warm, perfused  Heart: well " perfused   Lungs: normal breathing   Abdomen: The abdomen is soft, non-tender  Neurologic: awake, alert, and oriented.     Data:   CMP  Recent Labs   Lab 07/04/24  0632 07/04/24  0250 07/03/24  1912 07/03/24  0638 07/01/24  1037 07/01/24  0618 06/30/24  1200 06/30/24  0516     --   --  136   < > 135  --  136   POTASSIUM 4.6  --   --  4.8   < > 4.3  --  4.2   CHLORIDE 109*  --   --  106   < > 104  --  107   CO2 20*  --   --  23   < > 22  --  22   GLC 97 101*   < > 122*   < > 102*   < > 103*   BUN 31.1*  --   --  27.8*   < > 21.4*  --  13.0   CR 0.99*  --   --  1.03*   < > 0.90  --  0.92   GFRESTIMATED 76  --   --  73   < > 86  --  83   ROBLES 9.7  --   --  9.6   < > 9.9  --  9.5   MAG 2.1  --   --  2.4*   < > 1.7  --  1.5*   PHOS 3.1  --   --  3.9   < > 3.2  --  2.8   ALBUMIN  --   --   --   --   --  3.2*  --  2.9*   BILITOTAL  --   --   --   --   --  0.2  --  0.2   ALKPHOS  --   --   --   --   --  73  --  68   AST  --   --   --   --   --  18  --  15   ALT  --   --   --   --   --  13  --  16    < > = values in this interval not displayed.     CBC  Recent Labs   Lab 07/04/24  0632 07/03/24 0638   HGB 8.8* 8.9*   WBC 2.6* 3.0*    183

## 2024-07-04 NOTE — PROGRESS NOTES
"/87 (BP Location: Left arm)   Pulse 84   Temp 98.6  F (37  C) (Oral)   Resp 18   Ht 1.575 m (5' 2\")   Wt 50.7 kg (111 lb 11.2 oz)   SpO2 99%   BMI 20.43 kg/m      [id number]    Patient A&Ox4, RA, VSS, and independent. Soft-chew diet with poor appetite and TPN running at 90 mL/hr with lipids infusing at 20.8 mL/hr. Pain stated at 7/10 in her esophagus, oxycodone given x1 and lidocaine solution given x3. Patient stated no difference in vulvar mass. Patient showered tonight and has a loose BM.  remains at bedside and continues to be supportive and attentive. Continue POC.   "

## 2024-07-04 NOTE — PROGRESS NOTES
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   No indication for dialysis.    Pediatric en-bloc kidneys   - Baseline Creatinine: ~ 0.9-1.1   - Proteinuria: Normal (<0.2 grams)   - DSA Hx: No DSA   - Last cPRA: 9%   - BK Viremia: No   - Kidney Tx Biopsy Hx: No biopsy history.      # Immunosuppression Prior to Admission: Tacrolimus immediate release (goal 8-10) and Mycophenolic acid (dose 540 mg every 12 hours)   - Present Immunosuppression: Tacrolimus immediate release (goal 8-10), Mycophenolic acid (dose 360 mg every 12 hours), and Prednisone (dose 20 mg daily)   - Induction with Recent Transplant:  High Intensity Protocol   - Continue with intensive monitoring of immunosuppression for efficacy and toxicity.   - Historical Changes in Immunosuppression: Previously stopped mycophenolate due to oral and vaginal ulcers, but endoscopy did not indicate mycophenolic acid toxicity.  Started prednisone 20 mg daily d/t concern for Bechet's disease and mycophenolic acid dose reduced accordingly.   - Patient is in an immunosuppressed state and will continue to monitor for efficacy and toxicity of immunosuppression medications.   - Changes: Yes - prednisone 20 for possible Behcet's per Rheum, ok to switch MPA to AZA at 2mg/kg, repeat FK 7/5      # Infection  Prevention:      - PJP: Inhaled pentamidine; Okay to restart Bactrim on 7/5/24  - CMV: Valganciclovir (Valcyte); Patient is CMV IgG Ab discordant (D+/R-) and will continue on Valcyte x 6 months, then check CMV PCR monthly until 12 months post transplant.   - CMV IgG Ab High Risk Discordance (D+/R-): Yes  - EBV IgG Ab High Risk Discordance (D+/R-): No    # Blood Pressure: Controlled;  Goal BP: < 140/90   - Changes: No    # Anemia in Chronic Renal Disease: Hgb: Stable, low      JUNG: Yes- Retacrit 4000units (6/29).   - Iron studies: Replete    # Leukopenia: Stable, low Likely medication related.  Negative CMV PCR.    # Mineral Bone Disorder:    - Secondary renal hyperparathyroidism; PTH level: Minimally elevated ( pg/ml)        On treatment: None  - Vitamin D; level: Normal         On supplement: No  - Calcium; level: Normal          On supplement: No  - Phosphorus; level: Normal         On supplement: No    # Electrolytes:   - Potassium; level: Normal         On supplement: No  - Magnesium; level: High (received magnesium sulfate yesterday)         On supplement: Yes, magnesium oxide 400 mg PO daily  - Bicarbonate; level: Normal         On supplement: No  - Sodium; level: Normal      # Concern for Behcet's   # Oral and Genital Ulcers  # Severe Malnutrition  # GERD/Dysphagia/Esophagitis: Patient with apparent severe heartburn/GERD symptoms, worsening over the last week or so prior to admission.  Patient underwent EGD 5/24/24 which was mostly unremarkable with only finding of minimal non-specific chronic inflammation in the stomach (negative H. pylori), but jessee esophageal findings.  In addition, recently had significant oral ulcers.  Now with patient unable to eat and minimal fluid intake.  She hasn't been able to take her medications in the last day due to pain with swallowing.  Wt down with decreased PO intake.  Patient is on famotidine, but unable to use PPI due to probable underlying kidney disease  (interstitial nephritis) from previous PPI use. Continue Sucralfate.    - EGD 6/28/25  Esophageal ulcer with no bleeding and no stigmata of recent bleeding. Biopsies: HSV and CMV immunostains are negative. PAS stains are negative for fungal organisms.                          - LA Grade B esophagitis with no bleeding. Biopsied.                          - Z-line, 32 cm from the incisors.                          - Friable gastric mucosa.                          - Normal examined duodenum.  - HSV and CMV immunostains are negative. PAS stains are negative for fungal organisms.    - Patient was off mycophenolic acid 6/14-7/1 without any improvement  - 6/29/24 Started PPN  - 07/02/24 Biopsy of vulva (result pending)    # Other Significant PMH:   - Crohn's Disease: Appears to be stable.  Previously was on ustekinumab prior to kidney transplant, but hasn't restarted it due to ongoing oral and vaginal ulcers.  Does occasionally alternate between diarrhea and constipation, but no issues at this time.  Followed by GI and [facility].   - Cardiac/Vascular Disease Risk Factors: Dilated ascending aorta (3.8 cm)  on 2024 ECHO (stable from 2020).     # Transplant History:  Etiology of Kidney Failure: Interstitial nephritis  Tx: DDKT - Pediatric en bloc  Transplant: 4/17/2024 (Kidney)  Significant transplant-related complications:  Oral and vaginal ulcers, esophagitis     Recommendations were communicated to the primary team via this note.    Rachel Arellano MD  Transplant Nephrology  Contact information available via [facility] Paging/Directory    Interval History  Continued esophageal pain but tolerating po meds  Receiving PPN  No nausea, vomiting, or diarrhea  Pending final path and infectious w/up      Review of Systems   4 point ROS was obtained and negative except as noted in the Interval History.    MEDICATIONS:  Current Facility-Administered Medications   Medication Dose Route Frequency Provider Last Rate Last Admin    acetaminophen  (TYLENOL) tablet 975 mg  975 mg Oral Q6H Radha Mariscal MD   975 mg at 07/04/24 1012    Followed by    [START ON 7/5/2024] acetaminophen (TYLENOL) tablet 650 mg  650 mg Oral Q6H Radha Mariscal MD        aspirin (ASA) chewable tablet 81 mg  81 mg Oral Daily Radha Mariscal MD   81 mg at 07/04/24 0839    estradiol (ESTRACE) cream 0.5 g  0.5 g Vaginal Daily Frida Davis MD        famotidine (PEPCID) tablet 40 mg  40 mg Oral BID Radha Mariscal MD   40 mg at 07/04/24 0839    lipids plant base (CLINOLIPID) 20 % infusion 250 mL  250 mL Intravenous Once per day on Monday Tuesday Wednesday Thursday Friday Saturday Radha Mariscal MD 20.8 mL/hr at 07/03/24 2107 250 mL at 07/03/24 2107    magnesium oxide (MAG-OX) tablet 400 mg  400 mg Oral Daily Rosita French PA-C   400 mg at 07/04/24 0839    mycophenolic acid (GENERIC EQUIVALENT) EC tablet 360 mg  360 mg Oral BID IS Rosita French PA-C   360 mg at 07/04/24 0839    PARoxetine (PAXIL) tablet 30 mg  30 mg Oral QPM Radha Mariscal MD   30 mg at 07/03/24 1907    predniSONE (DELTASONE) tablet 20 mg  20 mg Oral Daily Rosita French PA-C   20 mg at 07/04/24 0839    senna-docusate (SENOKOT-S/PERICOLACE) 8.6-50 MG per tablet 1 tablet  1 tablet Oral BID Radha Mariscal MD   1 tablet at 07/03/24 0924    silver sulfADIAZINE (SILVADENE) 1 % cream   Topical BID Radha Mariscal MD   Given at 07/02/24 2030    sodium chloride (PF) 0.9% PF flush 3 mL  3 mL Intracatheter Q8H Radha Mariscal MD   3 mL at 07/03/24 2230    sodium chloride (PF) 0.9% PF flush 3 mL  3 mL Intracatheter Q8H Radha Mariscal MD   3 mL at 07/03/24 1610    sucralfate (CARAFATE) suspension 1 g  1 g Oral 4x Daily AC & HS Radha Mariscal MD   1 g at 07/04/24 1012    tacrolimus (GENERIC EQUIVALENT) capsule 2 mg  2 mg Oral BID IS Rosita French PA-C   2 mg at 07/04/24 0839    valGANciclovir (VALCYTE) tablet 450 mg  450 mg Oral Daily Radha Mariscal MD   450 mg at 07/04/24 0839     Current Facility-Administered Medications  "  Medication Dose Route Frequency Provider Last Rate Last Admin    dextrose 10% infusion   Intravenous Continuous PRN Radha Mariscal MD        parenteral nutrition - ADULT compounded formula   PERIPHERAL LINE IV TPN CONTINUOUS Sid Desai MD 90 mL/hr at 24 New Bag at 24       Physical Exam   Temp  Av.9  F (36.6  C)  Min: 97.5  F (36.4  C)  Max: 98.3  F (36.8  C)      Pulse  Av.9  Min: 76  Max: 118 Resp  Av  Min: 16  Max: 20  SpO2  Av.1 %  Min: 93 %  Max: 100 %     /75 (BP Location: Left arm)   Pulse 66   Temp 97.4  F (36.3  C) (Oral)   Resp 16   Ht 1.575 m (5' 2\")   Wt 50.4 kg (111 lb 3.2 oz)   SpO2 100%   BMI 20.34 kg/m      Admit       GENERAL APPEARANCE: alert and no distress  HEENT: resolving oral lesions.   RESP: lungs clear to auscultation - no rales, rhonchi or wheezes  CV: regular rhythm, normal rate, no rub, no murmur  EDEMA: no LE edema bilaterally  ABDOMEN: soft, nondistended, nontender, bowel sounds normal  MS: extremities normal - no gross deformities noted, no evidence of inflammation in joints, no muscle tenderness  SKIN: no rash  DIALYSIS ACCESS: right arm fistula +bruit/+thrill    Data   All labs reviewed by me.  CMP  Recent Labs   Lab 24  0632 24  0250 24  1912 24  0638 24  0751 24  1037 24  0618 24  1200 24  0516     --   --  136 134*  --  135  --  136   POTASSIUM 4.6  --   --  4.8 4.4  --  4.3  --  4.2   CHLORIDE 109*  --   --  106 102  --  104  --  107   CO2 20*  --   --  23 24  --  22  --  22   ANIONGAP 8  --   --  7 8  --  9  --  7   GLC 97 101* 122* 122* 124*   < > 102*   < > 103*   BUN 31.1*  --   --  27.8* 23.5*  --  21.4*  --  13.0   CR 0.99*  --   --  1.03* 0.96*  --  0.90  --  0.92   GFRESTIMATED 76  --   --  73 79  --  86  --  83   ROBLES 9.7  --   --  9.6 10.0  --  9.9  --  9.5   MAG 2.1  --   --  2.4* 1.4*  --  1.7  --  1.5*   PHOS 3.1  --   --  3.9 3.6  --  3.2  --  2.8 "   PROTTOTAL  --   --   --   --   --   --  6.0*  --  5.4*   ALBUMIN  --   --   --   --   --   --  3.2*  --  2.9*   BILITOTAL  --   --   --   --   --   --  0.2  --  0.2   ALKPHOS  --   --   --   --   --   --  73  --  68   AST  --   --   --   --   --   --  18  --  15   ALT  --   --   --   --   --   --  13  --  16    < > = values in this interval not displayed.     CBC  Recent Labs   Lab 07/04/24  0632 07/03/24  0638 07/02/24  0751 07/01/24  0618   HGB 8.8* 8.9* 9.2* 9.1*   WBC 2.6* 3.0* 2.8* 2.6*   RBC 2.86* 2.95* 3.07* 3.10*   HCT 26.9* 28.7* 29.2* 30.5*   MCV 94 97 95 98   MCH 30.8 30.2 30.0 29.4   MCHC 32.7 31.0* 31.5 29.8*   RDW 14.1 14.0 13.8 14.1    183 202 206     INR  Recent Labs   Lab 07/01/24  0618 06/30/24  0516 06/28/24  0602   INR 1.20* 1.27* 1.25*     ABGNo lab results found in last 7 days.   Urine Studies  Recent Labs   Lab Test 06/06/24  1113 05/03/24  1600 04/17/24  1113 09/29/22  1153   COLOR Yellow Light Yellow Straw Yellow   APPEARANCE Clear Clear Clear Clear   URINEGLC Negative 100* 70* 100*   URINEBILI Negative Negative Negative Negative   URINEKETONE Negative Negative Negative Negative   SG 1.028 1.014 1.004 1.010   UBLD Small* Negative Trace* Trace*   URINEPH 5.5 5.5 8.5* 8.5*   PROTEIN 10* Negative 100* 100*   NITRITE Negative Negative Negative Negative   LEUKEST Moderate* Trace* Negative Negative   RBCU 2 2 2 <1   WBCU 6* 4 <1 1     No lab results found.  PTH  Recent Labs   Lab Test 05/23/24  0949 04/22/24  0821   PTHI 131* 138*     Iron Studies  Recent Labs   Lab Test 05/23/24  0949 04/22/24  0821   IRON 77 53   * 147*   IRONSAT 38 36   PRICILA 2,181* 2,181*       IMAGING:  All imaging studies reviewed by me.

## 2024-07-05 ENCOUNTER — APPOINTMENT (OUTPATIENT)
Dept: GENERAL RADIOLOGY | Facility: CLINIC | Age: 34
DRG: 987 | End: 2024-07-05
Attending: TRANSPLANT SURGERY
Payer: MEDICARE

## 2024-07-05 ENCOUNTER — HOME INFUSION (PRE-WILLOW HOME INFUSION) (OUTPATIENT)
Dept: PHARMACY | Facility: CLINIC | Age: 34
End: 2024-07-05
Payer: MEDICARE

## 2024-07-05 LAB
ANION GAP SERPL CALCULATED.3IONS-SCNC: 7 MMOL/L (ref 7–15)
BUN SERPL-MCNC: 26.3 MG/DL (ref 6–20)
CALCIUM SERPL-MCNC: 9.7 MG/DL (ref 8.6–10)
CHLORIDE SERPL-SCNC: 107 MMOL/L (ref 98–107)
CREAT SERPL-MCNC: 0.91 MG/DL (ref 0.51–0.95)
DEPRECATED HCO3 PLAS-SCNC: 23 MMOL/L (ref 22–29)
DONOR IDENTIFICATION: NORMAL
DSA COMMENTS: NORMAL
DSA PRESENT: NO
DSA TEST METHOD: NORMAL
EGFRCR SERPLBLD CKD-EPI 2021: 84 ML/MIN/1.73M2
ERYTHROCYTE [DISTWIDTH] IN BLOOD BY AUTOMATED COUNT: 14.4 % (ref 10–15)
GLUCOSE BLDC GLUCOMTR-MCNC: 103 MG/DL (ref 70–99)
GLUCOSE BLDC GLUCOMTR-MCNC: 106 MG/DL (ref 70–99)
GLUCOSE BLDC GLUCOMTR-MCNC: 113 MG/DL (ref 70–99)
GLUCOSE SERPL-MCNC: 99 MG/DL (ref 70–99)
HCT VFR BLD AUTO: 29.4 % (ref 35–47)
HGB BLD-MCNC: 9 G/DL (ref 11.7–15.7)
MAGNESIUM SERPL-MCNC: 1.8 MG/DL (ref 1.7–2.3)
MCH RBC QN AUTO: 30.2 PG (ref 26.5–33)
MCHC RBC AUTO-ENTMCNC: 30.6 G/DL (ref 31.5–36.5)
MCV RBC AUTO: 99 FL (ref 78–100)
ORGAN: NORMAL
PHOSPHATE SERPL-MCNC: 2.6 MG/DL (ref 2.5–4.5)
PLATELET # BLD AUTO: 174 10E3/UL (ref 150–450)
POTASSIUM SERPL-SCNC: 4.5 MMOL/L (ref 3.4–5.3)
RBC # BLD AUTO: 2.98 10E6/UL (ref 3.8–5.2)
SA 1  COMMENTS: NORMAL
SA 1 CELL: NORMAL
SA 1 TEST METHOD: NORMAL
SA 2 CELL: NORMAL
SA 2 COMMENTS: NORMAL
SA 2 TEST METHOD: NORMAL
SA1 HI RISK ABY: NORMAL
SA1 MOD RISK ABY: NORMAL
SA2 HI RISK ABY: NORMAL
SA2 MOD RISK ABY: NORMAL
SODIUM SERPL-SCNC: 137 MMOL/L (ref 135–145)
TACROLIMUS BLD-MCNC: 8.2 UG/L (ref 5–15)
TME LAST DOSE: NORMAL H
TME LAST DOSE: NORMAL H
UNACCEPTABLE ANTIGENS: NORMAL
UNOS CPRA: 9
WBC # BLD AUTO: 1.8 10E3/UL (ref 4–11)

## 2024-07-05 PROCEDURE — B4185 PARENTERAL SOL 10 GM LIPIDS: HCPCS

## 2024-07-05 PROCEDURE — 80197 ASSAY OF TACROLIMUS: CPT

## 2024-07-05 PROCEDURE — 84100 ASSAY OF PHOSPHORUS: CPT

## 2024-07-05 PROCEDURE — 99233 SBSQ HOSP IP/OBS HIGH 50: CPT | Mod: 24 | Performed by: INTERNAL MEDICINE

## 2024-07-05 PROCEDURE — 250N000012 HC RX MED GY IP 250 OP 636 PS 637: Performed by: PHYSICIAN ASSISTANT

## 2024-07-05 PROCEDURE — 74018 RADEX ABDOMEN 1 VIEW: CPT | Mod: 26 | Performed by: RADIOLOGY

## 2024-07-05 PROCEDURE — 250N000013 HC RX MED GY IP 250 OP 250 PS 637: Performed by: PHYSICIAN ASSISTANT

## 2024-07-05 PROCEDURE — 36415 COLL VENOUS BLD VENIPUNCTURE: CPT | Performed by: NURSE PRACTITIONER

## 2024-07-05 PROCEDURE — 99223 1ST HOSP IP/OBS HIGH 75: CPT | Mod: GC | Performed by: DERMATOLOGY

## 2024-07-05 PROCEDURE — 250N000009 HC RX 250: Performed by: NURSE PRACTITIONER

## 2024-07-05 PROCEDURE — 250N000009 HC RX 250

## 2024-07-05 PROCEDURE — 999N000065 XR ABDOMEN PORT 1 VIEW

## 2024-07-05 PROCEDURE — 250N000013 HC RX MED GY IP 250 OP 250 PS 637: Performed by: TRANSPLANT SURGERY

## 2024-07-05 PROCEDURE — 120N000011 HC R&B TRANSPLANT UMMC

## 2024-07-05 PROCEDURE — 250N000013 HC RX MED GY IP 250 OP 250 PS 637

## 2024-07-05 PROCEDURE — 99233 SBSQ HOSP IP/OBS HIGH 50: CPT | Mod: 24 | Performed by: NURSE PRACTITIONER

## 2024-07-05 PROCEDURE — 83735 ASSAY OF MAGNESIUM: CPT

## 2024-07-05 PROCEDURE — 250N000012 HC RX MED GY IP 250 OP 636 PS 637: Performed by: NURSE PRACTITIONER

## 2024-07-05 PROCEDURE — 250N000011 HC RX IP 250 OP 636

## 2024-07-05 PROCEDURE — 85027 COMPLETE CBC AUTOMATED: CPT

## 2024-07-05 PROCEDURE — 84433 ASY THIOPURIN S-MTHYLTRNSFRS: CPT | Performed by: NURSE PRACTITIONER

## 2024-07-05 PROCEDURE — 99418 PROLNG IP/OBS E/M EA 15 MIN: CPT | Mod: FS | Performed by: INTERNAL MEDICINE

## 2024-07-05 PROCEDURE — 82374 ASSAY BLOOD CARBON DIOXIDE: CPT

## 2024-07-05 PROCEDURE — 99231 SBSQ HOSP IP/OBS SF/LOW 25: CPT | Mod: 24 | Performed by: STUDENT IN AN ORGANIZED HEALTH CARE EDUCATION/TRAINING PROGRAM

## 2024-07-05 RX ORDER — LIDOCAINE HYDROCHLORIDE 20 MG/ML
5 SOLUTION OROPHARYNGEAL ONCE
Status: COMPLETED | OUTPATIENT
Start: 2024-07-05 | End: 2024-07-05

## 2024-07-05 RX ORDER — GUAIFENESIN 600 MG/1
15 TABLET, EXTENDED RELEASE ORAL DAILY
Status: DISCONTINUED | OUTPATIENT
Start: 2024-07-06 | End: 2024-07-09 | Stop reason: HOSPADM

## 2024-07-05 RX ORDER — VALGANCICLOVIR HYDROCHLORIDE 50 MG/ML
900 POWDER, FOR SOLUTION ORAL DAILY
Status: DISCONTINUED | OUTPATIENT
Start: 2024-07-06 | End: 2024-07-06

## 2024-07-05 RX ORDER — LIDOCAINE 40 MG/G
CREAM TOPICAL
Status: DISCONTINUED | OUTPATIENT
Start: 2024-07-05 | End: 2024-07-09 | Stop reason: HOSPADM

## 2024-07-05 RX ORDER — METHYLPREDNISOLONE SODIUM SUCCINATE 40 MG/ML
40 INJECTION, POWDER, LYOPHILIZED, FOR SOLUTION INTRAMUSCULAR; INTRAVENOUS DAILY
Status: DISCONTINUED | OUTPATIENT
Start: 2024-07-05 | End: 2024-07-06

## 2024-07-05 RX ADMIN — HYDROXYZINE HYDROCHLORIDE 25 MG: 25 TABLET ORAL at 11:40

## 2024-07-05 RX ADMIN — OXYCODONE HYDROCHLORIDE 5 MG: 5 SOLUTION ORAL at 10:08

## 2024-07-05 RX ADMIN — Medication 5 ML: at 10:25

## 2024-07-05 RX ADMIN — Medication 5 ML: at 00:23

## 2024-07-05 RX ADMIN — Medication 5 ML: at 18:17

## 2024-07-05 RX ADMIN — PREDNISONE 20 MG: 20 TABLET ORAL at 08:19

## 2024-07-05 RX ADMIN — Medication 5 ML: at 16:17

## 2024-07-05 RX ADMIN — SUCRALFATE 1 G: 1 SUSPENSION ORAL at 06:30

## 2024-07-05 RX ADMIN — Medication 5 ML: at 14:03

## 2024-07-05 RX ADMIN — Medication 5 ML: at 20:09

## 2024-07-05 RX ADMIN — Medication 5 ML: at 04:24

## 2024-07-05 RX ADMIN — TACROLIMUS 2 MG: 1 CAPSULE ORAL at 08:19

## 2024-07-05 RX ADMIN — PAROXETINE 30 MG: 30 TABLET, FILM COATED ORAL at 20:04

## 2024-07-05 RX ADMIN — OLIVE OIL AND SOYBEAN OIL 250 ML: 16; 4 INJECTION, EMULSION INTRAVENOUS at 20:02

## 2024-07-05 RX ADMIN — SUCRALFATE 1 G: 1 SUSPENSION ORAL at 16:17

## 2024-07-05 RX ADMIN — FAMOTIDINE 40 MG: 20 TABLET ORAL at 20:04

## 2024-07-05 RX ADMIN — VALGANCICLOVIR HYDROCHLORIDE 450 MG: 450 TABLET ORAL at 08:19

## 2024-07-05 RX ADMIN — ASPIRIN 81 MG CHEWABLE TABLET 81 MG: 81 TABLET CHEWABLE at 08:20

## 2024-07-05 RX ADMIN — SUCRALFATE 1 G: 1 SUSPENSION ORAL at 10:23

## 2024-07-05 RX ADMIN — FAMOTIDINE 40 MG: 20 TABLET ORAL at 08:19

## 2024-07-05 RX ADMIN — OXYCODONE HYDROCHLORIDE 5 MG: 5 SOLUTION ORAL at 22:41

## 2024-07-05 RX ADMIN — LIDOCAINE HYDROCHLORIDE 5 ML: 20 SOLUTION OROPHARYNGEAL at 12:04

## 2024-07-05 RX ADMIN — SUCRALFATE 1 G: 1 SUSPENSION ORAL at 22:31

## 2024-07-05 RX ADMIN — Medication 5 ML: at 08:17

## 2024-07-05 RX ADMIN — TACROLIMUS 2 MG: 1 CAPSULE ORAL at 18:14

## 2024-07-05 RX ADMIN — Medication 5 ML: at 22:31

## 2024-07-05 RX ADMIN — AZATHIOPRINE 100 MG: 50 TABLET ORAL at 08:20

## 2024-07-05 RX ADMIN — Medication 5 ML: at 12:04

## 2024-07-05 RX ADMIN — Medication 5 ML: at 06:29

## 2024-07-05 RX ADMIN — ACETAMINOPHEN 650 MG: 325 TABLET, FILM COATED ORAL at 22:31

## 2024-07-05 RX ADMIN — METHYLPREDNISOLONE SODIUM SUCCINATE 40 MG: 40 INJECTION, POWDER, FOR SOLUTION INTRAMUSCULAR; INTRAVENOUS at 13:02

## 2024-07-05 RX ADMIN — MAGNESIUM OXIDE TAB 400 MG (241.3 MG ELEMENTAL MG) 400 MG: 400 (241.3 MG) TAB at 08:19

## 2024-07-05 RX ADMIN — Medication 5 ML: at 02:23

## 2024-07-05 ASSESSMENT — ACTIVITIES OF DAILY LIVING (ADL)
ADLS_ACUITY_SCORE: 24

## 2024-07-05 NOTE — PHARMACY-CONSULT NOTE
Pharmacy Tube Feeding Consult    Medication reviewed for administration by feeding tube and for potential food/drug interactions.    Recommendation: Recommend changing the following medications to a liquid dosage form: Valganciclovir  and azathioprine changed to suspension    Pharmacy will continue to follow as new medications are ordered.    Radha Fernandez, PharmD, BCPS

## 2024-07-05 NOTE — PROGRESS NOTES
Rheumatology Progress Note    Ira Zamora MRN# [id number]   Age: 34 year old YOB: 1990       Assessment & Recommendations:   ASSESSMENT:  Ira Zamora is a 34 year old with history of renal transplant (4/2024 DDKT 2/2 interstitial nephritis; started on tacrolimus and MMF for immunosuppression),Crohn's disease (previously treated with Stelara, last dose 3/2024) who presented with one week of odynophagia and poor oral intake in the setting of ~1 month of oral and vulvar ulcers. EGD 6/28 demonstrated 3 esophageal ulcers, which were biopsied. The extensive infectious disease work-up (see ID note) was negative, and rheumatology was consulted.     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 BRAVO and ANCA were negative, but vulvar biopsy, MPO and proteinase 3 are still pending.     Current immunosuppression includes tacrolimus, azathioprine (switched from MMF on 7/4), and steroids (increased for suspicion of Bechet's).    Patient reports continued severe odynophagia and worsening esophageal pain, with feeding tube placement today (7/5). She has developed a new oral ulcer. Given the worsening of her symptoms on 20mg prednisone, plan to switch to 40mg IV methylprednisolone daily for the next 3 days.     We also recommend consulting dermatology for evaluation (given potential for mucous membrane pemphigoid or other blistering disease).     Given the severity of her pain, would recommend aggressive pain management (e.g., up-titrate oxycodone), but will defer to primary team for management.       RECOMMENDATIONS:  -- Discontinue prednisone and start 40 mg IV methylprednisolone daily for 3 days  -- Recommend dermatology consult  -- Recommend aggressive pain management  -- Pending  "MPO and proteinase 3 labs  -- Pending Vulvar biopsy results       Case seen and discussed with Dr. Mcfarland who agrees with above assessment and plan.   Mary Lou Schwarz, MS4    Staff Addendum:  This patient was interviewed and examined in the presence of the medical student who was acting as a scribe. I have personally verified the history and performed the physical exam and medical decision making.I have reviewed the patients history as well as the available lab and imaging studies     Yasmany Mcfarland MD  Rheumatology attending     30 MINUTES SPENT BY ME on the date of service doing chart review, history, exam, documentation & further activities per the note.      Subjective     The patient reports worsening of the esophageal pain. She is unable to go 1-2 hours without additional lidocaine. She has a new mouth ulcer in the inner upper lip, but no other symptoms or other new ulcers. Her vulvar ulcer feels better than before. She continues to deny eye pain/symptoms. No joint pain or rash.       ROS     A 10 point ROS was performed with pertinent findings listed above.      Objective     PHYSICAL EXAM  /80 (BP Location: Left arm)   Pulse 92   Temp 98.1  F (36.7  C) (Oral)   Resp 16   Ht 1.575 m (5' 2\")   Wt 49.9 kg (110 lb 1.6 oz)   SpO2 100%   BMI 20.14 kg/m    Wt Readings from Last 4 Encounters:   07/05/24 49.9 kg (110 lb 1.6 oz)   06/27/24 52.2 kg (115 lb 1.6 oz)   06/26/24 50.1 kg (110 lb 7.2 oz)   06/24/24 51.3 kg (113 lb)       Constitutional: Alert and oriented, tearful  Skin: New 1-3 mm shallow erosion of upper inner lip, Stable erosions of medial inner lower lip and R buccal mucosa. Vulvar exam deferred. No nail pitting, rash, nodules or lesions  Eyes: white sclera  Resp: breathing comfortably on room air  Neuro: grossly nl cranial nerves  Psych: appropriate judgement, orientation, memory, affect.  MSK: Wrist, MCP, PIP, DIP, elbow joints were examined and found normal. No active synovitis or " altered joint anatomy. Full joint ROM       DATA:     7/1/24 BRAVO, ANCA negative     6/28/24 EGD for Esophageal Ulcers  Final Diagnosis   A. Esophagus, distal, endoscopic biopsies:  - Gastroesophageal junction mucosa without intestinal metaplasia or significant inflammation  - No granulomas present  - Negative for dysplasia or malignancy     B. Esophagus, ulcer, center, endoscopic biopsies:  - Ulcer, with associated granulation tissue, fibrinopurulent debris, acute and chronic inflammation (see comment and microscopic description)  - No granulomas present  - No intact epithelium identified  - Negative for dysplasia or malignancy     C. Esophagus, ulcer, edge, endoscopic biopsies:  - Ulcer, with associated granulation tissue, fibrinopurulent debris, acute and chronic inflammation (see comment and microscopic description)  - No granulomas present  - Occasional fragments of intact squamous epithelium, with scant acute and chronic inflammation  - Negative for dysplasia or malignancy

## 2024-07-05 NOTE — PROGRESS NOTES
Transplant Infectious Diseases Inpatient Consultation      Ira Zamora MRN# [id number]   YOB: 1990 Age: 34 year old   Date of Admission and time: 6/27/2024  1:24 PM     Reason for consult: Genital and Oral ulcers         Recommendations:   Will follow-up on pathology from biopsy results and additional infectious studies (HSV, EBV, CMV PCRs; fungal/bacterial/AFB cultures)  Appreciate rheumatology, dermatology input regarding her recurrent ulcerating lesions   Continue VGCV and fluconazole for prophylaxis  Would-restart TMP/SMX SS daily on PJP PPx today    Transplant infectious diseases will sign off at this time. Please feel free to call us back if there are any additional questions or concerns. Ira has follow-up appointment already scheduled with Dr. Calderon on 7/24 that she can keep as planned.     Attestation:  Total duration of visit including chart review, reviewing labs and imaging, interviewing and examining the patient, documentation, and sending communication to the primary treating team, all at the same day of this encounter, is: 52 minutes       Janee Bashir MD  Transplant Infectious Diseases Attending  [phone]          Synopsis of Clinical Presentation and Course   Transplant Summary  Transplants:  4/17/2024 (Kidney); POD  79.  Coordinator Radha Jorge  Reason for Transplantation: Interstitial nephritis   Current Immunosuppression: MMF d/kamini on 6/17 due to ulcers. Restarting on 7/2/24. Tacrolimus (goal 8-10). Prednisone 10 mg/day (d/kamini 7/2/2024)     Ira Zamora is a 34 year old female with history of DDKT (4/17/24) and Chron's diases on Stelara (last dose 3/24), with course c/b aphthous ulcers in her mouth, esophageal ulcer, and labial ulcers. She initially developed aphthous oral ulcers in late May She was admitted on 6/5-6/9 due to oral and vaginal ulcers. Extensive infectious work-up negative (HSV swabs, pelvic exa, G/C and chlaydia, RPR). There was concern for MMF as  a cause of her oral ulcers, so this was discontinued. Since discontinuing MMF, her oral ulcers have improved. However, her labial ulcer has not improved. She developed worsening odynophagia, which prompted admission on 6/27/24. EGD from 6/30/24 showed 3 esophageal ulcers. We are being consulted for additional assistance with work-up of infectious causes of her ulcers.         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 This includes HSV PCR from blood, swabs of oral (6/5 and 6/28) and vulvar lesions (6/5, 7/2). HIV, coxsackie IgG, Mycoplasma pneumoniae PCR, blood adenovirus PCR, urine histo antigen, GC/chlamydia, treponemal testing all negative. There is a rare syndrome called ulcus vulvae acutum (Lipschütz ulcer), which is a painful and necrotic self-healing ulcer that occurs in young women. Most typically, it is associated with primary EBV, but she is EBV IgG positive and has been on VGCV so this would be unlikely. Occasionally other viral infections (mumps, influenza, CMV, and adenovirus) can cause this, but we have essentially ruled out CMV, adenovirus, and influenza at this point. There have also been occasional reports of disseminated histoplasmosis causing vulvar ulcers. Her vulvar biopsy from 7/2 has HSV, CMV, and EBV PCR pending as well as fungal, AFB, and bacterial cultures in case there is an infectious component.     In discussion with rheumatology, her biopsy results are most consistent with Behcet's, but additional rheumatologic diseases are under consideration (SLE and vasculitis). She has been started on prednisone per rheumatology with additional rheumatology studies pending.     Transplant Checklist  - QTc interval: 464 on 6/7/24  - Pneumocystis prophylaxis: Inhaled pentamidine  (last 6/7/24). Held TMP/SMX due to leukopenia. Planning to restart TMP/SMX on 7/5/24.   - Bacterial prophylaxis:  None  - Fungal prophylaxis: Fluconazole  - Serostatus & viral prophylaxis: CMV D+/R-, HSV ?, EBV D+/R+, VZV +, Valgancyclovir  - Immunization status:  N/A until 3 months after transplant   - Gamma globulin status: No lab results found.  - Antibiotic allergies: None        Old Problems and Infectious Diseases Issues:   None           History of Present Illness    Ira Zamora is a 34 year old female with history of DDKT (10/17/24) and Chron's diases on [facility], with course c/b aphthous ulcers in her mouth, esophageal ulcer, and labial ulcers. She initially developed aphthous oral ulcers in late May. Her timeline is as follows:     5/30/24: Presents to [facility] ED with 1 week history of swollen gums and mouth sore. Had low-grade fever to 100.9. Treated with Magic Mouthwash.   6/3/24: Presents to [facility]YN with 2-3 day history of labial ulcer. Treated with topical lidocaine and soaks. Given Valtrex, but did not start this  6/5-6/9/24: Admitted for work-up of oral and vaginal ulcers. Seen by [facility]. Work-up included negative GC/chlamydia, treponema antibody, HIV, Coxackie virus, M pneumoniae PCR, urine histo, CMV and HSV swabs of lesion (buccal), and Karius test (low-level of CMV only).  Mid-July: MMF stopped in case this was contributing to ulcers.   6/24/24: Seen by [facility], who felt ulcers were most likely 2/2 MMF. Planned to watchfully wait for a few weeks to see if things improved.   6/27/24: Admitted with ~1 week history of odynophagia and inability to consume solid food.     Since admission, she has been seen by GYN and GI. GI did EGD on 6/28, which showed 3 esophageal ulcers. Biopsies were negative for CMV or HSV viral inclusions. No evidence of malignancy.      Interval Events   Since last seen by me on 7/3/24, she has developed new oral lesions. She also had an NG tube placed and is planning to start tube  feeds for malnutrition. Planning to consult derm today to ensure we are not missing blistering/bullous diseases.          Immunizations:     Immunization History   Administered Date(s) Administered    COVID-19 Bivalent 12+ (Pfizer) 09/23/2022    COVID-19 MONOVALENT 12+ (Pfizer) 01/21/2021, 02/10/2021, 09/16/2021    COVID-19 Monovalent 12+ (Pfizer 2022) 02/25/2022    HPV Quadrivalent 10/28/2008, 06/20/2011, 10/28/2011    HepB, Unspecified 12/30/2002    Hepatitis A (ADULT 19+) 02/19/2019, 08/19/2019    Hepatitis B, Peds 12/30/2002    Influenza (intradermal) 09/14/2021    Influenza Vaccine 18-64 (Flublok) 10/13/2023    Pneumo Conj 13-V (2010&after) 10/23/2018    Pneumococcal 20 valent Conjugate (Prevnar 20) 11/17/2023    Pneumococcal 23 valent 11/22/2021    Pneumococcal, Unspecified 12/18/2018, 11/22/2021    TDAP (Adacel,Boostrix) 10/23/2012    Td (Adult), Adsorbed 04/12/2004, 01/01/2006            Allergies:     Allergies   Allergen Reactions    Amlodipine Headache and Other (See Comments)     Other Reaction(s): Unknown    Omeprazole Other (See Comments)     All PPIs per patient    Proton Pump Inhibitors Nephrotoxicity     No PPIs due to history of renal failure due to interstitial nephritis    Tegaderm Transparent Dressing (Informational Only) Blisters             Medications:   Medications that Require Transfusion:   Current Facility-Administered Medications   Medication Dose Route Frequency Provider Last Rate Last Admin    dextrose 10% infusion   Intravenous Continuous PRN Radha Mariscal MD        parenteral nutrition - ADULT compounded formula   PERIPHERAL LINE IV TPN CONTINUOUS Sid Desai MD 90 mL/hr at 07/05/24 0631 Rate Verify at 07/05/24 0631     Scheduled Medications:   Current Facility-Administered Medications   Medication Dose Route Frequency Provider Last Rate Last Admin    acetaminophen (TYLENOL) tablet 650 mg  650 mg Oral Q6H Radha Mariscal MD        aspirin (ASA) chewable tablet 81 mg  81 mg Oral  Daily Radha Mariscal MD   81 mg at 07/05/24 0820    [START ON 7/6/2024] azaTHIOprine (IMURAN) suspension 100 mg  100 mg Oral Daily Sid Desai MD        estradiol (ESTRACE) cream 0.5 g  0.5 g Vaginal Daily Frida Davis MD        famotidine (PEPCID) tablet 40 mg  40 mg Oral BID Radha Mariscal MD   40 mg at 07/05/24 0819    lidocaine (viscous) (XYLOCAINE) 2 % solution 5 mL  5 mL Topical Once Holli Nelson APRN CNP        lipids plant base (CLINOLIPID) 20 % infusion 250 mL  250 mL Intravenous Once per day on Monday Tuesday Wednesday Thursday Friday Saturday Radha Mariscal MD   Stopped at 07/05/24 0803    magnesium oxide (MAG-OX) tablet 400 mg  400 mg Oral Daily Rosita French PA-C   400 mg at 07/05/24 0819    [START ON 7/6/2024] multivitamins w/minerals liquid 15 mL  15 mL Per Feeding Tube Daily Holli Nelson APRN CNP        PARoxetine (PAXIL) tablet 30 mg  30 mg Oral QPM Radha Mariscal MD   30 mg at 07/04/24 2011    predniSONE (DELTASONE) tablet 20 mg  20 mg Oral Daily Rosita French PA-C   20 mg at 07/05/24 0819    senna-docusate (SENOKOT-S/PERICOLACE) 8.6-50 MG per tablet 1 tablet  1 tablet Oral BID Radha Mariscal MD   1 tablet at 07/03/24 0924    silver sulfADIAZINE (SILVADENE) 1 % cream   Topical BID Radha Mariscal MD   Given at 07/02/24 2030    sodium chloride (PF) 0.9% PF flush 3 mL  3 mL Intracatheter Q8H Radha Mariscal MD   3 mL at 07/03/24 2230    sodium chloride (PF) 0.9% PF flush 3 mL  3 mL Intracatheter Q8H Radha Mariscal MD   3 mL at 07/05/24 0830    sucralfate (CARAFATE) suspension 1 g  1 g Oral 4x Daily AC & HS Radha Mariscal MD   1 g at 07/05/24 1023    tacrolimus (GENERIC EQUIVALENT) capsule 2 mg  2 mg Oral BID IS Rosita French PA-C   2 mg at 07/05/24 0819    [START ON 7/6/2024] valGANciclovir (VALCYTE) solution 900 mg  900 mg Oral Daily Sid Desai MD              Physical Exam     Physical Exam     Vital signs:  /80 (BP Location: Left arm)   Pulse 92   Temp 98.1  " F (36.7  C) (Oral)   Resp 16   Ht 1.575 m (5' 2\")   Wt 50 kg (110 lb 4.8 oz)   SpO2 100%   BMI 20.17 kg/m      GENERAL: alert and no distress. Lying in bed and talking with parents  HEENT: NG tube in place. No scleral icterus.   RESP: Breathing comfortably on room air. No accessory muscle use.   CV: Appears well-perfused.    (female): Not examined today.   MS: no gross musculoskeletal defects noted, no edema  Sink: No visible rashes on face, arms       Data     Data   Laboratory data and imaging listed below was reviewed prior to this encounter.     Microbiology:    Culture   Date Value Ref Range Status   07/02/2024 No growth after 2 days  Preliminary   07/02/2024 No growth after 2 days  Preliminary   06/06/2024 No Growth  Final   05/03/2024 No Growth  Final   , CD4: No lab results found. and HIV RNA: No lab results found., Hepatitis B Testing:   Recent Labs   Lab Test 04/17/24  0958 09/29/22  1140   HBCAB Nonreactive Nonreactive   HEPBANG Nonreactive Nonreactive   , Hepatitis C Testing:   Hepatitis C Antibody   Date Value Ref Range Status   04/17/2024 Nonreactive Nonreactive Final     Comment:     A nonreactive screening test result does not exclude the possibility of exposure to or infection with HCV. Nonreactive screening test results in individuals with prior exposure to HCV may be due to antibody levels below the limit of detection of this assay or lack of reactivity to the HCV antigens used in this assay. Patients with recent HCV infections (<3 months from time of exposure) may have false-negative HCV antibody results due to the time needed for seroconversion (average of 8 to 9 weeks).   09/29/2022 Nonreactive Nonreactive Final   ,  HSV 1/2 IgG: No lab results found., HVS 1/2 PCR: No lab results found., VZV PCR: No lab results found., and VZV IgG:   Recent Labs   Lab Test 09/29/22  1140 09/14/21  1435   VZVIGGIV 1,687.0 2,203.0*   VZVIGG Positive Positive*   , Quantitative CMV PCR:   Recent Labs   Lab " Test 06/27/24  0829 06/05/24  2232 05/31/24  0930 04/23/24  0735 04/17/24  0958   CMVQNT Not Detected Not Detected Not Detected Not Detected Not Detected    and CMV IgG:   Recent Labs   Lab Test 04/17/24  0958 09/29/22  1140   CMVIGGIV <0.20 <0.20   CMVIGG No detectable antibody. No detectable antibody.   , and EBV EA IgG: No lab results found.                    Janee Bashir MD  [facility]  Contact information available via [facility] Paging/Directory     07/05/2024

## 2024-07-05 NOTE — CONSULTS
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 Dermatology was consulted to assist with work up for ulcerations with particular concern for mucositis.   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 Rheumatology conditions remain on differential including Bechets, cannot be ruled out. Otherwise recurrent oral and genital aphthae in the absence of Behçet disease could represent complex aphthosis or something like mucous membrane pemphigoid which would require oral biopsy (H&E and dif). Methotrexate induced mucocutaneous disease (now stopped methotrexate).The patient had a vulvar biopsy obtained during debridement. We will plan to obtain these slides and to review with our Dermatopathology team next week for diagnostic clarity. In the meantime plan for treatment as below.   - EBV swab remains pending   - CMV negative   - HSV and VZV swabs negative  - fungal cultures negative   - Treponema negative    - consider ID consult given immunosuppression and concern for increased infection risk given immunosuppression   - BRAVO and ANCA negative   - MPO and proteinase 3 pending   - consider RIYA panel per rheumatology guidance   - consider mucosal biopsy per OMFS team of the ulceration, paralesional for DIF (H&E and dif)   - START triamcinolone topically BID to vulvar ulcer   - consider dexa swish and spit if oral ulcerations become painful or more bothersome  - we will work on reviewing vulvar sample with our Dermatopathologists, will likely be       Thank you for the dermatology consultation. Please do not hesitate to contact the dermatology resident/faculty on call for any additional questions or concerns. We will continue to follow.      Patient seen and evaluated with attending physician, Dr. Fiona Brito,    Medicine-Dermatology PGY-3    Staff Physician Comments:  I discussed and evaluated the patient with the resident and I agree with the assessment and plan as documented in the resident's note.    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 Ira Zamora is a 34 year old female with  history of ESRD due to interstitial nephritis status-post  donor kidney transplant (2024) on immunosuppression (tacrolimus, mycophenolic acid until 2024 with switch to Azathioprine on 2024), Crohn's disease on Stelara, and recent oral/vaginal ulcers thought to be due to mycophenolate who was admitted for odynophagia and vulvar ulcer. Dermatology  was consulted to assist with work up for ulcerations with particular concern for mucositis.     Patient reports that her vulvar pain is much improved after debridement. Her oral pain is very manageable and     Past Medical History:   Past Medical History:   Diagnosis Date    Anemia in chronic kidney disease     Anxiety 2007    Ascending aorta dilation (H24) 2020: ascending aorta 3.8 cm, aortic sinus 3.6 cm.  Echo: Ao root diam: 3.2 cm, asc Aorta Diam: 3.8 cm    ASCUS with positive high risk HPV cervical     ASCUS with positive high risk HPV cervical 2017    Crohn's disease (H)     ESRD (end stage renal disease) on dialysis (H)     GERD (gastroesophageal reflux disease)     Hidradenitis suppurativa 2015    History of blood transfusion     Hypertension     Juvenile rheumatoid arthritis (H)     Asymptomatic in adulthood    Kidney replaced by transplant 2024    En bloc. Induction w/ Thymoglobulin.    Secondary renal hyperparathyroidism (H24)     Tubulointerstitial nephropathy 2011    kidney biopsy 2011 - Acute and  chronic tubulointerstitial nephropathy.     Past Surgical History:   Procedure Laterality Date    BIOPSY VULVA Left 2024    Procedure: BIOPSY, VULVA;  Surgeon: Roxanne Montenegro MD;  Location: [facility] OR    ESOPHAGOSCOPY, GASTROSCOPY, DUODENOSCOPY (EGD), COMBINED N/A 2024    Procedure: ESOPHAGOGASTRODUODENOSCOPY, WITH BIOPSY;  Surgeon: Tamy Miranda MD;  Location: [facility] GI    EXAM UNDER ANESTHESIA PELVIC N/A 2024    Procedure: EXAM UNDER ANESTHESIA, PELVIS;  Surgeon: Roxanne Montenegro MD;  Location: [facility] OR     NEEDLE GUIDE,  KIDNEY BIOPSY      TRANSPLANT KIDNEY RECIPIENT  DONOR N/A 2024    Procedure: Transplant kidney recipient  donor,bilateral uretral stent implantation;  Surgeon: Carlitos Díaz MD;  Location: [facility] OR     Social History:  Social History     Socioeconomic History    Marital status:      Spouse  name: Not on file    Number of children: Not on file    Years of education: Not on file    Highest education level: Not on file   Occupational History    Not on file   Tobacco Use    Smoking status: Never     Passive exposure: Never    Smokeless tobacco: Never   Substance and Sexual Activity    Alcohol use: Not Currently     Comment: Very occasional, last drink two months ago, wine cooler    Drug use: Never    Sexual activity: Not on file   Other Topics Concern    Not on file   Social History Narrative    Not on file     Social Determinants of Health     Financial Resource Strain: Low Risk  (6/3/2024)    [facility]    Financial Resource Strain     Difficulty of Paying Living Expenses: 3     Difficulty of Paying Living Expenses: Not on file   Food Insecurity: No Food Insecurity (6/3/2024)    [facility]    Food Insecurity     Worried About Running Out of Food in the Last Year: 1   Transportation Needs: No Transportation Needs (6/3/2024)    [facility]    Transportation Needs     Lack of Transportation (Medical): 1   Physical Activity: Not on file   Stress: Not on file   Social Connections: Socially Integrated (6/3/2024)    [facility]    Social Connections     Frequency of Communication with Friends and Family: 0   Interpersonal Safety: Not on file   Housing Stability: Low Risk  (6/3/2024)    [facility]    Housing Stability     Unable to Pay for Housing in the Last Year: 1     Family History:  Family History   Problem Relation Age of Onset    Endometriosis Mother     Coronary Artery Disease Mother       Medications:  Current Facility-Administered Medications   Medication Dose Route Frequency Provider Last Rate Last Admin    acetaminophen (TYLENOL) tablet 650 mg  650 mg Oral Q6H Davey  MD Radha        alum & mag hydroxide-simethicone (MAALOX) suspension 30 mL  30 mL Oral Q4H PRN Radha Mariscal MD        aspirin (ASA) chewable tablet 81 mg  81 mg Oral Daily Radha Mariscal MD   81 mg at 07/05/24 0820    [START ON 7/6/2024] azaTHIOprine (IMURAN) suspension 100 mg  100 mg Oral Daily Sid Desai MD        benzocaine-menthol (CHLORASEPTIC MAX) 15-10 MG lozenge 1 lozenge  1 lozenge Buccal Q1H PRN Radha Mariscal MD        bisacodyl (DULCOLAX) suppository 10 mg  10 mg Rectal Daily PRN Radha Mariscal MD        calcium carbonate (TUMS) chewable tablet 500 mg  500 mg Oral TID PRN Radha Mariscal MD   500 mg at 06/30/24 2136    dextrose 10% infusion   Intravenous Continuous PRN Radha Mariscal MD        estradiol (ESTRACE) cream 0.5 g  0.5 g Vaginal Daily Frida Davis MD        famotidine (PEPCID) tablet 40 mg  40 mg Oral BID Radha Mariscal MD   40 mg at 07/05/24 0819    hydrOXYzine HCl (ATARAX) tablet 25 mg  25 mg Oral Q6H PRN Radha Mariscal MD        hydrOXYzine HCl (ATARAX) tablet 25 mg  25 mg Oral Q6H PRN Radha Mariscal MD   25 mg at 07/05/24 1140    Or    hydrOXYzine HCl (ATARAX) tablet 50 mg  50 mg Oral Q6H PRN Radha Mariscal MD   50 mg at 07/03/24 0936    lidocaine (LMX4) cream   Topical Q1H PRN Sid Desai MD        lidocaine (LMX4) cream   Topical Q1H PRN Radha Mariscal MD        lidocaine (viscous) (XYLOCAINE) 2 % solution 5 mL  5 mL Mouth/Throat Q2H PRN Radha Mariscal MD   5 mL at 07/05/24 1204    lidocaine (XYLOCAINE) 2 % external gel   Topical Q4H PRN Radha Mariscal MD   Given at 06/27/24 1619    lidocaine 1 % 0.1-1 mL  0.1-1 mL Other Q1H PRN Sid Desai MD        lidocaine 1 % 0.1-1 mL  0.1-1 mL Other Q1H PRN Radha Mariscal MD        lidocaine 1 % 1 mL  1 mL Other Q1H PRN Radha Mariscal MD        lipids plant base (CLINOLIPID) 20 % infusion 250 mL  250 mL Intravenous Once per day on Monday Tuesday Wednesday Thursday Friday Saturday Radha Mariscal MD   Stopped at 07/05/24 0803    magnesium hydroxide  (MILK OF MAGNESIA) suspension 30 mL  30 mL Oral Daily PRN Radha Mariscal MD        magnesium oxide (MAG-OX) tablet 400 mg  400 mg Oral Daily Rosita French PA-C   400 mg at 07/05/24 0819    methylPREDNISolone sodium succinate (SOLU-MEDROL) injection 40 mg  40 mg Intravenous Daily Jordy Carvalho MD   40 mg at 07/05/24 1302    [START ON 7/6/2024] multivitamins w/minerals liquid 15 mL  15 mL Per Feeding Tube Daily Holli Nelson, APRN CNP        naloxone (NARCAN) injection 0.2 mg  0.2 mg Intravenous Q2 Min PRN Radha Mariscal MD        Or    naloxone (NARCAN) injection 0.4 mg  0.4 mg Intravenous Q2 Min PRN Radha Mariscal MD        Or    naloxone (NARCAN) injection 0.2 mg  0.2 mg Intramuscular Q2 Min PRN Radha Mariscal MD        Or    naloxone (NARCAN) injection 0.4 mg  0.4 mg Intramuscular Q2 Min PRN Radha Mariscal MD        ondansetron (ZOFRAN ODT) ODT tab 4 mg  4 mg Oral Q6H PRN Radha Mariscal MD        Or    ondansetron (ZOFRAN) injection 4 mg  4 mg Intravenous Q6H PRN Radha Mariscal MD        oxyCODONE (ROXICODONE) solution 5 mg  5 mg Oral Q4H PRN Radha Mariscal MD   5 mg at 07/05/24 1008    parenteral nutrition - ADULT compounded formula   PERIPHERAL LINE IV TPN CONTINUOUS Sid Desai MD 90 mL/hr at 07/05/24 0631 Rate Verify at 07/05/24 0631    PARoxetine (PAXIL) tablet 30 mg  30 mg Oral QPM Radha Mariscal MD   30 mg at 07/04/24 2011    polyethylene glycol (MIRALAX) Packet 17 g  17 g Oral Daily PRN Radha Mariscal MD        prochlorperazine (COMPAZINE) injection 10 mg  10 mg Intravenous Q6H PRN Radha Mariscal MD        Or    prochlorperazine (COMPAZINE) tablet 10 mg  10 mg Oral Q6H PRN Radha Mariscal MD        senna-docusate (SENOKOT-S/PERICOLACE) 8.6-50 MG per tablet 1 tablet  1 tablet Oral BID Radha Mariscal MD   1 tablet at 07/03/24 0924    silver sulfADIAZINE (SILVADENE) 1 % cream   Topical BID Radha Mariscal MD   Given at 07/02/24 2030    sodium chloride (PF) 0.9% PF flush 3 mL  3 mL Intracatheter Q8H Lloyd  "MD Sid        sodium chloride (PF) 0.9% PF flush 3 mL  3 mL Intracatheter q1 min prn Sid Desai MD        sodium chloride (PF) 0.9% PF flush 3 mL  3 mL Intracatheter Q8H Radha Mariscal MD   3 mL at 07/03/24 2230    sodium chloride (PF) 0.9% PF flush 3 mL  3 mL Intracatheter q1 min prn Radha Mariscal MD        sodium chloride (PF) 0.9% PF flush 3 mL  3 mL Intracatheter Q1H PRN Radha Mariscal MD        sodium chloride (PF) 0.9% PF flush 3 mL  3 mL Intracatheter Q8H Radha Mariscal MD   3 mL at 07/05/24 0830    sodium phosphate (FLEET ENEMA) 1 enema  1 enema Rectal Once PRN Radha Mariscal MD        sucralfate (CARAFATE) suspension 1 g  1 g Oral 4x Daily AC & HS Radha Mariscal MD   1 g at 07/05/24 1023    tacrolimus (GENERIC EQUIVALENT) capsule 2 mg  2 mg Oral BID IS Rosita French PA-C   2 mg at 07/05/24 0819    [START ON 7/6/2024] valGANciclovir (VALCYTE) solution 900 mg  900 mg Oral Daily Sid Desai MD          Allergies:  Allergies   Allergen Reactions    Amlodipine Headache and Other (See Comments)     Other Reaction(s): Unknown    Omeprazole Other (See Comments)     All PPIs per patient    Proton Pump Inhibitors Nephrotoxicity     No PPIs due to history of renal failure due to interstitial nephritis    Tegaderm Transparent Dressing (Informational Only) Blisters     Review of Systems:  As noted in HPI.     Physical exam:  /80 (BP Location: Left arm)   Pulse 92   Temp 98.1  F (36.7  C) (Oral)   Resp 16   Ht 1.575 m (5' 2\")   Wt 50 kg (110 lb 4.8 oz)   SpO2 100%   BMI 20.17 kg/m    GEN:This is a well developed, well-nourished female in no acute distress, in a pleasant mood.    SKIN: Vera type I-II. Full skin, which includes the head/face, both arms, chest, back, abdomen,both legs, genitalia and/or groin buttocks, digits and/or nails, was examined.  - 1 large shallow ulceration to the buccal mucosa, right mandible   - 1 large ulceration to the labia majora , appears small in size from " prior photos after debridement, no surrounding redness, bulla or other skin changes noted               Laboratory:  Results for orders placed or performed during the hospital encounter of 06/27/24 (from the past 24 hour(s))   Glucose by meter   Result Value Ref Range    GLUCOSE BY METER POCT 118 (H) 70 - 99 mg/dL   Glucose by meter   Result Value Ref Range    GLUCOSE BY METER POCT 106 (H) 70 - 99 mg/dL   Basic metabolic panel   Result Value Ref Range    Sodium 137 135 - 145 mmol/L    Potassium 4.5 3.4 - 5.3 mmol/L    Chloride 107 98 - 107 mmol/L    Carbon Dioxide (CO2) 23 22 - 29 mmol/L    Anion Gap 7 7 - 15 mmol/L    Urea Nitrogen 26.3 (H) 6.0 - 20.0 mg/dL    Creatinine 0.91 0.51 - 0.95 mg/dL    GFR Estimate 84 >60 mL/min/1.73m2    Calcium 9.7 8.6 - 10.0 mg/dL    Glucose 99 70 - 99 mg/dL   Magnesium   Result Value Ref Range    Magnesium 1.8 1.7 - 2.3 mg/dL   Phosphorus   Result Value Ref Range    Phosphorus 2.6 2.5 - 4.5 mg/dL   Tacrolimus by Tandem Mass Spectrometry   Result Value Ref Range    Tacrolimus by Tandem Mass Spectrometry 8.2 5.0 - 15.0 ug/L    Tacrolimus Last Dose Date      Tacrolimus Last Dose Time      Narrative    This test was developed and its performance characteristics determined by the [facility],  Special Chemistry Laboratory. It has not been cleared or approved by the FDA. The laboratory is regulated under CLIA as qualified to perform high-complexity testing. This test is used for clinical purposes. It should not be regarded as investigational or for research.   CBC with platelets   Result Value Ref Range    WBC Count 1.8 (L) 4.0 - 11.0 10e3/uL    RBC Count 2.98 (L) 3.80 - 5.20 10e6/uL    Hemoglobin 9.0 (L) 11.7 - 15.7 g/dL    Hematocrit 29.4 (L) 35.0 - 47.0 %    MCV 99 78 - 100 fL    MCH 30.2 26.5 - 33.0 pg    MCHC 30.6 (L) 31.5 - 36.5 g/dL    RDW 14.4 10.0 - 15.0 %    Platelet Count 174 150 - 450 10e3/uL   Glucose by meter   Result Value Ref Range    GLUCOSE BY  METER POCT 103 (H) 70 - 99 mg/dL   Glucose by meter   Result Value Ref Range    GLUCOSE BY METER POCT 113 (H) 70 - 99 mg/dL       Staff Involved:  Resident(Nicole Brito DO)/Staff(as above)

## 2024-07-05 NOTE — PROGRESS NOTES
"Care Management Follow Up    Length of Stay (days): 8    Expected Discharge Date: 07/05/2024     Concerns to be Addressed: discharge planning     Patient plan of care discussed at interdisciplinary rounds: Yes    Anticipated Discharge Disposition: Home, Home Infusion     Anticipated Discharge Services: None  Anticipated Discharge DME: None    Patient/family educated on Medicare website which has current facility and service quality ratings: yes  Education Provided on the Discharge Plan: Yes  Patient/Family in Agreement with the Plan: yes    Referrals Placed by CM/SW: Internal Clinic Care Coordination, Home Infusion  Private pay costs discussed: Not applicable    Additional Information:      Per provider note pt with a medical history significant for interstitial nephritis secondary to PPIs s/p kidney transplant (pediatric en bloc). Admitted with mouth ulcers, severe odynophagia, difficulty eating, drinking and swallowing     Plan for [place] placement today, initiating enteral feeds.  Anticipate pt will discharge on enteral feeds.  Initiated referral to [facility].      Met with pt, spouse and pt parents.  Reviewed anticipated plan for discharge.  Pt is open to [facility] for home RN services.  Pt notes no concerns with her abilty to learn enteral feeds.      Updated [facility] [phone], Fax [phone].  [facility] d/t [place] enteral feeds.  Initiated referral to [facility].       [facility] Benefit check: \"Pt Ira Zamora unfortunately does not meet Medicare criteria for Enteral. Per our RDs pt does not meet criteria due to not have a permanent tube (Only ND tube) and also missing 90 day or greater HEATHER documentation into their charts by the MD. Pt s secondary UMR plan will help pickup coverage. Pt has met their $500 deductible (85/15 coverage) and has accumulated $1364/$2600 towards their out of pocket. Once out of pocket has been fulfilled pt should be covered at 100%. Since " the pt does not meet Medicare criteria an ABN would be required to be signed and sent back to intake before services begin.        Yudith Jimenez, RN BSN, PHN, ACM-RN  July 5, 2024  Nurse Coordinator    Phone: [phone]    Social Work and Care Management Department     SEARCHABLE in [facility] - search CARE COORDINATOR     [place] ([phone]) Saturday & Sunday; ([phone]) [place] Recognized Holidays     Units: 5A Onc Vocera & 5C Vocera     Units: 6B Vocera & 6C Vocera      Units: 7A SOT RNCC Vocera, 7B Med Surg Vocera, & 7C Med Surg Vocera      Units: 6A Vocera & 4A CVICU Vocera, 4C MICU Vocera, and 4E SICU Vocera      Units: 5 Ortho Vocera & 5 Med Surg Vocera      Units: 6 Med Surg Vocera & 8 Med Surg Vocera      7/5/2024

## 2024-07-05 NOTE — PROGRESS NOTES
Brief GYN Progress Note    Patient evaluated by GYN team for vulvar ulcer; pathology results returned benign.    Recommend continuation of barrier cream, rayshawn-bottle, and Sitz baths for comfort. Long-term use of topical lidocaine may exacerbate vulvar irritation; consider limiting use of this during admission/on discharge.    Also recommend follow-up in [facility] OB/GYN clinic about 2-4 weeks after hospital discharge. We will place a referral and send a message to our schedulers.    GYN to sign off during this admission; please re-contact our team with any new questions/concerns.    rFida Davis MD, PGY-4  6:33 PM  [facility] Obstetrics & Gynecology Residency

## 2024-07-05 NOTE — PROGRESS NOTES
CLINICAL NUTRITION SERVICES - REASSESSMENT NOTE     Nutrition Prescription    RECOMMENDATIONS FOR MDs/PROVIDERS TO ORDER:  Once FT placement confirmed/TF started, recommend discontinue PPN.   If nasoFT unable to be placed, consider placement of central line for TPN to meet full nutritional needs.      Monitor electrolytes, still has potential for refeeding syndrome in coming days given low dextrose content in PPN received.  Replete as needed.     Malnutrition Status:    Severe malnutrition in the context of acute illness    Recommendations already ordered by Registered Dietitian (RD):  Shanell Nguyen Standard 1.4 @ 10 ml/hr with advancement by 10 ml/hr q 6 hours to goal rate of 40 ml/hr.  Goal regimen provides 1344 kcal (27 kcal/kg), 59 grams protein (1.2g/kg), 151 grams CHO, 15 grams fiber, 682 ml free water.    -FW of 60 ml q 4 hours (if full hydration necessary, rec increase to 120 ml q 4 hours).   -Start MVI with minerals via FT once PPN stopped    Adjusting Ensure order per patient's preference (vanilla only) - will only send one to try given overall poor tolerance of all liquids for now.  Can request oral supplements as needed.     Future/Additional Recommendations:  If FT unable to be placed, recommend increasing PPN dextrose to 125 grams and reducing AA to 65 grams.  This will provide 1114 kcal (22 kcal/kg), 65g protein (1.3g/kg).  This will meet ~89% kcal needs, 100% pro needs.      EVALUATION OF THE PROGRESS TOWARD GOALS   Diet: Mechanical/Dental Soft + Ensure Enlive with meals  PPN: Dex 75 grams, AA 75 grams + Clinolipid 6x/week -> providing ~984 kcal/day, 1.5g/kg protein, 44% kcal from fat    Intake: Pt not eating/drinking d/t esophageal pain. Tolerating pills with small sips of water, but notes swallowing her spit even causes severe pain. PPN since 6/29, initially clinimix switched to compounded on 7/2.  Providing ~75% kcal needs, 100% protein needs.       NEW FINDINGS   Weight: Weight down 2.7 kg (5%)  since admission.     Labs: BUN 26.3 (elevated) - likely r/t protein provisions in PN    Meds: Tacrolimus, Imuran, Prednisone 20 mg, MagOx 400 mg, Pepcid, Carafate QID    GI: Severe odyophagia d/t oral and esophageal ulcers    MALNUTRITION  % Intake: Decreased intake does not meet criteria - receiving PPN the last week  % Weight Loss: > 2% in 1 week (severe)  Subcutaneous Fat Loss: Facial region:  Moderate  Muscle Loss: Temporal:  Mild, Thoracic region (clavicle, acromium bone, deltoid, trapezius, pectoral):  Moderate, Upper arm (bicep, tricep):  Moderate, Upper leg (quadricep, hamstring):  Moderate, and Posterior calf:  Moderate  Fluid Accumulation/Edema: None noted  Malnutrition Diagnosis: Severe malnutrition in the context of acute illness    Previous Goals   Diet adv v nutrition support within 2-3 days.   Evaluation: Met    Previous Nutrition Diagnosis  Predicted inadequate nutrient intake (pro/kcal)   Evaluation: No longer applicable, nutrition diagnosis changed below    CURRENT NUTRITION DIAGNOSIS  Inadequate energy intake related to use of PPN as evidenced by patient meeting ~75% of assessed needs via PPN over the last week.       INTERVENTIONS  Implementation  Collaboration with other providers - discussed FT placement/orders with bedside RN, discussed PPN with PharmD  Enteral Nutrition - Initiate (see above)  Nutrition education for recommended modifications - discussed TF provisions, plan with patient and SO    Goals  Total avg nutritional intake to meet a minimum of 25 kcal/kg and 1.2 g PRO/kg daily (per dosing wt 50 kg).    Monitoring/Evaluation  Progress toward goals will be monitored and evaluated per protocol.    Nicole Carrington, MS, RD, LD, CCTD, [facility]  7A + 7B (beds [id number])  Available on Vocera [7A or 7B Clinical Dietitian]   Weekend/Holiday: Vocera [Weekend Clinical Dietitian]

## 2024-07-05 NOTE — PLAN OF CARE
"VS: /80 (BP Location: Left arm, Cuff Size: Adult Regular)   Pulse 80   Temp 98  F (36.7  C) (Oral)   Resp 18   Ht 1.575 m (5' 2\")   Wt 49.9 kg (110 lb 1.6 oz)   SpO2 100%   BMI 20.14 kg/m      Cares: 1900 - 0730     Neuro: Aox4   VS: VSS, afebrile, on RA.   GI/: voiding adequately w/out issues, LBM 7/4   Skin: labial mass   Diet: mechanical/dental soft   Labs: awaiting AM lab results   BG: Q6H (106, 103)  LDA: Left PIV - PPN @ 90mL/hr   Mobility: UAL   Pain/Nausea: esophageal pain, denies nausea   PRN medications: lidocaine solution x5 (see MAR)  Events/Education: n/a   Plan of Care: continue with current POC and update MD with any changes.   "

## 2024-07-05 NOTE — PLAN OF CARE
"Goal Outcome Evaluation:       A/Ox4, still unable to tolerate PO food, able to take medication with water, continued on PPN at 90 ml/hr. C/o mouth ulcer and esophageal ulcer pain, medicated with viscous lido with good effect for short time, medicated with Oxy 5 mg and Attarax PRN.s/b nephrology, GI and Rheumatology,  NG inserted. X ray done to confirm location,  at bedside. Voiding freely, no BM, positive flatus.started on daily weight , I/O and BG. NG feeding started at 18:00 at 10 ml/hr.   BP (!) 142/92 (BP Location: Left arm)   Pulse 85   Temp 97.9  F (36.6  C) (Oral)   Resp 16   Ht 1.575 m (5' 2\")   Wt 50 kg (110 lb 4.8 oz)   SpO2 99%   BMI 20.17 kg/m                    "

## 2024-07-05 NOTE — PROGRESS NOTES
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 The GI Luminal Service was consulted regarding \"Odynophagia to solid and liquid; PMHX Kidney transplant  on immunosuppression (Tacrolimus and Belatacept).\"       # Chest Pain, non-specific symptom  # Odynophagia  # Esophageal Ulcers  # Non-bleeding Esophagitis, LA Grade B  # Status-post Kidney Transplant 2024  # Immunosuppressed Status  # Severe Malnutrition in the context of Acute Illness  Patient with recent heartburn and grade C esophagitis on outside EGD 2024. Also with recent oral and vaginal ulcers thought to be due to mycophenolate which are improving after discontinuing medication. Now presenting with one week of odynphagia limiting oral intake. Given immunosuppressed status, concern for infectious esophagitis (CMV, HSV, candida) vs medication side effect vs pill esophagitis, so proceeded with upper endoscopy to further evaluate.      Of note, patient unable to take PPI due to previous ESRD from interstitial nephritis so has been on famotidine and sucralfate.      Patient is status-post EGD on 2024 with non-bleeding esophageal ulcers seen which are likely the cause of the patient's odynophagia, most likely medication adverse effect (also had oral and vaginal ulcers) versus pill-induced esophagitis/ulcer. Additionally EGD reports LA Grade B esophagitis with no bleeding, friable gastric mucosa, and normal examined duodenum. Pathology of the " esophagus negative for dysplasia or malignancy, no evidence of infection, and biopsies consistent with ulcer with associated granulation tissue, fibrinopurulent debris, acute and chronic inflammation. Discussed with GI Pathology Dr. Sherman who agrees with this pathological assessment.      7/3/2024: The GI Luminal Service was paged regarding nasoenteric feeding tube in the setting of recent esophageal ulcer; no contraindication from a GI Luminal Standpoint for NJ tube placement to optimize nutrition.       7/5/2024 Interval Update: The GI Luminal Service was re-paged stating the patient had additional questions regarding EGD. Patient now has a nasoenteric feeding tube in place for severe malnutrition in the context of acute illness. Per MAR, current regimen includes: Famotidine 40 mg PO BID; Viscous Lidocaine every 2 hours; and Sucralfate suspension 1 gram QID. Mental Health Medications per the MAR include Selective serotonin reuptake inhibitor: Paroxetine 30 mg daily; PRN Hydroxyzine.     Rheumatology following with ongoing work-up for concern for Behcet's with oral and vulvar ulcers. Patient is now on prednisone 20 mg PO daily with ongoing Behcet's work-up pending, which could potentially be worsening acid reflux.    Discussed with patient that esophagitis and esophageal ulcers can take some time to heal. Of note, enteral feeding tubes prop open the lower esophageal sphincter and allow gastric acid to reflux into the esophagus. Patient is currently on maximal esophageal symptomatic management. PPIs are the best medication to treat esophagitis and more effective than H2 blockers; however, again nephrology advises against PPI due to previous interstitial nephritis.     Patient reports persistent chest pain despite ongoing scheduled Famotidine 40 mg PO BID, Sucralfate suspension QID and Viscous Lidocaine every 2 hours. Could consider trial of Cepacol lozenges but would check with nephrology and pharmacy prior to  starting. Recommend patient follow anti-reflux precautions. Otherwise, would need to consider PPI, as PPI is the most effective medication to treat esophagitis; defer decision to nephrology and primary team.     Chest pain is a non-specific symptom and potentially multifactorial in this patient with known esophagitis and reported history of acid reflux (?dyspepsia), potentially psychosomatic component to symptoms (patient currently on selective serotonin reuptake inhibitor and PRN hydroxyzine; ?other psychiatric etiology); other etiologies to consider include cardiac conditions, pulmonary etiologies, musculoskeletal, medication adverse effect (defer to pharmacist and primary team to review medications for medication adverse effects of acid reflux, dyspepsia and chest pain), potentially mechanical trauma from nasoenteric feeding tube, potentially referred pain. Defer additional chest-pain work-up to primary team.       # History of Crohn's Disease of Both Small and Large Intestine, on Stelara PTA  Patient with history of Crohn's Disease reportedly on Stelara prior to admission, managed chronically by Dr. Yosvany Terry at [facility]. Defer Crohn's management to patient's established IBD Physician Dr. Yosvany Terry at [facility]. Recommend contacting [facility] with any questions/concerns regarding patient's Crohn's management (phone: [phone]).       RECOMMENDATIONS:  -- Defer chest pain work-up for alternative etiologies to the primary team.   -- Health Psychology consult for healthy coping strategies, mental health optimization, non-specific symptom of chest pain.   -- Consider Psychiatry consult to ensure medical management of mental health is optimized (?psychiatric etiologies contributing to non-specific symptom of chest pain).       Esophagitis Treatment:   -- Continue Famotidine 40 mg IV BID.   - When able to tolerate PO, okay to transition to Famotidine 40 mg PO BID for 2-3 months.  --  Continue Sucralfate QID to help with ulcer healing.   -- Continue Viscous lidocaine for symptomatic treatment of odynophagia.   -- Could consider Cepacol Lozenges for symptomatic treatment if okay from a nephrology standpoint and would check with pharmacy regarding drug-drug interaction.   -- Otherwise, may need to consider PPI, as PPI is the most effective medication to treat esophagitis; defer decision to nephrology and primary team.  -- Please consider following an antireflux regimen for both meals and after taking pills. This includes:  - Do not lie down for at least 3 to 4 hours after meals and after taking pills.  - Raise the head of the bed 6 to 8 inches (35-45 degrees).  - Avoid foods and liquids that cause symptoms.  - Avoid cigarettes and other tobacco products.  -- Avoid opioids and anticholinergic medication as able as these medications can alter esophageal motility and worsen acid reflux/dyspepsia.      -- Appreciate RD involvement and recommendations for nutritional optimization.   -- Diet per primary team.  - While patient remains symptomatic, consider liquids and soft foods until improvement in symptoms.     -- Appreciate ID's involvement and recommendations.  -- Appreciate Rheumatology's involvement and ongoing work-up for concern for Behcet's.    -- Strict documentation of rectal output. Please ensure the patient does not become constipated as constipation can increase symptoms of dyspepsia and acid reflux.  - Appreciate detailed documentation of stool appearance, including color, consistency, frequency and amount.    - Consider smearing stool thinly on white paper towel to distinguish color.      -- Continue Supportive Cares  - Adequate volume resuscitation with IVF, pRBCs.  - Monitor Hemoglobin closely. Transfuse to keep Hgb > 7 g/dL from GI standpoint.   - Monitor Platelets closely. Keep PLT > 50 10e3/uL from GI standpoint.  - Maintain hemodynamics: MAP >65 mmHg and HR <100.  - Monitor and  optimize electrolytes.  - Monitor and optimize nutrition. --> Diet per primary team. Appreciate RD nutrition recs.   - Reposition every 30 minutes while awake.  - Encourage Ambulation as able: 4-6 walks per day.   -- Analgesics/antiemetics per primary team.  -- Avoid NSAIDs as there is potentially a 25% chance of causing an IBD flare.  -- If the patient experiences overt GI bleeding with hemodynamic instability, please page the GI Luminal Service (listed on [facility]).         Outpatient:  -- Follow-up in outpatient clinic in [facility] Dr. Yosvany Terry for ongoing management of Crohn's Disease.       COVID status: negative 6/26/2024.     Discussed with Nephrologist Dr. Rachel Breaux.     The inpatient gastroenterology service will sign off at this time. Thank you for allowing us to participate in the care of this patient. Please do not hesitate to page the GI service with any questions or concerns.     The patient was discussed and plan agreed upon with Dr. Nikia Light, GI Luminal Service staff physician.    Overall time spent on the date of this encounter preparing to see the patient (including chart review of available notes, clinical status events, imaging and labs); discussing, ordering, coordinating recommended medications, tests or procedures; communicating with other health care professionals; and documenting the above clinical information in the electronic medical record was 70 minutes.    Haley Kaur PA-C  Inpatient Gastroenterology Service  [facility]  Text Page  _______________________________________________________________      Subjective: Nursing notes and 24hr events reviewed.     Patient seen and examined at 1330.     Patient reports ongoing lower chest pain bilaterally radiating laterally. Discussed course of esophagitis can take some time to heal.     Discussed continuing H2 blocker, viscous lidocaine, sucralfate. Consider trial of  "Cepacol lozenges.     Reports previously Omeprazole worked really well for chronic acid reflux, noting that nephrology recommended against PPI due to kidney pathology.     Patient questioned Ozanimod for Crohn's treatment. Reports following with Dr. Terry at [facility] for chronic IBD management. Recommended patient follow-up with outpatient IBD Provider Dr. Terry to further discuss Crohn's treatment.     Patient had no additional questions/concerns for the GI Luminal Service.       ROS:   4 pt ROS negative unless noted in subjective.     Objective:  Blood pressure 117/80, pulse 92, temperature 98.1  F (36.7  C), temperature source Oral, resp. rate 16, height 1.575 m (5' 2\"), weight 50 kg (110 lb 4.8 oz), SpO2 100%.    General: 34 year old female lying in bed in NAD. Appears comfortable.  Answers appropriately.    HEENT: Head is AT/NC. Sclera anicteric. +Nasoenteric feeding tube present.   Lungs: No increased work of breathing, speaking in full sentences, equal chest rise. On Room Air.   Heart: Regular rate. Peripheral perfusion intact.  Abdomen: +mildly distended.   Musculoskeletal: No gross deformity.  Skin: No jaundice or rash on exposed skin.  Neurologic: Grossly non-focal.  CN 2-12 grossly intact.   Mental status/Psych: A&O. Asks/answers questions appropriately. Flat affect. Pleasant, interactive.    Date 07/05/24 0700 - 07/06/24 0659   Shift 3814-8222 7924-8105 4479-1412 24 Hour Total   INTAKE   P.O. 150   150   TPN 20.8   20.8   Shift Total(mL/kg) 170.8(3.41)   170.8(3.41)   OUTPUT   Urine 350   350   Shift Total(mL/kg) 350(7)   350(7)   Weight (kg) 50.03 50.03 50.03 50.03         PROCEDURES:    6/28/2024 - EGD - Dr. Flor Miranda  Patient Profile:       34 year old with history of Crohn's disease and recent                          renal transplant (on immunosuppression) for interstitial                          nephritis induced ESRD who presents with odynophagia.                          " Patient with recent OP and vaginal ulcerations                          attributed to mycophenolate which was recently stopped.   Impression:            Esophageal ulcers seen are the likely cause of                          patients odynophagia. These could be caused by CMV,                          HSV, medication SE (mycophenolate, tacrolimus) or, less                          likely, pill-induced esophagitis/ulcer.                          - Esophageal ulcer with no bleeding and no stigmata of                          recent bleeding. This was biopsied with a cold forceps                          for histology and evaluation for viral causes                          including CMV and HSV. Biopsies of center of ulcer                          were taken (Jar 2) and biopsies of the edge of ulcer                          placed into another jar (Jar 3).                          - Esophageal ulcers with no bleeding and no stigmata                          of recent bleeding.                          - LA Grade B esophagitis with no bleeding. Biopsied.                          - Z-line, 32 cm from the incisors.                          - Friable gastric mucosa.                          - Normal examined duodenum.   Final Diagnosis   A. Esophagus, distal, endoscopic biopsies:  - Gastroesophageal junction mucosa without intestinal metaplasia or significant inflammation  - No granulomas present  - Negative for dysplasia or malignancy     B. Esophagus, ulcer, center, endoscopic biopsies:  - Ulcer, with associated granulation tissue, fibrinopurulent debris, acute and chronic inflammation (see comment and microscopic description)  - No granulomas present  - No intact epithelium identified  - Negative for dysplasia or malignancy     C. Esophagus, ulcer, edge, endoscopic biopsies:  - Ulcer, with associated granulation tissue, fibrinopurulent debris, acute and chronic inflammation (see comment and microscopic description)  -  No granulomas present  - Occasional fragments of intact squamous epithelium, with scant acute and chronic inflammation  - Negative for dysplasia or malignancy       8/8/2022 - EGD - [facility], Dr. Bessy Murillo   Indication: Nausea or Vomiting              3/24/2022 - Colonoscopy - [facility], Dr. Yosvany Terry            LABS:  BMP  Recent Labs   Lab 07/05/24  1025 07/05/24  0654 07/05/24  0635 07/05/24  0037 07/04/24  1901 07/04/24  0632 07/03/24  1912 07/03/24  0638 07/02/24  0751   NA  --   --  137  --   --  137  --  136 134*   POTASSIUM  --   --  4.5  --   --  4.6  --  4.8 4.4   CHLORIDE  --   --  107  --   --  109*  --  106 102   ROBLES  --   --  9.7  --   --  9.7  --  9.6 10.0   CO2  --   --  23  --   --  20*  --  23 24   BUN  --   --  26.3*  --   --  31.1*  --  27.8* 23.5*   CR  --   --  0.91  --   --  0.99*  --  1.03* 0.96*   * 103* 99 106*   < > 97   < > 122* 124*    < > = values in this interval not displayed.     CBC  Recent Labs   Lab 07/05/24  0635 07/04/24  0632 07/03/24  0638 07/02/24  0751   WBC 1.8* 2.6* 3.0* 2.8*   RBC 2.98* 2.86* 2.95* 3.07*   HGB 9.0* 8.8* 8.9* 9.2*   HCT 29.4* 26.9* 28.7* 29.2*   MCV 99 94 97 95   MCH 30.2 30.8 30.2 30.0   MCHC 30.6* 32.7 31.0* 31.5   RDW 14.4 14.1 14.0 13.8    163 183 202     INR  Recent Labs   Lab 07/01/24 0618 06/30/24  0516   INR 1.20* 1.27*     LFTs  Recent Labs   Lab 07/01/24 0618 06/30/24  0516   ALKPHOS 73 68   AST 18 15   ALT 13 16   BILITOTAL 0.2 0.2   PROTTOTAL 6.0* 5.4*   ALBUMIN 3.2* 2.9*      PANCNo lab results found in last 7 days.      IMAGING:  -- No abdominal imaging this admission.       XR CHEST 2 VIEWS 6/26/2024 5:50 PM  INDICATION: Chest pain     COMPARISON: Chest radiograph 4/17/2024     TECHNIQUE: Upright PA and lateral views of the chest.     FINDINGS:      Trachea is midline. Cardiac silhouette is normal in size. No focal  pulmonary consolidation. No pleural effusion or pneumothorax. Bones  and soft  tissues are unremarkable. Nonspecific mild streaky perihilar  opacities                                                                   IMPRESSION:  Mild nonspecific streaky perihilar opacities, may  represent atelectasis/edema. No confluent consolidation.

## 2024-07-05 NOTE — PROGRESS NOTES
Transplant Surgery  Inpatient Daily Progress Note  07/05/2024     Assessment & Plan: 34 year old female with PMHx of interstitial nephritis secondary to PPIs s/p kidney transplant (pediatric en bloc). Admitted with mouth ulcers, severe odynophagia, difficulty eating, drinking and swallowing. In addition, she has a new vulvar painful soft tissue mass causing irritation while voiding and pain on walking.      Plan:   - For small bore nasoduodenal tube + nutritional services today  - Requested from nurse to give next Lidocaine prior to feeding tube insertion  - Rhuematology asked over Vocera: switch from MMF to AZA to help with the mouth/esophageal ulcers and can be used as an immunosuppressant   - Rhuem recommended: stop pred 20 mg and give IV methylpred 40 mg Once daily + Derm consult to r/o ulcerating mucocutaneous disorders   - Nephrology confirmed Azathioprine 2 mg/kg    - Awaiting vulvar biopsy results   - Monitor WBC (Leukopenia) ?start of azathioprine effect + MMF 7/4       Graft function:   Kidney: Cr 0.99, stable.      Immunosuppression management:   -Myfortic 540mg BID 7/1. Reduce Myfortic 360 mg BID with starting prednisone. Stopped Myfortic 7/4 and replaced with Azathioprine.   -Start prednisone 20 mg daily - for possible Bechet's    -Tacrolimus goal level 8-10     Neuro/Psych:   Pain: still c/o odynophagia, no improvement since yesterday, NPO because of the pain.      Hematology:   Anemia of chronic disease: Hgb ~8-9, stable.      Cardiorespiratory: No acute issues.      GI/Nutrition:   Severe malnutrition in the context of acute illness: Soft diet with supplements. Minimal intake. Remains on PPN. Calorie counts.  Odynophagia, severe: Secondary to oral and esophageal ulcers. Topical lidocaine PRN. Minimall intake.  Esophageal ulcer: Single large cratered ulcer noted on EGD on 6/28. Pathology negative for malignancy, and HSV, CMV, and fungal stains negative, no signs of MMF toxicity. On H2 blocker. PPI  contraindicated due to history of ESRD from interstitial nephritis. ID consult to evaluate for possible infectious causes. ID recommended Rheumatology consult. Possible Bechet's. Other diagnoses need to be ruled out before deciding on definitive treatment for the possible Bechet's. Pending labs (MPO, Proteinase 3, BRAVO, ANCA) and vulvar bx. Start prednisone 20 mg daily.      Endocrine: No acute issues.      Fluid/Electrolytes: Continue PPN @90 ml/hr until adequate oral intake.      : Ulceration, left labia: OB/GYN consulted. Plan for EUA and biopsy on 7/2. Lidocaine external gel to help with pain. Follow up labs, biopsy. Start prednisone for possible Bechet's.      Infectious disease: Afebrile. CMV not detected.      Prophylaxis: Patient mobilizing, not on DVT prophylaxis.      Disposition: 7A         ERICA/Fellow/Resident Provider: Jordy Jose, Plastic Surgery resident  Faculty: Glenis Vann MD     _________________________________________________________________  Transplant History:   4/17/2024 (Kidney), Postoperative day: 79     Interval History: History is obtained from the patient  Overnight events: Patient still isn't able to drink ensure yesterday due to esophageal ulcer pain. She passes stool regularly. Her vulvar pain is well controlled. She has been ambulating fine with no issues.      ROS:   A 10-point review of systems was negative except as noted above.     Meds:  Inpatient Administered Meds             Current Facility-Administered Medications   Medication Dose Route Frequency Provider Last Rate Last Admin    acetaminophen (TYLENOL) tablet 975 mg  975 mg Oral Q6H Radha Mariscal MD   975 mg at 07/04/24 0331     Followed by    [START ON 7/5/2024] acetaminophen (TYLENOL) tablet 650 mg  650 mg Oral Q6H Radha Mariscal MD        aspirin (ASA) chewable tablet 81 mg  81 mg Oral Daily Radha Mariscal MD   81 mg at 07/03/24 0923    estradiol (ESTRACE) cream 0.5 g  0.5 g Vaginal Daily Frida Davis MD    "     famotidine (PEPCID) tablet 40 mg  40 mg Oral BID Radha Mariscal MD   40 mg at 07/03/24 1907    lipids plant base (CLINOLIPID) 20 % infusion 250 mL  250 mL Intravenous Once per day on Monday Tuesday Wednesday Thursday Friday Saturday Radha Mariscal MD 20.8 mL/hr at 07/03/24 2107 250 mL at 07/03/24 2107    magnesium oxide (MAG-OX) tablet 400 mg  400 mg Oral Daily Rosita French PA-C   400 mg at 07/03/24 0923    mycophenolic acid (GENERIC EQUIVALENT) EC tablet 360 mg  360 mg Oral BID IS Rosita French PA-C   360 mg at 07/03/24 1905    PARoxetine (PAXIL) tablet 30 mg  30 mg Oral QPM Radha Mariscal MD   30 mg at 07/03/24 1907    predniSONE (DELTASONE) tablet 20 mg  20 mg Oral Daily Rosita French PA-C   20 mg at 07/03/24 1148    senna-docusate (SENOKOT-S/PERICOLACE) 8.6-50 MG per tablet 1 tablet  1 tablet Oral BID Radha Mariscal MD   1 tablet at 07/03/24 0924    silver sulfADIAZINE (SILVADENE) 1 % cream   Topical BID Radha Mariscal MD   Given at 07/02/24 2030    sodium chloride (PF) 0.9% PF flush 3 mL  3 mL Intracatheter Q8H Radha Mariscal MD   3 mL at 07/03/24 2230    sodium chloride (PF) 0.9% PF flush 3 mL  3 mL Intracatheter Q8H Radha aMriscal MD   3 mL at 07/03/24 1610    sucralfate (CARAFATE) suspension 1 g  1 g Oral 4x Daily AC & HS Radha Mariscal MD   1 g at 07/04/24 0654    tacrolimus (GENERIC EQUIVALENT) capsule 2 mg  2 mg Oral BID IS Rosita French PA-C        valGANciclovir (VALCYTE) tablet 450 mg  450 mg Oral Daily Radah Mariscal MD   450 mg at 07/03/24 0923            Physical Exam:      Admit Weight: 52.6 kg (115 lb 14.4 oz)     Current vitals:   /75 (BP Location: Left arm)   Pulse 66   Temp 97.4  F (36.3  C) (Oral)   Resp 16   Ht 1.575 m (5' 2\")   Wt 50.4 kg (111 lb 3.2 oz)   SpO2 100%   BMI 20.34 kg/m       Vital sign ranges:    Temp:  [97.4  F (36.3  C)-98.6  F (37  C)] 97.4  F (36.3  C)  Pulse:  [66-95] 66  Resp:  [16-18] 16  BP: (112-136)/(75-92) 136/75  SpO2:  [98 %-100 %] 100 " %     General Appearance: in mild distress and pain.   Skin: Warm, perfused  Heart: well perfused      Lungs: normal breathing   Abdomen: The abdomen is soft, non-tender  Neurologic: awake, alert, and oriented.        Lab Results   Component Value Date    WBC 1.8 (L) 07/05/2024    HGB 9.0 (L) 07/05/2024    HCT 29.4 (L) 07/05/2024     07/05/2024     07/05/2024    POTASSIUM 4.5 07/05/2024    CHLORIDE 107 07/05/2024    CO2 23 07/05/2024    BUN 26.3 (H) 07/05/2024    CR 0.91 07/05/2024     (H) 07/05/2024    DD 0.66 (H) 06/26/2024    NTBNPI 1,484 (H) 06/26/2024    AST 18 07/01/2024    ALT 13 07/01/2024    ALKPHOS 73 07/01/2024    BILITOTAL 0.2 07/01/2024    INR 1.20 (H) 07/01/2024                      CBC RESULTS:   Recent Labs   Lab Test 07/05/24  0635   WBC 1.8*   RBC 2.98*   HGB 9.0*   HCT 29.4*   MCV 99   MCH 30.2   MCHC 30.6*   RDW 14.4

## 2024-07-05 NOTE — PROGRESS NOTES
Therapy: Enteral  Insurance: Medicare, secondary UMR   Ded: $500  Met: $500    Co-Insurance: 85/15  Max Out of Pocket: $2600  Met: $1364    Pt does not meet Medicare criteria for Enteral. Per our RDs pt does not meet criteria due to not have a permanent tube (Only ND tube) and also missing 90 day or greater HEATHER documentation into their charts by the MD. Pt's secondary UMR plan will help pickup coverage.    Since the pt does not meet Medicare criteria an ABN would be required to be signed and sent back to intake before services begin.     Please contact Intake with any questions, [phone] or In Basket pool,  Home Infusion ([phone]).

## 2024-07-05 NOTE — PROGRESS NOTES
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 If ANC <500, recommend to decrease AZA to 1.5mg/kg   - follow up final path on vulvar biopsy and pending infectious w/up (EBV, VZV, CMV pcr)  - MPO and PR3 Ab pending  -recommend re-consult GI with worsening GI Symptoms with pain radiating to the back    Assessment & Plan   # DDKT: CKD Stage 3 - Stable serum creatinine, at baseline 0.9, Cystatin C 6/29=47.   No indication for dialysis.    Pediatric en-bloc kidneys   - Baseline Creatinine: ~ 0.9-1.1   - Proteinuria: Normal (<0.2 grams)   - DSA Hx: No DSA   - Last cPRA: 9%   - BK Viremia: No   - Kidney Tx Biopsy Hx: No biopsy history.      # Immunosuppression Prior to Admission: Tacrolimus immediate release (goal 8-10) and Mycophenolic acid (dose 540 mg every 12 hours)   - Present Immunosuppression: Tacrolimus immediate release (goal 8-10), Mycophenolic acid (dose 360 mg every 12 hours), and Prednisone (dose 20 mg daily)   - Induction with Recent Transplant:  High Intensity Protocol   - Continue with intensive monitoring of immunosuppression for efficacy and toxicity.   - Historical Changes in Immunosuppression: Previously stopped mycophenolate due to oral and vaginal ulcers, but endoscopy did not indicate mycophenolic acid toxicity.  Started prednisone 20 mg daily d/t concern for Bechet's disease and mycophenolic acid dose reduced accordingly.   - Patient is in an  immunosuppressed state and will continue to monitor for efficacy and toxicity of immunosuppression medications.   - Changes: Yes - stop prednisone 20  per Rheum, AZA at 2mg/kg-decrease to 1.5mg/kg if neutropenia persits    # Infection Prevention:      - PJP: Inhaled pentamidine; Okay to restart Bactrim on 7/5/24  - CMV: Valganciclovir (Valcyte); Patient is CMV IgG Ab discordant (D+/R-) and will continue on Valcyte x 6 months, then check CMV PCR monthly until 12 months post transplant.   - CMV IgG Ab High Risk Discordance (D+/R-): Yes  - EBV IgG Ab High Risk Discordance (D+/R-): No    # Blood Pressure: Controlled;  Goal BP: < 140/90   - Changes: No    # Anemia in Chronic Renal Disease: Hgb: Stable, low      JUNG: Yes- Retacrit 4000units (6/29).   - Iron studies: Replete    # Leukopenia: Stable, low Likely medication related.  Negative CMV PCR.  -Obtain cbc with diff.    # Mineral Bone Disorder:    - Secondary renal hyperparathyroidism; PTH level: Minimally elevated ( pg/ml)        On treatment: None  - Vitamin D; level: Normal         On supplement: No  - Calcium; level: Normal          On supplement: No  - Phosphorus; level: Normal         On supplement: No    # Electrolytes:   - Potassium; level: Normal         On supplement: No  - Magnesium; level: High (received magnesium sulfate yesterday)         On supplement: Yes, magnesium oxide 400 mg PO daily  - Bicarbonate; level: Normal         On supplement: No  - Sodium; level: Normal    # Concern for Behcet's   # Oral and Genital Ulcers  # Severe Malnutrition  # GERD/Dysphagia/Esophagitis: Patient with apparent severe heartburn/GERD symptoms, worsening over the last week or so prior to admission.  Patient underwent EGD 5/24/24 which was mostly unremarkable with only finding of minimal non-specific chronic inflammation in the stomach (negative H. pylori), but jessee esophageal findings.  In addition, recently had significant oral ulcers.  Now with patient unable to  eat and minimal fluid intake.  She hasn't been able to take her medications in the last day due to pain with swallowing.  Wt down with decreased PO intake.  Patient is on famotidine, but unable to use PPI due to probable underlying kidney disease (interstitial nephritis) from previous PPI use. Continue Sucralfate.    - EGD 6/28/25  Esophageal ulcer with no bleeding and no stigmata of recent bleeding. Biopsies: HSV and CMV immunostains are negative. PAS stains are negative for fungal organisms.                          - LA Grade B esophagitis with no bleeding. Biopsied.                          - Z-line, 32 cm from the incisors.                          - Friable gastric mucosa.                          - Normal examined duodenum.  - HSV and CMV immunostains are negative. PAS stains are negative for fungal organisms.    - Patient was off mycophenolic acid 6/14-7/1 without any improvement  - 6/29/24 Started PPN  - 07/02/24 Biopsy of vulva (result pending)  -7/5/24: plan to place NJT and start EN     # Other Significant PMH:   - Crohn's Disease: Appears to be stable.  Previously was on ustekinumab prior to kidney transplant, but hasn't restarted it due to ongoing oral and vaginal ulcers.  Does occasionally alternate between diarrhea and constipation, but no issues at this time.  Followed by GI and [facility].   - Cardiac/Vascular Disease Risk Factors: Dilated ascending aorta (3.8 cm)  on 2024 ECHO (stable from 2020).     # Transplant History:  Etiology of Kidney Failure: Interstitial nephritis  Tx: DDKT - Pediatric en bloc  Transplant: 4/17/2024 (Kidney)  Significant transplant-related complications:  Oral and vaginal ulcers, esophagitis     Recommendations were communicated to the primary team via this note.    Skye Judge NP  Transplant Nephrology  Contact information available via [facility] Paging/Directory        Physician Attestation     I saw and evaluated Ira Zamora as part of a shared APRN/PA visit.     I  personally reviewed the vital signs, medications, labs, and imaging.    I personally provided a substantive portion of care for this patient and I approve the care plan as written by the ERICA.  I was involved with Medical Decision Making including: Please see A&P for additional details of medical decision making.  MANAGEMENT DISCUSSED with the following over the past 24 hours: iv solumedrol per Rheum, continue  mg po every day, would consider dose reduction tomorrow to 1.5 mg/kg with downtrending wbc and now on higher dose steroids     Rachel Arellano MD  Date of Service (when I saw the patient): 07/05/24    Interval History  Continued esophageal pain, worsening per pt. but tolerating po meds  Receiving PPN. Primary team discussing NJT placement today.  No nausea, vomiting, or diarrhea  Pending final path and infectious w/up      Review of Systems   4 point ROS was obtained and negative except as noted in the Interval History.    MEDICATIONS:  Current Facility-Administered Medications   Medication Dose Route Frequency Provider Last Rate Last Admin    acetaminophen (TYLENOL) tablet 650 mg  650 mg Oral Q6H Radha Mariscal MD        aspirin (ASA) chewable tablet 81 mg  81 mg Oral Daily Radha aMriscal MD   81 mg at 07/05/24 0820    [START ON 7/6/2024] azaTHIOprine (IMURAN) suspension 100 mg  100 mg Oral Daily Sid Desai MD        estradiol (ESTRACE) cream 0.5 g  0.5 g Vaginal Daily Frida Davis MD        famotidine (PEPCID) tablet 40 mg  40 mg Oral BID Radha Mariscal MD   40 mg at 07/05/24 0819    lipids plant base (CLINOLIPID) 20 % infusion 250 mL  250 mL Intravenous Once per day on Monday Tuesday Wednesday Thursday Friday Saturday Radha Mariscal MD   Stopped at 07/05/24 0803    magnesium oxide (MAG-OX) tablet 400 mg  400 mg Oral Daily Rosita French PA-C   400 mg at 07/05/24 0819    methylPREDNISolone sodium succinate (SOLU-MEDROL) injection 40 mg  40 mg Intravenous Daily Jordy Carvalho MD   40 mg at  "24 1302    [START ON 2024] multivitamins w/minerals liquid 15 mL  15 mL Per Feeding Tube Daily Holli Nelson, APRN CNP        PARoxetine (PAXIL) tablet 30 mg  30 mg Oral QPM Radha Mariscal MD   30 mg at 24    senna-docusate (SENOKOT-S/PERICOLACE) 8.6-50 MG per tablet 1 tablet  1 tablet Oral BID Radha Mariscal MD   1 tablet at 24 0924    silver sulfADIAZINE (SILVADENE) 1 % cream   Topical BID Radha Mariscal MD   Given at 24 2030    sodium chloride (PF) 0.9% PF flush 3 mL  3 mL Intracatheter Q8H Sid Desai MD        sodium chloride (PF) 0.9% PF flush 3 mL  3 mL Intracatheter Q8H Radha Mariscal MD   3 mL at 24 2230    sodium chloride (PF) 0.9% PF flush 3 mL  3 mL Intracatheter Q8H Radha Mariscal MD   3 mL at 24 0830    sucralfate (CARAFATE) suspension 1 g  1 g Oral 4x Daily AC & HS Radha Mariscal MD   1 g at 24 1023    tacrolimus (GENERIC EQUIVALENT) capsule 2 mg  2 mg Oral BID IS Rosita French PA-C   2 mg at 24 0819    [START ON 2024] valGANciclovir (VALCYTE) solution 900 mg  900 mg Oral Daily Sid Desai MD         Current Facility-Administered Medications   Medication Dose Route Frequency Provider Last Rate Last Admin    dextrose 10% infusion   Intravenous Continuous PRN Radha Mariscal MD        parenteral nutrition - ADULT compounded formula   PERIPHERAL LINE IV TPN CONTINUOUS Sid Desai MD 90 mL/hr at 24 0631 Rate Verify at 24 0631       Physical Exam   Temp  Av.9  F (36.6  C)  Min: 97.5  F (36.4  C)  Max: 98.3  F (36.8  C)      Pulse  Av.9  Min: 76  Max: 118 Resp  Av  Min: 16  Max: 20  SpO2  Av.1 %  Min: 93 %  Max: 100 %     /80 (BP Location: Left arm)   Pulse 92   Temp 98.1  F (36.7  C) (Oral)   Resp 16   Ht 1.575 m (5' 2\")   Wt 50 kg (110 lb 4.8 oz)   SpO2 100%   BMI 20.17 kg/m      Admit       GENERAL APPEARANCE: alert and mild distress due to pain  RESP: lungs clear to auscultation - no " rales, rhonchi or wheezes  CV: regular rhythm, normal rate, no rub, no murmur  EDEMA: no LE edema bilaterally  ABDOMEN: soft, nondistended, nontender, bowel sounds normal  MS: extremities normal - no gross deformities noted, no evidence of inflammation in joints, no muscle tenderness  SKIN: no rash  DIALYSIS ACCESS: right arm fistula +bruit/+thrill    Data   All labs reviewed by me.  CMP  Recent Labs   Lab 07/05/24  1025 07/05/24  0654 07/05/24  0635 07/05/24  0037 07/04/24  1901 07/04/24  0632 07/03/24  1912 07/03/24  0638 07/02/24  0751 07/01/24  1037 07/01/24  0618 06/30/24  1200 06/30/24  0516   NA  --   --  137  --   --  137  --  136 134*  --  135  --  136   POTASSIUM  --   --  4.5  --   --  4.6  --  4.8 4.4  --  4.3  --  4.2   CHLORIDE  --   --  107  --   --  109*  --  106 102  --  104  --  107   CO2  --   --  23  --   --  20*  --  23 24  --  22  --  22   ANIONGAP  --   --  7  --   --  8  --  7 8  --  9  --  7   * 103* 99 106*   < > 97   < > 122* 124*   < > 102*   < > 103*   BUN  --   --  26.3*  --   --  31.1*  --  27.8* 23.5*  --  21.4*  --  13.0   CR  --   --  0.91  --   --  0.99*  --  1.03* 0.96*  --  0.90  --  0.92   GFRESTIMATED  --   --  84  --   --  76  --  73 79  --  86  --  83   ROBLES  --   --  9.7  --   --  9.7  --  9.6 10.0  --  9.9  --  9.5   MAG  --   --  1.8  --   --  2.1  --  2.4* 1.4*  --  1.7  --  1.5*   PHOS  --   --  2.6  --   --  3.1  --  3.9 3.6  --  3.2  --  2.8   PROTTOTAL  --   --   --   --   --   --   --   --   --   --  6.0*  --  5.4*   ALBUMIN  --   --   --   --   --   --   --   --   --   --  3.2*  --  2.9*   BILITOTAL  --   --   --   --   --   --   --   --   --   --  0.2  --  0.2   ALKPHOS  --   --   --   --   --   --   --   --   --   --  73  --  68   AST  --   --   --   --   --   --   --   --   --   --  18  --  15   ALT  --   --   --   --   --   --   --   --   --   --  13  --  16    < > = values in this interval not displayed.     CBC  Recent Labs   Lab 07/05/24  0443  07/04/24  0632 07/03/24  0638 07/02/24  0751   HGB 9.0* 8.8* 8.9* 9.2*   WBC 1.8* 2.6* 3.0* 2.8*   RBC 2.98* 2.86* 2.95* 3.07*   HCT 29.4* 26.9* 28.7* 29.2*   MCV 99 94 97 95   MCH 30.2 30.8 30.2 30.0   MCHC 30.6* 32.7 31.0* 31.5   RDW 14.4 14.1 14.0 13.8    163 183 202     INR  Recent Labs   Lab 07/01/24  0618 06/30/24  0516   INR 1.20* 1.27*     ABGNo lab results found in last 7 days.   Urine Studies  Recent Labs   Lab Test 06/06/24  1113 05/03/24  1600 04/17/24  1113 09/29/22  1153   COLOR Yellow Light Yellow Straw Yellow   APPEARANCE Clear Clear Clear Clear   URINEGLC Negative 100* 70* 100*   URINEBILI Negative Negative Negative Negative   URINEKETONE Negative Negative Negative Negative   SG 1.028 1.014 1.004 1.010   UBLD Small* Negative Trace* Trace*   URINEPH 5.5 5.5 8.5* 8.5*   PROTEIN 10* Negative 100* 100*   NITRITE Negative Negative Negative Negative   LEUKEST Moderate* Trace* Negative Negative   RBCU 2 2 2 <1   WBCU 6* 4 <1 1     No lab results found.  PTH  Recent Labs   Lab Test 05/23/24  0949 04/22/24  0821   PTHI 131* 138*     Iron Studies  Recent Labs   Lab Test 05/23/24  0949 04/22/24  0821   IRON 77 53   * 147*   IRONSAT 38 36   PRICILA 2,181* 2,181*       IMAGING:  All imaging studies reviewed by me.

## 2024-07-05 NOTE — PROGRESS NOTES
Small Bowel Feeding Tube Placement Assessment  Reason for Feeding Tube Placement: need for enteral access & nutrition  Cortrak Start Time: 1210   Cortrak End Time: 1235  Medicine Delivered During Procedure: lidocaine  Placement Successful: pending XR    Procedure Complications: none  Final Placement Mejia at exit of nare 79 cm  Face to Face time with patient: 25 minutes    Bridle Placement:   Reason for bridle placement: securement of small bore feeding tube  Medicine delivered during procedure: lubricating jelly    Procedure: Successful  Location of top of clip on FT: @ 79 cm marker   Condition of nose/skin at time of bridle placement: Unremarkable   Face to Face time with patient: 5 minutes.

## 2024-07-06 LAB
ANION GAP SERPL CALCULATED.3IONS-SCNC: 11 MMOL/L (ref 7–15)
ANION GAP SERPL CALCULATED.3IONS-SCNC: 9 MMOL/L (ref 7–15)
BASOPHILS # BLD AUTO: 0 10E3/UL (ref 0–0.2)
BASOPHILS NFR BLD AUTO: 0 %
BUN SERPL-MCNC: 24.8 MG/DL (ref 6–20)
BUN SERPL-MCNC: 27.4 MG/DL (ref 6–20)
CALCIUM SERPL-MCNC: 10.1 MG/DL (ref 8.6–10)
CALCIUM SERPL-MCNC: 9.8 MG/DL (ref 8.6–10)
CHLORIDE SERPL-SCNC: 105 MMOL/L (ref 98–107)
CHLORIDE SERPL-SCNC: 106 MMOL/L (ref 98–107)
CMV DNA SPEC QL NAA+PROBE: NOT DETECTED
CREAT SERPL-MCNC: 0.88 MG/DL (ref 0.51–0.95)
CREAT SERPL-MCNC: 0.92 MG/DL (ref 0.51–0.95)
CYSTATIN C (ROCHE): 1.7 MG/L (ref 0.6–1)
DEPRECATED HCO3 PLAS-SCNC: 19 MMOL/L (ref 22–29)
DEPRECATED HCO3 PLAS-SCNC: 23 MMOL/L (ref 22–29)
EGFRCR SERPLBLD CKD-EPI 2021: 83 ML/MIN/1.73M2
EGFRCR SERPLBLD CKD-EPI 2021: 88 ML/MIN/1.73M2
EOSINOPHIL # BLD AUTO: 0 10E3/UL (ref 0–0.7)
EOSINOPHIL NFR BLD AUTO: 1 %
ERYTHROCYTE [DISTWIDTH] IN BLOOD BY AUTOMATED COUNT: 14.4 % (ref 10–15)
GFR/BSA.PRED SERPLBLD CYS-BASED-ARV: 40 ML/MIN/1.73M2
GLUCOSE SERPL-MCNC: 118 MG/DL (ref 70–99)
GLUCOSE SERPL-MCNC: 135 MG/DL (ref 70–99)
HCT VFR BLD AUTO: 32.3 % (ref 35–47)
HGB BLD-MCNC: 9.9 G/DL (ref 11.7–15.7)
HOLD SPECIMEN: NORMAL
IMM GRANULOCYTES # BLD: 0 10E3/UL
IMM GRANULOCYTES NFR BLD: 1 %
LYMPHOCYTES # BLD AUTO: 0.3 10E3/UL (ref 0.8–5.3)
LYMPHOCYTES NFR BLD AUTO: 15 %
MAGNESIUM SERPL-MCNC: 1.7 MG/DL (ref 1.7–2.3)
MCH RBC QN AUTO: 30.1 PG (ref 26.5–33)
MCHC RBC AUTO-ENTMCNC: 30.7 G/DL (ref 31.5–36.5)
MCV RBC AUTO: 98 FL (ref 78–100)
MONOCYTES # BLD AUTO: 0.2 10E3/UL (ref 0–1.3)
MONOCYTES NFR BLD AUTO: 8 %
NEUTROPHILS # BLD AUTO: 1.6 10E3/UL (ref 1.6–8.3)
NEUTROPHILS NFR BLD AUTO: 75 %
NRBC # BLD AUTO: 0 10E3/UL
NRBC BLD AUTO-RTO: 0 /100
PHOSPHATE SERPL-MCNC: 3.6 MG/DL (ref 2.5–4.5)
PLATELET # BLD AUTO: 190 10E3/UL (ref 150–450)
POTASSIUM SERPL-SCNC: 4.7 MMOL/L (ref 3.4–5.3)
POTASSIUM SERPL-SCNC: 5 MMOL/L (ref 3.4–5.3)
RBC # BLD AUTO: 3.29 10E6/UL (ref 3.8–5.2)
SODIUM SERPL-SCNC: 136 MMOL/L (ref 135–145)
SODIUM SERPL-SCNC: 137 MMOL/L (ref 135–145)
TACROLIMUS BLD-MCNC: 6.9 UG/L (ref 5–15)
TME LAST DOSE: NORMAL H
TME LAST DOSE: NORMAL H
VZV DNA SPEC QL NAA+PROBE: NOT DETECTED
WBC # BLD AUTO: 2.1 10E3/UL (ref 4–11)

## 2024-07-06 PROCEDURE — 250N000011 HC RX IP 250 OP 636

## 2024-07-06 PROCEDURE — 120N000011 HC R&B TRANSPLANT UMMC

## 2024-07-06 PROCEDURE — 36415 COLL VENOUS BLD VENIPUNCTURE: CPT

## 2024-07-06 PROCEDURE — 250N000013 HC RX MED GY IP 250 OP 250 PS 637

## 2024-07-06 PROCEDURE — 84100 ASSAY OF PHOSPHORUS: CPT

## 2024-07-06 PROCEDURE — 87186 SC STD MICRODIL/AGAR DIL: CPT | Performed by: PHYSICIAN ASSISTANT

## 2024-07-06 PROCEDURE — 250N000013 HC RX MED GY IP 250 OP 250 PS 637: Performed by: NURSE PRACTITIONER

## 2024-07-06 PROCEDURE — 81001 URINALYSIS AUTO W/SCOPE: CPT

## 2024-07-06 PROCEDURE — 250N000012 HC RX MED GY IP 250 OP 636 PS 637: Performed by: PHYSICIAN ASSISTANT

## 2024-07-06 PROCEDURE — 99233 SBSQ HOSP IP/OBS HIGH 50: CPT | Mod: 24 | Performed by: NURSE PRACTITIONER

## 2024-07-06 PROCEDURE — 85025 COMPLETE CBC W/AUTO DIFF WBC: CPT | Performed by: NURSE PRACTITIONER

## 2024-07-06 PROCEDURE — 250N000009 HC RX 250

## 2024-07-06 PROCEDURE — 80048 BASIC METABOLIC PNL TOTAL CA: CPT

## 2024-07-06 PROCEDURE — 250N000013 HC RX MED GY IP 250 OP 250 PS 637: Performed by: TRANSPLANT SURGERY

## 2024-07-06 PROCEDURE — 83735 ASSAY OF MAGNESIUM: CPT

## 2024-07-06 PROCEDURE — 250N000013 HC RX MED GY IP 250 OP 250 PS 637: Performed by: STUDENT IN AN ORGANIZED HEALTH CARE EDUCATION/TRAINING PROGRAM

## 2024-07-06 PROCEDURE — 250N000013 HC RX MED GY IP 250 OP 250 PS 637: Performed by: PHYSICIAN ASSISTANT

## 2024-07-06 PROCEDURE — 99231 SBSQ HOSP IP/OBS SF/LOW 25: CPT | Mod: 24 | Performed by: STUDENT IN AN ORGANIZED HEALTH CARE EDUCATION/TRAINING PROGRAM

## 2024-07-06 PROCEDURE — 82610 CYSTATIN C: CPT

## 2024-07-06 PROCEDURE — 87086 URINE CULTURE/COLONY COUNT: CPT | Performed by: PHYSICIAN ASSISTANT

## 2024-07-06 PROCEDURE — 80197 ASSAY OF TACROLIMUS: CPT

## 2024-07-06 RX ORDER — TRIAMCINOLONE ACETONIDE 1 MG/ML
LOTION TOPICAL 2 TIMES DAILY
Status: DISCONTINUED | OUTPATIENT
Start: 2024-07-06 | End: 2024-07-09 | Stop reason: HOSPADM

## 2024-07-06 RX ORDER — SULFAMETHOXAZOLE AND TRIMETHOPRIM 400; 80 MG/1; MG/1
1 TABLET ORAL DAILY
Status: DISCONTINUED | OUTPATIENT
Start: 2024-07-06 | End: 2024-07-09 | Stop reason: HOSPADM

## 2024-07-06 RX ORDER — VALGANCICLOVIR HYDROCHLORIDE 50 MG/ML
450 POWDER, FOR SOLUTION ORAL DAILY
Status: DISCONTINUED | OUTPATIENT
Start: 2024-07-07 | End: 2024-07-09 | Stop reason: HOSPADM

## 2024-07-06 RX ORDER — TACROLIMUS 1 MG/1
3 CAPSULE ORAL
Status: DISCONTINUED | OUTPATIENT
Start: 2024-07-06 | End: 2024-07-06

## 2024-07-06 RX ORDER — METHYLPREDNISOLONE SODIUM SUCCINATE 40 MG/ML
40 INJECTION, POWDER, LYOPHILIZED, FOR SOLUTION INTRAMUSCULAR; INTRAVENOUS DAILY
Status: COMPLETED | OUTPATIENT
Start: 2024-07-07 | End: 2024-07-07

## 2024-07-06 RX ADMIN — PAROXETINE 30 MG: 30 TABLET, FILM COATED ORAL at 21:04

## 2024-07-06 RX ADMIN — ACETAMINOPHEN 650 MG: 325 TABLET, FILM COATED ORAL at 16:35

## 2024-07-06 RX ADMIN — SUCRALFATE 1 G: 1 SUSPENSION ORAL at 06:10

## 2024-07-06 RX ADMIN — SUCRALFATE 1 G: 1 SUSPENSION ORAL at 16:35

## 2024-07-06 RX ADMIN — Medication 100 MG: at 08:45

## 2024-07-06 RX ADMIN — MAGNESIUM OXIDE TAB 400 MG (241.3 MG ELEMENTAL MG) 400 MG: 400 (241.3 MG) TAB at 08:43

## 2024-07-06 RX ADMIN — Medication 5 ML: at 18:56

## 2024-07-06 RX ADMIN — Medication 5 ML: at 03:38

## 2024-07-06 RX ADMIN — FAMOTIDINE 40 MG: 20 TABLET ORAL at 08:43

## 2024-07-06 RX ADMIN — TACROLIMUS 2.5 MG: 1 CAPSULE ORAL at 17:33

## 2024-07-06 RX ADMIN — VALGANCICLOVIR HYDROCHLORIDE 900 MG: 50 POWDER, FOR SOLUTION ORAL at 09:58

## 2024-07-06 RX ADMIN — OXYCODONE HYDROCHLORIDE 5 MG: 5 SOLUTION ORAL at 22:54

## 2024-07-06 RX ADMIN — Medication 5 ML: at 10:35

## 2024-07-06 RX ADMIN — Medication 5 ML: at 08:32

## 2024-07-06 RX ADMIN — SULFAMETHOXAZOLE AND TRIMETHOPRIM 1 TABLET: 400; 80 TABLET ORAL at 12:05

## 2024-07-06 RX ADMIN — Medication 5 ML: at 00:23

## 2024-07-06 RX ADMIN — SUCRALFATE 1 G: 1 SUSPENSION ORAL at 21:04

## 2024-07-06 RX ADMIN — METHYLPREDNISOLONE SODIUM SUCCINATE 40 MG: 40 INJECTION, POWDER, FOR SOLUTION INTRAMUSCULAR; INTRAVENOUS at 08:45

## 2024-07-06 RX ADMIN — Medication 5 ML: at 06:11

## 2024-07-06 RX ADMIN — SUCRALFATE 1 G: 1 SUSPENSION ORAL at 10:35

## 2024-07-06 RX ADMIN — Medication 15 ML: at 08:45

## 2024-07-06 RX ADMIN — FAMOTIDINE 40 MG: 20 TABLET ORAL at 21:04

## 2024-07-06 RX ADMIN — Medication 5 ML: at 16:35

## 2024-07-06 RX ADMIN — ACETAMINOPHEN 650 MG: 325 TABLET, FILM COATED ORAL at 10:34

## 2024-07-06 RX ADMIN — ACETAMINOPHEN 650 MG: 325 TABLET, FILM COATED ORAL at 21:04

## 2024-07-06 RX ADMIN — TACROLIMUS 2 MG: 1 CAPSULE ORAL at 08:43

## 2024-07-06 RX ADMIN — Medication 5 ML: at 22:54

## 2024-07-06 RX ADMIN — Medication 5 ML: at 21:05

## 2024-07-06 RX ADMIN — ASPIRIN 81 MG CHEWABLE TABLET 81 MG: 81 TABLET CHEWABLE at 08:43

## 2024-07-06 ASSESSMENT — ACTIVITIES OF DAILY LIVING (ADL)
ADLS_ACUITY_SCORE: 24

## 2024-07-06 NOTE — PLAN OF CARE
Team notified of pt voiding small amounts at a time, pt says this is not baseline for her.          Per team, plan to bladder scan pt after voiding. BMP ordered.

## 2024-07-06 NOTE — PLAN OF CARE
"Shift: 7697-3998  /67 (BP Location: Left arm)   Pulse 71   Temp 98  F (36.7  C) (Oral)   Resp 16   Ht 1.575 m (5' 2\")   Wt 50.2 kg (110 lb 9.6 oz)   SpO2 100%   BMI 20.23 kg/m       VS- stable on RA  BG- none  Labs- Pending AM collection   Pain/Nausea/PRN'S- PRN Lidocaine oral solution, PRN oxy x1, denies nausea   Diet- mechanical soft, poor appetite   LDA- PIV X1  Gtt/IVF- Lipids, tube feeds @30 ml/hr - goal rate 40 mLl/hr ( next rate increase at noon)   GI/- voiding, LBM 7/4  Skin-vaginal ulcer- declined cream, oral ulcers  Activity- UAL  Plan-  continue with POC  "

## 2024-07-06 NOTE — PROGRESS NOTES
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 If ANC <500, recommend to decrease AZA to 75mg if neutropenic and WBC less than 2  -  f/up TPMT activity pending   - negative infectious w/up so far (EBV swab pending - vulvar sample) & Dermatopathology review 7/8  - MPO and PR3 Ab pending      Assessment & Plan   # DDKT: CKD Stage 3 - Stable serum creatinine, at baseline 0.9, Cystatin C 6/29=47.   No indication for dialysis.    Pediatric en-bloc kidneys   - Baseline Creatinine: ~ 0.9-1.1   - Proteinuria: Normal (<0.2 grams)   - DSA Hx: No DSA   - Last cPRA: 9%   - BK Viremia: No   - Kidney Tx Biopsy Hx: No biopsy history.      # Immunosuppression Prior to Admission: Tacrolimus immediate release (goal 8-10) and Mycophenolic acid (dose 540 mg every 12 hours)   - Present Immunosuppression: Tacrolimus immediate release (goal 8-10), Mycophenolic acid (dose 360 mg every 12 hours), and Prednisone (dose 20 mg daily)   - Induction with Recent Transplant:  High Intensity Protocol   - Continue with intensive monitoring of immunosuppression for efficacy and toxicity.   - Historical Changes in Immunosuppression: Previously stopped mycophenolate due to oral and vaginal ulcers, but endoscopy did not indicate mycophenolic acid toxicity.  Started prednisone 20 mg daily d/t concern for Bechet's disease and mycophenolic acid dose reduced accordingly.   - Patient is in an immunosuppressed state and will continue to monitor for efficacy and toxicity of immunosuppression  medications.   - Changes: Yes - stop prednisone 20  per Rheum, AZA to 100mg -decrease to 75mg  if neutropenia persits    # Infection Prevention:      - PJP: Inhaled pentamidine; Okay to restart Bactrim on 7/5/24  - CMV: Valganciclovir (Valcyte); Patient is CMV IgG Ab discordant (D+/R-) and will continue on Valcyte x 6 months, then check CMV PCR monthly until 12 months post transplant.   - CMV IgG Ab High Risk Discordance (D+/R-): Yes  - EBV IgG Ab High Risk Discordance (D+/R-): No    # Blood Pressure: Controlled;  Goal BP: < 140/90   - Changes: No    # Anemia in Chronic Renal Disease: Hgb: Stable, low      JUNG: Yes- Retacrit 4000units (6/29).   - Iron studies: Replete    # Leukopenia: Stable, low Likely medication related.  Negative CMV PCR.  -Obtain cbc with diff.    # Mineral Bone Disorder:    - Secondary renal hyperparathyroidism; PTH level: Minimally elevated ( pg/ml)        On treatment: None  - Vitamin D; level: Normal         On supplement: No  - Calcium; level: High          On supplement: No  - Phosphorus; level: Normal         On supplement: No    # Electrolytes:   - Potassium; level: Normal         On supplement: No  - Magnesium; level: Normal         On supplement: Yes, magnesium oxide 400 mg PO daily  - Bicarbonate; level: Normal         On supplement: No  - Sodium; level: Normal    # Concern for Behcet's   # Oral and Genital Ulcers  # Severe Malnutrition  # GERD/Dysphagia/Esophagitis: Patient with apparent severe heartburn/GERD symptoms, worsening over the last week or so prior to admission.  Patient underwent EGD 5/24/24 which was mostly unremarkable with only finding of minimal non-specific chronic inflammation in the stomach (negative H. pylori), but jessee esophageal findings.  In addition, recently had significant oral ulcers.  Now with patient unable to eat and minimal fluid intake.  She hasn't been able to take her medications in the last day due to pain with swallowing.  Wt down with  decreased PO intake.  Patient is on famotidine, but unable to use PPI due to probable underlying kidney disease (interstitial nephritis) from previous PPI use. Continue Sucralfate.    - EGD 6/28/25  Esophageal ulcer with no bleeding and no stigmata of recent bleeding. Biopsies: HSV and CMV immunostains are negative. PAS stains are negative for fungal organisms.                          - LA Grade B esophagitis with no bleeding. Biopsied.                          - Z-line, 32 cm from the incisors.                          - Friable gastric mucosa.                          - Normal examined duodenum.  - HSV and CMV immunostains are negative. PAS stains are negative for fungal organisms.    - Patient was off mycophenolic acid 6/14-7/1 without any improvement  - 6/29/24 Started PPN  - 07/02/24 Biopsy of vulva (result pending)  -7/5/24: plan to place NJT and start EN     # Other Significant PMH:   - Crohn's Disease: Appears to be stable.  Previously was on ustekinumab prior to kidney transplant, but hasn't restarted it due to ongoing oral and vaginal ulcers.  Does occasionally alternate between diarrhea and constipation, but no issues at this time.  Followed by GI and [facility].   - Cardiac/Vascular Disease Risk Factors: Dilated ascending aorta (3.8 cm)  on 2024 ECHO (stable from 2020).     # Transplant History:  Etiology of Kidney Failure: Interstitial nephritis  Tx: DDKT - Pediatric en bloc  Transplant: 4/17/2024 (Kidney)  Significant transplant-related complications:  Oral and vaginal ulcers, esophagitis     Recommendations were communicated to the primary team via this note.    Nicole Kowalski NP  Transplant Nephrology  Contact information available via [facility] Paging/Directory        Physician Attestation     I saw and evaluated Ira Zamora as part of a shared APRN/PA visit.     I personally reviewed the vital signs, medications, labs, and imaging.    I personally provided a substantive portion of care for this patient  and I approve the care plan as written by the ERICA.  I was involved with Medical Decision Making including: Please see A&P for additional details of medical decision making.  MANAGEMENT DISCUSSED with the following over the past 24 hours: continue AZA, iv steroids per Rheum then pred 5 every day, adjust tacrolimus to 2.5/2.5     Rachel Arellano MD  Date of Service (when I saw the patient): 07/06/24    Interval History  Ms. Puckett creatinine is 0.88 (07/06 0631); Stable.  good urine output.  Other significant labs/tests/vitals: VSS  no events overnight.  no chest pain or shortness of breath.  no leg swelling.  some nausea and vomiting.  Bowel movements are soft.  none fever, sweats or chills.        Review of Systems   4 point ROS was obtained and negative except as noted in the Interval History.    MEDICATIONS:  Current Facility-Administered Medications   Medication Dose Route Frequency Provider Last Rate Last Admin    acetaminophen (TYLENOL) tablet 650 mg  650 mg Oral Q6H Radha Mariscal MD   650 mg at 07/05/24 2231    aspirin (ASA) chewable tablet 81 mg  81 mg Oral Daily Radha Mariscal MD   81 mg at 07/06/24 0843    azaTHIOprine (IMURAN) suspension 100 mg  100 mg Oral Daily Sid Desai MD   100 mg at 07/06/24 0845    estradiol (ESTRACE) cream 0.5 g  0.5 g Vaginal Daily Frida Davis MD        famotidine (PEPCID) tablet 40 mg  40 mg Oral BID Radha Mariscal MD   40 mg at 07/06/24 0843    lipids plant base (CLINOLIPID) 20 % infusion 250 mL  250 mL Intravenous Once per day on Monday Tuesday Wednesday Thursday Friday Saturday Radha Mariscal MD 20.8 mL/hr at 07/05/24 2002 250 mL at 07/05/24 2002    magnesium oxide (MAG-OX) tablet 400 mg  400 mg Oral Daily Rosita French PA-C   400 mg at 07/06/24 0843    methylPREDNISolone sodium succinate (SOLU-MEDROL) injection 40 mg  40 mg Intravenous Daily Jordy Carvalho MD   40 mg at 07/06/24 0845    multivitamins w/minerals liquid 15 mL  15 mL Per Feeding Tube Daily Leslie  "Holli Silva, APRN CNP   15 mL at 24 0845    PARoxetine (PAXIL) tablet 30 mg  30 mg Oral QPM Radha Mariscal MD   30 mg at 24    senna-docusate (SENOKOT-S/PERICOLACE) 8.6-50 MG per tablet 1 tablet  1 tablet Oral BID Radha Mariscal MD   1 tablet at 24 0924    silver sulfADIAZINE (SILVADENE) 1 % cream   Topical BID Radha Mariscal MD   Given at 24 2030    sodium chloride (PF) 0.9% PF flush 3 mL  3 mL Intracatheter Q8H Sid Desai MD   3 mL at 24 1404    sodium chloride (PF) 0.9% PF flush 3 mL  3 mL Intracatheter Q8H Radha Mariscal MD   3 mL at 24 2230    sodium chloride (PF) 0.9% PF flush 3 mL  3 mL Intracatheter Q8H Radha Mariscal MD   3 mL at 24 0830    sucralfate (CARAFATE) suspension 1 g  1 g Oral 4x Daily AC & HS Radha Mariscal MD   1 g at 24 0610    tacrolimus (GENERIC EQUIVALENT) capsule 2 mg  2 mg Oral BID IS Rosita French PA-C   2 mg at 24 0843    valGANciclovir (VALCYTE) solution 900 mg  900 mg Oral Daily Sid Desai MD         Current Facility-Administered Medications   Medication Dose Route Frequency Provider Last Rate Last Admin    dextrose 10% infusion   Intravenous Continuous PRN Radha Mariscal MD           Physical Exam   Temp  Av.9  F (36.6  C)  Min: 97.5  F (36.4  C)  Max: 98.3  F (36.8  C)      Pulse  Av.9  Min: 76  Max: 118 Resp  Av  Min: 16  Max: 20  SpO2  Av.1 %  Min: 93 %  Max: 100 %     /67 (BP Location: Left arm)   Pulse 71   Temp 98  F (36.7  C) (Oral)   Resp 16   Ht 1.575 m (5' 2\")   Wt 50.2 kg (110 lb 9.6 oz)   SpO2 100%   BMI 20.23 kg/m      Admit       GENERAL APPEARANCE: alert and mild distress due to pain  RESP: lungs clear to auscultation - no rales, rhonchi or wheezes  CV: regular rhythm, normal rate, no rub, no murmur  EDEMA: no LE edema bilaterally  ABDOMEN: soft, nondistended, nontender, bowel sounds normal  MS: extremities normal - no gross deformities noted, no evidence of inflammation " in joints, no muscle tenderness  SKIN: no rash  DIALYSIS ACCESS: right arm fistula +bruit/+thrill    Data   All labs reviewed by me.  CMP  Recent Labs   Lab 07/06/24  0631 07/05/24  1025 07/05/24  0654 07/05/24  0635 07/04/24  1901 07/04/24  0632 07/03/24  1912 07/03/24  0638 07/01/24  1037 07/01/24  0618 06/30/24  1200 06/30/24  0516     --   --  137  --  137  --  136   < > 135  --  136   POTASSIUM 4.7  --   --  4.5  --  4.6  --  4.8   < > 4.3  --  4.2   CHLORIDE 106  --   --  107  --  109*  --  106   < > 104  --  107   CO2 19*  --   --  23  --  20*  --  23   < > 22  --  22   ANIONGAP 11  --   --  7  --  8  --  7   < > 9  --  7   * 113* 103* 99   < > 97   < > 122*   < > 102*   < > 103*   BUN 24.8*  --   --  26.3*  --  31.1*  --  27.8*   < > 21.4*  --  13.0   CR 0.88  --   --  0.91  --  0.99*  --  1.03*   < > 0.90  --  0.92   GFRESTIMATED 88  --   --  84  --  76  --  73   < > 86  --  83   ROBLES 10.1*  --   --  9.7  --  9.7  --  9.6   < > 9.9  --  9.5   MAG 1.7  --   --  1.8  --  2.1  --  2.4*   < > 1.7  --  1.5*   PHOS 3.6  --   --  2.6  --  3.1  --  3.9   < > 3.2  --  2.8   PROTTOTAL  --   --   --   --   --   --   --   --   --  6.0*  --  5.4*   ALBUMIN  --   --   --   --   --   --   --   --   --  3.2*  --  2.9*   BILITOTAL  --   --   --   --   --   --   --   --   --  0.2  --  0.2   ALKPHOS  --   --   --   --   --   --   --   --   --  73  --  68   AST  --   --   --   --   --   --   --   --   --  18  --  15   ALT  --   --   --   --   --   --   --   --   --  13  --  16    < > = values in this interval not displayed.     CBC  Recent Labs   Lab 07/06/24  0631 07/05/24  0635 07/04/24  0632 07/03/24  0638   HGB 9.9* 9.0* 8.8* 8.9*   WBC 2.1* 1.8* 2.6* 3.0*   RBC 3.29* 2.98* 2.86* 2.95*   HCT 32.3* 29.4* 26.9* 28.7*   MCV 98 99 94 97   MCH 30.1 30.2 30.8 30.2   MCHC 30.7* 30.6* 32.7 31.0*   RDW 14.4 14.4 14.1 14.0    174 163 183     INR  Recent Labs   Lab 07/01/24  0618 06/30/24  0516   INR 1.20* 1.27*      ABGNo lab results found in last 7 days.   Urine Studies  Recent Labs   Lab Test 06/06/24  1113 05/03/24  1600 04/17/24  1113 09/29/22  1153   COLOR Yellow Light Yellow Straw Yellow   APPEARANCE Clear Clear Clear Clear   URINEGLC Negative 100* 70* 100*   URINEBILI Negative Negative Negative Negative   URINEKETONE Negative Negative Negative Negative   SG 1.028 1.014 1.004 1.010   UBLD Small* Negative Trace* Trace*   URINEPH 5.5 5.5 8.5* 8.5*   PROTEIN 10* Negative 100* 100*   NITRITE Negative Negative Negative Negative   LEUKEST Moderate* Trace* Negative Negative   RBCU 2 2 2 <1   WBCU 6* 4 <1 1     No lab results found.  PTH  Recent Labs   Lab Test 05/23/24  0949 04/22/24  0821   PTHI 131* 138*     Iron Studies  Recent Labs   Lab Test 05/23/24  0949 04/22/24  0821   IRON 77 53   * 147*   IRONSAT 38 36   PRICILA 2,181* 2,181*       IMAGING:  All imaging studies reviewed by me.

## 2024-07-06 NOTE — PLAN OF CARE
"/82   Pulse 87   Temp 98.2  F (36.8  C) (Oral)   Resp 16   Ht 1.575 m (5' 2\")   Wt 50.2 kg (110 lb 9.6 oz)   SpO2 94%   BMI 20.23 kg/m      [id number]  Soft BP with last vital sign check, baseline per pt, OVSS on RA. A+Ox4.  with morning labs. Family at bedside, supportive. Lidocaine solution given x3 for mouth sores. Mechanical/dental soft diet, poor appetite. PIV SL. ND with continuous TF @40ml/hr (GR) and 60ml flushes q4. Pt complaining of dry nose, per on call apply Vaseline to area. 1 loose BM today. Voiding, saving in hat in toilet. Pt complaining of urgency/frequency with urination. UA ordered and sent. Results pending. Labial mass and oral ulcers. Pt declined creams for vulvar area. Up ad roya. Rheumatology and nephrology following. Possible discharge on Monday. Will continue to monitor and notify team with changes.                       "

## 2024-07-06 NOTE — PROGRESS NOTES
Transplant Surgery  Inpatient Daily Progress Note  07/05/2024     Assessment & Plan: 34 year old female with PMHx of 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 Admitted with mouth ulcers, severe odynophagia, difficulty eating, drinking and swallowing. In addition, she has a new vulvar painful soft tissue mass causing irritation while voiding and pain on walking, biopsy pending.     Plan:   - Encourage mobilization  - Continue lidocaine for mouth/esophageal pain  - Tube feed education, needs to get to goal which is 40   - Prepare at home prescriptions for crush/suspension form  - Maintain small bore nasoduodenal tube, titrate up to goal of 40 mL/hr  - Monitor WBC (leukopenia); WBC 2.1 (up from 1.8) with absolute neutrophils 1.6  - Continue AZA 2 mg/kg, rheum and neph following  - Vulvar biopsy results negative for dysplasia and malignancy  - Rheum: recommended change from pred 20 mg to IV methylpred 40 mg once daily since no ulcer improvement on pred (changed 7/5), continue methylpred for 3 days then transition to maintenance pred 5  - Derm: rec start triamcinolone topically BID to vulvar ulcer, consider dexa swish and spit; will review vulvar sample with dermatopathologists (likely 7/8)  - ID: rec restart bactrim, continue VGCV and fluconazole for prophylaxis    Graft function:   Kidney: Cr 0.88, stable.      Immunosuppression management:   -Myfortic 540mg BID 7/1. Reduced Myfortic to 360 mg BID with starting prednisone. Stopped Myfortic 7/4 and replaced with Azathioprine.   -Tacrolimus; goal level 8-10  -On IV methylpred 40mg once daily starting 7/6 for possible Bechet's (continue for 3 days per rheum)      Neuro/Psych:   Pain: still c/o odynophagia     Hematology:   Anemia of chronic disease: Hgb 9.9, stable.      Cardiorespiratory: No acute issues.      GI/Nutrition:   Severe malnutrition in the context of acute illness: Tube feed, goal rate 40 mL/hr. Patient not  able to tolerate pills or PO liquids.  Odynophagia, severe: Secondary to oral and esophageal ulcers. Topical lidocaine PRN.   Esophageal ulcer: Single large cratered ulcer noted on EGD on 6/28. Pathology negative for malignancy, and HSV, CMV, and fungal stains negative, no signs of MMF toxicity. On H2 blocker. PPI contraindicated due to history of ESRD from interstitial nephritis. ID consult to evaluate for possible infectious causes. ID recommended Rheumatology consult. Possible Bechet's. Other diagnoses need to be ruled out before deciding on definitive treatment for the possible Bechet's; biopsy negative for malignancy. Pending labs (MPO, Proteinase 3, BRAVO, ANCA). Day 2 of methylpred.     Endocrine: No acute issues.      Fluid/Electrolytes: Tube feeds     : Ulceration, left labia: OB/GYN consulted. EUA and biopsy on 7/2. Derm rec topical triamcinolone BID.     Infectious disease: Afebrile. CMV not detected.      Prophylaxis: Patient mobilizing.      Disposition: 7A         ERICA/Fellow/Resident Provider: Jordy Jose, Plastic Surgery resident  Faculty: Glenis Vann MD     _________________________________________________________________  Transplant History:   4/17/2024 (Kidney), Postoperative day: 79     Interval History: History is obtained from the patient  Overnight events: No acute events overnight. Lidocaine improves oral and esophageal pain, but pain is persistent. Tolerating nasoduodenal tube with some mild discomfort. Little to no oral intake. Prefers pills through feeding tube.     ROS:   A 10-point review of systems was negative except as noted above.     Meds:  Inpatient Administered Meds             Current Facility-Administered Medications   Medication Dose Route Frequency Provider Last Rate Last Admin    acetaminophen (TYLENOL) tablet 975 mg  975 mg Oral Q6H Radha Mariscal MD   975 mg at 07/04/24 0331     Followed by    [START ON 7/5/2024] acetaminophen (TYLENOL) tablet 650 mg  650 mg  "Oral Q6H Radha Mariscal MD        aspirin (ASA) chewable tablet 81 mg  81 mg Oral Daily Radha Mariscal MD   81 mg at 07/03/24 0923    estradiol (ESTRACE) cream 0.5 g  0.5 g Vaginal Daily Frida Davis MD        famotidine (PEPCID) tablet 40 mg  40 mg Oral BID Radha Mariscal MD   40 mg at 07/03/24 1907    lipids plant base (CLINOLIPID) 20 % infusion 250 mL  250 mL Intravenous Once per day on Monday Tuesday Wednesday Thursday Friday Saturday Radha Mariscal MD 20.8 mL/hr at 07/03/24 2107 250 mL at 07/03/24 2107    magnesium oxide (MAG-OX) tablet 400 mg  400 mg Oral Daily Rosita French PA-C   400 mg at 07/03/24 0923    mycophenolic acid (GENERIC EQUIVALENT) EC tablet 360 mg  360 mg Oral BID IS Rosita French PA-C   360 mg at 07/03/24 1905    PARoxetine (PAXIL) tablet 30 mg  30 mg Oral QPM Radha Mariscal MD   30 mg at 07/03/24 1907    predniSONE (DELTASONE) tablet 20 mg  20 mg Oral Daily Rosita French PA-C   20 mg at 07/03/24 1148    senna-docusate (SENOKOT-S/PERICOLACE) 8.6-50 MG per tablet 1 tablet  1 tablet Oral BID Radha Mariscal MD   1 tablet at 07/03/24 0924    silver sulfADIAZINE (SILVADENE) 1 % cream   Topical BID Radha Mariscal MD   Given at 07/02/24 2030    sodium chloride (PF) 0.9% PF flush 3 mL  3 mL Intracatheter Q8H Radha Mariscal MD   3 mL at 07/03/24 2230    sodium chloride (PF) 0.9% PF flush 3 mL  3 mL Intracatheter Q8H Radha Mariscal MD   3 mL at 07/03/24 1610    sucralfate (CARAFATE) suspension 1 g  1 g Oral 4x Daily AC & HS Radha Mariscal MD   1 g at 07/04/24 0654    tacrolimus (GENERIC EQUIVALENT) capsule 2 mg  2 mg Oral BID IS Rosita French PA-C        valGANciclovir (VALCYTE) tablet 450 mg  450 mg Oral Daily Radha Mariscal MD   450 mg at 07/03/24 0923            Physical Exam:      Admit Weight: 52.6 kg (115 lb 14.4 oz)    /67 (BP Location: Left arm)   Pulse 71   Temp 98  F (36.7  C) (Oral)   Resp 16   Ht 1.575 m (5' 2\")   Wt 50.2 kg (110 lb 9.6 oz)   SpO2 100%   BMI 20.23 " kg/m       General Appearance: in mild distress and pain.   Skin: Warm, perfused  Heart: Extremities appear well-perfused      Lungs: Normal breathing   Abdomen: The abdomen is soft, non-tender  Musculoskeletal: No lower extremity edema, erythema, tenderness  Neurologic: Awake, alert, and oriented.     Lab Results   Component Value Date    WBC 2.1 (L) 07/06/2024    HGB 9.9 (L) 07/06/2024    HCT 32.3 (L) 07/06/2024     07/06/2024     07/06/2024    POTASSIUM 4.7 07/06/2024    CHLORIDE 106 07/06/2024    CO2 19 (L) 07/06/2024    BUN 24.8 (H) 07/06/2024    CR 0.88 07/06/2024     (H) 07/06/2024    DD 0.66 (H) 06/26/2024    NTBNPI 1,484 (H) 06/26/2024    AST 18 07/01/2024    ALT 13 07/01/2024    ALKPHOS 73 07/01/2024    BILITOTAL 0.2 07/01/2024    INR 1.20 (H) 07/01/2024

## 2024-07-06 NOTE — PROGRESS NOTES
"RHEUMATOLOGY PROGRESS NOTE     Subjective      Ira Zamora was seen and examined at bedside today  ROS      Objective   /82   Pulse 87   Temp 98.2  F (36.8  C) (Oral)   Resp 16   Ht 1.575 m (5' 2\")   Wt 50.2 kg (110 lb 9.6 oz)   SpO2 94%   BMI 20.23 kg/m        PHYSICAL EXAMINATION  Physical Exam  HENT:      Mouth/Throat:      Comments: Small ulcers on the upper lip  Eyes:      Conjunctiva/sclera: Conjunctivae normal.   Pulmonary:      Effort: Pulmonary effort is normal.   Musculoskeletal:         General: No swelling or tenderness.   Neurological:      Mental Status: She is alert.       Assessment & Plan      # Esophageal ulcers  # Oral Ulcers  # Vulvar ulcer  # Odynophagia  # Immunosuppression 2/2 renal transplant  # Hx Crohn's disease    Ira is doing better today.  She remains requiring oxycodone for pain control.    Appreciate our dermatology colleagues involvement and evaluation of her mucocutaneous ulceration, further ongoing discussion regarding etiology of her mucocutaneous ulceration including Crohn's disease, mucocutaneous bullous pemphigoid or Behcet's.  She has missed her Stelara dose so this indeed might be reflective of Crohn's activity.    She seems to have responded to increased dose of methylprednisone which is encouraging we will continue on IV Methylpred 40 mg daily then reassess.    Recommendations:  - Continue IV Methylpred 40 mg for total of 4 days [today day 2/4]  - Await dermatology input regarding her skin biopsy  - Pain management per primary team  - Follow results of MPO/SC-3 antibodies    35 minutes spent by me on the date of the encounter doing chart review, history and exam, documentation and further activities per the note      Yasmany Mcfarland MD  Rheumatology attending     "

## 2024-07-06 NOTE — PLAN OF CARE
Paged team regarding pt complaining of frequency/uregency with urination and sediment noted in urine, declines burning.    Also, pt complaining of nose feeling dry. Can something be ordered?

## 2024-07-07 LAB
ALBUMIN UR-MCNC: NEGATIVE MG/DL
ANION GAP SERPL CALCULATED.3IONS-SCNC: 7 MMOL/L (ref 7–15)
APPEARANCE UR: CLEAR
BACTERIA #/AREA URNS HPF: ABNORMAL /HPF
BACTERIA TISS BX CULT: NO GROWTH
BASOPHILS # BLD AUTO: 0 10E3/UL (ref 0–0.2)
BASOPHILS NFR BLD AUTO: 1 %
BILIRUB UR QL STRIP: NEGATIVE
BUN SERPL-MCNC: 24.3 MG/DL (ref 6–20)
CALCIUM SERPL-MCNC: 9.6 MG/DL (ref 8.6–10)
CHLORIDE SERPL-SCNC: 105 MMOL/L (ref 98–107)
COLOR UR AUTO: YELLOW
CREAT SERPL-MCNC: 0.91 MG/DL (ref 0.51–0.95)
DEPRECATED HCO3 PLAS-SCNC: 25 MMOL/L (ref 22–29)
EBV DNA SPEC QL NAA+PROBE: DETECTED
EGFRCR SERPLBLD CKD-EPI 2021: 84 ML/MIN/1.73M2
EOSINOPHIL # BLD AUTO: 0.1 10E3/UL (ref 0–0.7)
EOSINOPHIL NFR BLD AUTO: 4 %
ERYTHROCYTE [DISTWIDTH] IN BLOOD BY AUTOMATED COUNT: 14.7 % (ref 10–15)
GLUCOSE SERPL-MCNC: 107 MG/DL (ref 70–99)
GLUCOSE UR STRIP-MCNC: NEGATIVE MG/DL
GRAM STAIN RESULT: NORMAL
GRAM STAIN RESULT: NORMAL
HCT VFR BLD AUTO: 28.2 % (ref 35–47)
HGB BLD-MCNC: 8.8 G/DL (ref 11.7–15.7)
HGB UR QL STRIP: ABNORMAL
IMM GRANULOCYTES # BLD: 0 10E3/UL
IMM GRANULOCYTES NFR BLD: 2 %
KETONES UR STRIP-MCNC: NEGATIVE MG/DL
LEUKOCYTE ESTERASE UR QL STRIP: ABNORMAL
LYMPHOCYTES # BLD AUTO: 0.3 10E3/UL (ref 0.8–5.3)
LYMPHOCYTES NFR BLD AUTO: 15 %
MAGNESIUM SERPL-MCNC: 1.5 MG/DL (ref 1.7–2.3)
MCH RBC QN AUTO: 30 PG (ref 26.5–33)
MCHC RBC AUTO-ENTMCNC: 31.2 G/DL (ref 31.5–36.5)
MCV RBC AUTO: 96 FL (ref 78–100)
MONOCYTES # BLD AUTO: 0.2 10E3/UL (ref 0–1.3)
MONOCYTES NFR BLD AUTO: 9 %
NEUTROPHILS # BLD AUTO: 1.4 10E3/UL (ref 1.6–8.3)
NEUTROPHILS NFR BLD AUTO: 69 %
NITRATE UR QL: NEGATIVE
NRBC # BLD AUTO: 0 10E3/UL
NRBC BLD AUTO-RTO: 0 /100
PH UR STRIP: 7 [PH] (ref 5–7)
PHOSPHATE SERPL-MCNC: 2.2 MG/DL (ref 2.5–4.5)
PLATELET # BLD AUTO: 180 10E3/UL (ref 150–450)
POTASSIUM SERPL-SCNC: 4.4 MMOL/L (ref 3.4–5.3)
RBC # BLD AUTO: 2.93 10E6/UL (ref 3.8–5.2)
RBC URINE: 1 /HPF
SODIUM SERPL-SCNC: 137 MMOL/L (ref 135–145)
SP GR UR STRIP: 1.01 (ref 1–1.03)
SQUAMOUS EPITHELIAL: <1 /HPF
TACROLIMUS BLD-MCNC: 5.9 UG/L (ref 5–15)
TME LAST DOSE: NORMAL H
TME LAST DOSE: NORMAL H
UROBILINOGEN UR STRIP-MCNC: NORMAL MG/DL
WBC # BLD AUTO: 2 10E3/UL (ref 4–11)
WBC URINE: 3 /HPF

## 2024-07-07 PROCEDURE — 250N000013 HC RX MED GY IP 250 OP 250 PS 637

## 2024-07-07 PROCEDURE — 250N000009 HC RX 250

## 2024-07-07 PROCEDURE — 83735 ASSAY OF MAGNESIUM: CPT

## 2024-07-07 PROCEDURE — 99233 SBSQ HOSP IP/OBS HIGH 50: CPT | Mod: 24 | Performed by: NURSE PRACTITIONER

## 2024-07-07 PROCEDURE — 80197 ASSAY OF TACROLIMUS: CPT

## 2024-07-07 PROCEDURE — 250N000012 HC RX MED GY IP 250 OP 636 PS 637: Performed by: PHYSICIAN ASSISTANT

## 2024-07-07 PROCEDURE — 250N000011 HC RX IP 250 OP 636: Performed by: PHYSICIAN ASSISTANT

## 2024-07-07 PROCEDURE — 120N000011 HC R&B TRANSPLANT UMMC

## 2024-07-07 PROCEDURE — 250N000013 HC RX MED GY IP 250 OP 250 PS 637: Performed by: STUDENT IN AN ORGANIZED HEALTH CARE EDUCATION/TRAINING PROGRAM

## 2024-07-07 PROCEDURE — 250N000011 HC RX IP 250 OP 636

## 2024-07-07 PROCEDURE — 36415 COLL VENOUS BLD VENIPUNCTURE: CPT

## 2024-07-07 PROCEDURE — 258N000003 HC RX IP 258 OP 636: Performed by: STUDENT IN AN ORGANIZED HEALTH CARE EDUCATION/TRAINING PROGRAM

## 2024-07-07 PROCEDURE — 250N000013 HC RX MED GY IP 250 OP 250 PS 637: Performed by: PHYSICIAN ASSISTANT

## 2024-07-07 PROCEDURE — 250N000013 HC RX MED GY IP 250 OP 250 PS 637: Performed by: TRANSPLANT SURGERY

## 2024-07-07 PROCEDURE — 84100 ASSAY OF PHOSPHORUS: CPT

## 2024-07-07 PROCEDURE — 250N000013 HC RX MED GY IP 250 OP 250 PS 637: Performed by: NURSE PRACTITIONER

## 2024-07-07 PROCEDURE — 80048 BASIC METABOLIC PNL TOTAL CA: CPT

## 2024-07-07 PROCEDURE — 85025 COMPLETE CBC W/AUTO DIFF WBC: CPT | Performed by: NURSE PRACTITIONER

## 2024-07-07 PROCEDURE — 99231 SBSQ HOSP IP/OBS SF/LOW 25: CPT | Mod: 24 | Performed by: STUDENT IN AN ORGANIZED HEALTH CARE EDUCATION/TRAINING PROGRAM

## 2024-07-07 RX ORDER — LEVOFLOXACIN 250 MG/1
250 TABLET, FILM COATED ORAL DAILY
Status: DISCONTINUED | OUTPATIENT
Start: 2024-07-07 | End: 2024-07-08

## 2024-07-07 RX ORDER — LACTOBACILLUS RHAMNOSUS GG 10B CELL
1 CAPSULE ORAL 2 TIMES DAILY
Status: DISCONTINUED | OUTPATIENT
Start: 2024-07-07 | End: 2024-07-09

## 2024-07-07 RX ORDER — METHYLPREDNISOLONE SODIUM SUCCINATE 40 MG/ML
40 INJECTION, POWDER, LYOPHILIZED, FOR SOLUTION INTRAMUSCULAR; INTRAVENOUS ONCE
Status: COMPLETED | OUTPATIENT
Start: 2024-07-08 | End: 2024-07-08

## 2024-07-07 RX ORDER — TACROLIMUS 1 MG/1
3 CAPSULE ORAL
Status: DISCONTINUED | OUTPATIENT
Start: 2024-07-07 | End: 2024-07-09 | Stop reason: HOSPADM

## 2024-07-07 RX ADMIN — FAMOTIDINE 40 MG: 20 TABLET ORAL at 08:53

## 2024-07-07 RX ADMIN — TACROLIMUS 2.5 MG: 1 CAPSULE ORAL at 08:53

## 2024-07-07 RX ADMIN — Medication 5 ML: at 01:06

## 2024-07-07 RX ADMIN — SUCRALFATE 1 G: 1 SUSPENSION ORAL at 22:09

## 2024-07-07 RX ADMIN — METHYLPREDNISOLONE SODIUM SUCCINATE 40 MG: 40 INJECTION, POWDER, FOR SOLUTION INTRAMUSCULAR; INTRAVENOUS at 09:11

## 2024-07-07 RX ADMIN — TACROLIMUS 3 MG: 1 CAPSULE ORAL at 18:20

## 2024-07-07 RX ADMIN — ONDANSETRON 4 MG: 2 INJECTION INTRAMUSCULAR; INTRAVENOUS at 09:04

## 2024-07-07 RX ADMIN — Medication 100 MG: at 08:51

## 2024-07-07 RX ADMIN — ASPIRIN 81 MG CHEWABLE TABLET 81 MG: 81 TABLET CHEWABLE at 08:53

## 2024-07-07 RX ADMIN — OXYCODONE HYDROCHLORIDE 5 MG: 5 SOLUTION ORAL at 10:23

## 2024-07-07 RX ADMIN — SUCRALFATE 1 G: 1 SUSPENSION ORAL at 05:42

## 2024-07-07 RX ADMIN — PAROXETINE 30 MG: 30 TABLET, FILM COATED ORAL at 20:05

## 2024-07-07 RX ADMIN — SULFAMETHOXAZOLE AND TRIMETHOPRIM 1 TABLET: 400; 80 TABLET ORAL at 08:53

## 2024-07-07 RX ADMIN — ACETAMINOPHEN 650 MG: 325 TABLET, FILM COATED ORAL at 22:09

## 2024-07-07 RX ADMIN — HYDROXYZINE HYDROCHLORIDE 25 MG: 25 TABLET ORAL at 10:33

## 2024-07-07 RX ADMIN — Medication 5 ML: at 03:21

## 2024-07-07 RX ADMIN — POTASSIUM & SODIUM PHOSPHATES POWDER PACK 280-160-250 MG 1 PACKET: 280-160-250 PACK at 08:53

## 2024-07-07 RX ADMIN — OXYCODONE HYDROCHLORIDE 5 MG: 5 SOLUTION ORAL at 20:04

## 2024-07-07 RX ADMIN — ACETAMINOPHEN 650 MG: 325 TABLET, FILM COATED ORAL at 10:23

## 2024-07-07 RX ADMIN — Medication 5 ML: at 22:09

## 2024-07-07 RX ADMIN — SUCRALFATE 1 G: 1 SUSPENSION ORAL at 10:23

## 2024-07-07 RX ADMIN — SUCRALFATE 1 G: 1 SUSPENSION ORAL at 16:33

## 2024-07-07 RX ADMIN — MAGNESIUM OXIDE TAB 400 MG (241.3 MG ELEMENTAL MG) 400 MG: 400 (241.3 MG) TAB at 13:32

## 2024-07-07 RX ADMIN — FAMOTIDINE 40 MG: 20 TABLET ORAL at 20:06

## 2024-07-07 RX ADMIN — HYDROXYZINE HYDROCHLORIDE 50 MG: 25 TABLET ORAL at 20:04

## 2024-07-07 RX ADMIN — Medication 15 ML: at 13:32

## 2024-07-07 RX ADMIN — Medication 5 ML: at 08:22

## 2024-07-07 RX ADMIN — Medication 5 ML: at 10:23

## 2024-07-07 RX ADMIN — Medication 5 ML: at 05:42

## 2024-07-07 RX ADMIN — POTASSIUM & SODIUM PHOSPHATES POWDER PACK 280-160-250 MG 1 PACKET: 280-160-250 PACK at 20:06

## 2024-07-07 RX ADMIN — POTASSIUM & SODIUM PHOSPHATES POWDER PACK 280-160-250 MG 1 PACKET: 280-160-250 PACK at 13:32

## 2024-07-07 RX ADMIN — VALGANCICLOVIR HYDROCHLORIDE 450 MG: 50 POWDER, FOR SOLUTION ORAL at 08:51

## 2024-07-07 RX ADMIN — Medication 5 ML: at 18:28

## 2024-07-07 RX ADMIN — SODIUM CHLORIDE 1000 ML: 9 INJECTION, SOLUTION INTRAVENOUS at 10:36

## 2024-07-07 RX ADMIN — Medication 1 CAPSULE: at 20:05

## 2024-07-07 RX ADMIN — Medication 5 ML: at 20:06

## 2024-07-07 RX ADMIN — Medication 5 ML: at 16:37

## 2024-07-07 ASSESSMENT — ACTIVITIES OF DAILY LIVING (ADL)
ADLS_ACUITY_SCORE: 25
ADLS_ACUITY_SCORE: 24
ADLS_ACUITY_SCORE: 25
ADLS_ACUITY_SCORE: 24
ADLS_ACUITY_SCORE: 25
ADLS_ACUITY_SCORE: 24
ADLS_ACUITY_SCORE: 24
ADLS_ACUITY_SCORE: 25
ADLS_ACUITY_SCORE: 24
ADLS_ACUITY_SCORE: 25
ADLS_ACUITY_SCORE: 25
ADLS_ACUITY_SCORE: 24
ADLS_ACUITY_SCORE: 25
ADLS_ACUITY_SCORE: 24
ADLS_ACUITY_SCORE: 25

## 2024-07-07 NOTE — PLAN OF CARE
"/83 (BP Location: Left arm)   Pulse 76   Temp 98.3  F (36.8  C) (Oral)   Resp 14   Ht 1.575 m (5' 2\")   Wt 49.8 kg (109 lb 12.8 oz)   SpO2 100%   BMI 20.08 kg/m      Shift: [id number]  Isolation Status: None  VS: VSS on RA, afebrile  Neuro: Aox4, able to make needs known  Behaviors: Calm, cooperative  BG: Daily - 107  Labs/Imaging: BUN 24.3, Cr 0.91, Ca 9.6, Mg 1.5, Phos 2.2, WBC 2.0, Hgb 8.8, hematocrit 28.2  Respiratory: WDL room air  Cardiac: WDL  Pain/Nausea: Denies nausea. Endorses throat/esophageal pain, managed with PRN lidocaine gel x 2 with relief.  PRN: Lidocaine gel (see MAR)  Diet: Mechanical/dental soft diet, poor appetite. TF continuous at 40 mL/hr with 60 mL FWF q4h. Setup to be changed at 2100.  IV Access: L PIV SL, R AVF bruit/thrill present  Infusion(s): N/A  Lines/Drains: L NJT  GI/: Voiding spontaneously without difficulty, LBM 7/6.  Skin: Labial biopsy site, R AVF  Mobility: UAL, encouraging pt to get up/walk in halls  Events/Education: Rest promoted, lighting adjusted, cares clustered. Pt's spouse at bedside, supportive of pt/involved in care.  Plan: Continue with plan of care and notify team with any changes. Possible discharge tomorrow home with home infusion services.  "

## 2024-07-07 NOTE — PROGRESS NOTES
"RHEUMATOLOGY PROGRESS NOTE     Subjective      Ira Zamora was seen and examined at bedside today  ROS      Objective   BP (!) 152/89 (BP Location: Left arm, Cuff Size: Adult Regular)   Pulse 93   Temp 98  F (36.7  C)   Resp 24   Ht 1.575 m (5' 2\")   Wt 49.8 kg (109 lb 12.8 oz)   SpO2 97%   BMI 20.08 kg/m        PHYSICAL EXAMINATION  Physical Exam  HENT:      Mouth/Throat:      Comments: No new visible oral sores in the upper or lower lip  Eyes:      Conjunctiva/sclera: Conjunctivae normal.   Pulmonary:      Effort: Pulmonary effort is normal.   Musculoskeletal:         General: No swelling or tenderness.   Neurological:      Mental Status: She is alert.       Assessment & Plan      # Esophageal ulcers  # Oral Ulcers  # Vulvar ulcer  # Odynophagia  # Immunosuppression 2/2 renal transplant  # Hx Crohn's disease currently off stelara since February   # Leucopenia     Ira had recurrence of esophageal pain today despite continued methylprednisone, she is still requiring oxycodone for pain control.   No new ulceration in her oral cavity.  Her last Stelara dose was in February so she is overdue.    Recommend to continue on IV methylprednisone today and tomorrow and We will discuss with our colleagues in dermatology about the next steps to control her mucocutaneous ulceration and formulate a plan for her disease prior to discharge.  She will need to be reinitiated on Stelara if there is no contraindication from infection or transplant standpoint.  We will defer pain control to primary team.    Recommendations:  - Continue IV Methylpred 40 mg for total of 4 days [today day 3/4]  - Await dermatology input regarding her skin biopsy  - Pain management per primary team  - Follow results of MPO/ND-3 antibodies    25 minutes spent by me on the date of the encounter doing chart review, history and exam, documentation and further activities per the note      Yasmany Mcfarland MD  Rheumatology attending     "

## 2024-07-07 NOTE — PLAN OF CARE
"Shift: 5217-5862  /84 (BP Location: Left arm)   Pulse 82   Temp 98  F (36.7  C) (Oral)   Resp 16   Ht 1.575 m (5' 2\")   Wt 49.8 kg (109 lb 12.8 oz)   SpO2 96%   BMI 20.08 kg/m       VS- stable on RA  BG- Daily  Pain/Nausea/PRN'S- PRN lidocaine oral solution Q2, Oxy x1 at bedtime for esophagus/abdominal pain. Denies nausea   Diet- mechanical soft, pt not eating, encourage more fluid intake  LDA- PIV X1 SL, NJ- continuous tube feeds    Gtt/IVF- TF at goal rate  40 cc/hr, with 60 ml Q4 water flushes  GI/- voiding 600 ml output ,LBM yesterday  Skin- intact, abdominal scars, declining creams/ lotions for Vulva area.   Activity- UAL,  a bedside.   Plan- Urine culture needed, Needs tube feeding education prior to discharge, continue with POC.  "

## 2024-07-07 NOTE — PROGRESS NOTES
Care Management Follow Up    Length of Stay (days): 10    Expected Discharge Date: 07/08/2024     Concerns to be Addressed: discharge planning     Patient plan of care discussed at interdisciplinary rounds: Yes    Anticipated Discharge Disposition: Home, Home Infusion     Anticipated Discharge Services: None  Anticipated Discharge DME: None    Patient/family educated on Medicare website which has current facility and service quality ratings: yes  Education Provided on the Discharge Plan: Yes  Patient/Family in Agreement with the Plan: yes    Referrals Placed by CM/SW: Internal Clinic Care Coordination, Home Infusion  Private pay costs discussed: Not applicable    Additional Information:  Care team anticipates possible discharge Monday.  Aware of inability to secure home care.  [facility] will reassess home care options on Monday 7/8.  [facility] providing home enteral.  Anticipate enteral education on Monday 7/8.    Met with pt and spouse.  Pt noted she has been working with nursing staff on enteral set up, flushing her feeding tube and medication administration.  Discussed that currently we do not have home care confirmed.  Pt disappointed but understands.  Aware that [facility] may be an option in the future once enteral feeds are no longer required.  Reviewed anticipated plan for transplant labs.  Pt anticipates she would go into [facility] or [facility] for transplant labs.  Pt and spouse note no concerns or questions at this time.    Home Infusion  [facility]  Phone # [phone]  Fax # [phone]   Enteral formula, pump and supplies.    Yudith Jimenez RN BSN, PHN, ACM-RN  July 7, 2024  Nurse Coordinator    Phone: [phone]    Social Work and Care Management Department     SEARCHABLE in [facility] - search CARE COORDINATOR     [place] ([phone]) Saturday & Sunday; ([phone]) [facility] Recognized Holidays     Units: 5A Onc Vocera & 5C Vocera     Units: 6B  Vocera & 6C Vocera      Units: 7A SOT RNCC Vocera, 7B Med Surg Vocera, & 7C Med Surg Vocera      Units: 6A Vocera & 4A CVICU Vocera, 4C MICU Vocera, and 4E SICU Vocera      Units: 5 Ortho Vocera & 5 Med Surg Vocera      Units: 6 Med Surg Vocera & 8 Med Surg Vocera      7/7/2024

## 2024-07-07 NOTE — PLAN OF CARE
"BP (!) 152/89 (BP Location: Left arm, Cuff Size: Adult Regular)   Pulse 93   Temp 98  F (36.7  C)   Resp 24   Ht 1.575 m (5' 2\")   Wt 49.8 kg (109 lb 12.8 oz)   SpO2 97%   BMI 20.08 kg/m      Assumed cares [id number]  Neuro: A/Ox4  Pain/Nausea: throat/esophageal pain, given Oxycodone, oral lidocaine and scheduled meds; Zofran x1 for nausea  Cardiac: rate and rhythm regular  Resp: lung sounds clear, equal bilaterally  GI/: voiding spontaneously, no BM today, pt declining senna  Diet/Appetite: mechanical soft diet but no appetite; TF @40  Skin: labial biopsy site; RAVF  Access: PIV - saline locked  Drains: NJ  Activity: up ad roya; pt was encouraged multiple times to walk in hallway, stating \"too much pain\" or \"not right now, I just got comfortable\"  Plan: pt received 1L bolus for hydration  Will continue with plan of care and notify team of any changes.?    ARCHIE BELTRAN RN on 7/7/2024 at 2:32 PM        "

## 2024-07-07 NOTE — PROGRESS NOTES
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 If ANC <500, recommend to decrease AZA to 75mg if neutropenic and WBC less than 2  -  f/up TPMT activity pending   - negative infectious w/up so far (EBV swab pending - vulvar sample) & Dermatopathology review 7/8  - MPO and PR3 Ab pending    Assessment & Plan   # DDKT: CKD Stage 3 - Stable serum creatinine, at baseline 0.9, Cystatin C 6/29=47.   No indication for dialysis.    Pediatric en-bloc kidneys   - Baseline Creatinine: ~ 0.9-1.1   - Proteinuria: Normal (<0.2 grams)   - DSA Hx: No DSA   - Last cPRA: 9%   - BK Viremia: No   - Kidney Tx Biopsy Hx: No biopsy history.      # Immunosuppression Prior to Admission: Tacrolimus immediate release (goal 8-10) and Mycophenolic acid (dose 540 mg every 12 hours)   - Present Immunosuppression: Tacrolimus immediate release (goal 8-10), Mycophenolic acid (dose 360 mg every 12 hours), and Prednisone (dose 20 mg daily)   - Induction with Recent Transplant:  High Intensity Protocol   - Continue with intensive monitoring of immunosuppression for efficacy and toxicity.   - Historical Changes in Immunosuppression: Previously stopped mycophenolate due to oral and vaginal ulcers, but endoscopy did not indicate mycophenolic acid toxicity.  Started prednisone 20 mg daily d/t concern for Bechet's disease and mycophenolic acid dose reduced accordingly.   - Patient is in an immunosuppressed state and will continue to monitor for efficacy and toxicity of immunosuppression medications.   - Changes: Yes - stop prednisone 20  per  Rheum, AZA to 100mg -decrease to 75mg  if neutropenia persits    # Infection Prevention:      - PJP: Inhaled pentamidine; Okay to restart Bactrim on 7/5/24  - CMV: Valganciclovir (Valcyte); Patient is CMV IgG Ab discordant (D+/R-) and will continue on Valcyte x 6 months, then check CMV PCR monthly until 12 months post transplant.   - CMV IgG Ab High Risk Discordance (D+/R-): Yes  - EBV IgG Ab High Risk Discordance (D+/R-): No    # Blood Pressure: Controlled;  Goal BP: < 140/90   - Changes: No    # Anemia in Chronic Renal Disease: Hgb: Stable, low      JUNG: Yes- Retacrit 4000units (6/29).   - Iron studies: Replete    # Leukopenia: Stable, low Likely medication related.  Negative CMV PCR.  -Obtain cbc with diff.    # Mineral Bone Disorder:    - Secondary renal hyperparathyroidism; PTH level: Minimally elevated ( pg/ml)        On treatment: None  - Vitamin D; level: Normal         On supplement: No  - Calcium; level: Normal          On supplement: No  - Phosphorus; level: Low         On supplement: No    # Electrolytes:   - Potassium; level: Normal         On supplement: No  - Magnesium; level: Normal         On supplement: Yes, magnesium oxide 400 mg PO daily  - Bicarbonate; level: Normal         On supplement: No  - Sodium; level: Normal    # Concern for Behcet's   # Oral and Genital Ulcers  # Severe Malnutrition  # GERD/Dysphagia/Esophagitis: Patient with apparent severe heartburn/GERD symptoms, worsening over the last week or so prior to admission.  Patient underwent EGD 5/24/24 which was mostly unremarkable with only finding of minimal non-specific chronic inflammation in the stomach (negative H. pylori), but jessee esophageal findings.  In addition, recently had significant oral ulcers.  Now with patient unable to eat and minimal fluid intake.  She hasn't been able to take her medications in the last day due to pain with swallowing.  Wt down with decreased PO intake.  Patient is on famotidine, but unable to  use PPI due to probable underlying kidney disease (interstitial nephritis) from previous PPI use. Continue Sucralfate.    - EGD 6/28/25  Esophageal ulcer with no bleeding and no stigmata of recent bleeding. Biopsies: HSV and CMV immunostains are negative. PAS stains are negative for fungal organisms.                          - LA Grade B esophagitis with no bleeding. Biopsied.                          - Z-line, 32 cm from the incisors.                          - Friable gastric mucosa.                          - Normal examined duodenum.  - HSV and CMV immunostains are negative. PAS stains are negative for fungal organisms.    - Patient was off mycophenolic acid 6/14-7/1 without any improvement  - 6/29/24 Started PPN  - 07/02/24 Biopsy of vulva (result pending)  -7/5/24: plan to place NJT and start EN     # Other Significant PMH:   - Crohn's Disease: Appears to be stable.  Previously was on ustekinumab prior to kidney transplant, but hasn't restarted it due to ongoing oral and vaginal ulcers.  Does occasionally alternate between diarrhea and constipation, but no issues at this time.  Followed by GI and [facility].   - Cardiac/Vascular Disease Risk Factors: Dilated ascending aorta (3.8 cm)  on 2024 ECHO (stable from 2020).     # Transplant History:  Etiology of Kidney Failure: Interstitial nephritis  Tx: DDKT - Pediatric en bloc  Transplant: 4/17/2024 (Kidney)  Significant transplant-related complications:  Oral and vaginal ulcers, esophagitis     Recommendations were communicated to the primary team via this note.    Nicole Kowalski NP  Transplant Nephrology  Contact information available via [facility] Paging/Directory        Physician Attestation     I saw and evaluated Ira Zamora as part of a shared APRN/PA visit.     I personally reviewed the vital signs, medications, labs, and imaging.    I personally provided a substantive portion of care for this patient and I approve the care plan as written by the ERICA.  I was  involved with Medical Decision Making including: Please see A&P for additional details of medical decision making.  MANAGEMENT DISCUSSED with the following over the past 24 hours: IV solumedrol per Rheum for possible Behcet's, once weaned will keep on pred 5. continue AZA and tacrolimus. TPMT pending, path review per dermatopath     Rachel Arellano MD  Date of Service (when I saw the patient): 07/07/24    Interval History  Ms. Puckett creatinine is 0.91 (07/07 0625); Stable.  good urine output.  Other significant labs/tests/vitals: VSS  no events overnight.  no chest pain or shortness of breath.  no leg swelling.  mild nausea and vomiting.  Bowel movements are soft.  no fever, sweats or chills.    Review of Systems   4 point ROS was obtained and negative except as noted in the Interval History.    MEDICATIONS:  Current Facility-Administered Medications   Medication Dose Route Frequency Provider Last Rate Last Admin    acetaminophen (TYLENOL) tablet 650 mg  650 mg Oral Q6H Radha Mariscal MD   650 mg at 07/07/24 1023    aspirin (ASA) chewable tablet 81 mg  81 mg Oral Daily Radha Mariscal MD   81 mg at 07/07/24 0853    azaTHIOprine (IMURAN) suspension 100 mg  100 mg Oral Daily Sid Desai MD   100 mg at 07/07/24 0851    estradiol (ESTRACE) cream 0.5 g  0.5 g Vaginal Daily Frida Davis MD        famotidine (PEPCID) tablet 40 mg  40 mg Oral BID Radha Mariscal MD   40 mg at 07/07/24 0853    [Held by provider] levofloxacin (LEVAQUIN) tablet 250 mg  250 mg Oral Daily Elise Hines MD        magnesium oxide (MAG-OX) tablet 400 mg  400 mg Oral Daily Rosita French PA-C   400 mg at 07/06/24 0843    multivitamins w/minerals liquid 15 mL  15 mL Per Feeding Tube Daily Holli Nelson APRN CNP   15 mL at 07/06/24 0845    PARoxetine (PAXIL) tablet 30 mg  30 mg Oral QPM Radha Mariscal MD   30 mg at 07/06/24 2104    potassium & sodium phosphates (NEUTRA-PHOS) Packet 1 packet  1 packet Oral TID Rosita French PA-C    "1 packet at 24 0853    senna-docusate (SENOKOT-S/PERICOLACE) 8.6-50 MG per tablet 1 tablet  1 tablet Oral BID Radha Mariscal MD   1 tablet at 24 0924    silver sulfADIAZINE (SILVADENE) 1 % cream   Topical BID Radha Mariscal MD   Given at 24 2030    sodium chloride (PF) 0.9% PF flush 3 mL  3 mL Intracatheter Q8H Sid Desai MD   3 mL at 24 2105    sodium chloride (PF) 0.9% PF flush 3 mL  3 mL Intracatheter Q8H Radha Mariscal MD   3 mL at 24 2258    sodium chloride (PF) 0.9% PF flush 3 mL  3 mL Intracatheter Q8H Radha Mariscal MD   3 mL at 24 0912    sucralfate (CARAFATE) suspension 1 g  1 g Oral 4x Daily AC & HS Radha Mariscal MD   1 g at 24 1023    sulfamethoxazole-trimethoprim (BACTRIM) 400-80 MG per tablet 1 tablet  1 tablet Oral Daily Jesus Jenkins MD   1 tablet at 24 0853    tacrolimus (GENERIC EQUIVALENT) capsule 2.5 mg  2.5 mg Oral BID IS Rosita French PA-C   2.5 mg at 24 0853    triamcinolone (KENALOG) 0.1 % lotion   Topical BID Jesus Jenkins MD        valGANciclovir (VALCYTE) solution 450 mg  450 mg Oral Daily Sid Desai MD   450 mg at 24 0851     Current Facility-Administered Medications   Medication Dose Route Frequency Provider Last Rate Last Admin    dextrose 10% infusion   Intravenous Continuous PRN Radha Mariscal MD           Physical Exam   Temp  Av.9  F (36.6  C)  Min: 97.5  F (36.4  C)  Max: 98.3  F (36.8  C)      Pulse  Av.9  Min: 76  Max: 118 Resp  Av  Min: 16  Max: 20  SpO2  Av.1 %  Min: 93 %  Max: 100 %     /84 (BP Location: Left arm)   Pulse 82   Temp 98  F (36.7  C) (Oral)   Resp 16   Ht 1.575 m (5' 2\")   Wt 49.8 kg (109 lb 12.8 oz)   SpO2 96%   BMI 20.08 kg/m      Admit       GENERAL APPEARANCE: alert and mild distress due to pain  RESP: lungs clear to auscultation - no rales, rhonchi or wheezes  CV: regular rhythm, normal rate, no rub, no murmur  EDEMA: no LE edema bilaterally  ABDOMEN: " soft, nondistended, nontender, bowel sounds normal  MS: extremities normal - no gross deformities noted, no evidence of inflammation in joints, no muscle tenderness  SKIN: no rash  DIALYSIS ACCESS: right arm fistula +bruit/+thrill    Data   All labs reviewed by me.  CMP  Recent Labs   Lab 07/07/24  0625 07/06/24  1843 07/06/24  0631 07/05/24  1025 07/05/24  0654 07/05/24  0635 07/04/24  1901 07/04/24  0632 07/01/24  1037 07/01/24  0618    137 136  --   --  137  --  137   < > 135   POTASSIUM 4.4 5.0 4.7  --   --  4.5  --  4.6   < > 4.3   CHLORIDE 105 105 106  --   --  107  --  109*   < > 104   CO2 25 23 19*  --   --  23  --  20*   < > 22   ANIONGAP 7 9 11  --   --  7  --  8   < > 9   * 135* 118* 113*   < > 99   < > 97   < > 102*   BUN 24.3* 27.4* 24.8*  --   --  26.3*  --  31.1*   < > 21.4*   CR 0.91 0.92 0.88  --   --  0.91  --  0.99*   < > 0.90   GFRESTIMATED 84 83 88  --   --  84  --  76   < > 86   ROBLES 9.6 9.8 10.1*  --   --  9.7  --  9.7   < > 9.9   MAG 1.5*  --  1.7  --   --  1.8  --  2.1   < > 1.7   PHOS 2.2*  --  3.6  --   --  2.6  --  3.1   < > 3.2   PROTTOTAL  --   --   --   --   --   --   --   --   --  6.0*   ALBUMIN  --   --   --   --   --   --   --   --   --  3.2*   BILITOTAL  --   --   --   --   --   --   --   --   --  0.2   ALKPHOS  --   --   --   --   --   --   --   --   --  73   AST  --   --   --   --   --   --   --   --   --  18   ALT  --   --   --   --   --   --   --   --   --  13    < > = values in this interval not displayed.     CBC  Recent Labs   Lab 07/07/24  0625 07/06/24  0631 07/05/24  0635 07/04/24  0632   HGB 8.8* 9.9* 9.0* 8.8*   WBC 2.0* 2.1* 1.8* 2.6*   RBC 2.93* 3.29* 2.98* 2.86*   HCT 28.2* 32.3* 29.4* 26.9*   MCV 96 98 99 94   MCH 30.0 30.1 30.2 30.8   MCHC 31.2* 30.7* 30.6* 32.7   RDW 14.7 14.4 14.4 14.1    190 174 163     INR  Recent Labs   Lab 07/01/24  0618   INR 1.20*     ABGNo lab results found in last 7 days.   Urine Studies  Recent Labs   Lab Test  07/06/24  1841 06/06/24  1113 05/03/24  1600 04/17/24  1113   COLOR Yellow Yellow Light Yellow Straw   APPEARANCE Clear Clear Clear Clear   URINEGLC Negative Negative 100* 70*   URINEBILI Negative Negative Negative Negative   URINEKETONE Negative Negative Negative Negative   SG 1.015 1.028 1.014 1.004   UBLD Moderate* Small* Negative Trace*   URINEPH 7.0 5.5 5.5 8.5*   PROTEIN Negative 10* Negative 100*   NITRITE Negative Negative Negative Negative   LEUKEST Trace* Moderate* Trace* Negative   RBCU 1 2 2 2   WBCU 3 6* 4 <1     No lab results found.  PTH  Recent Labs   Lab Test 05/23/24  0949 04/22/24  0821   PTHI 131* 138*     Iron Studies  Recent Labs   Lab Test 05/23/24  0949 04/22/24  0821   IRON 77 53   * 147*   IRONSAT 38 36   PRICILA 2,181* 2,181*       IMAGING:  All imaging studies reviewed by me.

## 2024-07-07 NOTE — PROGRESS NOTES
Transplant Surgery  Inpatient Daily Progress Note  7/7/24     Assessment & Plan: 34 year old female with PMHx of 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 Admitted with mouth ulcers, severe odynophagia, difficulty eating, drinking and swallowing. In addition, she has a new vulvar painful soft tissue mass causing irritation while voiding and pain on walking, biopsy pending.     Plan:   - Encourage mobilization has not walked in the halls in days, walks around the room  - Continue lidocaine for mouth/esophageal pain  - Tube feed education... not comfortable with managing TF yet. Appears dry clinically, will give 1 L bolus and increase FWF with TF  - Prepare at home prescriptions for crush/suspension form, still swallowing some meds, such as tacrolimus.  - Monitor WBC (leukopenia); WBC 2.1 (up from 1.8) with absolute neutrophils 1.6  - Continue AZA 2 mg/kg, methylpred day 3/4, rheum and neph following  - Vulvar biopsy results negative for dysplasia and malignancy, awaiting dermpath review, and Ab testing for Behcets  - ID: rec restart bactrim, continue VGCV and fluconazole for prophylaxis    Graft function:   Kidney: Cr 0.91 from 9.88, cystatin c is 1.7, up a little, we will bolus, sodium is 137      Immunosuppression management:   -Myfortic 540mg BID 7/1. Reduced Myfortic to 360 mg BID with starting prednisone. Stopped Myfortic 7/4 and replaced with Azathioprine.   -Tacrolimus; goal level 8-10  -On IV methylpred 40mg once daily starting 7/6 for possible Bechet's (continue for 4 days per rheum)      Neuro/Psych:   Pain: still c/o odynophagia     Hematology:   Anemia of chronic disease: Hgb 9.9, stable.      Cardiorespiratory: No acute issues.      GI/Nutrition:   Severe malnutrition in the context of acute illness: Tube feed, goal rate 40 mL/hr. Patient not able to tolerate pills or PO liquids.  Refeeding/malnutrition: Phos 2.2 this am, from 3.6 and mag 1.5 from 1.7,  some element of re-feeding, will replete and leave standing repletion and re-check.   Odynophagia, severe: Secondary to oral and esophageal ulcers. Topical lidocaine PRN.   Esophageal ulcer: Single large cratered ulcer noted on EGD on 6/28. Pathology negative for malignancy, and HSV, CMV, and fungal stains negative, no signs of MMF toxicity. On H2 blocker. PPI contraindicated due to history of ESRD from interstitial nephritis. ID consult to evaluate for possible infectious causes. ID recommended Rheumatology consult. Possible Bechet's. Other diagnoses need to be ruled out before deciding on definitive treatment for the possible Bechet's; biopsy negative for malignancy. Pending labs (MPO, Proteinase 3). Day 3 of methylpred.     Endocrine: No acute issues.      Fluid/Electrolytes: Tube feeds, increase FWF     : Ulceration, left labia: OB/GYN consulted. EUA and biopsy on 7/2. Derm rec topical triamcinolone BID.     Infectious disease: Afebrile. CMV not detected.      Prophylaxis: Patient mobilizing.      Disposition: 7A         ERICA/Fellow/Resident Provider: Omar Jenkins MD PhD Fellow    Faculty: Glenis Vann MD     _________________________________________________________________  Transplant History:   4/17/2024 (Kidney), Postoperative day: 79     Interval History: History is obtained from the patient  Overnight events: No acute events overnight. Lidocaine improves oral and esophageal pain, but pain is persistent. Tolerating nasoduodenal tube with some mild discomfort. Little to no oral intake. Prefers pills through feeding tube.     ROS:   A 10-point review of systems was negative except as noted above.     Meds:  Inpatient Administered Meds             Current Facility-Administered Medications   Medication Dose Route Frequency Provider Last Rate Last Admin    acetaminophen (TYLENOL) tablet 975 mg  975 mg Oral Q6H Radha Mariscal MD   975 mg at 07/04/24 0331     Followed by    [START ON 7/5/2024]  "acetaminophen (TYLENOL) tablet 650 mg  650 mg Oral Q6H Radha Mariscal MD        aspirin (ASA) chewable tablet 81 mg  81 mg Oral Daily Radha Mraiscal MD   81 mg at 07/03/24 0923    estradiol (ESTRACE) cream 0.5 g  0.5 g Vaginal Daily Frida Davis MD        famotidine (PEPCID) tablet 40 mg  40 mg Oral BID Radha Mariscal MD   40 mg at 07/03/24 1907    lipids plant base (CLINOLIPID) 20 % infusion 250 mL  250 mL Intravenous Once per day on Monday Tuesday Wednesday Thursday Friday Saturday Radha Mariscal MD 20.8 mL/hr at 07/03/24 2107 250 mL at 07/03/24 2107    magnesium oxide (MAG-OX) tablet 400 mg  400 mg Oral Daily Rosita French PA-C   400 mg at 07/03/24 0923    mycophenolic acid (GENERIC EQUIVALENT) EC tablet 360 mg  360 mg Oral BID IS Rosita French PA-C   360 mg at 07/03/24 1905    PARoxetine (PAXIL) tablet 30 mg  30 mg Oral QPM Radha Mariscal MD   30 mg at 07/03/24 1907    predniSONE (DELTASONE) tablet 20 mg  20 mg Oral Daily Rosita French PA-C   20 mg at 07/03/24 1148    senna-docusate (SENOKOT-S/PERICOLACE) 8.6-50 MG per tablet 1 tablet  1 tablet Oral BID Radha Mariscal MD   1 tablet at 07/03/24 0924    silver sulfADIAZINE (SILVADENE) 1 % cream   Topical BID Radha Mariscal MD   Given at 07/02/24 2030    sodium chloride (PF) 0.9% PF flush 3 mL  3 mL Intracatheter Q8H Radha Mariscal MD   3 mL at 07/03/24 2230    sodium chloride (PF) 0.9% PF flush 3 mL  3 mL Intracatheter Q8H Radha Mariscal MD   3 mL at 07/03/24 1610    sucralfate (CARAFATE) suspension 1 g  1 g Oral 4x Daily AC & HS Radha Mariscal MD   1 g at 07/04/24 0654    tacrolimus (GENERIC EQUIVALENT) capsule 2 mg  2 mg Oral BID IS Rosita French PA-C        valGANciclovir (VALCYTE) tablet 450 mg  450 mg Oral Daily Radha Mariscal MD   450 mg at 07/03/24 0923            Physical Exam:      Admit Weight: 52.6 kg (115 lb 14.4 oz)    /84 (BP Location: Left arm)   Pulse 82   Temp 98  F (36.7  C) (Oral)   Resp 16   Ht 1.575 m (5' 2\")   Wt 49.8 kg " (109 lb 12.8 oz)   SpO2 96%   BMI 20.08 kg/m       General Appearance: anxious, malnourished but non toxic   Skin: Warm, perfused  Heart: Extremities appear well-perfused      Lungs: Normal breathing   Abdomen: The abdomen is soft, non-tender  Musculoskeletal: No lower extremity edema, erythema, tenderness  Neurologic: Awake, alert, and oriented.     Lab Results   Component Value Date    WBC 2.0 (L) 07/07/2024    HGB 8.8 (L) 07/07/2024    HCT 28.2 (L) 07/07/2024     07/07/2024     07/07/2024    POTASSIUM 4.4 07/07/2024    CHLORIDE 105 07/07/2024    CO2 25 07/07/2024    BUN 24.3 (H) 07/07/2024    CR 0.91 07/07/2024     (H) 07/07/2024    DD 0.66 (H) 06/26/2024    NTBNPI 1,484 (H) 06/26/2024    AST 18 07/01/2024    ALT 13 07/01/2024    ALKPHOS 73 07/01/2024    BILITOTAL 0.2 07/01/2024    INR 1.20 (H) 07/01/2024

## 2024-07-08 ENCOUNTER — TELEPHONE (OUTPATIENT)
Dept: OBGYN | Facility: CLINIC | Age: 34
End: 2024-07-08
Payer: MEDICARE

## 2024-07-08 ENCOUNTER — DOCUMENTATION ONLY (OUTPATIENT)
Dept: MEDSURG UNIT | Facility: CLINIC | Age: 34
End: 2024-07-08

## 2024-07-08 DIAGNOSIS — K50.919 CROHN'S DISEASE WITH COMPLICATION, UNSPECIFIED GASTROINTESTINAL TRACT LOCATION (H): ICD-10-CM

## 2024-07-08 DIAGNOSIS — K12.1 MOUTH ULCERS: Primary | ICD-10-CM

## 2024-07-08 DIAGNOSIS — N76.6 VULVAR ULCER: ICD-10-CM

## 2024-07-08 LAB
ALBUMIN SERPL BCG-MCNC: 3 G/DL (ref 3.5–5.2)
ALP SERPL-CCNC: 140 U/L (ref 40–150)
ALT SERPL W P-5'-P-CCNC: 39 U/L (ref 0–50)
ANION GAP SERPL CALCULATED.3IONS-SCNC: 5 MMOL/L (ref 7–15)
AST SERPL W P-5'-P-CCNC: 16 U/L (ref 0–45)
BASOPHILS # BLD AUTO: 0 10E3/UL (ref 0–0.2)
BASOPHILS NFR BLD AUTO: 1 %
BILIRUB SERPL-MCNC: 0.2 MG/DL
BUN SERPL-MCNC: 19.5 MG/DL (ref 6–20)
CALCIUM SERPL-MCNC: 9.4 MG/DL (ref 8.6–10)
CHLORIDE SERPL-SCNC: 107 MMOL/L (ref 98–107)
CREAT SERPL-MCNC: 0.84 MG/DL (ref 0.51–0.95)
DEPRECATED HCO3 PLAS-SCNC: 27 MMOL/L (ref 22–29)
EGFRCR SERPLBLD CKD-EPI 2021: >90 ML/MIN/1.73M2
EOSINOPHIL # BLD AUTO: 0.1 10E3/UL (ref 0–0.7)
EOSINOPHIL NFR BLD AUTO: 4 %
ERYTHROCYTE [DISTWIDTH] IN BLOOD BY AUTOMATED COUNT: 15 % (ref 10–15)
GLUCOSE SERPL-MCNC: 103 MG/DL (ref 70–99)
HCT VFR BLD AUTO: 27.7 % (ref 35–47)
HGB BLD-MCNC: 8.7 G/DL (ref 11.7–15.7)
IMM GRANULOCYTES # BLD: 0 10E3/UL
IMM GRANULOCYTES NFR BLD: 2 %
INR PPP: 1.07 (ref 0.85–1.15)
LYMPHOCYTES # BLD AUTO: 0.3 10E3/UL (ref 0.8–5.3)
LYMPHOCYTES NFR BLD AUTO: 18 %
MAGNESIUM SERPL-MCNC: 1.5 MG/DL (ref 1.7–2.3)
MAGNESIUM SERPL-MCNC: 2.5 MG/DL (ref 1.7–2.3)
MCH RBC QN AUTO: 31.2 PG (ref 26.5–33)
MCHC RBC AUTO-ENTMCNC: 31.4 G/DL (ref 31.5–36.5)
MCV RBC AUTO: 99 FL (ref 78–100)
MONOCYTES # BLD AUTO: 0.2 10E3/UL (ref 0–1.3)
MONOCYTES NFR BLD AUTO: 12 %
MYELOPEROXIDASE AB SER IA-ACNC: 0.3 U/ML
MYELOPEROXIDASE AB SER IA-ACNC: NEGATIVE
NEUTROPHILS # BLD AUTO: 1.1 10E3/UL (ref 1.6–8.3)
NEUTROPHILS NFR BLD AUTO: 63 %
NRBC # BLD AUTO: 0 10E3/UL
NRBC BLD AUTO-RTO: 0 /100
PHOSPHATE SERPL-MCNC: 2.2 MG/DL (ref 2.5–4.5)
PHOSPHATE SERPL-MCNC: 3.6 MG/DL (ref 2.5–4.5)
PLATELET # BLD AUTO: 163 10E3/UL (ref 150–450)
POTASSIUM SERPL-SCNC: 4.6 MMOL/L (ref 3.4–5.3)
PREALB SERPL-MCNC: 24.4 MG/DL (ref 20–40)
PROT SERPL-MCNC: 5.6 G/DL (ref 6.4–8.3)
PROTEINASE3 AB SER IA-ACNC: <1 U/ML
PROTEINASE3 AB SER IA-ACNC: NEGATIVE
RBC # BLD AUTO: 2.79 10E6/UL (ref 3.8–5.2)
SODIUM SERPL-SCNC: 139 MMOL/L (ref 135–145)
TACROLIMUS BLD-MCNC: 6.2 UG/L (ref 5–15)
TME LAST DOSE: NORMAL H
TME LAST DOSE: NORMAL H
TPMT BLD-CCNC: 25.6 U/ML
WBC # BLD AUTO: 1.7 10E3/UL (ref 4–11)

## 2024-07-08 PROCEDURE — 99233 SBSQ HOSP IP/OBS HIGH 50: CPT | Mod: 24

## 2024-07-08 PROCEDURE — 250N000011 HC RX IP 250 OP 636: Mod: JZ

## 2024-07-08 PROCEDURE — 250N000013 HC RX MED GY IP 250 OP 250 PS 637

## 2024-07-08 PROCEDURE — 84134 ASSAY OF PREALBUMIN: CPT

## 2024-07-08 PROCEDURE — 250N000013 HC RX MED GY IP 250 OP 250 PS 637: Performed by: NURSE PRACTITIONER

## 2024-07-08 PROCEDURE — 250N000011 HC RX IP 250 OP 636: Performed by: PHYSICIAN ASSISTANT

## 2024-07-08 PROCEDURE — 258N000003 HC RX IP 258 OP 636: Performed by: STUDENT IN AN ORGANIZED HEALTH CARE EDUCATION/TRAINING PROGRAM

## 2024-07-08 PROCEDURE — 250N000013 HC RX MED GY IP 250 OP 250 PS 637: Performed by: STUDENT IN AN ORGANIZED HEALTH CARE EDUCATION/TRAINING PROGRAM

## 2024-07-08 PROCEDURE — 80197 ASSAY OF TACROLIMUS: CPT

## 2024-07-08 PROCEDURE — 84100 ASSAY OF PHOSPHORUS: CPT

## 2024-07-08 PROCEDURE — 83735 ASSAY OF MAGNESIUM: CPT

## 2024-07-08 PROCEDURE — 82784 ASSAY IGA/IGD/IGG/IGM EACH: CPT | Performed by: NURSE PRACTITIONER

## 2024-07-08 PROCEDURE — 85610 PROTHROMBIN TIME: CPT

## 2024-07-08 PROCEDURE — 250N000009 HC RX 250: Performed by: STUDENT IN AN ORGANIZED HEALTH CARE EDUCATION/TRAINING PROGRAM

## 2024-07-08 PROCEDURE — 250N000012 HC RX MED GY IP 250 OP 636 PS 637: Performed by: PHYSICIAN ASSISTANT

## 2024-07-08 PROCEDURE — 250N000013 HC RX MED GY IP 250 OP 250 PS 637: Performed by: PHYSICIAN ASSISTANT

## 2024-07-08 PROCEDURE — 250N000011 HC RX IP 250 OP 636: Performed by: STUDENT IN AN ORGANIZED HEALTH CARE EDUCATION/TRAINING PROGRAM

## 2024-07-08 PROCEDURE — 80053 COMPREHEN METABOLIC PANEL: CPT

## 2024-07-08 PROCEDURE — 84100 ASSAY OF PHOSPHORUS: CPT | Performed by: NURSE PRACTITIONER

## 2024-07-08 PROCEDURE — 36415 COLL VENOUS BLD VENIPUNCTURE: CPT

## 2024-07-08 PROCEDURE — 85025 COMPLETE CBC W/AUTO DIFF WBC: CPT | Performed by: NURSE PRACTITIONER

## 2024-07-08 PROCEDURE — 120N000011 HC R&B TRANSPLANT UMMC

## 2024-07-08 PROCEDURE — 83735 ASSAY OF MAGNESIUM: CPT | Performed by: NURSE PRACTITIONER

## 2024-07-08 PROCEDURE — 250N000013 HC RX MED GY IP 250 OP 250 PS 637: Performed by: TRANSPLANT SURGERY

## 2024-07-08 PROCEDURE — 36415 COLL VENOUS BLD VENIPUNCTURE: CPT | Performed by: NURSE PRACTITIONER

## 2024-07-08 PROCEDURE — 99231 SBSQ HOSP IP/OBS SF/LOW 25: CPT | Mod: 24 | Performed by: STUDENT IN AN ORGANIZED HEALTH CARE EDUCATION/TRAINING PROGRAM

## 2024-07-08 PROCEDURE — 87040 BLOOD CULTURE FOR BACTERIA: CPT | Performed by: NURSE PRACTITIONER

## 2024-07-08 PROCEDURE — 250N000009 HC RX 250

## 2024-07-08 RX ORDER — OXYCODONE HCL 5 MG/5 ML
5 SOLUTION, ORAL ORAL EVERY 6 HOURS PRN
Status: DISCONTINUED | OUTPATIENT
Start: 2024-07-08 | End: 2024-07-09

## 2024-07-08 RX ORDER — MAGNESIUM SULFATE HEPTAHYDRATE 40 MG/ML
4 INJECTION, SOLUTION INTRAVENOUS ONCE
Status: COMPLETED | OUTPATIENT
Start: 2024-07-08 | End: 2024-07-08

## 2024-07-08 RX ADMIN — SUCRALFATE 1 G: 1 SUSPENSION ORAL at 09:51

## 2024-07-08 RX ADMIN — ASPIRIN 81 MG CHEWABLE TABLET 81 MG: 81 TABLET CHEWABLE at 08:43

## 2024-07-08 RX ADMIN — Medication 5 ML: at 14:08

## 2024-07-08 RX ADMIN — SUCRALFATE 1 G: 1 SUSPENSION ORAL at 06:41

## 2024-07-08 RX ADMIN — Medication 15 ML: at 11:16

## 2024-07-08 RX ADMIN — Medication 100 MG: at 08:39

## 2024-07-08 RX ADMIN — FAMOTIDINE 40 MG: 20 TABLET ORAL at 19:47

## 2024-07-08 RX ADMIN — Medication 5 ML: at 22:15

## 2024-07-08 RX ADMIN — Medication 5 ML: at 04:10

## 2024-07-08 RX ADMIN — METHYLPREDNISOLONE SODIUM SUCCINATE 40 MG: 40 INJECTION, POWDER, FOR SOLUTION INTRAMUSCULAR; INTRAVENOUS at 08:43

## 2024-07-08 RX ADMIN — Medication 5 ML: at 00:33

## 2024-07-08 RX ADMIN — SUCRALFATE 1 G: 1 SUSPENSION ORAL at 15:48

## 2024-07-08 RX ADMIN — TACROLIMUS 3 MG: 1 CAPSULE ORAL at 08:43

## 2024-07-08 RX ADMIN — TACROLIMUS 3 MG: 1 CAPSULE ORAL at 17:49

## 2024-07-08 RX ADMIN — Medication 5 ML: at 08:35

## 2024-07-08 RX ADMIN — ACETAMINOPHEN 650 MG: 325 TABLET, FILM COATED ORAL at 22:14

## 2024-07-08 RX ADMIN — POTASSIUM & SODIUM PHOSPHATES POWDER PACK 280-160-250 MG 1 PACKET: 280-160-250 PACK at 08:43

## 2024-07-08 RX ADMIN — ACETAMINOPHEN 650 MG: 325 TABLET, FILM COATED ORAL at 15:48

## 2024-07-08 RX ADMIN — SUCRALFATE 1 G: 1 SUSPENSION ORAL at 22:14

## 2024-07-08 RX ADMIN — EPOETIN ALFA-EPBX 4000 UNITS: 10000 INJECTION, SOLUTION INTRAVENOUS; SUBCUTANEOUS at 17:49

## 2024-07-08 RX ADMIN — Medication 5 ML: at 19:53

## 2024-07-08 RX ADMIN — Medication 5 ML: at 06:21

## 2024-07-08 RX ADMIN — VALGANCICLOVIR HYDROCHLORIDE 450 MG: 50 POWDER, FOR SOLUTION ORAL at 08:39

## 2024-07-08 RX ADMIN — SODIUM PHOSPHATE, MONOBASIC, MONOHYDRATE AND SODIUM PHOSPHATE, DIBASIC, ANHYDROUS 15 MMOL: 142; 276 INJECTION, SOLUTION INTRAVENOUS at 11:16

## 2024-07-08 RX ADMIN — OXYCODONE HYDROCHLORIDE 5 MG: 5 SOLUTION ORAL at 09:51

## 2024-07-08 RX ADMIN — Medication 1 CAPSULE: at 08:43

## 2024-07-08 RX ADMIN — SULFAMETHOXAZOLE AND TRIMETHOPRIM 1 TABLET: 400; 80 TABLET ORAL at 08:43

## 2024-07-08 RX ADMIN — FAMOTIDINE 40 MG: 20 TABLET ORAL at 08:43

## 2024-07-08 RX ADMIN — SODIUM PHOSPHATE, MONOBASIC, MONOHYDRATE AND SODIUM PHOSPHATE, DIBASIC, ANHYDROUS 15 MMOL: 142; 276 INJECTION, SOLUTION INTRAVENOUS at 15:48

## 2024-07-08 RX ADMIN — ACETAMINOPHEN 650 MG: 325 TABLET, FILM COATED ORAL at 09:51

## 2024-07-08 RX ADMIN — Medication 5 ML: at 17:49

## 2024-07-08 RX ADMIN — Medication 5 ML: at 15:50

## 2024-07-08 RX ADMIN — OXYCODONE HYDROCHLORIDE 5 MG: 5 SOLUTION ORAL at 23:15

## 2024-07-08 RX ADMIN — MAGNESIUM SULFATE IN WATER 4 G: 40 INJECTION, SOLUTION INTRAVENOUS at 08:47

## 2024-07-08 RX ADMIN — MAGNESIUM OXIDE TAB 400 MG (241.3 MG ELEMENTAL MG) 400 MG: 400 (241.3 MG) TAB at 11:16

## 2024-07-08 RX ADMIN — PAROXETINE 30 MG: 30 TABLET, FILM COATED ORAL at 19:47

## 2024-07-08 RX ADMIN — Medication 1 CAPSULE: at 19:47

## 2024-07-08 ASSESSMENT — ACTIVITIES OF DAILY LIVING (ADL)
ADLS_ACUITY_SCORE: 25

## 2024-07-08 NOTE — PROGRESS NOTES
[facility]  Transplant Nephrology Progress Note  Date of Admission:  6/27/2024  Today's Date: 07/08/2024  Requesting physician: Sid Desai MD    Recommendations:   - Give epoetin 4000 units subcutaneous weekly (ordered).  - Leave on prednisone 5 mg daily after done with higher dose steroids.  - Agree with sodium phosphate 15 mmol IV once.  - Agree with magnesium sulfate 4 g IV once.  - Follow-up with anemia services outpatient after discharge.    Assessment & Plan   # DDKT: CKD Stage 3 - Stable serum creatinine, at baseline 0.9, Cystatin C 6/29=47.   No indication for dialysis.    Pediatric en-bloc kidneys   - Baseline Creatinine: ~ 0.9-1.1   - Proteinuria: Normal (<0.2 grams)   - DSA Hx: No DSA   - Last cPRA: 9%   - BK Viremia: No   - Kidney Tx Biopsy Hx: No biopsy history.      # Immunosuppression Prior to Admission: Tacrolimus immediate release (goal 8-10) and Mycophenolic acid (dose 540 mg every 12 hours)   - Present Immunosuppression: Tacrolimus immediate release (goal 8-10), Azathioprine (dose 100 mg daily), and Prednisone (dose 5 mg daily)   - Induction with Recent Transplant:  High Intensity Protocol   - Continue with intensive monitoring of immunosuppression for efficacy and toxicity.   - Historical Changes in Immunosuppression: Previously stopped mycophenolate due to oral and vaginal ulcers, but endoscopy did not indicate mycophenolic acid toxicity.  Changed to azathioprine d/t concern for Bechet's disease.     - Patient is in an immunosuppressed state and will continue to monitor for efficacy and toxicity of immunosuppression medications.   - 07/08/24  Tacrolimus level: 6.2 (~ 11.2 hour trough)   - Changes:  Transplant surgery team increasing tacrolimus dose to reach goal.  Leave on prednisone 5 mg daily after done with methylprednisolone.      # Infection Prevention:      - PJP: Sulfa/TMP (Bactrim)  - CMV: Valganciclovir (Valcyte); Patient is CMV IgG Ab  discordant (D+/R-) and will continue on Valcyte x 6 months, then check CMV PCR monthly until 12 months post transplant.   - CMV IgG Ab High Risk Discordance (D+/R-): Yes  - EBV IgG Ab High Risk Discordance (D+/R-): No    # Blood Pressure: Controlled;  Goal BP: < 140/90   - Changes: No    # Anemia in Chronic Renal Disease: Hgb: Stable, low      JUNG: No. Give epoetin 4000 units subcutaneous weekly   - Iron studies: Replete    # Leukopenia: Stable, low Likely medication related.  Negative CMV PCR.    # Mineral Bone Disorder:    - Secondary renal hyperparathyroidism; PTH level: Minimally elevated ( pg/ml)        On treatment: None  - Vitamin D; level: Normal         On supplement: No  - Calcium; level: Normal          On supplement: No  - Phosphorus; level: Low         On supplement: No. Agree with sodium phosphate 15 mmol IV once.    # Electrolytes:   - Potassium; level: Normal         On supplement: No  - Magnesium; level: Normal        On supplement: Yes, magnesium oxide 400 mg PO daily. Agree with magnesium sulfate 4 g IV once.  - Bicarbonate; level: Normal         On supplement: No  - Sodium; level: Normal    # Concern for Behcet's   # Oral and Genital Ulcers  # Severe Malnutrition  # GERD/Dysphagia/Esophagitis: Patient with apparent severe heartburn/GERD symptoms, worsening over the last week or so prior to admission.  Patient underwent EGD 5/24/24 which was mostly unremarkable with only finding of minimal non-specific chronic inflammation in the stomach (negative H. pylori), but jessee esophageal findings.  In addition, recently had significant oral ulcers.  Now with patient unable to eat and minimal fluid intake.  She hasn't been able to take her medications in the last day due to pain with swallowing.  Wt down with decreased PO intake.  Patient is on famotidine, but unable to use PPI due to probable underlying kidney disease (interstitial nephritis) from previous PPI use. Continue Sucralfate.    - EGD  6/28/25  Esophageal ulcer with no bleeding and no stigmata of recent bleeding. Biopsies: HSV and CMV immunostains are negative. PAS stains are negative for fungal organisms.                          - LA Grade B esophagitis with no bleeding. Biopsied.                          - Z-line, 32 cm from the incisors.                          - Friable gastric mucosa.                          - Normal examined duodenum.  - HSV and CMV immunostains are negative. PAS stains are negative for fungal organisms.    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 If ANC <500, recommend to decrease AZA to 75mg if neutropenic and WBC less than 2  -  f/up TPMT activity pending   - negative infectious w/up so far (EBV swab pending - vulvar sample) & Dermatopathology review 7/8     Reference  07/02/24    MPO Marielle IgG  <3.5 U/mL 0.3   Proteinase 3 Marielle IgG <2.0 U/mL <1.0       # Other Significant PMH:   - Crohn's Disease: Appears to be stable.  Previously was on ustekinumab prior to kidney transplant, but hasn't restarted it due to ongoing oral and vaginal ulcers.  Does occasionally alternate between diarrhea and constipation, but no issues at this time.  Followed by GI and [facility].   - Cardiac/Vascular Disease Risk Factors: Dilated ascending aorta (3.8 cm)  on 2024 ECHO (stable from 2020).     # Transplant History:  Etiology of Kidney Failure: Interstitial nephritis  Tx: DDKT - Pediatric en bloc  Transplant: 4/17/2024 (Kidney)  Significant transplant-related complications:  Oral and vaginal ulcers, esophagitis     Recommendations were communicated to the  primary team via this note.    LAURITA Hurt CNP  Transplant Nephrology  Contact information available via [facility] Paging/Directory      Interval History  Ms. Zamora's creatinine is 0.84 (07/08 0542); Stable from 0.91 yesterday.  Estimated Creatinine Clearance: 75.5 mL/min (based on SCr of 0.84 mg/dL).   I/O last 3 completed shifts:  In: 2260 [NG/GT:380; IV Piggyback:1000]  Out: 1500 [Urine:1500]   Other significant labs/tests/vitals: SBP 110s - 120s overnight. Afebrile  No acute events overnight.  No chest pain or shortness of breath.  No leg swelling.  No nausea or vomiting.  Pt continues to have esophagus pain.     Reference  07/02/24    MPO Marielle IgG  <3.5 U/mL 0.3   Proteinase 3 Marielle IgG <2.0 U/mL <1.0         Review of Systems   4 point ROS was obtained and negative except as noted in the Interval History.    MEDICATIONS:  Current Facility-Administered Medications   Medication Dose Route Frequency Provider Last Rate Last Admin    acetaminophen (TYLENOL) tablet 650 mg  650 mg Oral Q6H Radha Mariscal MD   650 mg at 07/07/24 2209    aspirin (ASA) chewable tablet 81 mg  81 mg Oral Daily Radha Mariscal MD   81 mg at 07/07/24 0853    azaTHIOprine (IMURAN) suspension 100 mg  100 mg Oral Daily Sid Desai MD   100 mg at 07/07/24 0851    estradiol (ESTRACE) cream 0.5 g  0.5 g Vaginal Daily Frida Davis MD        famotidine (PEPCID) tablet 40 mg  40 mg Oral BID Radha Mariscal MD   40 mg at 07/07/24 2006    lactobacillus rhamnosus (GG) (CULTURELL) capsule 1 capsule  1 capsule Oral BID Rosita French PA-C   1 capsule at 07/07/24 2005    magnesium oxide (MAG-OX) tablet 400 mg  400 mg Oral Daily Rosita French PA-C   400 mg at 07/07/24 1332    magnesium sulfate 4 g in 100 mL sterile water intermittent infusion  4 g Intravenous Once Jesus Jenkins MD        methylPREDNISolone sodium succinate (SOLU-MEDROL) injection 40 mg  40 mg Intravenous Once Rosita French PA-C        multivitamins w/minerals liquid 15  mL  15 mL Per Feeding Tube Daily Mobile, Holli Osmananda, APRN CNP   15 mL at 24 1332    PARoxetine (PAXIL) tablet 30 mg  30 mg Oral QPM Radha Mariscal MD   30 mg at 24    potassium & sodium phosphates (NEUTRA-PHOS) Packet 1 packet  1 packet Oral TID Rosita French PA-C   1 packet at 24    senna-docusate (SENOKOT-S/PERICOLACE) 8.6-50 MG per tablet 1 tablet  1 tablet Oral BID Radha Mariscal MD   1 tablet at 24 0924    silver sulfADIAZINE (SILVADENE) 1 % cream   Topical BID Radha Mariscal MD   Given at 24 2030    sodium chloride (PF) 0.9% PF flush 3 mL  3 mL Intracatheter Q8H Sid Desai MD   3 mL at 24 2210    sodium chloride (PF) 0.9% PF flush 3 mL  3 mL Intracatheter Q8H Radha Mariscal MD   3 mL at 24 2258    sodium chloride (PF) 0.9% PF flush 3 mL  3 mL Intracatheter Q8H Radha Mariscal MD   3 mL at 24 0912    sodium phosphate 15 mmol in sodium chloride 0.9 % 250 mL intermittent infusion  15 mmol Intravenous Q4H Jesus Jenkins MD        sucralfate (CARAFATE) suspension 1 g  1 g Oral 4x Daily AC & HS Radha Mariscal MD   1 g at 24 0641    sulfamethoxazole-trimethoprim (BACTRIM) 400-80 MG per tablet 1 tablet  1 tablet Oral Daily Jesus Jenkins MD   1 tablet at 24 0853    tacrolimus (GENERIC EQUIVALENT) capsule 3 mg  3 mg Oral BID IS Rosita French PA-C   3 mg at 24 1820    triamcinolone (KENALOG) 0.1 % lotion   Topical BID Jesus Jenkins MD        valGANciclovir (VALCYTE) solution 450 mg  450 mg Oral Daily Sid Desai MD   450 mg at 24 0851     Current Facility-Administered Medications   Medication Dose Route Frequency Provider Last Rate Last Admin    dextrose 10% infusion   Intravenous Continuous PRN Radha Mariscal MD           Physical Exam   Temp  Av.9  F (36.6  C)  Min: 97.5  F (36.4  C)  Max: 98.3  F (36.8  C)      Pulse  Av.9  Min: 76  Max: 118 Resp  Av  Min: 16  Max: 20  SpO2  Av.1 %  Min: 93 %  Max: 100  "%     /87 (BP Location: Left arm)   Pulse 80   Temp 98.4  F (36.9  C) (Oral)   Resp 16   Ht 1.575 m (5' 2\")   Wt 50.7 kg (111 lb 11.2 oz)   SpO2 98%   BMI 20.43 kg/m      Admit       GENERAL APPEARANCE: alert and mild distress due to pain  RESP: lungs clear to auscultation - no rales, rhonchi or wheezes  CV: regular rhythm, normal rate, no rub, no murmur  EDEMA: no LE edema bilaterally  ABDOMEN: soft, nondistended, nontender, bowel sounds normal  MS: extremities normal - no gross deformities noted, no evidence of inflammation in joints, no muscle tenderness  SKIN: no rash  DIALYSIS ACCESS: right arm fistula +bruit/+thrill    Data   All labs reviewed by me.  CMP  Recent Labs   Lab 07/08/24 0542 07/07/24  0625 07/06/24  1843 07/06/24  0631 07/05/24  0654 07/05/24  0635    137 137 136  --  137   POTASSIUM 4.6 4.4 5.0 4.7  --  4.5   CHLORIDE 107 105 105 106  --  107   CO2 27 25 23 19*  --  23   ANIONGAP 5* 7 9 11  --  7   * 107* 135* 118*   < > 99   BUN 19.5 24.3* 27.4* 24.8*  --  26.3*   CR 0.84 0.91 0.92 0.88  --  0.91   GFRESTIMATED >90 84 83 88  --  84   ROBLES 9.4 9.6 9.8 10.1*  --  9.7   MAG 1.5* 1.5*  --  1.7  --  1.8   PHOS 2.2* 2.2*  --  3.6  --  2.6   PROTTOTAL 5.6*  --   --   --   --   --    ALBUMIN 3.0*  --   --   --   --   --    BILITOTAL 0.2  --   --   --   --   --    ALKPHOS 140  --   --   --   --   --    AST 16  --   --   --   --   --    ALT 39  --   --   --   --   --     < > = values in this interval not displayed.     CBC  Recent Labs   Lab 07/08/24  0542 07/07/24  0625 07/06/24  0631 07/05/24  0635   HGB 8.7* 8.8* 9.9* 9.0*   WBC 1.7* 2.0* 2.1* 1.8*   RBC 2.79* 2.93* 3.29* 2.98*   HCT 27.7* 28.2* 32.3* 29.4*   MCV 99 96 98 99   MCH 31.2 30.0 30.1 30.2   MCHC 31.4* 31.2* 30.7* 30.6*   RDW 15.0 14.7 14.4 14.4    180 190 174     INR  Recent Labs   Lab 07/08/24  0542   INR 1.07     ABGNo lab results found in last 7 days.   Urine Studies  Recent Labs   Lab Test 07/06/24  1841 " 06/06/24  1113 05/03/24  1600 04/17/24  1113   COLOR Yellow Yellow Light Yellow Straw   APPEARANCE Clear Clear Clear Clear   URINEGLC Negative Negative 100* 70*   URINEBILI Negative Negative Negative Negative   URINEKETONE Negative Negative Negative Negative   SG 1.015 1.028 1.014 1.004   UBLD Moderate* Small* Negative Trace*   URINEPH 7.0 5.5 5.5 8.5*   PROTEIN Negative 10* Negative 100*   NITRITE Negative Negative Negative Negative   LEUKEST Trace* Moderate* Trace* Negative   RBCU 1 2 2 2   WBCU 3 6* 4 <1     No lab results found.  PTH  Recent Labs   Lab Test 05/23/24  0949 04/22/24  0821   PTHI 131* 138*     Iron Studies  Recent Labs   Lab Test 05/23/24  0949 04/22/24  0821   IRON 77 53   * 147*   IRONSAT 38 36   PRICILA 2,181* 2,181*       IMAGING:  All imaging studies reviewed by me.

## 2024-07-08 NOTE — PROGRESS NOTES
Prior Authorization Approval    Medication: VOQUEZNA 20 MG PO TABS  PA Initiated: 7/8/2024  PA Type: Clinical    Insurance: [employer] - Phone [phone] Fax [phone]  [facility] Key / Reference #: [id number] / [id number]   Authorization Effective Dates: 7/8/2024 - 9/2/2024    Expected CoPay: $ 100.00  CoPay Card Eligible: Yes    Copay Card Applied  Member ID: [id number]  BIN: [id number]  PCN: [id number]  Group: [id number]  Expected Copay: $ 25.00  Copay Card Monthly Max Amount: $ 235  Copay Card Annual Amount: $ 2,820    Filling Pharmacy: [facility] - [address]  Comments:  Proactive PA. Please send a script to the discharge pharmacy. Copay card info added to patient's [place] pharmacy profile.      Brenda Mehta  [facility] Liaison, JOS[place] MIGUEL  Ph: [phone]  Fax: [phone]  Available on Vocera (learn more here)

## 2024-07-08 NOTE — PROGRESS NOTES
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 Dermatology was consulted to assist with work up for ulcerations.   Differential for ulcerative mucocutaneous disorders include but are not limited to infectious causes such as EBV which returned positive on vulvar tissue, mucocutaneous manifestations of her IBD (Crohn's) has overall been well controlled but these ulcerations could represent that in the mouth and vulvar region - dermpath review remains pending, additionally PG (pyoderma grangrenosum) in Crohn's could be considered. Otherwise recurrent oral and genital aphthae in the absence of Behçet disease could represent complex aphthosis, Behçet's or something like mucous membrane pemphigoid which would require oral biopsy per OMFS given location (H&E and dif). Methotrexate induced mucocutaneous disease (now stopped methotrexate and hasn't been on for years).   The patient had a vulvar biopsy obtained during debridement. We have placed an order to review these with our Dermatopathology team for diagnostic clarity. In the meantime, recommend re-involvement of infectious disease given EBV positivity on vulvar tissue in the setting of IC status and painful vulvar ulceration,  plan for treatment as below. 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 Dermatology will continue to follow.   - EBV PCR pending   - EBV + vulvar tissue sample   - CMV negative   - HSV and VZV swabs negative  - fungal cultures negative   - Treponema negative   - re-involve ID consult given immunosuppression and + EBV on vulvar sample    - hold off on steroids until ID comments on this PCR + tissue sample   - BRAVO and ANCA negative   - MPO negative and proteinase 3 negative   - could consider mucosal biopsy per OMFS team of the ulceration, paralesional for DIF (H&E and dif) - seems less fruitful at this time with EBV positivity as above   - PRN triamcinolone topically BID to vulvar ulcer   - PRN dexa swish and spit if oral ulcerations become painful or more bothersome   - order for Dermatopathologists review in place 24      Thank you for the dermatology consultation. Please do not hesitate to contact the dermatology resident/faculty on call for any additional questions or concerns. We will continue to follow.      Patient seen and evaluated with attending physician, Dr. Chong Brito, DO   Medicine-Dermatology PGY-3  I, Tamy Schwarz MD, reviewed the medical record and photos of this patient with the resident and agree with the resident s findings and plan of care as documented in the resident s note.     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 Ira Zamora is a 34 year old female with  history of ESRD due to interstitial nephritis status-post  donor kidney transplant (2024) on  immunosuppression (tacrolimus, mycophenolic acid until 7/4/2024 with switch to Azathioprine on 7/4/2024), Crohn's disease on Stelara, and recent oral/vaginal ulcers thought to be due to mycophenolate who was admitted for odynophagia and vulvar ulcer. Dermatology was consulted to assist with work up for ulcerations.     Patient reports that her vulvar pain is much improved after debridement and remains stable. She has not been using the topical steroids. Her esophageal ulcers remain the most painful and bothersome. Her oral pain is very manageable with the mouthwash. She does not report any other new symptoms, concerns or complaints today.     Past Medical History:   Past Medical History:   Diagnosis Date    Anemia in chronic kidney disease     Anxiety 2007    Ascending aorta dilation (H24) 2020 2020: ascending aorta 3.8 cm, aortic sinus 3.6 cm. 2024 Echo: Ao root diam: 3.2 cm, asc Aorta Diam: 3.8 cm    ASCUS with positive high risk HPV cervical 2017    ASCUS with positive high risk HPV cervical 07/01/2017    Behcet's syndrome (H) 06/2024    Crohn's disease (H) 2004    Esophageal ulcer 06/27/2024    ESRD (end stage renal disease) on dialysis (H) 2020    GERD (gastroesophageal reflux disease)     Hidradenitis suppurativa 2015    History of blood transfusion     Hypertension     Juvenile rheumatoid arthritis (H)     Asymptomatic in adulthood    Kidney replaced by transplant 04/17/2024    En bloc. Induction w/ Thymoglobulin.    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     Past Surgical History:   Procedure Laterality Date    BIOPSY VULVA Left 7/2/2024    Procedure: BIOPSY, VULVA;  Surgeon: Roxanne Montenegro MD;  Location: [facility] OR    ESOPHAGOSCOPY, GASTROSCOPY, DUODENOSCOPY (EGD), COMBINED N/A 6/28/2024    Procedure: ESOPHAGOGASTRODUODENOSCOPY, WITH BIOPSY;  Surgeon: Tamy Miranda  MD Mae;  Location: [facility] GI    EXAM UNDER ANESTHESIA PELVIC N/A 2024    Procedure: EXAM UNDER ANESTHESIA, PELVIS;  Surgeon: Roxanne Montenegro MD;  Location: [facility] OR    H NEEDLE GUIDE,  KIDNEY BIOPSY      TRANSPLANT KIDNEY RECIPIENT  DONOR N/A 2024    Procedure: Transplant kidney recipient  donor,bilateral uretral stent implantation;  Surgeon: Carlitos Díaz MD;  Location: [facility] OR     Social History:  Social History     Socioeconomic History    Marital status:      Spouse name: Not on file    Number of children: Not on file    Years of education: Not on file    Highest education level: Not on file   Occupational History    Not on file   Tobacco Use    Smoking status: Never     Passive exposure: Never    Smokeless tobacco: Never   Substance and Sexual Activity    Alcohol use: Not Currently     Comment: Very occasional, last drink two months ago, wine cooler    Drug use: Never    Sexual activity: Not on file   Other Topics Concern    Not on file   Social History Narrative    Not on file     Social Determinants of Health     Financial Resource Strain: Low Risk  (6/3/2024)    Received from [facility]    Financial Resource Strain     Difficulty of Paying Living Expenses: 3     Difficulty of Paying Living Expenses: Not on file   Food Insecurity: No Food Insecurity (6/3/2024)    Received from [facility]    Food Insecurity     Worried About Running Out of Food in the Last Year: 1   Transportation Needs: No Transportation Needs (6/3/2024)    Received from [facility]    Transportation Needs     Lack of Transportation (Medical): 1   Physical Activity: Not on file   Stress: Not on file   Social Connections: Socially Integrated (6/3/2024)    Received from [facility]    Social Connections     Frequency of Communication with Friends and Family: 0   Interpersonal  Safety: Not on file   Housing Stability: Low Risk  (6/3/2024)    Received from [facility]    Housing Stability     Unable to Pay for Housing in the Last Year: 1     Family History:  Family History   Problem Relation Age of Onset    Endometriosis Mother     Coronary Artery Disease Mother       Medications:  Current Facility-Administered Medications   Medication Dose Route Frequency Provider Last Rate Last Admin    acetaminophen (TYLENOL) tablet 650 mg  650 mg Oral Q6H Radha Mariscal MD   650 mg at 07/08/24 0951    alum & mag hydroxide-simethicone (MAALOX) suspension 30 mL  30 mL Oral Q4H PRN Radha Mariscal MD        aspirin (ASA) chewable tablet 81 mg  81 mg Oral Daily Radha Mariscal MD   81 mg at 07/08/24 0843    azaTHIOprine (IMURAN) suspension 100 mg  100 mg Oral Daily Sid Desai MD   100 mg at 07/08/24 0839    benzocaine-menthol (CHLORASEPTIC MAX) 15-10 MG lozenge 1 lozenge  1 lozenge Buccal Q1H PRN Radha Mariscal MD        bisacodyl (DULCOLAX) suppository 10 mg  10 mg Rectal Daily PRN Radha Mariscal MD        calcium carbonate (TUMS) chewable tablet 500 mg  500 mg Oral TID PRN Radha Mariscal MD   500 mg at 06/30/24 2136    dextrose 10% infusion   Intravenous Continuous PRN Radha Mariscal MD        epoetin rey-epbx (RETACRIT) injection 4,000 Units  4,000 Units Subcutaneous Weekly Jas Gutierrez APRN CNP        estradiol (ESTRACE) cream 0.5 g  0.5 g Vaginal Daily Frida Davis MD        famotidine (PEPCID) tablet 40 mg  40 mg Oral BID Radha Mariscal MD   40 mg at 07/08/24 0843    hydrOXYzine HCl (ATARAX) tablet 25 mg  25 mg Oral Q6H PRN Radha Mariscal MD   25 mg at 07/07/24 1033    Or    hydrOXYzine HCl (ATARAX) tablet 50 mg  50 mg Oral Q6H PRN Radha Mariscal MD   50 mg at 07/07/24 2004    lactobacillus rhamnosus (GG) (CULTURELL) capsule 1 capsule  1 capsule Oral BID Rosita French PA-C   1 capsule at 07/08/24 0843    lidocaine (LMX4) cream   Topical Q1H PRN Sid Desai MD         lidocaine (LMX4) cream   Topical Q1H PRN Radha Mariscal MD        lidocaine (viscous) (XYLOCAINE) 2 % solution 5 mL  5 mL Mouth/Throat Q2H PRN Radha Mariscal MD   5 mL at 07/08/24 1408    lidocaine (XYLOCAINE) 2 % external gel   Topical Q4H PRN Radha Mariscal MD   Given at 06/27/24 1619    lidocaine 1 % 0.1-1 mL  0.1-1 mL Other Q1H PRN Sid Desai MD        magnesium hydroxide (MILK OF MAGNESIA) suspension 30 mL  30 mL Oral Daily PRN Radha Mariscal MD        magnesium oxide (MAG-OX) tablet 400 mg  400 mg Oral Daily Rosita French PA-C   400 mg at 07/08/24 1116    multivitamins w/minerals liquid 15 mL  15 mL Per Feeding Tube Daily Holli Nelson, APRN CNP   15 mL at 07/08/24 1116    naloxone (NARCAN) injection 0.2 mg  0.2 mg Intravenous Q2 Min PRN Radha Mariscal MD        Or    naloxone (NARCAN) injection 0.4 mg  0.4 mg Intravenous Q2 Min PRN Radha Mariscal MD        Or    naloxone (NARCAN) injection 0.2 mg  0.2 mg Intramuscular Q2 Min PRN Radha Mariscal MD        Or    naloxone (NARCAN) injection 0.4 mg  0.4 mg Intramuscular Q2 Min PRN Radha Mariscal MD        ondansetron (ZOFRAN ODT) ODT tab 4 mg  4 mg Oral Q6H PRN Radha Mariscal MD        Or    ondansetron (ZOFRAN) injection 4 mg  4 mg Intravenous Q6H PRN Radha Mariscal MD   4 mg at 07/07/24 0904    oxyCODONE (ROXICODONE) solution 5 mg  5 mg Oral Q6H PRN Nicole Mcclendon NP        PARoxetine (PAXIL) tablet 30 mg  30 mg Oral QPM Radha Mariscal MD   30 mg at 07/07/24 2005    polyethylene glycol (MIRALAX) Packet 17 g  17 g Oral Daily PRN Radha Mariscal MD        prochlorperazine (COMPAZINE) injection 10 mg  10 mg Intravenous Q6H PRN Radah Mariscal MD        Or    prochlorperazine (COMPAZINE) tablet 10 mg  10 mg Oral Q6H PRN Radha Mariscal MD        senna-docusate (SENOKOT-S/PERICOLACE) 8.6-50 MG per tablet 1 tablet  1 tablet Oral BID Radha Mariscal MD   1 tablet at 07/03/24 0924    silver sulfADIAZINE (SILVADENE) 1 % cream   Topical BID Radha Mariscal MD    "Given at 07/02/24 2030    sodium chloride (PF) 0.9% PF flush 3 mL  3 mL Intracatheter Q8H Sid Desai MD   3 mL at 07/07/24 2210    sodium chloride (PF) 0.9% PF flush 3 mL  3 mL Intracatheter q1 min prn Sid Desai MD        sodium chloride (PF) 0.9% PF flush 3 mL  3 mL Intracatheter Q8H Radha Mariscal MD   3 mL at 07/06/24 2258    sodium chloride (PF) 0.9% PF flush 3 mL  3 mL Intracatheter q1 min prn Radha Mariscal MD        sodium chloride (PF) 0.9% PF flush 3 mL  3 mL Intracatheter Q1H PRN Radha Mariscal MD        sodium chloride (PF) 0.9% PF flush 3 mL  3 mL Intracatheter Q8H Radha Mariscal MD   3 mL at 07/08/24 0844    sodium phosphate 15 mmol in sodium chloride 0.9 % 250 mL intermittent infusion  15 mmol Intravenous Q4H Jesus Jenkins MD   15 mmol at 07/08/24 1116    sucralfate (CARAFATE) suspension 1 g  1 g Oral 4x Daily AC & HS Radha Mariscal MD   1 g at 07/08/24 0951    sulfamethoxazole-trimethoprim (BACTRIM) 400-80 MG per tablet 1 tablet  1 tablet Oral Daily Jesus Jenkins MD   1 tablet at 07/08/24 0843    tacrolimus (GENERIC EQUIVALENT) capsule 3 mg  3 mg Oral BID IS Rosita French PA-C   3 mg at 07/08/24 0843    triamcinolone (KENALOG) 0.1 % lotion   Topical BID Jesus Jenkins MD        valGANciclovir (VALCYTE) solution 450 mg  450 mg Oral Daily Sid Desai MD   450 mg at 07/08/24 0839      Allergies:  Allergies   Allergen Reactions    Amlodipine Headache and Other (See Comments)     Other Reaction(s): Unknown    Omeprazole Other (See Comments)     All PPIs per patient    Proton Pump Inhibitors Nephrotoxicity     No PPIs due to history of renal failure due to interstitial nephritis    Tegaderm Transparent Dressing (Informational Only) Blisters     Review of Systems:  As noted in HPI.     Physical exam:  /89 (BP Location: Left arm)   Pulse 84   Temp 98.3  F (36.8  C) (Oral)   Resp 16   Ht 1.575 m (5' 2\")   Wt 50.7 kg (111 lb 11.2 oz)   SpO2 94%   BMI 20.43 kg/m    GEN:This is a " well developed, well-nourished female in no acute distress, in a pleasant mood.    SKIN: Vrea type I-II. Full skin, which includes the head/face, both arms, chest, back, abdomen,both legs, genitalia and/or groin buttocks, digits and/or nails, was examined.  -  EXAM DEFERRED BY PATIENT TODAY< PHOTOS FROM INITIAL CONSULTATION     - 1 large shallow ulceration to the buccal mucosa, right mandible   - 1 large ulceration to the labia majora , appears small in size from prior photos after debridement, no surrounding redness, bulla or other skin changes noted               Laboratory:  Results for orders placed or performed during the hospital encounter of 06/27/24 (from the past 24 hour(s))   CBC with Platelets & Differential    Narrative    The following orders were created for panel order CBC with Platelets & Differential.  Procedure                               Abnormality         Status                     ---------                               -----------         ------                     CBC with platelets and d...[[id number]]  Abnormal            Final result                 Please view results for these tests on the individual orders.   Magnesium   Result Value Ref Range    Magnesium 1.5 (L) 1.7 - 2.3 mg/dL   Phosphorus   Result Value Ref Range    Phosphorus 2.2 (L) 2.5 - 4.5 mg/dL   Comprehensive metabolic panel   Result Value Ref Range    Sodium 139 135 - 145 mmol/L    Potassium 4.6 3.4 - 5.3 mmol/L    Carbon Dioxide (CO2) 27 22 - 29 mmol/L    Anion Gap 5 (L) 7 - 15 mmol/L    Urea Nitrogen 19.5 6.0 - 20.0 mg/dL    Creatinine 0.84 0.51 - 0.95 mg/dL    GFR Estimate >90 >60 mL/min/1.73m2    Calcium 9.4 8.6 - 10.0 mg/dL    Chloride 107 98 - 107 mmol/L    Glucose 103 (H) 70 - 99 mg/dL    Alkaline Phosphatase 140 40 - 150 U/L    AST 16 0 - 45 U/L    ALT 39 0 - 50 U/L    Protein Total 5.6 (L) 6.4 - 8.3 g/dL    Albumin 3.0 (L) 3.5 - 5.2 g/dL    Bilirubin Total 0.2 <=1.2 mg/dL   INR   Result Value Ref Range     INR 1.07 0.85 - 1.15   Prealbumin   Result Value Ref Range    Prealbumin 24.4 20.0 - 40.0 mg/dL   Tacrolimus by Tandem Mass Spectrometry   Result Value Ref Range    Tacrolimus by Tandem Mass Spectrometry 6.2 5.0 - 15.0 ug/L    Tacrolimus Last Dose Date      Tacrolimus Last Dose Time      Narrative    This test was developed and its performance characteristics determined by the [facility],  Special [facility]. It has not been cleared or approved by the FDA. The laboratory is regulated under CLIA as qualified to perform high-complexity testing. This test is used for clinical purposes. It should not be regarded as investigational or for research.   CBC with platelets and differential   Result Value Ref Range    WBC Count 1.7 (L) 4.0 - 11.0 10e3/uL    RBC Count 2.79 (L) 3.80 - 5.20 10e6/uL    Hemoglobin 8.7 (L) 11.7 - 15.7 g/dL    Hematocrit 27.7 (L) 35.0 - 47.0 %    MCV 99 78 - 100 fL    MCH 31.2 26.5 - 33.0 pg    MCHC 31.4 (L) 31.5 - 36.5 g/dL    RDW 15.0 10.0 - 15.0 %    Platelet Count 163 150 - 450 10e3/uL    % Neutrophils 63 %    % Lymphocytes 18 %    % Monocytes 12 %    % Eosinophils 4 %    % Basophils 1 %    % Immature Granulocytes 2 %    NRBCs per 100 WBC 0 <1 /100    Absolute Neutrophils 1.1 (L) 1.6 - 8.3 10e3/uL    Absolute Lymphocytes 0.3 (L) 0.8 - 5.3 10e3/uL    Absolute Monocytes 0.2 0.0 - 1.3 10e3/uL    Absolute Eosinophils 0.1 0.0 - 0.7 10e3/uL    Absolute Basophils 0.0 0.0 - 0.2 10e3/uL    Absolute Immature Granulocytes 0.0 <=0.4 10e3/uL    Absolute NRBCs 0.0 10e3/uL       Staff Involved:  Resident(Nicole Brito DO)/Staff(as above)

## 2024-07-08 NOTE — PROGRESS NOTES
"RHEUMATOLOGY PROGRESS NOTE     Subjective      Ira Zamora was seen and examined at bedside today. She reports improved pain of her esophagus since yesterday. Moreover, her vulvar ulcer is only somewhat painful. She has not noticed any new mouth ulcers. She reports doing well.       Objective   /89 (BP Location: Left arm)   Pulse 84   Temp 98.3  F (36.8  C) (Oral)   Resp 16   Ht 1.575 m (5' 2\")   Wt 50.7 kg (111 lb 11.2 oz)   SpO2 94%   BMI 20.43 kg/m        PHYSICAL EXAMINATION    Constitutional: Alert and oriented  Skin: Two stable 1-3 mm shallow erosions of upper inner lip,  Vulvar exam deferred. No nail pitting, rash, nodules or lesions  Eyes: white sclera  Resp: breathing comfortably on room air  Neuro: grossly nl cranial nerves  Psych: appropriate judgement, orientation, memory, affect.  MSK: Wrist, MCP, PIP, DIP were examined and found normal. No active synovitis or altered joint anatomy. Full joint ROM     Assessment & Plan      # Esophageal ulcers  # Oral Ulcers  # Vulvar ulcer  # Odynophagia  # Immunosuppression 2/2 renal transplant  # Hx Crohn's disease currently off stelara    Ira reports her pain is better controlled, with improvement of esophageal and vulvar pain. Stable, minimally-painful oral ulcers. MPO and NC-3 returned negative. Vulvar biopsy positive for EBV (resulted yesterday). Reviewed dermatology's note, and agree with their recommendation to re-involve infectious disease.     She received prednisone 10 mg oral for 2 days, prednisone 20 mg oral for 3 days then IV Solu-Medrol 40 mg for 4 days, total 9 days of steroids therefore no need for gradual tapering and can stop steroids all together given less concern for adrenal insufficiency with short courses of less than 3-week duration.    Given the risk of EBV exacerbation, would agree with dermatology recommendation to decrease steroids. We will defer dosage to transplant team, given her need for immunosuppression 2/2 renal " transplant.       Recommendations:  - Can stop steroids from rheumatology standpoint given ulcer now with positive EBV since this can explain her symptoms and ulcerations.   - Infectious disease follow up as above   - Systemic steroids per transplant team (for transplant immunosuppression)  - Pain management per primary team  - Follow re-read of vulvar bx by derm-path    Seen and staffed with Dr. Bruna Schwarz, MS4    Staff Addendum:  This patient was interviewed and examined in the presence of the medical student who was acting as a scribe. I have personally verified the history and performed the physical exam and medical decision making.I have reviewed the patients history as well as the available lab and imaging studies      Yasmany Mcfarland MD  Rheumatology attending        25 MINUTES SPENT BY ME on the date of service doing chart review, history, exam, documentation & further activities per the note.

## 2024-07-08 NOTE — DISCHARGE SUMMARY
[facility]    Discharge Summary  Transplant Surgery    Date of Admission:  6/27/2024  Date of Discharge:  7/9/2024  Discharging Provider: Nicole Mcclendon NP / Carlitos Díaz MD PhD    Discharge Diagnoses   Principal Problem:    Odynophagia  Active Problems:    Kidney replaced by transplant    Immunosuppressed status (H24)    Dehydration    Mouth ulcers    Vaginal ulcer    Leukopenia, unspecified type    Severe malnutrition (H24)    Anemia in chronic renal disease    Hypomagnesemia    Inadequate oral intake    Vulvar lesion    Ulcer of esophagus without bleeding    Other neutropenia (H24)    Procedure/Surgery Information   Procedure: Procedure(s):  EXAM UNDER ANESTHESIA, PELVIS  BIOPSY, VULVA   Surgeon(s): Surgeons and Role:     * Roxanne Montenegro MD - Primary       Non-operative procedures 6/28/24 Upper Endoscopy     History of Present Illness   Ira Zamora is a 34 year old female with PMHx of 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 Admitted with mouth ulcers, severe odynophagia, difficulty eating, drinking, and swallowing. In addition, she has a new vulvar painful soft tissue mass causing irritation while voiding and pain on walking, biopsy negative for malignancy.     Hospital Course   Ira Zamora was admitted on 6/27/2024.  The following problems were addressed during her hospitalization:    DDKT (En bloc kidneys) 4/17/24: Cr 0.8, below previous baseline 0.9-1.1.    - Stop ASA (on for vascular prophylaxis) given GI ulcer.    - Follow up with Transplant Nephrology in 2-4 weeks.     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  Maintenance: Myfortic stopped 7/4 and transitioned to azathioprine due to concern for Behcet syndrome. Current regimen:   - Azathioprine 100 mg  daily (TPMT normal)   - Tacrolimus 3 mg BID. Goal level 8-10.   - Prednisone 5 mg daily  Infection prophylaxis: viral (Valcyte x 6 months post-transplant), PJP (Bactrim on hold with leukopenia, received Pentamidine 7/9/24)    Transplant coordinator: Radha Jorge [phone]  Donor type:  DBD  DSA:  No 6/27/24  CMV:  Donor + / Recipient -  EBV:  Donor + / Recipient +     Hematology:   Leukopenia: WBC 1-2, ANC 0.8. EBV, CMV, Parvovirus, IgG pending. Bactrim on hold.    - Continue to monitor CBC with diff. Give Neupogen PRN for ANC < 0.5 (therapy plan ordered).    - Follow up with Transplant ID as scheduled on 7/24/24.   Anemia of chronic disease: Hgb 8-9, stable. EPO per Nephrology while inpatient.    - Follow up with anemia services on discharge.      GI/Nutrition:   Severe malnutrition in the context of acute illness c/b Refeeding: Nutrition consulted. NJ placed and TF goal rate. FWF increased to ensure adequate hydration. Electrolytes now improved.    - Continue TF with FWF. Plan for cycling provided on discharge.   Odynophagia, severe: Secondary to oral and esophageal ulcers.    - Continue topical lidocaine solution and oxycodone (small prescription provided, pt encouraged to wean) PRN.  Esophageal ulcer: Single large cratered ulcer noted on EGD on 6/28. Pathology negative for malignancy; HSV, CMV, and fungal stains negative; no signs of MMF toxicity.    - Continue H2 blocker x 2-3 months.    - PPI contraindicated due to history of ESRD from interstitial nephritis. GI recommending Voquezna as alternative, prescription provided on discharge.    - Follow-up in outpatient in [facility] Dr. Yosvany Terry for ongoing management of Crohn's Disease.      Fluid/Electrolytes:   Hypomagnesemia: Continue PTA Mag-Ox 800 mg daily.     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 MPO/MN-3 antibodies negative. Received steroids and transitioned to azathioprine as above.    - Follow up  with Rheumatology as scheduled 7/24/24.     /GYN:   Positive urine culture: UCx with 50-100k Staph aureus. Patient asymptomatic. Discussed with Transplant ID, plan not to treat for now. BCx in process.   - Follow up with Transplant ID as above on 7/24/24.   Ulceration, left labia: OB/GYN consulted. EUA and biopsy on 7/2. Initial path negative for dysplasia or malignancy. Positive for EBV, though ID does not think this is the cause of ulceration.    - Derm-path to re-read biopsy.    - Continue topical triamcinolone BID PRN.    - Follow up in [facility]GYN clinic in 2-4 weeks.    - Follow up with Dermatology in clinic 7/18/24.          Discharge Disposition   Discharged to home   Condition at discharge: Stable    Pending Results   These results will be followed up by Transplant team  Unresulted Labs Ordered in the Past 30 Days of this Admission       Date and Time Order Name Status Description    7/9/2024 11:27 AM IgG In process     7/9/2024  9:36 AM Cytomegalovirus DNA by PCR, Quantitative In process     7/9/2024  9:32 AM Parvovirus B19 DNA PCR In process     7/8/2024 11:30 PM Jake Barr Virus Quantitative PCR, Plasma In process     7/8/2024 11:30 PM Tacrolimus by Tandem Mass Spectrometry In process     7/8/2024  1:30 PM Blood Culture Hand, Right Preliminary     7/8/2024  1:30 PM Blood Culture Hand, Left Preliminary     7/2/2024 12:55 PM Fungal or Yeast Culture Routine Preliminary     7/2/2024 12:55 PM Acid-Fast Bacilli Culture and Stain In process           Final pathology results:   Case Report   Surgical Pathology Report                         Case: [id number]                                   Authorizing Provider:  Tamy Miranda         Collected:           06/28/2024 10:43 AM                                  MD Mae                                                                 Ordering Location:     [facility]          Received:            06/28/2024 11:10 AM                                   Endoscopy                                                                     Pathologist:           Stacia Mullen MD                                                   Specimens:   A) - Esophagus, Distal, distal esophageal biopsies                                                  B) - Esophagus, esophageal ulcer CENTER biopsies                                                    C) - Esophagus, esophageal ulcer EDGE biopsies                                            Final Diagnosis   A. Esophagus, distal, endoscopic biopsies:  - Gastroesophageal junction mucosa without intestinal metaplasia or significant inflammation  - No granulomas present  - Negative for dysplasia or malignancy     B. Esophagus, ulcer, center, endoscopic biopsies:  - Ulcer, with associated granulation tissue, fibrinopurulent debris, acute and chronic inflammation (see comment and microscopic description)  - No granulomas present  - No intact epithelium identified  - Negative for dysplasia or malignancy     C. Esophagus, ulcer, edge, endoscopic biopsies:  - Ulcer, with associated granulation tissue, fibrinopurulent debris, acute and chronic inflammation (see comment and microscopic description)  - No granulomas present  - Occasional fragments of intact squamous epithelium, with scant acute and chronic inflammation  - Negative for dysplasia or malignancy     Case Report   Surgical Pathology Report                         Case: [id number]                                   Authorizing Provider:  Roxanne Montenegro MD       Collected:           07/02/2024 12:38 PM           Ordering Location:      MAIN OR                 Received:            07/02/2024 01:17 PM           Pathologist:           Debbi aYng MD                                                     Specimen:    Vulva, Left Labia Majora                                                                  Final Diagnosis   Vulva, left labia majora, biopsy:  -Squamous  epithelium with surface ulceration, granulation tissue and reactive changes  -Negative for dysplasia or malignancy       Primary Care Physician   BRIDGET PARKS    Physical Exam   Temp: 98  F (36.7  C) Temp src: Oral BP: (!) 124/90 Pulse: 98   Resp: 16 SpO2: 99 % O2 Device: None (Room air)    Vitals:    07/07/24 0546 07/08/24 0638 07/09/24 1237   Weight: 49.8 kg (109 lb 12.8 oz) 50.7 kg (111 lb 11.2 oz) 50.2 kg (110 lb 11.2 oz)     Vital Signs with Ranges  Temp:  [97.9  F (36.6  C)-98.2  F (36.8  C)] 98  F (36.7  C)  Pulse:  [87-98] 98  Resp:  [16-18] 16  BP: (112-125)/(77-90) 124/90  SpO2:  [97 %-99 %] 99 %  I/O last 3 completed shifts:  In: 1580 [P.O.:200; NG/GT:540]  Out: 1550 [Urine:1550]    Constitutional: resting comfortably in bed  Eyes: EOMI  ENT: NJ tube in nare  Respiratory: unlabored on RA  Cardiovascular: perfused  GI: abdomen soft, non-distended  Genitourinary: no Mcintosh present  Skin: warm, dry  Musculoskeletal: no edema noted  Neurologic: A&OX4  Neuropsychiatric: calm, pleasant    Consultations This Hospital Stay   GI LUMINAL ADULT IP CONSULT  NURSING TO CONSULT FOR VASCULAR ACCESS CARE IP CONSULT  GYNECOLOGY IP CONSULT  NUTRITION SERVICES ADULT IP CONSULT  GYNECOLOGY IP CONSULT  NURSING TO CONSULT FOR VASCULAR ACCESS CARE IP CONSULT  CARE MANAGEMENT / SOCIAL WORK IP CONSULT  PHARMACY/NUTRITION TO START AND MANAGE TPN  PHARMACY IP CONSULT  NURSING TO CONSULT FOR VASCULAR ACCESS CARE IP CONSULT  NURSING TO CONSULT FOR VASCULAR ACCESS CARE IP CONSULT  INFECTIOUS DISEASE TRANSPLANT SOT ADULT IP CONSULT  NURSING TO CONSULT FOR VASCULAR ACCESS CARE IP CONSULT  NURSING TO CONSULT FOR VASCULAR ACCESS CARE IP CONSULT  RHEUMATOLOGY IP CONSULT  NUTRITION SERVICES ADULT IP CONSULT  NURSING TO CONSULT FOR VASCULAR ACCESS CARE IP CONSULT  NUTRITION SERVICES ADULT IP CONSULT  PHARMACY IP CONSULT  DERMATOLOGY IP CONSULT  PHARMACY LIAISON FOR MEDICATION COVERAGE CONSULT  NURSING TO CONSULT FOR VASCULAR ACCESS CARE  IP CONSULT    Time Spent on this Encounter   I have spent greater than 30 minutes on this discharge.    Discharge Orders   Discharge Medications   Current Discharge Medication List        START taking these medications    Details   azaTHIOprine (IMURAN) 5 mg/mL SUSP Take 20 mLs (100 mg) by mouth daily  Qty: 600 mL, Refills: 11    Associated Diagnoses: Kidney replaced by transplant      hydrOXYzine HCl (ATARAX) 25 MG tablet Take 1 tablet (25 mg) by mouth every 6 hours as needed for other (adjuvant pain)  Qty: 20 tablet, Refills: 0    Associated Diagnoses: Odynophagia      lidocaine, viscous, (XYLOCAINE) 2 % solution Take 5 mLs by mouth every 2 hours as needed for moderate pain ; Max 8 doses/24 hour period.  Qty: 100 mL, Refills: 0    Associated Diagnoses: Odynophagia      multivitamins w/minerals liquid Place 15 mLs into Feeding Tube daily  Qty: 237 mL, Refills: 2    Associated Diagnoses: Severe malnutrition (H24)      oxyCODONE (ROXICODONE) 5 MG/5ML solution Take 2.5-5 mLs (2.5-5 mg) by mouth every 8 hours as needed for severe pain  Qty: 15 mL, Refills: 0    Associated Diagnoses: Odynophagia      triamcinolone (KENALOG) 0.1 % external lotion Apply topically 2 times daily  Qty: 60 mL, Refills: 0    Associated Diagnoses: Inadequate oral intake      Vonoprazan Fumarate (VOQUEZNA) 20 MG TABS Take 20 mg by mouth daily  Qty: 60 tablet, Refills: 0    Associated Diagnoses: Ulcer of esophagus without bleeding           CONTINUE these medications which have CHANGED    Details   predniSONE (DELTASONE) 5 MG tablet Take 1 tablet (5 mg) by mouth daily  Qty: 30 tablet, Refills: 2    Associated Diagnoses: Kidney replaced by transplant      sulfamethoxazole-trimethoprim (BACTRIM) 400-80 MG tablet HOLD d/t leukopenia. Pentamidine given 7/9/24.  Qty: 90 tablet, Refills: 2    Associated Diagnoses: Aftercare following organ transplant      !! tacrolimus (GENERIC EQUIVALENT) 0.5 MG capsule Take 1 capsule (0.5 mg) by mouth 2 times daily  To have available for dose adjustments per transplant team. Discharge dose = 3 mg twice daily.    Associated Diagnoses: Kidney replaced by transplant      !! tacrolimus (GENERIC EQUIVALENT) 1 MG capsule Take 3 capsules (3 mg) by mouth 2 times daily Discharge dose = 3 mg twice daily.    Associated Diagnoses: Kidney replaced by transplant      valGANciclovir (VALCYTE) 450 MG tablet Take 1 tablet (450 mg) by mouth daily    Associated Diagnoses: Kidney replaced by transplant       !! - Potential duplicate medications found. Please discuss with provider.        CONTINUE these medications which have NOT CHANGED    Details   acetaminophen (TYLENOL) 500 MG tablet Take 1,000 mg by mouth every 6 hours as needed      atorvastatin (LIPITOR) 10 MG tablet Take 1 tablet (10 mg) by mouth daily  Qty: 30 tablet, Refills: 1    Associated Diagnoses: Kidney replaced by transplant      benzocaine (ORAJEL MAXIMUM STRENGTH) 20 % GEL gel Take by mouth 4 times daily as needed for mouth sores  Qty: 9 g, Refills: 0    Associated Diagnoses: Mouth ulcers      famotidine (PEPCID) 20 MG tablet Take 2 tablets (40 mg) by mouth 2 times daily  Qty: 60 tablet, Refills: 1    Associated Diagnoses: Kidney replaced by transplant      levonorgestrel (KYLEENA) 19.5 MG IUD 1 Device by Intrauterine route      magnesium oxide (MAG-OX) 400 MG tablet Take 2 tablets (800 mg) by mouth daily  Qty: 60 tablet, Refills: 0    Associated Diagnoses: Hypomagnesemia      mineral oil-hydrophilic petrolatum (AQUAPHOR) external ointment Apply topically every hour as needed for other (barrier cream)  Qty: 50 g, Refills: 0    Associated Diagnoses: Vaginal ulcer      PARoxetine (PAXIL) 30 MG tablet Take 30 mg by mouth every evening      ustekinumab (STELARA) 90 MG/ML Inject 90 mg Subcutaneous once every eight weeks           STOP taking these medications       aspirin (ASA) 325 MG tablet Comments:   Reason for Stopping:         magic mouthwash suspension (diphenhydrAMINE,  lidocaine, aluminum-magnesium & simethicone) Comments:   Reason for Stopping:                  Ob/Gyn  Referral      Home infusion referral      Reason for your hospital stay    You were admitted due to ulcers, malnutrition, pain and were found to have an esophageal ulcer.  A feeding tube was placed for tube feeds. Rheumatology, Transplant Infectious Disease, Dermatology, OB/GYN, and GI were involved with your care. You are discharging home in stable condition with close follow up.     Activity    Your activity upon discharge: Walk at least four times a day.  No driving while taking opioids.     Adult [facility] Follow-up and recommended labs and tests    [facility] FOLLOW UP:     1. Follow up with Transplant Nephrology in 2-4 weeks.     2.  Follow up with Transplant ID as scheduled.    3.  Follow up with Rheumatology as scheduled.     4.  Follow up in [facility] OB/GYN clinic in 2-4 weeks.    5.  Follow up with Dermatology 7/18/24.    Remember to always bring an updated medication list to all appointments.        Call your Transplant Coordinator ([phone]) with questions about Transplant Center appointment scheduling.     LABS:     CBC with diff, BMP, magnesium, phosphorus, tacrolimus level every Monday and Thursday by home health care nurse if arranged, or at an outpatient lab.     When to contact your care team    WHEN TO CONTACT YOUR  COORDINATOR:     Transplant Coordinator [phone]     Notify your coordinator if you have pain over your kidney, increased redness or drainage from your incision, fever greater than 100F, decreased urine output or new or increased amount of blood in urine.     Notify your coordinator immediately if you are ever unable to take your immunosuppressive medications for any reason.     If you have URGENT concerns after office hours, please call the hospital switchboard at [phone] and ask to have the organ transplant nurse on-call paged. If you have a  life-threatening emergency, go to the nearest emergency room.     Monitor and record    Monitor blood pressure and weight three times weekly.     Diet    Follow this diet upon discharge:       Regular diet as tolerated with Snacks/Supplements      Adult Formula Drip Feeding: Specified Time Shanell Nguyen Standard 1.4; Nasoduodenal tube; Goal Rate: 60; mL/hr; From: 8:00 PM; To: 10:00 AM; Transition to cycled TF regimen @ 60 ml/hr x 16 hours (8pm-10am)    Diet recommendations long term post-transplant: Heart healthy dietary habits long term (low saturated/trans fat, low sodium). Practice food safety pre         Data   Most Recent 3 CBC's:  Recent Labs   Lab Test 07/09/24  0639 07/08/24  0542 07/07/24  0625   WBC 1.5* 1.7* 2.0*   HGB 8.4* 8.7* 8.8*   MCV 99 99 96    163 180      Most Recent 3 BMP's:  Recent Labs   Lab Test 07/09/24  0639 07/08/24  0542 07/07/24  0625    139 137   POTASSIUM 4.3 4.6 4.4   CHLORIDE 106 107 105   CO2 27 27 25   BUN 17.9 19.5 24.3*   CR 0.80 0.84 0.91   ANIONGAP 6* 5* 7   ROBLES 9.6 9.4 9.6   * 103* 107*     Most Recent 2 LFT's:  Recent Labs   Lab Test 07/08/24  0542 07/01/24  0618   AST 16 18   ALT 39 13   ALKPHOS 140 73   BILITOTAL 0.2 0.2     Most Recent INR's and Anticoagulation Dosing History:  Anticoagulation Dose History          Latest Ref Rng & Units 9/29/2022 4/17/2024 6/28/2024 6/30/2024 7/1/2024 7/8/2024   Recent Dosing and Labs   INR 0.85 - 1.15 1.01  1.20  1.25  1.27  1.20  1.07       Details                 Most Recent 3 Troponin's:No lab results found.  Most Recent Cholesterol Panel:  Recent Labs   Lab Test 06/29/24  0616 04/17/24  0958   CHOL  --  177   LDL  --  109*   HDL  --  40*   TRIG 156* 142     Most Recent 6 Bacteria Isolates From Any Culture (See EPIC Reports for Culture Details):No lab results found.  Most Recent TSH, T4 and A1c Labs:  Recent Labs   Lab Test 04/17/24  0958   A1C 4.7     Results for orders placed or performed during the hospital  encounter of 06/27/24   XR Abdomen Port 1 View    Narrative    Portable abdomen 1 view    INDICATION verify small bowel feeding tube bedside placement    COMPARISON: 5/23/2024    Findings: Feeding tube tip near the ligament of Treitz. IUD within the  pelvis. Surgical clips in the right lower pelvis. Nonobstructive bowel  gas pattern. Bony structures appear unremarkable.      Impression    IMPRESSION: Feeding tube certainly post pyloric near the ligament of  Treitz.    JYOTI BEAUCHAMP MD         SYSTEM ID:  [id number]

## 2024-07-08 NOTE — PLAN OF CARE
"Shift: 1900-0730  /87 (BP Location: Left arm)   Pulse 80   Temp 98.4  F (36.9  C) (Oral)   Resp 16   Ht 1.575 m (5' 2\")   Wt 50.7 kg (111 lb 11.2 oz)   SpO2 98%   BMI 20.43 kg/m       VS- Stable on RA  BG- Daily   Labs- pending AM collection   Pain/Nausea/PRN'S- PRN lidocaine solution Q2, PRN oxy x1, PRN Atarax x1. Denies nausea.  Diet- mechanical soft diet, poor appetite  LDA- PIV X1 SL  Gtt/IVF- Tube feeds at goal rate 40 ml/hr, 60 ml Q4 water flushes  GI/- voiding adequelty, LBM yesterday per pt  Skin- abdominal scars, L AV fistula, pt declining cream and lotions for vulva area.  Activity- UAL, ambulating bathroom   Plan-  Tube feeding education today, possible discharge today.   "

## 2024-07-08 NOTE — CONSULTS
Discharge Pharmacy Test Claim    Voquezna requires prior authorization through patient's commercial [employer] plan. Pharmacy liaison will submit the PA request.    Addendum 10:30 am: voquezna PA approved with a copay of $100.     Test Claim Copay   voquezna 100.00     Voquezna Copay Card  Patient may be eligible to use a [employer] copay card for additional savings. Visit the link below to print a copay card.    [email or web address]    Brenda Mehta  [facility]  Ph: [phone]  Fax: [phone]  Available on [employer] (learn more here)

## 2024-07-08 NOTE — PROGRESS NOTES
Transplant Surgery  Inpatient Daily Progress Note  7/8/24     Assessment & Plan: 34 year old female with PMHx of 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 Admitted with mouth ulcers, severe odynophagia, difficulty eating, drinking and swallowing. In addition, she has a new vulvar painful soft tissue mass causing irritation while voiding and pain on walking, biopsy negative for malignancy.     Plan:   - Encourage mobilization; has not walked in the halls in days, walks around the room  - Pain control. Lidocaine for mouth/esophageal ulcers. Oxycodone required twice yesterday, 7/7. Topical triamcinolone BID and topical estrogen for vulvar ulcer.  - Tube feeds. Evidence of re-feeding syndrome, continue to monitor electrolytes and replete phos (30 mmol sodium phos IV this am) and mag (mag sulfate 4g IV). Vitals stable. Increase Free Water Flushes (FWF) to ensure adequate hydration, discussed with nutrition. Will add probiotic via tube.  - Prepare at home prescriptions for crush/suspension form, still swallowing some meds, such as tacrolimus.  - Monitor WBC (leukopenia); WBC 1.7 (down from 2.0) with absolute neutrophils 1.1  - Continue AZA 2 mg/kg, methylpred day 4/4, rheum and neph following  - Vulvar biopsy results negative for dysplasia and malignancy, awaiting dermpath review, and Ab (MPO/PR3) testing for Behcets  - ID: continue valganciclovir, fluconazole, and bactrim for prophylaxis  - Urine culture pending  - Plan to discharge once electrolytes stable for at least 24hrs   - Feeding tube education today     Graft function:   Kidney: Cr 0.84 (down from 0.91) after receiving 1L bolus fluids yesterday, 7/7     Immunosuppression management:   -Myfortic 540mg BID 7/1. Reduced Myfortic to 360 mg BID with starting prednisone. Stopped Myfortic 7/4 and replaced with Azathioprine.   -Tacrolimus; goal level 8-10. Level 5.9 on 7/7.  -On IV methylpred 40mg once daily starting  7/6 for possible Bechet's (today day 4 of 4)      Neuro/Psych:   Pain: still c/o odynophagia     Hematology:   Anemia of chronic disease: Hgb 8.7, stable.      Cardiorespiratory: No acute issues.      GI/Nutrition:   Severe malnutrition in the context of acute illness: Tube feed, at goal rate of 40 mL/hr. Will increase free water flushes to ensure adequate hydration.  Refeeding/malnutrition: Phos 2.2 this am (same as yesterday, 7/7) and mag 1.5, some element of re-feeding. Continue to monitor and replete electrolytes.  Odynophagia, severe: Secondary to oral and esophageal ulcers. Topical lidocaine PRN and oxy PRN.  Esophageal ulcer: Single large cratered ulcer noted on EGD on 6/28. Pathology negative for malignancy, and HSV, CMV, and fungal stains negative, no signs of MMF toxicity. On H2 blocker. PPI contraindicated due to history of ESRD from interstitial nephritis. ID consult to evaluate for possible infectious causes. ID recommended Rheumatology consult. Possible Bechet's. Other diagnoses need to be ruled out before deciding on definitive treatment for the possible Bechet's; biopsy negative for malignancy. Pending labs (MPO, Proteinase 3). Day 4 of methylpred.     Endocrine: No acute issues.      Fluid/Electrolytes: Tube feeds, increase FWF     : Ulceration, left labia: OB/GYN consulted. EUA and biopsy on 7/2. Dermpath to review biopsy. Derm rec topical triamcinolone BID.    Gynecology: Recommend continuation of barrier cream, rayshawn-bottle, and Sitz baths for comfort. Long-term use of topical lidocaine may exacerbate vulvar irritation; consider limiting use of this during admission/on discharge. Also recommend follow-up in [facility] OB[facility]GYN clinic about 2-4 weeks after hospital discharge.     Infectious disease: Afebrile. CMV not detected.      Prophylaxis: Patient mobilizing.      Disposition: 7A         ERICA/Fellow/Resident Provider: Nicole Mcclendon/Omar Jenkins/Jordy Jose     Faculty: Dr Carlitos Díaz     _________________________________________________________________  Transplant History:   4/17/2024 (Kidney)     Interval History: History is obtained from the patient  Overnight events: No acute events overnight. Mouth/esophageal pain unchanged. Is tolerating PO tac. Had day of increased frequency of urination over the weekend, this has resolved.      ROS:   A 10-point review of systems was negative except as noted above.     Meds:  Inpatient Administered Meds             Current Facility-Administered Medications   Medication Dose Route Frequency Provider Last Rate Last Admin    acetaminophen (TYLENOL) tablet 975 mg  975 mg Oral Q6H Radha Mariscal MD   975 mg at 07/04/24 0331     Followed by    [START ON 7/5/2024] acetaminophen (TYLENOL) tablet 650 mg  650 mg Oral Q6H Radha Mariscal MD        aspirin (ASA) chewable tablet 81 mg  81 mg Oral Daily Radha Mariscal MD   81 mg at 07/03/24 0923    estradiol (ESTRACE) cream 0.5 g  0.5 g Vaginal Daily Frida Davis MD        famotidine (PEPCID) tablet 40 mg  40 mg Oral BID Radha Mariscal MD   40 mg at 07/03/24 1907    lipids plant base (CLINOLIPID) 20 % infusion 250 mL  250 mL Intravenous Once per day on Monday Tuesday Wednesday Thursday Friday Saturday Radha Mariscal MD 20.8 mL/hr at 07/03/24 2107 250 mL at 07/03/24 2107    magnesium oxide (MAG-OX) tablet 400 mg  400 mg Oral Daily Rosita French PA-C   400 mg at 07/03/24 0923    mycophenolic acid (GENERIC EQUIVALENT) EC tablet 360 mg  360 mg Oral BID IS Rosita French PA-C   360 mg at 07/03/24 1905    PARoxetine (PAXIL) tablet 30 mg  30 mg Oral QPM Radha Mariscal MD   30 mg at 07/03/24 1907    predniSONE (DELTASONE) tablet 20 mg  20 mg Oral Daily Rosita French PA-C   20 mg at 07/03/24 1148    senna-docusate (SENOKOT-S/PERICOLACE) 8.6-50 MG per tablet 1 tablet  1 tablet Oral BID Radha Mariscal MD   1 tablet at 07/03/24 0924    silver sulfADIAZINE (SILVADENE) 1 % cream   Topical BID Radha Mariscal MD   Given at  "07/02/24 2030    sodium chloride (PF) 0.9% PF flush 3 mL  3 mL Intracatheter Q8H Radha Mariscal MD   3 mL at 07/03/24 2230    sodium chloride (PF) 0.9% PF flush 3 mL  3 mL Intracatheter Q8H Radha Mariscal MD   3 mL at 07/03/24 1610    sucralfate (CARAFATE) suspension 1 g  1 g Oral 4x Daily AC & HS Radha Mariscal MD   1 g at 07/04/24 0654    tacrolimus (GENERIC EQUIVALENT) capsule 2 mg  2 mg Oral BID IS Rosita French PA-C        valGANciclovir (VALCYTE) tablet 450 mg  450 mg Oral Daily Radha Mariscal MD   450 mg at 07/03/24 0923            Physical Exam:      Admit Weight: 52.6 kg (115 lb 14.4 oz)    /86 (BP Location: Left arm)   Pulse 93   Temp 97.9  F (36.6  C) (Oral)   Resp 16   Ht 1.575 m (5' 2\")   Wt 49.8 kg (109 lb 12.8 oz)   SpO2 100%   BMI 20.08 kg/m       General Appearance: Anxious, malnourished but non-toxic   Skin: Warm, perfused  Heart: Extremities appear well-perfused      Lungs: Normal breathing   Abdomen: The abdomen is soft, non-tender  Musculoskeletal: No lower extremity edema, erythema, tenderness  Neurologic: Awake, alert, and oriented.     Lab Results   Component Value Date    WBC 1.7 (L) 07/08/2024    HGB 8.7 (L) 07/08/2024    HCT 27.7 (L) 07/08/2024     07/08/2024     07/07/2024    POTASSIUM 4.4 07/07/2024    CHLORIDE 105 07/07/2024    CO2 25 07/07/2024    BUN 24.3 (H) 07/07/2024    CR 0.91 07/07/2024     (H) 07/07/2024    DD 0.66 (H) 06/26/2024    NTBNPI 1,484 (H) 06/26/2024    AST 18 07/01/2024    ALT 13 07/01/2024    ALKPHOS 73 07/01/2024    BILITOTAL 0.2 07/01/2024    INR 1.20 (H) 07/01/2024         "

## 2024-07-08 NOTE — PROGRESS NOTES
Telephone call placed to pt's  to set a time for tomorrow to be taught on the enteral feeds.  Received no answer and LM for him to return call. Awaiting return call from pt's .    Thank you for the referral.    Juhi Yadav LPN  Enteral Nurse Liaison  [facility]  [address] [place] [id number]  [phone].

## 2024-07-08 NOTE — PLAN OF CARE
"/89 (BP Location: Left arm)   Pulse 84   Temp 98.3  F (36.8  C) (Oral)   Resp 16   Ht 1.575 m (5' 2\")   Wt 50.7 kg (111 lb 11.2 oz)   SpO2 94%   BMI 20.43 kg/m    Shift: 0119-9326  Isolation Status: standard  VS: stable on room air, afebrile  Neuro: Aox4  Behaviors: calm, able to make needs known  BG: daily  Labs: mag 1.5 (replaced), phos 2.2 (replacing now)  Respiratory: WDL  Cardiac: WDL  Pain/Nausea: 6/10 esophageal pain    PRN: lidocaine solution x2, oxy x1  Diet: regular, poor PO intake  IV Access: L PIV, R AVF  Infusion(s): mag, phos replacement  Lines/Drains: NJ with TF @goal of 40 ml/hr with q4 120 ml water flushes  GI/: small BM today, voids (some blood, menstrual cycle)  Skin: abd scars, vulva biopsy site  Mobility: UAL  Events/Education: waiting on TF education  Plan: continue POC and notify team of changes    "

## 2024-07-08 NOTE — PROGRESS NOTES
Care Management Follow Up    Length of Stay (days): 11    Expected Discharge Date: 07/09/2024     Concerns to be Addressed: discharge planning     Patient plan of care discussed at interdisciplinary rounds: Yes    Anticipated Discharge Disposition: Home, Home Infusion     Anticipated Discharge Services: None  Anticipated Discharge DME: None    Patient/family educated on Medicare website which has current facility and service quality ratings: yes  Education Provided on the Discharge Plan: Yes  Patient/Family in Agreement with the Plan: yes    Referrals Placed by CM/SW: Internal Clinic Care Coordination, Home Infusion  Private pay costs discussed: Not applicable    Additional Information:  Anticipate possible discharge tomorrow pending a.m. labs. Unable to secure home care, 15 agencies declined.  [facility] providing home enteral.  Juhi [facility] Liaison coordinating home infusion RN for enteral education at home on day of discharge.  As previously noted pt anticipates arranging OP lab appointments at [facility] or [facility].      Home Infusion  [facility]  Phone # [phone]  Fax # [phone]   Enteral formula, pump and supplies.    Yudith Jimenez RN BSN, PHN, ACM-RN  July 8, 2024  Nurse Coordinator    Phone: [phone]    Social Work and Care Management Department     SEARCHABLE in [facility] - search CARE COORDINATOR     [place] [employer] [place] ([phone]) Saturday & Sunday; ([phone]) [facility] Recognized Holidays     Units: 5A Onc Vocera & 5C Vocera     Units: 6B Vocera & 6C Vocera      Units: 7A SOT RNCC Vocera, 7B Med Surg Vocera, & 7C Med Surg Vocera      Units: 6A Vocera & 4A CVICU Vocera, 4C MICU Vocera, and 4E SICU Vocera      Units: 5 Ortho Vocera & 5 Med Surg Vocera      Units: 6 Med Surg Vocera & 8 Med Surg Vocera      7/8/2024

## 2024-07-09 VITALS
WEIGHT: 110.7 LBS | BODY MASS INDEX: 20.37 KG/M2 | TEMPERATURE: 98.2 F | OXYGEN SATURATION: 100 % | HEIGHT: 62 IN | HEART RATE: 92 BPM | SYSTOLIC BLOOD PRESSURE: 126 MMHG | DIASTOLIC BLOOD PRESSURE: 88 MMHG | RESPIRATION RATE: 16 BRPM

## 2024-07-09 PROBLEM — K21.9 GASTROESOPHAGEAL REFLUX DISEASE WITHOUT ESOPHAGITIS: Status: RESOLVED | Noted: 2024-04-27 | Resolved: 2024-07-09

## 2024-07-09 PROBLEM — K22.10 ULCER OF ESOPHAGUS WITHOUT BLEEDING: Status: ACTIVE | Noted: 2024-07-09

## 2024-07-09 PROBLEM — D70.8 OTHER NEUTROPENIA (H): Status: ACTIVE | Noted: 2024-07-09

## 2024-07-09 LAB
ANION GAP SERPL CALCULATED.3IONS-SCNC: 6 MMOL/L (ref 7–15)
BACTERIA UR CULT: ABNORMAL
BASOPHILS # BLD AUTO: 0 10E3/UL (ref 0–0.2)
BASOPHILS NFR BLD AUTO: 1 %
BUN SERPL-MCNC: 17.9 MG/DL (ref 6–20)
CALCIUM SERPL-MCNC: 9.6 MG/DL (ref 8.6–10)
CHLORIDE SERPL-SCNC: 106 MMOL/L (ref 98–107)
CMV DNA SPEC NAA+PROBE-ACNC: <35 IU/ML
CMV DNA SPEC NAA+PROBE-LOG#: <1.5 {LOG_COPIES}/ML
CREAT SERPL-MCNC: 0.8 MG/DL (ref 0.51–0.95)
DEPRECATED HCO3 PLAS-SCNC: 27 MMOL/L (ref 22–29)
EBV DNA SERPL NAA+PROBE-ACNC: NOT DETECTED IU/ML
EGFRCR SERPLBLD CKD-EPI 2021: >90 ML/MIN/1.73M2
EOSINOPHIL # BLD AUTO: 0.1 10E3/UL (ref 0–0.7)
EOSINOPHIL NFR BLD AUTO: 9 %
ERYTHROCYTE [DISTWIDTH] IN BLOOD BY AUTOMATED COUNT: 15.2 % (ref 10–15)
GLUCOSE SERPL-MCNC: 108 MG/DL (ref 70–99)
HCT VFR BLD AUTO: 27.2 % (ref 35–47)
HGB BLD-MCNC: 8.4 G/DL (ref 11.7–15.7)
IMM GRANULOCYTES # BLD: 0 10E3/UL
IMM GRANULOCYTES NFR BLD: 2 %
LYMPHOCYTES # BLD AUTO: 0.3 10E3/UL (ref 0.8–5.3)
LYMPHOCYTES NFR BLD AUTO: 22 %
MAGNESIUM SERPL-MCNC: 2 MG/DL (ref 1.7–2.3)
MCH RBC QN AUTO: 30.5 PG (ref 26.5–33)
MCHC RBC AUTO-ENTMCNC: 30.9 G/DL (ref 31.5–36.5)
MCV RBC AUTO: 99 FL (ref 78–100)
MONOCYTES # BLD AUTO: 0.2 10E3/UL (ref 0–1.3)
MONOCYTES NFR BLD AUTO: 13 %
NEUTROPHILS # BLD AUTO: 0.8 10E3/UL (ref 1.6–8.3)
NEUTROPHILS NFR BLD AUTO: 53 %
NRBC # BLD AUTO: 0 10E3/UL
NRBC BLD AUTO-RTO: 0 /100
PHOSPHATE SERPL-MCNC: 2.9 MG/DL (ref 2.5–4.5)
PLATELET # BLD AUTO: 160 10E3/UL (ref 150–450)
POTASSIUM SERPL-SCNC: 4.3 MMOL/L (ref 3.4–5.3)
RBC # BLD AUTO: 2.75 10E6/UL (ref 3.8–5.2)
SCANNED LAB RESULT: ABNORMAL
SCANNED LAB RESULT: NORMAL
SCANNED LAB RESULT: NORMAL
SODIUM SERPL-SCNC: 139 MMOL/L (ref 135–145)
TACROLIMUS BLD-MCNC: 6.3 UG/L (ref 5–15)
TME LAST DOSE: NORMAL H
TME LAST DOSE: NORMAL H
WBC # BLD AUTO: 1.5 10E3/UL (ref 4–11)

## 2024-07-09 PROCEDURE — 250N000013 HC RX MED GY IP 250 OP 250 PS 637: Performed by: STUDENT IN AN ORGANIZED HEALTH CARE EDUCATION/TRAINING PROGRAM

## 2024-07-09 PROCEDURE — 99233 SBSQ HOSP IP/OBS HIGH 50: CPT | Mod: 24 | Performed by: INTERNAL MEDICINE

## 2024-07-09 PROCEDURE — 250N000012 HC RX MED GY IP 250 OP 636 PS 637: Performed by: NURSE PRACTITIONER

## 2024-07-09 PROCEDURE — 999N000157 HC STATISTIC RCP TIME EA 10 MIN

## 2024-07-09 PROCEDURE — 99233 SBSQ HOSP IP/OBS HIGH 50: CPT | Mod: 24

## 2024-07-09 PROCEDURE — 250N000009 HC RX 250: Performed by: NURSE PRACTITIONER

## 2024-07-09 PROCEDURE — 94640 AIRWAY INHALATION TREATMENT: CPT

## 2024-07-09 PROCEDURE — 250N000013 HC RX MED GY IP 250 OP 250 PS 637: Performed by: PHYSICIAN ASSISTANT

## 2024-07-09 PROCEDURE — 84100 ASSAY OF PHOSPHORUS: CPT

## 2024-07-09 PROCEDURE — 250N000013 HC RX MED GY IP 250 OP 250 PS 637

## 2024-07-09 PROCEDURE — 87799 DETECT AGENT NOS DNA QUANT: CPT | Performed by: INTERNAL MEDICINE

## 2024-07-09 PROCEDURE — 80048 BASIC METABOLIC PNL TOTAL CA: CPT

## 2024-07-09 PROCEDURE — 250N000013 HC RX MED GY IP 250 OP 250 PS 637: Performed by: NURSE PRACTITIONER

## 2024-07-09 PROCEDURE — 85025 COMPLETE CBC W/AUTO DIFF WBC: CPT | Performed by: NURSE PRACTITIONER

## 2024-07-09 PROCEDURE — 250N000012 HC RX MED GY IP 250 OP 636 PS 637: Performed by: PHYSICIAN ASSISTANT

## 2024-07-09 PROCEDURE — 87798 DETECT AGENT NOS DNA AMP: CPT | Performed by: INTERNAL MEDICINE

## 2024-07-09 PROCEDURE — 83735 ASSAY OF MAGNESIUM: CPT

## 2024-07-09 PROCEDURE — 94642 AEROSOL INHALATION TREATMENT: CPT

## 2024-07-09 PROCEDURE — 250N000013 HC RX MED GY IP 250 OP 250 PS 637: Performed by: TRANSPLANT SURGERY

## 2024-07-09 PROCEDURE — 80197 ASSAY OF TACROLIMUS: CPT

## 2024-07-09 PROCEDURE — 36415 COLL VENOUS BLD VENIPUNCTURE: CPT | Performed by: INTERNAL MEDICINE

## 2024-07-09 RX ORDER — ALBUTEROL SULFATE 0.83 MG/ML
2.5 SOLUTION RESPIRATORY (INHALATION)
Status: CANCELLED | OUTPATIENT
Start: 2024-07-10

## 2024-07-09 RX ORDER — VONOPRAZAN FUMARATE 26.72 MG/1
20 TABLET ORAL DAILY
Qty: 60 TABLET | Refills: 0 | Status: SHIPPED | OUTPATIENT
Start: 2024-07-09

## 2024-07-09 RX ORDER — LIDOCAINE HYDROCHLORIDE 20 MG/ML
5 SOLUTION OROPHARYNGEAL
Qty: 100 ML | Refills: 0 | Status: SHIPPED | OUTPATIENT
Start: 2024-07-09 | End: 2024-08-14

## 2024-07-09 RX ORDER — TACROLIMUS 0.5 MG/1
0.5 CAPSULE ORAL 2 TIMES DAILY
Status: ACTIVE
Start: 2024-07-09 | End: 2024-07-17

## 2024-07-09 RX ORDER — PREDNISONE 5 MG/1
5 TABLET ORAL DAILY
Qty: 30 TABLET | Refills: 2 | Status: CANCELLED | OUTPATIENT
Start: 2024-07-10

## 2024-07-09 RX ORDER — DIPHENHYDRAMINE HYDROCHLORIDE 50 MG/ML
50 INJECTION INTRAMUSCULAR; INTRAVENOUS
Status: CANCELLED
Start: 2024-07-10

## 2024-07-09 RX ORDER — EPINEPHRINE 1 MG/ML
0.3 INJECTION, SOLUTION, CONCENTRATE INTRAVENOUS EVERY 5 MIN PRN
Status: CANCELLED | OUTPATIENT
Start: 2024-07-10

## 2024-07-09 RX ORDER — TACROLIMUS 1 MG/1
3 CAPSULE ORAL 2 TIMES DAILY
Status: CANCELLED
Start: 2024-07-09

## 2024-07-09 RX ORDER — HYDROXYZINE HYDROCHLORIDE 25 MG/1
25 TABLET, FILM COATED ORAL EVERY 6 HOURS PRN
Qty: 20 TABLET | Refills: 0 | Status: SHIPPED | OUTPATIENT
Start: 2024-07-09

## 2024-07-09 RX ORDER — OXYCODONE HCL 5 MG/5 ML
5 SOLUTION, ORAL ORAL EVERY 8 HOURS PRN
Status: DISCONTINUED | OUTPATIENT
Start: 2024-07-09 | End: 2024-07-09 | Stop reason: HOSPADM

## 2024-07-09 RX ORDER — SULFAMETHOXAZOLE AND TRIMETHOPRIM 400; 80 MG/1; MG/1
TABLET ORAL
Qty: 90 TABLET | Refills: 2 | Status: CANCELLED | OUTPATIENT
Start: 2024-07-09

## 2024-07-09 RX ORDER — PREDNISONE 5 MG/1
5 TABLET ORAL DAILY
Qty: 30 TABLET | Refills: 2 | Status: SHIPPED | OUTPATIENT
Start: 2024-07-10 | End: 2024-10-07

## 2024-07-09 RX ORDER — TACROLIMUS 0.5 MG/1
0.5 CAPSULE ORAL 2 TIMES DAILY
Status: CANCELLED
Start: 2024-07-09

## 2024-07-09 RX ORDER — GUAIFENESIN 600 MG/1
15 TABLET, EXTENDED RELEASE ORAL DAILY
Qty: 237 ML | Refills: 2 | Status: SHIPPED | OUTPATIENT
Start: 2024-07-09 | End: 2024-08-21

## 2024-07-09 RX ORDER — PREDNISONE 5 MG/1
5 TABLET ORAL DAILY
Status: DISCONTINUED | OUTPATIENT
Start: 2024-07-09 | End: 2024-07-09 | Stop reason: HOSPADM

## 2024-07-09 RX ORDER — TRIAMCINOLONE ACETONIDE 1 MG/ML
LOTION TOPICAL 2 TIMES DAILY
Qty: 60 ML | Refills: 0 | Status: SHIPPED | OUTPATIENT
Start: 2024-07-09 | End: 2024-09-03

## 2024-07-09 RX ORDER — ALBUTEROL SULFATE 0.83 MG/ML
2.5 SOLUTION RESPIRATORY (INHALATION)
Status: COMPLETED | OUTPATIENT
Start: 2024-07-09 | End: 2024-07-09

## 2024-07-09 RX ORDER — VALGANCICLOVIR 450 MG/1
450 TABLET, FILM COATED ORAL DAILY
Status: ACTIVE
Start: 2024-07-09 | End: 2024-07-25

## 2024-07-09 RX ORDER — METHYLPREDNISOLONE SODIUM SUCCINATE 125 MG/2ML
125 INJECTION, POWDER, LYOPHILIZED, FOR SOLUTION INTRAMUSCULAR; INTRAVENOUS
Status: CANCELLED
Start: 2024-07-10

## 2024-07-09 RX ORDER — TRIAMCINOLONE ACETONIDE 1 MG/ML
LOTION TOPICAL 2 TIMES DAILY PRN
Qty: 60 ML | Refills: 0 | Status: CANCELLED | OUTPATIENT
Start: 2024-07-09

## 2024-07-09 RX ORDER — LACTOBACILLUS RHAMNOSUS GG 10B CELL
1 CAPSULE ORAL 2 TIMES DAILY
Qty: 60 CAPSULE | Refills: 2 | Status: CANCELLED | OUTPATIENT
Start: 2024-07-09

## 2024-07-09 RX ORDER — ACETAMINOPHEN 325 MG/1
650 TABLET ORAL EVERY 6 HOURS PRN
Status: DISCONTINUED | OUTPATIENT
Start: 2024-07-09 | End: 2024-07-09 | Stop reason: HOSPADM

## 2024-07-09 RX ORDER — HYDROXYZINE HYDROCHLORIDE 25 MG/1
25 TABLET, FILM COATED ORAL EVERY 6 HOURS PRN
Qty: 20 TABLET | Refills: 0 | Status: CANCELLED | OUTPATIENT
Start: 2024-07-09

## 2024-07-09 RX ORDER — SULFAMETHOXAZOLE AND TRIMETHOPRIM 400; 80 MG/1; MG/1
TABLET ORAL
Qty: 90 TABLET | Refills: 2 | Status: ACTIVE | OUTPATIENT
Start: 2024-07-09 | End: 2024-08-15

## 2024-07-09 RX ORDER — MEPERIDINE HYDROCHLORIDE 25 MG/ML
25 INJECTION INTRAMUSCULAR; INTRAVENOUS; SUBCUTANEOUS EVERY 30 MIN PRN
Status: CANCELLED | OUTPATIENT
Start: 2024-07-10

## 2024-07-09 RX ORDER — TACROLIMUS 1 MG/1
3 CAPSULE ORAL 2 TIMES DAILY
Status: ACTIVE
Start: 2024-07-09 | End: 2024-07-17

## 2024-07-09 RX ORDER — ALBUTEROL SULFATE 90 UG/1
1-2 AEROSOL, METERED RESPIRATORY (INHALATION)
Status: CANCELLED
Start: 2024-07-10

## 2024-07-09 RX ORDER — OXYCODONE HCL 5 MG/5 ML
2.5-5 SOLUTION, ORAL ORAL EVERY 8 HOURS PRN
Qty: 15 ML | Refills: 0 | Status: SHIPPED | OUTPATIENT
Start: 2024-07-09 | End: 2024-08-14

## 2024-07-09 RX ORDER — PENTAMIDINE ISETHIONATE 300 MG/300MG
300 INHALANT RESPIRATORY (INHALATION)
Status: COMPLETED | OUTPATIENT
Start: 2024-07-09 | End: 2024-07-09

## 2024-07-09 RX ADMIN — Medication 5 ML: at 01:33

## 2024-07-09 RX ADMIN — SUCRALFATE 1 G: 1 SUSPENSION ORAL at 06:08

## 2024-07-09 RX ADMIN — ASPIRIN 81 MG CHEWABLE TABLET 81 MG: 81 TABLET CHEWABLE at 08:39

## 2024-07-09 RX ADMIN — Medication 100 MG: at 08:37

## 2024-07-09 RX ADMIN — Medication 5 ML: at 15:36

## 2024-07-09 RX ADMIN — Medication 5 ML: at 11:26

## 2024-07-09 RX ADMIN — Medication 1 CAPSULE: at 08:37

## 2024-07-09 RX ADMIN — Medication 5 ML: at 03:37

## 2024-07-09 RX ADMIN — MAGNESIUM OXIDE TAB 400 MG (241.3 MG ELEMENTAL MG) 400 MG: 400 (241.3 MG) TAB at 11:26

## 2024-07-09 RX ADMIN — TACROLIMUS 3 MG: 1 CAPSULE ORAL at 08:39

## 2024-07-09 RX ADMIN — VALGANCICLOVIR HYDROCHLORIDE 450 MG: 50 POWDER, FOR SOLUTION ORAL at 08:37

## 2024-07-09 RX ADMIN — Medication 15 ML: at 11:26

## 2024-07-09 RX ADMIN — FAMOTIDINE 40 MG: 20 TABLET ORAL at 08:37

## 2024-07-09 RX ADMIN — PREDNISONE 5 MG: 5 TABLET ORAL at 08:38

## 2024-07-09 RX ADMIN — ALBUTEROL SULFATE 2.5 MG: 2.5 SOLUTION RESPIRATORY (INHALATION) at 11:09

## 2024-07-09 RX ADMIN — Medication 5 ML: at 06:10

## 2024-07-09 RX ADMIN — ACETAMINOPHEN 650 MG: 325 TABLET, FILM COATED ORAL at 04:29

## 2024-07-09 RX ADMIN — SUCRALFATE 1 G: 1 SUSPENSION ORAL at 10:09

## 2024-07-09 RX ADMIN — Medication 5 ML: at 08:36

## 2024-07-09 RX ADMIN — Medication 5 ML: at 13:39

## 2024-07-09 RX ADMIN — PENTAMIDINE ISETHIONATE 300 MG: 300 INHALANT RESPIRATORY (INHALATION) at 11:09

## 2024-07-09 ASSESSMENT — ACTIVITIES OF DAILY LIVING (ADL)
ADLS_ACUITY_SCORE: 24

## 2024-07-09 NOTE — PROGRESS NOTES
Care Management Discharge Note    Discharge Date: 07/09/2024       Discharge Disposition: Home, Home Infusion    Discharge Services: None    Discharge DME: None    Discharge Transportation: family or friend will provide    Private pay costs discussed: Not applicable    Does the patient's insurance plan have a 3 day qualifying hospital stay waiver?  No    PAS Confirmation Code:    Patient/family educated on Medicare website which has current facility and service quality ratings: yes    Education Provided on the Discharge Plan: Yes  Persons Notified of Discharge Plans: patient  Patient/Family in Agreement with the Plan: yes    Handoff Referral Completed: Yes    Additional Information:  Anticipate possible discharge today.  Pt will discharge on continuous enteral feeds. [facility] Liaison inquiring on enteral education plan.      Home enteral education at 2 p.m. today.  Met with pt and spouse.  Discharge IMM reviewed/signed and copy given.  Pt and spouse note no concerns or questions.        Home Infusion  [facility]  Phone # [phone]  Fax # [phone]   Enteral formula, pump and supplies.    Yudtih Jimenez RN BSN, PHN, ACM-RN  July 9, 2024  Nurse Coordinator    Phone: [phone]    Social Work and Care Management Department     SEARCHABLE in [facility] - search CARE COORDINATOR     [place] [employer] [place] ([phone]) Saturday & Sunday; ([phone])  Recognized Holidays     Units: 5A Onc Vocera & 5C Vocera     Units: 6B Vocera & 6C Vocera      Units: 7A SOT RNCC Vocera, 7B Med Surg Vocera, & 7C Med Surg Vocera      Units: 6A Vocera & 4A CVICU Vocera, 4C MICU Vocera, and 4E SICU Vocera      Units: 5 Ortho Vocera & 5 Med Surg Vocera      Units: 6 Med Surg Vocera & 8 Med Surg Vocera      7/9/2024

## 2024-07-09 NOTE — PROGRESS NOTES
"/85 (BP Location: Left arm)   Pulse 87   Temp 97.9  F (36.6  C) (Oral)   Resp 16   Ht 1.575 m (5' 2\")   Wt 50.7 kg (111 lb 11.2 oz)   SpO2 97%   BMI 20.43 kg/m      Shift: 6110-6269    VS: VSS  Neuro: Aox4  Cardio: WDL  Respiratory: WDL  GI: LBM 7/8  : Voiding well  Skin: vulva bx site  Diet: TF @40ml/hr cont, mechanical soft  BG: none  LDA: NJ, PIV   Infusions: none  Mobility: UAL  Pain/Nausea: pain on mouth ulcers  PRN medications: lidocaine x2  "

## 2024-07-09 NOTE — PROGRESS NOTES
CLINICAL NUTRITION SERVICES - BRIEF NOTE     Nutrition Prescription    Recommendations already ordered by Registered Dietitian (RD):  Adjusted TF to cycled regimen per team request:    [email or web address] Standard 1.4 @ 60 ml/hr x 16 hours (8 PM to 10 AM)    Free water: 120 ml before/after TF + an additional 450 ml (oral vs flushes) throughout the day.  Total free water + FW in TF will provide 1372 ml (1 ml/kcal, 27 ml/kg/day).    Future/Additional Recommendations:  If adjustment to above cycle well tolerated, can consider reducing further to 80 ml/hr x 12 hours.       EVALUATION OF THE PROGRESS TOWARD GOALS   NEW FINDINGS   Tolerating TF at goal continuous rate of 40 ml/hr since 7/6 @ 1200, electrolytes now stable.      INTERVENTIONS  Discussed changes/recommendations with patient and SO.      Monitoring/Evaluation  Progress toward goals will be monitored and evaluated per protocol.     Nicole Carrington, MS, RD, LD, CCTD, [facility]  7A + 7B (beds 2250-7278)  Available on Vocera [7A or 7B Clinical Dietitian]   Weekend/Holiday: Vocera [Weekend Clinical Dietitian]

## 2024-07-09 NOTE — PLAN OF CARE
"Goal Outcome Evaluation:      Plan of Care Reviewed With: patient    Overall Patient Progress: improvingOverall Patient Progress: improving    /85 (BP Location: Left arm)   Pulse 87   Temp 97.9  F (36.6  C) (Oral)   Resp 16   Ht 1.575 m (5' 2\")   Wt 50.7 kg (111 lb 11.2 oz)   SpO2 97%   BMI 20.43 kg/m      Shift: 6445-5433  Isolation Status: N/A  VS: stable on RA, afebrile  Neuro: A&O x3  Behaviors: receptive to cares  BG: N/A  Labs/Imaging: Mag 2.5, Phos 3.6, K 4.6, WBC 1.7; pending AM labs  Respiratory: WDL  Cardiac: WDL  Pain/Nausea: Denies nausea. C/O mouth ulcer pain, given PRN Oxycodone and Lidocaine  PRN: Oxycodone 5 mg solution x1 and Lidocaine 5 mL x3 for mouth pain  Diet: mechanical soft, continuous TF  IV Access: L PIV, R AVF  Infusion(s): TF @ 40 mL/hr with q4hr 120 mL water flushes   Lines/Drains: NJ  GI/: No BM on shift. Voiding spontaneously, AUO, hat in toilet to measure  Skin: L labial biopsy site  Mobility: UAL  Plan: Notify MD of any significant changes, continue plan of care.    Hand off to be given to oncoming RN.       "

## 2024-07-09 NOTE — PLAN OF CARE
"BP (!) 124/90   Pulse 98   Temp 98  F (36.7  C)   Resp 16   Ht 1.575 m (5' 2\")   Wt 50.7 kg (111 lb 11.2 oz)   SpO2 98%   BMI 20.43 kg/m      3752-0123  VSS on RA. A+OX4. Rule out parvovirus.  at bedside, supportive. LBM 7/8. Voiding WOD. NJ with cycled TF, 8p-10am. Lidocaine solution x1 for mouth ulcers. Up ad roya. PIV SL and AV fistula. Healed abdominal incision and vulva biopsy site, UTV. Mechanical/dental soft diet, poor appetite. Plan for TF education with home infusion services at 2p. Plan to possibly discharge after. Plan for pentamidine neb today.   "

## 2024-07-09 NOTE — DISCHARGE INSTRUCTIONS
Tube Feedings:   [employer] Standard 1.4 @ 60 ml/hr x 16 hours (8 PM to 10 AM)  Free water: 120 ml before/after TF + an additional 450 ml (oral vs flushes) throughout the day.

## 2024-07-09 NOTE — PLAN OF CARE
"/88   Pulse 92   Temp 98.2  F (36.8  C)   Resp 16   Ht 1.575 m (5' 2\")   Wt 50.2 kg (110 lb 11.2 oz)   SpO2 100%   BMI 20.25 kg/m      8963-3282  VSS on RA. Droplet for r/o parvo. Aox4. Voiding without difficulty, LBM yesterday. 3/10 pain from mouth sores, lidocaine given x2. Denies nausea. Pentamidine neb given. L PIV SL, R AVF. NJ with cycled TF, education with home infusion completed. Plan for discharge when meds are ready. Discharge paperwork reviewed with patient and .       "

## 2024-07-09 NOTE — PROGRESS NOTES
Teaching of Enteral feedings and pt and her . Pt and her  have not dealt with enteral backpack.  Went over with them how to set up backpack with feeding bag, pump and backpack.  Pt did well set up everything. Spoke with them regarding the medication, water flush prior and after each individual medication. And he did very well remember what he needed to do. He set up very well. Discussed with her how to do medication 1 at a time flushing with water after medication given.  Informed I her of the supplies that [facility] will be sending to her home today.  Informed him if he notices pinkness or reddening of the skin and stoma information. Asked for pt to look through the Welcome folder that will be in with her supplies, discussed how to reapply the nose tube of any redness of skin, pain, skin warm to the tough. And explained that is to be done each day.  Asked if they any questions and they stated he did not.      Delivery to to pt's home today  Enteral order:  [employer] Standard 1.4 NGT at 60ml/[id number]-34l, water flushes 15-30ml every 4 hours, with medication, and when starting the feeding and after.    Supplies: adhesive for cheek to put tube, backpack, [employer] Standard, feeding bags, will need a scale, 60ml syringes, medication syringes, med cups.    Pt is hoping to be discharge home today. Informed them that this writer will come back to see them once discharge orders are written and go over orders and if they may have questions. Went over what supplies will be delivered to their home today on the day of discharge.  Asked for him to look over the Welcome Folder that will be sent with supplies.  Went over that pt will receive phone calls from [facility] off and no and to check on his status, nutrient, and orders and he understood this.  Also covered pt's benefits and she is aware of OOP with secondary insurance. They both stated the are comfortable with doing the enteral feeding at home independently. Encouraged  her to not hesitate calling [facility] with any questions. Went over her coverage of enteral feedings regarding 75/25 until meets deductible and/ or OOP.    Thank you for the referral.  Juhi [email or web address].LPN  Enteral; Nurse Liaison   [facility]  [address] [place] [address]  [phone]  [phone]    Discharge date: 07/09/24 (SOC)  Discharge therapies to be provided by [facility]:Enteral  Formula: [employer] Standard  Rate/Dose: 60ml/hr 8pm-10am  Provider to manage enteral at home: Dr. Desai  Estimated length of need: Less than 90 days  Education completed: Liaison teach done with pt and her  today.  They did well.  They feel comfortable with being independent with enteral feedings.  Delivery/supplies: Delivery to pt's home today. Supplies: adhesive for cheek to put tube, backpack, [employer] Standard, feeding bags, will need a scale, 60ml syringes, medication syringes, med cups.  Agency: NA  SNV: NA  Notes: Met with pt and her  and went over benefits with them and did teaching.. Neither one have ever done prior.  They both did well with setting everything up.Answered questions.  Went over [facility] servies as her supplies to home. The both stated that they feel comfortable with impendent of enteral feedings.

## 2024-07-09 NOTE — PROGRESS NOTES
Brief Dermatology Note     Assessment and Plan:  # Vulvar ulceration, isolated w/ EBV + tissue sample                 - Dermpath review of tissue pending   # Multiple oral ulcerations and esophageal ulcerations - esophageal samples negative for granulomas/Crohn's               - Concern for ulcerative mucocutaneous disorders   # Hx of Crohn's   # Immunosuppression related to renal transplant  Patient with a history of ESRD due to interstitial nephritis status-post  donor kidney transplant (2024) on immunosuppression (tacrolimus, mycophenolic acid until 2024 with switch to Azathioprine on 2024), Crohn's disease on Stelara - well controlled, and recent oral/vaginal ulcers thought to be due to mycophenolate who was admitted for odynophagia and vulvar ulcer. Dermatology was consulted to assist with work up for ulcerations.   Differential for ulcerative mucocutaneous disorders include but are not limited to infectious causes such as EBV which returned positive on vulvar tissue, mucocutaneous manifestations of her IBD (Crohn's) has overall been well controlled but these ulcerations could represent that in the mouth and vulvar region - dermpath review remains pending, additionally PG (pyoderma grangrenosum) in Crohn's could be considered. Otherwise recurrent oral and genital aphthae in the absence of Behçet disease could represent complex aphthosis, Behçet's or something like mucous membrane pemphigoid which would require oral biopsy per OMFS given location (H&E and dif). Methotrexate induced mucocutaneous disease (now stopped methotrexate and hasn't been on for years).   The patient had a vulvar biopsy obtained during debridement. We have placed an order to review these with our Dermatopathology team for diagnostic clarity- this remains pending. We recommend re-involvement of infectious disease given EBV positivity on vulvar tissue in the setting of IC status and painful vulvar ulceration and caution  with steroids. Jake-Barr virus associated mucocutaneous ulcers (EBVMCUs) and EBV-associated ulcus vulvae acutum (EBV-AUVA) seems most likely at this time from the dermatologic perspective. Dermatology will follow up in clinic as below.   - EBV PCR plasma negative    - EBV + vulvar tissue sample , dermpath review remains pending, features such as pseudoepitheliomatous hyperplasia at the borders, mixed infiltrate w/ lymphs plasma cells, apoptic cells, eos, and large blastic cells with hodgkin like morphology that stain + for CD30/PAX-5 , tissue necrosis can also be present   - CMV negative   - HSV and VZV swabs negative  - fungal cultures negative Attest Note  Attest in Encounter  My Note   Note Details      - Treponema negative    - BRAVO and ANCA negative   - MPO negative and proteinase 3 negative     Please discharge with;   - PRN triamcinolone topically BID to vulvar ulcer   - PRN dexa swish and spit if oral ulcerations become painful or more bothersome   - Derm follow up scheduled for 7/18 @ 8:20 AM Alisha at [address]      Thank you for the dermatology consultation. Please do not hesitate to contact the dermatology resident/faculty on call for any additional questions or concerns.  Dermatology will sign off as patient is planning to discharge 07/09/24. Please page with any additional questions.      Patient case discussed with attending physician, Dr. Fitch and Dermpath attending Dr. Boucher.

## 2024-07-09 NOTE — PROGRESS NOTES
[facility]  Transplant Nephrology Progress Note  Date of Admission:  6/27/2024  Today's Date: 07/09/2024  Requesting physician: Sid Desai MD    Recommendations:   - Would check labs twice weekly for now after discharge. Vonoprazan is anticipated to raise tacrolimus levels  - Give pentamidine prior to discharge.  - Hold Bactrim for now and give pentamidine prior to discharge.  - Follow-up with transplant nephrology in 2-4 weeks.  - Follow-up with anemia services outpatient after discharge.  - Transplant surgery team to increase tacrolimus dose to reach goal.  - Pt has been asymptomatic for UTI.  No plan to treat UC showing staph aureus at this time per Transplant Infectious Disease.    Assessment & Plan   # DDKT: CKD Stage 3 - Stable serum creatinine, at baseline 0.9, Cystatin C 6/29=47.   No indication for dialysis.    Pediatric en-bloc kidneys   - Baseline Creatinine: ~ 0.9-1.1   - Proteinuria: Normal (<0.2 grams)   - DSA Hx: No DSA   - Last cPRA: 9%   - BK Viremia: No   - Kidney Tx Biopsy Hx: No biopsy history.    # Immunosuppression Prior to Admission: Tacrolimus immediate release (goal 8-10) and Mycophenolic acid (dose 540 mg every 12 hours)   - Present Immunosuppression: Tacrolimus immediate release (goal 8-10), Azathioprine (dose 100 mg daily), and Prednisone (dose 5 mg daily)   - Induction with Recent Transplant:  High Intensity Protocol   - Continue with intensive monitoring of immunosuppression for efficacy and toxicity.   - Historical Changes in Immunosuppression: Previously stopped mycophenolate due to oral and vaginal ulcers, but endoscopy did not indicate mycophenolic acid toxicity.  Changed to azathioprine d/t concern for Bechet's disease.     - Patient is in an immunosuppressed state and will continue to monitor for efficacy and toxicity of immunosuppression medications.   - 07/09/24  Tacrolimus level: 6.3 (~ 13 hour trough)   - Changes: Yes -  Transplant surgery team to increase tacrolimus dose to reach goal. . Continue on prednisone 5 mg daily for immune suppression.    # Infection Prevention:      - PJP: Sulfa/TMP (Bactrim), Hold Bactrim for now and give pentamidine prior to discharge.  - CMV: Valganciclovir (Valcyte); Patient is CMV IgG Ab discordant (D+/R-) and will continue on Valcyte x 6 months, then check CMV PCR monthly until 12 months post transplant.   - CMV IgG Ab High Risk Discordance (D+/R-): Yes  - EBV IgG Ab High Risk Discordance (D+/R-): No    # Blood Pressure: Controlled;  Goal BP: < 140/90   - Changes: No    # Anemia in Chronic Renal Disease: Hgb: Stable, low      JUNG: No. Give epoetin 4000 units subcutaneous weekly   - Iron studies: Replete    # Leukopenia: Trend down Likely medication related.  Negative CMV PCR.    # Mineral Bone Disorder:    - Secondary renal hyperparathyroidism; PTH level: Minimally elevated ( pg/ml)        On treatment: None  - Vitamin D; level: Normal         On supplement: No  - Calcium; level: Normal          On supplement: No  - Phosphorus; level: Normal         On supplement: No.     # Electrolytes:   - Potassium; level: Normal         On supplement: No  - Magnesium; level: Normal        On supplement: Yes, magnesium oxide 400 mg PO daily.   - Bicarbonate; level: Normal         On supplement: No  - Sodium; level: Normal    # Concern for Behcet's   # Oral and Genital Ulcers  # Severe Malnutrition  # GERD/Dysphagia/Esophagitis: Patient with apparent severe heartburn/GERD symptoms, worsening over the last week or so prior to admission.  Patient underwent EGD 5/24/24 which was mostly unremarkable with only finding of minimal non-specific chronic inflammation in the stomach (negative H. pylori), but jessee esophageal findings.  In addition, recently had significant oral ulcers.  Now with patient unable to eat and minimal fluid intake.  She hasn't been able to take her medications in the last day due to pain  with swallowing.  Wt down with decreased PO intake.  Patient is on famotidine, but unable to use PPI due to probable underlying kidney disease (interstitial nephritis) from previous PPI use. Continue Sucralfate.    - EGD 6/28/25  Esophageal ulcer with no bleeding and no stigmata of recent bleeding. Biopsies: HSV and CMV immunostains are negative. PAS stains are negative for fungal organisms.                          - LA Grade B esophagitis with no bleeding. Biopsied.                          - Z-line, 32 cm from the incisors.                          - Friable gastric mucosa.                          - Normal examined duodenum.  - HSV and CMV immunostains are negative. PAS stains are negative for fungal organisms.    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 If ANC <500, recommend to decrease AZA to 75mg if neutropenic and WBC less than 2  -  f/up TPMT activity pending   - negative infectious w/up so far (EBV swab pending - vulvar sample) & Dermatopathology review 7/8  - Would check labs twice weekly for now after discharge. Vonoprazan is anticipated to raise tacrolimus levels     Reference  07/02/24    MPO Marielle IgG  <3.5 U/mL 0.3   Proteinase 3 Marielle IgG <2.0 U/mL <1.0         # Other Significant PMH:   - Crohn's Disease: Appears to be stable.  Previously was on ustekinumab prior to kidney transplant, but hasn't restarted it due to ongoing oral and vaginal ulcers.  Does occasionally alternate between diarrhea and constipation, but no issues at this time.  Followed by GI and [facility].   - Cardiac/Vascular Disease Risk Factors:  Dilated ascending aorta (3.8 cm)  on 2024 ECHO (stable from 2020).     # Transplant History:  Etiology of Kidney Failure: Interstitial nephritis  Tx: DDKT - Pediatric en bloc  Transplant: 4/17/2024 (Kidney)  Significant transplant-related complications:  Oral and vaginal ulcers, esophagitis     Recommendations were communicated to the primary team via this note.    LAURITA Hurt CNP  Transplant Nephrology  Contact information available via [facility] Paging/Directory      Interval History  Ms. Yadavs creatinine is 0.80 (07/09 0639); Decreased from 0.84 yesterday.  Estimated Creatinine Clearance: 79.3 mL/min (based on SCr of 0.8 mg/dL).   I/O last 3 completed shifts:  In: 1580 [P.O.:200; NG/GT:540]  Out: 1550 [Urine:1550]   Other significant labs/tests/vitals: SBP 110s - 120s overnight. Afebrile     7/6/24 Urine Culture   50,000-100,000 CFU/mL Staphylococcus aureus          Pt continues to have esophageal pain.  No acute events overnight.  No chest pain or shortness of breath.  No leg swelling.  No nausea or vomiting.  No diarrhea.  Denies burning with urination.  Denies abdominal pain.      Review of Systems   4 point ROS was obtained and negative except as noted in the Interval History.    MEDICATIONS:  Current Facility-Administered Medications   Medication Dose Route Frequency Provider Last Rate Last Admin    acetaminophen (TYLENOL) tablet 650 mg  650 mg Oral Q6H Radha Mariscal MD   650 mg at 07/09/24 0429    aspirin (ASA) chewable tablet 81 mg  81 mg Oral Daily Radha Mariscal MD   81 mg at 07/08/24 0843    azaTHIOprine (IMURAN) suspension 100 mg  100 mg Oral Daily Sid Desai MD   100 mg at 07/08/24 0839    epoetin rey-epbx (RETACRIT) injection 4,000 Units  4,000 Units Subcutaneous Weekly Jas Gutierrez APRN CNP   4,000 Units at 07/08/24 1749    estradiol (ESTRACE) cream 0.5 g  0.5 g Vaginal Daily Frida Davis MD        famotidine (PEPCID) tablet 40 mg  40 mg Oral BID Radha Mariscal MD   40 mg at  24    lactobacillus rhamnosus (GG) (CULTURELL) capsule 1 capsule  1 capsule Oral BID Rosita French PA-C   1 capsule at 24    magnesium oxide (MAG-OX) tablet 400 mg  400 mg Oral Daily Rosita French PA-C   400 mg at 24 111    multivitamins w/minerals liquid 15 mL  15 mL Per Feeding Tube Daily Holli Nelson, LAURITA CNP   15 mL at 24 111    PARoxetine (PAXIL) tablet 30 mg  30 mg Oral QPM Radha Mariscal MD   30 mg at 24 194    predniSONE (DELTASONE) tablet 5 mg  5 mg Oral Daily Nicole Mcclendon, NP        senna-docusate (SENOKOT-S/PERICOLACE) 8.6-50 MG per tablet 1 tablet  1 tablet Oral BID Radha Mariscal MD   1 tablet at 24 0924    silver sulfADIAZINE (SILVADENE) 1 % cream   Topical BID Radha Mariscal MD   Given at 24 2030    sodium chloride (PF) 0.9% PF flush 3 mL  3 mL Intracatheter Q8H Sid Desai MD   3 mL at 24 2217    sodium chloride (PF) 0.9% PF flush 3 mL  3 mL Intracatheter Q8H Radha Mariscal MD   3 mL at 24 0608    sodium chloride (PF) 0.9% PF flush 3 mL  3 mL Intracatheter Q8H Radha Mariscal MD   3 mL at 24 2312    sucralfate (CARAFATE) suspension 1 g  1 g Oral 4x Daily AC & HS Radha Mariscal MD   1 g at 24 0608    sulfamethoxazole-trimethoprim (BACTRIM) 400-80 MG per tablet 1 tablet  1 tablet Oral Daily Jesus Jenkins MD   1 tablet at 24 0843    tacrolimus (GENERIC EQUIVALENT) capsule 3 mg  3 mg Oral BID IS Rosita French PA-C   3 mg at 24 1749    triamcinolone (KENALOG) 0.1 % lotion   Topical BID Jesus Jenkins MD        valGANciclovir (VALCYTE) solution 450 mg  450 mg Oral Daily Sid Desai MD   450 mg at 24 0839     Current Facility-Administered Medications   Medication Dose Route Frequency Provider Last Rate Last Admin    dextrose 10% infusion   Intravenous Continuous PRN Radha Mariscal MD           Physical Exam   Temp  Av.9  F (36.6  C)  Min: 97.5  F (36.4  C)  Max: 98.3  F  "(36.8  C)      Pulse  Av.9  Min: 76  Max: 118 Resp  Av  Min: 16  Max: 20  SpO2  Av.1 %  Min: 93 %  Max: 100 %     /77 (BP Location: Left arm)   Pulse 88   Temp 98.2  F (36.8  C) (Oral)   Resp 18   Ht 1.575 m (5' 2\")   Wt 50.7 kg (111 lb 11.2 oz)   SpO2 98%   BMI 20.43 kg/m      Admit       GENERAL APPEARANCE: alert and mild distress due to pain  RESP: lungs clear to auscultation - no rales, rhonchi or wheezes  CV: regular rhythm, normal rate, no rub, no murmur  EDEMA: no LE edema bilaterally  ABDOMEN: soft, nondistended, nontender, bowel sounds normal  MS: extremities normal - no gross deformities noted, no evidence of inflammation in joints, no muscle tenderness  SKIN: no rash  DIALYSIS ACCESS: right arm fistula +bruit/+thrill    Data   All labs reviewed by me.  CMP  Recent Labs   Lab 24  0639 24  1618 24  0542 24  0625 24  1843     --  139 137 137   POTASSIUM 4.3  --  4.6 4.4 5.0   CHLORIDE 106  --  107 105 105   CO2 27  --  27 25 23   ANIONGAP 6*  --  5* 7 9   *  --  103* 107* 135*   BUN 17.9  --  19.5 24.3* 27.4*   CR 0.80  --  0.84 0.91 0.92   GFRESTIMATED >90  --  >90 84 83   ROBLES 9.6  --  9.4 9.6 9.8   MAG 2.0 2.5* 1.5* 1.5*  --    PHOS 2.9 3.6 2.2* 2.2*  --    PROTTOTAL  --   --  5.6*  --   --    ALBUMIN  --   --  3.0*  --   --    BILITOTAL  --   --  0.2  --   --    ALKPHOS  --   --  140  --   --    AST  --   --  16  --   --    ALT  --   --  39  --   --      CBC  Recent Labs   Lab 24  0639 24  0542 24  0625 24  0631   HGB 8.4* 8.7* 8.8* 9.9*   WBC 1.5* 1.7* 2.0* 2.1*   RBC 2.75* 2.79* 2.93* 3.29*   HCT 27.2* 27.7* 28.2* 32.3*   MCV 99 99 96 98   MCH 30.5 31.2 30.0 30.1   MCHC 30.9* 31.4* 31.2* 30.7*   RDW 15.2* 15.0 14.7 14.4    163 180 190     INR  Recent Labs   Lab 24  0542   INR 1.07     ABGNo lab results found in last 7 days.   Urine Studies  Recent Labs   Lab Test 24  1841 24  1113 " 05/03/24  1600 04/17/24  1113   COLOR Yellow Yellow Light Yellow Straw   APPEARANCE Clear Clear Clear Clear   URINEGLC Negative Negative 100* 70*   URINEBILI Negative Negative Negative Negative   URINEKETONE Negative Negative Negative Negative   SG 1.015 1.028 1.014 1.004   UBLD Moderate* Small* Negative Trace*   URINEPH 7.0 5.5 5.5 8.5*   PROTEIN Negative 10* Negative 100*   NITRITE Negative Negative Negative Negative   LEUKEST Trace* Moderate* Trace* Negative   RBCU 1 2 2 2   WBCU 3 6* 4 <1     No lab results found.  PTH  Recent Labs   Lab Test 05/23/24  0949 04/22/24  0821   PTHI 131* 138*     Iron Studies  Recent Labs   Lab Test 05/23/24  0949 04/22/24  0821   IRON 77 53   * 147*   IRONSAT 38 36   PRICILA 2,181* 2,181*       IMAGING:  All imaging studies reviewed by me.

## 2024-07-09 NOTE — PLAN OF CARE
"/89 (BP Location: Left arm)   Pulse 84   Temp 98.3  F (36.8  C) (Oral)   Resp 16   Ht 1.575 m (5' 2\")   Wt 50.7 kg (111 lb 11.2 oz)   SpO2 94%   BMI 20.43 kg/m      Shift: 4110-9562  Isolation Status: None  VS: Stable on RA, afebrile  Neuro: Aox4  Behaviors: Calm and cooperative  BG: Daily  Labs: Phos replaced on shift  Respiratory: WDL  Cardiac: WDL  Pain/Nausea: Denies nausea, pain managed with PRNs & scheduled meds  PRN: Lidocaine solution x2  Diet: Tube Feeds at 40ml/hr, mechanical soft diet   IV Access: L PIV Infusing  Infusion(s): Sodium Phosphate x1  Lines/Drains: NJ with tube feeds at goal  GI/: Voiding spontaneously without concerns, last BM 7/8/2024  Skin: Abdominal scars, vulva biopsy site  Mobility: Independent   Plan: Continue POC     "

## 2024-07-09 NOTE — PROGRESS NOTES
"Care Management Follow Up    Length of Stay (days): 12    Expected Discharge Date: 07/09/2024     Concerns to be Addressed: discharge planning     Patient plan of care discussed at interdisciplinary rounds: Yes    Anticipated Discharge Disposition: Home, Home Infusion     Anticipated Discharge Services: None  Anticipated Discharge DME: None    Patient/family educated on Medicare website which has current facility and service quality ratings: yes  Education Provided on the Discharge Plan: Yes  Patient/Family in Agreement with the Plan: yes    Referrals Placed by CM/SW: Internal Clinic Care Coordination, Home Infusion  Private pay costs discussed: Not applicable    Additional Information:  MSW met with pt and  at bedside to provide supportive visit to monitor emotional well being, FMLA questions, and any other psychosocial needs. Pt expressed no needs specific to FMLA paperwork and notes that it has been approved by her employer. Pt plans to work remotely when next FMLA period ends. MSW prompted pt to discuss the effect her pain has had on her emotional well being as she became tearful during previous visits. Pt verbalized feeling emotionally triggered when symptoms of pain flare up. Pt became tearful and notes she does not feel as though she has been \"doing well.\" MSW offered to connect pt with community resources specific to psychotherapists. Pt accepted offer and confirmed preference for therapist that specializes in chronic health conditions. MSW emailed pt following visit with education specific to finding a therapist, support groups, and peer mentorship programs. MSW to continue to follow.     VILLA Arndt, [facility]  Clinical       [facility]  Kidney, Pancreas, Auto-Islet   [address] [place] [id number]  [email or web address]  Office: [phone]  Fax: [phone]  Gender pronouns: she/her  Employed by " [employer]

## 2024-07-10 ENCOUNTER — TELEPHONE (OUTPATIENT)
Dept: TRANSPLANT | Facility: CLINIC | Age: 34
End: 2024-07-10

## 2024-07-10 ENCOUNTER — APPOINTMENT (OUTPATIENT)
Dept: LAB | Facility: CLINIC | Age: 34
End: 2024-07-10
Payer: COMMERCIAL

## 2024-07-10 ENCOUNTER — LAB (OUTPATIENT)
Dept: LAB | Facility: CLINIC | Age: 34
End: 2024-07-10
Payer: MEDICARE

## 2024-07-10 DIAGNOSIS — Z98.890 OTHER SPECIFIED POSTPROCEDURAL STATES: ICD-10-CM

## 2024-07-10 DIAGNOSIS — Z79.899 ENCOUNTER FOR LONG-TERM CURRENT USE OF MEDICATION: ICD-10-CM

## 2024-07-10 DIAGNOSIS — Z48.298 AFTERCARE FOLLOWING ORGAN TRANSPLANT: ICD-10-CM

## 2024-07-10 DIAGNOSIS — Z20.828 CONTACT WITH AND (SUSPECTED) EXPOSURE TO OTHER VIRAL COMMUNICABLE DISEASES: ICD-10-CM

## 2024-07-10 DIAGNOSIS — Z94.0 KIDNEY REPLACED BY TRANSPLANT: ICD-10-CM

## 2024-07-10 DIAGNOSIS — Z48.298 AFTERCARE FOLLOWING ORGAN TRANSPLANT: Primary | ICD-10-CM

## 2024-07-10 DIAGNOSIS — Z78.9 ON TUBE FEEDING DIET: ICD-10-CM

## 2024-07-10 LAB
ANION GAP SERPL CALCULATED.3IONS-SCNC: 9 MMOL/L (ref 7–15)
BUN SERPL-MCNC: 16.7 MG/DL (ref 6–20)
CALCIUM SERPL-MCNC: 10.2 MG/DL (ref 8.6–10)
CHLORIDE SERPL-SCNC: 106 MMOL/L (ref 98–107)
CREAT SERPL-MCNC: 0.86 MG/DL (ref 0.51–0.95)
DEPRECATED HCO3 PLAS-SCNC: 24 MMOL/L (ref 22–29)
EGFRCR SERPLBLD CKD-EPI 2021: 90 ML/MIN/1.73M2
ERYTHROCYTE [DISTWIDTH] IN BLOOD BY AUTOMATED COUNT: 15.2 % (ref 10–15)
GLUCOSE SERPL-MCNC: 111 MG/DL (ref 70–99)
HCT VFR BLD AUTO: 31.1 % (ref 35–47)
HGB BLD-MCNC: 9.5 G/DL (ref 11.7–15.7)
IGG SERPL-MCNC: 850 MG/DL (ref 610–1616)
MAGNESIUM SERPL-MCNC: 1.6 MG/DL (ref 1.7–2.3)
MCH RBC QN AUTO: 30.1 PG (ref 26.5–33)
MCHC RBC AUTO-ENTMCNC: 30.5 G/DL (ref 31.5–36.5)
MCV RBC AUTO: 98 FL (ref 78–100)
PHOSPHATE SERPL-MCNC: 2.5 MG/DL (ref 2.5–4.5)
PLATELET # BLD AUTO: 196 10E3/UL (ref 150–450)
POTASSIUM SERPL-SCNC: 4.8 MMOL/L (ref 3.4–5.3)
RBC # BLD AUTO: 3.16 10E6/UL (ref 3.8–5.2)
SODIUM SERPL-SCNC: 139 MMOL/L (ref 135–145)
TACROLIMUS BLD-MCNC: 11.9 UG/L (ref 5–15)
TME LAST DOSE: NORMAL H
TME LAST DOSE: NORMAL H
WBC # BLD AUTO: 1.7 10E3/UL (ref 4–11)

## 2024-07-10 PROCEDURE — 36415 COLL VENOUS BLD VENIPUNCTURE: CPT

## 2024-07-10 PROCEDURE — 84100 ASSAY OF PHOSPHORUS: CPT

## 2024-07-10 PROCEDURE — 87799 DETECT AGENT NOS DNA QUANT: CPT

## 2024-07-10 PROCEDURE — 83735 ASSAY OF MAGNESIUM: CPT

## 2024-07-10 PROCEDURE — 80048 BASIC METABOLIC PNL TOTAL CA: CPT

## 2024-07-10 PROCEDURE — 80197 ASSAY OF TACROLIMUS: CPT

## 2024-07-10 NOTE — TELEPHONE ENCOUNTER
Post Kidney and Pancreas Transplant Team Conference  Date: 7/10/2024  Transplant Coordinator: Radha Jorge     Attendees:  []  Dr. Ortiz [] Roxanne Betancur RN [] Betty Valencia LPN     []  Dr. Arellano [] Radha Jorge RN [] Diamond Atkinson LPN    [] Dr. Quezada [] Otilia Prather RN    [] Dr. Tsai [] Geneva Dennis RN [] Frida Harris RN   [] Dr. Burgess [] Shantelle Ko RN    [] Dr. Díaz [] Jael Olson RN    []  Dr. Patel [] Nia Pryor RN    [] Dr. Brooks [] Mao Mahan RN    [] Nicole Kowalski NP [] Mary Lou Corcoran RN    [] Pratima Macedo NP [] Brenda Zayas RN        Verbal Plan Read Back:   Cmv q2 weeks  Igg/cd4 level  allosure    Routed to RN Coordinator   Radha Jorge RN

## 2024-07-10 NOTE — PROGRESS NOTES
Transplant Infectious Diseases Inpatient Consultation      Ira Zamora MRN# [id number]   YOB: 1990 Age: 34 year old   Date of Admission and time: 6/27/2024  1:24 PM     Reason for consult: Genital and Oral ulcers         Recommendations:   Vulvar, oral, and esophageal ulcers   Agree that this could represent EBV-positive mucocutaneous ulcer, given positive EBV PCR on biopsy and negative EBV blood VL. However, will await dermatopathology results to determine whether the pathology results are consistent with this.   Agree with holding on additional steroids pending dermatopathology results   Have provided Ira with a paper regarding EBV-positive mucocutaneous ulcer and discussed this with her  IgG ordered and pending     Leukopenia   For her leukopenia, agree that this is most likely 2/2 cumulative effects of immunosuppression, medications that suppress her bone marrow.   CMV VL negative. Parvo B19 pending.   Holding TMP/SMX  Transplant will follow-up with her CBC tomorrow. If ANC<0.5, they will initiate G-CSF as an outpatient.   Could consider holding VGCV and monitoring weekly CMV VL vs transition to letermovir + Valcyte if her WBC does not improve with G-CSF  Received pentamidine on 7/10/24, as we are holding TMP/SMX    MSSA + Urine culture  Blood cultures (7/8) NGTD  Would recommend return to the hospital if she develops fevers/chills/other symptoms of systemic illness    Ira has follow-up appointment already scheduled with Dr. Calderon on 7/24 that she can keep as planned.     Attestation:  Total duration of visit including chart review, reviewing labs and imaging, interviewing and examining the patient, documentation, and sending communication to the primary treating team, all at the same day of this encounter, is: 54 minutes     Janee Bashir MD  Transplant Infectious Diseases Attending  [phone]          Synopsis of Clinical Presentation and Course   Transplant  Summary  Transplants:  4/17/2024 (Kidney); POD  83.  Coordinator Radha Jorge  Reason for Transplantation: Interstitial nephritis   Current Immunosuppression: MMF d/kamini on 6/17 due to ulcers. Restarting on 7/2/24. Tacrolimus (goal 8-10). Prednisone 10 mg/day (d/kamini 7/2/2024)     Ira Zamora is a 34 year old female with history of DDKT (4/17/24) and Chron's diases on Stelara (last dose 3/24), with course c/b aphthous ulcers in her mouth, esophageal ulcer, and labial ulcers. She initially developed aphthous oral ulcers in late May She was admitted on 6/5-6/9 due to oral and vaginal ulcers. Extensive infectious work-up negative (HSV swabs, pelvic exa, G/C and chlaydia, RPR). There was concern for MMF as a cause of her oral ulcers, so this was discontinued. Since discontinuing MMF, her oral ulcers have improved. However, her labial ulcer has not improved. She developed worsening odynophagia, which prompted admission on 6/27/24. EGD from 6/30/24 showed 3 esophageal ulcers. We are being consulted for additional assistance with work-up of infectious causes of her ulcers.         Active Problems and Infectious Diseases Issues:   Oral, vulvar, and esophageal ulcers   Vulvar biopsy with EBV + PCR (VL=18,770)  History of Chron's disease, on chronic Stelara (last in 3/2024)  EBV PCR returned positive on vulvar biopsy from 7/2 (VL = 18,770) with negative EBV blood PCR (7/9/24). In review of the literature there is a rare entity called EBV-positive mucocutaneous ulcer that can rarely occur in immunocompromised patients. Histology is very distinct in these lesions characterized by a polymorphous infiltrate and atypical large B-cell blasts often with Hodgkin/Lionel-Lana (HRS) cell-like morphology. These tend to occur in the oropharyngeal area, although vulvar lesions have also been described. Treatment usually involves decreasing immunosuppression as much as able. Most resolve spontaneously, although some can have a  relapsing/remitting course. Since she was previously EBV IgG positive, this would represent a reactivation of her EBV rather than a primary infection. Thus, antivirals really do not play a role here even if this does represent an EBV-related lesion.     Note that there is still a broad differential, including autoimmune/inflammatory causes, while awaiting final pathology.     Other negative infectious work-up has included:  HSV PCR from blood, swabs of oral (6/5 and 6/28) and vulvar lesions (6/5, 7/2). HIV, coxsackie IgG, Mycoplasma pneumoniae PCR, blood adenovirus PCR, urine histo antigen, GC/chlamydia, treponemal testing all negative. We have essentially ruled out CMV, adenovirus, influenza, histoplasmosis, CMV, and HSV at this point. Fungal and AFB cultures pending.     Pancytopenia  Likely medication related. CMV VL returned negative this afternoon. I sent for parvovirus just in case. She is currently VGCV, AZA, and has just re-started TMP/SMX. TMP/SMX is being held. Note that if she remains neutropenic (ANC <0.5) will need G-CSF. Could also do a trial of holding VGCV and doing weekly CMV VL for preemptive therapy with weekly CMV monitoring vs transition to letermovir to see if this increases her WBC.     MSSA + urine culture   Had some low-level dysuria on 7/6. UA with moderate blood, trace LE. Culture with ,00 CFU MSSA. Symptoms resolved without treatment. She did have a de leon placed during her vulvar biopsy on 7/2, so likely this is the etiology. To be cautious, we have obtained blood cultures to ensure she does not have bacteremia. She has not had any fevers and appears systemically well.     Transplant Checklist  - QTc interval: 464 on 6/7/24  - Pneumocystis prophylaxis: Inhaled pentamidine (last 6/7/24). Held TMP/SMX due to leukopenia. Planning to restart TMP/SMX on 7/5/24.   - Bacterial prophylaxis:  None  - Fungal prophylaxis: Fluconazole  - Serostatus & viral prophylaxis: CMV D+/R-, HSV ?, EBV  D+/R+, VZV +, Valgancyclovir  - Immunization status:  N/A until 3 months after transplant   - Gamma globulin status: No lab results found.  - Antibiotic allergies: None        Old Problems and Infectious Diseases Issues:   None         History of Present Illness    Ira Zamora is a 34 year old female with history of DDKT (10/17/24) and Chron's diases on [facility], with course c/b aphthous ulcers in her mouth, esophageal ulcer, and labial ulcers. She initially developed aphthous oral ulcers in late May. Her timeline is as follows:     5/30/24: [facility] with 1 week history of swollen gums and mouth sore. Had low-grade fever to 100.9. Treated with Magic Mouthwash.   6/3/24: Presents to [facility] with 2-3 day history of labial ulcer. Treated with topical lidocaine and soaks. Given Valtrex, but did not start this  6/5-6/9/24: Admitted for work-up of oral and vaginal ulcers. Seen by [facility]. Work-up included negative GC/chlamydia, treponema antibody, HIV, Coxackie virus, M pneumoniae PCR, urine histo, CMV and HSV swabs of lesion (buccal), and Karius test (low-level of CMV only).  Mid-July: MMF stopped in case this was contributing to ulcers.   6/24/24: Seen by [facility], who felt ulcers were most likely 2/2 MMF. Planned to watchfully wait for a few weeks to see if things improved.   6/27/24: Admitted with ~1 week history of odynophagia and inability to consume solid food.     Since admission, she has been seen by GYN and GI. GI did EGD on 6/28, which showed 3 esophageal ulcers. Biopsies were negative for CMV or HSV viral inclusions. No evidence of malignancy.      Interval Events   Since last seen by me on 7/5/24, biopsy results have returned with EBV + tissue. Blood EBV level is negative. She is planning to discharge to home today to follow-up outpatient. She feels that her lesions are slowly healing.          Immunizations:     Immunization History   Administered Date(s) Administered    COVID-19 Bivalent 12+ (Pfizer)  "09/23/2022    COVID-19 MONOVALENT 12+ (Pfizer) 01/21/2021, 02/10/2021, 09/16/2021    COVID-19 Monovalent 12+ (Pfizer 2022) 02/25/2022    HPV Quadrivalent 10/28/2008, 06/20/2011, 10/28/2011    HepB, Unspecified 12/30/2002    Hepatitis A (ADULT 19+) 02/19/2019, 08/19/2019    Hepatitis B, Peds 12/30/2002    Influenza (intradermal) 09/14/2021    Influenza Vaccine 18-64 (Flublok) 10/13/2023    Pneumo Conj 13-V (2010&after) 10/23/2018    Pneumococcal 20 valent Conjugate (Prevnar 20) 11/17/2023    Pneumococcal 23 valent 11/22/2021    Pneumococcal, Unspecified 12/18/2018, 11/22/2021    TDAP (Adacel,Boostrix) 10/23/2012    Td (Adult), Adsorbed 04/12/2004, 01/01/2006            Allergies:     Allergies   Allergen Reactions    Amlodipine Headache and Other (See Comments)     Other Reaction(s): Unknown    Omeprazole Other (See Comments)     All PPIs per patient    Proton Pump Inhibitors Nephrotoxicity     No PPIs due to history of renal failure due to interstitial nephritis    Tegaderm Transparent Dressing (Informational Only) Blisters             Medications:   Medications that Require Transfusion:   No current facility-administered medications for this encounter.     Scheduled Medications:   No current facility-administered medications for this encounter.          Physical Exam     Physical Exam     Vital signs:  /88   Pulse 92   Temp 98.2  F (36.8  C)   Resp 16   Ht 1.575 m (5' 2\")   Wt 50.2 kg (110 lb 11.2 oz)   SpO2 100%   BMI 20.25 kg/m      GENERAL: alert and no distress. Lying in bed and talking with parents  HEENT: NG tube in place. No scleral icterus.   RESP: Breathing comfortably on room air. No accessory muscle use.   CV: Appears well-perfused.    (female): Not examined today.   MS: no gross musculoskeletal defects noted, no edema  Sink: No visible rashes on face, arms       Data     Data   Laboratory data and imaging listed below was reviewed prior to this encounter.     Microbiology:    Culture "   Date Value Ref Range Status   07/08/2024 No growth after 1 day  Preliminary   07/08/2024 No growth after 1 day  Preliminary   07/06/2024 50,000-100,000 CFU/mL Staphylococcus aureus (A)  Final   07/02/2024 No growth after 7 days  Preliminary   07/02/2024 No Growth  Final   06/06/2024 No Growth  Final   05/03/2024 No Growth  Final   , CD4: No lab results found. and HIV RNA: No lab results found., Hepatitis B Testing:   Recent Labs   Lab Test 04/17/24  0958 09/29/22  1140   HBCAB Nonreactive Nonreactive   HEPBANG Nonreactive Nonreactive   , Hepatitis C Testing:   Hepatitis C Antibody   Date Value Ref Range Status   04/17/2024 Nonreactive Nonreactive Final     Comment:     A nonreactive screening test result does not exclude the possibility of exposure to or infection with HCV. Nonreactive screening test results in individuals with prior exposure to HCV may be due to antibody levels below the limit of detection of this assay or lack of reactivity to the HCV antigens used in this assay. Patients with recent HCV infections (<3 months from time of exposure) may have false-negative HCV antibody results due to the time needed for seroconversion (average of 8 to 9 weeks).   09/29/2022 Nonreactive Nonreactive Final   ,  HSV 1/2 IgG: No lab results found., HVS 1/2 PCR: No lab results found., VZV PCR: No lab results found., and VZV IgG:   Recent Labs   Lab Test 09/29/22  1140 09/14/21  1435   VZVIGGIV 1,687.0 2,203.0*   VZVIGG Positive Positive*   , Quantitative CMV PCR:   Recent Labs   Lab Test 07/09/24  0952 06/27/24  0829 06/05/24  2232 05/31/24  0930 04/23/24  0735 04/17/24  0958   CMVQNT <35* Not Detected Not Detected Not Detected Not Detected Not Detected   CMVLOG <1.5  --   --   --   --   --     and CMV IgG:   Recent Labs   Lab Test 04/17/24  0958 09/29/22  1140   CMVIGGIV <0.20 <0.20   CMVIGG No detectable antibody. No detectable antibody.   , and EBV EA IgG: No lab results found.                    Janee BARNES  MD Krysten  [facility]  Contact information available via [facility] Paging/Directory     07/09/2024

## 2024-07-11 DIAGNOSIS — N76.6 VULVAR ULCER: Primary | ICD-10-CM

## 2024-07-11 LAB
B19V DNA SER QL NAA+PROBE: NOT DETECTED
BK VIRUS DNA IU/ML, INSTRUMENT (6800): 26 IU/ML
BK VIRUS SPECIMEN TYPE: ABNORMAL
BKV DNA SPEC NAA+PROBE-LOG#: 1.4 {LOG_COPIES}/ML

## 2024-07-11 NOTE — PROGRESS NOTES
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)   - vulvar ulceration, isolated w/ EBV + tissue sample - Dermpath review of tissue pending (message Dermpath on )  -Continue PTA transplant meds, currently on 5 mg prednisone down from 40 mg  -Continue topical regimen with topical triamcinolone ointment 0.1%/topical triamcinolone paste    # Hx of Crohn's disease     # History of renal transplant   # Immunosuppression related to above     ___________________________________________    Assessment & Plan:     # 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 Dermatology was consulted to assist with work up for ulcerations while hospitalized.   A broad differential for ulcerative mucocutaneous disorders was reviewed and considered while she was hospitalized and include but are not limited to infectious causes such as EBV which returned positive on vulvar tissue, mucocutaneous manifestations of her IBD (Crohn's) has overall been well controlled but these ulcerations could represent that in the mouth and vulvar region - dermpath review remains pending as of 24, additionally PG (pyoderma grangrenosum) in Crohn's could be considered. Otherwise recurrent oral and genital aphthae in the absence of Behçet disease could represent complex aphthosis, Behçet's or something like mucous  membrane pemphigoid. Methotrexate induced mucocutaneous disease (now stopped methotrexate and hasn't been on for years).   The patient had a vulvar biopsy s/p debridement. 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  Mainstay of treatment is to decrease immunosuppression which is already being done.  She remains on 5 mg of prednisone and her disease seems to be much improved at this time. It is not usually associated with active viremia (which she did not have in the hospital). We will continue to monitor healing of her oral and vulvar ulceration.  We will also plan to do a full-body skin check for her given her transplant and immunosuppression status. The patient and her  understood and agreed to the plan 07/18/24.  All questions were answered.      Review of inpatient workup:   - EBV PCR plasma negative    - EBV + vulvar tissue sample , dermpath review remains pending, features such as pseudoepitheliomatous hyperplasia at the borders, mixed infiltrate w/ lymphs plasma cells, apoptic cells, eos, and large blastic cells with hodgkin like morphology that stain + for CD30/PAX-5 , tissue necrosis can also be present   - CMV negative   - HSV and VZV swabs negative  - fungal cultures negative Attest Note  - Treponema negative    - BRAVO and ANCA negative   - MPO negative and proteinase 3 negative     Plan:   -Vulvar ulceration, isolated w/ EBV + tissue sample - Dermpath review of tissue pending (message Dermpath on 7/18)  -Continue PTA transplant meds, currently on 5 mg prednisone down from 40 mg  -Continue topical regimen with topical triamcinolone ointment 0.1%/topical triamcinolone paste  -Follow up in 1 month     # Hx of Crohn's disease - on Stelara     # History of renal transplant   # Immunosuppression related to above - on PTA immunosuppression     Procedures Performed:   None    Follow-up: 1 month(s) in-person,  or earlier for new or changing lesions    Staff and Resident:   Dr. CAMILO Fitch and Nicole Brito, DO   ____________________________________________    CC: Derm Problem (Hospital follow up- Ulcer)    HPI:  Ms. Ira Zamora is a(n) 34 year old female who presents today as a return patient for follow up vulvar and oral ulcers     She is doing very well today and reports that pain is nearly resolved   2 residual oral ulcers present   Reports that vulvar ulcer has no residual pain   No new fever, chills   Reports that she has been able to eat without difficulty and appetite is returning     Patient is otherwise feeling well, without additional skin concerns.    Labs Reviewed:  N/A     Physical Exam:  Vitals: There were no vitals taken for this visit.  SKIN: Focused examination of oral cavity and vulva was performed.  - 1 shallow ulceration to the buccal mucosa, right molar and 1 shallow ulceration to the anterior inferior labial mucosa   - 1 large ulceration to the labia majora, well healing, mild fibrinous tissue present, no surrounding redness, bulla or other skin changes noted   - No other lesions of concern on areas examined.       Medications:  Current Outpatient Medications   Medication Sig Dispense Refill    acetaminophen (TYLENOL) 500 MG tablet Take 1,000 mg by mouth every 6 hours as needed      atorvastatin (LIPITOR) 10 MG tablet Take 1 tablet (10 mg) by mouth daily 30 tablet 1    azaTHIOprine (IMURAN) 5 mg/mL SUSP Take 20 mLs (100 mg) by mouth daily 600 mL 11    famotidine (PEPCID) 20 MG tablet Take 2 tablets (40 mg) by mouth 2 times daily 60 tablet 1    hydrOXYzine HCl (ATARAX) 25 MG tablet Take 1 tablet (25 mg) by mouth every 6 hours as needed for other (adjuvant pain) 20 tablet 0    levonorgestrel (KYLEENA) 19.5 MG IUD 1 Device by Intrauterine route      magnesium oxide (MAG-OX) 400 MG tablet Take 2 tablets (800 mg) by mouth daily 60 tablet 0    PARoxetine (PAXIL) 30 MG tablet Take 30 mg by mouth every  evening      predniSONE (DELTASONE) 5 MG tablet Take 1 tablet (5 mg) by mouth daily 30 tablet 2    tacrolimus (GENERIC EQUIVALENT) 0.5 MG capsule Take 1 capsule (0.5 mg) by mouth 2 times daily Total dose: 2.5mg twice daily 60 capsule 11    tacrolimus (GENERIC EQUIVALENT) 1 MG capsule Take 2 capsules (2 mg) by mouth 2 times daily Total dose: 2.5mg twice daily 120 capsule 11    triamcinolone (KENALOG) 0.1 % external lotion Apply topically 2 times daily 60 mL 0    triamcinolone (KENALOG) 0.1 % paste Take by mouth 2 times daily 5 g 3    valGANciclovir (VALCYTE) 450 MG tablet Take 1 tablet (450 mg) by mouth daily      Vonoprazan Fumarate (VOQUEZNA) 20 MG TABS Take 20 mg by mouth daily 60 tablet 0    azaTHIOprine (IMURAN) 50 MG tablet Take 2 tablets (100 mg) by mouth daily (Patient not taking: Reported on 7/18/2024) 60 tablet 11    benzocaine (ORAJEL MAXIMUM STRENGTH) 20 % GEL gel Take by mouth 4 times daily as needed for mouth sores (Patient not taking: Reported on 7/18/2024) 9 g 0    lidocaine, viscous, (XYLOCAINE) 2 % solution Take 5 mLs by mouth every 2 hours as needed for moderate pain ; Max 8 doses/24 hour period. (Patient not taking: Reported on 7/18/2024) 100 mL 0    mineral oil-hydrophilic petrolatum (AQUAPHOR) external ointment Apply topically every hour as needed for other (barrier cream) (Patient not taking: Reported on 7/18/2024) 50 g 0    multivitamins w/minerals liquid Place 15 mLs into Feeding Tube daily (Patient not taking: Reported on 7/18/2024) 237 mL 2    oxyCODONE (ROXICODONE) 5 MG/5ML solution Take 2.5-5 mLs (2.5-5 mg) by mouth every 8 hours as needed for severe pain (Patient not taking: Reported on 7/18/2024) 15 mL 0    sulfamethoxazole-trimethoprim (BACTRIM) 400-80 MG tablet HOLD d/t leukopenia. Pentamidine given 7/9/24. (Patient not taking: Reported on 7/18/2024) 90 tablet 2    ustekinumab (STELARA) 90 MG/ML Inject 90 mg Subcutaneous once every eight weeks (Patient not taking: Reported on  6/27/2024)       No current facility-administered medications for this visit.      Past Medical History:   Patient Active Problem List   Diagnosis    Crohn's disease (H)    CKD (chronic kidney disease) stage 2, GFR 60-89 ml/min    Secondary renal hyperparathyroidism (H24)    Long QT interval    Kidney replaced by transplant    Immunosuppressed status (H24)    Dehydration    Aftercare following organ transplant    Mouth ulcers    Vaginal ulcer    Leukopenia, unspecified type    Severe malnutrition (H24)    Need for pneumocystis prophylaxis    Anemia in chronic renal disease    Hypomagnesemia    Odynophagia    Inadequate oral intake    Vulvar lesion    Ulcer of esophagus without bleeding    Other neutropenia (H24)     Past Medical History:   Diagnosis Date    Anemia in chronic kidney disease     Anxiety 2007    Ascending aorta dilation (H24) 2020 2020: ascending aorta 3.8 cm, aortic sinus 3.6 cm. 2024 Echo: Ao root diam: 3.2 cm, asc Aorta Diam: 3.8 cm    ASCUS with positive high risk HPV cervical 2017    ASCUS with positive high risk HPV cervical 07/01/2017    Behcet's syndrome (H) 06/2024    Crohn's disease (H) 2004    Esophageal ulcer 06/27/2024    ESRD (end stage renal disease) on dialysis (H) 2020    GERD (gastroesophageal reflux disease)     Hidradenitis suppurativa 2015    History of blood transfusion     Hypertension     Juvenile rheumatoid arthritis (H)     Asymptomatic in adulthood    Kidney replaced by transplant 04/17/2024    En bloc. Induction w/ Thymoglobulin.    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       CC No referring provider defined for this encounter. on close of this encounter..

## 2024-07-13 LAB
BACTERIA BLD CULT: NO GROWTH
BACTERIA BLD CULT: NO GROWTH

## 2024-07-15 DIAGNOSIS — Z94.0 KIDNEY REPLACED BY TRANSPLANT: Chronic | ICD-10-CM

## 2024-07-15 RX ORDER — ATORVASTATIN CALCIUM 10 MG/1
10 TABLET, FILM COATED ORAL DAILY
Qty: 30 TABLET | Refills: 1 | Status: SHIPPED | OUTPATIENT
Start: 2024-07-15 | End: 2024-09-16

## 2024-07-16 ENCOUNTER — LAB (OUTPATIENT)
Dept: LAB | Facility: OTHER | Age: 34
End: 2024-07-16
Payer: MEDICARE

## 2024-07-16 DIAGNOSIS — Z79.899 ENCOUNTER FOR LONG-TERM CURRENT USE OF MEDICATION: ICD-10-CM

## 2024-07-16 DIAGNOSIS — Z98.890 OTHER SPECIFIED POSTPROCEDURAL STATES: ICD-10-CM

## 2024-07-16 DIAGNOSIS — Z20.828 CONTACT WITH AND (SUSPECTED) EXPOSURE TO OTHER VIRAL COMMUNICABLE DISEASES: ICD-10-CM

## 2024-07-16 DIAGNOSIS — Z94.0 KIDNEY REPLACED BY TRANSPLANT: ICD-10-CM

## 2024-07-16 DIAGNOSIS — Z48.298 AFTERCARE FOLLOWING ORGAN TRANSPLANT: ICD-10-CM

## 2024-07-16 LAB
ANION GAP SERPL CALCULATED.3IONS-SCNC: 10 MMOL/L (ref 7–15)
BUN SERPL-MCNC: 31.6 MG/DL (ref 6–20)
CALCIUM SERPL-MCNC: 10.6 MG/DL (ref 8.8–10.4)
CHLORIDE SERPL-SCNC: 105 MMOL/L (ref 98–107)
CREAT SERPL-MCNC: 0.75 MG/DL (ref 0.51–0.95)
EGFRCR SERPLBLD CKD-EPI 2021: >90 ML/MIN/1.73M2
ERYTHROCYTE [DISTWIDTH] IN BLOOD BY AUTOMATED COUNT: 15.9 % (ref 10–15)
GLUCOSE SERPL-MCNC: 95 MG/DL (ref 70–99)
HCO3 SERPL-SCNC: 26 MMOL/L (ref 22–29)
HCT VFR BLD AUTO: 33.3 % (ref 35–47)
HGB BLD-MCNC: 10.1 G/DL (ref 11.7–15.7)
MAGNESIUM SERPL-MCNC: 1.2 MG/DL (ref 1.7–2.3)
MCH RBC QN AUTO: 30.7 PG (ref 26.5–33)
MCHC RBC AUTO-ENTMCNC: 30.3 G/DL (ref 31.5–36.5)
MCV RBC AUTO: 101 FL (ref 78–100)
PHOSPHATE SERPL-MCNC: 2.9 MG/DL (ref 2.5–4.5)
PLATELET # BLD AUTO: 263 10E3/UL (ref 150–450)
POTASSIUM SERPL-SCNC: 4.6 MMOL/L (ref 3.4–5.3)
RBC # BLD AUTO: 3.29 10E6/UL (ref 3.8–5.2)
SODIUM SERPL-SCNC: 141 MMOL/L (ref 135–145)
TACROLIMUS BLD-MCNC: 11.8 UG/L (ref 5–15)
TME LAST DOSE: NORMAL H
TME LAST DOSE: NORMAL H
WBC # BLD AUTO: 3.5 10E3/UL (ref 4–11)

## 2024-07-16 PROCEDURE — 36415 COLL VENOUS BLD VENIPUNCTURE: CPT

## 2024-07-16 PROCEDURE — 85027 COMPLETE CBC AUTOMATED: CPT

## 2024-07-16 PROCEDURE — 83735 ASSAY OF MAGNESIUM: CPT

## 2024-07-16 PROCEDURE — 80197 ASSAY OF TACROLIMUS: CPT

## 2024-07-16 PROCEDURE — 84100 ASSAY OF PHOSPHORUS: CPT

## 2024-07-16 PROCEDURE — 80048 BASIC METABOLIC PNL TOTAL CA: CPT

## 2024-07-17 ENCOUNTER — TELEPHONE (OUTPATIENT)
Dept: TRANSPLANT | Facility: CLINIC | Age: 34
End: 2024-07-17
Payer: COMMERCIAL

## 2024-07-17 DIAGNOSIS — Z94.0 KIDNEY REPLACED BY TRANSPLANT: Primary | ICD-10-CM

## 2024-07-17 LAB
CMV DNA SPEC NAA+PROBE-ACNC: <35 IU/ML
CMV DNA SPEC NAA+PROBE-LOG#: <1.5 {LOG_COPIES}/ML

## 2024-07-17 RX ORDER — TACROLIMUS 1 MG/1
2 CAPSULE ORAL 2 TIMES DAILY
Qty: 120 CAPSULE | Refills: 11 | Status: SHIPPED | OUTPATIENT
Start: 2024-07-17 | End: 2024-07-25

## 2024-07-17 RX ORDER — AZATHIOPRINE 50 MG/1
100 TABLET ORAL DAILY
Qty: 60 TABLET | Refills: 11 | Status: SHIPPED | OUTPATIENT
Start: 2024-07-17

## 2024-07-17 RX ORDER — TACROLIMUS 0.5 MG/1
0.5 CAPSULE ORAL 2 TIMES DAILY
Qty: 60 CAPSULE | Refills: 11 | Status: SHIPPED | OUTPATIENT
Start: 2024-07-17 | End: 2024-07-25

## 2024-07-17 NOTE — TELEPHONE ENCOUNTER
Issue: low mag and high tac    Outcome: patient was not taking magnesium supplement. Added back to her regime and will ensure she starts taking. Tac 3mg (level high) so she will decrease to 2.5mg bid

## 2024-07-18 ENCOUNTER — OFFICE VISIT (OUTPATIENT)
Dept: DERMATOLOGY | Facility: CLINIC | Age: 34
End: 2024-07-18
Payer: MEDICARE

## 2024-07-18 DIAGNOSIS — K12.1 ORAL ULCER: ICD-10-CM

## 2024-07-18 DIAGNOSIS — N76.6 VULVAR ULCER: Primary | ICD-10-CM

## 2024-07-18 PROCEDURE — 99214 OFFICE O/P EST MOD 30 MIN: CPT | Mod: GC | Performed by: STUDENT IN AN ORGANIZED HEALTH CARE EDUCATION/TRAINING PROGRAM

## 2024-07-18 RX ORDER — TRIAMCINOLONE ACETONIDE 0.25 MG/G
OINTMENT TOPICAL 2 TIMES DAILY
Status: CANCELLED | OUTPATIENT
Start: 2024-07-18

## 2024-07-18 RX ORDER — TRIAMCINOLONE ACETONIDE 0.1 %
PASTE (GRAM) DENTAL 2 TIMES DAILY
Qty: 5 G | Refills: 3 | Status: SHIPPED | OUTPATIENT
Start: 2024-07-18

## 2024-07-18 ASSESSMENT — PAIN SCALES - GENERAL: PAINLEVEL: NO PAIN (0)

## 2024-07-18 NOTE — PATIENT INSTRUCTIONS
"-Continue PTA transplant meds, currently on 5 mg prednisone down from 40 mg  -Continue topical regimen with topical triamcinolone ointment 0.1%/topical triamcinolone paste   -Follow up in 1 month, we will do a full skin exam at that time       Sun Protection  Sunscreen   What does \"broad spectrum mean\"?  Broad spectrum sunscreens protect against both UVA and UVB radiation. UVC is filtered out by the ozone layer.     What does SPF mean?   SPF stands for  Sun Protection Factor  and represents the ability to screen only UVB (burning) rays. UVB rays are mostly blocked in all sunscreens, but only those that contain titanium dioxide, zinc oxide, mexoryl or Parsol 1789 (avobenzone) block the UVA spectrum. Even though a sunscreen is labeled  UVA/UVB Protection  that is not entirely accurate because products that only partially protect against UVA can claim to protect against both UVA and UVB.     What SPF should I chose?   Aim to get a sunscreen that is at least sun protection factor (SPF) 30. SPF 15 provides about 92-93% coverage, SPF 30 about 95-97% coverage, and SPF 45 about 98% coverage. That is to say, SPF 30 is not twice as good as SPF 15. The reason why we recommend SPF 30 is because we are usually only putting on half the necessary amount of sunscreen to achieve the advertised protection. That means that it is very possible that your SPF 30 sunscreen is only providing you with SPF 15 coverage based on how much you are applying. SPF 15 (92-93% coverage) is the absolute minimal that we recommend. Similarly, the benefit of sunscreens with SPF higher than 50 is that even if you put on less than the required amount, you are likely still getting good protection (ex: even if you apply only half the recommended amount of , it should still provide you with an SPF of 50).     How much should I apply?  If covering your whole body, you should be using 30 grams, or one ounce, which is how much is in one shot glass! That s " a THICK layer! May times, you are only applying half the recommended amount, which means that you are only getting half the SPF (for example, you may be using SPF 30 but if you're only applying half the recommended amount, you're only getting SPF 15 protection.      When do I need to wear sunscreen?  Every day, rain or shine! Even on a rainy day or a day when you are only indoors, you are still being exposed harmful UV radiation from the sun. We usually recommend physical/mineral sunscreens (active ingredient is titanium dioxide or zinc oxide) as these ingredients have been around for many years, there is no concern of them being absorbed into the bloodstream, and they are coral reef friendly! However, the best sunscreen is the one that you will use everyday.     What about my kids?  Sunscreen is not recommended for infants under the age of 6 months. Use clothing, shade and sun avoidance for small infants. For kids older than 6 months, we recommend that you should use only mineral/physical sunscreens that have zinc oxide as the active ingredient. Sun-protective clothing and hats are also important for people of all ages.     Sun protective clothing and Resources   Coolibar (www.coolibar.Solar Site Design)  Roomtag (wwwPlyfe)  Athleta (wwwahoyDoc)  Ascots of [place] (www.Zadby)  Carve Designs (Zarpo) - affordable  Skinz (i2O Waterskinz.com)    Long sleeve - La Cool DRI UPF 50 or Gilliam PFG UPF 50  Edmundie - Gilliam PFG UPF 50  Swimshirt/Rash Guard - Priti UPF 50 (on Amazon)  Neck - Outdoor Research Ubertubes (www.outdoorresearch.Solar Site Design)      Do I need tinted sunscreen?  There is more and more research showing that visible light can also lead to discoloration (such as melasma). Tinted sunscreens (which contain iron oxides) protect against visible light as an added bonus.     What brands do you recommend?  Physical/Mineral Sunscreens (in no particular order)  Elta MD UV Physical Broad-Spectrum SPF 41  Sunscreen (Tinted, $33)  Skin Ceuticals Physical Fusion UV Defense SPF 50 (Tinted, $34)  Unsun Mineral Tinted Face Sunscreen (Tinted, with 2 shade ranges, $29)  It Cosmetics CC+ Cream with SPF 50+ (Tinted, can also double as foundation/coverage -- great range of shades, $40)  Biossance Squalane + Zinc Sheer Mineral Sunscreen SPF 30 PA +++ (goes on white then blends in, $30)  Cerave 100% Mineral Sunscreen SPF 50 Face (good for sensitive skin, $15)  La Roche Posay Anthelios Mineral Zinc Oxide Sunscreen SPF 50 ($35)  La Roche Posay Anthelios Mineral Tinted Sunscreen for Face SPF 50 ($35)  Think Sport Sunscreen (great for sports, though has more of a white cast, $20)  Think Baby Sunscreen (for kids, $21)  Color Science Sunforgettable Total Protection Brush On Shield SPF 50 (Multiple tints, $130)    Chemical Sunscreens  Vee Jacinto Weightless Protection SPF 30 ($48)  Cierra SPF Brightening Moisturizer ($30)  Urban Skin Complexion Protection Moisturizer SPF 30 ($20)  Total Defense + Repair Broad Spectrum SPF 34 ($68)  Clarins UV PLUS Anti-Pollution Sunscreen Multi-Protection Tint SPF 50 (Multiple tints, $45)  Neutrogena Healthy Skin Glow Sheers Tinted Moisturizer with SPF 20 (Multiple tints, $11)

## 2024-07-18 NOTE — NURSING NOTE
Dermatology Rooming Note    Ira Zamora's goals for this visit include:   Chief Complaint   Patient presents with    Derm Problem     Hospital follow up- Ulcer     Bruce Musa, EMT  Clinic Support  [facility]     [phone]    Employed by [employer]

## 2024-07-18 NOTE — LETTER
2024       RE: Ira Zamora  [address]  [place] [address]     Dear Colleague,    Thank you for referring your patient, Ira Zamora, to the [facility] DERMATOLOGY [facility] [place] at [facility]. Please see a copy of my visit note below.    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)   - vulvar ulceration, isolated w/ EBV + tissue sample - Dermpath review of tissue pending (message Dermpath on )  -Continue PTA transplant meds, currently on 5 mg prednisone down from 40 mg  -Continue topical regimen with topical triamcinolone ointment 0.1%/topical triamcinolone paste    # Hx of Crohn's disease     # History of renal transplant   # Immunosuppression related to above     ___________________________________________    Assessment & Plan:     # 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 Dermatology was consulted to assist with work up for ulcerations while hospitalized.   A broad differential for ulcerative mucocutaneous disorders was reviewed and considered while she was hospitalized and include but are not limited to infectious causes such as EBV which returned positive on vulvar tissue, mucocutaneous manifestations of her IBD (Crohn's) has overall been well controlled but these ulcerations could represent that in  the mouth and vulvar region - dermpath review remains pending as of 07/18/24, additionally PG (pyoderma grangrenosum) in Crohn's could be considered. Otherwise recurrent oral and genital aphthae in the absence of Behçet disease could represent complex aphthosis, Behçet's or something like mucous membrane pemphigoid. Methotrexate induced mucocutaneous disease (now stopped methotrexate and hasn't been on for years).   The patient had a vulvar biopsy s/p debridement. 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  Mainstay of treatment is to decrease immunosuppression which is already being done.  She remains on 5 mg of prednisone and her disease seems to be much improved at this time. It is not usually associated with active viremia (which she did not have in the hospital). We will continue to monitor healing of her oral and vulvar ulceration.  We will also plan to do a full-body skin check for her given her transplant and immunosuppression status. The patient and her  understood and agreed to the plan 07/18/24.  All questions were answered.      Review of inpatient workup:   - EBV PCR plasma negative    - EBV + vulvar tissue sample , dermpath review remains pending, features such as pseudoepitheliomatous hyperplasia at the borders, mixed infiltrate w/ lymphs plasma cells, apoptic cells, eos, and large blastic cells with hodgkin like morphology that stain + for CD30/PAX-5 , tissue necrosis can also be present   - CMV negative   - HSV and VZV swabs negative  - fungal cultures negative Attest Note  - Treponema negative    - BRAVO and ANCA negative   - MPO negative and proteinase 3 negative     Plan:   -Vulvar ulceration, isolated w/ EBV + tissue sample - Dermpath review of tissue pending (message Dermpath on 7/18)  -Continue PTA transplant meds, currently on 5 mg prednisone down from 40 mg  -Continue topical regimen with  topical triamcinolone ointment 0.1%/topical triamcinolone paste  -Follow up in 1 month     # Hx of Crohn's disease - on Stelara     # History of renal transplant   # Immunosuppression related to above - on PTA immunosuppression     Procedures Performed:   None    Follow-up: 1 month(s) in-person, or earlier for new or changing lesions    Staff and Resident:   Dr. CAMILO Fitch and Nicole Brito, DO   ____________________________________________    CC: Derm Problem (Hospital follow up- Ulcer)    HPI:  Ms. Ira Zamora is a(n) 34 year old female who presents today as a return patient for follow up vulvar and oral ulcers     She is doing very well today and reports that pain is nearly resolved   2 residual oral ulcers present   Reports that vulvar ulcer has no residual pain   No new fever, chills   Reports that she has been able to eat without difficulty and appetite is returning     Patient is otherwise feeling well, without additional skin concerns.    Labs Reviewed:  N/A     Physical Exam:  Vitals: There were no vitals taken for this visit.  SKIN: Focused examination of oral cavity and vulva was performed.  - 1 shallow ulceration to the buccal mucosa, right molar and 1 shallow ulceration to the anterior inferior labial mucosa   - 1 large ulceration to the labia majora, well healing, mild fibrinous tissue present, no surrounding redness, bulla or other skin changes noted   - No other lesions of concern on areas examined.       Medications:  Current Outpatient Medications   Medication Sig Dispense Refill     acetaminophen (TYLENOL) 500 MG tablet Take 1,000 mg by mouth every 6 hours as needed       atorvastatin (LIPITOR) 10 MG tablet Take 1 tablet (10 mg) by mouth daily 30 tablet 1     azaTHIOprine (IMURAN) 5 mg/mL SUSP Take 20 mLs (100 mg) by mouth daily 600 mL 11     famotidine (PEPCID) 20 MG tablet Take 2 tablets (40 mg) by mouth 2 times daily 60 tablet 1     hydrOXYzine HCl (ATARAX) 25 MG tablet Take 1 tablet (25 mg)  by mouth every 6 hours as needed for other (adjuvant pain) 20 tablet 0     levonorgestrel (KYLEENA) 19.5 MG IUD 1 Device by Intrauterine route       magnesium oxide (MAG-OX) 400 MG tablet Take 2 tablets (800 mg) by mouth daily 60 tablet 0     PARoxetine (PAXIL) 30 MG tablet Take 30 mg by mouth every evening       predniSONE (DELTASONE) 5 MG tablet Take 1 tablet (5 mg) by mouth daily 30 tablet 2     tacrolimus (GENERIC EQUIVALENT) 0.5 MG capsule Take 1 capsule (0.5 mg) by mouth 2 times daily Total dose: 2.5mg twice daily 60 capsule 11     tacrolimus (GENERIC EQUIVALENT) 1 MG capsule Take 2 capsules (2 mg) by mouth 2 times daily Total dose: 2.5mg twice daily 120 capsule 11     triamcinolone (KENALOG) 0.1 % external lotion Apply topically 2 times daily 60 mL 0     triamcinolone (KENALOG) 0.1 % paste Take by mouth 2 times daily 5 g 3     valGANciclovir (VALCYTE) 450 MG tablet Take 1 tablet (450 mg) by mouth daily       Vonoprazan Fumarate (VOQUEZNA) 20 MG TABS Take 20 mg by mouth daily 60 tablet 0     azaTHIOprine (IMURAN) 50 MG tablet Take 2 tablets (100 mg) by mouth daily (Patient not taking: Reported on 7/18/2024) 60 tablet 11     benzocaine (ORAJEL MAXIMUM STRENGTH) 20 % GEL gel Take by mouth 4 times daily as needed for mouth sores (Patient not taking: Reported on 7/18/2024) 9 g 0     lidocaine, viscous, (XYLOCAINE) 2 % solution Take 5 mLs by mouth every 2 hours as needed for moderate pain ; Max 8 doses/24 hour period. (Patient not taking: Reported on 7/18/2024) 100 mL 0     mineral oil-hydrophilic petrolatum (AQUAPHOR) external ointment Apply topically every hour as needed for other (barrier cream) (Patient not taking: Reported on 7/18/2024) 50 g 0     multivitamins w/minerals liquid Place 15 mLs into Feeding Tube daily (Patient not taking: Reported on 7/18/2024) 237 mL 2     oxyCODONE (ROXICODONE) 5 MG/5ML solution Take 2.5-5 mLs (2.5-5 mg) by mouth every 8 hours as needed for severe pain (Patient not taking:  Reported on 7/18/2024) 15 mL 0     sulfamethoxazole-trimethoprim (BACTRIM) 400-80 MG tablet HOLD d/t leukopenia. Pentamidine given 7/9/24. (Patient not taking: Reported on 7/18/2024) 90 tablet 2     ustekinumab (STELARA) 90 MG/ML Inject 90 mg Subcutaneous once every eight weeks (Patient not taking: Reported on 6/27/2024)       No current facility-administered medications for this visit.      Past Medical History:   Patient Active Problem List   Diagnosis     Crohn's disease (H)     CKD (chronic kidney disease) stage 2, GFR 60-89 ml/min     Secondary renal hyperparathyroidism (H24)     Long QT interval     Kidney replaced by transplant     Immunosuppressed status (H24)     Dehydration     Aftercare following organ transplant     Mouth ulcers     Vaginal ulcer     Leukopenia, unspecified type     Severe malnutrition (H24)     Need for pneumocystis prophylaxis     Anemia in chronic renal disease     Hypomagnesemia     Odynophagia     Inadequate oral intake     Vulvar lesion     Ulcer of esophagus without bleeding     Other neutropenia (H24)     Past Medical History:   Diagnosis Date     Anemia in chronic kidney disease      Anxiety 2007     Ascending aorta dilation (H24) 2020 2020: ascending aorta 3.8 cm, aortic sinus 3.6 cm. 2024 Echo: Ao root diam: 3.2 cm, asc Aorta Diam: 3.8 cm     ASCUS with positive high risk HPV cervical 2017    ASCUS with positive high risk HPV cervical 07/01/2017     Behcet's syndrome (H) 06/2024     Crohn's disease (H) 2004     Esophageal ulcer 06/27/2024     ESRD (end stage renal disease) on dialysis (H) 2020     GERD (gastroesophageal reflux disease)      Hidradenitis suppurativa 2015     History of blood transfusion      Hypertension      Juvenile rheumatoid arthritis (H)     Asymptomatic in adulthood     Kidney replaced by transplant 04/17/2024    En bloc. Induction w/ Thymoglobulin.     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       CC No referring provider defined for this encounter. on close of this encounter..    Attestation signed by Ruben Fitch MD at 7/18/2024  2:15 PM:  I have personally examined this patient and agree with the resident doctor's documentation and plan of care. I have reviewed and amended the resident's note. The documentation accurately reflects my clinical observations, diagnoses, treatment and follow-up plans.     Ruben Fitch MD  Dermatology Staff      Again, thank you for allowing me to participate in the care of your patient.      Sincerely,    Nicole Brito, DO

## 2024-07-22 ENCOUNTER — VIRTUAL VISIT (OUTPATIENT)
Dept: TRANSPLANT | Facility: CLINIC | Age: 34
End: 2024-07-22
Attending: INTERNAL MEDICINE
Payer: COMMERCIAL

## 2024-07-22 DIAGNOSIS — Z78.9 ON TUBE FEEDING DIET: ICD-10-CM

## 2024-07-22 DIAGNOSIS — Z48.298 AFTERCARE FOLLOWING ORGAN TRANSPLANT: ICD-10-CM

## 2024-07-22 NOTE — LETTER
7/22/2024      Ira Zamora  [address]  [place] [address]      Dear Colleague,    Thank you for referring your patient, Ira Zamora, to the [facility]PL[facility]. Please see a copy of my visit note below.    Pt evaluated via billable telephone visit. Time spent: <8 min/no charge  Provider location: offsite    [facility] SOLID ORGAN TRANSPLANT  OUTPATIENT MNT: POST KIDNEY TRANSPLANT    TIME SPENT: <8 minutes  VISIT TYPE: follow up   REFERRING PHYSICIAN: Angel  PT ACCOMPANIED BY: self     Txp hx: kidney 4/17/24    NUTRITION ASSESSMENT  Pt discharged ~10 days ago with NJT due to mouth/esophageal ulcers, severe odynophagia, difficulty eating/drinking/swallowing. Ira reports ulcers are significantly improved. She reports the [facility] RD advised her to take her FT out at home since she has been eating well, but per I RD note, recommended follow up with txp coordinator for further direction.     Ira is eating BID meals + snacks.   Eating hamburgers, steak, scrambled eggs with ham, cinnamon rolls, cereal.    Justine- water, milk (several cups/day)  Protein supplements- has Ensure but not currently drinking     Weight- 111 lbs discharge-->116 lbs now  Prior  -135 lbs; Ira reports feeling good at current weight, maybe into the 120s    Wt Readings from Last 10 Encounters:   07/09/24 50.2 kg (110 lb 11.2 oz)   06/27/24 52.2 kg (115 lb 1.6 oz)   06/26/24 50.1 kg (110 lb 7.2 oz)   06/24/24 51.3 kg (113 lb)   06/17/24 51.3 kg (113 lb)   06/14/24 51.7 kg (113 lb 14.4 oz)   06/08/24 54.3 kg (119 lb 11.4 oz)   05/23/24 56.6 kg (124 lb 12.8 oz)   05/06/24 56.2 kg (123 lb 12.8 oz)   05/02/24 57.2 kg (126 lb 1.6 oz)     NUTRITION DIAGNOSIS  No nutrition diagnosis identified at this time    NUTRITION INTERVENTION  Reviewed current diet. Encouraged pt to reach out if further questions/concerns arise. OK with her maintaining weight vs regaining some, as long as she is stable and not losing weight. Updated  RNCC.    Reviewed importance of food safety and increased risk for food-borne illness post-txp. Also discussed potential need to monitor K+, CHO, and Na+ intakes d/t medication side effects.     Instructed pt to avoid herbal supplements and alternative medicine therapies d/t interaction with immunosuppression and risk for rejection.    Patient Understanding: Pt verbalized understanding of education provided.  Expected Engagement: Good  Follow-Up Plans: PRN     NUTRITION GOALS  No nutrition goals identified at this time    Jael Madden RD, LD, CCTD                                    Again, thank you for allowing me to participate in the care of your patient.        Sincerely,        Jael Madden RD

## 2024-07-22 NOTE — PROGRESS NOTES
Pt evaluated via billable telephone visit. Time spent: <8 min/no charge  Provider location: offsite    [facility] SOLID ORGAN TRANSPLANT  OUTPATIENT MNT: POST KIDNEY TRANSPLANT    TIME SPENT: <8 minutes  VISIT TYPE: follow up   REFERRING PHYSICIAN: Angel  PT ACCOMPANIED BY: self     Txp hx: kidney 4/17/24    NUTRITION ASSESSMENT  Pt discharged ~10 days ago with NJT due to mouth/esophageal ulcers, severe odynophagia, difficulty eating/drinking/swallowing. Ira reports ulcers are significantly improved. She reports the I RD advised her to take her FT out at home since she has been eating well, but per I RD note, recommended follow up with txp coordinator for further direction.     Ira is eating BID meals + snacks.   Eating hamburgers, steak, scrambled eggs with ham, cinnamon rolls, cereal.    Justine- water, milk (several cups/day)  Protein supplements- has Ensure but not currently drinking     Weight- 111 lbs discharge-->116 lbs now  Prior  -135 lbs; Ira reports feeling good at current weight, maybe into the 120s    Wt Readings from Last 10 Encounters:   07/09/24 50.2 kg (110 lb 11.2 oz)   06/27/24 52.2 kg (115 lb 1.6 oz)   06/26/24 50.1 kg (110 lb 7.2 oz)   06/24/24 51.3 kg (113 lb)   06/17/24 51.3 kg (113 lb)   06/14/24 51.7 kg (113 lb 14.4 oz)   06/08/24 54.3 kg (119 lb 11.4 oz)   05/23/24 56.6 kg (124 lb 12.8 oz)   05/06/24 56.2 kg (123 lb 12.8 oz)   05/02/24 57.2 kg (126 lb 1.6 oz)     NUTRITION DIAGNOSIS  No nutrition diagnosis identified at this time    NUTRITION INTERVENTION  Reviewed current diet. Encouraged pt to reach out if further questions/concerns arise. OK with her maintaining weight vs regaining some, as long as she is stable and not losing weight. Updated RNCC.    Reviewed importance of food safety and increased risk for food-borne illness post-txp. Also discussed potential need to monitor K+, CHO, and Na+ intakes d/t medication side effects.     Instructed pt to avoid herbal  supplements and alternative medicine therapies d/t interaction with immunosuppression and risk for rejection.    Patient Understanding: Pt verbalized understanding of education provided.  Expected Engagement: Good  Follow-Up Plans: PRN     NUTRITION GOALS  No nutrition goals identified at this time    Jael Madden RD, LD, CCTD

## 2024-07-23 ENCOUNTER — LAB (OUTPATIENT)
Dept: LAB | Facility: CLINIC | Age: 34
End: 2024-07-23
Payer: MEDICARE

## 2024-07-23 DIAGNOSIS — Z98.890 OTHER SPECIFIED POSTPROCEDURAL STATES: ICD-10-CM

## 2024-07-23 DIAGNOSIS — Z20.828 CONTACT WITH AND (SUSPECTED) EXPOSURE TO OTHER VIRAL COMMUNICABLE DISEASES: ICD-10-CM

## 2024-07-23 DIAGNOSIS — Z79.899 ENCOUNTER FOR LONG-TERM CURRENT USE OF MEDICATION: ICD-10-CM

## 2024-07-23 DIAGNOSIS — Z94.0 KIDNEY REPLACED BY TRANSPLANT: ICD-10-CM

## 2024-07-23 DIAGNOSIS — Z48.298 AFTERCARE FOLLOWING ORGAN TRANSPLANT: ICD-10-CM

## 2024-07-23 DIAGNOSIS — N18.6 ESRD (END STAGE RENAL DISEASE) (H): ICD-10-CM

## 2024-07-23 DIAGNOSIS — Z76.82 ORGAN TRANSPLANT CANDIDATE: ICD-10-CM

## 2024-07-23 LAB
ANION GAP SERPL CALCULATED.3IONS-SCNC: 9 MMOL/L (ref 7–15)
BUN SERPL-MCNC: 23.7 MG/DL (ref 6–20)
CALCIUM SERPL-MCNC: 10.3 MG/DL (ref 8.8–10.4)
CHLORIDE SERPL-SCNC: 104 MMOL/L (ref 98–107)
CMV DNA SPEC NAA+PROBE-ACNC: 123 IU/ML
CMV DNA SPEC NAA+PROBE-LOG#: 2.1 {LOG_COPIES}/ML
CREAT SERPL-MCNC: 0.76 MG/DL (ref 0.51–0.95)
EGFRCR SERPLBLD CKD-EPI 2021: >90 ML/MIN/1.73M2
ERYTHROCYTE [DISTWIDTH] IN BLOOD BY AUTOMATED COUNT: 17.5 % (ref 10–15)
GLUCOSE SERPL-MCNC: 95 MG/DL (ref 70–99)
HCO3 SERPL-SCNC: 28 MMOL/L (ref 22–29)
HCT VFR BLD AUTO: 35.7 % (ref 35–47)
HGB BLD-MCNC: 10.8 G/DL (ref 11.7–15.7)
MAGNESIUM SERPL-MCNC: 1.4 MG/DL (ref 1.7–2.3)
MCH RBC QN AUTO: 31.4 PG (ref 26.5–33)
MCHC RBC AUTO-ENTMCNC: 30.3 G/DL (ref 31.5–36.5)
MCV RBC AUTO: 104 FL (ref 78–100)
PHOSPHATE SERPL-MCNC: 3.2 MG/DL (ref 2.5–4.5)
PLATELET # BLD AUTO: 216 10E3/UL (ref 150–450)
POTASSIUM SERPL-SCNC: 4.3 MMOL/L (ref 3.4–5.3)
RBC # BLD AUTO: 3.44 10E6/UL (ref 3.8–5.2)
SODIUM SERPL-SCNC: 141 MMOL/L (ref 135–145)
TACROLIMUS BLD-MCNC: 12 UG/L (ref 5–15)
TME LAST DOSE: NORMAL H
TME LAST DOSE: NORMAL H
WBC # BLD AUTO: 4.7 10E3/UL (ref 4–11)

## 2024-07-23 PROCEDURE — 36415 COLL VENOUS BLD VENIPUNCTURE: CPT

## 2024-07-23 PROCEDURE — 83735 ASSAY OF MAGNESIUM: CPT

## 2024-07-23 PROCEDURE — 84100 ASSAY OF PHOSPHORUS: CPT

## 2024-07-23 PROCEDURE — 85027 COMPLETE CBC AUTOMATED: CPT

## 2024-07-23 PROCEDURE — 80197 ASSAY OF TACROLIMUS: CPT

## 2024-07-23 PROCEDURE — 80048 BASIC METABOLIC PNL TOTAL CA: CPT

## 2024-07-23 PROCEDURE — 86833 HLA CLASS II HIGH DEFIN QUAL: CPT

## 2024-07-23 PROCEDURE — 86832 HLA CLASS I HIGH DEFIN QUAL: CPT

## 2024-07-24 ENCOUNTER — TELEPHONE (OUTPATIENT)
Dept: PHARMACY | Facility: CLINIC | Age: 34
End: 2024-07-24

## 2024-07-24 ENCOUNTER — TELEPHONE (OUTPATIENT)
Dept: TRANSPLANT | Facility: CLINIC | Age: 34
End: 2024-07-24

## 2024-07-24 ENCOUNTER — VIRTUAL VISIT (OUTPATIENT)
Dept: INFECTIOUS DISEASES | Facility: CLINIC | Age: 34
End: 2024-07-24
Attending: STUDENT IN AN ORGANIZED HEALTH CARE EDUCATION/TRAINING PROGRAM
Payer: COMMERCIAL

## 2024-07-24 DIAGNOSIS — K22.10 ULCER OF ESOPHAGUS WITHOUT BLEEDING: ICD-10-CM

## 2024-07-24 DIAGNOSIS — D84.9 IMMUNOSUPPRESSED STATUS (H): Chronic | ICD-10-CM

## 2024-07-24 DIAGNOSIS — Z94.0 KIDNEY REPLACED BY TRANSPLANT: ICD-10-CM

## 2024-07-24 DIAGNOSIS — Z94.0 KIDNEY REPLACED BY TRANSPLANT: Chronic | ICD-10-CM

## 2024-07-24 DIAGNOSIS — K12.1 MOUTH ULCERS: Primary | ICD-10-CM

## 2024-07-24 DIAGNOSIS — N76.6 VULVAR ULCER: ICD-10-CM

## 2024-07-24 DIAGNOSIS — K50.919 CROHN'S DISEASE WITH COMPLICATION, UNSPECIFIED GASTROINTESTINAL TRACT LOCATION (H): ICD-10-CM

## 2024-07-24 DIAGNOSIS — B25.9 CMV (CYTOMEGALOVIRUS INFECTION) (H): Primary | ICD-10-CM

## 2024-07-24 LAB
DONOR IDENTIFICATION: NORMAL
DSA COMMENTS: NORMAL
DSA PRESENT: NO
DSA TEST METHOD: NORMAL
ORGAN: NORMAL
SA 1  COMMENTS: NORMAL
SA 1 CELL: NORMAL
SA 1 TEST METHOD: NORMAL
SA 2 CELL: NORMAL
SA 2 COMMENTS: NORMAL
SA 2 TEST METHOD: NORMAL
SA1 HI RISK ABY: NORMAL
SA1 MOD RISK ABY: NORMAL
SA2 HI RISK ABY: NORMAL
SA2 MOD RISK ABY: NORMAL
UNACCEPTABLE ANTIGENS: NORMAL
UNOS CPRA: 9

## 2024-07-24 PROCEDURE — 99443 PR PHYSICIAN TELEPHONE EVALUATION 21-30 MIN: CPT | Mod: 93 | Performed by: STUDENT IN AN ORGANIZED HEALTH CARE EDUCATION/TRAINING PROGRAM

## 2024-07-24 ASSESSMENT — PAIN SCALES - GENERAL: PAINLEVEL: NO PAIN (0)

## 2024-07-24 NOTE — TELEPHONE ENCOUNTER
Clinical Pharmacy Consult:                                                      Transplant Specific: 3 month post transplant pharmacy review  Date of Transplant: 4/17/24  Type of Transplant: kidney  First Transplant: yes  History of rejection: no    Immunosuppression Regimen   TAC 2.5 mg qAM & 2.5 mg qPM and AZA 100mg qAM  Patient specific goal: 8-10  Most recent level: 12.0 on 07/23/2024  Immunosuppressant Levels:  Supratherapeutic  Pt adherent to lab draws: yes  Scr:   Lab Results   Component Value Date    CR 4.16 04/18/2024     Side effects: None    Prophylactic Medications  PJP Prophylactic: Inhaled pentamadime  Scheduled Discontinue Date: Lifelong     Antifungal: Not needed thus far  Scheduled Discontinue Date: N/A     CMV Prophylactic: Valcyte 900 mg once daily   Scheduled Discontinue Date:  3 to 6 months  Anticipate stop 10/17/2024     Acid Reducer: Pepcid (famotidine)  Scheduled Reviewed Date:  TBD    Vascular Prophylactic: Not needed thus far  Scheduled Discontinue Date: N/A    Med rec/DUR performed: yes  Med Rec Discrepancies: no    Recent hospitalizations, urgent care or ED visits: 1  Spoke to Ira today. She reports she is doing well. She is happy to be eating again after having her feeding tube taken out. She was able to name off all of her medications and the correct doses. Ira manages her own medications and uses her medication card and medication box. Denies any difficulty getting refills or remembering to take her medications.  Ira reports checking her blood pressure a few times per week. It is typically in the 120s/80s. Goal BP is <140/90.  No additional questions or concerns. Follow up in 3 months.  Time spent: 15 minutes  Anibal Cooney, PharmD  [phone]

## 2024-07-24 NOTE — NURSING NOTE
Current patient location: [address] [place] [id number]    Is the patient currently in the state of [place]? YES    Visit mode:VIDEO    If the visit is dropped, the patient can be reconnected by: VIDEO VISIT: Text to cell phone:   Telephone Information:   Mobile [phone]       Will anyone else be joining the visit? Yes  (If patient encounters technical issues they should call [phone])    How would you like to obtain your AVS? MyChart    Are changes needed to the allergy or medication list? Pt stated no med changes and unable to review medications due to time restraint.     Are refills needed on medications prescribed by this physician? NO    Reason for visit: [place]

## 2024-07-24 NOTE — PROGRESS NOTES
Unable to do video visit as the connection on patients side was not good. Patient at healthcare facility - [facility] building for another medical appointment. Therefore switched to telephone visit   Telephone time 10 mins   Total time including extensive chart review ,review of medical literature, care-coordination and documentation time on the date of encounter - 75 mins            SOLID ORGAN TRANSPLANT INFECTIOUS DISEASES CLINIC NOTE     Patient:  Ira Zamora   YOB: 1990  Date of Visit:  2024          Assessment and Recommendations:   Recommendations:  -Have asked pathologists if EBV immunostain can be done on the esophageal and vulvar tissue specimens.   -Increase valcyte to 900 mg po daily with normal creatinine - sent message to kidney transplant coordinator if they are ok   -CMV, EBV PCR next week     RTC 1 month     Dr. 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     ID work up included swab of the lesion for HSV, NP Mycoplasma pneumoniae PCR, blood adenovirus PCR, CMV PCR, EBV PCR, HIV, syphilis, Coxsackie ab, chlamydia/gonorrhea PCR of cervix, Karius test, urine histo antigen which were negative.     MPA was stopped ~ .  admitted with odynophagia and found to have esophageal ulcers.     Vulvar tissue EBV PCR was pos at 19k. Dermatology and ID concerned for Jake-Barr virus associated mucocutaneous ulcers (EBVMCUs) and EBV-associated ulcus vulvae acutum (EBV-AUVA).    24 biopsy of esophageal ulcers and 24 biopsy of vulvar ulcer was non diagnostic.     She  was treated with iv solumedrol for 4 days followed by 1 week of tapering oral steroids for possible Behcets.     7/24/24 follow up reports improvement in all ulcers.     I do not think she has EBV related mucocutaneous ulcer especially with lack of H and E findings.Also tissue PCR test results can be unreliable. Will  ask if EBV immunostain can be done on the tissue specimens.     Suspect ulcers are more likely due to MPA, Behcets or Crohns    Previous ID Issues:  - Crohn's disease- follows with [facility] on ustekinumab (Stelara) pre-transplant, last dose 3/2024     Other ID issues:  - QTc interval:  503msec on 4/17/24  - Bacterial prophylaxis:  none  - Pneumocystis prophylaxis:  inhaled pentamidine due to leukopenia, last dose 7/9/24   - Viral serostatus: CMV D+/R-, EBV D+/R+, HSV 1?/2?, VZV +   - Viral prophylaxis:  Valcyte 450 mg daily - prophylaxis   - Fungal prophylaxis:  none  - Immunosuppression: Induction with thymo and steroid taper;   - Immunization status:  to discuss next visit   - Gamma globulin status:  unknown  - Isolation status: good hand hygiene    ------------------------------------------------------------------------------------------------------------------------------------------------------------------------------------------------------------  Interval events:  Last seen 6/24/24   Myfortic was stopped a week prior   Admitted 6/27 due to odynophagia, found to have esophageal ulcers. EBV PCR of the ulcer pos- 19k. EBV blood PCR neg. Dermatology and ID concerned for Jake-Barr virus associated mucocutaneous ulcers (EBVMCUs) and EBV-associated ulcus vulvae acutum (EBV-AUVA). Pathology returned later - was non specific.   Rheumatology was consulted and for possibility of behcets started on iv solumedrol for 4 days followed by 1 week of tapering oral steroids. Currently on prednisone 5 mg daily, tacrolimus and imuran which was started during the recent hospital stay   She notes that all the ulcers  seem to be getting better - Esophageal ulcers - no odynophagia, mouth ulcers and genital     Leukopenia has resolved in the past 1-2 weeks    international unit(s) on 24            History of Present Illness: 24    Adopted from initial consult note:  This is a follow up after hospital discharge. First time seeing patient. Seen by my colleague as an inpatient.     Transplants:  2024 (Kidney), Postoperative day:  98     Ira Zamora is a 34 year old female with history of Crohn's (rcvd ustekinumab on 3/1/24), Hidradenitis suppurativa, Juvenile RA, HTN, ESRD 2/2 interstitial nephritis PPIs s/p  donor kidney transplant on 24 induction with thymoglobulin and steroid taper,  immunosuppressed with MPA and TAC who presented to ED on 24 with oral and vulvar sores.      Symptoms started beginning of  with mouth sores (small and one large). Ira notes night sweats that started just before the mouth sores and have increased to nightly. Sores are very painful and have limited ability to eat and drink. Had tried topical lidocaine and magic mouthwash. Has pain in esophagus with swallowing or eating food. This is not new for her and was related to GERD but she had to stop PPI since it caused the interstitial nephritis and therefore GERD has become worse after transplant.     2 weeks later developed vaginal sores. Urinating burns the sore.      Has hx of Crohn's disease, last saw GI around 2023 and last Stelara infusion was 3/1/24. Around time of initial diagnosis with Crohns had severe reflux and canker sores in mouth.     No fevers, chills, abdominal pain, diarrhea, vomiting, rhinorrhea, dyspnea, cough, vaginal discharge, dysuria, joint pain/swelling or erythema, rashes. No sx of hidradenitis suppurativa.   Has some fresh blood in stool at the end since a week ago - has no pain      Lives in [place], [place] with her  (Collin) and her parents live in upstairs in the same house.  They both have indoor pet cats. No other animal exposures, fish, or birds. No recent travel. International travel to [place] in 2016 and 2018, traveled to [place], [place], [place] in 2009. Currently employed by [employer] and works remotely, previously worked as a pharmacy tech. Not around any young children. No sick contacts.     Myfortic dose was reduced from 540 bid to 360 bid trixie few days prior to admission.     She was hospitalized 6/5 to 6/9 for these sores. ID work up included swab of the lesion for HSV, NP Mycoplasma pneumoniae PCR, blood adenovirus PCR, CMV PCR, EBV PCR, BK PCR, HIV, syphilis, Coxsackie ab, chlamydia/gonorrhea PCR of cervix, Karius test, urine histo antigen which were negative.     Myfortic was stopped a week ago. Planning to start beltacept instead     She reports that she was able to eat something after the dose of myfortic was reduced. The pain has eased a little with stopping myfortic. But there is still considerable pain.  reports that she has not slept in weeks. The oxycodone 5 mg seemed to help in the beginning but not that much anymore.     Leukopenic since May          Current Medications:     Current Outpatient Medications   Medication Sig Dispense Refill    acetaminophen (TYLENOL) 500 MG tablet Take 1,000 mg by mouth every 6 hours as needed      atorvastatin (LIPITOR) 10 MG tablet Take 1 tablet (10 mg) by mouth daily 30 tablet 1    azaTHIOprine (IMURAN) 5 mg/mL SUSP Take 20 mLs (100 mg) by mouth daily 600 mL 11    azaTHIOprine (IMURAN) 50 MG tablet Take 2 tablets (100 mg) by mouth daily (Patient not taking: Reported on 7/18/2024) 60 tablet 11    benzocaine (ORAJEL MAXIMUM STRENGTH) 20 % GEL gel Take by mouth 4 times daily as needed for mouth sores (Patient not taking: Reported on 7/18/2024) 9 g 0    famotidine (PEPCID) 20 MG tablet Take 2 tablets (40 mg) by mouth 2 times daily 60 tablet 1    hydrOXYzine HCl (ATARAX) 25 MG tablet Take 1  tablet (25 mg) by mouth every 6 hours as needed for other (adjuvant pain) 20 tablet 0    levonorgestrel (KYLEENA) 19.5 MG IUD 1 Device by Intrauterine route      lidocaine, viscous, (XYLOCAINE) 2 % solution Take 5 mLs by mouth every 2 hours as needed for moderate pain ; Max 8 doses/24 hour period. (Patient not taking: Reported on 7/18/2024) 100 mL 0    magnesium oxide (MAG-OX) 400 MG tablet Take 2 tablets (800 mg) by mouth daily 60 tablet 0    mineral oil-hydrophilic petrolatum (AQUAPHOR) external ointment Apply topically every hour as needed for other (barrier cream) (Patient not taking: Reported on 7/18/2024) 50 g 0    multivitamins w/minerals liquid Place 15 mLs into Feeding Tube daily (Patient not taking: Reported on 7/18/2024) 237 mL 2    oxyCODONE (ROXICODONE) 5 MG/5ML solution Take 2.5-5 mLs (2.5-5 mg) by mouth every 8 hours as needed for severe pain (Patient not taking: Reported on 7/18/2024) 15 mL 0    PARoxetine (PAXIL) 30 MG tablet Take 30 mg by mouth every evening      predniSONE (DELTASONE) 5 MG tablet Take 1 tablet (5 mg) by mouth daily 30 tablet 2    sulfamethoxazole-trimethoprim (BACTRIM) 400-80 MG tablet HOLD d/t leukopenia. Pentamidine given 7/9/24. (Patient not taking: Reported on 7/18/2024) 90 tablet 2    tacrolimus (GENERIC EQUIVALENT) 0.5 MG capsule Take 1 capsule (0.5 mg) by mouth 2 times daily Total dose: 2.5mg twice daily 60 capsule 11    tacrolimus (GENERIC EQUIVALENT) 1 MG capsule Take 2 capsules (2 mg) by mouth 2 times daily Total dose: 2.5mg twice daily 120 capsule 11    triamcinolone (KENALOG) 0.1 % external lotion Apply topically 2 times daily 60 mL 0    triamcinolone (KENALOG) 0.1 % paste Take by mouth 2 times daily 5 g 3    ustekinumab (STELARA) 90 MG/ML Inject 90 mg Subcutaneous once every eight weeks (Patient not taking: Reported on 6/27/2024)      valGANciclovir (VALCYTE) 450 MG tablet Take 1 tablet (450 mg) by mouth daily      Vonoprazan Fumarate (VOQUEZNA) 20 MG TABS Take 20 mg  by mouth daily 60 tablet 0            Physical Exam:   There were no vitals taken for this visit.    Unable to examine due to virtual visit            Laboratory Data:   Microbiology:  Culture   Date Value Ref Range Status   07/08/2024 No Growth  Final   07/08/2024 No Growth  Final   07/06/2024 50,000-100,000 CFU/mL Staphylococcus aureus (A)  Final   07/02/2024 No growth after 21 days  Preliminary   07/02/2024 No Growth  Final   06/06/2024 No Growth  Final   05/03/2024 No Growth  Final       Metabolic Studies       Recent Labs   Lab Test 07/23/24  1103 07/16/24  1131 07/10/24  0739 04/17/24  2230 04/17/24  2123 04/17/24  1603 04/17/24  0958    141 139   < > 136  --  139   POTASSIUM 4.3 4.6 4.8   < > 3.7  --  3.4   CHLORIDE 104 105 106   < >  --   --  95*   CO2 28 26 24   < >  --   --  33*   ANIONGAP 9 10 9   < >  --   --  11   BUN 23.7* 31.6* 16.7   < >  --   --  11.6   CR 0.76 0.75 0.86   < >  --   --  3.11*   GFRESTIMATED >90 >90 90   < >  --   --  19*   GLC 95 95 111*   < > 168*   < > 91   A1C  --   --   --   --   --   --  4.7   ROBLES 10.3 10.6* 10.2*   < >  --   --  9.3   PHOS 3.2 2.9 2.5   < >  --   --   --    MAG 1.4* 1.2* 1.6*   < >  --   --   --    LACT  --   --   --   --  0.8  --   --     < > = values in this interval not displayed.       Hepatic Studies    Recent Labs   Lab Test 07/08/24  0542   BILITOTAL 0.2   ALKPHOS 140   ALBUMIN 3.0*   AST 16   ALT 39         Hematology Studies      Recent Labs   Lab Test 07/23/24  1103 07/16/24  1131 07/10/24  0739 05/02/24  0920 04/25/24  0930   WBC 4.7 3.5* 1.7*  1.7*   < > 7.1   ANEU  --   --   --   --  5.8   ALYM  --   --   --   --  0.2*   FAVIAN  --   --   --   --  0.4   AEOS  --   --   --   --  0.2   HGB 10.8* 10.1* 9.4*  9.5*   < > 8.4*   HCT 35.7 33.3* 31.1*  31.1*   < > 26.0*    263 198  196   < > 197    < > = values in this interval not displayed.     Pathology   7/2/24  Vulva, left labia majora, biopsy:  -Squamous epithelium with surface  ulceration, granulation tissue and reactive changes  -Negative for dysplasia or malignancy    6/28/24  A. Esophagus, distal, endoscopic biopsies:  - Gastroesophageal junction mucosa without intestinal metaplasia or significant inflammation  - No granulomas present  - Negative for dysplasia or malignancy     B. Esophagus, ulcer, center, endoscopic biopsies:  - Ulcer, with associated granulation tissue, fibrinopurulent debris, acute and chronic inflammation (see comment and microscopic description)  - No granulomas present  - No intact epithelium identified  - Negative for dysplasia or malignancy     C. Esophagus, ulcer, edge, endoscopic biopsies:  - Ulcer, with associated granulation tissue, fibrinopurulent debris, acute and chronic inflammation (see comment and microscopic description)  - No granulomas present  - Occasional fragments of intact squamous epithelium, with scant acute and chronic inflammation  - Negative for dysplasia or malignancy     HSV and CMV immunostains are negative. PAS stains are negative for fungal organisms.       Microbiology:  6/2024 - swab of lesions for HSV PCR, NP Mycoplasma pneumoniae PCR, blood adenovirus PCR, CMV PCR, EBV PCR, HIV, syphilis, Coxsackie ab, chlamydia/gonorrhea PCR of cervix, Karius test, urine histo antigen negative     7/2/24 Vulva tissue biopsy - aerobic cx, afb cx, fungal cx, HSV 1 and 2 PCR and CMV PCR neg. EBV PCR pos (19k)     7/9/24 EBV PCR, CMV PCR  blood neg

## 2024-07-24 NOTE — TELEPHONE ENCOUNTER
----- Message from Neno Ortiz sent at 7/23/2024 10:04 PM CDT -----  New CMV viremia and recommend increasing Valcyte dose to 450 mg twice daily and rechecking CMV PCR weekly and rechecking CMV IgG Ab next week.

## 2024-07-24 NOTE — LETTER
2024       RE: Ira Zamora  [address] [place] [address]     Dear Colleague,    Thank you for referring your patient, Ira Zamora, to the [facility] [place] at [facility]. Please see a copy of my visit note below.    Unable to do video visit as the connection on patients side was not good. [place] at healthcare [place] [facility] [place] medical appointment. Therefore switched to telephone visit   Telephone time 10 mins   Total time including extensive chart review ,review of medical literature, care-coordination and documentation time on the date of encounter - 75 mins            [facility] INFECTIOUS DISEASES CLINIC NOTE     Patient:  Ira Zamora   YOB: 1990  Date of Visit:  2024          Assessment and Recommendations:   Recommendations:  -Have asked pathologists if EBV immunostain can be done on the esophageal and vulvar tissue specimens.   -Increase valcyte to 900 mg po daily with normal creatinine - sent message to kidney transplant coordinator if they are ok   -CMV, EBV PCR next week     RTC 1 month     Dr. 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     ID work up included swab of the lesion for HSV, NP Mycoplasma pneumoniae PCR, blood adenovirus PCR, CMV PCR, EBV PCR, HIV, syphilis, Coxsackie ab, chlamydia/gonorrhea PCR of cervix, Karius test, urine histo antigen which were negative.     MPA was stopped ~ .  admitted with odynophagia and found to  have esophageal ulcers.     Vulvar tissue EBV PCR was pos at 19k. Dermatology and ID concerned for Jake-Barr virus associated mucocutaneous ulcers (EBVMCUs) and EBV-associated ulcus vulvae acutum (EBV-AUVA).    6/28/24 biopsy of esophageal ulcers and 7/2/24 biopsy of vulvar ulcer was non diagnostic.     She was treated with iv solumedrol for 4 days followed by 1 week of tapering oral steroids for possible Behcets.     7/24/24 follow up reports improvement in all ulcers.     I do not think she has EBV related mucocutaneous ulcer especially with lack of H and E findings.Also tissue PCR test results can be unreliable. Will  ask if EBV immunostain can be done on the tissue specimens.     Suspect ulcers are more likely due to MPA, Behcets or Crohns    Previous ID Issues:  - Crohn's disease- follows with [facility] on ustekinumab (Stelara) pre-transplant, last dose 3/2024     Other ID issues:  - QTc interval:  503msec on 4/17/24  - Bacterial prophylaxis:  none  - Pneumocystis prophylaxis:  inhaled pentamidine due to leukopenia, last dose 7/9/24   - Viral serostatus: CMV D+/R-, EBV D+/R+, HSV 1?/2?, VZV +   - Viral prophylaxis:  Valcyte 450 mg daily - prophylaxis   - Fungal prophylaxis:  none  - Immunosuppression: Induction with thymo and steroid taper;   - Immunization status:  to discuss next visit   - Gamma globulin status:  unknown  - Isolation status: good hand hygiene    ------------------------------------------------------------------------------------------------------------------------------------------------------------------------------------------------------------  Interval events:  Last seen 6/24/24   Myfortic was stopped a week prior   Admitted 6/27 due to odynophagia, found to have esophageal ulcers. EBV PCR of the ulcer pos- 19k. EBV blood PCR neg. Dermatology and ID concerned for Jake-Barr virus associated mucocutaneous ulcers (EBVMCUs) and EBV-associated ulcus vulvae acutum (EBV-AUVA). Pathology  returned later - was non specific.   Rheumatology was consulted and for possibility of behcets started on iv solumedrol for 4 days followed by 1 week of tapering oral steroids. Currently on prednisone 5 mg daily, tacrolimus and imuran which was started during the recent hospital stay   She notes that all the ulcers seem to be getting better - Esophageal ulcers - no odynophagia, mouth ulcers and genital     Leukopenia has resolved in the past 1-2 weeks    international unit(s) on 24            History of Present Illness: 24    Adopted from initial consult note:  This is a follow up after hospital discharge. First time seeing patient. Seen by my colleague as an inpatient.     Transplants:  2024 (Kidney), Postoperative day:  98     Ira Zamora is a 34 year old female with history of Crohn's (rcvd ustekinumab on 3/1/24), Hidradenitis suppurativa, Juvenile RA, HTN, ESRD 2/2 interstitial nephritis PPIs s/p  donor kidney transplant on 24 induction with thymoglobulin and steroid taper,  immunosuppressed with MPA and TAC who presented to ED on 24 with oral and vulvar sores.      Symptoms started beginning of  with mouth sores (small and one large). Ira notes night sweats that started just before the mouth sores and have increased to nightly. Sores are very painful and have limited ability to eat and drink. Had tried topical lidocaine and magic mouthwash. Has pain in esophagus with swallowing or eating food. This is not new for her and was related to GERD but she had to stop PPI since it caused the interstitial nephritis and therefore GERD has become worse after transplant.     2 weeks later developed vaginal sores. Urinating burns the sore.      Has hx of Crohn's disease, last saw GI around 2023 and last Stelara infusion was 3/1/24. Around time of initial diagnosis with Crohns had severe reflux and canker sores in mouth.     No fevers, chills, abdominal pain, diarrhea,  vomiting, rhinorrhea, dyspnea, cough, vaginal discharge, dysuria, joint pain/swelling or erythema, rashes. No sx of hidradenitis suppurativa.   Has some fresh blood in stool at the end since a week ago - has no pain      Lives in [place], [place] with her  (Collin) and her parents live in upstairs in the same house. They both have indoor pet cats. No other animal exposures, fish, or birds. No recent travel. International travel to [place] in 2016 and 2018, traveled to [place], [place], [place] in 2009. Currently employed by [employer] and works remotely, previously worked as a pharmacy tech. Not around any young children. No sick contacts.     Myfortic dose was reduced from 540 bid to 360 bid trixie few days prior to admission.     She was hospitalized 6/5 to 6/9 for these sores. ID work up included swab of the lesion for HSV, NP Mycoplasma pneumoniae PCR, blood adenovirus PCR, CMV PCR, EBV PCR, BK PCR, HIV, syphilis, Coxsackie ab, chlamydia/gonorrhea PCR of cervix, Karius test, urine histo antigen which were negative.     Myfortic was stopped a week ago. Planning to start beltacept instead     She reports that she was able to eat something after the dose of myfortic was reduced. The pain has eased a little with stopping myfortic. But there is still considerable pain.  reports that she has not slept in weeks. The oxycodone 5 mg seemed to help in the beginning but not that much anymore.     Leukopenic since May          Current Medications:     Current Outpatient Medications   Medication Sig Dispense Refill    acetaminophen (TYLENOL) 500 MG tablet Take 1,000 mg by mouth every 6 hours as needed      atorvastatin (LIPITOR) 10 MG tablet Take 1 tablet (10 mg) by mouth daily 30 tablet 1    azaTHIOprine (IMURAN) 5 mg/mL SUSP Take 20 mLs (100 mg) by mouth daily 600 mL 11    azaTHIOprine (IMURAN) 50 MG tablet Take 2 tablets (100 mg) by mouth daily (Patient not taking: Reported on  7/18/2024) 60 tablet 11    benzocaine (ORAJEL MAXIMUM STRENGTH) 20 % GEL gel Take by mouth 4 times daily as needed for mouth sores (Patient not taking: Reported on 7/18/2024) 9 g 0    famotidine (PEPCID) 20 MG tablet Take 2 tablets (40 mg) by mouth 2 times daily 60 tablet 1    hydrOXYzine HCl (ATARAX) 25 MG tablet Take 1 tablet (25 mg) by mouth every 6 hours as needed for other (adjuvant pain) 20 tablet 0    levonorgestrel (KYLEENA) 19.5 MG IUD 1 Device by Intrauterine route      lidocaine, viscous, (XYLOCAINE) 2 % solution Take 5 mLs by mouth every 2 hours as needed for moderate pain ; Max 8 doses/24 hour period. (Patient not taking: Reported on 7/18/2024) 100 mL 0    magnesium oxide (MAG-OX) 400 MG tablet Take 2 tablets (800 mg) by mouth daily 60 tablet 0    mineral oil-hydrophilic petrolatum (AQUAPHOR) external ointment Apply topically every hour as needed for other (barrier cream) (Patient not taking: Reported on 7/18/2024) 50 g 0    multivitamins w/minerals liquid Place 15 mLs into Feeding Tube daily (Patient not taking: Reported on 7/18/2024) 237 mL 2    oxyCODONE (ROXICODONE) 5 MG/5ML solution Take 2.5-5 mLs (2.5-5 mg) by mouth every 8 hours as needed for severe pain (Patient not taking: Reported on 7/18/2024) 15 mL 0    PARoxetine (PAXIL) 30 MG tablet Take 30 mg by mouth every evening      predniSONE (DELTASONE) 5 MG tablet Take 1 tablet (5 mg) by mouth daily 30 tablet 2    sulfamethoxazole-trimethoprim (BACTRIM) 400-80 MG tablet HOLD d/t leukopenia. Pentamidine given 7/9/24. (Patient not taking: Reported on 7/18/2024) 90 tablet 2    tacrolimus (GENERIC EQUIVALENT) 0.5 MG capsule Take 1 capsule (0.5 mg) by mouth 2 times daily Total dose: 2.5mg twice daily 60 capsule 11    tacrolimus (GENERIC EQUIVALENT) 1 MG capsule Take 2 capsules (2 mg) by mouth 2 times daily Total dose: 2.5mg twice daily 120 capsule 11    triamcinolone (KENALOG) 0.1 % external lotion Apply topically 2 times daily 60 mL 0    triamcinolone  (KENALOG) 0.1 % paste Take by mouth 2 times daily 5 g 3    ustekinumab (STELARA) 90 MG/ML Inject 90 mg Subcutaneous once every eight weeks (Patient not taking: Reported on 6/27/2024)      valGANciclovir (VALCYTE) 450 MG tablet Take 1 tablet (450 mg) by mouth daily      Vonoprazan Fumarate (VOQUEZNA) 20 MG TABS Take 20 mg by mouth daily 60 tablet 0            Physical Exam:   There were no vitals taken for this visit.    Unable to examine due to virtual visit            Laboratory Data:   Microbiology:  Culture   Date Value Ref Range Status   07/08/2024 No Growth  Final   07/08/2024 No Growth  Final   07/06/2024 50,000-100,000 CFU/mL Staphylococcus aureus (A)  Final   07/02/2024 No growth after 21 days  Preliminary   07/02/2024 No Growth  Final   06/06/2024 No Growth  Final   05/03/2024 No Growth  Final       Metabolic Studies       Recent Labs   Lab Test 07/23/24  1103 07/16/24  1131 07/10/24  0739 04/17/24  2230 04/17/24  2123 04/17/24  1603 04/17/24  0958    141 139   < > 136  --  139   POTASSIUM 4.3 4.6 4.8   < > 3.7  --  3.4   CHLORIDE 104 105 106   < >  --   --  95*   CO2 28 26 24   < >  --   --  33*   ANIONGAP 9 10 9   < >  --   --  11   BUN 23.7* 31.6* 16.7   < >  --   --  11.6   CR 0.76 0.75 0.86   < >  --   --  3.11*   GFRESTIMATED >90 >90 90   < >  --   --  19*   GLC 95 95 111*   < > 168*   < > 91   A1C  --   --   --   --   --   --  4.7   ROBLES 10.3 10.6* 10.2*   < >  --   --  9.3   PHOS 3.2 2.9 2.5   < >  --   --   --    MAG 1.4* 1.2* 1.6*   < >  --   --   --    LACT  --   --   --   --  0.8  --   --     < > = values in this interval not displayed.       Hepatic Studies    Recent Labs   Lab Test 07/08/24  0542   BILITOTAL 0.2   ALKPHOS 140   ALBUMIN 3.0*   AST 16   ALT 39         Hematology Studies      Recent Labs   Lab Test 07/23/24  1103 07/16/24  1131 07/10/24  0739 05/02/24  0920 04/25/24  0930   WBC 4.7 3.5* 1.7*  1.7*   < > 7.1   ANEU  --   --   --   --  5.8   ALYM  --   --   --   --  0.2*    FAVIAN  --   --   --   --  0.4   AEOS  --   --   --   --  0.2   HGB 10.8* 10.1* 9.4*  9.5*   < > 8.4*   HCT 35.7 33.3* 31.1*  31.1*   < > 26.0*    263 198  196   < > 197    < > = values in this interval not displayed.     Pathology   7/2/24  Vulva, left labia majora, biopsy:  -Squamous epithelium with surface ulceration, granulation tissue and reactive changes  -Negative for dysplasia or malignancy    6/28/24  A. Esophagus, distal, endoscopic biopsies:  - Gastroesophageal junction mucosa without intestinal metaplasia or significant inflammation  - No granulomas present  - Negative for dysplasia or malignancy     B. Esophagus, ulcer, center, endoscopic biopsies:  - Ulcer, with associated granulation tissue, fibrinopurulent debris, acute and chronic inflammation (see comment and microscopic description)  - No granulomas present  - No intact epithelium identified  - Negative for dysplasia or malignancy     C. Esophagus, ulcer, edge, endoscopic biopsies:  - Ulcer, with associated granulation tissue, fibrinopurulent debris, acute and chronic inflammation (see comment and microscopic description)  - No granulomas present  - Occasional fragments of intact squamous epithelium, with scant acute and chronic inflammation  - Negative for dysplasia or malignancy     HSV and CMV immunostains are negative. PAS stains are negative for fungal organisms.       Microbiology:  6/2024 - swab of lesions for HSV PCR, NP Mycoplasma pneumoniae PCR, blood adenovirus PCR, CMV PCR, EBV PCR, HIV, syphilis, Coxsackie ab, chlamydia/gonorrhea PCR of cervix, Karius test, urine histo antigen negative     7/2/24 Vulva tissue biopsy - aerobic cx, afb cx, fungal cx, HSV 1 and 2 PCR and CMV PCR neg. EBV PCR pos (19k)     7/9/24 EBV PCR, CMV PCR  blood neg       Irena Calderon MD

## 2024-07-24 NOTE — Clinical Note
Hi,  I am the transplant ID physician who is caring for Ira. I would like to increase valcyte from 450 mg daily to 900 mg daily with normal creatinine. Making sure the transplant team is ok with this.  Thanks Irena

## 2024-07-25 ENCOUNTER — TELEPHONE (OUTPATIENT)
Dept: INFECTIOUS DISEASES | Facility: CLINIC | Age: 34
End: 2024-07-25
Payer: COMMERCIAL

## 2024-07-25 RX ORDER — VALGANCICLOVIR 450 MG/1
900 TABLET, FILM COATED ORAL DAILY
Qty: 60 TABLET | Refills: 3 | Status: SHIPPED | OUTPATIENT
Start: 2024-07-25

## 2024-07-25 RX ORDER — FAMOTIDINE 20 MG/1
40 TABLET, FILM COATED ORAL 2 TIMES DAILY
Qty: 60 TABLET | Refills: 1 | Status: SHIPPED | OUTPATIENT
Start: 2024-07-25

## 2024-07-25 RX ORDER — TACROLIMUS 1 MG/1
2 CAPSULE ORAL 2 TIMES DAILY
Qty: 120 CAPSULE | Refills: 11 | Status: SHIPPED | OUTPATIENT
Start: 2024-07-25 | End: 2024-08-29

## 2024-07-25 RX ORDER — TACROLIMUS 0.5 MG/1
CAPSULE ORAL
Qty: 60 CAPSULE | Refills: 11 | Status: SHIPPED | OUTPATIENT
Start: 2024-07-25 | End: 2024-08-29

## 2024-07-25 NOTE — TELEPHONE ENCOUNTER
EP called 7/25 to sched a 1 month follow up with Dr. Calderon per checkout notes from 7/24. Patient stated that she'll be in [place] for this virtual visit.

## 2024-07-25 NOTE — TELEPHONE ENCOUNTER
Call placed to patient with a few questions or changes;  How is she eating without the NG tube. How are mouth and esophagus sores?  Increase Valcyte to 900mg daily  Decrease tac to 2mg BID    Eating well, ulcers are MUCH better. Able to eat and gaining weight and gained 10 lbs. Feels ready to go back to work, will send forms. She has increased Valcyte to 900mg daily and will decrease tac to 2mg bid

## 2024-07-26 ENCOUNTER — TELEPHONE (OUTPATIENT)
Dept: TRANSPLANT | Facility: CLINIC | Age: 34
End: 2024-07-26
Payer: COMMERCIAL

## 2024-07-26 NOTE — TELEPHONE ENCOUNTER
Spoke to Shanell regarding patient and return to work. Informed RN that [facility] would have more information after speaking to patient this afternoon. Will give number to patient to call Shanell to go over how she feels and return to work.

## 2024-07-26 NOTE — TELEPHONE ENCOUNTER
Patient Call: General  Route to LPN    Reason for call: Shanell Jones with [employer] looking for an update, on when patient will return to work, and stated they haven't received any medical records for this patient like return to work form. And is requesting to have Coordinator return her call    Call back needed? Yes    Return Call Needed  Same as documented in contacts section  When to return call?: Same day: Route High Priority

## 2024-07-30 ENCOUNTER — LAB (OUTPATIENT)
Dept: LAB | Facility: OTHER | Age: 34
End: 2024-07-30
Payer: MEDICARE

## 2024-07-30 DIAGNOSIS — Z79.899 ENCOUNTER FOR LONG-TERM CURRENT USE OF MEDICATION: ICD-10-CM

## 2024-07-30 DIAGNOSIS — B25.9 CMV (CYTOMEGALOVIRUS INFECTION) (H): ICD-10-CM

## 2024-07-30 DIAGNOSIS — Z48.298 AFTERCARE FOLLOWING ORGAN TRANSPLANT: ICD-10-CM

## 2024-07-30 DIAGNOSIS — Z20.828 CONTACT WITH AND (SUSPECTED) EXPOSURE TO OTHER VIRAL COMMUNICABLE DISEASES: ICD-10-CM

## 2024-07-30 DIAGNOSIS — Z94.0 KIDNEY REPLACED BY TRANSPLANT: ICD-10-CM

## 2024-07-30 DIAGNOSIS — K12.1 MOUTH ULCERS: ICD-10-CM

## 2024-07-30 DIAGNOSIS — Z98.890 OTHER SPECIFIED POSTPROCEDURAL STATES: ICD-10-CM

## 2024-07-30 LAB
ANION GAP SERPL CALCULATED.3IONS-SCNC: 7 MMOL/L (ref 7–15)
BACTERIA TISS BX CULT: NO GROWTH
BUN SERPL-MCNC: 26.7 MG/DL (ref 6–20)
CALCIUM SERPL-MCNC: 10.1 MG/DL (ref 8.8–10.4)
CHLORIDE SERPL-SCNC: 107 MMOL/L (ref 98–107)
CREAT SERPL-MCNC: 0.8 MG/DL (ref 0.51–0.95)
EGFRCR SERPLBLD CKD-EPI 2021: >90 ML/MIN/1.73M2
ERYTHROCYTE [DISTWIDTH] IN BLOOD BY AUTOMATED COUNT: 18.4 % (ref 10–15)
GLUCOSE SERPL-MCNC: 96 MG/DL (ref 70–99)
HCO3 SERPL-SCNC: 28 MMOL/L (ref 22–29)
HCT VFR BLD AUTO: 36.5 % (ref 35–47)
HGB BLD-MCNC: 10.8 G/DL (ref 11.7–15.7)
MAGNESIUM SERPL-MCNC: 1.5 MG/DL (ref 1.7–2.3)
MCH RBC QN AUTO: 31.9 PG (ref 26.5–33)
MCHC RBC AUTO-ENTMCNC: 29.6 G/DL (ref 31.5–36.5)
MCV RBC AUTO: 108 FL (ref 78–100)
PATH REPORT.ADDENDUM SPEC: NORMAL
PATH REPORT.COMMENTS IMP SPEC: NORMAL
PATH REPORT.COMMENTS IMP SPEC: NORMAL
PATH REPORT.FINAL DX SPEC: NORMAL
PATH REPORT.GROSS SPEC: NORMAL
PATH REPORT.MICROSCOPIC SPEC OTHER STN: NORMAL
PATH REPORT.RELEVANT HX SPEC: NORMAL
PHOSPHATE SERPL-MCNC: 3.2 MG/DL (ref 2.5–4.5)
PHOTO IMAGE: NORMAL
PLATELET # BLD AUTO: 218 10E3/UL (ref 150–450)
POTASSIUM SERPL-SCNC: 4.9 MMOL/L (ref 3.4–5.3)
RBC # BLD AUTO: 3.39 10E6/UL (ref 3.8–5.2)
SODIUM SERPL-SCNC: 142 MMOL/L (ref 135–145)
TACROLIMUS BLD-MCNC: 9.9 UG/L (ref 5–15)
TME LAST DOSE: NORMAL H
TME LAST DOSE: NORMAL H
WBC # BLD AUTO: 5.6 10E3/UL (ref 4–11)

## 2024-07-30 PROCEDURE — 83735 ASSAY OF MAGNESIUM: CPT

## 2024-07-30 PROCEDURE — 87799 DETECT AGENT NOS DNA QUANT: CPT

## 2024-07-30 PROCEDURE — 80048 BASIC METABOLIC PNL TOTAL CA: CPT

## 2024-07-30 PROCEDURE — 80197 ASSAY OF TACROLIMUS: CPT

## 2024-07-30 PROCEDURE — 84100 ASSAY OF PHOSPHORUS: CPT

## 2024-07-30 PROCEDURE — 85027 COMPLETE CBC AUTOMATED: CPT

## 2024-07-30 PROCEDURE — 86644 CMV ANTIBODY: CPT

## 2024-07-30 PROCEDURE — 36415 COLL VENOUS BLD VENIPUNCTURE: CPT

## 2024-07-31 LAB
CMV DNA SPEC NAA+PROBE-ACNC: 169 IU/ML
CMV DNA SPEC NAA+PROBE-LOG#: 2.2 {LOG_COPIES}/ML
CMV IGG SERPL IA-ACNC: <0.2 U/ML
CMV IGG SERPL IA-ACNC: NORMAL
EBV DNA SERPL NAA+PROBE-ACNC: NOT DETECTED IU/ML

## 2024-08-02 ENCOUNTER — TELEPHONE (OUTPATIENT)
Dept: TRANSPLANT | Facility: CLINIC | Age: 34
End: 2024-08-02
Payer: MEDICARE

## 2024-08-02 NOTE — TELEPHONE ENCOUNTER
----- Message from Irena Calderon sent at 8/1/2024  5:05 PM CDT -----  Yes, these low levels are usual even with prophylaxis, does not indicate anything much.   Thanks  AB  ----- Message -----  From: Rahda Jorge RN  Sent: 7/31/2024   2:36 PM CDT  To: Neno Ortiz MD; #    Dr. Calderon,  Are you ok with her cmv slightly increased on 900mg daily of Valcyte?

## 2024-08-06 ENCOUNTER — LAB (OUTPATIENT)
Dept: LAB | Facility: OTHER | Age: 34
End: 2024-08-06
Payer: MEDICARE

## 2024-08-06 DIAGNOSIS — Z79.899 ENCOUNTER FOR LONG-TERM CURRENT USE OF MEDICATION: ICD-10-CM

## 2024-08-06 DIAGNOSIS — Z94.0 KIDNEY REPLACED BY TRANSPLANT: ICD-10-CM

## 2024-08-06 DIAGNOSIS — Z98.890 OTHER SPECIFIED POSTPROCEDURAL STATES: ICD-10-CM

## 2024-08-06 DIAGNOSIS — Z20.828 CONTACT WITH AND (SUSPECTED) EXPOSURE TO OTHER VIRAL COMMUNICABLE DISEASES: ICD-10-CM

## 2024-08-06 DIAGNOSIS — Z48.298 AFTERCARE FOLLOWING ORGAN TRANSPLANT: ICD-10-CM

## 2024-08-06 LAB
ANION GAP SERPL CALCULATED.3IONS-SCNC: 10 MMOL/L (ref 7–15)
BUN SERPL-MCNC: 29.7 MG/DL (ref 6–20)
CALCIUM SERPL-MCNC: 10.6 MG/DL (ref 8.8–10.4)
CHLORIDE SERPL-SCNC: 108 MMOL/L (ref 98–107)
CREAT SERPL-MCNC: 0.82 MG/DL (ref 0.51–0.95)
EGFRCR SERPLBLD CKD-EPI 2021: >90 ML/MIN/1.73M2
ERYTHROCYTE [DISTWIDTH] IN BLOOD BY AUTOMATED COUNT: 18.1 % (ref 10–15)
GLUCOSE SERPL-MCNC: 98 MG/DL (ref 70–99)
HCO3 SERPL-SCNC: 25 MMOL/L (ref 22–29)
HCT VFR BLD AUTO: 39.8 % (ref 35–47)
HGB BLD-MCNC: 12.2 G/DL (ref 11.7–15.7)
MAGNESIUM SERPL-MCNC: 1.7 MG/DL (ref 1.7–2.3)
MCH RBC QN AUTO: 32.4 PG (ref 26.5–33)
MCHC RBC AUTO-ENTMCNC: 30.7 G/DL (ref 31.5–36.5)
MCV RBC AUTO: 106 FL (ref 78–100)
PHOSPHATE SERPL-MCNC: 3.6 MG/DL (ref 2.5–4.5)
PLATELET # BLD AUTO: 198 10E3/UL (ref 150–450)
POTASSIUM SERPL-SCNC: 4.9 MMOL/L (ref 3.4–5.3)
RBC # BLD AUTO: 3.77 10E6/UL (ref 3.8–5.2)
SODIUM SERPL-SCNC: 143 MMOL/L (ref 135–145)
TACROLIMUS BLD-MCNC: 10.2 UG/L (ref 5–15)
TME LAST DOSE: NORMAL H
TME LAST DOSE: NORMAL H
WBC # BLD AUTO: 6.2 10E3/UL (ref 4–11)

## 2024-08-06 PROCEDURE — 80197 ASSAY OF TACROLIMUS: CPT

## 2024-08-06 PROCEDURE — 83735 ASSAY OF MAGNESIUM: CPT

## 2024-08-06 PROCEDURE — 36415 COLL VENOUS BLD VENIPUNCTURE: CPT

## 2024-08-06 PROCEDURE — 87799 DETECT AGENT NOS DNA QUANT: CPT

## 2024-08-06 PROCEDURE — 85027 COMPLETE CBC AUTOMATED: CPT

## 2024-08-06 PROCEDURE — 84100 ASSAY OF PHOSPHORUS: CPT

## 2024-08-06 PROCEDURE — 80048 BASIC METABOLIC PNL TOTAL CA: CPT

## 2024-08-07 DIAGNOSIS — Z48.298 AFTERCARE FOLLOWING ORGAN TRANSPLANT: Primary | ICD-10-CM

## 2024-08-07 LAB
BK VIRUS DNA IU/ML, INSTRUMENT (6800): 45 IU/ML
BK VIRUS SPECIMEN TYPE: ABNORMAL
BKV DNA SPEC NAA+PROBE-LOG#: 1.7 {LOG_COPIES}/ML
CMV DNA SPEC NAA+PROBE-ACNC: 118 IU/ML
CMV DNA SPEC NAA+PROBE-LOG#: 2.1 {LOG_COPIES}/ML

## 2024-08-13 ENCOUNTER — LAB (OUTPATIENT)
Dept: LAB | Facility: OTHER | Age: 34
End: 2024-08-13
Payer: MEDICARE

## 2024-08-13 DIAGNOSIS — Z20.828 CONTACT WITH AND (SUSPECTED) EXPOSURE TO OTHER VIRAL COMMUNICABLE DISEASES: ICD-10-CM

## 2024-08-13 DIAGNOSIS — K12.1 MOUTH ULCERS: ICD-10-CM

## 2024-08-13 DIAGNOSIS — Z79.899 ENCOUNTER FOR LONG-TERM CURRENT USE OF MEDICATION: ICD-10-CM

## 2024-08-13 DIAGNOSIS — N18.6 ESRD (END STAGE RENAL DISEASE) (H): ICD-10-CM

## 2024-08-13 DIAGNOSIS — Z98.890 OTHER SPECIFIED POSTPROCEDURAL STATES: ICD-10-CM

## 2024-08-13 DIAGNOSIS — B25.9 CMV (CYTOMEGALOVIRUS INFECTION) (H): ICD-10-CM

## 2024-08-13 DIAGNOSIS — Z48.298 AFTERCARE FOLLOWING ORGAN TRANSPLANT: ICD-10-CM

## 2024-08-13 DIAGNOSIS — Z94.0 KIDNEY REPLACED BY TRANSPLANT: ICD-10-CM

## 2024-08-13 DIAGNOSIS — Z76.82 ORGAN TRANSPLANT CANDIDATE: ICD-10-CM

## 2024-08-13 LAB
ANION GAP SERPL CALCULATED.3IONS-SCNC: 11 MMOL/L (ref 7–15)
BUN SERPL-MCNC: 30.4 MG/DL (ref 6–20)
CALCIUM SERPL-MCNC: 9.9 MG/DL (ref 8.8–10.4)
CHLORIDE SERPL-SCNC: 107 MMOL/L (ref 98–107)
CREAT SERPL-MCNC: 0.78 MG/DL (ref 0.51–0.95)
EGFRCR SERPLBLD CKD-EPI 2021: >90 ML/MIN/1.73M2
ERYTHROCYTE [DISTWIDTH] IN BLOOD BY AUTOMATED COUNT: 17.1 % (ref 10–15)
GLUCOSE SERPL-MCNC: 93 MG/DL (ref 70–99)
HCO3 SERPL-SCNC: 23 MMOL/L (ref 22–29)
HCT VFR BLD AUTO: 37.9 % (ref 35–47)
HGB BLD-MCNC: 11.8 G/DL (ref 11.7–15.7)
MAGNESIUM SERPL-MCNC: 1.5 MG/DL (ref 1.7–2.3)
MCH RBC QN AUTO: 33.1 PG (ref 26.5–33)
MCHC RBC AUTO-ENTMCNC: 31.1 G/DL (ref 31.5–36.5)
MCV RBC AUTO: 106 FL (ref 78–100)
PHOSPHATE SERPL-MCNC: 3.4 MG/DL (ref 2.5–4.5)
PLATELET # BLD AUTO: 169 10E3/UL (ref 150–450)
POTASSIUM SERPL-SCNC: 4.5 MMOL/L (ref 3.4–5.3)
PTH-INTACT SERPL-MCNC: 71 PG/ML (ref 15–65)
RBC # BLD AUTO: 3.57 10E6/UL (ref 3.8–5.2)
SODIUM SERPL-SCNC: 141 MMOL/L (ref 135–145)
TACROLIMUS BLD-MCNC: 19.2 UG/L (ref 5–15)
TME LAST DOSE: ABNORMAL H
TME LAST DOSE: ABNORMAL H
WBC # BLD AUTO: 5.3 10E3/UL (ref 4–11)

## 2024-08-13 PROCEDURE — 85027 COMPLETE CBC AUTOMATED: CPT

## 2024-08-13 PROCEDURE — 87799 DETECT AGENT NOS DNA QUANT: CPT

## 2024-08-13 PROCEDURE — 86833 HLA CLASS II HIGH DEFIN QUAL: CPT

## 2024-08-13 PROCEDURE — 83970 ASSAY OF PARATHORMONE: CPT

## 2024-08-13 PROCEDURE — 86832 HLA CLASS I HIGH DEFIN QUAL: CPT

## 2024-08-13 PROCEDURE — 86644 CMV ANTIBODY: CPT

## 2024-08-13 PROCEDURE — 36415 COLL VENOUS BLD VENIPUNCTURE: CPT

## 2024-08-13 PROCEDURE — 84100 ASSAY OF PHOSPHORUS: CPT

## 2024-08-13 PROCEDURE — 80048 BASIC METABOLIC PNL TOTAL CA: CPT

## 2024-08-13 PROCEDURE — 80197 ASSAY OF TACROLIMUS: CPT

## 2024-08-13 PROCEDURE — 83735 ASSAY OF MAGNESIUM: CPT

## 2024-08-14 LAB
BK VIRUS DNA IU/ML, INSTRUMENT (6800): 32 IU/ML
BK VIRUS SPECIMEN TYPE: ABNORMAL
BKV DNA SPEC NAA+PROBE-LOG#: 1.5 {LOG_COPIES}/ML
CMV DNA SPEC NAA+PROBE-ACNC: 71 IU/ML
CMV DNA SPEC NAA+PROBE-LOG#: 1.9 {LOG_COPIES}/ML
CMV IGG SERPL IA-ACNC: 0.86 U/ML
CMV IGG SERPL IA-ACNC: ABNORMAL

## 2024-08-15 ENCOUNTER — TELEPHONE (OUTPATIENT)
Dept: TRANSPLANT | Facility: CLINIC | Age: 34
End: 2024-08-15
Payer: MEDICARE

## 2024-08-15 DIAGNOSIS — Z48.298 AFTERCARE FOLLOWING ORGAN TRANSPLANT: ICD-10-CM

## 2024-08-15 RX ORDER — SULFAMETHOXAZOLE AND TRIMETHOPRIM 400; 80 MG/1; MG/1
1 TABLET ORAL DAILY
Qty: 30 TABLET | Refills: 11 | Status: SHIPPED | OUTPATIENT
Start: 2024-08-15

## 2024-08-15 NOTE — TELEPHONE ENCOUNTER
----- Message from Neno Ortiz sent at 8/14/2024  2:59 PM CDT -----  Yes, can restart Bactrim.  Would continue Valcyte for another couple of weeks and see.  That CMV IgG Ab level is barely positive.    Jas  ----- Message -----  From: Radha Jorge RN  Sent: 8/14/2024  11:38 AM CDT  To: Neno Ortiz MD    Can she restart bactrim? It was held due to Leukopenia in July. Also, can she stop Vacyte with positive IGG?

## 2024-08-21 ENCOUNTER — OFFICE VISIT (OUTPATIENT)
Dept: TRANSPLANT | Facility: CLINIC | Age: 34
End: 2024-08-21
Attending: INTERNAL MEDICINE
Payer: MEDICARE

## 2024-08-21 ENCOUNTER — LAB (OUTPATIENT)
Dept: LAB | Facility: CLINIC | Age: 34
End: 2024-08-21
Attending: INTERNAL MEDICINE
Payer: MEDICARE

## 2024-08-21 ENCOUNTER — DOCUMENTATION ONLY (OUTPATIENT)
Dept: TRANSPLANT | Facility: CLINIC | Age: 34
End: 2024-08-21

## 2024-08-21 VITALS
SYSTOLIC BLOOD PRESSURE: 109 MMHG | DIASTOLIC BLOOD PRESSURE: 74 MMHG | TEMPERATURE: 98.4 F | BODY MASS INDEX: 24.33 KG/M2 | HEART RATE: 76 BPM | OXYGEN SATURATION: 96 % | WEIGHT: 133 LBS

## 2024-08-21 DIAGNOSIS — N18.9 ANEMIA IN CHRONIC KIDNEY DISEASE, UNSPECIFIED CKD STAGE: ICD-10-CM

## 2024-08-21 DIAGNOSIS — D63.1 ANEMIA IN CHRONIC KIDNEY DISEASE, UNSPECIFIED CKD STAGE: ICD-10-CM

## 2024-08-21 DIAGNOSIS — Z76.82 ORGAN TRANSPLANT CANDIDATE: ICD-10-CM

## 2024-08-21 DIAGNOSIS — N18.6 ESRD (END STAGE RENAL DISEASE) (H): ICD-10-CM

## 2024-08-21 DIAGNOSIS — B27.00 EBV (EPSTEIN-BARR VIRUS) VIREMIA: ICD-10-CM

## 2024-08-21 DIAGNOSIS — E83.42 HYPOMAGNESEMIA: ICD-10-CM

## 2024-08-21 DIAGNOSIS — Z48.298 AFTERCARE FOLLOWING ORGAN TRANSPLANT: ICD-10-CM

## 2024-08-21 DIAGNOSIS — Z29.89 NEED FOR PNEUMOCYSTIS PROPHYLAXIS: ICD-10-CM

## 2024-08-21 DIAGNOSIS — Z98.890 OTHER SPECIFIED POSTPROCEDURAL STATES: ICD-10-CM

## 2024-08-21 DIAGNOSIS — Z94.0 KIDNEY REPLACED BY TRANSPLANT: Chronic | ICD-10-CM

## 2024-08-21 DIAGNOSIS — Z79.899 ENCOUNTER FOR LONG-TERM CURRENT USE OF MEDICATION: ICD-10-CM

## 2024-08-21 DIAGNOSIS — R13.10 ODYNOPHAGIA: ICD-10-CM

## 2024-08-21 DIAGNOSIS — Z20.828 CONTACT WITH AND (SUSPECTED) EXPOSURE TO OTHER VIRAL COMMUNICABLE DISEASES: ICD-10-CM

## 2024-08-21 DIAGNOSIS — N25.81 SECONDARY RENAL HYPERPARATHYROIDISM (H): ICD-10-CM

## 2024-08-21 DIAGNOSIS — D72.819 LEUKOPENIA, UNSPECIFIED TYPE: ICD-10-CM

## 2024-08-21 DIAGNOSIS — B25.9 CYTOMEGALOVIRUS (CMV) VIREMIA (H): ICD-10-CM

## 2024-08-21 DIAGNOSIS — D84.9 IMMUNOSUPPRESSED STATUS (H): Chronic | ICD-10-CM

## 2024-08-21 DIAGNOSIS — E43 SEVERE MALNUTRITION (H): Primary | ICD-10-CM

## 2024-08-21 DIAGNOSIS — K12.1 MOUTH ULCERS: ICD-10-CM

## 2024-08-21 DIAGNOSIS — Z94.0 KIDNEY REPLACED BY TRANSPLANT: ICD-10-CM

## 2024-08-21 LAB
ALBUMIN MFR UR ELPH: 8.5 MG/DL
ANION GAP SERPL CALCULATED.3IONS-SCNC: 9 MMOL/L (ref 7–15)
BK VIRUS DNA IU/ML, INSTRUMENT (6800): 281 IU/ML
BK VIRUS SPECIMEN TYPE: ABNORMAL
BKV DNA SPEC NAA+PROBE-LOG#: 2.4 {LOG_COPIES}/ML
BUN SERPL-MCNC: 32.8 MG/DL (ref 6–20)
CALCIUM SERPL-MCNC: 10.6 MG/DL (ref 8.8–10.4)
CHLORIDE SERPL-SCNC: 105 MMOL/L (ref 98–107)
CREAT SERPL-MCNC: 0.93 MG/DL (ref 0.51–0.95)
CREAT UR-MCNC: 81.4 MG/DL
EGFRCR SERPLBLD CKD-EPI 2021: 82 ML/MIN/1.73M2
ERYTHROCYTE [DISTWIDTH] IN BLOOD BY AUTOMATED COUNT: 16.7 % (ref 10–15)
GLUCOSE SERPL-MCNC: 90 MG/DL (ref 70–99)
HCO3 SERPL-SCNC: 26 MMOL/L (ref 22–29)
HCT VFR BLD AUTO: 40 % (ref 35–47)
HGB BLD-MCNC: 12.8 G/DL (ref 11.7–15.7)
MAGNESIUM SERPL-MCNC: 1.6 MG/DL (ref 1.7–2.3)
MCH RBC QN AUTO: 33.1 PG (ref 26.5–33)
MCHC RBC AUTO-ENTMCNC: 32 G/DL (ref 31.5–36.5)
MCV RBC AUTO: 103 FL (ref 78–100)
PHOSPHATE SERPL-MCNC: 2.9 MG/DL (ref 2.5–4.5)
PLATELET # BLD AUTO: 196 10E3/UL (ref 150–450)
POTASSIUM SERPL-SCNC: 4.2 MMOL/L (ref 3.4–5.3)
PROT/CREAT 24H UR: 0.1 MG/MG CR (ref 0–0.2)
RBC # BLD AUTO: 3.87 10E6/UL (ref 3.8–5.2)
SODIUM SERPL-SCNC: 140 MMOL/L (ref 135–145)
TACROLIMUS BLD-MCNC: 14.1 UG/L (ref 5–15)
TME LAST DOSE: NORMAL H
TME LAST DOSE: NORMAL H
WBC # BLD AUTO: 5 10E3/UL (ref 4–11)

## 2024-08-21 PROCEDURE — 86832 HLA CLASS I HIGH DEFIN QUAL: CPT | Performed by: SURGERY

## 2024-08-21 PROCEDURE — 86833 HLA CLASS II HIGH DEFIN QUAL: CPT | Performed by: SURGERY

## 2024-08-21 PROCEDURE — 99213 OFFICE O/P EST LOW 20 MIN: CPT | Performed by: STUDENT IN AN ORGANIZED HEALTH CARE EDUCATION/TRAINING PROGRAM

## 2024-08-21 PROCEDURE — 80048 BASIC METABOLIC PNL TOTAL CA: CPT | Performed by: PATHOLOGY

## 2024-08-21 PROCEDURE — 36415 COLL VENOUS BLD VENIPUNCTURE: CPT | Performed by: PATHOLOGY

## 2024-08-21 PROCEDURE — 84156 ASSAY OF PROTEIN URINE: CPT | Performed by: PATHOLOGY

## 2024-08-21 PROCEDURE — 83735 ASSAY OF MAGNESIUM: CPT | Performed by: PATHOLOGY

## 2024-08-21 PROCEDURE — G0463 HOSPITAL OUTPT CLINIC VISIT: HCPCS | Performed by: STUDENT IN AN ORGANIZED HEALTH CARE EDUCATION/TRAINING PROGRAM

## 2024-08-21 PROCEDURE — 80197 ASSAY OF TACROLIMUS: CPT | Performed by: INTERNAL MEDICINE

## 2024-08-21 PROCEDURE — 87799 DETECT AGENT NOS DNA QUANT: CPT | Performed by: INTERNAL MEDICINE

## 2024-08-21 PROCEDURE — 99000 SPECIMEN HANDLING OFFICE-LAB: CPT | Performed by: PATHOLOGY

## 2024-08-21 PROCEDURE — 85027 COMPLETE CBC AUTOMATED: CPT | Performed by: PATHOLOGY

## 2024-08-21 PROCEDURE — G2211 COMPLEX E/M VISIT ADD ON: HCPCS | Performed by: STUDENT IN AN ORGANIZED HEALTH CARE EDUCATION/TRAINING PROGRAM

## 2024-08-21 PROCEDURE — 99214 OFFICE O/P EST MOD 30 MIN: CPT | Performed by: STUDENT IN AN ORGANIZED HEALTH CARE EDUCATION/TRAINING PROGRAM

## 2024-08-21 PROCEDURE — 84100 ASSAY OF PHOSPHORUS: CPT | Performed by: PATHOLOGY

## 2024-08-21 ASSESSMENT — PAIN SCALES - GENERAL: PAINLEVEL: NO PAIN (0)

## 2024-08-21 NOTE — PROGRESS NOTES
Seen by Dr Capm.  Last Tac level 10 hr trough.  Report today's Tac also a 10 hour level.  Requesting if safe to be off Prednisone and also to retry MMF.  Currently on AZA.  Per Dr Camp will discuss with team.  Radha Gallego RN[facility] made aware.

## 2024-08-21 NOTE — PROGRESS NOTES
TRANSPLANT NEPHROLOGY CLINIC VISIT     Recommendation:  Continue AZA for now.  We will discuss need for Prednisone, but if no AZA, perhaps she benefits from it, especially the first year post transplant.  Continue Valcyte 900 mg every day.    Assessment & Plan   # DDKT: - Stable   Pediatric en-bloc kidneys   - Baseline Creatinine: ~ 0.7-0.9 mg/dL.   - Proteinuria: Normal (<0.2 grams)   - DSA Hx: No DSA   - Last cPRA: 9%   - BK Viremia: No   - Kidney Tx Biopsy Hx: No biopsy history.    # Immunosuppression: Tacrolimus immediate release (goal 8-10), Azathioprine (dose 100 mg daily), and Prednisone (dose 5 mg daily)   - Induction with Recent Transplant:  High Intensity Protocol   - Continue with intensive monitoring of immunosuppression for efficacy and toxicity.   - Historical Changes in Immunosuppression:  Switched mycophenolate to Azathioprine, due to oral and vaginal ulcers.   - Changes: Not at this time.    - Repeat Tac level today, if still high we'll adjust per protocol (no diarrhea).    # Infection Prevention:      - PJP: Sulfa/TMP (Bactrim); since 08/15/2024.  - CMV: Valganciclovir (Valcyte); On treatment dose of Valcyte 900 mg every day since 7/24.  - CMV IgG Ab High Risk Discordance (D+/R-): Yes  - EBV IgG Ab High Risk Discordance (D+/R-): No    # Blood Pressure: Controlled;  Goal BP: < 140/90   - Changes: No    # Elevated Blood Glucose: Glucose generally running ~ 90-130s  Last HbA1c: 4.7%    # Anemia in Chronic Renal Disease: Hgb: Stable      JUNG: No   - Iron studies: Replete    # Leukopenia: Trend up, moderately low WBC at ~ 2.4 with last check, but had been running closer to ~ 1.3-2.1 over the last month.  Likely medication related.  Negative CMV PCR.    # Mineral Bone Disorder:    - Secondary renal hyperparathyroidism; PTH level: Minimally elevated ( pg/ml)        On treatment: None  - Vitamin D; level: Normal        On supplement: No  - Calcium; level: High        On supplement: No     #  Electrolytes:   - Potassium; level: Normal        On supplement: No  - Magnesium; level: Stable low        On supplement: Yes  - Bicarbonate; level: Normal        On supplement: No    # GERD/Dysphagia: Dysphagia resolved. Pepcid only as needed now.    # Oral-genital ulcers: Has seen rheumatologist and ID. Still unclear whether it was due to EBV (EBV pathology stain was not impressive), Behcet's syndrome or MMF. For now we have made changes to cover all of the possible etiologies.   - She's off MMF.   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   - On Valcyte for CMV and EBV.   - We will discuss as a team whether continue AZA or switch to MMF. I favor AZA for now.     # Severe Malnutrition: Resolved after treatment of ulcers.    # Other Significant PMH:   - Crohn's Disease: Appears to be stable.  Previously was on ustekinumab prior to kidney transplant, but hasn't restarted it due to ongoing oral and vaginal ulcers.    - Cardiac/Vascular Disease Risk Factors: Dilated ascending aorta (3.8 cm)  on 2024 ECHO (stable from 2020).     # Skin Cancer Risk:    - Discussed sun protection and recommend regular follow up with Dermatology.    # Transplant History:  Etiology of Kidney Failure: Interstitial nephritis  Tx: DDKT - Pediatric en bloc  Transplant: 4/17/2024 (Kidney)  Significant transplant-related complications:  Oral and vaginal ulcers that prompted change from MMF to AZA.    Transplant Office Phone Number: [phone]    Assessment and plan was discussed with the patient and she voiced her understanding and agreement.    Return visit: Return for appointment scheduled for 10/22/2024.    Merritt Patino MD    The longitudinal plan of care for the diagnosis(es)/condition(s) as documented were addressed during this visit. Due to the added complexity in care, I will continue to support Ira in the subsequent management and with ongoing continuity of care.      Chief Complaint   Ms. Zamora is a 34 year old  here for kidney transplant and immunosuppression management.     History of Present Illness    Ms. Zamora reports feeling great overall.    Since last clinic visit:  Hospitalizations: Yes. Presented to clinic on May 26 with severe odynophagia and inability to take PO. Biopsy negative for malignancy. Concern for Behcet's syndrome. She was treated with IV methylprednisolone and MMF was switched to AZA. Later, vulvar biopsy was eventually positive for EBV.    Much better since discharge. Ulcers pretty much gone. No odynophagia, appetite back to normal and gained the weight that she lost.    New Medical Issues: No    Chest pain or shortness of breath: No  Lower extremity swelling: No  Weight change: Yes  Nausea and vomiting: No  Diarrhea: No  Heartburn symptoms: No  Fever, sweats or chills: No  Urinary complaints: No    Home BP: Not checked    Problem List   Patient Active Problem List   Diagnosis    Crohn's disease (H)    CKD (chronic kidney disease) stage 2, GFR 60-89 ml/min    Secondary renal hyperparathyroidism (H24)    Long QT interval    Kidney replaced by transplant    Immunosuppressed status (H24)    Dehydration    Aftercare following organ transplant    Mouth ulcers    Vaginal ulcer    Leukopenia, unspecified type    Severe malnutrition (H24)    Need for pneumocystis prophylaxis    Anemia in chronic renal disease    Hypomagnesemia    Odynophagia    Inadequate oral intake    Vulvar lesion    Ulcer of esophagus without bleeding    Other neutropenia (H24)    Cytomegalovirus (CMV) viremia (H)    EBV (Jake-Barr virus) viremia       Allergies   Allergies   Allergen Reactions    Amlodipine Headache and Other (See Comments)     Other Reaction(s): Unknown    Omeprazole Other (See Comments)     All PPIs per patient    Proton Pump Inhibitors Nephrotoxicity     No PPIs due to history of renal failure due to interstitial nephritis    Tegaderm Transparent Dressing (Informational Only) Blisters       Medications   Current  Outpatient Medications   Medication Sig Dispense Refill    acetaminophen (TYLENOL) 500 MG tablet Take 1,000 mg by mouth every 6 hours as needed      atorvastatin (LIPITOR) 10 MG tablet Take 1 tablet (10 mg) by mouth daily 30 tablet 1    azaTHIOprine (IMURAN) 50 MG tablet Take 2 tablets (100 mg) by mouth daily (Patient not taking: Reported on 7/18/2024) 60 tablet 11    famotidine (PEPCID) 20 MG tablet Take 2 tablets (40 mg) by mouth 2 times daily 60 tablet 1    hydrOXYzine HCl (ATARAX) 25 MG tablet Take 1 tablet (25 mg) by mouth every 6 hours as needed for other (adjuvant pain) 20 tablet 0    levonorgestrel (KYLEENA) 19.5 MG IUD 1 Device by Intrauterine route      magnesium oxide (MAG-OX) 400 MG tablet Take 2 tablets (800 mg) by mouth daily 60 tablet 0    PARoxetine (PAXIL) 30 MG tablet Take 30 mg by mouth every evening      predniSONE (DELTASONE) 5 MG tablet Take 1 tablet (5 mg) by mouth daily 30 tablet 2    sulfamethoxazole-trimethoprim (BACTRIM) 400-80 MG tablet Take 1 tablet by mouth daily 30 tablet 11    tacrolimus (GENERIC EQUIVALENT) 0.5 MG capsule Hold for dose changes 60 capsule 11    tacrolimus (GENERIC EQUIVALENT) 1 MG capsule Take 2 capsules (2 mg) by mouth 2 times daily 120 capsule 11    triamcinolone (KENALOG) 0.1 % external lotion Apply topically 2 times daily 60 mL 0    triamcinolone (KENALOG) 0.1 % paste Take by mouth 2 times daily 5 g 3    ustekinumab (STELARA) 90 MG/ML Inject 90 mg Subcutaneous once every eight weeks (Patient not taking: Reported on 6/27/2024)      valGANciclovir (VALCYTE) 450 MG tablet Take 2 tablets (900 mg) by mouth daily 60 tablet 3    Vonoprazan Fumarate (VOQUEZNA) 20 MG TABS Take 20 mg by mouth daily 60 tablet 0     No current facility-administered medications for this visit.     Medications Discontinued During This Encounter   Medication Reason    azaTHIOprine (IMURAN) 5 mg/mL SUSP     multivitamins w/minerals liquid        Physical Exam   Vital Signs: /74   Pulse 76    Temp 98.4  F (36.9  C) (Oral)   Wt 60.3 kg (133 lb)   SpO2 96%   BMI 24.33 kg/m      GENERAL APPEARANCE: alert and no distress  HENT: mouth without ulcers or lesions  LYMPHATICS: no cervical or supraclavicular nodes  RESP: lungs clear to auscultation - no rales, rhonchi or wheezes  CV: regular rhythm, normal rate, no rub, no murmur  EDEMA: no LE edema bilaterally  ABDOMEN: soft, nondistended, nontender, bowel sounds normal. Incision well healed. No bruit at allograft site.  MS: extremities normal - no gross deformities noted, no evidence of inflammation in joints, no muscle tenderness  SKIN: no rash  ACCESS: RUE AVF with bruit and thrill.    Data         Latest Ref Rng & Units 8/21/2024     7:46 AM 8/13/2024     7:38 AM 8/6/2024     8:58 AM   Renal   Sodium 135 - 145 mmol/L 140  141  143    K 3.4 - 5.3 mmol/L 4.2  4.5  4.9    Cl 98 - 107 mmol/L 105  107  108    Cl (external) 98 - 107 mmol/L 105  107  108    CO2 22 - 29 mmol/L 26  23  25    Urea Nitrogen 6.0 - 20.0 mg/dL 32.8  30.4  29.7    Creatinine 0.51 - 0.95 mg/dL 0.93  0.78  0.82    Glucose 70 - 99 mg/dL 90  93  98    Calcium 8.8 - 10.4 mg/dL 10.6  9.9  10.6    Magnesium 1.7 - 2.3 mg/dL 1.6  1.5  1.7          Latest Ref Rng & Units 8/21/2024     7:46 AM 8/13/2024     7:38 AM 8/6/2024     8:58 AM   Bone Health   Phosphorus 2.5 - 4.5 mg/dL 2.9  3.4  3.6    Parathyroid Hormone Intact 15 - 65 pg/mL  71           Latest Ref Rng & Units 8/21/2024     7:46 AM 8/13/2024     7:38 AM 8/6/2024     8:58 AM   Heme   WBC 4.0 - 11.0 10e3/uL 5.0  5.3  6.2    Hgb 11.7 - 15.7 g/dL 12.8  11.8  12.2    Plt 150 - 450 10e3/uL 196  169  198          Latest Ref Rng & Units 7/8/2024     5:42 AM 7/1/2024     6:18 AM 6/30/2024     5:16 AM   Liver   AP 40 - 150 U/L 140  73  68    TBili <=1.2 mg/dL 0.2  0.2  0.2    Bilirubin Direct 0.00 - 0.30 mg/dL   <0.20    ALT 0 - 50 U/L 39  13  16    AST 0 - 45 U/L 16  18  15    Tot Protein 6.4 - 8.3 g/dL 5.6  6.0  5.4    Albumin 3.5 - 5.2 g/dL  3.0  3.2  2.9          Latest Ref Rng & Units 6/26/2024     5:06 PM 4/17/2024     9:58 AM   Pancreas   A1C <5.7 %  4.7    Lipase (Roche) 13 - 60 U/L 14           Latest Ref Rng & Units 5/23/2024     9:49 AM 4/22/2024     8:21 AM   Iron studies   Iron 37 - 145 ug/dL 77  53    Iron Sat Index 15 - 46 % 38  36    Ferritin 6 - 175 ng/mL 2,181  2,181          8/13/2024     7:38 AM 8/6/2024     8:58 AM 7/30/2024     9:40 AM   [facility] Virology   LOG IU/ML OF CMVQNT 1.9  2.1  2.2      Failed to redirect to the Timeline version of the [employer].  Recent Labs   Lab Test 07/30/24  0940 08/06/24  0858 08/13/24  0738   DOSTAC 7/29/2024 8/5/2024 8/12/2024   TACROL 9.9 10.2 19.2*     Recent Labs   Lab Test 04/23/24  0735   DOSMPA 4/22/2024   9:00 PM   MPACID 2.20   MPAG 107.5*

## 2024-08-21 NOTE — LETTER
8/21/2024      Ira Zamora  [address] [place] [address]  [place] [address]      Dear Colleague,    Thank you for referring your patient, Ira Zamora, to the [facility]. Please see a copy of my visit note below.    TRANSPLANT NEPHROLOGY CLINIC VISIT     Recommendation:  Continue AZA for now.  We will discuss need for Prednisone, but if no AZA, perhaps she benefits from it, especially the first year post transplant.  Continue Valcyte 900 mg every day.    Assessment & Plan  # DDKT: - Stable   Pediatric en-bloc kidneys   - Baseline Creatinine: ~ 0.7-0.9 mg/dL.   - Proteinuria: Normal (<0.2 grams)   - DSA Hx: No DSA   - Last cPRA: 9%   - BK Viremia: No   - Kidney Tx Biopsy Hx: No biopsy history.    # Immunosuppression: Tacrolimus immediate release (goal 8-10), Azathioprine (dose 100 mg daily), and Prednisone (dose 5 mg daily)   - Induction with Recent Transplant:  High Intensity Protocol   - Continue with intensive monitoring of immunosuppression for efficacy and toxicity.   - Historical Changes in Immunosuppression:  Switched mycophenolate to Azathioprine, due to oral and vaginal ulcers.   - Changes: Not at this time.    - Repeat Tac level today, if still high we'll adjust per protocol (no diarrhea).    # Infection Prevention:      - PJP: Sulfa/TMP (Bactrim); since 08/15/2024.  - CMV: Valganciclovir (Valcyte); On treatment dose of Valcyte 900 mg every day since 7/24.  - CMV IgG Ab High Risk Discordance (D+/R-): Yes  - EBV IgG Ab High Risk Discordance (D+/R-): No    # Blood Pressure: Controlled;  Goal BP: < 140/90   - Changes: No    # Elevated Blood Glucose: Glucose generally running ~ 90-130s  Last HbA1c: 4.7%    # Anemia in Chronic Renal Disease: Hgb: Stable      JUNG: No   - Iron studies: Replete    # Leukopenia: Trend up, moderately low WBC at ~ 2.4 with last check, but had been running closer to ~ 1.3-2.1 over the last month.  Likely medication related.  Negative CMV PCR.    # Mineral Bone Disorder:     - Secondary renal hyperparathyroidism; PTH level: Minimally elevated ( pg/ml)        On treatment: None  - Vitamin D; level: Normal        On supplement: No  - Calcium; level: High        On supplement: No     # Electrolytes:   - Potassium; level: Normal        On supplement: No  - Magnesium; level: Stable low        On supplement: Yes  - Bicarbonate; level: Normal        On supplement: No    # GERD/Dysphagia: Dysphagia resolved. Pepcid only as needed now.    # Oral-genital ulcers: Has seen rheumatologist and ID. Still unclear whether it was due to EBV (EBV pathology stain was not impressive), Behcet's syndrome or MMF. For now we have made changes to cover all of the possible etiologies.   - She's off MMF.   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   - On Valcyte for CMV and EBV.   - We will discuss as a team whether continue AZA or switch to MMF. I favor AZA for now.     # Severe Malnutrition: Resolved after treatment of ulcers.    # Other Significant PMH:   - Crohn's Disease: Appears to be stable.  Previously was on ustekinumab prior to kidney transplant, but hasn't restarted it due to ongoing oral and vaginal ulcers.    - Cardiac/Vascular Disease Risk Factors: Dilated ascending aorta (3.8 cm)  on 2024 ECHO (stable from 2020).     # Skin Cancer Risk:    - Discussed sun protection and recommend regular follow up with Dermatology.    # Transplant History:  Etiology of Kidney Failure: Interstitial nephritis  Tx: DDKT - Pediatric en bloc  Transplant: 4/17/2024 (Kidney)  Significant transplant-related complications:  Oral and vaginal ulcers that prompted change from MMF to AZA.    Transplant Office Phone Number: [phone]    Assessment and plan was discussed with the patient and she voiced her understanding and agreement.    Return visit: Return for appointment scheduled for 10/22/2024.    Merritt Patino MD    The longitudinal plan of care for the diagnosis(es)/condition(s) as documented  were addressed during this visit. Due to the added complexity in care, I will continue to support Ira in the subsequent management and with ongoing continuity of care.      Chief Complaint  Ms. Zamora is a 34 year old here for kidney transplant and immunosuppression management.     History of Present Illness   Ms. Zamora reports feeling great overall.    Since last clinic visit:  Hospitalizations: Yes. Presented to clinic on May 26 with severe odynophagia and inability to take PO. Biopsy negative for malignancy. Concern for Behcet's syndrome. She was treated with IV methylprednisolone and MMF was switched to AZA. Later, vulvar biopsy was eventually positive for EBV.    Much better since discharge. Ulcers pretty much gone. No odynophagia, appetite back to normal and gained the weight that she lost.    New Medical Issues: No    Chest pain or shortness of breath: No  Lower extremity swelling: No  Weight change: Yes  Nausea and vomiting: No  Diarrhea: No  Heartburn symptoms: No  Fever, sweats or chills: No  Urinary complaints: No    Home BP: Not checked    Problem List  Patient Active Problem List   Diagnosis     Crohn's disease (H)     CKD (chronic kidney disease) stage 2, GFR 60-89 ml/min     Secondary renal hyperparathyroidism (H24)     Long QT interval     Kidney replaced by transplant     Immunosuppressed status (H24)     Dehydration     Aftercare following organ transplant     Mouth ulcers     Vaginal ulcer     Leukopenia, unspecified type     Severe malnutrition (H24)     Need for pneumocystis prophylaxis     Anemia in chronic renal disease     Hypomagnesemia     Odynophagia     Inadequate oral intake     Vulvar lesion     Ulcer of esophagus without bleeding     Other neutropenia (H24)     Cytomegalovirus (CMV) viremia (H)     EBV (Jake-Barr virus) viremia       Allergies  Allergies   Allergen Reactions     Amlodipine Headache and Other (See Comments)     Other Reaction(s): Unknown     Omeprazole Other (See  Comments)     All PPIs per patient     Proton Pump Inhibitors Nephrotoxicity     No PPIs due to history of renal failure due to interstitial nephritis     Tegaderm Transparent Dressing (Informational Only) Blisters       Medications  Current Outpatient Medications   Medication Sig Dispense Refill     acetaminophen (TYLENOL) 500 MG tablet Take 1,000 mg by mouth every 6 hours as needed       atorvastatin (LIPITOR) 10 MG tablet Take 1 tablet (10 mg) by mouth daily 30 tablet 1     azaTHIOprine (IMURAN) 50 MG tablet Take 2 tablets (100 mg) by mouth daily (Patient not taking: Reported on 7/18/2024) 60 tablet 11     famotidine (PEPCID) 20 MG tablet Take 2 tablets (40 mg) by mouth 2 times daily 60 tablet 1     hydrOXYzine HCl (ATARAX) 25 MG tablet Take 1 tablet (25 mg) by mouth every 6 hours as needed for other (adjuvant pain) 20 tablet 0     levonorgestrel (KYLEENA) 19.5 MG IUD 1 Device by Intrauterine route       magnesium oxide (MAG-OX) 400 MG tablet Take 2 tablets (800 mg) by mouth daily 60 tablet 0     PARoxetine (PAXIL) 30 MG tablet Take 30 mg by mouth every evening       predniSONE (DELTASONE) 5 MG tablet Take 1 tablet (5 mg) by mouth daily 30 tablet 2     sulfamethoxazole-trimethoprim (BACTRIM) 400-80 MG tablet Take 1 tablet by mouth daily 30 tablet 11     tacrolimus (GENERIC EQUIVALENT) 0.5 MG capsule Hold for dose changes 60 capsule 11     tacrolimus (GENERIC EQUIVALENT) 1 MG capsule Take 2 capsules (2 mg) by mouth 2 times daily 120 capsule 11     triamcinolone (KENALOG) 0.1 % external lotion Apply topically 2 times daily 60 mL 0     triamcinolone (KENALOG) 0.1 % paste Take by mouth 2 times daily 5 g 3     ustekinumab (STELARA) 90 MG/ML Inject 90 mg Subcutaneous once every eight weeks (Patient not taking: Reported on 6/27/2024)       valGANciclovir (VALCYTE) 450 MG tablet Take 2 tablets (900 mg) by mouth daily 60 tablet 3     Vonoprazan Fumarate (VOQUEZNA) 20 MG TABS Take 20 mg by mouth daily 60 tablet 0     No  current facility-administered medications for this visit.     Medications Discontinued During This Encounter   Medication Reason     azaTHIOprine (IMURAN) 5 mg/mL SUSP      multivitamins w/minerals liquid        Physical Exam  Vital Signs: /74   Pulse 76   Temp 98.4  F (36.9  C) (Oral)   Wt 60.3 kg (133 lb)   SpO2 96%   BMI 24.33 kg/m      GENERAL APPEARANCE: alert and no distress  HENT: mouth without ulcers or lesions  LYMPHATICS: no cervical or supraclavicular nodes  RESP: lungs clear to auscultation - no rales, rhonchi or wheezes  CV: regular rhythm, normal rate, no rub, no murmur  EDEMA: no LE edema bilaterally  ABDOMEN: soft, nondistended, nontender, bowel sounds normal. Incision well healed. No bruit at allograft site.  MS: extremities normal - no gross deformities noted, no evidence of inflammation in joints, no muscle tenderness  SKIN: no rash  ACCESS: RUE AVF with bruit and thrill.    Data        Latest Ref Rng & Units 8/21/2024     7:46 AM 8/13/2024     7:38 AM 8/6/2024     8:58 AM   Renal   Sodium 135 - 145 mmol/L 140  141  143    K 3.4 - 5.3 mmol/L 4.2  4.5  4.9    Cl 98 - 107 mmol/L 105  107  108    Cl (external) 98 - 107 mmol/L 105  107  108    CO2 22 - 29 mmol/L 26  23  25    Urea Nitrogen 6.0 - 20.0 mg/dL 32.8  30.4  29.7    Creatinine 0.51 - 0.95 mg/dL 0.93  0.78  0.82    Glucose 70 - 99 mg/dL 90  93  98    Calcium 8.8 - 10.4 mg/dL 10.6  9.9  10.6    Magnesium 1.7 - 2.3 mg/dL 1.6  1.5  1.7          Latest Ref Rng & Units 8/21/2024     7:46 AM 8/13/2024     7:38 AM 8/6/2024     8:58 AM   Bone Health   Phosphorus 2.5 - 4.5 mg/dL 2.9  3.4  3.6    Parathyroid Hormone Intact 15 - 65 pg/mL  71           Latest Ref Rng & Units 8/21/2024     7:46 AM 8/13/2024     7:38 AM 8/6/2024     8:58 AM   Heme   WBC 4.0 - 11.0 10e3/uL 5.0  5.3  6.2    Hgb 11.7 - 15.7 g/dL 12.8  11.8  12.2    Plt 150 - 450 10e3/uL 196  169  198          Latest Ref Rng & Units 7/8/2024     5:42 AM 7/1/2024     6:18 AM  6/30/2024     5:16 AM   Liver   AP 40 - 150 U/L 140  73  68    TBili <=1.2 mg/dL 0.2  0.2  0.2    Bilirubin Direct 0.00 - 0.30 mg/dL   <0.20    ALT 0 - 50 U/L 39  13  16    AST 0 - 45 U/L 16  18  15    Tot Protein 6.4 - 8.3 g/dL 5.6  6.0  5.4    Albumin 3.5 - 5.2 g/dL 3.0  3.2  2.9          Latest Ref Rng & Units 6/26/2024     5:06 PM 4/17/2024     9:58 AM   Pancreas   A1C <5.7 %  4.7    Lipase (Roche) 13 - 60 U/L 14           Latest Ref Rng & Units 5/23/2024     9:49 AM 4/22/2024     8:21 AM   Iron studies   Iron 37 - 145 ug/dL 77  53    Iron Sat Index 15 - 46 % 38  36    Ferritin 6 - 175 ng/mL 2,181  2,181          8/13/2024     7:38 AM 8/6/2024     8:58 AM 7/30/2024     9:40 AM   [facility] Virology   LOG IU/ML OF CMVQNT 1.9  2.1  2.2      Failed to redirect to the Timeline version of the [employer].  Recent Labs   Lab Test 07/30/24  0940 08/06/24  0858 08/13/24  0738   DOSTAC 7/29/2024 8/5/2024 8/12/2024   TACROL 9.9 10.2 19.2*     Recent Labs   Lab Test 04/23/24  0735   DOSMPA 4/22/2024   9:00 PM   MPACID 2.20   MPAG 107.5*        Again, thank you for allowing me to participate in the care of your patient.        Sincerely,        Merritt Patino MD

## 2024-08-21 NOTE — PATIENT INSTRUCTIONS
Patient Recommendations:  - No new recommendations at this time.    Transplant Patient Information  Your Post Transplant Coordinator is: Radha Jorge  For non urgent items, we encourage you to contact your coordinator/care team online via [employer]  You and your care team can also contact your transplant coordinator Monday - Friday, 8am - 5pm at [phone] (Option 2 to reach the coordinator or Option 4 to schedule an appointment).  After hours for urgent matters, please call [facility] at [phone].

## 2024-08-21 NOTE — NURSING NOTE
Chief Complaint   Patient presents with    RECHECK       /74   Pulse 76   Temp 98.4  F (36.9  C) (Oral)   Wt 60.3 kg (133 lb)   SpO2 96%   BMI 24.33 kg/m      Bronson Luna on 8/21/2024 at 8:01 AM

## 2024-08-22 ENCOUNTER — TELEPHONE (OUTPATIENT)
Dept: TRANSPLANT | Facility: CLINIC | Age: 34
End: 2024-08-22

## 2024-08-22 ENCOUNTER — OFFICE VISIT (OUTPATIENT)
Dept: DERMATOLOGY | Facility: CLINIC | Age: 34
End: 2024-08-22
Attending: STUDENT IN AN ORGANIZED HEALTH CARE EDUCATION/TRAINING PROGRAM
Payer: MEDICARE

## 2024-08-22 DIAGNOSIS — Z12.83 SKIN EXAM FOR MALIGNANT NEOPLASM: ICD-10-CM

## 2024-08-22 DIAGNOSIS — L21.9 DERMATITIS, SEBORRHEIC: Primary | ICD-10-CM

## 2024-08-22 DIAGNOSIS — D23.9 DERMAL NEVUS: ICD-10-CM

## 2024-08-22 PROCEDURE — 88305 TISSUE EXAM BY PATHOLOGIST: CPT | Mod: TC | Performed by: DERMATOLOGY

## 2024-08-22 PROCEDURE — 99214 OFFICE O/P EST MOD 30 MIN: CPT | Mod: 25 | Performed by: DERMATOLOGY

## 2024-08-22 PROCEDURE — 11102 TANGNTL BX SKIN SINGLE LES: CPT | Mod: GC | Performed by: DERMATOLOGY

## 2024-08-22 PROCEDURE — 88305 TISSUE EXAM BY PATHOLOGIST: CPT | Mod: 26 | Performed by: DERMATOLOGY

## 2024-08-22 RX ORDER — KETOCONAZOLE 20 MG/ML
SHAMPOO TOPICAL DAILY PRN
Qty: 120 ML | Refills: 11 | Status: SHIPPED | OUTPATIENT
Start: 2024-08-22

## 2024-08-22 ASSESSMENT — PAIN SCALES - GENERAL: PAINLEVEL: NO PAIN (0)

## 2024-08-22 NOTE — PROGRESS NOTES
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  Sun avoidance, protective clothing, and the use of 30-SPF sunscreens is advised. Observe closely for skin damage/changes, and call if such occurs.   - reassured patient regarding the benign nature of these lesions.  - reviewed sun protection measures (i.e. protective clothing and broad spectrum sunscreen).   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 Dermatology was consulted to assist with work up for ulcerations while hospitalized. A broad differential for ulcerative mucocutaneous disorders was reviewed - see prior note for details.   The patient underwent vulvar biopsy s/p debridement. 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 It is not usually associated with active viremia (which she did not have in the hospital). Mainstay of treatment is to decrease immunosuppression which is already being done and she continues to do well. She knows to reach out with any changes.   Review of inpatient workup:   - EBV PCR plasma negative    - EBV + vulvar tissue sample , dermpath review remains pending, features such as pseudoepitheliomatous hyperplasia at the borders, mixed infiltrate w/ lymphs plasma cells, apoptic cells, eos, and large blastic cells with hodgkin like morphology that stain + for CD30/PAX-5 , tissue necrosis can also be present   - CMV negative   - HSV and VZV swabs negative  - fungal cultures negative Attest Note  - Treponema negative    - BRAVO and ANCA negative   - MPO negative and proteinase 3 negative     Procedures Performed:   Procedure Note: Biopsy by shave technique  The risks and benefits of the procedure were described to the patient. These include but are not limited to bleeding, infection, scar, incomplete removal, and non-diagnostic biopsy. Written informed consent was obtained. The neck was cleansed with an alcohol pad and injected with 1% lidocaine with epinephrine buffered with sodium bicarbonate. Once anesthesia was obtained, a biopsy was performed with Dermablade. The tissue was placed in a labeled container with formalin and sent to pathology. Hemostasis was achieved with aluminum chloride.  Vaseline and a bandage were applied to the wound. The patient tolerated the procedure well and was given post biopsy care instructions.     Follow-up: 1 year (s) in-person, or earlier for new or changing lesions    Staff and Resident:   Dr. Frost and Nicole Brito DO     Patient was seen and examined with the dermatology resident. I agree with the history, review of systems, physical examination, assessments and plan. I was present for the key portion of the biopsy procedure.    Lory Frost MD  Professor   Department of Dermatology  [facility]   ____________________________________________    CC: Skin Check (Ira is here today for a skin check )    HPI:  Ms. Ira Zamora is a(n) 34 year old female who presents today as a return patient for follow up vulvar and oral ulcers and FBSE     She is doing very well today and reports that pain is nearly resolved , has some residual oral ulcerations but they continue to heal, vulvar lesion is also healing well     She does not report any new concerns     - denies any lesions of concern , has an irritated mole on her neck   - uses sunscreen and wears sun protective clothing: yes  - history of skin cancer: no  - family history of melanoma: no   - family history of non-melanoma skin cancer: none known      Patient is otherwise feeling well, without additional skin concerns.    Labs Reviewed:  N/A     Physical Exam:  GEN: This is a well developed, well-nourished female in no acute distress, in a pleasant mood.    SKIN: Full skin, which includes the head/face, both arms, chest, back, abdomen,both legs, and buttocks, digits and/or nails, was examined.  -Vera skin type: I-II  -1 shallow ulceration to the buccal mucosa, right molar and 1 shallow ulceration to the anterior inferior labial mucosa   -There is a skin colored fleshy papule(s) located on the neck and scattered to the trunk.  -Multiple regular brown pigmented macules and papules are  identified on the trunk and extremities- few .   -There is no erythema, telangectasias, nodularity, or pigmentation on the prior surgical scars - has a fistula to the right arm.  -There is xerosis of the skin diffusely.   - Perifollicular scaling without erythema diffusely to the scalp   -No concerning features with dermoscopy.  -No other lesions of concern on areas examined.      Medications:  Current Outpatient Medications   Medication Sig Dispense Refill    acetaminophen (TYLENOL) 500 MG tablet Take 1,000 mg by mouth every 6 hours as needed      atorvastatin (LIPITOR) 10 MG tablet Take 1 tablet (10 mg) by mouth daily 30 tablet 1    famotidine (PEPCID) 20 MG tablet Take 2 tablets (40 mg) by mouth 2 times daily 60 tablet 1    hydrOXYzine HCl (ATARAX) 25 MG tablet Take 1 tablet (25 mg) by mouth every 6 hours as needed for other (adjuvant pain) 20 tablet 0    levonorgestrel (KYLEENA) 19.5 MG IUD 1 Device by Intrauterine route      magnesium oxide (MAG-OX) 400 MG tablet Take 2 tablets (800 mg) by mouth daily 60 tablet 0    PARoxetine (PAXIL) 30 MG tablet Take 30 mg by mouth every evening      predniSONE (DELTASONE) 5 MG tablet Take 1 tablet (5 mg) by mouth daily 30 tablet 2    sulfamethoxazole-trimethoprim (BACTRIM) 400-80 MG tablet Take 1 tablet by mouth daily 30 tablet 11    tacrolimus (GENERIC EQUIVALENT) 0.5 MG capsule Hold for dose changes 60 capsule 11    tacrolimus (GENERIC EQUIVALENT) 1 MG capsule Take 2 capsules (2 mg) by mouth 2 times daily 120 capsule 11    triamcinolone (KENALOG) 0.1 % external lotion Apply topically 2 times daily 60 mL 0    triamcinolone (KENALOG) 0.1 % paste Take by mouth 2 times daily 5 g 3    valGANciclovir (VALCYTE) 450 MG tablet Take 2 tablets (900 mg) by mouth daily 60 tablet 3    Vonoprazan Fumarate (VOQUEZNA) 20 MG TABS Take 20 mg by mouth daily 60 tablet 0    azaTHIOprine (IMURAN) 50 MG tablet Take 2 tablets (100 mg) by mouth daily (Patient not taking: Reported on 7/18/2024) 60  tablet 11    ustekinumab (STELARA) 90 MG/ML Inject 90 mg Subcutaneous once every eight weeks (Patient not taking: Reported on 6/27/2024)       No current facility-administered medications for this visit.      Past Medical History:   Patient Active Problem List   Diagnosis    Crohn's disease (H)    CKD (chronic kidney disease) stage 2, GFR 60-89 ml/min    Secondary renal hyperparathyroidism (H24)    Long QT interval    Kidney replaced by transplant    Immunosuppressed status (H24)    Dehydration    Aftercare following organ transplant    Mouth ulcers    Vaginal ulcer    Leukopenia, unspecified type    Severe malnutrition (H24)    Need for pneumocystis prophylaxis    Anemia in chronic renal disease    Hypomagnesemia    Odynophagia    Inadequate oral intake    Vulvar lesion    Ulcer of esophagus without bleeding    Other neutropenia (H24)    Cytomegalovirus (CMV) viremia (H)    EBV (Jake-Barr virus) viremia     Past Medical History:   Diagnosis Date    Anemia in chronic kidney disease     Anxiety 2007    Ascending aorta dilation (H24) 2020 2020: ascending aorta 3.8 cm, aortic sinus 3.6 cm. 2024 Echo: Ao root diam: 3.2 cm, asc Aorta Diam: 3.8 cm    ASCUS with positive high risk HPV cervical 2017    ASCUS with positive high risk HPV cervical 07/01/2017    Behcet's syndrome (H) 06/2024    Crohn's disease (H) 2004    Esophageal ulcer 06/27/2024    ESRD (end stage renal disease) on dialysis (H) 2020    GERD (gastroesophageal reflux disease)     Hidradenitis suppurativa 2015    History of blood transfusion     Hypertension     Juvenile rheumatoid arthritis (H)     Asymptomatic in adulthood    Kidney replaced by transplant 04/17/2024    En bloc. Induction w/ Thymoglobulin.    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       CC No referring provider defined for this encounter. on close of this  encounter..

## 2024-08-22 NOTE — NURSING NOTE
Lidocaine-epinephrine 1-1:158846 % injection   1.5 mL once for one use, starting 8/22/2024 ending 8/22/2024,  2mL disp, R-0, injection  Injected by Dr. Brito

## 2024-08-22 NOTE — TELEPHONE ENCOUNTER
ISSUE:   Tacrolimus IR level 14.3 on 8/21, goal 8-10, dose 2 mg BID.    PLAN:   Call Patient and confirm this was an accurate 12-hour trough.   Verify Tacrolimus IR dose 2 mg BID.   Confirm no new medications or or missed doses.   Confirm no new illness / infection / diarrhea.   If accurate trough and accurate dose, decrease Tacrolimus IR dose to 1.5 mg BID     Is this more than a 50% increase or decrease in current IS dose: No  If YES, justification: na    Repeat labs in 1 weeks.  *If > 50% change in immunosuppression dose, repeat labs in 1 week.     OUTCOME:

## 2024-08-22 NOTE — LETTER
8/22/2024       RE: Ira Zamora  [address]   [place] [address]     Dear Colleague,    Thank you for referring your patient, Ira Zamora, to the [facility] [place] [facility]. Please see a copy of my visit note below.    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  Sun avoidance, protective clothing, and the use of 30-SPF sunscreens is advised. Observe closely for skin damage/changes, and call if such occurs.   - reassured patient regarding the benign nature of these lesions.  - reviewed sun protection measures (i.e. protective clothing and broad spectrum sunscreen).   - ABCDEs of melaoma reviewed      # NUB, status post shave removal 8/22  - Irritated dermal nevus shave removal       # Seb Derm   # Suspected telogen effluvium - likely related to recent hospitalization   - Ketoconazole shampoo     # Jake-Barr virus associated mucocutaneous ulcers (EBVMCUs)  and EBV-associated ulcus vulvae acutum (EBV-AUVA)   Patient with a history of ESRD due to interstitial nephritis status-post  donor kidney transplant (2024) on immunosuppression (tacrolimus, mycophenolic acid until 2024 with switched to Azathioprine on 2024), Crohn's disease on Stelara - well controlled, and recent oral/vaginal ulcers thought to be due to mycophenolate who was admitted for odynophagia and vulvar ulcer. Dermatology was consulted to assist with work up for ulcerations while hospitalized. A broad differential for ulcerative mucocutaneous disorders was reviewed - see prior note for details.   The patient underwent vulvar biopsy s/p debridement. 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 It is not usually associated with active viremia (which she did not have in the hospital). Mainstay of treatment is to decrease immunosuppression which is already being done and she continues to do well. She knows to reach out with any changes.   Review of inpatient workup:   - EBV PCR plasma negative    - EBV + vulvar tissue sample , dermpath review remains pending, features such as pseudoepitheliomatous hyperplasia at the borders, mixed infiltrate w/ lymphs plasma cells, apoptic cells, eos, and large blastic cells with hodgkin like morphology that stain + for CD30/PAX-5 , tissue necrosis can also be present   - CMV negative   - HSV and VZV swabs negative  - fungal cultures negative Attest Note  - Treponema negative    - BRAVO and ANCA negative   - MPO negative and proteinase 3 negative     Procedures Performed:   Procedure Note: Biopsy by shave technique  The risks and benefits of the procedure were described to the patient. These include but are not limited to bleeding, infection, scar, incomplete removal, and non-diagnostic biopsy. Written informed consent was obtained. The neck was cleansed with an  alcohol pad and injected with 1% lidocaine with epinephrine buffered with sodium bicarbonate. Once anesthesia was obtained, a biopsy was performed with Dermablade. The tissue was placed in a labeled container with formalin and sent to pathology. Hemostasis was achieved with aluminum chloride. Vaseline and a bandage were applied to the wound. The patient tolerated the procedure well and was given post biopsy care instructions.     Follow-up: 1 year (s) in-person, or earlier for new or changing lesions    Staff and Resident:   Dr. Frost and Nicole Brito DO     Patient was seen and examined with the dermatology resident. I agree with the history, review of systems, physical examination, assessments and plan. I was present for the key portion of the biopsy procedure.    Lory Frost MD  Professor   Department of Dermatology  [facility]   ____________________________________________    CC: Skin Check (Ira is here today for a skin check )    HPI:  Ms. Ira Zamora is a(n) 34 year old female who presents today as a return patient for follow up vulvar and oral ulcers and FBSE     She is doing very well today and reports that pain is nearly resolved , has some residual oral ulcerations but they continue to heal, vulvar lesion is also healing well     She does not report any new concerns     - denies any lesions of concern , has an irritated mole on her neck   - uses sunscreen and wears sun protective clothing: yes  - history of skin cancer: no  - family history of melanoma: no   - family history of non-melanoma skin cancer: none known      Patient is otherwise feeling well, without additional skin concerns.    Labs Reviewed:  N/A     Physical Exam:  GEN: This is a well developed, well-nourished female in no acute distress, in a pleasant mood.    SKIN: Full skin, which includes the head/face, both arms, chest, back, abdomen,both legs, and buttocks, digits and/or nails, was examined.  -Vera  skin type: I-II  -1 shallow ulceration to the buccal mucosa, right molar and 1 shallow ulceration to the anterior inferior labial mucosa   -There is a skin colored fleshy papule(s) located on the neck and scattered to the trunk.  -Multiple regular brown pigmented macules and papules are identified on the trunk and extremities- few .   -There is no erythema, telangectasias, nodularity, or pigmentation on the prior surgical scars - has a fistula to the right arm.  -There is xerosis of the skin diffusely.   - Perifollicular scaling without erythema diffusely to the scalp   -No concerning features with dermoscopy.  -No other lesions of concern on areas examined.      Medications:  Current Outpatient Medications   Medication Sig Dispense Refill     acetaminophen (TYLENOL) 500 MG tablet Take 1,000 mg by mouth every 6 hours as needed       atorvastatin (LIPITOR) 10 MG tablet Take 1 tablet (10 mg) by mouth daily 30 tablet 1     famotidine (PEPCID) 20 MG tablet Take 2 tablets (40 mg) by mouth 2 times daily 60 tablet 1     hydrOXYzine HCl (ATARAX) 25 MG tablet Take 1 tablet (25 mg) by mouth every 6 hours as needed for other (adjuvant pain) 20 tablet 0     levonorgestrel (KYLEENA) 19.5 MG IUD 1 Device by Intrauterine route       magnesium oxide (MAG-OX) 400 MG tablet Take 2 tablets (800 mg) by mouth daily 60 tablet 0     PARoxetine (PAXIL) 30 MG tablet Take 30 mg by mouth every evening       predniSONE (DELTASONE) 5 MG tablet Take 1 tablet (5 mg) by mouth daily 30 tablet 2     sulfamethoxazole-trimethoprim (BACTRIM) 400-80 MG tablet Take 1 tablet by mouth daily 30 tablet 11     tacrolimus (GENERIC EQUIVALENT) 0.5 MG capsule Hold for dose changes 60 capsule 11     tacrolimus (GENERIC EQUIVALENT) 1 MG capsule Take 2 capsules (2 mg) by mouth 2 times daily 120 capsule 11     triamcinolone (KENALOG) 0.1 % external lotion Apply topically 2 times daily 60 mL 0     triamcinolone (KENALOG) 0.1 % paste Take by mouth 2 times daily 5 g 3      valGANciclovir (VALCYTE) 450 MG tablet Take 2 tablets (900 mg) by mouth daily 60 tablet 3     Vonoprazan Fumarate (VOQUEZNA) 20 MG TABS Take 20 mg by mouth daily 60 tablet 0     azaTHIOprine (IMURAN) 50 MG tablet Take 2 tablets (100 mg) by mouth daily (Patient not taking: Reported on 7/18/2024) 60 tablet 11     ustekinumab (STELARA) 90 MG/ML Inject 90 mg Subcutaneous once every eight weeks (Patient not taking: Reported on 6/27/2024)       No current facility-administered medications for this visit.      Past Medical History:   Patient Active Problem List   Diagnosis     Crohn's disease (H)     CKD (chronic kidney disease) stage 2, GFR 60-89 ml/min     Secondary renal hyperparathyroidism (H24)     Long QT interval     Kidney replaced by transplant     Immunosuppressed status (H24)     Dehydration     Aftercare following organ transplant     Mouth ulcers     Vaginal ulcer     Leukopenia, unspecified type     Severe malnutrition (H24)     Need for pneumocystis prophylaxis     Anemia in chronic renal disease     Hypomagnesemia     Odynophagia     Inadequate oral intake     Vulvar lesion     Ulcer of esophagus without bleeding     Other neutropenia (H24)     Cytomegalovirus (CMV) viremia (H)     EBV (Jake-Barr virus) viremia     Past Medical History:   Diagnosis Date     Anemia in chronic kidney disease      Anxiety 2007     Ascending aorta dilation (H24) 2020 2020: ascending aorta 3.8 cm, aortic sinus 3.6 cm. 2024 Echo: Ao root diam: 3.2 cm, asc Aorta Diam: 3.8 cm     ASCUS with positive high risk HPV cervical 2017    ASCUS with positive high risk HPV cervical 07/01/2017     Behcet's syndrome (H) 06/2024     Crohn's disease (H) 2004     Esophageal ulcer 06/27/2024     ESRD (end stage renal disease) on dialysis (H) 2020     GERD (gastroesophageal reflux disease)      Hidradenitis suppurativa 2015     History of blood transfusion      Hypertension      Juvenile rheumatoid arthritis (H)     Asymptomatic in  adulthood     Kidney replaced by transplant 04/17/2024    En bloc. Induction w/ Thymoglobulin.     Secondary renal hyperparathyroidism (H24)      Tubulointerstitial nephropathy 11/09/2011    kidney biopsy 11/09/2011 - Acute and  chronic tubulointerstitial nephropathy.       CC No referring provider defined for this encounter. on close of this encounter..      Again, thank you for allowing me to participate in the care of your patient.      Sincerely,    Nicole Brito, DO

## 2024-08-22 NOTE — TELEPHONE ENCOUNTER
Spoke with patient and advised of lab results. Patient states she is currently taking Tacrolimus 2 mg bid. An accurate 12-hour trough was not done. She only did 10 hours.     No new medications or missed doses. She did want to know if it is okay to take her magnesium at the same time as her Tacrolimus.    No new illness, infection or diarrhea.    Patient advised I will message Radha about the 10-hour trough and call her back with instructions.    Please advise.

## 2024-08-22 NOTE — NURSING NOTE
Dermatology Rooming Note    Ira Zamora's goals for this visit include:   Chief Complaint   Patient presents with    Skin Check     Ira is here today for a skin check      KOTA Calloway - Dermatology

## 2024-08-22 NOTE — PATIENT INSTRUCTIONS
Wound Care After a Biopsy    What is a skin biopsy?  A skin biopsy allows the doctor to examine a very small piece of tissue under the microscope to determine the diagnosis and the best treatment for the skin condition. A local anesthetic (numbing medicine) is injected with a very small needle into the skin area to be tested. A small piece of skin is taken from the area. Sometimes a suture (stitch) is used.     What are the risks of a skin biopsy?  I will experience scar, bleeding, swelling, pain, crusting and redness. I may experience incomplete removal or recurrence. Risks of this procedure are excessive bleeding, bruising, infection, nerve damage, numbness, thick (hypertrophic or keloidal) scar and non-diagnostic biopsy.    How should I care for my wound for the first 24 hours?  Keep the wound dry and covered for 24 hours  If it bleeds, hold direct pressure on the area for 15 minutes. If bleeding does not stop, call us or go to the emergency room  Avoid strenuous exercise the first 1-2 days or as your doctor instructs you    How should I care for the wound after 24 hours?  After 24 hours, remove the bandage  You may bathe or shower as normal  If you had a scalp biopsy, you can shampoo as usual and can use shower water to clean the biopsy site daily  Clean the wound once a day with gentle soap and water  Do not scrub, be gentle  Apply white petroleum/Vaseline after cleaning the wound with a cotton swab or a clean finger, and keep the site covered with a Bandaid /bandage. Bandages are not necessary with a scalp biopsy  If you are unable to cover the site with a Bandaid /bandage, re-apply ointment 2-3 times a day to keep the site moist. Moisture will help with healing  Avoid strenuous activity for first 1-2 days  Avoid lakes, rivers, pools, and oceans until the stitches are removed or the site is healed    How do I clean my wound?  Wash hands thoroughly with soap or use hand  before all wound care  Clean  the wound with gentle soap and water  Apply white petroleum/Vaseline  to wound after it is clean  Replace the Bandaid /bandage to keep the wound covered for the first few days or as instructed by your doctor  If you had a scalp biopsy, warm shower water to the area on a daily basis should suffice    What should I use to clean my wound?   Cotton-tipped applicators (Qtips )  White petroleum jelly (Vaseline ). Use a clean new container and use Q-tips to apply.  Bandaids  as needed  Gentle soap     How should I care for my wound long term?  Do not get your wound dirty  Keep up with wound care for one week or until the area is healed.  If you have stitches, stitches need to be removed in 14 days. You may return to our clinic for this or you may have it done locally at your doctor s office.  A small scab will form and fall off by itself when the area is completely healed. The area will be red and will become pink in color as it heals. Sun protection is very important for how your scar will turn out. Sunscreen with an SPF 30 or greater is recommended once the area is healed.  You should have some soreness but it should be mild and slowly go away over several days. Talk to your doctor about using tylenol for pain,    When should I call my doctor?  If you have increased:   Pain or swelling  Pus or drainage (clear or slightly yellow drainage is ok)  Temperature over 100F  Spreading redness or warmth around wound    When will I hear about my results?  The biopsy results can take 2 weeks to come back.  Your results will automatically release to [facility] before your provider has even reviewed them.  The clinic will call you with the results, send you a [facility] message, or have you schedule a follow-up clinic or phone time to discuss the results.  Contact our clinics if you do not hear from us in 2 weeks.    Who should I call with questions?  [facility]: [phone]  [facility]  "[facility]: [phone]  For urgent needs outside of business hours call the [facility] at [phone] and ask for the dermatology resident on call       Sun Protection      Sunscreen   What does \"broad spectrum mean\"?  Broad spectrum sunscreens protect against both UVA and UVB radiation. UVC is filtered out by the ozone layer.     What does SPF mean?   SPF stands for  Sun Protection Factor  and represents the ability to screen only UVB (burning) rays. UVB rays are mostly blocked in all sunscreens, but only those that contain titanium dioxide, zinc oxide, mexoryl or Parsol 1789 (avobenzone) block the UVA spectrum. Even though a sunscreen is labeled  UVA/UVB Protection  that is not entirely accurate because products that only partially protect against UVA can claim to protect against both UVA and UVB.     What SPF should I chose?   Aim to get a sunscreen that is at least sun protection factor (SPF) 30. SPF 15 provides about 92-93% coverage, SPF 30 about 95-97% coverage, and SPF 45 about 98% coverage. That is to say, SPF 30 is not twice as good as SPF 15. The reason why we recommend SPF 30 is because we are usually only putting on half the necessary amount of sunscreen to achieve the advertised protection. That means that it is very possible that your SPF 30 sunscreen is only providing you with SPF 15 coverage based on how much you are applying. SPF 15 (92-93% coverage) is the absolute minimal that we recommend. Similarly, the benefit of sunscreens with SPF higher than 50 is that even if you put on less than the required amount, you are likely still getting good protection (ex: even if you apply only half the recommended amount of , it should still provide you with an SPF of 50).     How much should I apply?  If covering your whole body, you should be using 30 grams, or one ounce, which is how much is in one shot glass! That s a THICK layer! May times, you are only applying half the recommended " amount, which means that you are only getting half the SPF (for example, you may be using SPF 30 but if you're only applying half the recommended amount, you're only getting SPF 15 protection.      When do I need to wear sunscreen?  Every day, rain or shine! Even on a rainy day or a day when you are only indoors, you are still being exposed harmful UV radiation from the sun. We usually recommend physical/mineral sunscreens (active ingredient is titanium dioxide or zinc oxide) as these ingredients have been around for many years, there is no concern of them being absorbed into the bloodstream, and they are coral reef friendly! However, the best sunscreen is the one that you will use everyday.     What about my kids?  Sunscreen is not recommended for infants under the age of 6 months. Use clothing, shade and sun avoidance for small infants. For kids older than 6 months, we recommend that you should use only mineral/physical sunscreens that have zinc oxide as the active ingredient. Sun-protective clothing and hats are also important for people of all ages.     Sun protective clothing and Resources   Coolibar (www.coolibar.miLibris)  infoBizz (Magma Global)  Athleta (wwwLeap)  PassKit (wwwInterse)  Carve Designs (Prefundia) - affordable  Skinz (Jazz Pharmaceuticalsskinz.com)    Long sleeve - La Cool DRI UPF 50 or Bristol PFG UPF 50  Hoodie - [place] PFG UPF 50  Swimshirt/Rash Guard - Priti UPF 50 (on Amazon)  Neck - Outdoor Research Ubertubes (www.outdoorresearch.miLibris)      Do I need tinted sunscreen?  There is more and more research showing that visible light can also lead to discoloration (such as melasma). Tinted sunscreens (which contain iron oxides) protect against visible light as an added bonus.     What brands do you recommend?  Physical/Mineral Sunscreens (in no particular order)  Elta MD UV Physical Broad-Spectrum SPF 41 Sunscreen (Tinted, $33)  Skin Ceuticals Physical Fusion UV Defense SPF 50  (Tinted, $34)  Unsun Mineral Tinted Face Sunscreen (Tinted, with 2 shade ranges, $29)  It Cosmetics CC+ Cream with SPF 50+ (Tinted, can also double as foundation/coverage -- great range of shades, $40)  Biossance Squalane + Zinc Sheer Mineral Sunscreen SPF 30 PA +++ (goes on white then blends in, $30)  Cerave 100% Mineral Sunscreen SPF 50 Face (good for sensitive skin, $15)  La Roche Posay Anthelios Mineral Zinc Oxide Sunscreen SPF 50 ($35)  La Roche Posay Anthelios Mineral Tinted Sunscreen for Face SPF 50 ($35)  Think Sport Sunscreen (great for sports, though has more of a white cast, $20)  Think Baby Sunscreen (for kids, $21)  Color Science Sunforgettable Total Protection Brush On Shield SPF 50 (Multiple tints, $130)    Chemical Sunscreens  Vee Rouleau Weightless Protection SPF 30 ($48)  Cierra SPF Brightening Moisturizer ($30)  Urban Skin Complexion Protection Moisturizer SPF 30 ($20)  Total Defense + Repair Broad Spectrum SPF 34 ($68)  Clarins UV PLUS Anti-Pollution Sunscreen Multi-Protection Tint SPF 50 (Multiple tints, $45)  Neutrogena Healthy Skin Glow Sheers Tinted Moisturizer with SPF 20 (Multiple tints, $11)     Learning the ABCDEs or Melanomas / Self Skin checks    Even if you have carefully practiced sun safety all summer, it's important to continue being vigilant about your skin in fall, winter, and beyond. Throughout the year, you should examine your skin head-to-toe once a month, looking for any suspicious lesions. Self-exams can help you identify potential skin cancers early, when they can almost always be completely cured. As a general rule, to spot either melanomas or non-melanoma skin cancers (such as basal cell carcinoma and squamous cell carcinoma), take note of any new moles or growths, and any existing growths that begin to grow or change significantly in any other way.  Lesions that change, itch, bleed, or don't heal are also alarm signals. Physicians have developed two specific strategies  for early recognition ofmelanoma, the deadliest form of skin cancer: the ABCDEs and the Ugly Duckling sign.      Moles, brown spots and growths on the skin are usually harmless -- but not always. Anyone who has more than 100 moles is at greater risk for melanoma. The first signs can appear in one or more atypical moles. That's why it's so important to get to know your skin very well and to recognize any changes in the moles on your body. Look for the ABCDE signs of melanoma, and if you see one or more, make an appointment with a physician immediately.    Common, benign moles look the same over time. Be on alert when moles start to evolve or change and see a doctor. Any change -- in size, shape, color, elevation, or another trait, or any new symptom such as bleeding, itching or crusting -- points to danger.    A - Asymmetry  This benign mole is not asymmetrical. If you draw a line through the middle, the two sides will match, meaning it is symmetrical. If you draw a line through this mole, the two halves will not match, meaning it is asymmetrical, a warning sign for melanoma.    B - Border  A benign mole has smooth, even borders, unlike melanomas. The borders of an early melanoma tend to be uneven. The edges may be scalloped or notched.     C - Color  Most benign moles are all one color -- often a single shade of brown. Having a variety of colors is another warning signal. A number of different shades of brown, tan or black could appear. A melanoma may also become red, white or blue.      D - Diameter  Benign moles usually have a smaller diameter than malignant ones. Melanomas usually are larger in diameter than the eraser on your pencil tip (  inch or 6mm), but they may sometimes be smaller when first detected.      E - Evolution  Common, benign moles look the same over time. Be on the alert when a mole starts to evolve or change in any way. When a mole is evolving, see a doctor. Any change -- in size, shape, color,  "elevation, or another trait, or any new symptom such as bleeding, itching or crusting -- points to danger.    The Ugly Duckling Rule  This is a simplified method where you look for any moles that do not match the other moles you have. Even if some of your moles look a little funny we are less concerned if they match one another. We are looking for the outlier - the one that is darker, larger, etc. In A, there is one dominant mole pattern with slight variation in size. The outlier lesion is clearly darker and larger than all other moles. In B, the patient has two predominant mole patterns, one with larger nevi and one with small, darker nevi. The outlier lesion is small but lacks pigmentation. In C, the patient shows only one lesion on the back. If this lesion is changing, symptomatic, or deemed atypical, it should be removed.      Seborrheic keratoses - a mimicker of melanoma    Seborrheic keratosis (seb-o-REE-ik care-uh-TOE-sis) is a common skin growth. It may seem worrisome because it can look like a wart, pre-cancerous skin growth (actinic keratosis), or skin cancer. Despite their appearance, seborrheic keratoses are harmless.    Most people get these growths when they are middle aged or older. Because they begin at a later age and can have a flat, waxy or wart-like appearance, seborrheic keratoses are often called the  barnacles of aging  or \"wisdom spots\".    It s possible to have just one of these growths, but most people develop several. Seborrheic keratoses range in color from white to black; however, most are tan or brown. You can find these harmless growths anywhere on the skin, except the palms and soles. Most often, you ll see them on the chest, back, head, or neck. Seborrheic keratoses are not contagious.            "

## 2024-08-22 NOTE — TELEPHONE ENCOUNTER
Patient advised to stay on current Tacrolimus dose and to take Magnesium supplement 2 hours before or after Tacrolimus dose. Patient verbalized understanding of plan of care.

## 2024-08-23 LAB
PATH REPORT.COMMENTS IMP SPEC: NORMAL
PATH REPORT.FINAL DX SPEC: NORMAL
PATH REPORT.GROSS SPEC: NORMAL
PATH REPORT.MICROSCOPIC SPEC OTHER STN: NORMAL
PATH REPORT.RELEVANT HX SPEC: NORMAL

## 2024-08-26 LAB — ALLOSURE DD-CFDNA: 0.17 %

## 2024-08-27 LAB
ACID FAST STAIN (ARUP): NORMAL
DONOR IDENTIFICATION: NORMAL
DSA COMMENTS: NORMAL
DSA PRESENT: NO
DSA TEST METHOD: NORMAL
ORGAN: NORMAL
SA 1  COMMENTS: NORMAL
SA 1 CELL: NORMAL
SA 1 TEST METHOD: NORMAL
SA 2 CELL: NORMAL
SA 2 COMMENTS: NORMAL
SA 2 TEST METHOD: NORMAL
SA1 HI RISK ABY: NORMAL
SA1 MOD RISK ABY: NORMAL
SA2 HI RISK ABY: NORMAL
SA2 MOD RISK ABY: NORMAL
UNACCEPTABLE ANTIGENS: NORMAL
UNOS CPRA: 9

## 2024-08-28 ENCOUNTER — LAB (OUTPATIENT)
Dept: LAB | Facility: OTHER | Age: 34
End: 2024-08-28
Payer: MEDICARE

## 2024-08-28 ENCOUNTER — TRANSFERRED RECORDS (OUTPATIENT)
Dept: HEALTH INFORMATION MANAGEMENT | Facility: CLINIC | Age: 34
End: 2024-08-28

## 2024-08-28 DIAGNOSIS — Z20.828 CONTACT WITH AND (SUSPECTED) EXPOSURE TO OTHER VIRAL COMMUNICABLE DISEASES: ICD-10-CM

## 2024-08-28 DIAGNOSIS — Z98.890 OTHER SPECIFIED POSTPROCEDURAL STATES: ICD-10-CM

## 2024-08-28 DIAGNOSIS — Z94.0 KIDNEY REPLACED BY TRANSPLANT: ICD-10-CM

## 2024-08-28 DIAGNOSIS — Z48.298 AFTERCARE FOLLOWING ORGAN TRANSPLANT: ICD-10-CM

## 2024-08-28 DIAGNOSIS — Z79.899 ENCOUNTER FOR LONG-TERM CURRENT USE OF MEDICATION: ICD-10-CM

## 2024-08-28 LAB
ANION GAP SERPL CALCULATED.3IONS-SCNC: 11 MMOL/L (ref 7–15)
BUN SERPL-MCNC: 22.8 MG/DL (ref 6–20)
CALCIUM SERPL-MCNC: 10.1 MG/DL (ref 8.8–10.4)
CHLORIDE SERPL-SCNC: 106 MMOL/L (ref 98–107)
CREAT SERPL-MCNC: 0.87 MG/DL (ref 0.51–0.95)
EGFRCR SERPLBLD CKD-EPI 2021: 89 ML/MIN/1.73M2
ERYTHROCYTE [DISTWIDTH] IN BLOOD BY AUTOMATED COUNT: 15.9 % (ref 10–15)
GLUCOSE SERPL-MCNC: 94 MG/DL (ref 70–99)
HCO3 SERPL-SCNC: 26 MMOL/L (ref 22–29)
HCT VFR BLD AUTO: 40.2 % (ref 35–47)
HGB BLD-MCNC: 12.8 G/DL (ref 11.7–15.7)
MCH RBC QN AUTO: 33.7 PG (ref 26.5–33)
MCHC RBC AUTO-ENTMCNC: 31.8 G/DL (ref 31.5–36.5)
MCV RBC AUTO: 106 FL (ref 78–100)
PLATELET # BLD AUTO: 193 10E3/UL (ref 150–450)
POTASSIUM SERPL-SCNC: 4.7 MMOL/L (ref 3.4–5.3)
RBC # BLD AUTO: 3.8 10E6/UL (ref 3.8–5.2)
SODIUM SERPL-SCNC: 143 MMOL/L (ref 135–145)
TACROLIMUS BLD-MCNC: 16.1 UG/L (ref 5–15)
TME LAST DOSE: ABNORMAL H
TME LAST DOSE: ABNORMAL H
WBC # BLD AUTO: 5.8 10E3/UL (ref 4–11)

## 2024-08-28 PROCEDURE — 36415 COLL VENOUS BLD VENIPUNCTURE: CPT

## 2024-08-28 PROCEDURE — 86833 HLA CLASS II HIGH DEFIN QUAL: CPT

## 2024-08-28 PROCEDURE — 85027 COMPLETE CBC AUTOMATED: CPT

## 2024-08-28 PROCEDURE — 80048 BASIC METABOLIC PNL TOTAL CA: CPT

## 2024-08-28 PROCEDURE — 80197 ASSAY OF TACROLIMUS: CPT

## 2024-08-28 PROCEDURE — 86832 HLA CLASS I HIGH DEFIN QUAL: CPT

## 2024-08-29 DIAGNOSIS — Z94.0 KIDNEY REPLACED BY TRANSPLANT: Primary | ICD-10-CM

## 2024-08-29 LAB
CMV DNA SPEC NAA+PROBE-ACNC: <35 IU/ML
CMV DNA SPEC NAA+PROBE-LOG#: <1.5 {LOG_COPIES}/ML

## 2024-08-29 NOTE — TELEPHONE ENCOUNTER
Spoke with patient and advised of lab result. Patient states she is taking Tacrolimus 2 mg bid and her trough was close to 12 hours (took Tac at 9:30 pm and labs were drawn at 8:45 am).    No new medications or missed doses.    No new infection, illness or diarrhea.    Patient advised to decrease Tacrolimus to 1.5 mg bid and have labs repeated in 1 week. Refills not needed. Patient verbalized understanding of plan of care.

## 2024-08-29 NOTE — TELEPHONE ENCOUNTER
ISSUE:   Tacrolimus IR level 16.1 on 8/29, goal 8-10, dose 2 mg BID.    PLAN:   Call Patient and confirm this was an accurate 12-hour trough.   Verify Tacrolimus IR dose 2 mg BID.   Confirm no new medications or or missed doses.   Confirm no new illness / infection / diarrhea.   If accurate trough and accurate dose, decrease Tacrolimus IR dose to 1.5 mg BID     Is this more than a 50% increase or decrease in current IS dose: No  If YES, justification:     Repeat labs in 1 weeks.  *If > 50% change in immunosuppression dose, repeat labs in 1 week.     OUTCOME:

## 2024-08-30 ENCOUNTER — VIRTUAL VISIT (OUTPATIENT)
Dept: INFECTIOUS DISEASES | Facility: CLINIC | Age: 34
End: 2024-08-30
Attending: STUDENT IN AN ORGANIZED HEALTH CARE EDUCATION/TRAINING PROGRAM
Payer: MEDICARE

## 2024-08-30 VITALS — BODY MASS INDEX: 25.21 KG/M2 | HEIGHT: 62 IN | WEIGHT: 137 LBS

## 2024-08-30 DIAGNOSIS — D84.9 IMMUNOSUPPRESSED STATUS (H): ICD-10-CM

## 2024-08-30 DIAGNOSIS — Z94.0 KIDNEY REPLACED BY TRANSPLANT: ICD-10-CM

## 2024-08-30 DIAGNOSIS — Z23 NEED FOR VACCINATION: ICD-10-CM

## 2024-08-30 DIAGNOSIS — K12.1 MOUTH ULCERS: ICD-10-CM

## 2024-08-30 DIAGNOSIS — N76.6 VULVAR ULCER: Primary | ICD-10-CM

## 2024-08-30 DIAGNOSIS — K22.10 ULCER OF ESOPHAGUS WITHOUT BLEEDING: ICD-10-CM

## 2024-08-30 PROCEDURE — 99214 OFFICE O/P EST MOD 30 MIN: CPT | Mod: 95 | Performed by: STUDENT IN AN ORGANIZED HEALTH CARE EDUCATION/TRAINING PROGRAM

## 2024-08-30 RX ORDER — TACROLIMUS 0.5 MG/1
CAPSULE ORAL
Status: SHIPPED
Start: 2024-08-30 | End: 2024-09-13

## 2024-08-30 RX ORDER — TACROLIMUS 1 MG/1
CAPSULE ORAL
Status: SHIPPED
Start: 2024-08-30 | End: 2024-09-05

## 2024-08-30 ASSESSMENT — PAIN SCALES - GENERAL: PAINLEVEL: NO PAIN (0)

## 2024-08-30 NOTE — PROGRESS NOTES
Video time 4.31 pm to 4.45 pm   Patient location; home   Provider location: on site   Platform used; [employer]   Total time including chart review, care-coordination and documentation time on the date of encounter - 35 mins          SOLID ORGAN TRANSPLANT INFECTIOUS DISEASES CLINIC NOTE     Patient:  Ira Zamora   YOB: 1990  Date of Visit:  2024          Assessment and Recommendations:   Recommendations:  -The oral and vulvar sores are likely Not due to EBV with negative histopathology and neg EBV immunostain on vulvar tissue biopsy. Tissue EBV PCR has not been validated for this diagnosis and was misleading in this case. 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     ID work up included swab of the lesion for HSV, NP Mycoplasma pneumoniae PCR, blood adenovirus PCR, CMV PCR, EBV PCR, HIV, syphilis, Coxsackie ab, chlamydia/gonorrhea PCR of cervix, Karius test, urine histo antigen which were negative.     MPA was stopped ~ .  admitted with odynophagia and found to have esophageal ulcers.     Vulvar tissue EBV PCR was pos at 19k. Dermatology and ID concerned for Jake-Barr virus associated mucocutaneous ulcers (EBVMCUs) and EBV-associated ulcus vulvae acutum (EBV-AUVA).    24 biopsy of esophageal ulcers and 24 biopsy of  vulvar ulcer was non diagnostic.     She was treated with iv solumedrol for 4 days followed by 1 week of tapering oral steroids for possible Behcets.     7/24/24 and 8/30/24 follow up, reports improvement in all ulcers.     I do not think she has EBV related mucocutaneous ulcer especially with lack of H and E findings and neg EBV immunostain in tissue .Also tissue PCR test results can be unreliable.    Suspect ulcers are more likely due to MPA, Behcets or Crohns    Previous ID Issues:  - Crohn's disease- follows with [facility] on ustekinumab (Stelara) pre-transplant, last dose 3/2024     Other ID issues:  - Bacterial prophylaxis:  none  - Pneumocystis prophylaxis:  inhaled pentamidine due to leukopenia, last dose 7/9/24, on bactrim now with resolution of leukopenia on stopping MPA  - Viral serostatus: CMV D+/R-, EBV D+/R+, HSV 1?/2?, VZV +   - Viral prophylaxis:  Valcyte 900 mg daily - prophylaxis   - Fungal prophylaxis:  none  - Immunosuppression: Induction with thymo and steroid taper;   - Immunization status:  see recs  - Gamma globulin status:  unknown    ------------------------------------------------------------------------------------------------------------------------------------------------------------------------------------------------------------  Interval events:  Last seen 7/24/24  She reports that she is doing well. No esophageal issues and vulvar sore is healing well   She had questions today about CMV and BK virus since low level viremia were detected in surveillance PCR tests. We discussed that they are low level and of no clinical significance  Asked pathology if vulvar biopsy suggestive of Jake-Barr virus associated mucocutaneous ulcers (EBVMCUs) and EBV-associated ulcus vulvae acutum (EBV-AUVA). EBV immunostain was done. Overall not suggestive of that diagnosis.     Interval events:  Last seen 6/24/24   Myfortic was stopped a week prior   Admitted 6/27 due to odynophagia, found to have  esophageal ulcers. EBV PCR of the ulcer pos- 19k. EBV blood PCR neg. Dermatology and ID concerned for Jake-Barr virus associated mucocutaneous ulcers (EBVMCUs) and EBV-associated ulcus vulvae acutum (EBV-AUVA). Pathology returned later - was non specific.   Rheumatology was consulted and for possibility of behcets started on iv solumedrol for 4 days followed by 1 week of tapering oral steroids. Currently on prednisone 5 mg daily, tacrolimus and imuran which was started during the recent hospital stay   She notes that all the ulcers seem to be getting better - Esophageal ulcers - no odynophagia, mouth ulcers and genital     Leukopenia has resolved in the past 1-2 weeks    international unit(s) on 24            History of Present Illness: 24    Adopted from initial consult note:  This is a follow up after hospital discharge. First time seeing patient. Seen by my colleague as an inpatient.     Transplants:  2024 (Kidney), Postoperative day:  135     Ira Zamora is a 34 year old female with history of Crohn's (rcvd ustekinumab on 3/1/24), Hidradenitis suppurativa, Juvenile RA, HTN, ESRD 2/2 interstitial nephritis PPIs s/p  donor kidney transplant on 24 induction with thymoglobulin and steroid taper,  immunosuppressed with MPA and TAC who presented to ED on 24 with oral and vulvar sores.      Symptoms started beginning of  with mouth sores (small and one large). Ira notes night sweats that started just before the mouth sores and have increased to nightly. Sores are very painful and have limited ability to eat and drink. Had tried topical lidocaine and magic mouthwash. Has pain in esophagus with swallowing or eating food. This is not new for her and was related to GERD but she had to stop PPI since it caused the interstitial nephritis and therefore GERD has become worse after transplant.     2 weeks later developed vaginal sores. Urinating burns the sore.      Has hx of  Crohn's disease, last saw GI around September 2023 and last Stelara infusion was 3/1/24. Around time of initial diagnosis with Crohns had severe reflux and canker sores in mouth.     No fevers, chills, abdominal pain, diarrhea, vomiting, rhinorrhea, dyspnea, cough, vaginal discharge, dysuria, joint pain/swelling or erythema, rashes. No sx of hidradenitis suppurativa.   Has some fresh blood in stool at the end since a week ago - has no pain      Lives in [place], [place] with her  (Collin) and her parents live in upstairs in the same house. They both have indoor pet cats. No other animal exposures, fish, or birds. No recent travel. International travel to [place] in 2016 and 2018, traveled to [place], [place], [place] in 2009. 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 Not around any young children. No sick contacts.     Myfortic dose was reduced from 540 bid to 360 bid trixie few days prior to admission.     She was hospitalized 6/5 to 6/9 for these sores. ID work up included swab of the lesion for HSV, NP Mycoplasma pneumoniae PCR, blood adenovirus PCR, CMV PCR, EBV PCR, BK PCR, HIV, syphilis, Coxsackie ab, chlamydia/gonorrhea PCR of cervix, Karius test, urine histo antigen which were negative.     Myfortic was stopped a week ago. Planning to start beltacept instead     She reports that she was able to eat something after the dose of myfortic was reduced. The pain has eased a little with stopping myfortic. But there is still considerable pain.  reports that she has not slept in weeks. The oxycodone 5 mg seemed to help in the beginning but not that much anymore.     Leukopenic since May     Immunization History   Administered Date(s) Administered    COVID-19 Bivalent 12+ (Pfizer) 09/23/2022    COVID-19 MONOVALENT 12+ (Pfizer) 01/21/2021, 02/10/2021, 09/16/2021    COVID-19 Monovalent 12+ (Pfizer 2022) 02/25/2022    HPV  Quadrivalent 10/28/2008, 06/20/2011, 10/28/2011    HepB, Unspecified 12/30/2002    Hepatitis A (ADULT 19+) 02/19/2019, 08/19/2019    Hepatitis B, Peds 12/30/2002    Influenza (intradermal) 09/14/2021    Influenza Vaccine 18-64 (Flublok) 10/13/2023    Pneumo Conj 13-V (2010&after) 10/23/2018    Pneumococcal 20 valent Conjugate (Prevnar 20) 11/17/2023    Pneumococcal 23 valent 11/22/2021    Pneumococcal, Unspecified 12/18/2018, 11/22/2021    TDAP (Adacel,Boostrix) 10/23/2012    Td (Adult), Adsorbed 04/12/2004, 01/01/2006              Current Medications:     Current Outpatient Medications   Medication Sig Dispense Refill    acetaminophen (TYLENOL) 500 MG tablet Take 1,000 mg by mouth every 6 hours as needed      atorvastatin (LIPITOR) 10 MG tablet Take 1 tablet (10 mg) by mouth daily 30 tablet 1    azaTHIOprine (IMURAN) 50 MG tablet Take 2 tablets (100 mg) by mouth daily (Patient not taking: Reported on 7/18/2024) 60 tablet 11    famotidine (PEPCID) 20 MG tablet Take 2 tablets (40 mg) by mouth 2 times daily 60 tablet 1    hydrOXYzine HCl (ATARAX) 25 MG tablet Take 1 tablet (25 mg) by mouth every 6 hours as needed for other (adjuvant pain) 20 tablet 0    ketoconazole (NIZORAL) 2 % external shampoo Apply topically daily as needed for itching or irritation (use there times a week and let sit on scalp for 5 minutes). 120 mL 11    levonorgestrel (KYLEENA) 19.5 MG IUD 1 Device by Intrauterine route      magnesium oxide (MAG-OX) 400 MG tablet Take 2 tablets (800 mg) by mouth daily 60 tablet 0    PARoxetine (PAXIL) 30 MG tablet Take 30 mg by mouth every evening      predniSONE (DELTASONE) 5 MG tablet Take 1 tablet (5 mg) by mouth daily 30 tablet 2    sulfamethoxazole-trimethoprim (BACTRIM) 400-80 MG tablet Take 1 tablet by mouth daily 30 tablet 11    tacrolimus (GENERIC EQUIVALENT) 0.5 MG capsule Hold for dose changes. Total dose 1.5 mg bid.      tacrolimus (GENERIC EQUIVALENT) 1 MG capsule Total dose= 1.5 mg bid.       "triamcinolone (KENALOG) 0.1 % external lotion Apply topically 2 times daily 60 mL 0    triamcinolone (KENALOG) 0.1 % paste Take by mouth 2 times daily 5 g 3    ustekinumab (STELARA) 90 MG/ML Inject 90 mg Subcutaneous once every eight weeks (Patient not taking: Reported on 6/27/2024)      valGANciclovir (VALCYTE) 450 MG tablet Take 2 tablets (900 mg) by mouth daily 60 tablet 3    Vonoprazan Fumarate (VOQUEZNA) 20 MG TABS Take 20 mg by mouth daily 60 tablet 0            Physical Exam:   Ht 1.575 m (5' 2\")   Wt 62.1 kg (137 lb)   BMI 25.06 kg/m      Unable to examine due to virtual visit            Laboratory Data:   Microbiology:  Culture   Date Value Ref Range Status   07/08/2024 No Growth  Final   07/08/2024 No Growth  Final   07/06/2024 50,000-100,000 CFU/mL Staphylococcus aureus (A)  Final   07/02/2024 No Growth  Final   07/02/2024 No Growth  Final   06/06/2024 No Growth  Final   05/03/2024 No Growth  Final       Metabolic Studies       Recent Labs   Lab Test 08/28/24  0852 08/21/24  0746 08/13/24  0738 04/17/24  2230 04/17/24  2123 04/17/24  1603 04/17/24  0958    140 141   < > 136  --  139   POTASSIUM 4.7 4.2 4.5   < > 3.7  --  3.4   CHLORIDE 106 105 107   < >  --   --  95*   CO2 26 26 23   < >  --   --  33*   ANIONGAP 11 9 11   < >  --   --  11   BUN 22.8* 32.8* 30.4*   < >  --   --  11.6   CR 0.87 0.93 0.78   < >  --   --  3.11*   GFRESTIMATED 89 82 >90   < >  --   --  19*   GLC 94 90 93   < > 168*   < > 91   A1C  --   --   --   --   --   --  4.7   ROBLES 10.1 10.6* 9.9   < >  --   --  9.3   PHOS  --  2.9 3.4   < >  --   --   --    MAG  --  1.6* 1.5*   < >  --   --   --    LACT  --   --   --   --  0.8  --   --     < > = values in this interval not displayed.       Hepatic Studies    Recent Labs   Lab Test 07/08/24  0542   BILITOTAL 0.2   ALKPHOS 140   ALBUMIN 3.0*   AST 16   ALT 39         Hematology Studies      Recent Labs   Lab Test 08/28/24  0852 08/21/24  0746 08/13/24  0738 05/02/24  0920 " 04/25/24  0930   WBC 5.8 5.0 5.3   < > 7.1   ANEU  --   --   --   --  5.8   ALYM  --   --   --   --  0.2*   FAVIAN  --   --   --   --  0.4   AEOS  --   --   --   --  0.2   HGB 12.8 12.8 11.8   < > 8.4*   HCT 40.2 40.0 37.9   < > 26.0*    196 169   < > 197    < > = values in this interval not displayed.     Pathology   7/2/24  Vulva, left labia majora, biopsy:  -Squamous epithelium with surface ulceration, granulation tissue and reactive changes  -Negative for dysplasia or malignancy    I was asked by the clinical team to evaluate this specimen and perform EBV CARLOS and CMV IHC. The features of this specimen, including an ulcer with a mild to moderate superficial perivascular and interstitial mixed inflammatory infiltrate including lymphocytes, histiocytes, plasma cells, and scattered neutrophils, is interpreted as most consistent with an aphthous ulcer. CMV immunohistochemistry is negative. EBV CARLOS shows very rare nuclear labeling (1-2 cells per section), but there is no evidence of a lymphoid dyscrasia in this specimen to suggest EBV mucocutaneous ulcer (a B-cell lymphoproliferative disorder); there are no large, atypical cells, and the EBV positivity falls well short of published cases of this entity.       6/28/24  A. Esophagus, distal, endoscopic biopsies:  - Gastroesophageal junction mucosa without intestinal metaplasia or significant inflammation  - No granulomas present  - Negative for dysplasia or malignancy     B. Esophagus, ulcer, center, endoscopic biopsies:  - Ulcer, with associated granulation tissue, fibrinopurulent debris, acute and chronic inflammation (see comment and microscopic description)  - No granulomas present  - No intact epithelium identified  - Negative for dysplasia or malignancy     C. Esophagus, ulcer, edge, endoscopic biopsies:  - Ulcer, with associated granulation tissue, fibrinopurulent debris, acute and chronic inflammation (see comment and microscopic description)  - No  granulomas present  - Occasional fragments of intact squamous epithelium, with scant acute and chronic inflammation  - Negative for dysplasia or malignancy     HSV and CMV immunostains are negative. PAS stains are negative for fungal organisms.       Microbiology:  6/2024 - swab of lesions for HSV PCR, NP Mycoplasma pneumoniae PCR, blood adenovirus PCR, CMV PCR, EBV PCR, HIV, syphilis, Coxsackie ab, chlamydia/gonorrhea PCR of cervix, Karius test, urine histo antigen negative     7/2/24 Vulva tissue biopsy - aerobic cx, afb cx, fungal cx, HSV 1 and 2 PCR and CMV PCR neg. EBV PCR pos (19k)     7/9/24 serum EBV PCR, CMV PCR  blood neg

## 2024-08-30 NOTE — NURSING NOTE
Current patient location: [address] [place] [id number]    Is the patient currently in the state of [place]? YES    Visit mode:VIDEO    If the visit is dropped, the patient can be reconnected by: VIDEO VISIT: Text to cell phone:   Telephone Information:   Mobile [phone]       Will anyone else be joining the visit? NO  (If patient encounters technical issues they should call [phone]    How would you like to obtain your AVS? MyChart    Are changes needed to the allergy or medication list? Pt stated no changes to allergies and Pt stated no med changes    Are refills needed on medications prescribed by this physician? NO    Rooming Documentation:  Not applicable      Reason for visit: RECHECK    No other vitals to report per pt    Lucia PAGANF

## 2024-08-30 NOTE — LETTER
2024       RE: Ira Zamora  [address] [facility] [place] [address]     Dear Colleague,    Thank you for referring your patient, Ira Zamora, to the [facility]IN[facility] [place] [facility]. Please see a copy of my visit note below.    Video time 4.31 pm to 4.45 pm   Patient location; home   Provider location: on site   Platform used; [employer]   Total time including chart review, care-coordination and documentation time on the date of encounter - 35 mins          [facility]PL[facility] INFECTIOUS DISEASES CLINIC NOTE     Patient:  Ira Zamora   YOB: 1990  Date of Visit:  2024          Assessment and Recommendations:   Recommendations:  -The oral and vulvar sores are likely Not due to EBV with negative histopathology and neg EBV immunostain on vulvar tissue biopsy. Tissue EBV PCR has not been validated for this diagnosis and was misleading in this case. Suspect ulcers are more likely due to MPA, Behcets or Crohns  - due for Shingrix and tdap >6 months post transplant   - due for Covid and flu vaccine this fall      No follow up is needed unless ID issues arise, I wish her the best of luck     Dr. 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     ID work up included swab of the lesion for HSV, NP Mycoplasma pneumoniae PCR, blood adenovirus PCR, CMV PCR, EBV PCR, HIV, syphilis, Coxsackie ab, chlamydia/gonorrhea PCR of cervix, Karius test, urine histo antigen which were negative.     MPA was stopped ~ .  6/27 admitted with odynophagia and found to have esophageal ulcers.     Vulvar tissue EBV PCR was pos at 19k. Dermatology and ID concerned for Jake-Barr virus associated mucocutaneous ulcers (EBVMCUs) and EBV-associated ulcus vulvae acutum (EBV-AUVA).    6/28/24 biopsy of esophageal ulcers and 7/2/24 biopsy of vulvar ulcer was non diagnostic.     She was treated with iv solumedrol for 4 days followed by 1 week of tapering oral steroids for possible Behcets.     7/24/24 and 8/30/24 follow up, reports improvement in all ulcers.     I do not think she has EBV related mucocutaneous ulcer especially with lack of H and E findings and neg EBV immunostain in tissue .Also tissue PCR test results can be unreliable.    Suspect ulcers are more likely due to MPA, Behcets or Crohns    Previous ID Issues:  - Crohn's disease- follows with [facility] on ustekinumab (Stelara) pre-transplant, last dose 3/2024     Other ID issues:  - Bacterial prophylaxis:  none  - Pneumocystis prophylaxis:  inhaled pentamidine due to leukopenia, last dose 7/9/24, on bactrim now with resolution of leukopenia on stopping MPA  - Viral serostatus: CMV D+/R-, EBV D+/R+, HSV 1?/2?, VZV +   - Viral prophylaxis:  Valcyte 900 mg daily - prophylaxis   - Fungal prophylaxis:  none  - Immunosuppression: Induction with thymo and steroid taper;   - Immunization status:  see recs  - Gamma globulin status:  unknown    ------------------------------------------------------------------------------------------------------------------------------------------------------------------------------------------------------------  Interval events:  Last seen 7/24/24  She reports that she is doing well. No esophageal issues and vulvar sore is healing well   She had questions today about CMV and BK virus since low level viremia were detected in surveillance PCR tests. We discussed that they are low level and of no clinical significance  Asked pathology if vulvar biopsy suggestive of  Jake-Barr virus associated mucocutaneous ulcers (EBVMCUs) and EBV-associated ulcus vulvae acutum (EBV-AUVA). EBV immunostain was done. Overall not suggestive of that diagnosis.     Interval events:  Last seen 24   Myfortic was stopped a week prior   Admitted  due to odynophagia, found to have esophageal ulcers. EBV PCR of the ulcer pos- 19k. EBV blood PCR neg. Dermatology and ID concerned for Jake-Barr virus associated mucocutaneous ulcers (EBVMCUs) and EBV-associated ulcus vulvae acutum (EBV-AUVA). Pathology returned later - was non specific.   Rheumatology was consulted and for possibility of behcets started on iv solumedrol for 4 days followed by 1 week of tapering oral steroids. Currently on prednisone 5 mg daily, tacrolimus and imuran which was started during the recent hospital stay   She notes that all the ulcers seem to be getting better - Esophageal ulcers - no odynophagia, mouth ulcers and genital     Leukopenia has resolved in the past 1-2 weeks    international unit(s) on 24            History of Present Illness: 24    Adopted from initial consult note:  This is a follow up after hospital discharge. First time seeing patient. Seen by my colleague as an inpatient.     Transplants:  2024 (Kidney), Postoperative day:  135     Ira Zamora is a 34 year old female with history of Crohn's (rcvd ustekinumab on 3/1/24), Hidradenitis suppurativa, Juvenile RA, HTN, ESRD 2/2 interstitial nephritis PPIs s/p  donor kidney transplant on 24 induction with thymoglobulin and steroid taper,  immunosuppressed with MPA and TAC who presented to ED on 24 with oral and vulvar sores.      Symptoms started beginning of  with mouth sores (small and one large). Ira notes night sweats that started just before the mouth sores and have increased to nightly. Sores are very painful and have limited ability to eat and drink. Had tried topical lidocaine and magic mouthwash. Has  pain in esophagus with swallowing or eating food. This is not new for her and was related to GERD but she had to stop PPI since it caused the interstitial nephritis and therefore GERD has become worse after transplant.     2 weeks later developed vaginal sores. Urinating burns the sore.      Has hx of Crohn's disease, last saw GI around September 2023 and last Stelara infusion was 3/1/24. Around time of initial diagnosis with Crohns had severe reflux and canker sores in mouth.     No fevers, chills, abdominal pain, diarrhea, vomiting, rhinorrhea, dyspnea, cough, vaginal discharge, dysuria, joint pain/swelling or erythema, rashes. No sx of hidradenitis suppurativa.   Has some fresh blood in stool at the end since a week ago - has no pain      Lives in [place], [place] with her  (Collin) and her parents live in upstairs in the same house. They both have indoor pet cats. No other animal exposures, fish, or birds. No recent travel. International travel to [place] in 2016 and 2018, traveled to [place], [place], [place] in 2009. 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 Not around any young children. No sick contacts.     Myfortic dose was reduced from 540 bid to 360 bid trixie few days prior to admission.     She was hospitalized 6/5 to 6/9 for these sores. ID work up included swab of the lesion for HSV, NP Mycoplasma pneumoniae PCR, blood adenovirus PCR, CMV PCR, EBV PCR, BK PCR, HIV, syphilis, Coxsackie ab, chlamydia/gonorrhea PCR of cervix, Karius test, urine histo antigen which were negative.     Myfortic was stopped a week ago. Planning to start beltacept instead     She reports that she was able to eat something after the dose of myfortic was reduced. The pain has eased a little with stopping myfortic. But there is still considerable pain.  reports that she has not slept in weeks. The oxycodone 5 mg seemed to help in the  beginning but not that much anymore.     Leukopenic since May     Immunization History   Administered Date(s) Administered     COVID-19 Bivalent 12+ (Pfizer) 09/23/2022     COVID-19 MONOVALENT 12+ (Pfizer) 01/21/2021, 02/10/2021, 09/16/2021     COVID-19 Monovalent 12+ (Pfizer 2022) 02/25/2022     HPV Quadrivalent 10/28/2008, 06/20/2011, 10/28/2011     HepB, Unspecified 12/30/2002     Hepatitis A (ADULT 19+) 02/19/2019, 08/19/2019     Hepatitis B, Peds 12/30/2002     Influenza (intradermal) 09/14/2021     Influenza Vaccine 18-64 (Flublok) 10/13/2023     Pneumo Conj 13-V (2010&after) 10/23/2018     Pneumococcal 20 valent Conjugate (Prevnar 20) 11/17/2023     Pneumococcal 23 valent 11/22/2021     Pneumococcal, Unspecified 12/18/2018, 11/22/2021     TDAP (Adacel,Boostrix) 10/23/2012     Td (Adult), Adsorbed 04/12/2004, 01/01/2006              Current Medications:     Current Outpatient Medications   Medication Sig Dispense Refill     acetaminophen (TYLENOL) 500 MG tablet Take 1,000 mg by mouth every 6 hours as needed       atorvastatin (LIPITOR) 10 MG tablet Take 1 tablet (10 mg) by mouth daily 30 tablet 1     azaTHIOprine (IMURAN) 50 MG tablet Take 2 tablets (100 mg) by mouth daily (Patient not taking: Reported on 7/18/2024) 60 tablet 11     famotidine (PEPCID) 20 MG tablet Take 2 tablets (40 mg) by mouth 2 times daily 60 tablet 1     hydrOXYzine HCl (ATARAX) 25 MG tablet Take 1 tablet (25 mg) by mouth every 6 hours as needed for other (adjuvant pain) 20 tablet 0     ketoconazole (NIZORAL) 2 % external shampoo Apply topically daily as needed for itching or irritation (use there times a week and let sit on scalp for 5 minutes). 120 mL 11     levonorgestrel (KYLEENA) 19.5 MG IUD 1 Device by Intrauterine route       magnesium oxide (MAG-OX) 400 MG tablet Take 2 tablets (800 mg) by mouth daily 60 tablet 0     PARoxetine (PAXIL) 30 MG tablet Take 30 mg by mouth every evening       predniSONE (DELTASONE) 5 MG tablet Take 1  "tablet (5 mg) by mouth daily 30 tablet 2     sulfamethoxazole-trimethoprim (BACTRIM) 400-80 MG tablet Take 1 tablet by mouth daily 30 tablet 11     tacrolimus (GENERIC EQUIVALENT) 0.5 MG capsule Hold for dose changes. Total dose 1.5 mg bid.       tacrolimus (GENERIC EQUIVALENT) 1 MG capsule Total dose= 1.5 mg bid.       triamcinolone (KENALOG) 0.1 % external lotion Apply topically 2 times daily 60 mL 0     triamcinolone (KENALOG) 0.1 % paste Take by mouth 2 times daily 5 g 3     ustekinumab (STELARA) 90 MG/ML Inject 90 mg Subcutaneous once every eight weeks (Patient not taking: Reported on 6/27/2024)       valGANciclovir (VALCYTE) 450 MG tablet Take 2 tablets (900 mg) by mouth daily 60 tablet 3     Vonoprazan Fumarate (VOQUEZNA) 20 MG TABS Take 20 mg by mouth daily 60 tablet 0            Physical Exam:   Ht 1.575 m (5' 2\")   Wt 62.1 kg (137 lb)   BMI 25.06 kg/m      Unable to examine due to virtual visit            Laboratory Data:   Microbiology:  Culture   Date Value Ref Range Status   07/08/2024 No Growth  Final   07/08/2024 No Growth  Final   07/06/2024 50,000-100,000 CFU/mL Staphylococcus aureus (A)  Final   07/02/2024 No Growth  Final   07/02/2024 No Growth  Final   06/06/2024 No Growth  Final   05/03/2024 No Growth  Final       Metabolic Studies       Recent Labs   Lab Test 08/28/24  0852 08/21/24  0746 08/13/24  0738 04/17/24  2230 04/17/24  2123 04/17/24  1603 04/17/24  0958    140 141   < > 136  --  139   POTASSIUM 4.7 4.2 4.5   < > 3.7  --  3.4   CHLORIDE 106 105 107   < >  --   --  95*   CO2 26 26 23   < >  --   --  33*   ANIONGAP 11 9 11   < >  --   --  11   BUN 22.8* 32.8* 30.4*   < >  --   --  11.6   CR 0.87 0.93 0.78   < >  --   --  3.11*   GFRESTIMATED 89 82 >90   < >  --   --  19*   GLC 94 90 93   < > 168*   < > 91   A1C  --   --   --   --   --   --  4.7   ROBLES 10.1 10.6* 9.9   < >  --   --  9.3   PHOS  --  2.9 3.4   < >  --   --   --    MAG  --  1.6* 1.5*   < >  --   --   --    LACT  --   " --   --   --  0.8  --   --     < > = values in this interval not displayed.       Hepatic Studies    Recent Labs   Lab Test 07/08/24  0542   BILITOTAL 0.2   ALKPHOS 140   ALBUMIN 3.0*   AST 16   ALT 39         Hematology Studies      Recent Labs   Lab Test 08/28/24  0852 08/21/24  0746 08/13/24  0738 05/02/24  0920 04/25/24  0930   WBC 5.8 5.0 5.3   < > 7.1   ANEU  --   --   --   --  5.8   ALYM  --   --   --   --  0.2*   FAVIAN  --   --   --   --  0.4   AEOS  --   --   --   --  0.2   HGB 12.8 12.8 11.8   < > 8.4*   HCT 40.2 40.0 37.9   < > 26.0*    196 169   < > 197    < > = values in this interval not displayed.     Pathology   7/2/24  Vulva, left labia majora, biopsy:  -Squamous epithelium with surface ulceration, granulation tissue and reactive changes  -Negative for dysplasia or malignancy    I was asked by the clinical team to evaluate this specimen and perform EBV CARLOS and CMV IHC. The features of this specimen, including an ulcer with a mild to moderate superficial perivascular and interstitial mixed inflammatory infiltrate including lymphocytes, histiocytes, plasma cells, and scattered neutrophils, is interpreted as most consistent with an aphthous ulcer. CMV immunohistochemistry is negative. EBV CARLOS shows very rare nuclear labeling (1-2 cells per section), but there is no evidence of a lymphoid dyscrasia in this specimen to suggest EBV mucocutaneous ulcer (a B-cell lymphoproliferative disorder); there are no large, atypical cells, and the EBV positivity falls well short of published cases of this entity.       6/28/24  A. Esophagus, distal, endoscopic biopsies:  - Gastroesophageal junction mucosa without intestinal metaplasia or significant inflammation  - No granulomas present  - Negative for dysplasia or malignancy     B. Esophagus, ulcer, center, endoscopic biopsies:  - Ulcer, with associated granulation tissue, fibrinopurulent debris, acute and chronic inflammation (see comment and microscopic  description)  - No granulomas present  - No intact epithelium identified  - Negative for dysplasia or malignancy     C. Esophagus, ulcer, edge, endoscopic biopsies:  - Ulcer, with associated granulation tissue, fibrinopurulent debris, acute and chronic inflammation (see comment and microscopic description)  - No granulomas present  - Occasional fragments of intact squamous epithelium, with scant acute and chronic inflammation  - Negative for dysplasia or malignancy     HSV and CMV immunostains are negative. PAS stains are negative for fungal organisms.       Microbiology:  6/2024 - swab of lesions for HSV PCR, NP Mycoplasma pneumoniae PCR, blood adenovirus PCR, CMV PCR, EBV PCR, HIV, syphilis, Coxsackie ab, chlamydia/gonorrhea PCR of cervix, Karius test, urine histo antigen negative     7/2/24 Vulva tissue biopsy - aerobic cx, afb cx, fungal cx, HSV 1 and 2 PCR and CMV PCR neg. EBV PCR pos (19k)     7/9/24 serum EBV PCR, CMV PCR  blood neg               Again, thank you for allowing me to participate in the care of your patient.      Sincerely,    Irena Calderon MD

## 2024-09-03 ENCOUNTER — VIRTUAL VISIT (OUTPATIENT)
Dept: PHARMACY | Facility: CLINIC | Age: 34
End: 2024-09-03
Payer: MEDICARE

## 2024-09-03 DIAGNOSIS — K21.9 GASTROESOPHAGEAL REFLUX DISEASE WITHOUT ESOPHAGITIS: ICD-10-CM

## 2024-09-03 DIAGNOSIS — Z78.9 TAKES DIETARY SUPPLEMENTS: ICD-10-CM

## 2024-09-03 DIAGNOSIS — Z48.298 AFTERCARE FOLLOWING ORGAN TRANSPLANT: Primary | ICD-10-CM

## 2024-09-03 DIAGNOSIS — K12.1 MOUTH ULCERS: ICD-10-CM

## 2024-09-03 DIAGNOSIS — E78.5 DYSLIPIDEMIA: ICD-10-CM

## 2024-09-03 PROCEDURE — 99606 MTMS BY PHARM EST 15 MIN: CPT | Mod: 93 | Performed by: PHARMACIST

## 2024-09-03 PROCEDURE — 99607 MTMS BY PHARM ADDL 15 MIN: CPT | Mod: 93 | Performed by: PHARMACIST

## 2024-09-03 RX ORDER — ASPIRIN 81 MG/1
81 TABLET ORAL DAILY
COMMUNITY

## 2024-09-03 RX ORDER — MAGNESIUM 200 MG
1000 TABLET ORAL DAILY
COMMUNITY

## 2024-09-03 NOTE — PATIENT INSTRUCTIONS
"Recommendations from today's MTM visit:                                                      Recommend trying Sublingual B12 tablets next time you need a refill.   Per last note, can use Aspirin 81mg daily.   At 6 months post transplant due for Shingrix (2 doses 8 weeks apart).     Follow-up: as needed.     It was great speaking with you today.  I value your experience and would be very thankful for your time in providing feedback in our clinic survey. In the next few days, you may receive an email or text message from [employer] with a link to a survey related to your  clinical pharmacist.\"     To schedule another MTM appointment, please call the clinic directly or you may call the MTM scheduling line at [phone] or toll-free at [phone].     My Clinical Pharmacist's contact information:                                                      Please feel free to contact me with any questions or concerns you have.      Kirk Patel, PharmD  MTM Pharmacist    Phone: [phone]     "

## 2024-09-03 NOTE — PROGRESS NOTES
Medication Therapy Management (MTM) Encounter    ASSESSMENT:                            Medication Adherence/Access: No issues identified    Kidney Transplant:    Per last note, patient should be on aspirin 81mg daily. Recommended she reduce this dose. Discussed long term vaccines due.      Supplements:   Stable.    GERD/ esophagititis/ Mouth sores:   Recommend using sublingual b12 tablets as this is what was used in the studies reducing canker sores.     Hyperlipidemia   Stable.     PLAN:                            Recommend trying Sublingual B12 tablets next time you need a refill.   Per last note, can use Aspirin 81mg daily.   At 6 months post transplant due for Shingrix (2 doses 8 weeks apart).     Follow-up: as needed.     SUBJECTIVE/OBJECTIVE:                          Ira Zamora is a 34 year old female seen for a follow-up visit.       Reason for visit: 4 months post txp  Changes last visit:  Recommend B12 sublingual tablets 1000mcg daily (can be higher than that, make sure they are sublingual tablets). For mouth sores  Talk to Dr. Ortiz about Sucralfate for esophageal pain.  Talk to Dr. Ortiz about reducing aspirin to 81mg daily. If safe.      Allergies/ADRs: Reviewed in chart  Past Medical History: Reviewed in chart  Tobacco: She reports that she has never smoked. She has never been exposed to tobacco smoke. She has never used smokeless tobacco.  Alcohol: not currently using    Medication Adherence/Access: no issues reported    Kidney Transplant:    Tacrolimus 1.5 mg twice daily  Azathioprine 100mg daily  Prednisone 5mg daily.   Pt reports GERD, GI pain, canker sores.  Transplant date: 4/14/24  Vascular Prophylaxis: Aspirin 325mg daily. Denies gastrointestinal bleed sx.  CMV prophylaxis: Valcyte 900 mg daily (+), Recipient (-), treat 6 months post tx   PJP prophylaxis: Bactrim SS daily  Tx Coordinator: Renae Stewart MD: Dr. Ortiz, Using Med Card: Yes  Immunizations: annual flu shot 2023; Pneumovax 23:   2021; Prevnar 20: 2023; TDaP:  2012; Shingrix: unknown, HBV: immunity per last titers, COVID: Primary x 3 and Bivalent x 1    Estimated Creatinine Clearance: 79 mL/min (based on SCr of 0.87 mg/dL).     Supplements:   Magnesium 800mg daily   Lab Results   Component Value Date    MAG 1.6 (L) 08/21/2024     GERD/ esophagititis/ Mouth sores:   Famotidine 40 mg twice daily  Voquezna 20mg daily  Triamcinolone paste for canker sores- has not needed  Using Vitamin B12 gummies daily.   PPI induced interstitial nephritis. Symptoms much improved after starting voquezna.   Has canker sores improved as well.     Hyperlipidemia   Atorvastatin 10mg daily  Patient reports no issues      Today's Vitals: There were no vitals taken for this visit.  ----------------    I spent 10 minutes with this patient today. All changes were made via collaborative practice agreement with Dr. Ortiz. A copy of the visit note was provided to the patient's provider(s).    A summary of these recommendations was sent via [employer].    Kirk Patel, PharmD  [facility] Pharmacist    Phone: [phone]     Telemedicine Visit Details  Type of service:  Telephone visit  Start Time: 10:09 AM  End Time: 10:19 AM     Medication Therapy Recommendations  Aftercare following organ transplant    Current Medication: aspirin 81 MG EC tablet   Rationale: Does not understand instructions - Adherence - Adherence   Recommendation: Decrease Dose   Status: Accepted - no CPA Needed          Rationale: Preventive therapy - Needs additional medication therapy - Indication   Recommendation: Order Vaccine - Shingrix 50 MCG/0.5ML Susr   Status: Accepted - no CPA Needed         Mouth ulcers    Current Medication: cyanocobalamin 1000 MCG sublingual tablet   Rationale: More effective medication available - Ineffective medication - Effectiveness   Recommendation: Change Medication Formulation    Status: Accepted - no CPA Needed

## 2024-09-04 ENCOUNTER — LAB (OUTPATIENT)
Dept: LAB | Facility: OTHER | Age: 34
End: 2024-09-04
Payer: MEDICARE

## 2024-09-04 DIAGNOSIS — Z94.0 KIDNEY REPLACED BY TRANSPLANT: ICD-10-CM

## 2024-09-04 DIAGNOSIS — Z98.890 OTHER SPECIFIED POSTPROCEDURAL STATES: ICD-10-CM

## 2024-09-04 DIAGNOSIS — Z48.298 AFTERCARE FOLLOWING ORGAN TRANSPLANT: ICD-10-CM

## 2024-09-04 DIAGNOSIS — Z79.899 ENCOUNTER FOR LONG-TERM CURRENT USE OF MEDICATION: ICD-10-CM

## 2024-09-04 DIAGNOSIS — B25.9 CMV (CYTOMEGALOVIRUS INFECTION) (H): ICD-10-CM

## 2024-09-04 DIAGNOSIS — Z20.828 CONTACT WITH AND (SUSPECTED) EXPOSURE TO OTHER VIRAL COMMUNICABLE DISEASES: ICD-10-CM

## 2024-09-04 LAB
ANION GAP SERPL CALCULATED.3IONS-SCNC: 9 MMOL/L (ref 7–15)
BUN SERPL-MCNC: 26.1 MG/DL (ref 6–20)
CALCIUM SERPL-MCNC: 10.3 MG/DL (ref 8.8–10.4)
CHLORIDE SERPL-SCNC: 106 MMOL/L (ref 98–107)
CREAT SERPL-MCNC: 0.99 MG/DL (ref 0.51–0.95)
DONOR IDENTIFICATION: NORMAL
DSA COMMENTS: NORMAL
DSA PRESENT: NO
DSA TEST METHOD: NORMAL
EGFRCR SERPLBLD CKD-EPI 2021: 76 ML/MIN/1.73M2
ERYTHROCYTE [DISTWIDTH] IN BLOOD BY AUTOMATED COUNT: 15.6 % (ref 10–15)
GLUCOSE SERPL-MCNC: 89 MG/DL (ref 70–99)
HCO3 SERPL-SCNC: 26 MMOL/L (ref 22–29)
HCT VFR BLD AUTO: 41.2 % (ref 35–47)
HGB BLD-MCNC: 13.2 G/DL (ref 11.7–15.7)
MCH RBC QN AUTO: 33.6 PG (ref 26.5–33)
MCHC RBC AUTO-ENTMCNC: 32 G/DL (ref 31.5–36.5)
MCV RBC AUTO: 105 FL (ref 78–100)
ORGAN: NORMAL
PLATELET # BLD AUTO: 194 10E3/UL (ref 150–450)
POTASSIUM SERPL-SCNC: 4.8 MMOL/L (ref 3.4–5.3)
RBC # BLD AUTO: 3.93 10E6/UL (ref 3.8–5.2)
SA 1  COMMENTS: NORMAL
SA 1 CELL: NORMAL
SA 1 TEST METHOD: NORMAL
SA 2 CELL: NORMAL
SA 2 COMMENTS: NORMAL
SA 2 TEST METHOD: NORMAL
SA1 HI RISK ABY: NORMAL
SA1 MOD RISK ABY: NORMAL
SA2 HI RISK ABY: NORMAL
SA2 MOD RISK ABY: NORMAL
SODIUM SERPL-SCNC: 141 MMOL/L (ref 135–145)
TACROLIMUS BLD-MCNC: 11.7 UG/L (ref 5–15)
TME LAST DOSE: NORMAL H
TME LAST DOSE: NORMAL H
UNACCEPTABLE ANTIGENS: NORMAL
UNOS CPRA: 9
WBC # BLD AUTO: 7.1 10E3/UL (ref 4–11)

## 2024-09-04 PROCEDURE — 80197 ASSAY OF TACROLIMUS: CPT

## 2024-09-04 PROCEDURE — 85027 COMPLETE CBC AUTOMATED: CPT

## 2024-09-04 PROCEDURE — 87799 DETECT AGENT NOS DNA QUANT: CPT

## 2024-09-04 PROCEDURE — 36415 COLL VENOUS BLD VENIPUNCTURE: CPT

## 2024-09-04 PROCEDURE — 80048 BASIC METABOLIC PNL TOTAL CA: CPT

## 2024-09-05 DIAGNOSIS — Z94.0 KIDNEY REPLACED BY TRANSPLANT: Primary | ICD-10-CM

## 2024-09-05 LAB
BK VIRUS DNA IU/ML, INSTRUMENT (6800): 461 IU/ML
BK VIRUS SPECIMEN TYPE: ABNORMAL
BKV DNA SPEC NAA+PROBE-LOG#: 2.7 {LOG_COPIES}/ML
CMV DNA SPEC NAA+PROBE-ACNC: NOT DETECTED IU/ML

## 2024-09-05 RX ORDER — TACROLIMUS 1 MG/1
1 CAPSULE ORAL 2 TIMES DAILY
Qty: 60 CAPSULE | Refills: 3 | Status: SHIPPED | OUTPATIENT
Start: 2024-09-05 | End: 2024-09-11

## 2024-09-05 NOTE — TELEPHONE ENCOUNTER
Spoke with patient and advised of lab results. Patient states she is currently taking 1.5 mg bid of the Tacrolimus and an accurate 12-hour trough was done (she got in a little bit late because they were behind in the lab).    No new medications or missed doses.    No new infection, illness or diarrhea.    Patient advised to decrease her Tacrolimus to 1 mg bid and have labs repeated in 1 week. Patient verbalized understanding of plan of care.

## 2024-09-05 NOTE — TELEPHONE ENCOUNTER
ISSUE:   Tacrolimus IR level 11.7 on 9/5, goal 8-10, dose 1.5 mg BID.    PLAN:   Call Patient and confirm this was an accurate 12-hour trough.   Verify Tacrolimus IR dose 1.5 mg BID.   Confirm no new medications or or missed doses.   Confirm no new illness / infection / diarrhea.   If accurate trough and accurate dose, decrease Tacrolimus IR dose to 1 mg BID     Is this more than a 50% increase or decrease in current IS dose: No  If YES, justification: na    Repeat labs in 1 weeks.  *If > 50% change in immunosuppression dose, repeat labs in 1 week.     OUTCOME:

## 2024-09-11 DIAGNOSIS — Z94.0 KIDNEY REPLACED BY TRANSPLANT: ICD-10-CM

## 2024-09-11 RX ORDER — TACROLIMUS 1 MG/1
1 CAPSULE ORAL 2 TIMES DAILY
Qty: 60 CAPSULE | Refills: 3 | Status: CANCELLED | OUTPATIENT
Start: 2024-09-11

## 2024-09-11 RX ORDER — TACROLIMUS 1 MG/1
1 CAPSULE ORAL 2 TIMES DAILY
Qty: 60 CAPSULE | Refills: 3 | Status: SHIPPED | OUTPATIENT
Start: 2024-09-11 | End: 2024-09-13

## 2024-09-12 ENCOUNTER — LAB (OUTPATIENT)
Dept: LAB | Facility: OTHER | Age: 34
End: 2024-09-12
Payer: MEDICARE

## 2024-09-12 DIAGNOSIS — Z94.0 KIDNEY REPLACED BY TRANSPLANT: ICD-10-CM

## 2024-09-12 LAB
ANION GAP SERPL CALCULATED.3IONS-SCNC: 8 MMOL/L (ref 7–15)
BUN SERPL-MCNC: 20.4 MG/DL (ref 6–20)
CALCIUM SERPL-MCNC: 10.7 MG/DL (ref 8.8–10.4)
CHLORIDE SERPL-SCNC: 106 MMOL/L (ref 98–107)
CREAT SERPL-MCNC: 0.93 MG/DL (ref 0.51–0.95)
EGFRCR SERPLBLD CKD-EPI 2021: 82 ML/MIN/1.73M2
ERYTHROCYTE [DISTWIDTH] IN BLOOD BY AUTOMATED COUNT: 14.7 % (ref 10–15)
GLUCOSE SERPL-MCNC: 87 MG/DL (ref 70–99)
HCO3 SERPL-SCNC: 27 MMOL/L (ref 22–29)
HCT VFR BLD AUTO: 41.6 % (ref 35–47)
HGB BLD-MCNC: 13.5 G/DL (ref 11.7–15.7)
MCH RBC QN AUTO: 34 PG (ref 26.5–33)
MCHC RBC AUTO-ENTMCNC: 32.5 G/DL (ref 31.5–36.5)
MCV RBC AUTO: 105 FL (ref 78–100)
PLATELET # BLD AUTO: 171 10E3/UL (ref 150–450)
POTASSIUM SERPL-SCNC: 4.6 MMOL/L (ref 3.4–5.3)
RBC # BLD AUTO: 3.97 10E6/UL (ref 3.8–5.2)
SODIUM SERPL-SCNC: 141 MMOL/L (ref 135–145)
TACROLIMUS BLD-MCNC: 5.7 UG/L (ref 5–15)
TME LAST DOSE: NORMAL H
TME LAST DOSE: NORMAL H
WBC # BLD AUTO: 5.3 10E3/UL (ref 4–11)

## 2024-09-12 PROCEDURE — 80048 BASIC METABOLIC PNL TOTAL CA: CPT

## 2024-09-12 PROCEDURE — 85027 COMPLETE CBC AUTOMATED: CPT

## 2024-09-12 PROCEDURE — 80197 ASSAY OF TACROLIMUS: CPT

## 2024-09-12 PROCEDURE — 36415 COLL VENOUS BLD VENIPUNCTURE: CPT

## 2024-09-13 DIAGNOSIS — Z94.0 KIDNEY REPLACED BY TRANSPLANT: Primary | ICD-10-CM

## 2024-09-13 RX ORDER — TACROLIMUS 1 MG/1
1 CAPSULE ORAL 2 TIMES DAILY
Qty: 60 CAPSULE | Refills: 3 | Status: SHIPPED | OUTPATIENT
Start: 2024-09-13

## 2024-09-13 RX ORDER — TACROLIMUS 0.5 MG/1
0.5 CAPSULE ORAL 2 TIMES DAILY
Qty: 60 CAPSULE | Refills: 3 | Status: SHIPPED | OUTPATIENT
Start: 2024-09-13

## 2024-09-13 NOTE — TELEPHONE ENCOUNTER
ISSUE:   Tacrolimus IR level 5.4 on 9/13, goal 8-10, dose 1 mg BID.    PLAN:   Call Patient and confirm this was an accurate 12-hour trough.   Verify Tacrolimus IR dose 1 mg BID.   Confirm no new medications or or missed doses.   Confirm no new illness / infection / diarrhea.   If accurate trough and accurate dose, increase Tacrolimus IR dose to 1.5 mg BID     Is this more than a 50% increase or decrease in current IS dose: No  If YES, justification: na    Repeat labs in 1-2 weeks.  *If > 50% change in immunosuppression dose, repeat labs in 1 week.     OUTCOME:

## 2024-09-13 NOTE — TELEPHONE ENCOUNTER
Spoke with patient and advised of lab results. Patient states she is taking Tacrolimus 1 mg bid and an accurate 12-hour trough was done.     No new medications or missed doses.    No new illness, infection or diarrhea.    Patient advised to increase her dose to 1.5 mg bid and have labs repeated in 1-2 weeks. Patient verbalized understanding of plan of care.

## 2024-09-16 DIAGNOSIS — Z94.0 KIDNEY REPLACED BY TRANSPLANT: Chronic | ICD-10-CM

## 2024-09-16 RX ORDER — ATORVASTATIN CALCIUM 10 MG/1
10 TABLET, FILM COATED ORAL DAILY
Qty: 30 TABLET | Refills: 1 | Status: SHIPPED | OUTPATIENT
Start: 2024-09-16

## 2024-09-18 ENCOUNTER — LAB (OUTPATIENT)
Dept: LAB | Facility: OTHER | Age: 34
End: 2024-09-18
Payer: COMMERCIAL

## 2024-09-18 DIAGNOSIS — Z48.298 AFTERCARE FOLLOWING ORGAN TRANSPLANT: ICD-10-CM

## 2024-09-18 DIAGNOSIS — Z98.890 OTHER SPECIFIED POSTPROCEDURAL STATES: ICD-10-CM

## 2024-09-18 DIAGNOSIS — Z79.899 ENCOUNTER FOR LONG-TERM CURRENT USE OF MEDICATION: ICD-10-CM

## 2024-09-18 DIAGNOSIS — Z20.828 CONTACT WITH AND (SUSPECTED) EXPOSURE TO OTHER VIRAL COMMUNICABLE DISEASES: ICD-10-CM

## 2024-09-18 DIAGNOSIS — Z94.0 KIDNEY REPLACED BY TRANSPLANT: ICD-10-CM

## 2024-09-18 LAB
ANION GAP SERPL CALCULATED.3IONS-SCNC: 12 MMOL/L (ref 7–15)
BUN SERPL-MCNC: 19.7 MG/DL (ref 6–20)
CALCIUM SERPL-MCNC: 10.6 MG/DL (ref 8.8–10.4)
CHLORIDE SERPL-SCNC: 105 MMOL/L (ref 98–107)
CREAT SERPL-MCNC: 0.87 MG/DL (ref 0.51–0.95)
EGFRCR SERPLBLD CKD-EPI 2021: 89 ML/MIN/1.73M2
ERYTHROCYTE [DISTWIDTH] IN BLOOD BY AUTOMATED COUNT: 14.4 % (ref 10–15)
GLUCOSE SERPL-MCNC: 96 MG/DL (ref 70–99)
HCO3 SERPL-SCNC: 25 MMOL/L (ref 22–29)
HCT VFR BLD AUTO: 42.9 % (ref 35–47)
HGB BLD-MCNC: 13.9 G/DL (ref 11.7–15.7)
MAGNESIUM SERPL-MCNC: 1.4 MG/DL (ref 1.7–2.3)
MCH RBC QN AUTO: 34 PG (ref 26.5–33)
MCHC RBC AUTO-ENTMCNC: 32.4 G/DL (ref 31.5–36.5)
MCV RBC AUTO: 105 FL (ref 78–100)
PHOSPHATE SERPL-MCNC: 3 MG/DL (ref 2.5–4.5)
PLATELET # BLD AUTO: 169 10E3/UL (ref 150–450)
POTASSIUM SERPL-SCNC: 4.8 MMOL/L (ref 3.4–5.3)
RBC # BLD AUTO: 4.09 10E6/UL (ref 3.8–5.2)
SODIUM SERPL-SCNC: 142 MMOL/L (ref 135–145)
TACROLIMUS BLD-MCNC: 12.4 UG/L (ref 5–15)
TME LAST DOSE: NORMAL H
TME LAST DOSE: NORMAL H
WBC # BLD AUTO: 4.6 10E3/UL (ref 4–11)

## 2024-09-18 PROCEDURE — 84100 ASSAY OF PHOSPHORUS: CPT

## 2024-09-18 PROCEDURE — 83735 ASSAY OF MAGNESIUM: CPT

## 2024-09-18 PROCEDURE — 36415 COLL VENOUS BLD VENIPUNCTURE: CPT

## 2024-09-18 PROCEDURE — 80197 ASSAY OF TACROLIMUS: CPT

## 2024-09-18 PROCEDURE — 80048 BASIC METABOLIC PNL TOTAL CA: CPT

## 2024-09-18 PROCEDURE — 85027 COMPLETE CBC AUTOMATED: CPT

## 2024-09-19 LAB
CMV DNA SPEC NAA+PROBE-ACNC: <35 IU/ML
CMV DNA SPEC NAA+PROBE-LOG#: <1.5 {LOG_COPIES}/ML

## 2024-09-25 ENCOUNTER — TELEPHONE (OUTPATIENT)
Dept: TRANSPLANT | Facility: CLINIC | Age: 34
End: 2024-09-25
Payer: MEDICARE

## 2024-09-25 DIAGNOSIS — Z48.298 AFTERCARE FOLLOWING ORGAN TRANSPLANT: Primary | ICD-10-CM

## 2024-09-25 RX ORDER — MYCOPHENOLIC ACID 180 MG/1
540 TABLET, DELAYED RELEASE ORAL 2 TIMES DAILY
Qty: 180 TABLET | Refills: 11 | Status: SHIPPED | OUTPATIENT
Start: 2024-09-25

## 2024-09-25 ASSESSMENT — ENCOUNTER SYMPTOMS: NEW SYMPTOMS OF CORONARY ARTERY DISEASE: 0

## 2024-09-25 NOTE — TELEPHONE ENCOUNTER
Spoke to patient about the plan to try mycophenolate again and eventually come off the prednisone. Patient agreeable to this plan. Patient will  and start MPA and let [facility] know how it goes.

## 2024-09-25 NOTE — TELEPHONE ENCOUNTER
Post Kidney and Pancreas Transplant Team Conference  Date: 9/25/2024  Transplant Coordinator: Radha Jorge     Attendees:  [x]  Dr. Ortiz [] Roxanne Betancur RN [] Tanna Castanon LPN     [x]  Dr. Arellano [x] Radha Jorge, RN [] Diamond Atkinson LPN    [x] Dr. Patino [x] Otilia Prather RN    [x] Dr. Tsai [x] Geneva Dennis RN [] Frida Harris RN   [] Dr. Burgess [] Shantelle Ko RN    [] Dr. Díaz [] Jael Olson RN    [x]  Dr. Patel [] Nia Pryor RN    [] Dr. Brooks [] Mao Mahan RN    [] Nicole Kowalski NP [] Mary Lou Corcoran RN    [] Pratima Macedo NP [] Brenda Roca RN        Verbal Plan Read Back:   Add Mycophenolic Acid 540mg BID  At 1 month, address IS again    Routed to RN Coordinator   Radha Jorge RN

## 2024-10-01 DIAGNOSIS — Z48.298 AFTERCARE FOLLOWING ORGAN TRANSPLANT: Primary | ICD-10-CM

## 2024-10-04 ENCOUNTER — LAB (OUTPATIENT)
Dept: LAB | Facility: OTHER | Age: 34
End: 2024-10-04
Payer: MEDICARE

## 2024-10-04 DIAGNOSIS — Z48.298 AFTERCARE FOLLOWING ORGAN TRANSPLANT: ICD-10-CM

## 2024-10-04 LAB
ANION GAP SERPL CALCULATED.3IONS-SCNC: 10 MMOL/L (ref 7–15)
BUN SERPL-MCNC: 19.4 MG/DL (ref 6–20)
CALCIUM SERPL-MCNC: 10.1 MG/DL (ref 8.8–10.4)
CHLORIDE SERPL-SCNC: 106 MMOL/L (ref 98–107)
CREAT SERPL-MCNC: 0.9 MG/DL (ref 0.51–0.95)
EGFRCR SERPLBLD CKD-EPI 2021: 86 ML/MIN/1.73M2
ERYTHROCYTE [DISTWIDTH] IN BLOOD BY AUTOMATED COUNT: 13.6 % (ref 10–15)
GLUCOSE SERPL-MCNC: 93 MG/DL (ref 70–99)
HCO3 SERPL-SCNC: 26 MMOL/L (ref 22–29)
HCT VFR BLD AUTO: 43.2 % (ref 35–47)
HGB BLD-MCNC: 13.8 G/DL (ref 11.7–15.7)
MCH RBC QN AUTO: 33.7 PG (ref 26.5–33)
MCHC RBC AUTO-ENTMCNC: 31.9 G/DL (ref 31.5–36.5)
MCV RBC AUTO: 105 FL (ref 78–100)
PLATELET # BLD AUTO: 167 10E3/UL (ref 150–450)
POTASSIUM SERPL-SCNC: 4.4 MMOL/L (ref 3.4–5.3)
RBC # BLD AUTO: 4.1 10E6/UL (ref 3.8–5.2)
SODIUM SERPL-SCNC: 142 MMOL/L (ref 135–145)
TACROLIMUS BLD-MCNC: 9.8 UG/L (ref 5–15)
TME LAST DOSE: NORMAL H
TME LAST DOSE: NORMAL H
WBC # BLD AUTO: 5.2 10E3/UL (ref 4–11)

## 2024-10-04 PROCEDURE — 36415 COLL VENOUS BLD VENIPUNCTURE: CPT

## 2024-10-04 PROCEDURE — 85027 COMPLETE CBC AUTOMATED: CPT

## 2024-10-04 PROCEDURE — 80197 ASSAY OF TACROLIMUS: CPT

## 2024-10-04 PROCEDURE — 87799 DETECT AGENT NOS DNA QUANT: CPT

## 2024-10-04 PROCEDURE — 80048 BASIC METABOLIC PNL TOTAL CA: CPT

## 2024-10-05 LAB
CMV DNA SPEC NAA+PROBE-ACNC: 81 IU/ML
CMV DNA SPEC NAA+PROBE-LOG#: 1.9 {LOG_COPIES}/ML

## 2024-10-06 LAB
BK VIRUS DNA IU/ML, INSTRUMENT (6800): 320 IU/ML
BK VIRUS SPECIMEN TYPE: ABNORMAL
BKV DNA SPEC NAA+PROBE-LOG#: 2.5 {LOG_COPIES}/ML

## 2024-10-07 DIAGNOSIS — Z94.0 KIDNEY REPLACED BY TRANSPLANT: ICD-10-CM

## 2024-10-07 RX ORDER — PREDNISONE 5 MG/1
5 TABLET ORAL DAILY
Qty: 30 TABLET | Refills: 2 | Status: SHIPPED | OUTPATIENT
Start: 2024-10-07

## 2024-10-15 ENCOUNTER — LAB (OUTPATIENT)
Dept: LAB | Facility: OTHER | Age: 34
End: 2024-10-15
Payer: MEDICARE

## 2024-10-15 DIAGNOSIS — Z94.0 KIDNEY REPLACED BY TRANSPLANT: Primary | ICD-10-CM

## 2024-10-15 DIAGNOSIS — Z48.298 AFTERCARE FOLLOWING ORGAN TRANSPLANT: ICD-10-CM

## 2024-10-15 LAB
ANION GAP SERPL CALCULATED.3IONS-SCNC: 11 MMOL/L (ref 7–15)
BUN SERPL-MCNC: 15.4 MG/DL (ref 6–20)
CALCIUM SERPL-MCNC: 9.7 MG/DL (ref 8.8–10.4)
CHLORIDE SERPL-SCNC: 106 MMOL/L (ref 98–107)
CREAT SERPL-MCNC: 0.87 MG/DL (ref 0.51–0.95)
EGFRCR SERPLBLD CKD-EPI 2021: 89 ML/MIN/1.73M2
ERYTHROCYTE [DISTWIDTH] IN BLOOD BY AUTOMATED COUNT: 13.2 % (ref 10–15)
GLUCOSE SERPL-MCNC: 119 MG/DL (ref 70–99)
HCO3 SERPL-SCNC: 25 MMOL/L (ref 22–29)
HCT VFR BLD AUTO: 44.5 % (ref 35–47)
HGB BLD-MCNC: 14.4 G/DL (ref 11.7–15.7)
MCH RBC QN AUTO: 34.2 PG (ref 26.5–33)
MCHC RBC AUTO-ENTMCNC: 32.4 G/DL (ref 31.5–36.5)
MCV RBC AUTO: 106 FL (ref 78–100)
PLATELET # BLD AUTO: 172 10E3/UL (ref 150–450)
POTASSIUM SERPL-SCNC: 4.5 MMOL/L (ref 3.4–5.3)
RBC # BLD AUTO: 4.21 10E6/UL (ref 3.8–5.2)
SODIUM SERPL-SCNC: 142 MMOL/L (ref 135–145)
TACROLIMUS BLD-MCNC: 10.9 UG/L (ref 5–15)
TME LAST DOSE: NORMAL H
TME LAST DOSE: NORMAL H
WBC # BLD AUTO: 2.2 10E3/UL (ref 4–11)

## 2024-10-15 PROCEDURE — 80048 BASIC METABOLIC PNL TOTAL CA: CPT

## 2024-10-15 PROCEDURE — 36415 COLL VENOUS BLD VENIPUNCTURE: CPT

## 2024-10-15 PROCEDURE — 87799 DETECT AGENT NOS DNA QUANT: CPT

## 2024-10-15 PROCEDURE — 85027 COMPLETE CBC AUTOMATED: CPT

## 2024-10-15 PROCEDURE — 80197 ASSAY OF TACROLIMUS: CPT

## 2024-10-16 ENCOUNTER — TELEPHONE (OUTPATIENT)
Dept: TRANSPLANT | Facility: CLINIC | Age: 34
End: 2024-10-16
Payer: MEDICARE

## 2024-10-16 DIAGNOSIS — Z94.0 KIDNEY REPLACED BY TRANSPLANT: ICD-10-CM

## 2024-10-16 DIAGNOSIS — B25.9 CMV (CYTOMEGALOVIRUS INFECTION) (H): Primary | ICD-10-CM

## 2024-10-16 LAB
BK VIRUS DNA IU/ML, INSTRUMENT (6800): 357 IU/ML
BK VIRUS SPECIMEN TYPE: ABNORMAL
BKV DNA SPEC NAA+PROBE-LOG#: 2.6 {LOG_COPIES}/ML
CMV DNA SPEC NAA+PROBE-ACNC: 510 IU/ML
CMV DNA SPEC NAA+PROBE-LOG#: 2.7 {LOG_COPIES}/ML

## 2024-10-16 RX ORDER — AZATHIOPRINE 50 MG/1
50 TABLET ORAL DAILY
Qty: 30 TABLET | Refills: 11 | Status: SHIPPED | OUTPATIENT
Start: 2024-10-16

## 2024-10-16 NOTE — TELEPHONE ENCOUNTER
ISSUE:  Rise in CMV     Neno Ortiz MD Colianni, Lauren, RN  Cc: Irena Calderon MD  Increased CMV viremia, but still minimal.  Would decrease azathioprine to 50 mg daily and recheck CMV PCR in a week with CMV resistance panel.  Appreciate Transplant ID input.    Spoke with Ira, has not yet started MPA as she would like to speak with Dr. Ortiz at clinic visit next week before starting. Still taking 100 mg azathioprine daily. Will reduce to 50 mg daily starting tomorrow. CMV resistance panel to be collected net week.

## 2024-10-22 ENCOUNTER — OFFICE VISIT (OUTPATIENT)
Dept: TRANSPLANT | Facility: CLINIC | Age: 34
End: 2024-10-22
Attending: INTERNAL MEDICINE
Payer: MEDICARE

## 2024-10-22 ENCOUNTER — LAB (OUTPATIENT)
Dept: LAB | Facility: CLINIC | Age: 34
End: 2024-10-22
Attending: INTERNAL MEDICINE
Payer: MEDICARE

## 2024-10-22 VITALS
TEMPERATURE: 98.7 F | SYSTOLIC BLOOD PRESSURE: 113 MMHG | WEIGHT: 147.9 LBS | BODY MASS INDEX: 27.05 KG/M2 | OXYGEN SATURATION: 99 % | DIASTOLIC BLOOD PRESSURE: 77 MMHG | HEART RATE: 75 BPM

## 2024-10-22 DIAGNOSIS — D84.9 IMMUNOSUPPRESSED STATUS (H): Chronic | ICD-10-CM

## 2024-10-22 DIAGNOSIS — Z98.890 OTHER SPECIFIED POSTPROCEDURAL STATES: ICD-10-CM

## 2024-10-22 DIAGNOSIS — D72.819 LEUKOPENIA, UNSPECIFIED TYPE: ICD-10-CM

## 2024-10-22 DIAGNOSIS — Z94.0 KIDNEY REPLACED BY TRANSPLANT: ICD-10-CM

## 2024-10-22 DIAGNOSIS — Z20.828 CONTACT WITH AND (SUSPECTED) EXPOSURE TO OTHER VIRAL COMMUNICABLE DISEASES: ICD-10-CM

## 2024-10-22 DIAGNOSIS — E83.42 HYPOMAGNESEMIA: ICD-10-CM

## 2024-10-22 DIAGNOSIS — D72.819 LEUKOPENIA, UNSPECIFIED TYPE: Primary | ICD-10-CM

## 2024-10-22 DIAGNOSIS — Z48.298 AFTERCARE FOLLOWING ORGAN TRANSPLANT: ICD-10-CM

## 2024-10-22 DIAGNOSIS — Z13.1 ENCOUNTER FOR SCREENING FOR DIABETES MELLITUS: ICD-10-CM

## 2024-10-22 DIAGNOSIS — Z48.298 AFTERCARE FOLLOWING ORGAN TRANSPLANT: Primary | ICD-10-CM

## 2024-10-22 DIAGNOSIS — Z79.899 ENCOUNTER FOR LONG-TERM CURRENT USE OF MEDICATION: ICD-10-CM

## 2024-10-22 DIAGNOSIS — B25.9 CMV (CYTOMEGALOVIRUS INFECTION) (H): ICD-10-CM

## 2024-10-22 LAB
ALBUMIN SERPL BCG-MCNC: 4 G/DL (ref 3.5–5.2)
ALP SERPL-CCNC: 58 U/L (ref 40–150)
ALT SERPL W P-5'-P-CCNC: 43 U/L (ref 0–50)
ANION GAP SERPL CALCULATED.3IONS-SCNC: 7 MMOL/L (ref 7–15)
AST SERPL W P-5'-P-CCNC: 34 U/L (ref 0–45)
BASOPHILS # BLD AUTO: 0 10E3/UL (ref 0–0.2)
BASOPHILS NFR BLD AUTO: 2 %
BILIRUB DIRECT SERPL-MCNC: <0.2 MG/DL (ref 0–0.3)
BILIRUB SERPL-MCNC: 0.5 MG/DL
BK VIRUS DNA IU/ML, INSTRUMENT (6800): 571 IU/ML
BK VIRUS SPECIMEN TYPE: ABNORMAL
BKV DNA SPEC NAA+PROBE-LOG#: 2.8 {LOG_COPIES}/ML
BUN SERPL-MCNC: 27.2 MG/DL (ref 6–20)
CALCIUM SERPL-MCNC: 10 MG/DL (ref 8.8–10.4)
CD3 CELLS # BLD: 231 CELLS/UL (ref 603–2990)
CD3 CELLS NFR BLD: 62 % (ref 49–84)
CD3+CD4+ CELLS # BLD: 82 CELLS/UL (ref 441–2156)
CD3+CD4+ CELLS NFR BLD: 22 % (ref 28–63)
CD3+CD4+ CELLS/CD3+CD8+ CLL BLD: 0.6 % (ref 1.4–2.6)
CD3+CD8+ CELLS # BLD: 136 CELLS/UL (ref 125–1312)
CD3+CD8+ CELLS NFR BLD: 37 % (ref 10–40)
CHLORIDE SERPL-SCNC: 106 MMOL/L (ref 98–107)
CHOLEST SERPL-MCNC: 154 MG/DL
CMV DNA SPEC NAA+PROBE-ACNC: 584 IU/ML
CMV DNA SPEC NAA+PROBE-LOG#: 2.8 {LOG_COPIES}/ML
CREAT SERPL-MCNC: 0.87 MG/DL (ref 0.51–0.95)
EGFRCR SERPLBLD CKD-EPI 2021: 89 ML/MIN/1.73M2
EOSINOPHIL # BLD AUTO: 0.1 10E3/UL (ref 0–0.7)
EOSINOPHIL NFR BLD AUTO: 8 %
ERYTHROCYTE [DISTWIDTH] IN BLOOD BY AUTOMATED COUNT: 13.2 % (ref 10–15)
GLUCOSE SERPL-MCNC: 88 MG/DL (ref 70–99)
HCO3 SERPL-SCNC: 27 MMOL/L (ref 22–29)
HCT VFR BLD AUTO: 42.8 % (ref 35–47)
HDLC SERPL-MCNC: 47 MG/DL
HGB BLD-MCNC: 14.2 G/DL (ref 11.7–15.7)
IMM GRANULOCYTES # BLD: 0 10E3/UL
IMM GRANULOCYTES NFR BLD: 0 %
LDLC SERPL CALC-MCNC: 66 MG/DL
LYMPHOCYTES # BLD AUTO: 0.3 10E3/UL (ref 0.8–5.3)
LYMPHOCYTES NFR BLD AUTO: 25 %
MAGNESIUM SERPL-MCNC: 1.7 MG/DL (ref 1.7–2.3)
MCH RBC QN AUTO: 34.4 PG (ref 26.5–33)
MCHC RBC AUTO-ENTMCNC: 33.2 G/DL (ref 31.5–36.5)
MCV RBC AUTO: 104 FL (ref 78–100)
MONOCYTES # BLD AUTO: 0.3 10E3/UL (ref 0–1.3)
MONOCYTES NFR BLD AUTO: 21 %
NEUTROPHILS # BLD AUTO: 0.6 10E3/UL (ref 1.6–8.3)
NEUTROPHILS NFR BLD AUTO: 43 %
NONHDLC SERPL-MCNC: 107 MG/DL
NRBC # BLD AUTO: 0 10E3/UL
NRBC BLD AUTO-RTO: 0 /100
PLATELET # BLD AUTO: 169 10E3/UL (ref 150–450)
POTASSIUM SERPL-SCNC: 4.5 MMOL/L (ref 3.4–5.3)
PROT SERPL-MCNC: 6.6 G/DL (ref 6.4–8.3)
RBC # BLD AUTO: 4.13 10E6/UL (ref 3.8–5.2)
SODIUM SERPL-SCNC: 140 MMOL/L (ref 135–145)
T CELL COMMENT: ABNORMAL
TACROLIMUS BLD-MCNC: 11.4 UG/L (ref 5–15)
TME LAST DOSE: NORMAL H
TME LAST DOSE: NORMAL H
TRIGL SERPL-MCNC: 205 MG/DL
URATE SERPL-MCNC: 4.9 MG/DL (ref 2.4–5.7)
WBC # BLD AUTO: 1.3 10E3/UL (ref 4–11)
WBC # BLD AUTO: 1.3 10E3/UL (ref 4–11)

## 2024-10-22 PROCEDURE — G0463 HOSPITAL OUTPT CLINIC VISIT: HCPCS | Performed by: INTERNAL MEDICINE

## 2024-10-22 PROCEDURE — 36415 COLL VENOUS BLD VENIPUNCTURE: CPT | Performed by: PATHOLOGY

## 2024-10-22 PROCEDURE — 86359 T CELLS TOTAL COUNT: CPT | Performed by: INTERNAL MEDICINE

## 2024-10-22 PROCEDURE — 84550 ASSAY OF BLOOD/URIC ACID: CPT | Performed by: PATHOLOGY

## 2024-10-22 PROCEDURE — 99000 SPECIMEN HANDLING OFFICE-LAB: CPT | Performed by: PATHOLOGY

## 2024-10-22 PROCEDURE — 80180 DRUG SCRN QUAN MYCOPHENOLATE: CPT | Performed by: INTERNAL MEDICINE

## 2024-10-22 PROCEDURE — 87900 PHENOTYPE INFECT AGENT DRUG: CPT | Mod: 90 | Performed by: PATHOLOGY

## 2024-10-22 PROCEDURE — 80197 ASSAY OF TACROLIMUS: CPT | Performed by: INTERNAL MEDICINE

## 2024-10-22 PROCEDURE — 99214 OFFICE O/P EST MOD 30 MIN: CPT | Performed by: INTERNAL MEDICINE

## 2024-10-22 PROCEDURE — 87799 DETECT AGENT NOS DNA QUANT: CPT | Performed by: INTERNAL MEDICINE

## 2024-10-22 PROCEDURE — 80061 LIPID PANEL: CPT | Performed by: PATHOLOGY

## 2024-10-22 PROCEDURE — G2211 COMPLEX E/M VISIT ADD ON: HCPCS | Performed by: INTERNAL MEDICINE

## 2024-10-22 PROCEDURE — 87910 NFCT AGT GNTYP ALYS CMV: CPT | Mod: 90 | Performed by: PATHOLOGY

## 2024-10-22 PROCEDURE — 80053 COMPREHEN METABOLIC PANEL: CPT | Performed by: PATHOLOGY

## 2024-10-22 PROCEDURE — 82248 BILIRUBIN DIRECT: CPT | Performed by: PATHOLOGY

## 2024-10-22 PROCEDURE — 99213 OFFICE O/P EST LOW 20 MIN: CPT | Performed by: INTERNAL MEDICINE

## 2024-10-22 PROCEDURE — 85025 COMPLETE CBC W/AUTO DIFF WBC: CPT | Performed by: PATHOLOGY

## 2024-10-22 PROCEDURE — 83735 ASSAY OF MAGNESIUM: CPT | Performed by: PATHOLOGY

## 2024-10-22 ASSESSMENT — PAIN SCALES - GENERAL: PAINLEVEL: NO PAIN (0)

## 2024-10-22 NOTE — PATIENT INSTRUCTIONS
Patient Recommendations:  - No new recommendations at this time.    Transplant Patient Information  Your Post Transplant Coordinator is: Radha Jorge  For non urgent items, we encourage you to contact your coordinator/care team online via [email or web address]  You and your care team can also contact your transplant coordinator Monday - Friday, 8am - 5pm at [phone] (Option 2 to reach the coordinator or Option 4 to schedule an appointment).  After hours for urgent matters, please call [facility] at [phone].

## 2024-10-22 NOTE — LETTER
10/22/2024      Ira Zamora  [address] [place] [address]  [place] [address]      Dear Colleague,    Thank you for referring your patient, Ira Zamora, to the [facility]. Please see a copy of my visit note below.    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    # H/o Interstitial Nephritis: No evidence of recurrence.    # Immunosuppression: Tacrolimus immediate release (goal 6-8), Azathioprine (dose 50 mg daily), and Prednisone (dose 5 mg daily)   - Induction with Recent Transplant:  High Intensity Protocol   - Continue with intensive monitoring of immunosuppression for efficacy and toxicity.   - Historical Changes in Immunosuppression:  Changed from mycophenolate to azathioprine and added prednisone due to oral and genital ulcers, concern for Behcet's versus mycophenolate toxicity.  Decreased azathioprine dose due to BK and CMV viremia.   - Changes: Not at this time due to ongoing viremia.  Discussed possibly changing off azathioprine to either mycophenolic acid or mTORI (everolimus) for long-term immunosuppression and then tapering off prednisone.  Will discuss again at next clinic visit.    # Infection Prevention:      - PJP: Sulfa/TMP (Bactrim)  - CMV: Being treated for CMV viremia and/or disease      - CMV IgG Ab High Risk Discordance (D+/R-): Yes  CMV Serostatus: Positive  - EBV IgG Ab High Risk Discordance (D+/R-): No  EBV Serostatus: Negative    # Blood Pressure: Controlled;  Goal BP: < 130/80   - Changes: No    # Leukopenia: Trend down, moderately low WBC at ~ 1.3 after being normal for ~ 3 months.  Possibly due to medications and/or CMV disease.   - Will check differential and consider filgrastim if ANC < 500.    # Mineral Bone Disorder:    - Secondary  renal hyperparathyroidism; PTH level: Minimally elevated ( pg/ml)        On treatment: None  - Vitamin D; level: Normal        On supplement: No  - Calcium; level: Normal        On supplement: No    # Electrolytes:   - Potassium; level: Normal        On supplement: No  - Magnesium; level: Not checked recently        On supplement: Yes  - Bicarbonate; level: Normal        On supplement: No    # BK Viremia: Stable, minimally elevated BK PCR at ~ 360 with last check Oct/2024.  IgG level is normal.  Decreased immunosuppression.   - Will continue to follow BK PCR every 2 weeks.    # CMV Viremia: Trend up, minimally elevated CMV PCR at ~ 500 with last check Oct/2024.  CMV IgG Ab positive.  Presently on Valcyte.  IgG level is normal.  Decreased immunosuppression.   - Will continue to follow CMV PCR every weekly.    # H/o Oral and Genital Ulcers: Patient with h/o significant and debilitating ulcers due to unclear etiology.  Doubt EBV as a cause, although positive staining on biopsy.  Felt possibly related to Behcet's or mycophenolate toxicity.  Symptoms resolved with overall decrease in immunosuppression and change from mycophenolate to azathioprine.  Followed by Transplant ID and Rheumatology in the past.    # Other Significant PMH:   - GERD: Asymptomatic and now off medications.   - Crohn's Disease: Appears to be stable with no recent symptoms.  Previously was on ustekinumab prior to kidney transplant, but hasn't restarted it due to ongoing oral and vaginal ulcers.      # Skin Cancer Risk:    - Discussed sun protection and recommend regular follow up with Dermatology.    # Transplant History:  Etiology of Kidney Failure: Interstitial nephritis  Tx: DDKT  Transplant: 4/17/2024 (Kidney)  Significant transplant-related complications: BK Viremia, CMV Viremia, and Oral and genital ulcers    Transplant Office Phone Number: [phone]    Assessment and plan was discussed with the patient and she voiced her understanding  and agreement.    Return visit: Return for previously scheduled visit.    Neno Ortiz MD    The longitudinal plan of care for the diagnosis(es)/condition(s) as documented were addressed during this visit. Due to the added complexity in care, I will continue to support Ira in the subsequent management and with ongoing continuity of care.      Chief Complaint  Ms. Zamora is a 34 year old here for kidney transplant and immunosuppression management.     History of Present Illness   Ms. Zamora reports feeling good overall.  Since last clinic visit:   Hospitalizations: No   New Medical Issues: No; Patient denies any recent oral or genital ulcers for months.  Chest pain or shortness of breath: No  Lower extremity swelling: No  Weight change: Yes; Weight is up ~ 10 lbs.  Nausea and vomiting: No  Diarrhea: No  Heartburn symptoms: No  Fever, sweats or chills: No  Night sweats: No  Urinary complaints: No    Home BP: Not checked    Problem List  Patient Active Problem List   Diagnosis     Crohn's disease (H)     CKD (chronic kidney disease) stage 2, GFR 60-89 ml/min     Secondary renal hyperparathyroidism (H)     Long QT interval     Kidney replaced by transplant     Immunosuppressed status (H)     Dehydration     Aftercare following organ transplant     Mouth ulcers     Vaginal ulcer     Leukopenia, unspecified type     Severe malnutrition (H)     Need for pneumocystis prophylaxis     Anemia in chronic renal disease     Hypomagnesemia     Odynophagia     Inadequate oral intake     Vulvar lesion     Ulcer of esophagus without bleeding     Other neutropenia (H)     Cytomegalovirus (CMV) viremia (H)     EBV (Jake-Barr virus) viremia       Allergies  Allergies   Allergen Reactions     Amlodipine Headache and Other (See Comments)     Other Reaction(s): Unknown     Omeprazole Other (See Comments)     All PPIs per patient     Proton Pump Inhibitors Nephrotoxicity     No PPIs due to history of renal failure due to  interstitial nephritis     Tegaderm Transparent Dressing (Informational Only) Blisters       Medications  Current Outpatient Medications   Medication Sig Dispense Refill     acetaminophen (TYLENOL) 500 MG tablet Take 1,000 mg by mouth every 6 hours as needed       aspirin 81 MG EC tablet Take 81 mg by mouth daily.       atorvastatin (LIPITOR) 10 MG tablet TAKE ONE TABLET BY MOUTH ONCE DAILY 30 tablet 1     azaTHIOprine (IMURAN) 50 MG tablet Take 1 tablet (50 mg) by mouth daily. 30 tablet 11     cyanocobalamin 1000 MCG sublingual tablet Place 1,000 mcg under the tongue daily.       hydrOXYzine HCl (ATARAX) 25 MG tablet Take 1 tablet (25 mg) by mouth every 6 hours as needed for other (adjuvant pain) 20 tablet 0     ketoconazole (NIZORAL) 2 % external shampoo Apply topically daily as needed for itching or irritation (use there times a week and let sit on scalp for 5 minutes). 120 mL 11     levonorgestrel (KYLEENA) 19.5 MG IUD 1 Device by Intrauterine route       magnesium oxide (MAG-OX) 400 MG tablet Take 2 tablets (800 mg) by mouth daily 60 tablet 0     PARoxetine (PAXIL) 30 MG tablet Take 30 mg by mouth every evening       predniSONE (DELTASONE) 5 MG tablet Take 1 tablet (5 mg) by mouth daily. 30 tablet 2     sulfamethoxazole-trimethoprim (BACTRIM) 400-80 MG tablet Take 1 tablet by mouth daily 30 tablet 11     tacrolimus (GENERIC EQUIVALENT) 0.5 MG capsule Take 1 capsule (0.5 mg) by mouth 2 times daily. Total dose =1.5 mg twice daily 60 capsule 3     tacrolimus (GENERIC EQUIVALENT) 1 MG capsule Take 1 capsule (1 mg) by mouth 2 times daily. Total dose= 1.5 mg twice daily 60 capsule 3     triamcinolone (KENALOG) 0.1 % paste Take by mouth 2 times daily 5 g 3     valGANciclovir (VALCYTE) 450 MG tablet Take 2 tablets (900 mg) by mouth daily 60 tablet 3     Vonoprazan Fumarate (VOQUEZNA) 20 MG TABS Take 20 mg by mouth daily 60 tablet 0     famotidine (PEPCID) 20 MG tablet Take 2 tablets (40 mg) by mouth 2 times daily 60  tablet 1     mycophenolic acid (GENERIC EQUIVALENT) 180 MG EC tablet Take 3 tablets (540 mg) by mouth 2 times daily. (Patient not taking: Reported on 10/22/2024) 180 tablet 11     No current facility-administered medications for this visit.     There are no discontinued medications.    Physical Exam  Vital Signs: /77 (BP Location: Left arm, Patient Position: Sitting, Cuff Size: Adult Regular)   Pulse 75   Temp 98.7  F (37.1  C) (Oral)   Wt 67.1 kg (147 lb 14.4 oz)   SpO2 99%   BMI 27.05 kg/m      GENERAL APPEARANCE: alert and no distress  HENT: mouth without ulcers or lesions  RESP: lungs clear to auscultation - no rales, rhonchi or wheezes  CV: regular rhythm, normal rate, no rub, no murmur  EDEMA: no LE edema bilaterally  ABDOMEN: soft, nondistended, nontender, bowel sounds normal  MS: extremities normal - no gross deformities noted, no evidence of inflammation in joints, no muscle tenderness  SKIN: no rash  TX KIDNEY: normal  DIALYSIS ACCESS:  RUE AV fistula with good thrill, aneurysmal    Data        Latest Ref Rng & Units 10/15/2024     8:28 AM 10/4/2024     9:31 AM 9/18/2024     9:29 AM   Renal   Sodium 135 - 145 mmol/L 142  142  142    K 3.4 - 5.3 mmol/L 4.5  4.4  4.8    Cl 98 - 107 mmol/L 106  106  105    Cl (external) 98 - 107 mmol/L 106  106  105    CO2 22 - 29 mmol/L 25  26  25    Urea Nitrogen 6.0 - 20.0 mg/dL 15.4  19.4  19.7    Creatinine 0.51 - 0.95 mg/dL 0.87  0.90  0.87    Glucose 70 - 99 mg/dL 119  93  96    Calcium 8.8 - 10.4 mg/dL 9.7  10.1  10.6    Magnesium 1.7 - 2.3 mg/dL   1.4          Latest Ref Rng & Units 9/18/2024     9:29 AM 8/21/2024     7:46 AM 8/13/2024     7:38 AM   Bone Health   Phosphorus 2.5 - 4.5 mg/dL 3.0  2.9  3.4    Parathyroid Hormone Intact 15 - 65 pg/mL   71          Latest Ref Rng & Units 10/22/2024     9:03 AM 10/15/2024     8:28 AM 10/4/2024     9:31 AM   Heme   WBC 4.0 - 11.0 10e3/uL 1.3  2.2  5.2    Hgb 11.7 - 15.7 g/dL 14.2  14.4  13.8    Plt 150 - 450  10e3/uL 169  172  167          Latest Ref Rng & Units 7/8/2024     5:42 AM 7/1/2024     6:18 AM 6/30/2024     5:16 AM   Liver   AP 40 - 150 U/L 140  73  68    TBili <=1.2 mg/dL 0.2  0.2  0.2    Bilirubin Direct 0.00 - 0.30 mg/dL   <0.20    ALT 0 - 50 U/L 39  13  16    AST 0 - 45 U/L 16  18  15    Tot Protein 6.4 - 8.3 g/dL 5.6  6.0  5.4    Albumin 3.5 - 5.2 g/dL 3.0  3.2  2.9          Latest Ref Rng & Units 6/26/2024     5:06 PM 4/17/2024     9:58 AM   Pancreas   A1C <5.7 %  4.7    Lipase (Roche) 13 - 60 U/L 14           Latest Ref Rng & Units 5/23/2024     9:49 AM 4/22/2024     8:21 AM   Iron studies   Iron 37 - 145 ug/dL 77  53    Iron Sat Index 15 - 46 % 38  36    Ferritin 6 - 175 ng/mL 2,181  2,181          10/15/2024     8:28 AM 10/4/2024     9:31 AM 9/18/2024     9:29 AM   [facility] Virology   LOG IU/ML OF CMVQNT 2.7  1.9  <1.5      Failed to redirect to the Timeline version of the [employer].  Recent Labs   Lab Test 09/18/24  0929 10/04/24  0931 10/15/24  0828   DOSTAC 9/17/2024 10/3/2024 10/14/2024   TACROL 12.4 9.8 10.9     Recent Labs   Lab Test 04/23/24  0735   DOSMPA 4/22/2024   9:00 PM   MPACID 2.20   MPAG 107.5*          Again, thank you for allowing me to participate in the care of your patient.        Sincerely,        Neno Ortiz MD

## 2024-10-22 NOTE — NURSING NOTE
Chief Complaint   Patient presents with    RECHECK     Post txp follow up.      Vitals:    10/22/24 0911   BP: 113/77   BP Location: Left arm   Patient Position: Sitting   Cuff Size: Adult Regular   Pulse: 75   Temp: 98.7  F (37.1  C)   TempSrc: Oral   SpO2: 99%   Weight: 67.1 kg (147 lb 14.4 oz)       BP Readings from Last 3 Encounters:   10/22/24 113/77   08/21/24 109/74   07/09/24 126/88       /77 (BP Location: Left arm, Patient Position: Sitting, Cuff Size: Adult Regular)   Pulse 75   Temp 98.7  F (37.1  C) (Oral)   Wt 67.1 kg (147 lb 14.4 oz)   SpO2 99%   BMI 27.05 kg/m       Sunshine Heymans

## 2024-10-22 NOTE — PROGRESS NOTES
TRANSPLANT NEPHROLOGY CLINIC VISIT     Assessment & Plan   # DDKT: CKD Stage 2 - Stable   - Baseline Creatinine: ~ 0.7-0.9   - Proteinuria: Normal (<0.2 grams)   - DSA Hx: No DSA   - Last cPRA: 9%   - BK Viremia: Yes, minimally elevated (< 1000)   - Kidney Tx Biopsy Hx: No biopsy history.    # H/o Interstitial Nephritis: No evidence of recurrence.    # Immunosuppression: Tacrolimus immediate release (goal 6-8), Azathioprine (dose 50 mg daily), and Prednisone (dose 5 mg daily)   - Induction with Recent Transplant:  High Intensity Protocol   - Continue with intensive monitoring of immunosuppression for efficacy and toxicity.   - Historical Changes in Immunosuppression:  Changed from mycophenolate to azathioprine and added prednisone due to oral and genital ulcers, concern for Behcet's versus mycophenolate toxicity.  Decreased azathioprine dose due to BK and CMV viremia.   - Changes: Not at this time due to ongoing viremia.  Discussed possibly changing off azathioprine to either mycophenolic acid or mTORI (everolimus) for long-term immunosuppression and then tapering off prednisone.  Will discuss again at next clinic visit.    # Infection Prevention:      - PJP: Sulfa/TMP (Bactrim)  - CMV: Being treated for CMV viremia and/or disease      - CMV IgG Ab High Risk Discordance (D+/R-): Yes  CMV Serostatus: Positive  - EBV IgG Ab High Risk Discordance (D+/R-): No  EBV Serostatus: Negative    # Blood Pressure: Controlled;  Goal BP: < 130/80   - Changes: No    # Leukopenia: Trend down, moderately low WBC at ~ 1.3 after being normal for ~ 3 months.  Possibly due to medications and/or CMV disease.   - Will check differential and consider filgrastim if ANC < 500.    # Mineral Bone Disorder:    - Secondary renal hyperparathyroidism; PTH level: Minimally elevated ( pg/ml)        On treatment: None  - Vitamin D; level: Normal        On supplement: No  - Calcium; level: Normal        On supplement: No    # Electrolytes:   -  Potassium; level: Normal        On supplement: No  - Magnesium; level: Not checked recently        On supplement: Yes  - Bicarbonate; level: Normal        On supplement: No    # BK Viremia: Stable, minimally elevated BK PCR at ~ 360 with last check Oct/2024.  IgG level is normal.  Decreased immunosuppression.   - Will continue to follow BK PCR every 2 weeks.    # CMV Viremia: Trend up, minimally elevated CMV PCR at ~ 500 with last check Oct/2024.  CMV IgG Ab positive.  Presently on Valcyte.  IgG level is normal.  Decreased immunosuppression.   - Will continue to follow CMV PCR every weekly.    # H/o Oral and Genital Ulcers: Patient with h/o significant and debilitating ulcers due to unclear etiology.  Doubt EBV as a cause, although positive staining on biopsy.  Felt possibly related to Behcet's or mycophenolate toxicity.  Symptoms resolved with overall decrease in immunosuppression and change from mycophenolate to azathioprine.  Followed by Transplant ID and Rheumatology in the past.    # Other Significant PMH:   - GERD: Asymptomatic and now off medications.   - Crohn's Disease: Appears to be stable with no recent symptoms.  Previously was on ustekinumab prior to kidney transplant, but hasn't restarted it due to ongoing oral and vaginal ulcers.      # Skin Cancer Risk:    - Discussed sun protection and recommend regular follow up with Dermatology.    # Transplant History:  Etiology of Kidney Failure: Interstitial nephritis  Tx: DDKT  Transplant: 4/17/2024 (Kidney)  Significant transplant-related complications: BK Viremia, CMV Viremia, and Oral and genital ulcers    Transplant Office Phone Number: [phone]    Assessment and plan was discussed with the patient and she voiced her understanding and agreement.    Return visit: Return for previously scheduled visit.    Neno Ortiz MD    The longitudinal plan of care for the diagnosis(es)/condition(s) as documented were addressed during this visit. Due to the  added complexity in care, I will continue to support Ira in the subsequent management and with ongoing continuity of care.      Chief Complaint   Ms. Zamora is a 34 year old here for kidney transplant and immunosuppression management.     History of Present Illness    Ms. Zamora reports feeling good overall.  Since last clinic visit:   Hospitalizations: No   New Medical Issues: No; Patient denies any recent oral or genital ulcers for months.  Chest pain or shortness of breath: No  Lower extremity swelling: No  Weight change: Yes; Weight is up ~ 10 lbs.  Nausea and vomiting: No  Diarrhea: No  Heartburn symptoms: No  Fever, sweats or chills: No  Night sweats: No  Urinary complaints: No    Home BP: Not checked    Problem List   Patient Active Problem List   Diagnosis    Crohn's disease (H)    CKD (chronic kidney disease) stage 2, GFR 60-89 ml/min    Secondary renal hyperparathyroidism (H)    Long QT interval    Kidney replaced by transplant    Immunosuppressed status (H)    Dehydration    Aftercare following organ transplant    Mouth ulcers    Vaginal ulcer    Leukopenia, unspecified type    Severe malnutrition (H)    Need for pneumocystis prophylaxis    Anemia in chronic renal disease    Hypomagnesemia    Odynophagia    Inadequate oral intake    Vulvar lesion    Ulcer of esophagus without bleeding    Other neutropenia (H)    Cytomegalovirus (CMV) viremia (H)    EBV (Jake-Barr virus) viremia       Allergies   Allergies   Allergen Reactions    Amlodipine Headache and Other (See Comments)     Other Reaction(s): Unknown    Omeprazole Other (See Comments)     All PPIs per patient    Proton Pump Inhibitors Nephrotoxicity     No PPIs due to history of renal failure due to interstitial nephritis    Tegaderm Transparent Dressing (Informational Only) Blisters       Medications   Current Outpatient Medications   Medication Sig Dispense Refill    acetaminophen (TYLENOL) 500 MG tablet Take 1,000 mg by mouth every 6 hours as needed       aspirin 81 MG EC tablet Take 81 mg by mouth daily.      atorvastatin (LIPITOR) 10 MG tablet TAKE ONE TABLET BY MOUTH ONCE DAILY 30 tablet 1    azaTHIOprine (IMURAN) 50 MG tablet Take 1 tablet (50 mg) by mouth daily. 30 tablet 11    cyanocobalamin 1000 MCG sublingual tablet Place 1,000 mcg under the tongue daily.      hydrOXYzine HCl (ATARAX) 25 MG tablet Take 1 tablet (25 mg) by mouth every 6 hours as needed for other (adjuvant pain) 20 tablet 0    ketoconazole (NIZORAL) 2 % external shampoo Apply topically daily as needed for itching or irritation (use there times a week and let sit on scalp for 5 minutes). 120 mL 11    levonorgestrel (KYLEENA) 19.5 MG IUD 1 Device by Intrauterine route      magnesium oxide (MAG-OX) 400 MG tablet Take 2 tablets (800 mg) by mouth daily 60 tablet 0    PARoxetine (PAXIL) 30 MG tablet Take 30 mg by mouth every evening      predniSONE (DELTASONE) 5 MG tablet Take 1 tablet (5 mg) by mouth daily. 30 tablet 2    sulfamethoxazole-trimethoprim (BACTRIM) 400-80 MG tablet Take 1 tablet by mouth daily 30 tablet 11    tacrolimus (GENERIC EQUIVALENT) 0.5 MG capsule Take 1 capsule (0.5 mg) by mouth 2 times daily. Total dose =1.5 mg twice daily 60 capsule 3    tacrolimus (GENERIC EQUIVALENT) 1 MG capsule Take 1 capsule (1 mg) by mouth 2 times daily. Total dose= 1.5 mg twice daily 60 capsule 3    triamcinolone (KENALOG) 0.1 % paste Take by mouth 2 times daily 5 g 3    valGANciclovir (VALCYTE) 450 MG tablet Take 2 tablets (900 mg) by mouth daily 60 tablet 3    Vonoprazan Fumarate (VOQUEZNA) 20 MG TABS Take 20 mg by mouth daily 60 tablet 0    famotidine (PEPCID) 20 MG tablet Take 2 tablets (40 mg) by mouth 2 times daily 60 tablet 1    mycophenolic acid (GENERIC EQUIVALENT) 180 MG EC tablet Take 3 tablets (540 mg) by mouth 2 times daily. (Patient not taking: Reported on 10/22/2024) 180 tablet 11     No current facility-administered medications for this visit.     There are no discontinued  medications.    Physical Exam   Vital Signs: /77 (BP Location: Left arm, Patient Position: Sitting, Cuff Size: Adult Regular)   Pulse 75   Temp 98.7  F (37.1  C) (Oral)   Wt 67.1 kg (147 lb 14.4 oz)   SpO2 99%   BMI 27.05 kg/m      GENERAL APPEARANCE: alert and no distress  HENT: mouth without ulcers or lesions  RESP: lungs clear to auscultation - no rales, rhonchi or wheezes  CV: regular rhythm, normal rate, no rub, no murmur  EDEMA: no LE edema bilaterally  ABDOMEN: soft, nondistended, nontender, bowel sounds normal  MS: extremities normal - no gross deformities noted, no evidence of inflammation in joints, no muscle tenderness  SKIN: no rash  TX KIDNEY: normal  DIALYSIS ACCESS:  RUE AV fistula with good thrill, aneurysmal    Data         Latest Ref Rng & Units 10/15/2024     8:28 AM 10/4/2024     9:31 AM 9/18/2024     9:29 AM   Renal   Sodium 135 - 145 mmol/L 142  142  142    K 3.4 - 5.3 mmol/L 4.5  4.4  4.8    Cl 98 - 107 mmol/L 106  106  105    Cl (external) 98 - 107 mmol/L 106  106  105    CO2 22 - 29 mmol/L 25  26  25    Urea Nitrogen 6.0 - 20.0 mg/dL 15.4  19.4  19.7    Creatinine 0.51 - 0.95 mg/dL 0.87  0.90  0.87    Glucose 70 - 99 mg/dL 119  93  96    Calcium 8.8 - 10.4 mg/dL 9.7  10.1  10.6    Magnesium 1.7 - 2.3 mg/dL   1.4          Latest Ref Rng & Units 9/18/2024     9:29 AM 8/21/2024     7:46 AM 8/13/2024     7:38 AM   Bone Health   Phosphorus 2.5 - 4.5 mg/dL 3.0  2.9  3.4    Parathyroid Hormone Intact 15 - 65 pg/mL   71          Latest Ref Rng & Units 10/22/2024     9:03 AM 10/15/2024     8:28 AM 10/4/2024     9:31 AM   Heme   WBC 4.0 - 11.0 10e3/uL 1.3  2.2  5.2    Hgb 11.7 - 15.7 g/dL 14.2  14.4  13.8    Plt 150 - 450 10e3/uL 169  172  167          Latest Ref Rng & Units 7/8/2024     5:42 AM 7/1/2024     6:18 AM 6/30/2024     5:16 AM   Liver   AP 40 - 150 U/L 140  73  68    TBili <=1.2 mg/dL 0.2  0.2  0.2    Bilirubin Direct 0.00 - 0.30 mg/dL   <0.20    ALT 0 - 50 U/L 39  13  16    AST  0 - 45 U/L 16  18  15    Tot Protein 6.4 - 8.3 g/dL 5.6  6.0  5.4    Albumin 3.5 - 5.2 g/dL 3.0  3.2  2.9          Latest Ref Rng & Units 6/26/2024     5:06 PM 4/17/2024     9:58 AM   Pancreas   A1C <5.7 %  4.7    Lipase (Roche) 13 - 60 U/L 14           Latest Ref Rng & Units 5/23/2024     9:49 AM 4/22/2024     8:21 AM   Iron studies   Iron 37 - 145 ug/dL 77  53    Iron Sat Index 15 - 46 % 38  36    Ferritin 6 - 175 ng/mL 2,181  2,181          10/15/2024     8:28 AM 10/4/2024     9:31 AM 9/18/2024     9:29 AM   [facility] Virology   LOG IU/ML OF CMVQNT 2.7  1.9  <1.5      Failed to redirect to the Timeline version of the [employer].  Recent Labs   Lab Test 09/18/24  0929 10/04/24  0931 10/15/24  0828   DOSTAC 9/17/2024 10/3/2024 10/14/2024   TACROL 12.4 9.8 10.9     Recent Labs   Lab Test 04/23/24  0735   DOSMPA 4/22/2024   9:00 PM   MPACID 2.20   MPAG 107.5*

## 2024-10-22 NOTE — LETTER
10/22/2024      RE: Ira Zamora  [address] [facility]  [place] [address]       TRANSPLANT NEPHROLOGY CLINIC VISIT     Assessment & Plan  # DDKT: CKD Stage 2 - Stable   - Baseline Creatinine: ~ 0.7-0.9   - Proteinuria: Normal (<0.2 grams)   - DSA Hx: No DSA   - Last cPRA: 9%   - BK Viremia: Yes, minimally elevated (< 1000)   - Kidney Tx Biopsy Hx: No biopsy history.    # H/o Interstitial Nephritis: No evidence of recurrence.    # Immunosuppression: Tacrolimus immediate release (goal 6-8), Azathioprine (dose 50 mg daily), and Prednisone (dose 5 mg daily)   - Induction with Recent Transplant:  High Intensity Protocol   - Continue with intensive monitoring of immunosuppression for efficacy and toxicity.   - Historical Changes in Immunosuppression:  Changed from mycophenolate to azathioprine and added prednisone due to oral and genital ulcers, concern for Behcet's versus mycophenolate toxicity.  Decreased azathioprine dose due to BK and CMV viremia.   - Changes: Not at this time due to ongoing viremia.  Discussed possibly changing off azathioprine to either mycophenolic acid or mTORI (everolimus) for long-term immunosuppression and then tapering off prednisone.  Will discuss again at next clinic visit.    # Infection Prevention:      - PJP: Sulfa/TMP (Bactrim)  - CMV: Being treated for CMV viremia and/or disease      - CMV IgG Ab High Risk Discordance (D+/R-): Yes  CMV Serostatus: Positive  - EBV IgG Ab High Risk Discordance (D+/R-): No  EBV Serostatus: Negative    # Blood Pressure: Controlled;  Goal BP: < 130/80   - Changes: No    # Leukopenia: Trend down, moderately low WBC at ~ 1.3 after being normal for ~ 3 months.  Possibly due to medications and/or CMV disease.   - Will check differential and consider filgrastim if ANC < 500.    # Mineral Bone Disorder:    - Secondary renal hyperparathyroidism; PTH level: Minimally elevated ( pg/ml)        On treatment: None  - Vitamin D; level: Normal        On supplement: No  -  Calcium; level: Normal        On supplement: No    # Electrolytes:   - Potassium; level: Normal        On supplement: No  - Magnesium; level: Not checked recently        On supplement: Yes  - Bicarbonate; level: Normal        On supplement: No    # BK Viremia: Stable, minimally elevated BK PCR at ~ 360 with last check Oct/2024.  IgG level is normal.  Decreased immunosuppression.   - Will continue to follow BK PCR every 2 weeks.    # CMV Viremia: Trend up, minimally elevated CMV PCR at ~ 500 with last check Oct/2024.  CMV IgG Ab positive.  Presently on Valcyte.  IgG level is normal.  Decreased immunosuppression.   - Will continue to follow CMV PCR every weekly.    # H/o Oral and Genital Ulcers: Patient with h/o significant and debilitating ulcers due to unclear etiology.  Doubt EBV as a cause, although positive staining on biopsy.  Felt possibly related to Behcet's or mycophenolate toxicity.  Symptoms resolved with overall decrease in immunosuppression and change from mycophenolate to azathioprine.  Followed by Transplant ID and Rheumatology in the past.    # Other Significant PMH:   - GERD: Asymptomatic and now off medications.   - Crohn's Disease: Appears to be stable with no recent symptoms.  Previously was on ustekinumab prior to kidney transplant, but hasn't restarted it due to ongoing oral and vaginal ulcers.      # Skin Cancer Risk:    - Discussed sun protection and recommend regular follow up with Dermatology.    # Transplant History:  Etiology of Kidney Failure: Interstitial nephritis  Tx: DDKT  Transplant: 4/17/2024 (Kidney)  Significant transplant-related complications: BK Viremia, CMV Viremia, and Oral and genital ulcers    Transplant Office Phone Number: [phone]    Assessment and plan was discussed with the patient and she voiced her understanding and agreement.    Return visit: Return for previously scheduled visit.    Neno Ortiz MD    The longitudinal plan of care for the  diagnosis(es)/condition(s) as documented were addressed during this visit. Due to the added complexity in care, I will continue to support Ira in the subsequent management and with ongoing continuity of care.      Chief Complaint  Ms. Zamora is a 34 year old here for kidney transplant and immunosuppression management.     History of Present Illness   Ms. Zamora reports feeling good overall.  Since last clinic visit:   Hospitalizations: No   New Medical Issues: No; Patient denies any recent oral or genital ulcers for months.  Chest pain or shortness of breath: No  Lower extremity swelling: No  Weight change: Yes; Weight is up ~ 10 lbs.  Nausea and vomiting: No  Diarrhea: No  Heartburn symptoms: No  Fever, sweats or chills: No  Night sweats: No  Urinary complaints: No    Home BP: Not checked    Problem List  Patient Active Problem List   Diagnosis     Crohn's disease (H)     CKD (chronic kidney disease) stage 2, GFR 60-89 ml/min     Secondary renal hyperparathyroidism (H)     Long QT interval     Kidney replaced by transplant     Immunosuppressed status (H)     Dehydration     Aftercare following organ transplant     Mouth ulcers     Vaginal ulcer     Leukopenia, unspecified type     Severe malnutrition (H)     Need for pneumocystis prophylaxis     Anemia in chronic renal disease     Hypomagnesemia     Odynophagia     Inadequate oral intake     Vulvar lesion     Ulcer of esophagus without bleeding     Other neutropenia (H)     Cytomegalovirus (CMV) viremia (H)     EBV (Jake-Barr virus) viremia       Allergies  Allergies   Allergen Reactions     Amlodipine Headache and Other (See Comments)     Other Reaction(s): Unknown     Omeprazole Other (See Comments)     All PPIs per patient     Proton Pump Inhibitors Nephrotoxicity     No PPIs due to history of renal failure due to interstitial nephritis     Tegaderm Transparent Dressing (Informational Only) Blisters       Medications  Current Outpatient Medications   Medication  Sig Dispense Refill     acetaminophen (TYLENOL) 500 MG tablet Take 1,000 mg by mouth every 6 hours as needed       aspirin 81 MG EC tablet Take 81 mg by mouth daily.       atorvastatin (LIPITOR) 10 MG tablet TAKE ONE TABLET BY MOUTH ONCE DAILY 30 tablet 1     azaTHIOprine (IMURAN) 50 MG tablet Take 1 tablet (50 mg) by mouth daily. 30 tablet 11     cyanocobalamin 1000 MCG sublingual tablet Place 1,000 mcg under the tongue daily.       hydrOXYzine HCl (ATARAX) 25 MG tablet Take 1 tablet (25 mg) by mouth every 6 hours as needed for other (adjuvant pain) 20 tablet 0     ketoconazole (NIZORAL) 2 % external shampoo Apply topically daily as needed for itching or irritation (use there times a week and let sit on scalp for 5 minutes). 120 mL 11     levonorgestrel (KYLEENA) 19.5 MG IUD 1 Device by Intrauterine route       magnesium oxide (MAG-OX) 400 MG tablet Take 2 tablets (800 mg) by mouth daily 60 tablet 0     PARoxetine (PAXIL) 30 MG tablet Take 30 mg by mouth every evening       predniSONE (DELTASONE) 5 MG tablet Take 1 tablet (5 mg) by mouth daily. 30 tablet 2     sulfamethoxazole-trimethoprim (BACTRIM) 400-80 MG tablet Take 1 tablet by mouth daily 30 tablet 11     tacrolimus (GENERIC EQUIVALENT) 0.5 MG capsule Take 1 capsule (0.5 mg) by mouth 2 times daily. Total dose =1.5 mg twice daily 60 capsule 3     tacrolimus (GENERIC EQUIVALENT) 1 MG capsule Take 1 capsule (1 mg) by mouth 2 times daily. Total dose= 1.5 mg twice daily 60 capsule 3     triamcinolone (KENALOG) 0.1 % paste Take by mouth 2 times daily 5 g 3     valGANciclovir (VALCYTE) 450 MG tablet Take 2 tablets (900 mg) by mouth daily 60 tablet 3     Vonoprazan Fumarate (VOQUEZNA) 20 MG TABS Take 20 mg by mouth daily 60 tablet 0     famotidine (PEPCID) 20 MG tablet Take 2 tablets (40 mg) by mouth 2 times daily 60 tablet 1     mycophenolic acid (GENERIC EQUIVALENT) 180 MG EC tablet Take 3 tablets (540 mg) by mouth 2 times daily. (Patient not taking: Reported on  10/22/2024) 180 tablet 11     No current facility-administered medications for this visit.     There are no discontinued medications.    Physical Exam  Vital Signs: /77 (BP Location: Left arm, Patient Position: Sitting, Cuff Size: Adult Regular)   Pulse 75   Temp 98.7  F (37.1  C) (Oral)   Wt 67.1 kg (147 lb 14.4 oz)   SpO2 99%   BMI 27.05 kg/m      GENERAL APPEARANCE: alert and no distress  HENT: mouth without ulcers or lesions  RESP: lungs clear to auscultation - no rales, rhonchi or wheezes  CV: regular rhythm, normal rate, no rub, no murmur  EDEMA: no LE edema bilaterally  ABDOMEN: soft, nondistended, nontender, bowel sounds normal  MS: extremities normal - no gross deformities noted, no evidence of inflammation in joints, no muscle tenderness  SKIN: no rash  TX KIDNEY: normal  DIALYSIS ACCESS:  RUE AV fistula with good thrill, aneurysmal    Data        Latest Ref Rng & Units 10/15/2024     8:28 AM 10/4/2024     9:31 AM 9/18/2024     9:29 AM   Renal   Sodium 135 - 145 mmol/L 142  142  142    K 3.4 - 5.3 mmol/L 4.5  4.4  4.8    Cl 98 - 107 mmol/L 106  106  105    Cl (external) 98 - 107 mmol/L 106  106  105    CO2 22 - 29 mmol/L 25  26  25    Urea Nitrogen 6.0 - 20.0 mg/dL 15.4  19.4  19.7    Creatinine 0.51 - 0.95 mg/dL 0.87  0.90  0.87    Glucose 70 - 99 mg/dL 119  93  96    Calcium 8.8 - 10.4 mg/dL 9.7  10.1  10.6    Magnesium 1.7 - 2.3 mg/dL   1.4          Latest Ref Rng & Units 9/18/2024     9:29 AM 8/21/2024     7:46 AM 8/13/2024     7:38 AM   Bone Health   Phosphorus 2.5 - 4.5 mg/dL 3.0  2.9  3.4    Parathyroid Hormone Intact 15 - 65 pg/mL   71          Latest Ref Rng & Units 10/22/2024     9:03 AM 10/15/2024     8:28 AM 10/4/2024     9:31 AM   Heme   WBC 4.0 - 11.0 10e3/uL 1.3  2.2  5.2    Hgb 11.7 - 15.7 g/dL 14.2  14.4  13.8    Plt 150 - 450 10e3/uL 169  172  167          Latest Ref Rng & Units 7/8/2024     5:42 AM 7/1/2024     6:18 AM 6/30/2024     5:16 AM   Liver   AP 40 - 150 U/L 140  73   68    TBili <=1.2 mg/dL 0.2  0.2  0.2    Bilirubin Direct 0.00 - 0.30 mg/dL   <0.20    ALT 0 - 50 U/L 39  13  16    AST 0 - 45 U/L 16  18  15    Tot Protein 6.4 - 8.3 g/dL 5.6  6.0  5.4    Albumin 3.5 - 5.2 g/dL 3.0  3.2  2.9          Latest Ref Rng & Units 6/26/2024     5:06 PM 4/17/2024     9:58 AM   Pancreas   A1C <5.7 %  4.7    Lipase (Roche) 13 - 60 U/L 14           Latest Ref Rng & Units 5/23/2024     9:49 AM 4/22/2024     8:21 AM   Iron studies   Iron 37 - 145 ug/dL 77  53    Iron Sat Index 15 - 46 % 38  36    Ferritin 6 - 175 ng/mL 2,181  2,181          10/15/2024     8:28 AM 10/4/2024     9:31 AM 9/18/2024     9:29 AM   [facility] Virology   LOG IU/ML OF CMVQNT 2.7  1.9  <1.5      Failed to redirect to the Timeline version of the [employer].  Recent Labs   Lab Test 09/18/24  0929 10/04/24  0931 10/15/24  0828   DOSTAC 9/17/2024 10/3/2024 10/14/2024   TACROL 12.4 9.8 10.9     Recent Labs   Lab Test 04/23/24  0735   DOSMPA 4/22/2024   9:00 PM   MPACID 2.20   MPAG 107.5*        Neno Ortiz MD

## 2024-10-23 ENCOUNTER — TELEPHONE (OUTPATIENT)
Dept: TRANSPLANT | Facility: CLINIC | Age: 34
End: 2024-10-23
Payer: MEDICARE

## 2024-10-23 DIAGNOSIS — Z94.0 KIDNEY REPLACED BY TRANSPLANT: ICD-10-CM

## 2024-10-23 LAB
MYCOPHENOLATE SERPL LC/MS/MS-MCNC: <0.25 MG/L (ref 1–3.5)
MYCOPHENOLATE-G SERPL LC/MS/MS-MCNC: <6.5 MG/L (ref 30–95)
TME LAST DOSE: ABNORMAL H
TME LAST DOSE: ABNORMAL H

## 2024-10-23 RX ORDER — VALGANCICLOVIR 450 MG/1
900 TABLET, FILM COATED ORAL 2 TIMES DAILY
Qty: 90 TABLET | Refills: 3 | Status: SHIPPED | OUTPATIENT
Start: 2024-10-23

## 2024-10-23 NOTE — TELEPHONE ENCOUNTER
Post Kidney and Pancreas Transplant Team Conference  Date: 10/23/2024  Transplant Coordinator: Radha     Attendees:  [x]  Dr. Ortiz [] Roxanne Betancur, RN [x] Tanna Castanon LPN     [x]  Dr. Arellano [x] Radha Jorge, RN [] Diamond Atkinson LPN    [x] Dr. Patino [x] Otilia Prather, AMANDA    [x] Dr. Tsai [x] Geneva Dennis, RN [] Frida Harris RN   [x] Dr. Burgess [] Shantelle Ko, AMANDA Judge   [] Dr. Díaz [] Jael Olson, RN    []  Dr. Patel [] Nia Pryor, RN    [] Dr. Brooks [] Mao Mahan RN    [x] Nicole Kowalski, NP [] Mary Lou Corcoran RN    [x] Pratima Macedo NP [] Brenda Roca RN        Verbal Plan Read Back:   Increase to 900 bid  Watch white count    Routed to RN Coordinator   Tanna Castanon LPN

## 2024-10-25 ENCOUNTER — TELEPHONE (OUTPATIENT)
Dept: PHARMACY | Facility: CLINIC | Age: 34
End: 2024-10-25
Payer: MEDICARE

## 2024-10-25 NOTE — TELEPHONE ENCOUNTER
Clinical Pharmacy Consult:                                                      Transplant Specific: 6 month post transplant pharmacy review **unable to contact pt**  Date of Transplant: 4/17/24  Type of Transplant: kidney  First Transplant: yes  History of rejection: no    Immunosuppression Regimen   TAC 1.5 mg qAM & 1.5 mg qPM, AZA 50mg qAM, and Prednisone 5mg daily  Patient specific goal: 6-8  Most recent level: 11.4 on 10/22/24  Immunosuppressant Levels:  Supratherapeutic  Pt adherent to lab draws: yes  Scr:   Lab Results   Component Value Date    CR 4.16 04/18/2024     Side effects: Unknown - unable to contact pt    Prophylactic Medications  PJP Prophylactic: Bactrim SS daily  Scheduled Discontinue Date: Lifelong     Antifungal: Not needed thus far  Scheduled Discontinue Date: N/A     CMV Prophylactic: Valcyte 900 mg once daily   Scheduled Discontinue Date:  3 to 6 months  Anticipate stop 10/17/2024     Acid Reducer: Pepcid (famotidine)  Scheduled Reviewed Date:  TBD    Vascular Prophylactic: Not needed thus far  Scheduled Discontinue Date: N/A    Med rec/DUR performed: yes  Med Rec Discrepancies: unable to contact pt    Recent hospitalizations, urgent care or ED visits: unknown, unable to contact pt  Unable to contact Ira for 6 month follow up call. Blood pressure readings in clinic has been at goal. Last tacro level was above goal.    Will attempt follow up in 6 months.  Ines Lechuga, PharmD  [facility]

## 2024-10-26 LAB — ANNOTATION COMMENT IMP: NORMAL

## 2024-10-30 ENCOUNTER — LAB (OUTPATIENT)
Dept: LAB | Facility: OTHER | Age: 34
End: 2024-10-30
Payer: COMMERCIAL

## 2024-10-30 DIAGNOSIS — Z13.1 ENCOUNTER FOR SCREENING FOR DIABETES MELLITUS: ICD-10-CM

## 2024-10-30 DIAGNOSIS — Z48.298 AFTERCARE FOLLOWING ORGAN TRANSPLANT: ICD-10-CM

## 2024-10-30 LAB
ANION GAP SERPL CALCULATED.3IONS-SCNC: 10 MMOL/L (ref 7–15)
BUN SERPL-MCNC: 20.8 MG/DL (ref 6–20)
CALCIUM SERPL-MCNC: 9.7 MG/DL (ref 8.8–10.4)
CHLORIDE SERPL-SCNC: 106 MMOL/L (ref 98–107)
CREAT SERPL-MCNC: 0.85 MG/DL (ref 0.51–0.95)
EGFRCR SERPLBLD CKD-EPI 2021: >90 ML/MIN/1.73M2
ERYTHROCYTE [DISTWIDTH] IN BLOOD BY AUTOMATED COUNT: 13.1 % (ref 10–15)
EST. AVERAGE GLUCOSE BLD GHB EST-MCNC: 114 MG/DL
GLUCOSE SERPL-MCNC: 97 MG/DL (ref 70–99)
HBA1C MFR BLD: 5.6 % (ref 0–5.6)
HCO3 SERPL-SCNC: 26 MMOL/L (ref 22–29)
HCT VFR BLD AUTO: 44.6 % (ref 35–47)
HGB BLD-MCNC: 15 G/DL (ref 11.7–15.7)
MCH RBC QN AUTO: 34.8 PG (ref 26.5–33)
MCHC RBC AUTO-ENTMCNC: 33.6 G/DL (ref 31.5–36.5)
MCV RBC AUTO: 104 FL (ref 78–100)
PLATELET # BLD AUTO: 189 10E3/UL (ref 150–450)
POTASSIUM SERPL-SCNC: 4.5 MMOL/L (ref 3.4–5.3)
RBC # BLD AUTO: 4.31 10E6/UL (ref 3.8–5.2)
SODIUM SERPL-SCNC: 142 MMOL/L (ref 135–145)
TACROLIMUS BLD-MCNC: 11.4 UG/L (ref 5–15)
TME LAST DOSE: NORMAL H
TME LAST DOSE: NORMAL H
WBC # BLD AUTO: 1.8 10E3/UL (ref 4–11)

## 2024-10-30 PROCEDURE — 85027 COMPLETE CBC AUTOMATED: CPT

## 2024-10-30 PROCEDURE — 87799 DETECT AGENT NOS DNA QUANT: CPT

## 2024-10-30 PROCEDURE — 83036 HEMOGLOBIN GLYCOSYLATED A1C: CPT

## 2024-10-30 PROCEDURE — 80197 ASSAY OF TACROLIMUS: CPT

## 2024-10-30 PROCEDURE — 80048 BASIC METABOLIC PNL TOTAL CA: CPT

## 2024-10-30 PROCEDURE — 36415 COLL VENOUS BLD VENIPUNCTURE: CPT

## 2024-10-31 LAB
BK VIRUS DNA IU/ML, INSTRUMENT (6800): 459 IU/ML
BK VIRUS SPECIMEN TYPE: ABNORMAL
BKV DNA SPEC NAA+PROBE-LOG#: 2.7 {LOG_COPIES}/ML
CMV DNA SPEC NAA+PROBE-ACNC: 975 IU/ML
CMV DNA SPEC NAA+PROBE-LOG#: 3 {LOG_COPIES}/ML

## 2024-11-01 DIAGNOSIS — B25.9 CYTOMEGALOVIRUS (CMV) VIREMIA (H): Primary | ICD-10-CM

## 2024-11-01 DIAGNOSIS — Z94.0 KIDNEY TRANSPLANTED: ICD-10-CM

## 2024-11-01 DIAGNOSIS — B27.00 EBV (EPSTEIN-BARR VIRUS) VIREMIA: ICD-10-CM

## 2024-11-04 DIAGNOSIS — B27.00 EBV (EPSTEIN-BARR VIRUS) VIREMIA: ICD-10-CM

## 2024-11-04 DIAGNOSIS — Z94.0 KIDNEY REPLACED BY TRANSPLANT: ICD-10-CM

## 2024-11-04 DIAGNOSIS — B25.9 CYTOMEGALOVIRUS (CMV) VIREMIA (H): Primary | ICD-10-CM

## 2024-11-04 RX ORDER — TACROLIMUS 0.5 MG/1
0.5 CAPSULE ORAL 2 TIMES DAILY
Status: SHIPPED
Start: 2024-11-04 | End: 2024-11-06

## 2024-11-04 RX ORDER — TACROLIMUS 1 MG/1
1 CAPSULE ORAL 2 TIMES DAILY
Qty: 60 CAPSULE | Refills: 3 | Status: SHIPPED | OUTPATIENT
Start: 2024-11-04 | End: 2024-11-06

## 2024-11-04 NOTE — TELEPHONE ENCOUNTER
Spoke with patient and advised of lab results. Patient states she is currently taking Tacrolimus 1.5 mg bid and an 11-hour trough was done.    No new medications or missed doses.    No new illness, infection or diarrhea.    Patient advised to repeat labs in 1-2 weeks.    Advised I will get back to her about dose changes since only an 11-hour trough was done.

## 2024-11-04 NOTE — TELEPHONE ENCOUNTER
ISSUE:   Tacrolimus IR level 11.4 on 11/4, goal 6-8, dose 1.5 mg BID.    PLAN:   Call Patient and confirm this was an accurate 12-hour trough.   Verify Tacrolimus IR dose 1.5 mg BID.   Confirm no new medications or or missed doses.   Confirm no new illness / infection / diarrhea.   If accurate trough and accurate dose, decrease Tacrolimus IR dose to 1 mg BID     Is this more than a 50% increase or decrease in current IS dose: No  If YES, justification: na    Repeat labs in 1-2 weeks.  *If > 50% change in immunosuppression dose, repeat labs in 1 week.     OUTCOME:

## 2024-11-04 NOTE — TELEPHONE ENCOUNTER
Per Radha, patient advised to decrease Tacrolimus to 1 mg bid and repeat labs in 1-2 weeks. Patient verbalized understanding of plan of care.

## 2024-11-05 ENCOUNTER — LAB (OUTPATIENT)
Dept: LAB | Facility: OTHER | Age: 34
End: 2024-11-05
Payer: COMMERCIAL

## 2024-11-05 DIAGNOSIS — Z48.298 AFTERCARE FOLLOWING ORGAN TRANSPLANT: ICD-10-CM

## 2024-11-05 LAB
ANION GAP SERPL CALCULATED.3IONS-SCNC: 8 MMOL/L (ref 7–15)
BUN SERPL-MCNC: 26.7 MG/DL (ref 6–20)
CALCIUM SERPL-MCNC: 10.3 MG/DL (ref 8.8–10.4)
CHLORIDE SERPL-SCNC: 105 MMOL/L (ref 98–107)
CREAT SERPL-MCNC: 0.94 MG/DL (ref 0.51–0.95)
EGFRCR SERPLBLD CKD-EPI 2021: 81 ML/MIN/1.73M2
ERYTHROCYTE [DISTWIDTH] IN BLOOD BY AUTOMATED COUNT: 12.8 % (ref 10–15)
GLUCOSE SERPL-MCNC: 91 MG/DL (ref 70–99)
HCO3 SERPL-SCNC: 26 MMOL/L (ref 22–29)
HCT VFR BLD AUTO: 43.3 % (ref 35–47)
HGB BLD-MCNC: 14.5 G/DL (ref 11.7–15.7)
MCH RBC QN AUTO: 34.7 PG (ref 26.5–33)
MCHC RBC AUTO-ENTMCNC: 33.5 G/DL (ref 31.5–36.5)
MCV RBC AUTO: 104 FL (ref 78–100)
PLATELET # BLD AUTO: 160 10E3/UL (ref 150–450)
POTASSIUM SERPL-SCNC: 4.6 MMOL/L (ref 3.4–5.3)
RBC # BLD AUTO: 4.18 10E6/UL (ref 3.8–5.2)
SODIUM SERPL-SCNC: 139 MMOL/L (ref 135–145)
TACROLIMUS BLD-MCNC: 12 UG/L (ref 5–15)
TME LAST DOSE: NORMAL H
TME LAST DOSE: NORMAL H
WBC # BLD AUTO: 4.3 10E3/UL (ref 4–11)

## 2024-11-05 PROCEDURE — 85027 COMPLETE CBC AUTOMATED: CPT

## 2024-11-05 PROCEDURE — 36415 COLL VENOUS BLD VENIPUNCTURE: CPT

## 2024-11-05 PROCEDURE — 80048 BASIC METABOLIC PNL TOTAL CA: CPT

## 2024-11-05 PROCEDURE — 80197 ASSAY OF TACROLIMUS: CPT

## 2024-11-05 PROCEDURE — 87799 DETECT AGENT NOS DNA QUANT: CPT

## 2024-11-06 ENCOUNTER — TELEPHONE (OUTPATIENT)
Dept: TRANSPLANT | Facility: CLINIC | Age: 34
End: 2024-11-06
Payer: COMMERCIAL

## 2024-11-06 DIAGNOSIS — Z94.0 KIDNEY REPLACED BY TRANSPLANT: ICD-10-CM

## 2024-11-06 DIAGNOSIS — B27.00 EBV (EPSTEIN-BARR VIRUS) VIREMIA: ICD-10-CM

## 2024-11-06 DIAGNOSIS — Z94.0 KIDNEY REPLACED BY TRANSPLANT: Chronic | ICD-10-CM

## 2024-11-06 DIAGNOSIS — B25.9 CYTOMEGALOVIRUS (CMV) VIREMIA (H): Primary | ICD-10-CM

## 2024-11-06 LAB
CMV DNA SPEC NAA+PROBE-ACNC: 1110 IU/ML
CMV DNA SPEC NAA+PROBE-LOG#: 3 {LOG_COPIES}/ML

## 2024-11-06 RX ORDER — TACROLIMUS 1 MG/1
1 CAPSULE ORAL EVERY MORNING
Qty: 90 CAPSULE | Refills: 3 | Status: SHIPPED | OUTPATIENT
Start: 2024-11-06

## 2024-11-06 RX ORDER — TACROLIMUS 0.5 MG/1
0.5 CAPSULE ORAL EVERY EVENING
Qty: 90 CAPSULE | Refills: 3 | Status: SHIPPED | OUTPATIENT
Start: 2024-11-06

## 2024-11-06 NOTE — TELEPHONE ENCOUNTER
Issue: elevated tac    Plan: call patient to discuss tac level    Outcome: talked to patient to reduce tac to 1mg AM and 0.5mg PM

## 2024-11-07 LAB
BK VIRUS DNA IU/ML, INSTRUMENT (6800): 611 IU/ML
BK VIRUS SPECIMEN TYPE: ABNORMAL
BKV DNA SPEC NAA+PROBE-LOG#: 2.8 {LOG_COPIES}/ML

## 2024-11-08 RX ORDER — ATORVASTATIN CALCIUM 10 MG/1
10 TABLET, FILM COATED ORAL DAILY
Qty: 30 TABLET | Refills: 1 | Status: SHIPPED | OUTPATIENT
Start: 2024-11-08

## 2024-11-12 ENCOUNTER — LAB (OUTPATIENT)
Dept: LAB | Facility: CLINIC | Age: 34
End: 2024-11-12
Payer: COMMERCIAL

## 2024-11-12 DIAGNOSIS — B27.00 EBV (EPSTEIN-BARR VIRUS) VIREMIA: ICD-10-CM

## 2024-11-12 DIAGNOSIS — B25.9 CYTOMEGALOVIRUS (CMV) VIREMIA (H): ICD-10-CM

## 2024-11-12 DIAGNOSIS — Z94.0 KIDNEY TRANSPLANTED: ICD-10-CM

## 2024-11-12 DIAGNOSIS — Z48.298 AFTERCARE FOLLOWING ORGAN TRANSPLANT: ICD-10-CM

## 2024-11-12 LAB
ANION GAP SERPL CALCULATED.3IONS-SCNC: 8 MMOL/L (ref 7–15)
BUN SERPL-MCNC: 21.1 MG/DL (ref 6–20)
CALCIUM SERPL-MCNC: 10.1 MG/DL (ref 8.8–10.4)
CHLORIDE SERPL-SCNC: 104 MMOL/L (ref 98–107)
CREAT SERPL-MCNC: 0.92 MG/DL (ref 0.51–0.95)
EGFRCR SERPLBLD CKD-EPI 2021: 83 ML/MIN/1.73M2
ERYTHROCYTE [DISTWIDTH] IN BLOOD BY AUTOMATED COUNT: 12.8 % (ref 10–15)
GLUCOSE SERPL-MCNC: 95 MG/DL (ref 70–99)
HCO3 SERPL-SCNC: 27 MMOL/L (ref 22–29)
HCT VFR BLD AUTO: 41.9 % (ref 35–47)
HGB BLD-MCNC: 14.2 G/DL (ref 11.7–15.7)
MCH RBC QN AUTO: 34.6 PG (ref 26.5–33)
MCHC RBC AUTO-ENTMCNC: 33.9 G/DL (ref 31.5–36.5)
MCV RBC AUTO: 102 FL (ref 78–100)
PLATELET # BLD AUTO: 161 10E3/UL (ref 150–450)
POTASSIUM SERPL-SCNC: 4.4 MMOL/L (ref 3.4–5.3)
RBC # BLD AUTO: 4.1 10E6/UL (ref 3.8–5.2)
SODIUM SERPL-SCNC: 139 MMOL/L (ref 135–145)
TACROLIMUS BLD-MCNC: 6.8 UG/L (ref 5–15)
TME LAST DOSE: NORMAL H
TME LAST DOSE: NORMAL H
WBC # BLD AUTO: 4.6 10E3/UL (ref 4–11)

## 2024-11-12 PROCEDURE — 80048 BASIC METABOLIC PNL TOTAL CA: CPT

## 2024-11-12 PROCEDURE — 87799 DETECT AGENT NOS DNA QUANT: CPT

## 2024-11-12 PROCEDURE — 80197 ASSAY OF TACROLIMUS: CPT

## 2024-11-12 PROCEDURE — 36415 COLL VENOUS BLD VENIPUNCTURE: CPT

## 2024-11-12 PROCEDURE — 85027 COMPLETE CBC AUTOMATED: CPT

## 2024-11-13 LAB
BK VIRUS DNA IU/ML, INSTRUMENT (6800): 845 IU/ML
BK VIRUS SPECIMEN TYPE: ABNORMAL
BKV DNA SPEC NAA+PROBE-LOG#: 2.9 {LOG_COPIES}/ML
CMV DNA SPEC NAA+PROBE-ACNC: 1150 IU/ML
CMV DNA SPEC NAA+PROBE-LOG#: 3.1 {LOG_COPIES}/ML
SPECIMEN TYPE: ABNORMAL

## 2024-11-19 ENCOUNTER — LAB (OUTPATIENT)
Dept: LAB | Facility: OTHER | Age: 34
End: 2024-11-19
Payer: COMMERCIAL

## 2024-11-19 DIAGNOSIS — Z79.899 ENCOUNTER FOR LONG-TERM CURRENT USE OF MEDICATION: ICD-10-CM

## 2024-11-19 DIAGNOSIS — Z20.828 CONTACT WITH AND (SUSPECTED) EXPOSURE TO OTHER VIRAL COMMUNICABLE DISEASES: ICD-10-CM

## 2024-11-19 DIAGNOSIS — Z48.298 AFTERCARE FOLLOWING ORGAN TRANSPLANT: ICD-10-CM

## 2024-11-19 DIAGNOSIS — Z94.0 KIDNEY REPLACED BY TRANSPLANT: ICD-10-CM

## 2024-11-19 DIAGNOSIS — Z98.890 OTHER SPECIFIED POSTPROCEDURAL STATES: ICD-10-CM

## 2024-11-19 LAB
ANION GAP SERPL CALCULATED.3IONS-SCNC: 8 MMOL/L (ref 7–15)
BUN SERPL-MCNC: 14.5 MG/DL (ref 6–20)
CALCIUM SERPL-MCNC: 9.5 MG/DL (ref 8.8–10.4)
CHLORIDE SERPL-SCNC: 106 MMOL/L (ref 98–107)
CREAT SERPL-MCNC: 0.86 MG/DL (ref 0.51–0.95)
EGFRCR SERPLBLD CKD-EPI 2021: 90 ML/MIN/1.73M2
ERYTHROCYTE [DISTWIDTH] IN BLOOD BY AUTOMATED COUNT: 13 % (ref 10–15)
GLUCOSE SERPL-MCNC: 111 MG/DL (ref 70–99)
HCO3 SERPL-SCNC: 27 MMOL/L (ref 22–29)
HCT VFR BLD AUTO: 40.9 % (ref 35–47)
HGB BLD-MCNC: 14 G/DL (ref 11.7–15.7)
MCH RBC QN AUTO: 35.8 PG (ref 26.5–33)
MCHC RBC AUTO-ENTMCNC: 34.2 G/DL (ref 31.5–36.5)
MCV RBC AUTO: 105 FL (ref 78–100)
PLATELET # BLD AUTO: 145 10E3/UL (ref 150–450)
POTASSIUM SERPL-SCNC: 4 MMOL/L (ref 3.4–5.3)
RBC # BLD AUTO: 3.91 10E6/UL (ref 3.8–5.2)
SODIUM SERPL-SCNC: 141 MMOL/L (ref 135–145)
TACROLIMUS BLD-MCNC: 7 UG/L (ref 5–15)
TME LAST DOSE: NORMAL H
TME LAST DOSE: NORMAL H
WBC # BLD AUTO: 4.7 10E3/UL (ref 4–11)

## 2024-11-19 PROCEDURE — 85027 COMPLETE CBC AUTOMATED: CPT

## 2024-11-19 PROCEDURE — 80197 ASSAY OF TACROLIMUS: CPT

## 2024-11-19 PROCEDURE — 80048 BASIC METABOLIC PNL TOTAL CA: CPT

## 2024-11-19 PROCEDURE — 87799 DETECT AGENT NOS DNA QUANT: CPT

## 2024-11-19 PROCEDURE — 36415 COLL VENOUS BLD VENIPUNCTURE: CPT

## 2024-11-20 LAB
BK VIRUS DNA IU/ML, INSTRUMENT (6800): 1560 IU/ML
BK VIRUS SPECIMEN TYPE: ABNORMAL
BKV DNA SPEC NAA+PROBE-LOG#: 3.2 {LOG_COPIES}/ML
CMV DNA SPEC NAA+PROBE-ACNC: 821 IU/ML
CMV DNA SPEC NAA+PROBE-LOG#: 2.9 {LOG_COPIES}/ML
SPECIMEN TYPE: ABNORMAL

## 2024-11-25 ENCOUNTER — TELEPHONE (OUTPATIENT)
Dept: INFECTIOUS DISEASES | Facility: CLINIC | Age: 34
End: 2024-11-25
Payer: COMMERCIAL

## 2024-11-25 DIAGNOSIS — B25.9 CYTOMEGALOVIRUS (CMV) VIREMIA (H): Primary | ICD-10-CM

## 2024-11-25 DIAGNOSIS — B27.00 EBV (EPSTEIN-BARR VIRUS) VIREMIA: ICD-10-CM

## 2024-11-25 NOTE — TELEPHONE ENCOUNTER
GIRMA LVM 11/25 to sched a next avail follow up with Dr. Calderon per provider.     ----- Message from Irena Calderon sent at 11/21/2024  5:37 PM CST -----  Pls schedule follow up with me, next available appt for CMV. Virtual visit ok   Thanks  AB

## 2024-12-05 ENCOUNTER — LAB (OUTPATIENT)
Dept: LAB | Facility: OTHER | Age: 34
End: 2024-12-05
Payer: COMMERCIAL

## 2024-12-05 DIAGNOSIS — Z48.298 AFTERCARE FOLLOWING ORGAN TRANSPLANT: ICD-10-CM

## 2024-12-05 LAB
ANION GAP SERPL CALCULATED.3IONS-SCNC: 9 MMOL/L (ref 7–15)
BUN SERPL-MCNC: 18.2 MG/DL (ref 6–20)
CALCIUM SERPL-MCNC: 9.7 MG/DL (ref 8.8–10.4)
CHLORIDE SERPL-SCNC: 105 MMOL/L (ref 98–107)
CREAT SERPL-MCNC: 0.8 MG/DL (ref 0.51–0.95)
EGFRCR SERPLBLD CKD-EPI 2021: >90 ML/MIN/1.73M2
ERYTHROCYTE [DISTWIDTH] IN BLOOD BY AUTOMATED COUNT: 14.4 % (ref 10–15)
GLUCOSE SERPL-MCNC: 87 MG/DL (ref 70–99)
HCO3 SERPL-SCNC: 27 MMOL/L (ref 22–29)
HCT VFR BLD AUTO: 40.4 % (ref 35–47)
HGB BLD-MCNC: 13.7 G/DL (ref 11.7–15.7)
MCH RBC QN AUTO: 35.7 PG (ref 26.5–33)
MCHC RBC AUTO-ENTMCNC: 33.9 G/DL (ref 31.5–36.5)
MCV RBC AUTO: 105 FL (ref 78–100)
PLATELET # BLD AUTO: 184 10E3/UL (ref 150–450)
POTASSIUM SERPL-SCNC: 4.3 MMOL/L (ref 3.4–5.3)
RBC # BLD AUTO: 3.84 10E6/UL (ref 3.8–5.2)
SODIUM SERPL-SCNC: 141 MMOL/L (ref 135–145)
TACROLIMUS BLD-MCNC: 6.4 UG/L (ref 5–15)
TME LAST DOSE: NORMAL H
TME LAST DOSE: NORMAL H
WBC # BLD AUTO: 4.6 10E3/UL (ref 4–11)

## 2024-12-16 ENCOUNTER — TELEPHONE (OUTPATIENT)
Dept: TRANSPLANT | Facility: CLINIC | Age: 34
End: 2024-12-16
Payer: COMMERCIAL

## 2024-12-16 DIAGNOSIS — B25.9 CYTOMEGALOVIRUS (CMV) VIREMIA (H): Primary | ICD-10-CM

## 2024-12-16 DIAGNOSIS — B27.00 EBV (EPSTEIN-BARR VIRUS) VIREMIA: ICD-10-CM

## 2024-12-16 LAB
DONOR IDENTIFICATION: NORMAL
DSA COMMENTS: NORMAL
DSA PRESENT: NO
DSA TEST METHOD: NORMAL
ORGAN: NORMAL
SA 1  COMMENTS: NORMAL
SA 1 CELL: NORMAL
SA 1 TEST METHOD: NORMAL
SA 2 CELL: NORMAL
SA 2 COMMENTS: NORMAL
SA 2 TEST METHOD: NORMAL
SA1 HI RISK ABY: NORMAL
SA1 MOD RISK ABY: NORMAL
SA2 HI RISK ABY: NORMAL
SA2 MOD RISK ABY: NORMAL
UNACCEPTABLE ANTIGENS: NORMAL
UNOS CPRA: 18

## 2024-12-16 NOTE — TELEPHONE ENCOUNTER
Left Voicemail (1st Attempt) and Sent Mychart (1st Attempt) for the patient to call back and schedule the following:    Appointment type: RKT   Provider: Dr. Ortiz  Return date: 1/9/25  Specialty phone number: [phone]  Additional appointment(s) needed: lab  Additonal Notes: LVM & MyC to r/s appt with Dr. Ortiz/labs on 1/9/25. okay to r/s with Dr. Camp on 1/21 per RNCC     AL (Noor) 12/26/24

## 2024-12-18 ENCOUNTER — TELEPHONE (OUTPATIENT)
Dept: TRANSPLANT | Facility: CLINIC | Age: 34
End: 2024-12-18
Payer: COMMERCIAL

## 2024-12-18 DIAGNOSIS — B25.9 CYTOMEGALOVIRUS (CMV) VIREMIA (H): ICD-10-CM

## 2024-12-18 DIAGNOSIS — B27.00 EBV (EPSTEIN-BARR VIRUS) VIREMIA: ICD-10-CM

## 2024-12-18 DIAGNOSIS — Z48.298 AFTERCARE FOLLOWING ORGAN TRANSPLANT: Primary | ICD-10-CM

## 2024-12-18 NOTE — TELEPHONE ENCOUNTER
Post Kidney and Pancreas Transplant Team Conference  Date: 12/18/2024  Transplant Coordinator: Radha     Attendees:  [x]  Dr. Ortiz [x] Roxanne Betancur, RN [x] Tanna Castanon LPN     [x]  Dr. Arellano [x] Radha Jorge, RN [] Diamond Atkinson LPN    [x] Dr. Patino [x] Otilia Prather RN    [x] Dr. Tsai [x] Geneva Dennis, RN [] Frida Harris RN   [] Dr. Burgess [] Shantelle Ko, RN    [x] Dr. Díaz [] Jael Olson RN    []  Dr. Patel [] Nia Pryor RN    [] Dr. Brooks [] Mao Mahan RN    [] Nicole Kowalski NP [] Mary Lou Corcoran RN    [] Pratima Macedo NP [] Brenda Roca, AMANDA        Verbal Plan Read Back:   Stay on weekly labs.  Discuss again in 1 month.    Routed to RN Coordinator   Tanna Castanon LPN

## 2024-12-28 ENCOUNTER — TELEPHONE (OUTPATIENT)
Dept: TRANSPLANT | Facility: CLINIC | Age: 34
End: 2024-12-28
Payer: COMMERCIAL

## 2024-12-28 DIAGNOSIS — B25.9 CYTOMEGALOVIRUS (CMV) VIREMIA (H): ICD-10-CM

## 2024-12-28 DIAGNOSIS — B27.00 EBV (EPSTEIN-BARR VIRUS) VIREMIA: ICD-10-CM

## 2024-12-28 NOTE — TELEPHONE ENCOUNTER
Mildly increased CMV viremia.  Would ensure patient is taking Valcyte 900 mg twice daily and see if she is having any diarrhea.  Would check CMV resistance panel, as well as T-cell subset and IgG level with labs on Monday.       Pt reports that she is taking valcyte 900 mg BID.  On christmas started having diarrhea.  She is going 1-2 times a day.   Pt has increased hydration.    Message to dr osman.

## 2024-12-28 NOTE — TELEPHONE ENCOUNTER
Attempted to reach Ira with update from dr osman.   No answer. Left message.  If diarrhea persists or worsens to let tx office know and tostart dietary fiber, such as psyllium, Metamucil, Benefiber or Citrucel.

## 2024-12-31 ENCOUNTER — LAB (OUTPATIENT)
Dept: LAB | Facility: CLINIC | Age: 34
End: 2024-12-31
Payer: COMMERCIAL

## 2024-12-31 DIAGNOSIS — B27.00 EBV (EPSTEIN-BARR VIRUS) VIREMIA: ICD-10-CM

## 2024-12-31 DIAGNOSIS — B25.9 CYTOMEGALOVIRUS (CMV) VIREMIA (H): ICD-10-CM

## 2024-12-31 DIAGNOSIS — Z48.298 AFTERCARE FOLLOWING ORGAN TRANSPLANT: ICD-10-CM

## 2024-12-31 DIAGNOSIS — Z94.0 KIDNEY TRANSPLANTED: ICD-10-CM

## 2024-12-31 LAB
ANION GAP SERPL CALCULATED.3IONS-SCNC: 11 MMOL/L (ref 7–15)
BUN SERPL-MCNC: 14.8 MG/DL (ref 6–20)
CALCIUM SERPL-MCNC: 9.7 MG/DL (ref 8.8–10.4)
CD3 CELLS # BLD: 185 CELLS/UL (ref 603–2990)
CD3 CELLS NFR BLD: 70 % (ref 49–84)
CD3+CD4+ CELLS # BLD: 73 CELLS/UL (ref 441–2156)
CD3+CD4+ CELLS NFR BLD: 28 % (ref 28–63)
CD3+CD4+ CELLS/CD3+CD8+ CLL BLD: 0.73 % (ref 1.4–2.6)
CD3+CD8+ CELLS # BLD: 101 CELLS/UL (ref 125–1312)
CD3+CD8+ CELLS NFR BLD: 38 % (ref 10–40)
CHLORIDE SERPL-SCNC: 104 MMOL/L (ref 98–107)
CMV DNA SPEC NAA+PROBE-ACNC: ABNORMAL IU/ML
CMV DNA SPEC NAA+PROBE-LOG#: 4.1 {LOG_COPIES}/ML
CREAT SERPL-MCNC: 0.82 MG/DL (ref 0.51–0.95)
EGFRCR SERPLBLD CKD-EPI 2021: >90 ML/MIN/1.73M2
ERYTHROCYTE [DISTWIDTH] IN BLOOD BY AUTOMATED COUNT: 14.2 % (ref 10–15)
GLUCOSE SERPL-MCNC: 116 MG/DL (ref 70–99)
HCO3 SERPL-SCNC: 26 MMOL/L (ref 22–29)
HCT VFR BLD AUTO: 39.2 % (ref 35–47)
HGB BLD-MCNC: 13.4 G/DL (ref 11.7–15.7)
MCH RBC QN AUTO: 36.5 PG (ref 26.5–33)
MCHC RBC AUTO-ENTMCNC: 34.2 G/DL (ref 31.5–36.5)
MCV RBC AUTO: 107 FL (ref 78–100)
PLATELET # BLD AUTO: 117 10E3/UL (ref 150–450)
POTASSIUM SERPL-SCNC: 3.8 MMOL/L (ref 3.4–5.3)
RBC # BLD AUTO: 3.67 10E6/UL (ref 3.8–5.2)
SODIUM SERPL-SCNC: 141 MMOL/L (ref 135–145)
SPECIMEN TYPE: ABNORMAL
T CELL COMMENT: ABNORMAL
TACROLIMUS BLD-MCNC: 6.3 UG/L (ref 5–15)
TME LAST DOSE: NORMAL H
TME LAST DOSE: NORMAL H
WBC # BLD AUTO: 2.6 10E3/UL (ref 4–11)

## 2024-12-31 PROCEDURE — 36415 COLL VENOUS BLD VENIPUNCTURE: CPT

## 2024-12-31 PROCEDURE — 86360 T CELL ABSOLUTE COUNT/RATIO: CPT

## 2024-12-31 PROCEDURE — 80197 ASSAY OF TACROLIMUS: CPT

## 2024-12-31 PROCEDURE — 87900 PHENOTYPE INFECT AGENT DRUG: CPT | Mod: 90

## 2024-12-31 PROCEDURE — 87910 NFCT AGT GNTYP ALYS CMV: CPT | Mod: 90

## 2024-12-31 PROCEDURE — 99000 SPECIMEN HANDLING OFFICE-LAB: CPT

## 2024-12-31 PROCEDURE — 82784 ASSAY IGA/IGD/IGG/IGM EACH: CPT

## 2024-12-31 PROCEDURE — 85027 COMPLETE CBC AUTOMATED: CPT

## 2024-12-31 PROCEDURE — 87799 DETECT AGENT NOS DNA QUANT: CPT

## 2024-12-31 PROCEDURE — 80048 BASIC METABOLIC PNL TOTAL CA: CPT

## 2024-12-31 PROCEDURE — 86359 T CELLS TOTAL COUNT: CPT

## 2025-01-01 LAB
BK VIRUS DNA IU/ML, INSTRUMENT (6800): 547 IU/ML
BK VIRUS SPECIMEN TYPE: ABNORMAL
BKV DNA SPEC NAA+PROBE-LOG#: 2.7 {LOG_COPIES}/ML

## 2025-01-02 LAB — IGG SERPL-MCNC: 903 MG/DL (ref 610–1616)

## 2025-01-03 ENCOUNTER — HOSPITAL ENCOUNTER (INPATIENT)
Facility: CLINIC | Age: 35
LOS: 3 days | Discharge: HOME | End: 2025-01-07
Attending: SURGERY | Admitting: SURGERY
Payer: MEDICARE

## 2025-01-03 DIAGNOSIS — B27.00 EBV (EPSTEIN-BARR VIRUS) VIREMIA: ICD-10-CM

## 2025-01-03 DIAGNOSIS — E83.42 HYPOMAGNESEMIA: ICD-10-CM

## 2025-01-03 DIAGNOSIS — N76.6 VULVAR ULCER: ICD-10-CM

## 2025-01-03 DIAGNOSIS — B25.9 CYTOMEGALOVIRUS (CMV) VIREMIA (H): ICD-10-CM

## 2025-01-03 LAB
ANION GAP SERPL CALCULATED.3IONS-SCNC: 12 MMOL/L (ref 7–15)
BASOPHILS # BLD AUTO: 0 10E3/UL (ref 0–0.2)
BASOPHILS NFR BLD AUTO: 1 %
BUN SERPL-MCNC: 9.3 MG/DL (ref 6–20)
CALCIUM SERPL-MCNC: 9.7 MG/DL (ref 8.8–10.4)
CHLORIDE SERPL-SCNC: 106 MMOL/L (ref 98–107)
CREAT SERPL-MCNC: 0.84 MG/DL (ref 0.51–0.95)
EGFRCR SERPLBLD CKD-EPI 2021: >90 ML/MIN/1.73M2
EOSINOPHIL # BLD AUTO: 0 10E3/UL (ref 0–0.7)
EOSINOPHIL NFR BLD AUTO: 1 %
ERYTHROCYTE [DISTWIDTH] IN BLOOD BY AUTOMATED COUNT: 14.1 % (ref 10–15)
GLUCOSE SERPL-MCNC: 151 MG/DL (ref 70–99)
HCO3 SERPL-SCNC: 20 MMOL/L (ref 22–29)
HCT VFR BLD AUTO: 37.1 % (ref 35–47)
HGB BLD-MCNC: 13.3 G/DL (ref 11.7–15.7)
IMM GRANULOCYTES # BLD: 0 10E3/UL
IMM GRANULOCYTES NFR BLD: 1 %
LYMPHOCYTES # BLD AUTO: 0.1 10E3/UL (ref 0.8–5.3)
LYMPHOCYTES NFR BLD AUTO: 6 %
MAGNESIUM SERPL-MCNC: 1.5 MG/DL (ref 1.7–2.3)
MCH RBC QN AUTO: 36.9 PG (ref 26.5–33)
MCHC RBC AUTO-ENTMCNC: 35.8 G/DL (ref 31.5–36.5)
MCV RBC AUTO: 103 FL (ref 78–100)
MONOCYTES # BLD AUTO: 0.1 10E3/UL (ref 0–1.3)
MONOCYTES NFR BLD AUTO: 6 %
NEUTROPHILS # BLD AUTO: 1.9 10E3/UL (ref 1.6–8.3)
NEUTROPHILS NFR BLD AUTO: 87 %
NRBC # BLD AUTO: 0 10E3/UL
NRBC BLD AUTO-RTO: 0 /100
PHOSPHATE SERPL-MCNC: 2.5 MG/DL (ref 2.5–4.5)
PLATELET # BLD AUTO: 140 10E3/UL (ref 150–450)
POTASSIUM SERPL-SCNC: 3.7 MMOL/L (ref 3.4–5.3)
RBC # BLD AUTO: 3.6 10E6/UL (ref 3.8–5.2)
SODIUM SERPL-SCNC: 138 MMOL/L (ref 135–145)
WBC # BLD AUTO: 2.2 10E3/UL (ref 4–11)

## 2025-01-03 PROCEDURE — 999N000128 HC STATISTIC PERIPHERAL IV START W/O US GUIDANCE

## 2025-01-03 PROCEDURE — 250N000013 HC RX MED GY IP 250 OP 250 PS 637: Performed by: NURSE PRACTITIONER

## 2025-01-03 PROCEDURE — 85018 HEMOGLOBIN: CPT | Performed by: NURSE PRACTITIONER

## 2025-01-03 PROCEDURE — 250N000013 HC RX MED GY IP 250 OP 250 PS 637: Performed by: PHYSICIAN ASSISTANT

## 2025-01-03 PROCEDURE — 36415 COLL VENOUS BLD VENIPUNCTURE: CPT | Performed by: NURSE PRACTITIONER

## 2025-01-03 PROCEDURE — 87635 SARS-COV-2 COVID-19 AMP PRB: CPT | Performed by: NURSE PRACTITIONER

## 2025-01-03 PROCEDURE — 85025 COMPLETE CBC W/AUTO DIFF WBC: CPT | Performed by: NURSE PRACTITIONER

## 2025-01-03 PROCEDURE — 84100 ASSAY OF PHOSPHORUS: CPT | Performed by: NURSE PRACTITIONER

## 2025-01-03 PROCEDURE — 250N000012 HC RX MED GY IP 250 OP 636 PS 637: Performed by: PHYSICIAN ASSISTANT

## 2025-01-03 PROCEDURE — 83735 ASSAY OF MAGNESIUM: CPT | Performed by: NURSE PRACTITIONER

## 2025-01-03 PROCEDURE — 80048 BASIC METABOLIC PNL TOTAL CA: CPT | Performed by: NURSE PRACTITIONER

## 2025-01-03 RX ORDER — ONDANSETRON 2 MG/ML
4 INJECTION INTRAMUSCULAR; INTRAVENOUS EVERY 6 HOURS PRN
Status: DISCONTINUED | OUTPATIENT
Start: 2025-01-03 | End: 2025-01-07 | Stop reason: HOSPADM

## 2025-01-03 RX ORDER — MAGNESIUM OXIDE 400 MG/1
800 TABLET ORAL DAILY
Status: DISCONTINUED | OUTPATIENT
Start: 2025-01-03 | End: 2025-01-06

## 2025-01-03 RX ORDER — LIDOCAINE 40 MG/G
CREAM TOPICAL
Status: DISCONTINUED | OUTPATIENT
Start: 2025-01-03 | End: 2025-01-07 | Stop reason: HOSPADM

## 2025-01-03 RX ORDER — TRIAMCINOLONE ACETONIDE 0.1 %
PASTE (GRAM) DENTAL 2 TIMES DAILY
Status: DISCONTINUED | OUTPATIENT
Start: 2025-01-03 | End: 2025-01-07 | Stop reason: HOSPADM

## 2025-01-03 RX ORDER — PREDNISONE 5 MG/1
5 TABLET ORAL DAILY
Status: DISCONTINUED | OUTPATIENT
Start: 2025-01-04 | End: 2025-01-07 | Stop reason: HOSPADM

## 2025-01-03 RX ORDER — AZATHIOPRINE 50 MG/1
50 TABLET ORAL EVERY EVENING
Status: DISCONTINUED | OUTPATIENT
Start: 2025-01-03 | End: 2025-01-07 | Stop reason: HOSPADM

## 2025-01-03 RX ORDER — ATORVASTATIN CALCIUM 10 MG/1
10 TABLET, FILM COATED ORAL DAILY
Status: DISCONTINUED | OUTPATIENT
Start: 2025-01-04 | End: 2025-01-07 | Stop reason: HOSPADM

## 2025-01-03 RX ORDER — ASPIRIN 81 MG/1
81 TABLET, CHEWABLE ORAL DAILY
Status: DISCONTINUED | OUTPATIENT
Start: 2025-01-04 | End: 2025-01-07 | Stop reason: HOSPADM

## 2025-01-03 RX ORDER — SULFAMETHOXAZOLE AND TRIMETHOPRIM 400; 80 MG/1; MG/1
1 TABLET ORAL DAILY
Status: DISCONTINUED | OUTPATIENT
Start: 2025-01-04 | End: 2025-01-07 | Stop reason: HOSPADM

## 2025-01-03 RX ORDER — PAROXETINE 30 MG/1
30 TABLET, FILM COATED ORAL EVERY EVENING
Status: DISCONTINUED | OUTPATIENT
Start: 2025-01-03 | End: 2025-01-07 | Stop reason: HOSPADM

## 2025-01-03 RX ORDER — HYDROXYZINE HYDROCHLORIDE 25 MG/1
25 TABLET, FILM COATED ORAL EVERY 6 HOURS PRN
Status: DISCONTINUED | OUTPATIENT
Start: 2025-01-03 | End: 2025-01-07 | Stop reason: HOSPADM

## 2025-01-03 RX ORDER — ONDANSETRON 4 MG/1
4 TABLET, ORALLY DISINTEGRATING ORAL EVERY 6 HOURS PRN
Status: DISCONTINUED | OUTPATIENT
Start: 2025-01-03 | End: 2025-01-07 | Stop reason: HOSPADM

## 2025-01-03 RX ORDER — TACROLIMUS 0.5 MG/1
0.5 CAPSULE ORAL EVERY EVENING
Status: DISCONTINUED | OUTPATIENT
Start: 2025-01-03 | End: 2025-01-07 | Stop reason: HOSPADM

## 2025-01-03 RX ORDER — TACROLIMUS 1 MG/1
1 CAPSULE ORAL EVERY MORNING
Status: DISCONTINUED | OUTPATIENT
Start: 2025-01-04 | End: 2025-01-07 | Stop reason: HOSPADM

## 2025-01-03 RX ORDER — ACETAMINOPHEN 325 MG/1
650 TABLET ORAL EVERY 4 HOURS PRN
Status: DISCONTINUED | OUTPATIENT
Start: 2025-01-03 | End: 2025-01-07 | Stop reason: HOSPADM

## 2025-01-03 RX ADMIN — ACETAMINOPHEN 650 MG: 325 TABLET, FILM COATED ORAL at 20:34

## 2025-01-03 RX ADMIN — PAROXETINE 30 MG: 30 TABLET, FILM COATED ORAL at 20:22

## 2025-01-03 RX ADMIN — AZATHIOPRINE 50 MG: 50 TABLET ORAL at 20:22

## 2025-01-03 RX ADMIN — TACROLIMUS 0.5 MG: 0.5 CAPSULE ORAL at 20:22

## 2025-01-03 RX ADMIN — MAGNESIUM OXIDE TAB 400 MG (241.3 MG ELEMENTAL MG) 800 MG: 400 (241.3 MG) TAB at 20:22

## 2025-01-03 ASSESSMENT — COLUMBIA-SUICIDE SEVERITY RATING SCALE - C-SSRS
2. HAVE YOU ACTUALLY HAD ANY THOUGHTS OF KILLING YOURSELF IN THE PAST MONTH?: NO
1. IN THE PAST MONTH, HAVE YOU WISHED YOU WERE DEAD OR WISHED YOU COULD GO TO SLEEP AND NOT WAKE UP?: NO
6. HAVE YOU EVER DONE ANYTHING, STARTED TO DO ANYTHING, OR PREPARED TO DO ANYTHING TO END YOUR LIFE?: NO

## 2025-01-03 ASSESSMENT — ACTIVITIES OF DAILY LIVING (ADL)
ADLS_ACUITY_SCORE: 58
ADLS_ACUITY_SCORE: 37
ADLS_ACUITY_SCORE: 58
ADLS_ACUITY_SCORE: 58
ADLS_ACUITY_SCORE: 37
ADLS_ACUITY_SCORE: 58
ADLS_ACUITY_SCORE: 37

## 2025-01-03 NOTE — H&P
[facility]    History and Physical  Transplant Surgery     Date of Admission:  1/3/25    Assessment & Plan   Ira Zamora is a 34 year old female with PMHx of interstitial nephritis secondary to PPIs s/p kidney transplant (pediatric en bloc) 4/17/2024 with BL Cr 0.7-0.9, Crohn's disease, anxiety, HTN, and GERD. 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 Admit for management of ganciclovir-resistant CMV.    Patient states she has been feeling better and denies SOB, CP, fever, abdominal pain. Has loose stools occasionally that are consistent with baseline.     Transplant:   Kidney: Cr pending on admit, baseline 0.7-0.9.     Immunosuppression management:   -Prednisone 5mg daily  -Azathioprine 50mg daily  -Tac 1 mg every morning and 0.5 mg every evening, goal level 6-8.     Hematology:   Leukopenia, chronic: Monitor. Last WBC 12/31 2.6.   Macrocytosis: Likely due to azathioprine.     Cardiorespiratory: No acute issues.      GI/Nutrition:   GERD: Continue Voquezna (interstitial nephritis w/ PPI).  Crohn's: Previously was on ustekinumab prior to kidney transplant.     Endocrine: No acute issues.      Fluid/Electrolytes:   Chronic hypomagnesemia: Continue MagOx.     : No acute issues.      Infectious disease:   BK viremia: 12/31 547 copies/ml.   Ganciclovir-resistant CMV: CMV viral load rising on treatment dose valganciclovir. Known resistance to ganciclovir & cidofovir.   -Consult Transplant ID     Prophylaxis: TMP/sulfa     Disposition:  7A    Judy Gallardo PA-C [id number]      Chief Complaint   CMV infection    History of Present Illness   Ira Zamora is a 34 year old female with PMHx of interstitial nephritis secondary to PPIs s/p kidney transplant (pediatric en bloc) 4/17/2024 with BL Cr 0.7-0.9, Crohn's disease, anxiety, HTN, and GERD. Post transplant complications  of mucocutaneous & vulvar ulcers of unclear etiology (MMF discontinued at that time), leukopenia, BK viremia, and CMV viremia.    Onset of CMV viremia 7/10/24. Valcyte dose increased on 7/25/24 per ID. Viremia persisted and Valcyte was increased to 900mg BID on 10/23/24.     10/22/24 CMV resistance panel:            Ganciclovir,GCV             Resistant             Foscarnet,FOS               Sensitive             Cidofovir,CDV               Resistant             Maribavir,MBV               Sensitive             Letermovir,LTV              Sensitive     CMV viral load increased in December:      Admit for further treatment.    Clinically Significant Risk Factors Present on Admission                         # Financial/Environmental Concerns:           Past Medical History    I have reviewed this patient's medical history and updated it with pertinent information if needed.   Past Medical History:   Diagnosis Date    Anemia in chronic kidney disease     Anxiety 2007    Ascending aorta dilation (H) 2020 2020: ascending aorta 3.8 cm, aortic sinus 3.6 cm. 2024 Echo: Ao root diam: 3.2 cm, asc Aorta Diam: 3.8 cm    ASCUS with positive high risk HPV cervical 2017    ASCUS with positive high risk HPV cervical 07/01/2017    Crohn's disease (H) 2004    Esophageal ulcer 06/27/2024    ESRD (end stage renal disease) on dialysis (H) 2020    GERD (gastroesophageal reflux disease)     Hidradenitis suppurativa 2015    History of blood transfusion     Hypertension     Juvenile rheumatoid arthritis (H)     Asymptomatic in adulthood    Kidney replaced by transplant 04/17/2024    En bloc. Induction w/ Thymoglobulin.    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       Past Surgical History   I have reviewed this patient's surgical history and updated it with pertinent information if needed.  Past Surgical  History:   Procedure Laterality Date    BIOPSY VULVA Left 2024    Procedure: BIOPSY, VULVA;  Surgeon: Roxanne Montenegro MD;  Location: [facility] OR    ESOPHAGOSCOPY, GASTROSCOPY, DUODENOSCOPY (EGD), COMBINED N/A 2024    Procedure: ESOPHAGOGASTRODUODENOSCOPY, WITH BIOPSY;  Surgeon: Tamy Miranda MD;  Location: [facility] GI    EXAM UNDER ANESTHESIA PELVIC N/A 2024    Procedure: EXAM UNDER ANESTHESIA, PELVIS;  Surgeon: Roxanne Montenegro MD;  Location: [facility] OR    H NEEDLE GUIDE,  KIDNEY BIOPSY      TRANSPLANT KIDNEY RECIPIENT  DONOR N/A 2024    Procedure: Transplant kidney recipient  donor,bilateral uretral stent implantation;  Surgeon: Carlitos Díaz MD;  Location: [facility] OR       Prior to Admission Medications   Cannot display prior to admission medications because the patient has not been admitted in this contact.     Allergies   Allergies   Allergen Reactions    Amlodipine Headache and Other (See Comments)     Other Reaction(s): Unknown    Omeprazole Other (See Comments)     All PPIs per patient    Proton Pump Inhibitors Nephrotoxicity     No PPIs due to history of renal failure due to interstitial nephritis    Tegaderm Transparent Dressing (Informational Only) Blisters       Social History   I have reviewed this patient's social history and updated it with pertinent information if needed. Ira Zamora  reports that she has never smoked. She has never been exposed to tobacco smoke. She has never used smokeless tobacco. She reports that she does not currently use alcohol. She reports that she does not use drugs.    Family History   I have reviewed this patient's family history and updated it with pertinent information if needed.   Family History   Problem Relation Age of Onset    Endometriosis Mother     Coronary Artery Disease Mother     Skin Cancer No family hx of     Melanoma No family hx of        Review of Systems   The 10 point Review of Systems is negative other than noted in  the HPI or here.     Physical Exam                      Vital Signs with Ranges  Temp:  [98.3  F (36.8  C)] 98.3  F (36.8  C)  Pulse:  [90] 90  Resp:  [18] 18  BP: (112)/(77) 112/77  SpO2:  [97 %] 97 %  0 lbs 0 oz    Data   Labs pending.

## 2025-01-03 NOTE — LETTER
Transition Communication Hand-off for Care Transitions to Next Level of Care Provider    Name: Ira Zamora  : 1990  MRN #: [id number]  Primary Care Provider: BRIDGET PARKS     Primary Clinic: [address] [place] [address]     Reason for Hospitalization:  CMV  Cytomegalovirus (CMV) viremia (H)  EBV (Jake-Barr virus) viremia  Admit Date/Time: 1/3/2025  4:27 PM  Discharge Date:  2025  Payor Source: Payor: [facility] / Plan: [facility] CORE / Product Type: Indemnity /            Reason for Communication Hand-off Referral: Other continuity of care    Discharge Plan: Home with home infusion       Concern for non-adherence with plan of care:   No  Discharge Needs Assessment:  Needs      Flowsheet Row Most Recent Value   Equipment Currently Used at Home none   # of Referrals Placed by CM External Care Coordination, Home Infusion     Follow-up specialty is recommended: Yes    Follow-up plan:    Future Appointments   Date Time Provider Department Center   2025  8:30 AM ER LAB [facility] [place]   2025  2:15 PM  LAB [facility]   2025  3:35 PM Merritt Escobar MD [facility]   2025  4:30 PM Edgardo Mccoy MD [facility]   2/3/2025  2:30 PM  LAB [facility]   2025 10:30 AM  LAB [facility]   2025 11:00 AM Neno Ortiz MD [facility]       Any outstanding tests or procedures:        Referrals       Future Labs/Procedures    Home Infusion Referral               Key Recommendations:  Please refer to AVS.  Patient discharging home with [facility] services for IV antibiotic, infusion supplies, RN support.     Yudith Jimenez, AMANDA    AVS/Discharge Summary is the source of truth; this is a helpful guide for improved communication of patient story

## 2025-01-04 PROBLEM — B27.00 EBV (EPSTEIN-BARR VIRUS) VIREMIA: Status: ACTIVE | Noted: 2024-08-21

## 2025-01-04 PROBLEM — B25.9 CYTOMEGALOVIRUS (CMV) VIREMIA (H): Status: ACTIVE | Noted: 2024-08-21

## 2025-01-04 LAB
ANION GAP SERPL CALCULATED.3IONS-SCNC: 10 MMOL/L (ref 7–15)
ANNOTATION COMMENT IMP: NORMAL
BUN SERPL-MCNC: 14.3 MG/DL (ref 6–20)
CA-I BLD-MCNC: 5.2 MG/DL (ref 4.4–5.2)
CALCIUM SERPL-MCNC: 10.1 MG/DL (ref 8.8–10.4)
CHLORIDE SERPL-SCNC: 107 MMOL/L (ref 98–107)
CREAT SERPL-MCNC: 0.78 MG/DL (ref 0.51–0.95)
CREAT SERPL-MCNC: 0.8 MG/DL (ref 0.51–0.95)
EGFRCR SERPLBLD CKD-EPI 2021: >90 ML/MIN/1.73M2
EGFRCR SERPLBLD CKD-EPI 2021: >90 ML/MIN/1.73M2
ERYTHROCYTE [DISTWIDTH] IN BLOOD BY AUTOMATED COUNT: 14.3 % (ref 10–15)
GLUCOSE SERPL-MCNC: 91 MG/DL (ref 70–99)
HCO3 SERPL-SCNC: 24 MMOL/L (ref 22–29)
HCT VFR BLD AUTO: 37.3 % (ref 35–47)
HGB BLD-MCNC: 12.7 G/DL (ref 11.7–15.7)
MCH RBC QN AUTO: 36.3 PG (ref 26.5–33)
MCHC RBC AUTO-ENTMCNC: 34 G/DL (ref 31.5–36.5)
MCV RBC AUTO: 107 FL (ref 78–100)
PLATELET # BLD AUTO: 120 10E3/UL (ref 150–450)
POTASSIUM SERPL-SCNC: 4 MMOL/L (ref 3.4–5.3)
RBC # BLD AUTO: 3.5 10E6/UL (ref 3.8–5.2)
SARS-COV-2 RNA RESP QL NAA+PROBE: NEGATIVE
SODIUM SERPL-SCNC: 141 MMOL/L (ref 135–145)
TACROLIMUS BLD-MCNC: 6.8 UG/L (ref 5–15)
TME LAST DOSE: NORMAL H
TME LAST DOSE: NORMAL H
WBC # BLD AUTO: 2 10E3/UL (ref 4–11)

## 2025-01-04 PROCEDURE — 82310 ASSAY OF CALCIUM: CPT | Performed by: NURSE PRACTITIONER

## 2025-01-04 PROCEDURE — 250N000013 HC RX MED GY IP 250 OP 250 PS 637: Performed by: PHYSICIAN ASSISTANT

## 2025-01-04 PROCEDURE — 85027 COMPLETE CBC AUTOMATED: CPT | Performed by: NURSE PRACTITIONER

## 2025-01-04 PROCEDURE — 120N000011 HC R&B TRANSPLANT UMMC

## 2025-01-04 PROCEDURE — 99223 1ST HOSP IP/OBS HIGH 75: CPT | Performed by: STUDENT IN AN ORGANIZED HEALTH CARE EDUCATION/TRAINING PROGRAM

## 2025-01-04 PROCEDURE — 80048 BASIC METABOLIC PNL TOTAL CA: CPT | Performed by: NURSE PRACTITIONER

## 2025-01-04 PROCEDURE — 82330 ASSAY OF CALCIUM: CPT | Performed by: PHYSICIAN ASSISTANT

## 2025-01-04 PROCEDURE — 82565 ASSAY OF CREATININE: CPT | Performed by: PHYSICIAN ASSISTANT

## 2025-01-04 PROCEDURE — 250N000011 HC RX IP 250 OP 636: Performed by: PHYSICIAN ASSISTANT

## 2025-01-04 PROCEDURE — 36415 COLL VENOUS BLD VENIPUNCTURE: CPT | Performed by: PHYSICIAN ASSISTANT

## 2025-01-04 PROCEDURE — 250N000012 HC RX MED GY IP 250 OP 636 PS 637: Performed by: PHYSICIAN ASSISTANT

## 2025-01-04 PROCEDURE — 99223 1ST HOSP IP/OBS HIGH 75: CPT | Performed by: INTERNAL MEDICINE

## 2025-01-04 PROCEDURE — 80197 ASSAY OF TACROLIMUS: CPT | Performed by: NURSE PRACTITIONER

## 2025-01-04 PROCEDURE — 82374 ASSAY BLOOD CARBON DIOXIDE: CPT | Performed by: NURSE PRACTITIONER

## 2025-01-04 PROCEDURE — 36415 COLL VENOUS BLD VENIPUNCTURE: CPT | Performed by: NURSE PRACTITIONER

## 2025-01-04 PROCEDURE — 258N000003 HC RX IP 258 OP 636: Performed by: PHYSICIAN ASSISTANT

## 2025-01-04 RX ORDER — CALCIUM GLUCONATE 20 MG/ML
1 INJECTION, SOLUTION INTRAVENOUS EVERY 6 HOURS PRN
Status: DISCONTINUED | OUTPATIENT
Start: 2025-01-04 | End: 2025-01-07 | Stop reason: HOSPADM

## 2025-01-04 RX ORDER — CALCIUM GLUCONATE 20 MG/ML
2 INJECTION, SOLUTION INTRAVENOUS EVERY 6 HOURS PRN
Status: DISCONTINUED | OUTPATIENT
Start: 2025-01-04 | End: 2025-01-07 | Stop reason: HOSPADM

## 2025-01-04 RX ADMIN — ASPIRIN 81 MG CHEWABLE TABLET 81 MG: 81 TABLET CHEWABLE at 08:53

## 2025-01-04 RX ADMIN — SODIUM CHLORIDE 1000 ML: 9 INJECTION, SOLUTION INTRAVENOUS at 17:26

## 2025-01-04 RX ADMIN — FOSCARNET SODIUM 3000 MG: 24 INJECTION, SOLUTION INTRAVENOUS at 19:33

## 2025-01-04 RX ADMIN — PREDNISONE 5 MG: 5 TABLET ORAL at 08:54

## 2025-01-04 RX ADMIN — PAROXETINE 30 MG: 30 TABLET, FILM COATED ORAL at 21:27

## 2025-01-04 RX ADMIN — MAGNESIUM OXIDE TAB 400 MG (241.3 MG ELEMENTAL MG) 800 MG: 400 (241.3 MG) TAB at 08:53

## 2025-01-04 RX ADMIN — AZATHIOPRINE 50 MG: 50 TABLET ORAL at 21:27

## 2025-01-04 RX ADMIN — ATORVASTATIN CALCIUM 10 MG: 10 TABLET, FILM COATED ORAL at 08:53

## 2025-01-04 RX ADMIN — TACROLIMUS 1 MG: 1 CAPSULE ORAL at 08:53

## 2025-01-04 RX ADMIN — SULFAMETHOXAZOLE AND TRIMETHOPRIM 1 TABLET: 400; 80 TABLET ORAL at 08:54

## 2025-01-04 RX ADMIN — TACROLIMUS 0.5 MG: 0.5 CAPSULE ORAL at 18:46

## 2025-01-04 ASSESSMENT — ACTIVITIES OF DAILY LIVING (ADL)
ADLS_ACUITY_SCORE: 37

## 2025-01-04 NOTE — PHARMACY-ADMISSION MEDICATION HISTORY
Pharmacist Admission Medication History    Admission medication history is complete. The information provided in this note is only as accurate as the sources available at the time of the update.    Information Source(s): Patient via phone    Pertinent Information:   - Patient knew all medications and last doses.  - Home pharmacy is [facility].    Changes made to PTA medication list:  Added: None  Deleted: cyanocobalamin 1000 mcg SL daily (patient reports no longer taking, has been at least a couple of months)  Changed: triamcinolone paste from BID to BID PRN canker sore    Allergies reviewed with patient and updates made in EHR: yes    Medication History Completed By:   Skye Bradford, PharmD      PTA Med List   Medication Sig Last Dose/Taking    acetaminophen (TYLENOL) 500 MG tablet Take 1,000 mg by mouth every 6 hours as needed More than a month    aspirin 81 MG EC tablet Take 81 mg by mouth daily. 1/3/2025 Morning    atorvastatin (LIPITOR) 10 MG tablet TAKE ONE TABLET BY MOUTH ONCE DAILY 1/3/2025 Morning    azaTHIOprine (IMURAN) 50 MG tablet Take 1 tablet (50 mg) by mouth daily. 1/2/2025 Evening    hydrOXYzine HCl (ATARAX) 25 MG tablet Take 1 tablet (25 mg) by mouth every 6 hours as needed for other (adjuvant pain) More than a month    ketoconazole (NIZORAL) 2 % external shampoo Apply topically daily as needed for itching or irritation (use there times a week and let sit on scalp for 5 minutes). Past Week    levonorgestrel (KYLEENA) 19.5 MG IUD 1 Device by Intrauterine route Taking    magnesium oxide (MAG-OX) 400 MG tablet Take 2 tablets (800 mg) by mouth daily More than a month    PARoxetine (PAXIL) 30 MG tablet Take 30 mg by mouth every evening 1/2/2025 Evening    predniSONE (DELTASONE) 5 MG tablet Take 1 tablet (5 mg) by mouth daily. 1/3/2025 Morning    sulfamethoxazole-trimethoprim (BACTRIM) 400-80 MG tablet Take 1 tablet by mouth daily 1/3/2025 Morning    tacrolimus (GENERIC EQUIVALENT) 0.5 MG capsule  Take 1 capsule (0.5 mg) by mouth every evening. 1mg AM and 0.5mg PM 1/2/2025 Evening    tacrolimus (GENERIC EQUIVALENT) 1 MG capsule Take 1 capsule (1 mg) by mouth every morning. 1mg AM and 0.5mg PM 1/3/2025 Morning    triamcinolone (KENALOG) 0.1 % paste Take by mouth 2 times daily (Patient taking differently: Take by mouth 2 times daily as needed (canker sore).) More than a month    valGANciclovir (VALCYTE) 450 MG tablet Take 2 tablets (900 mg) by mouth 2 times daily. 1/3/2025 Morning    Vonoprazan Fumarate (VOQUEZNA) 20 MG TABS Take 20 mg by mouth daily 1/2/2025 Evening

## 2025-01-04 NOTE — PROGRESS NOTES
/77 (BP Location: Left arm)   Pulse 90   Temp 98.3  F (36.8  C) (Oral)   Resp 18   Wt 71.5 kg (157 lb 9.6 oz)   SpO2 97%   BMI 28.83 kg/m      Shift: 6303-9590 (patient arrived around 1600)  Isolation Status: none  VS: stable on room air, afebrile  Neuro: Aox4  Behaviors: calm, cooperative  BG: none  Respiratory: WDL  Cardiac: WDL  Pain/Nausea: denies both  PRN: none given   Diet: Regular  IV Access: PIV placement ordered  Infusion(s): none  Lines/Drains: Right AVF  GI/: Voiding/ last BM 1/2  Skin: Skin check still needed  Mobility: UAL  Plan: CMV treatment

## 2025-01-04 NOTE — PROGRESS NOTES
Admitted/transferred from: Home  Time of arrival on unit around 1630  2 RN full  skin assessment completed by Skye YT and Brittany GONZALEZ   Skin assessment finding: skin intact, no problems   Interventions/actions: no interventions needed at this time.     Will continue to monitor.

## 2025-01-04 NOTE — CONSULTS
[facility]  Transplant Nephrology Consult Note  Date of Admission:  1/3/2025  Today's Date: 01/04/2025  Requesting physician: Herber Patel*    Reason for Consult:  Kidney transplant on immunosuppression   CMV viremia    Recommendations:   - continue on current immunosuppression   - agree with ID consult and initiation of Foscarnet for CMV viremia     Assessment & Plan   # DDKT: CKD stage I, Stable   - Baseline Creatinine: ~ 0.7-0.9   - Proteinuria: Normal (<0.2 grams)   - DSA Hx: No DSA   - Last cPRA: 9%   - BK Viremia: Yes, minimally elevated (< 1000)   - Kidney Tx Biopsy Hx: No biopsy history.    # H/o Interstitial Nephritis: No evidence of recurrence.     # Immunosuppression: Tacrolimus immediate release (goal 6-8), Azathioprine (dose 50 mg daily), and Prednisone (dose 5 mg daily)   - Induction with Recent Transplant:  High Intensity Protocol   - Continue with intensive monitoring of immunosuppression for efficacy and toxicity.   - Historical Changes in Immunosuppression:  Changed from mycophenolate to azathioprine and added prednisone due to oral and genital ulcers, concern for Behcet's versus mycophenolate toxicity.  Decreased azathioprine dose due to BK and CMV viremia.   - Changes: Not at this time    # Infection Prevention:   - PJP: Sulfa/TMP (Bactrim)  - CMV: Being treated for CMV viremia and/or disease      - CMV IgG Ab High Risk Discordance (D+/R-): Yes  CMV Serostatus: Positive  - EBV IgG Ab High Risk Discordance (D+/R-): No  EBV Serostatus: Positive    # Hypertension: Controlled;  Goal BP: > 100, but < 130 systolic   - Changes: Not at this time    # Leukopenia: Trend down, moderately low WBC at ~ 2.  Possibly due to medications and/or CMV disease.              - Will check differential and consider filgrastim if ANC < 500.    # Anemia in Chronic Renal Disease: Hgb: Stable      JUNG: No   - Iron studies: Replete    # Mineral Bone Disorder:    -  Secondary renal hyperparathyroidism; PTH level: Minimally elevated ( pg/ml)        On treatment: None  - Vitamin D; level: Not checked recently, but was normal last check        On supplement: No  - Calcium; level: Normal        On supplement: No    # Electrolytes:   - Potassium; level: Normal        On supplement: No  - Magnesium; level: Low normal        On supplement: Yes  - Bicarbonate; level: Normal        On supplement: No  - Sodium; level: Normal    # BK Viremia: Stable, minimally elevated BK PCR at ~ 500 with last check dec/2024.  IgG level is normal.  Decreased immunosuppression.              - Will continue to follow BK PCR every 2 weeks.     # CMV Viremia: Trend up, elevated CMV PCR at ~ 70693 with last check dec/2024.  CMV IgG Ab positive.  was on Valcyte.  IgG level is normal.  Decreased immunosuppression.   - resistance panel showed resistance to Ganciclovir, so ID was consulted and would like start on Foscarnet today              - Will continue to follow CMV PCR weekly.     # H/o Oral and Genital Ulcers: Patient with h/o significant and debilitating ulcers due to unclear etiology.  Doubt EBV as a cause, although positive staining on biopsy.  Felt possibly related to Behcet's or mycophenolate toxicity.  Symptoms resolved with overall decrease in immunosuppression and change from mycophenolate to azathioprine.  Followed by Transplant ID and Rheumatology in the past.    # Other Significant PMH:   - GERD: Asymptomatic and now off medications.              - Crohn's Disease: Appears to be stable with no recent symptoms.  Previously was on ustekinumab prior to kidney transplant, but hasn't restarted it due to ongoing oral and vaginal ulcers    # Transplant History:  Etiology of Kidney Failure: Interstitial nephritis  Tx: DDKT  Transplant: 4/17/2024 (Kidney)  Significant transplant-related complications: BK Viremia, CMV Viremia, and Oral and genital ulcers    Recommendations were communicated to the  primary team verbally.    Shayla Tsai MD  Transplant Nephrology  Contact information via Vocera Web Console or via [facility] Paging/Directory      Interval History  Ira Zamora is a 34 year old female with PMHx of interstitial nephritis secondary to PPIs s/p kidney transplant (pediatric en bloc) 4/17/2024 with BL Cr 0.7-0.9, Crohn's disease, anxiety, HTN, and GERD. 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 Admit for management of ganciclovir-resistant CMV.   Pt endorsed myalgias and low grade fevers but no night sweats. Does have some diarrhea in the past, but she can not differentiate it from her chrons disease. Currently having only one BM per day, not watery. No hematochezia or melena noted. GERD symptoms controlled well.     Review of Systems   The 10 point Review of Systems is negative other than noted in the HPI or here.      MEDICATIONS:  Current Facility-Administered Medications   Medication Dose Route Frequency Provider Last Rate Last Admin    aspirin (ASA) chewable tablet 81 mg  81 mg Oral Daily Judy Gallardo PA-C   81 mg at 01/04/25 0853    atorvastatin (LIPITOR) tablet 10 mg  10 mg Oral Daily Judy Gallardo PA-C   10 mg at 01/04/25 0853    azaTHIOprine (IMURAN) tablet 50 mg  50 mg Oral QPM Judy Gallardo PA-C   50 mg at 01/03/25 2022    foscarnet (FOSCAVIR) 3,000 mg in D5W 250 mL intermittent infusion  45 mg/kg Intravenous Q8H Carmen Luna PA-C        magnesium oxide (MAG-OX) tablet 800 mg  800 mg Oral Daily Judy Gallardo PA-C   800 mg at 01/04/25 0853    PARoxetine (PAXIL) tablet 30 mg  30 mg Oral QPM Judy Gallardo PA-C   30 mg at 01/03/25 2022    predniSONE (DELTASONE) tablet 5 mg  5 mg Oral Daily Judy Gallardo PA-C   5 mg at 01/04/25 0854    sodium chloride (PF) 0.9% PF flush 3 mL  3 mL Intracatheter Q8H Holli Nelson APRN CNP    3 mL at 25    sodium chloride 0.9% BOLUS 1,000 mL  1,000 mL Intravenous Q8H Carmen Luna PA-C        sulfamethoxazole-trimethoprim (BACTRIM) 400-80 MG per tablet 1 tablet  1 tablet Oral Daily Judy Gallardo PA-C   1 tablet at 25 0854    tacrolimus (GENERIC EQUIVALENT) capsule 0.5 mg  0.5 mg Oral QPM Judy Gallardo PA-C   0.5 mg at 25    tacrolimus (GENERIC EQUIVALENT) capsule 1 mg  1 mg Oral QAM Judy Gallardo PA-C   1 mg at 25 0853    [Held by provider] triamcinolone (KENALOG) 0.1 % paste   Mouth/Throat BID Judy Gallardo PA-C        Vonoprazan Fumarate TABS 20 mg  20 mg Oral Daily Judy Gallardo PA-C         Current Facility-Administered Medications   Medication Dose Route Frequency Provider Last Rate Last Admin    Calcium ionized level less than 3.3 mg/dL   Does not apply Continuous PRN Carmen Luna PA-C           Physical Exam   Temp  Av.1  F (36.7  C)  Min: 98  F (36.7  C)  Max: 98.3  F (36.8  C)      Pulse  Av  Min: 74  Max: 90 Resp  Av.2  Min: 16  Max: 18  SpO2  Av.4 %  Min: 94 %  Max: 100 %     /78 (BP Location: Left arm)   Pulse 88   Temp 98.2  F (36.8  C) (Oral)   Resp 18   Wt 71.5 kg (157 lb 9.6 oz)   SpO2 97%   BMI 28.83 kg/m     Date 25 0700 - 25 0659   Shift 5302-9945 5114-5465 1523-3373 24 Hour Total   INTAKE   P.O. 500   500   Shift Total(mL/kg) 500(6.99)   500(6.99)   OUTPUT   Urine 750   750   Shift Total(mL/kg) 750(10.49)   750(10.49)   Weight (kg) 71.49 71.49 71.49 71.49      Admit Weight: 71.5 kg (157 lb 9.6 oz)     GENERAL APPEARANCE: alert and no distress  EYES: eyes grossly normal to inspection  HENT: normal cephalic/atraumatic  RESP: able to speak in full sentences, no audile wheezing or no accessory muscle usage  CV: S1, S2 present and regular  EDEMA: no LE edema bilaterally  ABDOMEN: soft, nondistended, nontender  MS: extremities normal - no gross deformities  noted  SKIN: no rash  NEURO: mentation intact and speech normal  PSYCH: mentation appears normal and affect normal/bright  TX KIDNEY: normal    Data   All labs reviewed by me.  CMP  Recent Labs   Lab 01/04/25  1423 01/04/25  0614 01/03/25  1651 12/31/24  0840   NA  --  141 138 141   POTASSIUM  --  4.0 3.7 3.8   CHLORIDE  --  107 106 104   CO2  --  24 20* 26   ANIONGAP  --  10 12 11   GLC  --  91 151* 116*   BUN  --  14.3 9.3 14.8   CR 0.80 0.78 0.84 0.82   GFRESTIMATED >90 >90 >90 >90   ROBLES  --  10.1 9.7 9.7   MAG  --   --  1.5*  --    PHOS  --   --  2.5  --      CBC  Recent Labs   Lab 01/04/25  0614 01/03/25  1651 12/31/24  0840   HGB 12.7 13.3 13.4   WBC 2.0* 2.2* 2.6*   RBC 3.50* 3.60* 3.67*   HCT 37.3 37.1 39.2   * 103* 107*   MCH 36.3* 36.9* 36.5*   MCHC 34.0 35.8 34.2   RDW 14.3 14.1 14.2   * 140* 117*     INRNo lab results found in last 7 days.  ABGNo lab results found in last 7 days.   Urine Studies  Recent Labs   Lab Test 07/06/24  1841 06/06/24  1113 05/03/24  1600 04/17/24  1113   COLOR Yellow Yellow Light Yellow Straw   APPEARANCE Clear Clear Clear Clear   URINEGLC Negative Negative 100* 70*   URINEBILI Negative Negative Negative Negative   URINEKETONE Negative Negative Negative Negative   SG 1.015 1.028 1.014 1.004   UBLD Moderate* Small* Negative Trace*   URINEPH 7.0 5.5 5.5 8.5*   PROTEIN Negative 10* Negative 100*   NITRITE Negative Negative Negative Negative   LEUKEST Trace* Moderate* Trace* Negative   RBCU 1 2 2 2   WBCU 3 6* 4 <1     No lab results found.  PTH  Recent Labs   Lab Test 08/13/24  0738 05/23/24  0949 04/22/24  0821   PTHI 71* 131* 138*     Iron Studies  Recent Labs   Lab Test 05/23/24  0949 04/22/24  0821   IRON 77 53   * 147*   IRONSAT 38 36   PRICILA 2,181* 2,181*       IMAGING:  All imaging studies reviewed by me.    Past Medical History    I have reviewed this patient's medical history and updated it with pertinent information if needed.   Past Medical History:    Diagnosis Date    Anemia in chronic kidney disease     Anxiety 2007    Ascending aorta dilation (H) 2020: ascending aorta 3.8 cm, aortic sinus 3.6 cm.  Echo: Ao root diam: 3.2 cm, asc Aorta Diam: 3.8 cm    ASCUS with positive high risk HPV cervical     ASCUS with positive high risk HPV cervical 2017    Crohn's disease (H) 2004    Esophageal ulcer 2024    ESRD (end stage renal disease) on dialysis (H)     GERD (gastroesophageal reflux disease)     Hidradenitis suppurativa 2015    History of blood transfusion     Hypertension     Juvenile rheumatoid arthritis (H)     Asymptomatic in adulthood    Kidney replaced by transplant 2024    En bloc. Induction w/ Thymoglobulin.    Secondary renal hyperparathyroidism (H)     Tubulointerstitial nephropathy 2011    kidney biopsy 2011 - Acute and  chronic tubulointerstitial nephropathy.       Past Surgical History   I have reviewed this patient's surgical history and updated it with pertinent information if needed.  Past Surgical History:   Procedure Laterality Date    BIOPSY VULVA Left 2024    Procedure: BIOPSY, VULVA;  Surgeon: Roxanne Montenegro MD;  Location: [facility] OR    ESOPHAGOSCOPY, GASTROSCOPY, DUODENOSCOPY (EGD), COMBINED N/A 2024    Procedure: ESOPHAGOGASTRODUODENOSCOPY, WITH BIOPSY;  Surgeon: Tamy Miranda MD;  Location: [facility] GI    EXAM UNDER ANESTHESIA PELVIC N/A 2024    Procedure: EXAM UNDER ANESTHESIA, PELVIS;  Surgeon: Roxanne Montenegro MD;  Location: [facility] OR    H NEEDLE GUIDE,  KIDNEY BIOPSY      TRANSPLANT KIDNEY RECIPIENT  DONOR N/A 2024    Procedure: Transplant kidney recipient  donor,bilateral uretral stent implantation;  Surgeon: Carlitos Díaz MD;  Location: [facility] OR       Family History   I have reviewed this patient's family history and updated it with pertinent information if needed.   Family History   Problem Relation Age of Onset    Endometriosis Mother      Coronary Artery Disease Mother     Skin Cancer No family hx of     Melanoma No family hx of        Social History   I have reviewed this patient's social history and updated it with pertinent information if needed. Ira Zamora  reports that she has never smoked. She has never been exposed to tobacco smoke. She has never used smokeless tobacco. She reports that she does not currently use alcohol. She reports that she does not use drugs.    Prior to Admission Medications   Medications Prior to Admission   Medication Sig Dispense Refill Last Dose/Taking    acetaminophen (TYLENOL) 500 MG tablet Take 1,000 mg by mouth every 6 hours as needed   More than a month    aspirin 81 MG EC tablet Take 81 mg by mouth daily.   1/3/2025 Morning    atorvastatin (LIPITOR) 10 MG tablet TAKE ONE TABLET BY MOUTH ONCE DAILY 30 tablet 1 1/3/2025 Morning    azaTHIOprine (IMURAN) 50 MG tablet Take 1 tablet (50 mg) by mouth daily. 30 tablet 11 1/2/2025 Evening    hydrOXYzine HCl (ATARAX) 25 MG tablet Take 1 tablet (25 mg) by mouth every 6 hours as needed for other (adjuvant pain) 20 tablet 0 More than a month    ketoconazole (NIZORAL) 2 % external shampoo Apply topically daily as needed for itching or irritation (use there times a week and let sit on scalp for 5 minutes). 120 mL 11 Past Week    levonorgestrel (KYLEENA) 19.5 MG IUD 1 Device by Intrauterine route   Taking    magnesium oxide (MAG-OX) 400 MG tablet Take 2 tablets (800 mg) by mouth daily 60 tablet 0 More than a month    PARoxetine (PAXIL) 30 MG tablet Take 30 mg by mouth every evening   1/2/2025 Evening    predniSONE (DELTASONE) 5 MG tablet Take 1 tablet (5 mg) by mouth daily. 30 tablet 2 1/3/2025 Morning    sulfamethoxazole-trimethoprim (BACTRIM) 400-80 MG tablet Take 1 tablet by mouth daily 30 tablet 11 1/3/2025 Morning    tacrolimus (GENERIC EQUIVALENT) 0.5 MG capsule Take 1 capsule (0.5 mg) by mouth every evening. 1mg AM and 0.5mg PM 90 capsule 3 1/2/2025 Evening    tacrolimus  (GENERIC EQUIVALENT) 1 MG capsule Take 1 capsule (1 mg) by mouth every morning. 1mg AM and 0.5mg PM 90 capsule 3 1/3/2025 Morning    triamcinolone (KENALOG) 0.1 % paste Take by mouth 2 times daily (Patient taking differently: Take by mouth 2 times daily as needed (canker sore).) 5 g 3 More than a month    valGANciclovir (VALCYTE) 450 MG tablet Take 2 tablets (900 mg) by mouth 2 times daily. 90 tablet 3 1/3/2025 Morning    Vonoprazan Fumarate (VOQUEZNA) 20 MG TABS Take 20 mg by mouth daily 60 tablet 0 1/2/2025 Evening

## 2025-01-04 NOTE — PLAN OF CARE
Goal Outcome Evaluation:      Plan of Care Reviewed With: patient, spouse    Overall Patient Progress: improvingOverall Patient Progress: improving    Outcome Evaluation: Vitally stable, slept well overnight    Shift: 7213-6632    Patient has been vitally stable. Only complaining of minimal pain in knees which the patient reports intermittently happens at home. Admission completed. The patient is up ad roya. Voiding appropriately and saves urine. No BM's overnight. Still waiting on results for the covid swab - special precautions initiated until results are back. Continuing plan of care.

## 2025-01-04 NOTE — CONSULTS
Transplant & Immunocompromised   Infectious Disease     New Inpatient Consult   Patient: Ira Zamora, Date of birth 1990, Medical record number [id number].     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 mutation    Recommendations  Start Foscarnet 90 mg/kg BID, dose adjust to 70mg/kg BID if Cr >/= 0.80  Daily BMP and aggressive electrolyte monitoring  Please begin prior auth process ASAP for Maribavir, it represents a less nephrotoxic option for treatment if the viral load decreases  Please check coverage for letermovir, it may be needed for secondary prophilaxsis  Check today and then Weekly Viral load (next on 25) , await resistance testing from 24  Please reduce immunosuppression to the minimum possible to hasten CMV recovery and limit time on these nephrotoxic drugs  Await COVID test, likely will avoid antivirals even if positive to avoid further antiviral/immunosuppression complexity.    ID will continue to follow, please contact (page if time sensitive, vocera message if non urgent) with questions or if any situation arises where we may be of addional assistance.    Signed:  Andrea Ramos MD, 2025   Staff Physician, Transplant and General Infectious Diseases  Pager [phone] if urgent or Vocera if non time sensitive  For coverage and paging info see [employer]-> Infectious Disease Medicine Adult/[facility]        Patient Summary:   Transplants:  2024 (Kidney); POD  262.  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 Here with CMV viremia, progressing since October.        ID Problem List and Discussion:     # Resistant Refractory CMV Viremia   - CMV D+ R-   - CMV Trend Below, Resistance  testing from 10/28/24 below- on 10/28/24 the key resistance mutation was T503I     - Med History   -  Prophylactic VGCV (900 daily equivalent Renally dosed when indicated) through 10/23/24  -  Treatment Dose VGCV 900 mg /23-1/4/24  -  Foscarnet 1/4/24- Onwards     Induction with Thymoglobulin, high risk mismatch [D+/R-].  Was due for 6 months of prophylactic dose of valganciclovir [900 mg daily or renally dosed equivalent].  Through October.  However had ongoing low-level CMV viremia while on prophylaxis so it was continued.  On October 15 the patient had a rise to high level CMV viremia and this likely prompted resistance testing to be done 10/28 which showed resistant CMV.  Treatment was attempted with higher [treatment course] valganciclovir through 1/3/2025 although given a significant rise in the CMV level on 12/31 the patient is now admitted.    10/28/2024 think he resistance mutation was in UL 54 gene, T503 I, conferring 2.9 fold ganciclovir resistance and 6.1 fold cidofovir resistance.  This level of ganciclovir resistance is potentially able to be overcome by high-dose ganciclovir although in this case the patient then subsequently received an additional nearly 2 months of valganciclovir with rapidly escalating CMV viral load raising a high probability for additional mutations.    We will await the new CMV resistance panel ordered from 12/31/2024 and treat with foscarnet pending further information.    # Rhinorrhea  COVID pending, on room air.    Previous ID Issues:  - Crohn's disease- follows with [facility] on ustekinumab (Stelara) pre-transplant, last dose 3/2024   - H/o Ulcers 6/5/24  ID work up included swab of the lesion for HSV, NP Mycoplasma pneumoniae PCR, blood adenovirus PCR, CMV PCR, EBV PCR, HIV, syphilis, Coxsackie ab, chlamydia/gonorrhea PCR of cervix, Karius test, urine histo antigen which were negative. Resolved when stopping MPA     Other ID issues:  - Bacterial prophylaxis:  none  -  Pneumocystis prophylaxis:  inhaled pentamidine due to leukopenia, last dose 7/9/24, on bactrim now with resolution of leukopenia on stopping MPA  - Viral serostatus: CMV D+/R-, EBV D+/R+, HSV 1?/2?, VZV +  - See above  - Viral prophylaxis:  See above  - Fungal prophylaxis:  none  - Immunosuppression: Induction with thymo and steroid taper; Currently on AZA 50, pred 5, tac 1/0.5   - Immunization status:  No record of COVID recently, or shingrix  - Gamma globulin status:  unknown      History of Presenting Illness:     Ira Zamora is a 34 year old patient who presented on 1/3/2025 for resistant refractory CMV viremia.    She had a kidney transplant in April which was complicated by leukopenia and ulcers from CellCept which was then switched to azathioprine with resolution.  The patient had some intermittent low-level CMV viremia while on prophylaxis which escalated to higher grade CMV viremia in October prompting resistance testing and dose escalation.  Resistance testing did show a UL 54 codon mutation and the patient was continued on valganciclovir without much effect with eventually rapidly rising CMV viremia over the last 2 to 3 weeks prompting admission for further management.  On admission the patient no symptoms  no fevers, chills or diarrhea (aside from baseline diarrhea). No cough or vision changes.    Review of Symptoms:  A comprehensive 14 system review of symptoms was conducted and was otherwise negative (unless mentioned above)       Other Medical History:     An attempt was made to collect past medical surgical, family and social history during this encounter,  this information was reviewed with the patient and updated, documented where most relevant in the HPI       Physical Exam:     Temp: 98  F (36.7  C) Temp src: Oral BP: 123/77 Pulse: 74   Resp: 16 SpO2: 100 % O2 Device: None (Room air)       GENERAL APPEARANCE: Not in acute distress    PHYSICAL EXAM:  Eyes:     Extraocular eye movements grossly  intact, no ptosis, no discharge, no scleral icterus.  Mouth, Throat:     Mucous membranes moist  Cardiovascular:    S1, S2 normal, regular rate and rhythm.  Respiratory:     Inspection: Not in respiratory distress, chest expansion symmetrical.   Auscultation: 4 point auscultation done clear to auscultation bilaterally  Gastrointestinal:  Soft, non-tender; bowel sounds normal; no palpable masses.  Musculoskeletal:     No major deformity  Neurologic:     Higher Mental Function: Conversant, AOx4   Motor: Moving all 4 limbs  Psychiatric: Appropriate  Skin:  Anicteric      MDM   I reviewed recent lab data, notes from referring physicians, as well as other available imaging and diagnostic testing and incorporated these into my discussion as above.  The specific values when most notable are documented above but are otherwise available in the medical record for review.    Medical Decision Making/Coding Information  I saw and evaluated this patient on todays date as part of an E&M Encounter     1- Number and Complexity of Problems Addressed as part the Encounter High    I addressed 1 or more acute or chronic illness or injury that poses a threat to life or bodily functionrefractory CMV Viremia    2- Amount and/or Complexity of Data to Be Reviewed and Analyzed High   I reviewed the medical records for notes from other providers, and recent lab and imaging results   I ordered the following tests CMV  3- Risk of Complications and/or Morbidity or Mortality of Patient Management:  was high due to my recommendation for drug therapy requiring intensive monitoring (more than quarterly assesment for toxicity foscarnet  I, as an Infectious disease trained physician have been requested to follow this patient  assist the primary team to provide complex antimicrobial therapy counseling and treatment.   Complex susceptibility patterns were reviewed and incorporated into decision making

## 2025-01-04 NOTE — PLAN OF CARE
Goal Outcome Evaluation:      Plan of Care Reviewed With: patient, spouse    Overall Patient Progress: no changeOverall Patient Progress: no change    Outcome Evaluation: Infectious ID consulted    Vitals: /75 (BP Location: Left arm)   Pulse 84   Temp 98  F (36.7  C) (Oral)   Resp 16   Wt 71.5 kg (157 lb 9.6 oz)   SpO2 99%   BMI 28.83 kg/m      Endocrine: n/a  Labs: Stable labs  Pain: Minimal knee pain.  PRN's: n/a  Diet: Regular diet, eating well.  LDA: L PIV saline locked.  GI: Constipated, history of Crohn's, requesting Senna.  : Voids spontaneously.  Skin: Skin is intact.  Neuro: Alert and oriented,  here.  Mobility: Up ad roya.  Education: n/a  Plan: Infectious ID consult to manage resistant CMV. New orders for NS bolus and Foscarnet.

## 2025-01-05 LAB
ANION GAP SERPL CALCULATED.3IONS-SCNC: 9 MMOL/L (ref 7–15)
BUN SERPL-MCNC: 12.9 MG/DL (ref 6–20)
CA-I BLD-MCNC: 4.6 MG/DL (ref 4.4–5.2)
CA-I BLD-MCNC: 4.7 MG/DL (ref 4.4–5.2)
CA-I BLD-MCNC: 4.9 MG/DL (ref 4.4–5.2)
CA-I BLD-MCNC: 5 MG/DL (ref 4.4–5.2)
CA-I BLD-MCNC: 5.1 MG/DL (ref 4.4–5.2)
CALCIUM SERPL-MCNC: 8.8 MG/DL (ref 8.8–10.4)
CHLORIDE SERPL-SCNC: 110 MMOL/L (ref 98–107)
CREAT SERPL-MCNC: 0.73 MG/DL (ref 0.51–0.95)
EGFRCR SERPLBLD CKD-EPI 2021: >90 ML/MIN/1.73M2
ERYTHROCYTE [DISTWIDTH] IN BLOOD BY AUTOMATED COUNT: 14.5 % (ref 10–15)
GLUCOSE SERPL-MCNC: 93 MG/DL (ref 70–99)
HCO3 SERPL-SCNC: 21 MMOL/L (ref 22–29)
HCT VFR BLD AUTO: 34.3 % (ref 35–47)
HGB BLD-MCNC: 11.5 G/DL (ref 11.7–15.7)
MAGNESIUM SERPL-MCNC: 1.4 MG/DL (ref 1.7–2.3)
MCH RBC QN AUTO: 35.9 PG (ref 26.5–33)
MCHC RBC AUTO-ENTMCNC: 33.5 G/DL (ref 31.5–36.5)
MCV RBC AUTO: 107 FL (ref 78–100)
PLATELET # BLD AUTO: 107 10E3/UL (ref 150–450)
POTASSIUM SERPL-SCNC: 3.7 MMOL/L (ref 3.4–5.3)
RBC # BLD AUTO: 3.2 10E6/UL (ref 3.8–5.2)
SODIUM SERPL-SCNC: 140 MMOL/L (ref 135–145)
WBC # BLD AUTO: 1.6 10E3/UL (ref 4–11)

## 2025-01-05 PROCEDURE — 250N000011 HC RX IP 250 OP 636: Performed by: PHYSICIAN ASSISTANT

## 2025-01-05 PROCEDURE — 250N000013 HC RX MED GY IP 250 OP 250 PS 637: Performed by: PHYSICIAN ASSISTANT

## 2025-01-05 PROCEDURE — 250N000011 HC RX IP 250 OP 636: Performed by: SURGERY

## 2025-01-05 PROCEDURE — 258N000003 HC RX IP 258 OP 636: Performed by: PHYSICIAN ASSISTANT

## 2025-01-05 PROCEDURE — 82330 ASSAY OF CALCIUM: CPT | Performed by: PHYSICIAN ASSISTANT

## 2025-01-05 PROCEDURE — 36415 COLL VENOUS BLD VENIPUNCTURE: CPT | Performed by: PHYSICIAN ASSISTANT

## 2025-01-05 PROCEDURE — 80048 BASIC METABOLIC PNL TOTAL CA: CPT | Performed by: NURSE PRACTITIONER

## 2025-01-05 PROCEDURE — 120N000011 HC R&B TRANSPLANT UMMC

## 2025-01-05 PROCEDURE — 36415 COLL VENOUS BLD VENIPUNCTURE: CPT | Performed by: SURGERY

## 2025-01-05 PROCEDURE — 999N000128 HC STATISTIC PERIPHERAL IV START W/O US GUIDANCE

## 2025-01-05 PROCEDURE — 99233 SBSQ HOSP IP/OBS HIGH 50: CPT | Performed by: STUDENT IN AN ORGANIZED HEALTH CARE EDUCATION/TRAINING PROGRAM

## 2025-01-05 PROCEDURE — 36415 COLL VENOUS BLD VENIPUNCTURE: CPT | Performed by: STUDENT IN AN ORGANIZED HEALTH CARE EDUCATION/TRAINING PROGRAM

## 2025-01-05 PROCEDURE — 83735 ASSAY OF MAGNESIUM: CPT | Performed by: PHYSICIAN ASSISTANT

## 2025-01-05 PROCEDURE — 85018 HEMOGLOBIN: CPT | Performed by: NURSE PRACTITIONER

## 2025-01-05 PROCEDURE — 250N000012 HC RX MED GY IP 250 OP 636 PS 637: Performed by: PHYSICIAN ASSISTANT

## 2025-01-05 PROCEDURE — 258N000003 HC RX IP 258 OP 636: Performed by: SURGERY

## 2025-01-05 PROCEDURE — 82330 ASSAY OF CALCIUM: CPT | Performed by: SURGERY

## 2025-01-05 RX ORDER — MAGNESIUM SULFATE HEPTAHYDRATE 40 MG/ML
2 INJECTION, SOLUTION INTRAVENOUS ONCE
Status: COMPLETED | OUTPATIENT
Start: 2025-01-05 | End: 2025-01-05

## 2025-01-05 RX ADMIN — AZATHIOPRINE 50 MG: 50 TABLET ORAL at 19:52

## 2025-01-05 RX ADMIN — MAGNESIUM OXIDE TAB 400 MG (241.3 MG ELEMENTAL MG) 800 MG: 400 (241.3 MG) TAB at 07:34

## 2025-01-05 RX ADMIN — MAGNESIUM SULFATE HEPTAHYDRATE 2 G: 2 INJECTION, SOLUTION INTRAVENOUS at 09:04

## 2025-01-05 RX ADMIN — TACROLIMUS 1 MG: 1 CAPSULE ORAL at 07:34

## 2025-01-05 RX ADMIN — FOSCARNET SODIUM 6000 MG: 24 INJECTION, SOLUTION INTRAVENOUS at 18:27

## 2025-01-05 RX ADMIN — PREDNISONE 5 MG: 5 TABLET ORAL at 07:34

## 2025-01-05 RX ADMIN — TACROLIMUS 0.5 MG: 0.5 CAPSULE ORAL at 18:28

## 2025-01-05 RX ADMIN — ATORVASTATIN CALCIUM 10 MG: 10 TABLET, FILM COATED ORAL at 07:34

## 2025-01-05 RX ADMIN — SULFAMETHOXAZOLE AND TRIMETHOPRIM 1 TABLET: 400; 80 TABLET ORAL at 07:34

## 2025-01-05 RX ADMIN — FOSCARNET SODIUM 3000 MG: 24 INJECTION, SOLUTION INTRAVENOUS at 05:28

## 2025-01-05 RX ADMIN — SODIUM CHLORIDE 1000 ML: 9 INJECTION, SOLUTION INTRAVENOUS at 02:46

## 2025-01-05 RX ADMIN — SODIUM CHLORIDE 1000 ML: 9 INJECTION, SOLUTION INTRAVENOUS at 16:31

## 2025-01-05 RX ADMIN — PAROXETINE 30 MG: 30 TABLET, FILM COATED ORAL at 19:52

## 2025-01-05 RX ADMIN — ASPIRIN 81 MG CHEWABLE TABLET 81 MG: 81 TABLET CHEWABLE at 07:34

## 2025-01-05 ASSESSMENT — ACTIVITIES OF DAILY LIVING (ADL)
ADLS_ACUITY_SCORE: 37
ADLS_ACUITY_SCORE: 35
ADLS_ACUITY_SCORE: 37
ADLS_ACUITY_SCORE: 35
ADLS_ACUITY_SCORE: 37

## 2025-01-05 NOTE — PROGRESS NOTES
Transplant & Immunocompromised   Infectious Disease     Follow Up Note   Patient: Ira Zamora, Date of birth 1990, Medical record number [id number].     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 mutation    Recommendations  Continue Foscarnet 90 mg/kg BID, dose adjust to 70mg/kg BID if Cr >/= 0.80   Daily BMP and aggressive electrolyte monitoring  Appreciate support to start process to obtain maribavir  Check weekly CMV viral load , await resistance testing from 24  Appreciate minimizing immunosuppression to hasten CMV recovery and limit time on these nephrotoxic drugs    ID will continue to follow, Dr. Edgardo Mccoy returns to cover the SOT ID service [email or web address]    Signed:  Andrea Ramos MD, 2025   Staff Physician, Transplant and General Infectious Diseases  Pager [phone] if urgent or Vocera if non time sensitive  For coverage and paging info see [facility]-> Infectious Disease Medicine Adult/[facility]        Patient Summary:   Transplants:  2024 (Kidney); POD  263.  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 Here with CMV viremia, progressing since October.      ID Problem List and Discussion:     # Resistant Refractory CMV Viremia   - CMV D+ R-   - CMV Trend Below, Resistance testing from 10/28/24 below- on 10/28/24 the key resistance mutation was T503I     - Med History   -  Prophylactic VGCV (900 daily equivalent Renally dosed when indicated) through 10/23/24  -  Treatment Dose VGCV 900 mg BID -24  -  Foscarnet 24- Onwards     Induction with Thymoglobulin, high risk mismatch [D+/R-].  Was due for 6 months of prophylactic dose of valganciclovir [900 mg daily or renally dosed  equivalent] through October.  However had ongoing low-level CMV viremia while on prophylaxis so it was continued.  On October 15 the patient had a rise to high level CMV viremia and this likely prompted resistance testing to be done 10/28 which showed resistant CMV.  Treatment was attempted with higher [treatment dose] valganciclovir through 1/3/2025 although given a significant rise in the CMV level on 12/31 the patient is now admitted.    10/28/2024 think he resistance mutation was in UL 54 gene, T503 I, conferring 2.9 fold ganciclovir resistance and 6.1 fold cidofovir resistance.  This level of ganciclovir resistance is potentially able to be overcome by high-dose ganciclovir although in this case the patient then subsequently received an additional nearly 2 months of valganciclovir with rapidly escalating CMV viral load raising a high probability for additional mutations.    We will await the new CMV resistance panel ordered from 12/31/2024 and treat with foscarnet pending further information.    Patients CrCl/Kg on borderline of 2 doses, will elect to use higher dose unless it drops ( ie Cr >/= ~ 0.80)    Previous ID Issues:  - Crohn's disease- follows with [facility] on ustekinumab (Stelara) pre-transplant, last dose 3/2024   - H/o Ulcers 6/5/24  ID work up included swab of the lesion for HSV, NP Mycoplasma pneumoniae PCR, blood adenovirus PCR, CMV PCR, EBV PCR, HIV, syphilis, Coxsackie ab, chlamydia/gonorrhea PCR of cervix, Karius test, urine histo antigen which were negative. Resolved when stopping MPA     Other ID issues:  - Bacterial prophylaxis:  none  - Pneumocystis prophylaxis:  inhaled pentamidine due to leukopenia, last dose 7/9/24, on bactrim now with resolution of leukopenia on stopping MPA  - Viral serostatus: CMV D+/R-, EBV D+/R+, HSV 1?/2?, VZV +  - See above  - Viral prophylaxis:  See above  - Fungal prophylaxis:  none  - Immunosuppression: Induction with thymo and steroid taper; Currently on AZA 50,  pred 5, tac 1/0.5   - Immunization status:  No record of COVID recently, or shingrix  - Gamma globulin status:  unknown      History of Presenting Illness from 1/4/25     Ira Zamora is a 34 year old patient who presented on 1/3/2025 for resistant refractory CMV viremia.    She had a kidney transplant in April which was complicated by leukopenia and ulcers from CellCept which was then switched to azathioprine with resolution.  The patient had some intermittent low-level CMV viremia while on prophylaxis which escalated to higher grade CMV viremia in October prompting resistance testing and dose escalation.  Resistance testing did show a UL 54 codon mutation and the patient was continued on valganciclovir without much effect with eventually rapidly rising CMV viremia over the last 2 to 3 weeks prompting admission for further management.  On admission the patient no symptoms  no fevers, chills or diarrhea (aside from baseline diarrhea). No cough or vision changes.    Review of Symptoms:  A comprehensive 14 system review of symptoms was conducted and was otherwise negative (unless mentioned above)      Interval/Subjective     Started on Foscarnet yesterday, no new symptoms, Cr stable.    Review of Symptoms.  A comprehensive 14 system review of symptoms was conducted and was otherwise negative (unless mentioned above)        Physical Exam:     Temp: 98.3  F (36.8  C) Temp src: Oral BP: 118/86 Pulse: 97   Resp: 16 SpO2: 98 % O2 Device: None (Room air)       GENERAL APPEARANCE: Not in acute distress    PHYSICAL EXAM:  Eyes:     Extraocular eye movements grossly intact, no ptosis, no discharge, no scleral icterus.  Mouth, Throat:     Mucous membranes moist  Respiratory:     Inspection: Not in respiratory distress, chest expansion symmetrical.  Musculoskeletal:     No major deformity  Neurologic:     Higher Mental Function: Conversant, AOx4   Motor: Moving all 4 limbs  Psychiatric: Appropriate  Skin:  Anicteric      MDM   I  reviewed recent lab data, notes from referring physicians, as well as other available imaging and diagnostic testing and incorporated these into my discussion as above.  The specific values when most notable are documented above but are otherwise available in the medical record for review.    Medical Decision Making/Coding Information  I saw and evaluated this patient on todays date as part of an E&M Encounter     1- Number and Complexity of Problems Addressed as part the Encounter High    I addressed 1 or more acute or chronic illness or injury that poses a threat to life or bodily functionrefractory CMV Viremia    2- Amount and/or Complexity of Data to Be Reviewed and Analyzed Moderate  I reviewed the medical records for notes from other providers, and recent lab and imaging results   3- Risk of Complications and/or Morbidity or Mortality of Patient Management:  was high due to my recommendation for drug therapy requiring intensive monitoring (more than quarterly assesment for toxicity foscarnet  I, as an Infectious disease trained physician have been requested to follow this patient  assist the primary team to provide complex antimicrobial therapy counseling and treatment.   Complex susceptibility patterns were reviewed and incorporated into decision making

## 2025-01-05 NOTE — PROGRESS NOTES
Transplant Surgery  Inpatient Daily Progress Note  01/04/2025    Assessment & Plan: Ira Zamora is a 34 year old female with PMHx of interstitial nephritis secondary to PPIs s/p kidney transplant (pediatric en bloc) 4/17/2024 with BL Cr 0.7-0.9, Crohn's disease, anxiety, HTN, and GERD. 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 Admit for management of ganciclovir-resistant CMV.     Patient states she has been feeling better and denies SOB, CP, fever, abdominal pain. Has loose stools occasionally that are consistent with baseline.      Transplant:   Kidney: Cr 0.84, baseline 0.7-0.9.     Immunosuppression management:   -Prednisone 5mg daily  -Azathioprine 50mg daily  -Tac 1 mg every morning and 0.5 mg every evening, goal level 6-8.     Hematology:   Leukopenia, chronic: Monitor. Last WBC 12/31 2.6.   Macrocytosis: Likely due to azathioprine.     Cardiorespiratory: No acute issues.      GI/Nutrition:   GERD: Continue Voquezna (interstitial nephritis w/ PPI).  Crohn's: Previously was on ustekinumab prior to kidney transplant.     Endocrine: No acute issues.      Fluid/Electrolytes:   Chronic hypomagnesemia: Continue MagOx.     : No acute issues.      Infectious disease:   BK viremia: 12/31 547 copies/ml.   Ganciclovir-resistant CMV: CMV viral load rising on treatment dose valganciclovir. Known resistance to ganciclovir & cidofovir.   -Consult Transplant ID. Will start foscarnet, dosing per ID. Will submit medication liaison request for maribavir, will be addressed on Monday     Prophylaxis: TMP/sulfa    Disposition: 7A     Medical Decision Making: Medium  Subsequent visit 27357 (moderate level decision making)    ERICA/Fellow/Resident Provider: Carmen Luna PA-C    Faculty: Herber Patel MD  _________________________________________________________________  Transplant History: Admitted  1/3/2025 for ganciclovir resistant CMV.  4/17/2024 (Kidney), Postoperative day: 262     Interval History: History is obtained from the patient and patient's chart  Overnight events: No acute events    ROS:   A 10-point review of systems was negative except as noted above.    Meds:  Current Facility-Administered Medications   Medication Dose Route Frequency Provider Last Rate Last Admin    aspirin (ASA) chewable tablet 81 mg  81 mg Oral Daily Judy Gallardo PA-C   81 mg at 01/04/25 0853    atorvastatin (LIPITOR) tablet 10 mg  10 mg Oral Daily Judy Gallardo PA-C   10 mg at 01/04/25 0853    azaTHIOprine (IMURAN) tablet 50 mg  50 mg Oral QPM Judy Gallardo PA-C   50 mg at 01/03/25 2022    foscarnet (FOSCAVIR) 3,000 mg in D5W 250 mL intermittent infusion  45 mg/kg Intravenous Q8H Carmen Lnua PA-C 125 mL/hr at 01/04/25 1933 3,000 mg at 01/04/25 1933    magnesium oxide (MAG-OX) tablet 800 mg  800 mg Oral Daily Judy Gallardo PA-C   800 mg at 01/04/25 0853    PARoxetine (PAXIL) tablet 30 mg  30 mg Oral QPM Judy Gallardo PA-C   30 mg at 01/03/25 2022    predniSONE (DELTASONE) tablet 5 mg  5 mg Oral Daily Judy Gallardo PA-C   5 mg at 01/04/25 0854    sodium chloride (PF) 0.9% PF flush 3 mL  3 mL Intracatheter Q8H Holli Nelson APRN CNP   3 mL at 01/03/25 2025    sodium chloride 0.9% BOLUS 1,000 mL  1,000 mL Intravenous Q8H Carmen Luna PA-C 500 mL/hr at 01/04/25 1726 1,000 mL at 01/04/25 1726    sulfamethoxazole-trimethoprim (BACTRIM) 400-80 MG per tablet 1 tablet  1 tablet Oral Daily Judy Gallardo PA-C   1 tablet at 01/04/25 0854    tacrolimus (GENERIC EQUIVALENT) capsule 0.5 mg  0.5 mg Oral QPM Judy Gallardo PA-C   0.5 mg at 01/04/25 1846    tacrolimus (GENERIC EQUIVALENT) capsule 1 mg  1 mg Oral QAM Judy Gallardo PA-C   1 mg at 01/04/25 0853    [Held by provider] triamcinolone (KENALOG) 0.1 % paste   Mouth/Throat BID Judy Gallardo  "ANTOLIN Stafford        Vonoprazan Fumarate TABS 20 mg  20 mg Oral Daily Jhony Gallardopreston Stafford PA-C           Physical Exam:     Admit Weight: 71.5 kg (157 lb 9.6 oz)    Current vitals:   BP 97/59 (BP Location: Left arm, Cuff Size: Adult Regular)   Pulse 88   Temp 97.9  F (36.6  C) (Oral)   Resp 17   Wt 71.5 kg (157 lb 9.6 oz)   SpO2 98%   BMI 28.83 kg/m           Vital sign ranges:    Temp:  [97.9  F (36.6  C)-98.2  F (36.8  C)] 97.9  F (36.6  C)  Pulse:  [74-88] 88  Resp:  [16-18] 17  BP: ()/(59-78) 97/59  SpO2:  [94 %-100 %] 98 %  Patient Vitals for the past 24 hrs:   BP Temp Temp src Pulse Resp SpO2   01/04/25 1733 97/59 97.9  F (36.6  C) Oral -- 17 98 %   01/04/25 1326 109/78 98.2  F (36.8  C) Oral 88 18 97 %   01/04/25 1123 110/75 98  F (36.7  C) Oral 84 16 99 %   01/04/25 0542 123/77 98  F (36.7  C) Oral -- 16 100 %   01/04/25 0201 98/64 98.1  F (36.7  C) Oral 74 -- 94 %   01/03/25 2048 108/73 98  F (36.7  C) Oral 79 18 --     General Appearance: in no apparent distress.   Skin: normal, warm  Heart: Perfused  Lungs: Nonlabored resps on RA  Abdomen: The abdomen is nontender  Neurologic: awake, alert, and oriented. Tremor absent..     Data:   CMP  Recent Labs   Lab 01/04/25  1423 01/04/25  0614 01/03/25  1651   NA  --  141 138   POTASSIUM  --  4.0 3.7   CHLORIDE  --  107 106   CO2  --  24 20*   GLC  --  91 151*   BUN  --  14.3 9.3   CR 0.80 0.78 0.84   GFRESTIMATED >90 >90 >90   ROBLES  --  10.1 9.7   ICAW 5.2  --   --    MAG  --   --  1.5*   PHOS  --   --  2.5     CBC  Recent Labs   Lab 01/04/25  0614 01/03/25  1651   HGB 12.7 13.3   WBC 2.0* 2.2*   * 140*     COAGSNo lab results found in last 7 days.    Invalid input(s): \"XA\"   Urinalysis  Recent Labs   Lab Test 07/06/24  1841 06/06/24  1113   COLOR Yellow Yellow   APPEARANCE Clear Clear   URINEGLC Negative Negative   URINEBILI Negative Negative   URINEKETONE Negative Negative   SG 1.015 1.028   UBLD Moderate* Small*   URINEPH 7.0 5.5   PROTEIN " Negative 10*   NITRITE Negative Negative   LEUKEST Trace* Moderate*   RBCU 1 2   WBCU 3 6*     Virology:  EBV Qualitative PCR   Date Value Ref Range Status   07/02/2024 Detected (A)  Final     Comment:     INTERPRETIVE INFORMATION:  Jake Barr Virus by PCR    This test was developed and its performance characteristics   determined by [facility]. It has not been cleared or   approved by the US Food and Drug Administration. This test   was performed in a CLIA certified laboratory and is   intended for clinical purposes.  Performed By: [facility]  [address] [place] [id number]  : Sam Abel MD, PhD  CLIA Number: [id number]     Hepatitis C Antibody   Date Value Ref Range Status   04/17/2024 Nonreactive Nonreactive Final     Comment:     A nonreactive screening test result does not exclude the possibility of exposure to or infection with HCV. Nonreactive screening test results in individuals with prior exposure to HCV may be due to antibody levels below the limit of detection of this assay or lack of reactivity to the HCV antigens used in this assay. Patients with recent HCV infections (<3 months from time of exposure) may have false-negative HCV antibody results due to the time needed for seroconversion (average of 8 to 9 weeks).     BK Virus DNA IU/mL   Date Value Ref Range Status   05/23/2024 Not Detected Not Detected IU/mL Final

## 2025-01-05 NOTE — PLAN OF CARE
Goal Outcome Evaluation:      Plan of Care Reviewed With: patient    Overall Patient Progress: no change    Outcome Evaluation: pt tolerated 2nd dose of Foscarnet w/out issues. continue ionized calciums Q6H    2300 - 0730     Pt is Aox4 and vitally stable. Denies pain or nausea. Pt is up independently in the room. Voiding adequately w/out issues and saves urine. Tolerated 2nd dose of Foscarnet. Continue with current POC and update MD with any changes.

## 2025-01-05 NOTE — PLAN OF CARE
/82 (BP Location: Left arm)   Pulse 87   Temp 98.1  F (36.7  C) (Oral)   Resp 16   Wt 72.8 kg (160 lb 6.4 oz)   SpO2 98%   BMI 29.34 kg/m      Shift: 1511-6541  Isolation Status: standard  VS: stable on room air, afebrile  Neuro: Aox4  Behaviors: calm, able to make needs known.  at bedside  BG: none  Labs: ionized calcium q6 (5.0, 5.1 ), mag 1.4 (replaced)  Respiratory: WDL  Cardiac: WDL  Pain/Nausea: denies  PRN: none given  Diet: regular  IV Access: L PIV, R AVF  Infusion(s): mag replacement, foscarnet + 1L NS bolus  Lines/Drains: none  GI/: voids, no BM  Skin: no new concerns  Mobility: UAL  Plan: continue POC and notify team of changes

## 2025-01-05 NOTE — PLAN OF CARE
Goal Outcome Evaluation:      Plan of Care Reviewed With: patient    Overall Patient Progress: no changeOverall Patient Progress: no change    Outcome Evaluation: patient started on Forscanet this evening    .BP 99/67 (BP Location: Left arm)   Pulse 80   Temp 98.2  F (36.8  C) (Oral)   Resp 16   Wt 71.5 kg (157 lb 9.6 oz)   SpO2 98%   BMI 28.83 kg/m        Shift: 2146-1307  VS: VSS on RA, afebrile  Neuro: Aox4  Behaviors: calm and cooperative  Labs: Ionized calcium : 5.2  Respiratory: WDL  Cardiac: WDL  Pain/Nausea: denies  Diet: Regular   IV Access: JOO Fistula , LPIV  GI/: voiding without difficulty, No BM this shift  Skin: No new deficits   Mobility: UAL  Events/Education: started foscarnet this shift  Plan: continue plan of care  , give NS bolus prior to foscarnet administration, monitor ionized calcium and notify provider with any concerns

## 2025-01-05 NOTE — PROGRESS NOTES
Transplant Surgery  Inpatient Daily Progress Note  01/05/2025    Assessment & Plan: Ira Zamora is a 34 year old female with PMHx of interstitial nephritis secondary to PPIs s/p kidney transplant (pediatric en bloc) 4/17/2024 with BL Cr 0.7-0.9, Crohn's disease, anxiety, HTN, and GERD. 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 Admit for management of ganciclovir-resistant CMV.     Patient states she has been feeling better and denies SOB, CP, fever, abdominal pain. Has loose stools occasionally that are consistent with baseline.      Transplant:   Kidney: Cr 0.73, baseline 0.7-0.9.     Immunosuppression management:   -Prednisone 5mg daily  -Azathioprine 50mg daily  -Tac 1 mg every morning and 0.5 mg every evening, goal level 6-8.     Hematology:   Leukopenia, chronic: Monitor. Likely related to Infection (CMV) and medications, WBC 1.6  Macrocytosis: Likely due to azathioprine.     Cardiorespiratory: No acute issues.      GI/Nutrition:   GERD: Continue Voquezna (interstitial nephritis w/ PPI).  Crohn's: Previously was on ustekinumab prior to kidney transplant.     Endocrine: No acute issues.      Fluid/Electrolytes:   Chronic hypomagnesemia: Continue MagOx. Will give IV supplementation today as well     : No acute issues.      Infectious disease:   BK viremia: 12/31 547 copies/ml.   Ganciclovir-resistant CMV: CMV viral load rising on treatment dose valganciclovir. Known resistance to ganciclovir & cidofovir.   -Consult Transplant ID. Will start foscarnet, dosing per ID. Will submit medication liaison request for maribavir and letermovir, will be addressed on Monday     Prophylaxis: TMP/sulfa    Disposition: 7A     Medical Decision Making: Medium  Subsequent visit 36678 (moderate level decision making)    ERICA/Fellow/Resident Provider: Carmen Luna PA-C    Faculty: Herber Patel  MD  _________________________________________________________________  Transplant History: Admitted 1/3/2025 for ganciclovir resistant CMV.  4/17/2024 (Kidney), Postoperative day: 263     Interval History: History is obtained from the patient and patient's chart  Overnight events: No acute events    ROS:   A 10-point review of systems was negative except as noted above.    Meds:  Current Facility-Administered Medications   Medication Dose Route Frequency Provider Last Rate Last Admin    aspirin (ASA) chewable tablet 81 mg  81 mg Oral Daily Judy Gallardo PA-C   81 mg at 01/05/25 0734    atorvastatin (LIPITOR) tablet 10 mg  10 mg Oral Daily Judy Gallardo PA-C   10 mg at 01/05/25 0734    azaTHIOprine (IMURAN) tablet 50 mg  50 mg Oral QPM Judy Gallardo PA-C   50 mg at 01/04/25 2127    foscarnet (FOSCAVIR) 6,000 mg in D5W 500 mL intermittent infusion  90 mg/kg Intravenous Q12H Herber Patel MD        magnesium oxide (MAG-OX) tablet 800 mg  800 mg Oral Daily Judy Gallardo PA-C   800 mg at 01/05/25 0734    PARoxetine (PAXIL) tablet 30 mg  30 mg Oral QPM Judy Gallardo PA-C   30 mg at 01/04/25 2127    predniSONE (DELTASONE) tablet 5 mg  5 mg Oral Daily Judy Gallardo PA-C   5 mg at 01/05/25 0734    sodium chloride (PF) 0.9% PF flush 3 mL  3 mL Intracatheter Q8H Holli Nelson APRN CNP   3 mL at 01/05/25 0736    sodium chloride 0.9% BOLUS 1,000 mL  1,000 mL Intravenous Q12H Herber Patel MD        sulfamethoxazole-trimethoprim (BACTRIM) 400-80 MG per tablet 1 tablet  1 tablet Oral Daily Judy Gallardo PA-C   1 tablet at 01/05/25 0734    tacrolimus (GENERIC EQUIVALENT) capsule 0.5 mg  0.5 mg Oral QPM Judy Gallardo PA-C   0.5 mg at 01/04/25 1846    tacrolimus (GENERIC EQUIVALENT) capsule 1 mg  1 mg Oral QAM Judy Gallardo PA-C   1 mg at 01/05/25 0734    [Held by provider] triamcinolone (KENALOG) 0.1 % paste   Mouth/Throat  BID Judy Gallardo PA-C        Vonoprazan Fumarate TABS 20 mg  20 mg Oral Daily Judy Gallardo PA-C   20 mg at 01/04/25 2134       Physical Exam:     Admit Weight: 71.5 kg (157 lb 9.6 oz)    Current vitals:   /86 (BP Location: Left arm)   Pulse 97   Temp 98.3  F (36.8  C) (Oral)   Resp 16   Wt 72.8 kg (160 lb 6.4 oz)   SpO2 98%   BMI 29.34 kg/m           Vital sign ranges:    Temp:  [97.9  F (36.6  C)-98.3  F (36.8  C)] 98.3  F (36.8  C)  Pulse:  [71-97] 97  Resp:  [16-18] 16  BP: ()/(59-86) 118/86  SpO2:  [97 %-99 %] 98 %  Patient Vitals for the past 24 hrs:   BP Temp Temp src Pulse Resp SpO2 Weight   01/05/25 0927 118/86 98.3  F (36.8  C) Oral 97 16 98 % --   01/05/25 0530 106/72 98.2  F (36.8  C) Oral 71 16 98 % --   01/05/25 0525 -- -- -- -- -- -- 72.8 kg (160 lb 6.4 oz)   01/05/25 0159 108/72 98  F (36.7  C) Oral 77 16 98 % --   01/04/25 2123 99/67 98.2  F (36.8  C) Oral 80 16 98 % --   01/04/25 1733 97/59 97.9  F (36.6  C) Oral -- 17 98 % --   01/04/25 1326 109/78 98.2  F (36.8  C) Oral 88 18 97 % --   01/04/25 1123 110/75 98  F (36.7  C) Oral 84 16 99 % --     General Appearance: in no apparent distress.   Skin: normal, warm  Heart: Perfused  Lungs: Nonlabored resps on RA  Abdomen: The abdomen is nontender  Neurologic: awake, alert, and oriented. Tremor absent..     Data:   CMP  Recent Labs   Lab 01/05/25  0540 01/05/25  0141 01/04/25  1423 01/04/25  0614 01/04/25  0614 01/03/25  1651     --   --   --  141 138   POTASSIUM 3.7  --   --   --  4.0 3.7   CHLORIDE 110*  --   --   --  107 106   CO2 21*  --   --   --  24 20*   GLC 93  --   --   --  91 151*   BUN 12.9  --   --   --  14.3 9.3   CR 0.73  --  0.80  --  0.78 0.84   GFRESTIMATED >90  --  >90  --  >90 >90   ROBLES 8.8  --   --   --  10.1 9.7   ICAW 4.7 4.9 5.2   < >  --   --    MAG 1.4*  --   --   --   --  1.5*   PHOS  --   --   --   --   --  2.5    < > = values in this interval not displayed.     CBC  Recent Labs   Lab  "01/05/25  0540 01/04/25  0614   HGB 11.5* 12.7   WBC 1.6* 2.0*   * 120*     COAGSNo lab results found in last 7 days.    Invalid input(s): \"XA\"   Urinalysis  Recent Labs   Lab Test 07/06/24  1841 06/06/24  1113   COLOR Yellow Yellow   APPEARANCE Clear Clear   URINEGLC Negative Negative   URINEBILI Negative Negative   URINEKETONE Negative Negative   SG 1.015 1.028   UBLD Moderate* Small*   URINEPH 7.0 5.5   PROTEIN Negative 10*   NITRITE Negative Negative   LEUKEST Trace* Moderate*   RBCU 1 2   WBCU 3 6*     Virology:  EBV Qualitative PCR   Date Value Ref Range Status   07/02/2024 Detected (A)  Final     Comment:     INTERPRETIVE INFORMATION:  Jake Barr Virus by PCR    This test was developed and its performance characteristics   determined by [facility]. It has not been cleared or   approved by the US Food and Drug Administration. This test   was performed in a CLIA certified laboratory and is   intended for clinical purposes.  Performed By: [facility]  [address] [place] [id number]  : Sam Abel MD, PhD  CLIA Number: [id number]     Hepatitis C Antibody   Date Value Ref Range Status   04/17/2024 Nonreactive Nonreactive Final     Comment:     A nonreactive screening test result does not exclude the possibility of exposure to or infection with HCV. Nonreactive screening test results in individuals with prior exposure to HCV may be due to antibody levels below the limit of detection of this assay or lack of reactivity to the HCV antigens used in this assay. Patients with recent HCV infections (<3 months from time of exposure) may have false-negative HCV antibody results due to the time needed for seroconversion (average of 8 to 9 weeks).     BK Virus DNA IU/mL   Date Value Ref Range Status   05/23/2024 Not Detected Not Detected IU/mL Final       "

## 2025-01-06 ENCOUNTER — ENROLLMENT (OUTPATIENT)
Dept: HOME HEALTH SERVICES | Facility: HOME HEALTH | Age: 35
End: 2025-01-06
Payer: MEDICARE

## 2025-01-06 DIAGNOSIS — B25.9 CYTOMEGALOVIRUS (CMV) VIREMIA (H): ICD-10-CM

## 2025-01-06 DIAGNOSIS — Z94.0 KIDNEY REPLACED BY TRANSPLANT: ICD-10-CM

## 2025-01-06 DIAGNOSIS — B27.00 EBV (EPSTEIN-BARR VIRUS) VIREMIA: ICD-10-CM

## 2025-01-06 LAB
ANION GAP SERPL CALCULATED.3IONS-SCNC: 8 MMOL/L (ref 7–15)
BUN SERPL-MCNC: 9.9 MG/DL (ref 6–20)
CA-I BLD-MCNC: 4.7 MG/DL (ref 4.4–5.2)
CA-I BLD-MCNC: 5 MG/DL (ref 4.4–5.2)
CA-I BLD-MCNC: 5.3 MG/DL (ref 4.4–5.2)
CALCIUM SERPL-MCNC: 8.9 MG/DL (ref 8.8–10.4)
CHLORIDE SERPL-SCNC: 110 MMOL/L (ref 98–107)
CMV DNA SPEC NAA+PROBE-ACNC: ABNORMAL IU/ML
CMV DNA SPEC NAA+PROBE-ACNC: ABNORMAL IU/ML
CMV DNA SPEC NAA+PROBE-LOG#: 4.4 {LOG_COPIES}/ML
CMV DNA SPEC NAA+PROBE-LOG#: 4.5 {LOG_COPIES}/ML
CREAT SERPL-MCNC: 0.67 MG/DL (ref 0.51–0.95)
EGFRCR SERPLBLD CKD-EPI 2021: >90 ML/MIN/1.73M2
ERYTHROCYTE [DISTWIDTH] IN BLOOD BY AUTOMATED COUNT: 14.4 % (ref 10–15)
GLUCOSE SERPL-MCNC: 90 MG/DL (ref 70–99)
HCO3 SERPL-SCNC: 22 MMOL/L (ref 22–29)
HCT VFR BLD AUTO: 35.8 % (ref 35–47)
HGB BLD-MCNC: 12.4 G/DL (ref 11.7–15.7)
MAGNESIUM SERPL-MCNC: 1.6 MG/DL (ref 1.7–2.3)
MCH RBC QN AUTO: 36.9 PG (ref 26.5–33)
MCHC RBC AUTO-ENTMCNC: 34.6 G/DL (ref 31.5–36.5)
MCV RBC AUTO: 107 FL (ref 78–100)
PHOSPHATE SERPL-MCNC: 2.2 MG/DL (ref 2.5–4.5)
PLATELET # BLD AUTO: 110 10E3/UL (ref 150–450)
POTASSIUM SERPL-SCNC: 4.2 MMOL/L (ref 3.4–5.3)
RBC # BLD AUTO: 3.36 10E6/UL (ref 3.8–5.2)
SODIUM SERPL-SCNC: 140 MMOL/L (ref 135–145)
SPECIMEN TYPE: ABNORMAL
SPECIMEN TYPE: ABNORMAL
TACROLIMUS BLD-MCNC: 6.6 UG/L (ref 5–15)
TME LAST DOSE: NORMAL H
TME LAST DOSE: NORMAL H
WBC # BLD AUTO: 1.8 10E3/UL (ref 4–11)

## 2025-01-06 PROCEDURE — 36415 COLL VENOUS BLD VENIPUNCTURE: CPT | Performed by: PHYSICIAN ASSISTANT

## 2025-01-06 PROCEDURE — 83735 ASSAY OF MAGNESIUM: CPT | Performed by: NURSE PRACTITIONER

## 2025-01-06 PROCEDURE — 80048 BASIC METABOLIC PNL TOTAL CA: CPT | Performed by: NURSE PRACTITIONER

## 2025-01-06 PROCEDURE — 82330 ASSAY OF CALCIUM: CPT | Performed by: PHYSICIAN ASSISTANT

## 2025-01-06 PROCEDURE — 250N000012 HC RX MED GY IP 250 OP 636 PS 637: Performed by: PHYSICIAN ASSISTANT

## 2025-01-06 PROCEDURE — 99233 SBSQ HOSP IP/OBS HIGH 50: CPT | Performed by: INTERNAL MEDICINE

## 2025-01-06 PROCEDURE — 250N000009 HC RX 250: Performed by: NURSE PRACTITIONER

## 2025-01-06 PROCEDURE — 84132 ASSAY OF SERUM POTASSIUM: CPT | Performed by: NURSE PRACTITIONER

## 2025-01-06 PROCEDURE — 36415 COLL VENOUS BLD VENIPUNCTURE: CPT | Performed by: NURSE PRACTITIONER

## 2025-01-06 PROCEDURE — 85027 COMPLETE CBC AUTOMATED: CPT | Performed by: NURSE PRACTITIONER

## 2025-01-06 PROCEDURE — 120N000011 HC R&B TRANSPLANT UMMC

## 2025-01-06 PROCEDURE — 80197 ASSAY OF TACROLIMUS: CPT | Performed by: NURSE PRACTITIONER

## 2025-01-06 PROCEDURE — 84100 ASSAY OF PHOSPHORUS: CPT | Performed by: NURSE PRACTITIONER

## 2025-01-06 PROCEDURE — 258N000003 HC RX IP 258 OP 636: Performed by: NURSE PRACTITIONER

## 2025-01-06 PROCEDURE — 258N000003 HC RX IP 258 OP 636: Performed by: SURGERY

## 2025-01-06 PROCEDURE — 250N000011 HC RX IP 250 OP 636: Performed by: SURGERY

## 2025-01-06 PROCEDURE — 250N000013 HC RX MED GY IP 250 OP 250 PS 637: Performed by: NURSE PRACTITIONER

## 2025-01-06 PROCEDURE — 250N000013 HC RX MED GY IP 250 OP 250 PS 637: Performed by: PHYSICIAN ASSISTANT

## 2025-01-06 PROCEDURE — 99233 SBSQ HOSP IP/OBS HIGH 50: CPT | Mod: FS | Performed by: NURSE PRACTITIONER

## 2025-01-06 RX ORDER — POTASSIUM PHOS IN 0.9 % NACL 15MMOL/250
15 PLASTIC BAG, INJECTION (ML) INTRAVENOUS ONCE
Status: COMPLETED | OUTPATIENT
Start: 2025-01-06 | End: 2025-01-06

## 2025-01-06 RX ORDER — PREDNISONE 5 MG/1
5 TABLET ORAL DAILY
Qty: 30 TABLET | Refills: 2 | Status: SHIPPED | OUTPATIENT
Start: 2025-01-06

## 2025-01-06 RX ORDER — MAGNESIUM OXIDE 400 MG/1
800 TABLET ORAL 2 TIMES DAILY
Status: DISCONTINUED | OUTPATIENT
Start: 2025-01-06 | End: 2025-01-07 | Stop reason: HOSPADM

## 2025-01-06 RX ADMIN — SODIUM CHLORIDE 1000 ML: 9 INJECTION, SOLUTION INTRAVENOUS at 17:33

## 2025-01-06 RX ADMIN — TACROLIMUS 0.5 MG: 0.5 CAPSULE ORAL at 17:38

## 2025-01-06 RX ADMIN — PAROXETINE 30 MG: 30 TABLET, FILM COATED ORAL at 20:14

## 2025-01-06 RX ADMIN — POTASSIUM PHOSPHATE, MONOBASIC POTASSIUM PHOSPHATE, DIBASIC 15 MMOL: 236; 224 INJECTION, SOLUTION INTRAVENOUS at 09:08

## 2025-01-06 RX ADMIN — ASPIRIN 81 MG CHEWABLE TABLET 81 MG: 81 TABLET CHEWABLE at 07:45

## 2025-01-06 RX ADMIN — SODIUM CHLORIDE 1000 ML: 9 INJECTION, SOLUTION INTRAVENOUS at 04:32

## 2025-01-06 RX ADMIN — SULFAMETHOXAZOLE AND TRIMETHOPRIM 1 TABLET: 400; 80 TABLET ORAL at 07:45

## 2025-01-06 RX ADMIN — FOSCARNET SODIUM 6000 MG: 24 INJECTION, SOLUTION INTRAVENOUS at 20:03

## 2025-01-06 RX ADMIN — PREDNISONE 5 MG: 5 TABLET ORAL at 07:45

## 2025-01-06 RX ADMIN — TACROLIMUS 1 MG: 1 CAPSULE ORAL at 07:45

## 2025-01-06 RX ADMIN — MAGNESIUM OXIDE TAB 400 MG (241.3 MG ELEMENTAL MG) 800 MG: 400 (241.3 MG) TAB at 07:46

## 2025-01-06 RX ADMIN — FOSCARNET SODIUM 6000 MG: 24 INJECTION, SOLUTION INTRAVENOUS at 06:39

## 2025-01-06 RX ADMIN — ATORVASTATIN CALCIUM 10 MG: 10 TABLET, FILM COATED ORAL at 07:46

## 2025-01-06 RX ADMIN — MAGNESIUM OXIDE TAB 400 MG (241.3 MG ELEMENTAL MG) 800 MG: 400 (241.3 MG) TAB at 20:16

## 2025-01-06 RX ADMIN — AZATHIOPRINE 50 MG: 50 TABLET ORAL at 20:13

## 2025-01-06 ASSESSMENT — ACTIVITIES OF DAILY LIVING (ADL)
ADLS_ACUITY_SCORE: 35
DEPENDENT_IADLS:: INDEPENDENT
ADLS_ACUITY_SCORE: 35

## 2025-01-06 NOTE — PLAN OF CARE
/72 (BP Location: Left arm)   Pulse 90   Temp 98.1  F (36.7  C) (Oral)   Resp 16   Wt 72.8 kg (160 lb 6.4 oz)   SpO2 96%   BMI 29.34 kg/m       Goal Outcome Evaluation:       [id number]    Overall Patient Progress: no changeOverall Patient Progress: no change     VSS, on RA. A&Ox4. Ind in rm. Reg diet. Denies N/V/SOB. Denies pain. Voiding spont, AUO. LBM 1/5, passing gas. Ca ionized q6, 4.6 & 5.0, next recheck 1130. PIV SL. R AVF. Cont POC.

## 2025-01-06 NOTE — PROGRESS NOTES
Care Management Initial Consult    General Information  Assessment completed with: Patient, Care Team Member, -chart review,    Type of CM/SW Visit: Initial Assessment    Primary Care Provider verified and updated as needed: Yes (Dr. Rad PINTO [facility])   Readmission within the last 30 days:        Reason for Consult: discharge planning  Advance Care Planning: Advance Care Planning Reviewed: no concerns identified          Communication Assessment  Patient's communication style: spoken language (English or Bilingual)    Hearing Difficulty or Deaf: no   Wear Glasses or Blind: yes    Cognitive  Cognitive/Neuro/Behavioral: WDL                      Living Environment:   People in home: spouse     Current living Arrangements: house      Able to return to prior arrangements: yes       Family/Social Support:  Care provided by: self  Provides care for: no one  Marital Status:   Support system:           Description of Support System: Supportive, Involved         Current Resources:   Patient receiving home care services: No        Community Resources: None  Equipment currently used at home: none  Supplies currently used at home: None    Employment/Financial:  Employment Status: employed full-time        Financial Concerns: none           Does the patient's insurance plan have a 3 day qualifying hospital stay waiver?  No    Lifestyle & Psychosocial Needs:  Social Drivers of Health     Food Insecurity: Low Risk  (1/3/2025)    Food Insecurity     Within the past 12 months, did you worry that your food would run out before you got money to buy more?: No     Within the past 12 months, did the food you bought just not last and you didn t have money to get more?: No   Depression: Not at risk (8/30/2024)    PHQ-2     PHQ-2 Score: 0   Housing Stability: Low Risk  (1/3/2025)    Housing Stability     Do you have housing? : Yes     Are you worried about losing your housing?: No   Tobacco Use: Low Risk  (8/30/2024)     Patient History     Smoking Tobacco Use: Never     Smokeless Tobacco Use: Never     Passive Exposure: Never   Financial Resource Strain: Low Risk  (1/3/2025)    Financial Resource Strain     Within the past 12 months, have you or your family members you live with been unable to get utilities (heat, electricity) when it was really needed?: No   Alcohol Use: Not on file   Transportation Needs: Low Risk  (1/3/2025)    Transportation Needs     Within the past 12 months, has lack of transportation kept you from medical appointments, getting your medicines, non-medical meetings or appointments, work, or from getting things that you need?: No   Physical Activity: Not on file   Interpersonal Safety: Not on file   Stress: Not on file   Social Connections: Socially Integrated (6/3/2024)    Received from [facility]    Social Connections     Do you often feel lonely or isolated from those around you?: 0   Health Literacy: Not on file       Functional Status:  Prior to admission patient needed assistance:   Dependent ADLs:: Independent  Dependent IADLs:: Independent       Mental Health Status:  Mental Health Status: No Current Concerns       Chemical Dependency Status:  Chemical Dependency Status: No Current Concerns             Values/Beliefs:  Spiritual, Cultural Beliefs, Scientology Practices, Values that affect care: no               Discussed  Partnership in Safe Discharge Planning  document with patient/family: No    Additional Information:  Notified by [facility] team anticipates pt will discharge on IV Foscarnet while waiting on confirmation of coverage/approval for maribavir.    Initiated referral to [facility].    Per chart pt with a medical history significant for kidney transplant 4/2024 admitted for ganciclovir resistant CMV.      Met with pt and spouse.  Prior to hospitalization pt managed all her own care needs, working full time.  Reviewed anticipated discharge plan/home infusion  services.  Pt and spouse are willing to learn how to administer foscarnet.  Pt and spouse noted one concern is that they have a trip planned leaving Friday 1/10 for one week to [facility].     Reviewed above with [facility] Transplant.  Oral maribavir has been approved but needs to shipped from [facility], anticipated arrival tomorrow.  Awaiting response from ID on discharge status today vs wait until oral maribavir arrives (per pharmacy liaison would be shipped to pt home but [facility] would need script to process request.  Brenda AMBROSIO Transplant sent script via EPIC.     1335: Notified by [facility] Transplant per ID patient will need to be on IV foscarnet until CMV level is below 8000.   Messaged Yaz Moreno, [facility] Liaison.  [facility] benefit check is still pending.      Next Steps:   Awaiting response from transplant ID   Coordinate home infusion set up pending transplant ID plan.    Yudith Jimenez, RN BSN, PHN, ACM-RN  January 6, 2025  7A Nurse Coordinator    Phone: [phone]  Available on [employer] 7A SOT RNCC  Weekend/Holiday 7A SOT RNCC    1/6/2025

## 2025-01-06 NOTE — DISCHARGE SUMMARY
[facility]    Discharge Summary  Transplant Surgery    Date of Admission:  1/3/2025  Date of Discharge:  1/7/2025  Discharging Provider: Carmen Luna PA-C/Dr. 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 She was admitted to [facility] on 1/3/2025 for management of ganciclovir-resistant CMV.     Hospital Course   Transplant:   Kidney: Creatinine baseline 0.7-0.9; 0.6 on the day of discharge.      Immunosuppression management:   Maintenance:   -Prednisone 5mg daily  -Azathioprine 50mg daily  -Tacrolimus 1 mg every morning and 0.5 mg every evening, goal level 6-8    Historical Maintenance Change:  -MMF discontinued due to mucocutaneous and vulvar ulcers     Rejection: None.     Infection Prophylaxis:   Pneumocystis: Bactrim     Transplant Coordinator: Radha Jorge RN     Hematology:   Leukopenia, chronic: Monitor. ANC 1.9 on 1/3/2025.   Macrocytosis: Likely due to azathioprine.     Cardiorespiratory: No acute issues.      GI/Nutrition:   GERD: Continue Voquezna (interstitial nephritis w/ PPI).  Crohn's: Previously was on ustekinumab prior to kidney transplant.     Endocrine: No acute issues.      Fluid/Electrolytes:   Chronic hypomagnesemia: Continue MagOx; increased to BID  Hypophosphatemia: Repletion in-hospital. Neutra Phos PO BID at discharge.      : No acute issues.      Infectious disease:   BK viremia: 12/31 547 copies/ml.   Ganciclovir-resistant CMV: CMV viral load rising on treatment dose valganciclovir. Known resistance to ganciclovir & cidofovir; CMV  resistance panel from 12/31: sensitive to foscarnet, cidofovir, maribavir, letermovir. She was treated in-hospital with IV foscarnet. She will discharge on IV foscarnet, and continue until ID recommends switching to PO maribavir (likely when DNA PCR is < 8000)              Plan per ID:   Recommendations  Continue Foscarnet 90 mg/kg BID, dose adjust to 70mg/kg BID if Cr >/= 0.80   Labs twice weekly as an outpatient  Appreciate support to start process to obtain maribavir  Check weekly CMV viral load (Mondays)  Appreciate minimizing immunosuppression to hasten CMV recovery and limit time on these nephrotoxic drugs    Transplant Staff: Dr. Carlitos Díaz     Significant Results and Procedures   PICC placement for foscarnet at home    Pending Results   These results will be followed up by Transplant Service   Unresulted Labs Ordered in the Past 30 Days of this Admission       Date and Time Order Name Status Description    1/6/2025  8:54 AM Cytomegalovirus DNA by PCR, Quantitative In process     1/5/2025 10:42 AM Cytomegalovirus DNA by PCR, Quantitative In process             Code Status   Full    Primary Care Physician   BRIDGET PARKS    Physical Exam   Temp: 98.1  F (36.7  C) Temp src: Oral BP: 113/72 Pulse: 84   Resp: 16 SpO2: 96 % O2 Device: None (Room air)    Vitals:    01/03/25 1636 01/05/25 0525 01/06/25 0658   Weight: 71.5 kg (157 lb 9.6 oz) 72.8 kg (160 lb 6.4 oz) 72.7 kg (160 lb 3.2 oz)     Vital Signs with Ranges  Temp:  [98.1  F (36.7  C)-98.6  F (37  C)] 98.1  F (36.7  C)  Pulse:  [83-91] 84  Resp:  [16] 16  BP: (102-119)/(67-82) 113/72  SpO2:  [96 %-98 %] 96 %  I/O last 3 completed shifts:  In: 1056 [P.O.:1056]  Out: 4750 [Urine:4750]    Constitutional: Awake, alert, cooperative, no apparent distress, and appears stated age.  Eyes: Lids and lashes normal, pupils equal, round  ENT: Normocephalic, without obvious abnormality, atraumatic  Respiratory: Nonlabored resps on RA  Cardiovascular: Perfused  GI:  Nontender  Genitourinary:  Voiding  Neurologic: Awake, alert, oriented to name, place and time.    Neuropsychiatric: Calm, normal eye contact, alert, normal affect, oriented to self, place, time and situation, memory for past and recent events intact and thought process normal.    Time Spent on this Encounter   I, Carmen Luna PA-C, personally saw the patient today and spent greater than 30 minutes discharging this patient.    Discharge Disposition   Discharged to home  Condition at discharge: Stable    Consultations This Hospital Stay   NEPHROLOGY KIDNEY/PANCREAS TRANSPLANT ADULT IP CONSULT  INFECTIOUS DISEASE TRANSPLANT SOT ADULT IP CONSULT  NURSING TO CONSULT FOR VASCULAR ACCESS CARE IP CONSULT  PHARMACY IP CONSULT  PHARMACY LIAISON FOR MEDICATION COVERAGE CONSULT  NURSING TO CONSULT FOR VASCULAR ACCESS CARE IP CONSULT  PHARMACY LIAISON FOR MEDICATION COVERAGE CONSULT    Discharge Orders   No discharge procedures on file.  Discharge Medications   Current Discharge Medication List        START taking these medications    Details   maribavir (LIVTENCITY) 200 MG tablet Take 2 tablets (400 mg) by mouth 2 times daily.  Qty: 120 tablet, Refills: 11    Associated Diagnoses: Cytomegalovirus (CMV) viremia (H)           CONTINUE these medications which have NOT CHANGED    Details   acetaminophen (TYLENOL) 500 MG tablet Take 1,000 mg by mouth every 6 hours as needed      aspirin 81 MG EC tablet Take 81 mg by mouth daily.      atorvastatin (LIPITOR) 10 MG tablet TAKE ONE TABLET BY MOUTH ONCE DAILY  Qty: 30 tablet, Refills: 1    Associated Diagnoses: Kidney replaced by transplant      azaTHIOprine (IMURAN) 50 MG tablet Take 1 tablet (50 mg) by mouth daily.  Qty: 30 tablet, Refills: 11    Comments: TXP DT 4/17/2024 (Kidney) TXP Dischg DT 4/21/2024 DX Kidney replaced by transplant Z94.0 [facility] ([place], [place])  Associated Diagnoses: Kidney replaced by transplant       hydrOXYzine HCl (ATARAX) 25 MG tablet Take 1 tablet (25 mg) by mouth every 6 hours as needed for other (adjuvant pain)  Qty: 20 tablet, Refills: 0    Associated Diagnoses: Odynophagia      ketoconazole (NIZORAL) 2 % external shampoo Apply topically daily as needed for itching or irritation (use there times a week and let sit on scalp for 5 minutes).  Qty: 120 mL, Refills: 11    Associated Diagnoses: Dermatitis, seborrheic      levonorgestrel (KYLEENA) 19.5 MG IUD 1 Device by Intrauterine route      magnesium oxide (MAG-OX) 400 MG tablet Take 2 tablets (800 mg) by mouth daily  Qty: 60 tablet, Refills: 0    Associated Diagnoses: Hypomagnesemia      PARoxetine (PAXIL) 30 MG tablet Take 30 mg by mouth every evening      predniSONE (DELTASONE) 5 MG tablet Take 1 tablet (5 mg) by mouth daily.  Qty: 30 tablet, Refills: 2    Associated Diagnoses: Kidney replaced by transplant      sulfamethoxazole-trimethoprim (BACTRIM) 400-80 MG tablet Take 1 tablet by mouth daily  Qty: 30 tablet, Refills: 11    Associated Diagnoses: Aftercare following organ transplant      !! tacrolimus (GENERIC EQUIVALENT) 0.5 MG capsule Take 1 capsule (0.5 mg) by mouth every evening. 1mg AM and 0.5mg PM  Qty: 90 capsule, Refills: 3    Comments: TXP DT 4/17/2024 (Kidney) TXP Dischg DT 4/21/2024 DX Kidney replaced by transplant Z94.0 [facility] ([place], [place])  Associated Diagnoses: Kidney replaced by transplant      !! tacrolimus (GENERIC EQUIVALENT) 1 MG capsule Take 1 capsule (1 mg) by mouth every morning. 1mg AM and 0.5mg PM  Qty: 90 capsule, Refills: 3    Comments: TXP DT 4/17/2024 (Kidney) TXP Dischg DT 4/21/2024 DX Kidney replaced by transplant Z94.0 [facility] ([place], [place])  Associated Diagnoses: Kidney replaced by transplant      triamcinolone (KENALOG) 0.1 % paste Take by mouth 2 times daily  Qty: 5 g, Refills: 3    Associated Diagnoses:  Vulvar ulcer      valGANciclovir (VALCYTE) 450 MG tablet Take 2 tablets (900 mg) by mouth 2 times daily.  Qty: 90 tablet, Refills: 3    Associated Diagnoses: Kidney replaced by transplant      Vonoprazan Fumarate (VOQUEZNA) 20 MG TABS Take 20 mg by mouth daily  Qty: 60 tablet, Refills: 0    Associated Diagnoses: Ulcer of esophagus without bleeding       !! - Potential duplicate medications found. Please discuss with provider.        Allergies   Allergies   Allergen Reactions    Amlodipine Headache and Other (See Comments)     Other Reaction(s): Unknown    Omeprazole Other (See Comments)     All PPIs per patient    Proton Pump Inhibitors Nephrotoxicity     No PPIs due to history of renal failure due to interstitial nephritis    Tegaderm Transparent Dressing (Informational Only) Blisters     Data   Most Recent 3 Creatinines:  Recent Labs   Lab Test 01/07/25  0545 01/06/25  0538 01/05/25  0540   CR 0.60 0.67 0.73

## 2025-01-06 NOTE — PROGRESS NOTES
/72 (BP Location: Left arm)   Pulse 84   Temp 98.1  F (36.7  C) (Oral)   Resp 16   Wt 72.7 kg (160 lb 3.2 oz)   SpO2 96%   BMI 29.30 kg/m        0793-5307  VSS on RA.  at bedside, supportive. A+Ox4. Regular diet. LBM 1/5. Denies pain or nausea. Voiding WOD. Potassium phosphate given this shift. R AVF. Ionized calcium checks q6, 4.7 at 1127. Foscarnet q12. Up ad roya. Will continue to monitor and notify team with changes.

## 2025-01-06 NOTE — PROGRESS NOTES
Transplant Surgery  Inpatient Daily Progress Note  01/06/2025    Assessment & Plan: Ira Zamora is a 34 year old female with PMHx of interstitial nephritis secondary to PPIs s/p kidney transplant (pediatric en bloc) 4/17/2024 with BL Cr 0.7-0.9, Crohn's disease, anxiety, HTN, and GERD. 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 Admit for management of ganciclovir-resistant CMV.     Changes today:  - awaiting maribavir coverage   - repletion of magnesium and phosphorus      Transplant:   Kidney: Cr 0.73, baseline 0.7-0.9.     Immunosuppression management:   -Prednisone 5mg daily  -Azathioprine 50mg daily  -Tac 1 mg every morning and 0.5 mg every evening, goal level 6-8.     Hematology:   Leukopenia, chronic: Monitor. Likely related to Infection (CMV) and medications, WBC 1.6  Macrocytosis: Likely due to azathioprine.     Cardiorespiratory: No acute issues.      GI/Nutrition:   GERD: Continue Voquezna (interstitial nephritis w/ PPI).  Crohn's: Previously was on ustekinumab prior to kidney transplant.     Endocrine: No acute issues.      Fluid/Electrolytes:   Chronic hypomagnesemia: Increase MagOx 1/6.   Hypophosphatemia: Repletion today.      : No acute issues.      Infectious disease:   BK viremia: 12/31 547 copies/ml.   Ganciclovir-resistant CMV: CMV viral load rising on treatment dose valganciclovir. Known resistance to ganciclovir & cidofovir.    Plan per ID:  Recommendations  Continue Foscarnet 90 mg/kg BID, dose adjust to 70mg/kg BID if Cr >/= 0.80   Daily BMP and aggressive electrolyte monitoring  Appreciate support to start process to obtain maribavir  Check weekly CMV viral load, await resistance testing from 12/31/24  Appreciate minimizing immunosuppression to hasten CMV recovery and limit time on these nephrotoxic drugs     Prophylaxis: TMP/sulfa    Disposition: 7A; discharge pending OP  medication coverage     Medical Decision Making: Medium  Subsequent visit 81258 (moderate level decision making)    ERICA/Fellow/Resident Provider:   LAURITA Arias CNP  Adult Solid Organ Transplant   Contact: Vocera Web Console    Faculty: Dr. Díaz   _________________________________________________________________  Transplant History: Admitted 1/3/2025 for ganciclovir resistant CMV.  4/17/2024 (Kidney), Postoperative day: 264     Interval History: History is obtained from the patient and patient's chart  Overnight events: No acute events    ROS:   A 10-point review of systems was negative except as noted above.    Meds:  Current Facility-Administered Medications   Medication Dose Route Frequency Provider Last Rate Last Admin    aspirin (ASA) chewable tablet 81 mg  81 mg Oral Daily Judy Gallardo PA-C   81 mg at 01/05/25 0734    atorvastatin (LIPITOR) tablet 10 mg  10 mg Oral Daily Judy Gallardo PA-C   10 mg at 01/05/25 0734    azaTHIOprine (IMURAN) tablet 50 mg  50 mg Oral QPM Judy Gallardo PA-C   50 mg at 01/05/25 1952    foscarnet (FOSCAVIR) 6,000 mg in D5W 500 mL intermittent infusion  90 mg/kg Intravenous Q12H Herber Patel  mL/hr at 01/05/25 1827 6,000 mg at 01/05/25 1827    magnesium oxide (MAG-OX) tablet 800 mg  800 mg Oral Daily Judy Gallardo PA-C   800 mg at 01/05/25 0734    PARoxetine (PAXIL) tablet 30 mg  30 mg Oral QPM Judy Gallardo PA-C   30 mg at 01/05/25 1952    predniSONE (DELTASONE) tablet 5 mg  5 mg Oral Daily Judy Gallardo PA-C   5 mg at 01/05/25 0734    sodium chloride (PF) 0.9% PF flush 3 mL  3 mL Intracatheter Q8H Holli Nelson APRN CNP   3 mL at 01/05/25 0736    sodium chloride 0.9% BOLUS 1,000 mL  1,000 mL Intravenous Q12H Herber Patel  mL/hr at 01/06/25 0432 1,000 mL at 01/06/25 0432    sulfamethoxazole-trimethoprim (BACTRIM) 400-80 MG per tablet 1 tablet  1 tablet Oral Daily  Judy Gallardo PA-C   1 tablet at 01/05/25 0734    tacrolimus (GENERIC EQUIVALENT) capsule 0.5 mg  0.5 mg Oral QPM Judy Gallardo PA-C   0.5 mg at 01/05/25 1828    tacrolimus (GENERIC EQUIVALENT) capsule 1 mg  1 mg Oral QAM Judy Gallardo PA-C   1 mg at 01/05/25 0734    [Held by provider] triamcinolone (KENALOG) 0.1 % paste   Mouth/Throat BID Judy Gallardo PA-C        Vonoprazan Fumarate TABS 20 mg  20 mg Oral Daily Judy Gallardo PA-C   20 mg at 01/05/25 1953       Physical Exam:     Admit Weight: 71.5 kg (157 lb 9.6 oz)    Current vitals:   /72 (BP Location: Left arm)   Pulse 90   Temp 98.1  F (36.7  C) (Oral)   Resp 16   Wt 72.8 kg (160 lb 6.4 oz)   SpO2 96%   BMI 29.34 kg/m           Vital sign ranges:    Temp:  [98.1  F (36.7  C)-98.6  F (37  C)] 98.1  F (36.7  C)  Pulse:  [83-97] 90  Resp:  [16] 16  BP: (102-119)/(67-86) 104/72  SpO2:  [96 %-98 %] 96 %  Patient Vitals for the past 24 hrs:   BP Temp Temp src Pulse Resp SpO2   01/06/25 0555 104/72 98.1  F (36.7  C) Oral 90 16 96 %   01/06/25 0207 116/82 98.2  F (36.8  C) Oral 91 16 96 %   01/05/25 2142 102/67 98.6  F (37  C) Oral 83 16 97 %   01/05/25 1833 119/82 98.1  F (36.7  C) Oral 87 16 98 %   01/05/25 0927 118/86 98.3  F (36.8  C) Oral 97 16 98 %     General Appearance: No apparent distress.   Skin: Normal, warm.  Heart: Appears adequately perfused.   Lungs: Nonlabored resps on RA  Abdomen: The abdomen is non-distended.   Neurologic: awake, alert, and oriented. Tremor absent.    Data:   CMP  Recent Labs   Lab 01/06/25  0538 01/05/25  2344 01/05/25  1131 01/05/25  0540 01/04/25  0614 01/03/25  1651     --   --  140   < > 138   POTASSIUM 4.2  --   --  3.7   < > 3.7   CHLORIDE 110*  --   --  110*   < > 106   CO2 22  --   --  21*   < > 20*   GLC 90  --   --  93   < > 151*   BUN 9.9  --   --  12.9   < > 9.3   CR 0.67  --   --  0.73   < > 0.84   GFRESTIMATED >90  --   --  >90   < > >90   ROBLES 8.9  --   --  8.8   <  "> 9.7   ICAW 5.0 4.6   < > 4.7   < >  --    MAG 1.6*  --   --  1.4*  --  1.5*   PHOS 2.2*  --   --   --   --  2.5    < > = values in this interval not displayed.     CBC  Recent Labs   Lab 01/06/25  0538 01/05/25  0540   HGB 12.4 11.5*   WBC 1.8* 1.6*   * 107*     COAGSNo lab results found in last 7 days.    Invalid input(s): \"XA\"   Urinalysis  Recent Labs   Lab Test 07/06/24  1841 06/06/24  1113   COLOR Yellow Yellow   APPEARANCE Clear Clear   URINEGLC Negative Negative   URINEBILI Negative Negative   URINEKETONE Negative Negative   SG 1.015 1.028   UBLD Moderate* Small*   URINEPH 7.0 5.5   PROTEIN Negative 10*   NITRITE Negative Negative   LEUKEST Trace* Moderate*   RBCU 1 2   WBCU 3 6*     Virology:  EBV Qualitative PCR   Date Value Ref Range Status   07/02/2024 Detected (A)  Final     Comment:     INTERPRETIVE INFORMATION:  Jake Barr Virus by PCR    This test was developed and its performance characteristics   determined by [facility]. It has not been cleared or   approved by the US Food and Drug Administration. This test   was performed in a CLIA certified laboratory and is   intended for clinical purposes.  Performed By: [facility]  [address] [place] [id number]  : Sam Abel MD, PhD  CLIA Number: [id number]     Hepatitis C Antibody   Date Value Ref Range Status   04/17/2024 Nonreactive Nonreactive Final     Comment:     A nonreactive screening test result does not exclude the possibility of exposure to or infection with HCV. Nonreactive screening test results in individuals with prior exposure to HCV may be due to antibody levels below the limit of detection of this assay or lack of reactivity to the HCV antigens used in this assay. Patients with recent HCV infections (<3 months from time of exposure) may have false-negative HCV antibody results due to the time needed for seroconversion (average of 8 to 9 weeks).     BK Virus DNA IU/mL   Date " Value Ref Range Status   05/23/2024 Not Detected Not Detected IU/mL Final

## 2025-01-06 NOTE — PROGRESS NOTES
[facility]  Transplant Nephrology Progress Note  Date of Admission:  1/3/2025  Today's Date: 01/06/2025  Requesting physician: Herber Patel*    Recommendations:   - Replete magnesium IV today and continue PO magnesium oxide 800 mg bid.   - continue on current immunosuppression   - Appreciate ID recommendations for CMV management. Agree with continuing Foscarnet for CMV viremia.  - No acute indications for dialysis.     Assessment & Plan   # DDKT: CKD stage I, Stable; creatinine at baseline. Good urine output.    - Baseline Creatinine: ~ 0.7-0.9   - Proteinuria: Normal (<0.2 grams)   - DSA Hx: No DSA   - Last cPRA: 9%   - BK Viremia: Yes, minimally elevated (< 1000)   - Kidney Tx Biopsy Hx: No biopsy history.    # H/o Interstitial Nephritis: No evidence of recurrence.     # Immunosuppression: Tacrolimus immediate release (goal 6-8), Azathioprine (dose 50 mg daily), and Prednisone (dose 5 mg daily)   - Induction with Recent Transplant:  High Intensity Protocol   - Continue with intensive monitoring of immunosuppression for efficacy and toxicity.   - Historical Changes in Immunosuppression:  Changed from mycophenolate to azathioprine and added prednisone due to oral and genital ulcers, concern for Behcet's versus mycophenolate toxicity.  Decreased azathioprine dose due to BK and CMV viremia.   - Changes: Not at this time    # Infection Prevention:   - PJP: Sulfa/TMP (Bactrim)  - CMV: Being treated for CMV viremia and/or disease      - CMV IgG Ab High Risk Discordance (D+/R-): Yes  CMV Serostatus: Positive  - EBV IgG Ab High Risk Discordance (D+/R-): No  EBV Serostatus: Positive    # Hypertension: Controlled;  Goal BP: > 100, but < 130 systolic   - Changes: Not at this time    # Leukopenia: Trend down, moderately low WBC at ~ 2.  Possibly due to medications and/or CMV disease.              - Will check differential and consider filgrastim if ANC < 500.    #  Anemia in Chronic Renal Disease: Hgb: Stable      JUNG: No   - Iron studies: Replete    # Mineral Bone Disorder:    - Secondary renal hyperparathyroidism; PTH level: Minimally elevated ( pg/ml)        On treatment: None  - Vitamin D; level: Not checked recently, but was normal last check        On supplement: No  - Calcium; level: Normal        On supplement: No    # Electrolytes:   - Potassium; level: Normal        On supplement: No  - Magnesium; level: Low        On supplement: Yes  - Bicarbonate; level: Normal        On supplement: No  - Sodium; level: Normal    # BK Viremia: Stable, minimally elevated BK PCR at ~ 500 with last check dec/2024.  IgG level is normal.  Decreased immunosuppression.              - Will continue to follow BK PCR every 2 weeks.     # CMV Viremia: Trend up, elevated CMV PCR at ~ 68522 with last check dec/2024.  CMV IgG Ab positive. Was on Valcyte.  IgG level is normal.  Decreased immunosuppression.   - resistance panel showed resistance to Ganciclovir, so ID was consulted and initiated Foscarnet.               - Will continue to follow CMV PCR weekly.     # H/o Oral and Genital Ulcers: Patient with h/o significant and debilitating ulcers due to unclear etiology.  Doubt EBV as a cause, although positive staining on biopsy.  Felt possibly related to Behcet's or mycophenolate toxicity.  Symptoms resolved with overall decrease in immunosuppression and change from mycophenolate to azathioprine.  Followed by Transplant ID and Rheumatology in the past.    # Other Significant PMH:   - GERD: Asymptomatic and now off medications.              - Crohn's Disease: Appears to be stable with no recent symptoms.  Previously was on ustekinumab prior to kidney transplant, but hasn't restarted it due to ongoing oral and vaginal ulcers    # Transplant History:  Etiology of Kidney Failure: Interstitial nephritis  Tx: DDKT  Transplant: 4/17/2024 (Kidney)  Significant transplant-related complications: BK  Viremia, CMV Viremia, and Oral and genital ulcers    Recommendations were communicated to the primary team verbally.    LAURITA Cruz CNP  Transplant Nephrology  Contact information via Vocera Web Console or via [facility] Paging/Directory        Physician Attestation     I evaluated Ira Zamora as part of a shared APRN/PA visit.     I personally reviewed the vital signs, medications, labs, and imaging.    I personally provided a substantive portion of care for this patient and I approve the care plan as written by the ERICA.  I was involved with Medical Decision Making including:   Pt with stable renal function and asymptomatic with CMV viremia at this point.c continue on current immunosuppression which is at its lowest, decrease in setting of CMV viremia.  CMV viremia management per transplant ID, appreciate their recommendations.  Please see A&P for additional details of medical decision making.    Shayla Tsai MD  Date of Service (when I saw the patient): 01/06/25       Interval History  Ms. Yadavs creatinine is 0.67 (01/06 0538); Trend down.  Good urine output.  Other significant labs/tests/vitals: VSS.  No events overnight.  No chest pain or shortness of breath.  No leg swelling.  No nausea and vomiting.  Bowel movements are normal.  No fever, sweats or chills.      Review of Systems   4 point ROS was obtained and negative except as noted in the Interval History.    MEDICATIONS:  Current Facility-Administered Medications   Medication Dose Route Frequency Provider Last Rate Last Admin    aspirin (ASA) chewable tablet 81 mg  81 mg Oral Daily Judy Gallardo PA-C   81 mg at 01/05/25 0734    atorvastatin (LIPITOR) tablet 10 mg  10 mg Oral Daily Judy Gallardo PA-C   10 mg at 01/05/25 0734    azaTHIOprine (IMURAN) tablet 50 mg  50 mg Oral QPM Judy Gallardo PA-C   50 mg at 01/05/25 1952    foscarnet (FOSCAVIR) 6,000 mg in D5W 500 mL intermittent infusion  90 mg/kg Intravenous Q12H  Herber Patel  mL/hr at 25 0639 6,000 mg at 25 0639    magnesium oxide (MAG-OX) tablet 800 mg  800 mg Oral BID Brenda Wagner NP        PARoxetine (PAXIL) tablet 30 mg  30 mg Oral QPM Judy Gallardo PA-C   30 mg at 25    Potassium Phosphate 15 mmol in NS intermittent infusion 15 mmol  15 mmol Intravenous Once Brenda Wagner NP        predniSONE (DELTASONE) tablet 5 mg  5 mg Oral Daily Judy Gallardo PA-C   5 mg at 25 0734    sodium chloride (PF) 0.9% PF flush 3 mL  3 mL Intracatheter Q8H Holli Nelson APRN CNP   3 mL at 25 0736    sodium chloride 0.9% BOLUS 1,000 mL  1,000 mL Intravenous Q12H Herber Patel  mL/hr at 25 0432 1,000 mL at 25 0432    sulfamethoxazole-trimethoprim (BACTRIM) 400-80 MG per tablet 1 tablet  1 tablet Oral Daily Judy Gallardo PA-C   1 tablet at 25 0734    tacrolimus (GENERIC EQUIVALENT) capsule 0.5 mg  0.5 mg Oral QPM Judy Gallardo PA-C   0.5 mg at 25 1828    tacrolimus (GENERIC EQUIVALENT) capsule 1 mg  1 mg Oral QAM Judy Gallardo PA-C   1 mg at 25 0734    [Held by provider] triamcinolone (KENALOG) 0.1 % paste   Mouth/Throat BID Judy Gallardo PA-C        Vonoprazan Fumarate TABS 20 mg  20 mg Oral Daily Judy Gallardo PA-C   20 mg at 25     Current Facility-Administered Medications   Medication Dose Route Frequency Provider Last Rate Last Admin    Calcium ionized level less than 3.3 mg/dL   Does not apply Continuous PRN Carmen Luna PA-C           Physical Exam   Temp  Av.1  F (36.7  C)  Min: 98  F (36.7  C)  Max: 98.3  F (36.8  C)      Pulse  Av  Min: 74  Max: 90 Resp  Av.2  Min: 16  Max: 18  SpO2  Av.4 %  Min: 94 %  Max: 100 %     /72 (BP Location: Left arm)   Pulse 90   Temp 98.1  F (36.7  C) (Oral)   Resp 16   Wt 72.7 kg (160 lb 3.2 oz)   SpO2 96%   BMI 29.30  kg/m     Date 01/04/25 0700 - 01/05/25 0659   Shift [id number] 9751-3621 4340-0659 24 Hour Total   INTAKE   P.O. 500   500   Shift Total(mL/kg) 500(6.99)   500(6.99)   OUTPUT   Urine 750   750   Shift Total(mL/kg) 750(10.49)   750(10.49)   Weight (kg) 71.49 71.49 71.49 71.49      Admit Weight: 71.5 kg (157 lb 9.6 oz)     GENERAL APPEARANCE: alert and no distress  EYES: eyes grossly normal to inspection  HENT: normal cephalic/atraumatic  RESP: breath sounds clear bilaterally   CV: S1, S2 present and regular  EDEMA: no LE edema bilaterally  ABDOMEN: soft, nondistended, nontender  MS: extremities normal - no gross deformities noted  SKIN: no rash  NEURO: mentation intact and speech normal  TX KIDNEY: normal    Data   All labs reviewed by me.  CMP  Recent Labs   Lab 01/06/25  0538 01/05/25  0540 01/04/25  1423 01/04/25  0614 01/03/25  1651    140  --  141 138   POTASSIUM 4.2 3.7  --  4.0 3.7   CHLORIDE 110* 110*  --  107 106   CO2 22 21*  --  24 20*   ANIONGAP 8 9  --  10 12   GLC 90 93  --  91 151*   BUN 9.9 12.9  --  14.3 9.3   CR 0.67 0.73 0.80 0.78 0.84   GFRESTIMATED >90 >90 >90 >90 >90   ROBLES 8.9 8.8  --  10.1 9.7   MAG 1.6* 1.4*  --   --  1.5*   PHOS 2.2*  --   --   --  2.5     CBC  Recent Labs   Lab 01/06/25  0538 01/05/25  0540 01/04/25  0614 01/03/25  1651   HGB 12.4 11.5* 12.7 13.3   WBC 1.8* 1.6* 2.0* 2.2*   RBC 3.36* 3.20* 3.50* 3.60*   HCT 35.8 34.3* 37.3 37.1   * 107* 107* 103*   MCH 36.9* 35.9* 36.3* 36.9*   MCHC 34.6 33.5 34.0 35.8   RDW 14.4 14.5 14.3 14.1   * 107* 120* 140*     INRNo lab results found in last 7 days.  ABGNo lab results found in last 7 days.   Urine Studies  Recent Labs   Lab Test 07/06/24  1841 06/06/24  1113 05/03/24  1600 04/17/24  1113   COLOR Yellow Yellow Light Yellow Straw   APPEARANCE Clear Clear Clear Clear   URINEGLC Negative Negative 100* 70*   URINEBILI Negative Negative Negative Negative   URINEKETONE Negative Negative Negative Negative   SG 1.015 1.028  1.014 1.004   UBLD Moderate* Small* Negative Trace*   URINEPH 7.0 5.5 5.5 8.5*   PROTEIN Negative 10* Negative 100*   NITRITE Negative Negative Negative Negative   LEUKEST Trace* Moderate* Trace* Negative   RBCU 1 2 2 2   WBCU 3 6* 4 <1     No lab results found.  PTH  Recent Labs   Lab Test 08/13/24  0738 05/23/24  0949 04/22/24  0821   PTHI 71* 131* 138*     Iron Studies  Recent Labs   Lab Test 05/23/24  0949 04/22/24  0821   IRON 77 53   * 147*   IRONSAT 38 36   PRICILA 2,181* 2,181*       IMAGING:  All imaging studies reviewed by me.

## 2025-01-06 NOTE — CONSULTS
Discharge Pharmacy Test Claim    Livtencity and prevymis both require prior authorization through patient's commercial ClearScript plan. Pharmacy liaison will work on the PA request. Please note livtencity is a limited distribution drug that is not available at [facility]. Livtencity can be dispensed at [facility].    Addendum 12:02 pm: livtencity prior authorization approved with a copay of $2700 for one month supply. Liaison applied a copay card to patient's prescription and reduced the copay card to $0. Relayed copay card information to [facility] and requested expedited shipment.     Addendum 4:32 pm: Patient's ClearScript plan has a hard stop on livtencity stating this can only be filled at [facility]. Liaison called [facility] ([phone]) to lift this override. Rep stated that since [facility] does not have access, prescription must go to [facility]. Representative lifted hard stop and prescription was sent to [facility]. Liaison also gave [facility] copay card info.    Addendum 1/7: [facility] stated they're still awaiting [employer] (pharmacy benefit manager for ClearScript) to lift [facility] restriction. [employer] stated they have forwarded the issue internally and may take up to a 7 day turn-around to resolve. Liaison escalated claim with leadership to see if override can be completed by end of day.    Addendum 4:50 pm: received email from [employer] stating hard stop was lifted and removed from livtencity. Liaison called [facility] and confirmed prescription runs through with $0 copay with the copay card applied. [facility] is closed for the evening and will reach out to the patient on 1/8 to set up shipment.    Test Claim Copay Notes   prevymis PA required   livtencity 0.00 must be filled at  [facility]     Brenda Mehta  [facility] [place], [place]  Ph: [phone]  Fax:  [phone]  Available on Teams and Vocera  Disclaimer: Pharmacy test claims are estimates and may not reflect final costs. Suggested alternatives aim to be cost-effective and may not be therapeutically equivalent. This consult is informational and does not constitute medical advice. Clinical decisions should be made by qualified healthcare providers.

## 2025-01-06 NOTE — PROGRESS NOTES
" Transplant & Immunocompromised   Infectious Disease     Follow Up Note   Patient: Ira Zamora, Date of birth 1990, Medical record number [id number].     Assessment and Recommendations   Recommendations  Continue Foscarnet 90 mg/kg BID (started 1/4/25)  Twice a week CBC with diff, CMP, phosphorus.   Weekly CMV PCR.   Maribavir 400 mg bid was ordered to be started as OP but the patient was instructed not to start maribavir until cleared by ID.   Would not start maribavir unless CMV VL is <8000 international unit(s)/mL.   Would place double lumen PICC line for infusion and blood draws.   Would keep PICC line in place and maintain for two weeks after starting maribavir to confirm response to PO maribavir.     Prolonged Parenteral/Oral Antibiotic Recommendations and ID Follow up  This template provides final ID recommendations as of this date. If there are clinical changes or questions please call the ID team.     Infectious Diseases Indication: CMV viremia with resistance.     Antibiotic Information  Name of Antibiotic Dose of Antibiotic1 Pharmacy to assist with dosing Y/N Anticipated duration Effective start date2 End date   Foscarnet  90 mg/kg/bid Y 4 weeks 1/4/25  TBD as outpatient.                    1.Dose of antibiotic will need to be renally adjusted if creatinine clearance changes  2.Effective start date is the date of therapy with appropriate spectrum    Method of antibiotic delivery:PICC line. At the end of therapy should the line be removed? No. Selecting \"yes\" will function as written order to remove PICC line at the end of therapy.    Tentative plans for disposition:  TBD    Weekly labs required:  CMV PCR Weekly. Twice a week CMP, phosphorus, Mg, CBC with diff . Dr. Mccoy will follow labs at discharge until ID follow up. Please have labs faxed to ID clinic.    Appointment to be scheduled: Within 2-4 weeks of discharge. Type of ID Clinic Appointment Okay for in person or a virtual visit. Appointment " will be scheduled with: Yumiko. Routine hospital follow up appointments are 30 minutes. If 60 minutes is necessary due to complexity of case indicate that a 60 min appointment is required. Appointment time Appointment Time: 30 minutes. If the patient remains in the hospital at this date please re-consult ID.     Imaging for ID follow up: ID Imaging: No.   ID provider to route this note to the appropriate [facility] pools: [facility] INFECTIOUS DISEASE ADULT [facility], PANDA, [facility]    [facility]/ID Clinic Information:  [address], [address]  [place]  [address]  Phone: [phone]        Patient Summary:   Transplants:  2024 (Kidney); POD  264.  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 Here with CMV viremia, progressing since October.      ID Problem List and Discussion:   Resistant Refractory donor-derived primary CMV Viremia  At risk for drug-induced adverse events.     CMV viremia occurred while on the CMV universal ppx prompting resistance testing in 10/2024 with UL54 resistance to GCV and CDV. VGCV continued at induction dosing with rising VL.     CMV Trend Below, Resistance testing from 10/28/24 below- on 10/28/24 the key resistance mutation was T503I and additional resistance testing 24 with UL97 high grade mutation to GCV but no UL54 this time around. This discrepant results are not unusual and still reflects significant resistance to GCV/VGCV.   We should continue to use foscarnet until the VL is <8000.  Once VL is <8000 international unit(s)/mL, we can then stop foscarnet and start maribavir  mg bid. I would keep/maintain the PICC line until we ascertain continued response to maribavir.     CMV VL trend                                            CMV resistance panel 10/2024                                                                     CMV resistance panel 12/31/24     - Med History   -  Prophylactic VGCV (900 daily equivalent Renally dosed when indicated) through 10/23/24  -  Treatment Dose VGCV 900 mg BID 10/23/24-1/4/24  -  Foscarnet 1/4/24- Onwards     Previous ID Issues:  - Crohn's disease- follows with [facility] on ustekinumab (Stelara) pre-transplant, last dose 3/2024   - H/o Ulcers 6/5/24  ID work up included swab of the lesion for HSV, NP Mycoplasma pneumoniae PCR, blood adenovirus PCR, CMV PCR, EBV PCR, HIV, syphilis, Coxsackie ab, chlamydia/gonorrhea PCR of cervix, Karius test, urine histo antigen which were negative. Resolved when stopping MPA     Other ID issues:  - Bacterial prophylaxis:  none  - Pneumocystis prophylaxis:  inhaled pentamidine due to leukopenia, last dose 7/9/24, on bactrim now with resolution of leukopenia on stopping MPA  - Viral serostatus: CMV D+/R-, EBV D+/R+, HSV 1?/2?, VZV +  - See above  - Viral prophylaxis:  See above  - Fungal prophylaxis:  none  - Immunosuppression: Induction with thymo and steroid taper; Currently on AZA 50, pred 5, tac 1/0.5   - Immunization status:  No record of COVID recently, or shingrix  - Gamma globulin status:  unknown      History of Presenting Illness from 1/4/25     Ira Zamora is a 34 year old patient who presented on 1/3/2025 for resistant refractory CMV viremia.    She had a kidney transplant in April which was complicated by leukopenia and ulcers from CellCept which was then switched to azathioprine with resolution.  The patient had some intermittent low-level CMV viremia while on prophylaxis which escalated to higher grade CMV viremia in October prompting resistance testing and dose escalation.  Resistance testing did show a UL 54 codon mutation and the patient was continued on valganciclovir without much effect with eventually rapidly rising CMV viremia over the last 2 to 3 weeks  prompting admission for further management.  On admission the patient no symptoms  no fevers, chills or diarrhea (aside from baseline diarrhea). No cough or vision changes.    Review of Symptoms:  A comprehensive 14 system review of symptoms was conducted and was otherwise negative (unless mentioned above)      Interval/Subjective     No events.   One episode of watery diarrhea upon admission and one today otherwise no diarrhea.   No N/V no fever and no abdominal pain.     Review of Symptoms.  A comprehensive 14 system review of symptoms was conducted and was otherwise negative (unless mentioned above)        Physical Exam:     Temp: 98.1  F (36.7  C) Temp src: Oral BP: 113/72 Pulse: 84   Resp: 16 SpO2: 96 % O2 Device: None (Room air)       GENERAL APPEARANCE: Not in acute distress    PHYSICAL EXAM:  Eyes:     Extraocular eye movements grossly intact, no ptosis, no discharge, no scleral icterus.  Mouth, Throat:     Mucous membranes moist  Respiratory:     Inspection: Not in respiratory distress, chest expansion symmetrical.  Musculoskeletal:     No major deformity  Neurologic:     Higher Mental Function: Conversant, AOx4   Motor: Moving all 4 limbs  Psychiatric: Appropriate  Skin:  Anicteric    ATTESTATION   Total duration of visit including chart review, reviewing labs and imaging, interviewing and examining the patient, documentation, and sending communication to the patient and to the primary treating team, all at the same day of this encounter, is: 60 minutes.   Edgardo Mccoy MD   1/6/2025

## 2025-01-07 ENCOUNTER — HOME INFUSION BILLING (OUTPATIENT)
Dept: HOME HEALTH SERVICES | Facility: HOME HEALTH | Age: 35
End: 2025-01-07
Payer: MEDICARE

## 2025-01-07 ENCOUNTER — HOME INFUSION (OUTPATIENT)
Dept: HOME HEALTH SERVICES | Facility: HOME HEALTH | Age: 35
End: 2025-01-07
Payer: MEDICARE

## 2025-01-07 VITALS
TEMPERATURE: 98 F | OXYGEN SATURATION: 99 % | RESPIRATION RATE: 16 BRPM | HEART RATE: 83 BPM | BODY MASS INDEX: 29.12 KG/M2 | DIASTOLIC BLOOD PRESSURE: 63 MMHG | SYSTOLIC BLOOD PRESSURE: 116 MMHG | WEIGHT: 159.2 LBS

## 2025-01-07 DIAGNOSIS — Z94.0 KIDNEY REPLACED BY TRANSPLANT: Chronic | ICD-10-CM

## 2025-01-07 DIAGNOSIS — B25.9 CYTOMEGALOVIRUS (CMV) VIREMIA (H): Primary | ICD-10-CM

## 2025-01-07 LAB
ANION GAP SERPL CALCULATED.3IONS-SCNC: 11 MMOL/L (ref 7–15)
BUN SERPL-MCNC: 12.2 MG/DL (ref 6–20)
CA-I BLD-MCNC: 4.8 MG/DL (ref 4.4–5.2)
CA-I BLD-MCNC: 5 MG/DL (ref 4.4–5.2)
CA-I BLD-MCNC: 5.2 MG/DL (ref 4.4–5.2)
CALCIUM SERPL-MCNC: 9.3 MG/DL (ref 8.8–10.4)
CHLORIDE SERPL-SCNC: 109 MMOL/L (ref 98–107)
CREAT SERPL-MCNC: 0.6 MG/DL (ref 0.51–0.95)
EGFRCR SERPLBLD CKD-EPI 2021: >90 ML/MIN/1.73M2
ERYTHROCYTE [DISTWIDTH] IN BLOOD BY AUTOMATED COUNT: 14.4 % (ref 10–15)
GLUCOSE SERPL-MCNC: 85 MG/DL (ref 70–99)
HCO3 SERPL-SCNC: 18 MMOL/L (ref 22–29)
HCT VFR BLD AUTO: 34.8 % (ref 35–47)
HGB BLD-MCNC: 12.1 G/DL (ref 11.7–15.7)
MAGNESIUM SERPL-MCNC: 1.4 MG/DL (ref 1.7–2.3)
MCH RBC QN AUTO: 36.8 PG (ref 26.5–33)
MCHC RBC AUTO-ENTMCNC: 34.8 G/DL (ref 31.5–36.5)
MCV RBC AUTO: 106 FL (ref 78–100)
PLATELET # BLD AUTO: 123 10E3/UL (ref 150–450)
POTASSIUM SERPL-SCNC: 4.3 MMOL/L (ref 3.4–5.3)
RBC # BLD AUTO: 3.29 10E6/UL (ref 3.8–5.2)
SODIUM SERPL-SCNC: 138 MMOL/L (ref 135–145)
WBC # BLD AUTO: 2.4 10E3/UL (ref 4–11)

## 2025-01-07 PROCEDURE — 99231 SBSQ HOSP IP/OBS SF/LOW 25: CPT | Performed by: INTERNAL MEDICINE

## 2025-01-07 PROCEDURE — 82330 ASSAY OF CALCIUM: CPT | Performed by: PHYSICIAN ASSISTANT

## 2025-01-07 PROCEDURE — 250N000009 HC RX 250: Performed by: PHYSICIAN ASSISTANT

## 2025-01-07 PROCEDURE — 83735 ASSAY OF MAGNESIUM: CPT | Performed by: PHYSICIAN ASSISTANT

## 2025-01-07 PROCEDURE — 36592 COLLECT BLOOD FROM PICC: CPT | Performed by: PHYSICIAN ASSISTANT

## 2025-01-07 PROCEDURE — 250N000013 HC RX MED GY IP 250 OP 250 PS 637: Performed by: NURSE PRACTITIONER

## 2025-01-07 PROCEDURE — 250N000012 HC RX MED GY IP 250 OP 636 PS 637: Performed by: PHYSICIAN ASSISTANT

## 2025-01-07 PROCEDURE — 36569 INSJ PICC 5 YR+ W/O IMAGING: CPT

## 2025-01-07 PROCEDURE — 36415 COLL VENOUS BLD VENIPUNCTURE: CPT | Performed by: PHYSICIAN ASSISTANT

## 2025-01-07 PROCEDURE — 272N000450 HC KIT 4FR POWER PICC SINGLE LUMEN

## 2025-01-07 PROCEDURE — 99233 SBSQ HOSP IP/OBS HIGH 50: CPT | Mod: FS | Performed by: NURSE PRACTITIONER

## 2025-01-07 PROCEDURE — 999N000248 HC STATISTIC IV INSERT WITH US BY RN

## 2025-01-07 PROCEDURE — 250N000011 HC RX IP 250 OP 636: Performed by: SURGERY

## 2025-01-07 PROCEDURE — 85027 COMPLETE CBC AUTOMATED: CPT | Performed by: NURSE PRACTITIONER

## 2025-01-07 PROCEDURE — 80048 BASIC METABOLIC PNL TOTAL CA: CPT | Performed by: NURSE PRACTITIONER

## 2025-01-07 PROCEDURE — 250N000013 HC RX MED GY IP 250 OP 250 PS 637: Performed by: PHYSICIAN ASSISTANT

## 2025-01-07 PROCEDURE — 250N000011 HC RX IP 250 OP 636: Performed by: PHYSICIAN ASSISTANT

## 2025-01-07 PROCEDURE — 272N000277 HC DEVICE 4FR SECURACATH

## 2025-01-07 PROCEDURE — 258N000003 HC RX IP 258 OP 636: Performed by: SURGERY

## 2025-01-07 RX ORDER — LIDOCAINE 40 MG/G
CREAM TOPICAL
Status: DISCONTINUED | OUTPATIENT
Start: 2025-01-07 | End: 2025-01-07 | Stop reason: HOSPADM

## 2025-01-07 RX ORDER — MAGNESIUM SULFATE HEPTAHYDRATE 40 MG/ML
2 INJECTION, SOLUTION INTRAVENOUS ONCE
Status: DISCONTINUED | OUTPATIENT
Start: 2025-01-07 | End: 2025-01-07

## 2025-01-07 RX ORDER — TRIAMCINOLONE ACETONIDE 0.1 %
PASTE (GRAM) DENTAL 2 TIMES DAILY PRN
Status: SHIPPED
Start: 2025-01-07

## 2025-01-07 RX ORDER — MAGNESIUM OXIDE 400 MG/1
800 TABLET ORAL 2 TIMES DAILY
Qty: 120 TABLET | Refills: 3 | Status: SHIPPED | OUTPATIENT
Start: 2025-01-07

## 2025-01-07 RX ORDER — MAGNESIUM SULFATE HEPTAHYDRATE 40 MG/ML
4 INJECTION, SOLUTION INTRAVENOUS ONCE
Status: COMPLETED | OUTPATIENT
Start: 2025-01-07 | End: 2025-01-07

## 2025-01-07 RX ADMIN — ATORVASTATIN CALCIUM 10 MG: 10 TABLET, FILM COATED ORAL at 08:52

## 2025-01-07 RX ADMIN — TACROLIMUS 1 MG: 1 CAPSULE ORAL at 08:51

## 2025-01-07 RX ADMIN — ASPIRIN 81 MG CHEWABLE TABLET 81 MG: 81 TABLET CHEWABLE at 08:51

## 2025-01-07 RX ADMIN — MAGNESIUM OXIDE TAB 400 MG (241.3 MG ELEMENTAL MG) 800 MG: 400 (241.3 MG) TAB at 08:52

## 2025-01-07 RX ADMIN — SULFAMETHOXAZOLE AND TRIMETHOPRIM 1 TABLET: 400; 80 TABLET ORAL at 08:52

## 2025-01-07 RX ADMIN — PREDNISONE 5 MG: 5 TABLET ORAL at 08:51

## 2025-01-07 RX ADMIN — FOSCARNET SODIUM 6000 MG: 24 INJECTION, SOLUTION INTRAVENOUS at 06:47

## 2025-01-07 RX ADMIN — MAGNESIUM SULFATE IN WATER 4 G: 40 INJECTION, SOLUTION INTRAVENOUS at 12:18

## 2025-01-07 RX ADMIN — LIDOCAINE HYDROCHLORIDE ANHYDROUS 1 ML: 10 INJECTION, SOLUTION INFILTRATION at 11:45

## 2025-01-07 RX ADMIN — SODIUM CHLORIDE 1000 ML: 9 INJECTION, SOLUTION INTRAVENOUS at 04:17

## 2025-01-07 ASSESSMENT — ACTIVITIES OF DAILY LIVING (ADL)
ADLS_ACUITY_SCORE: 35

## 2025-01-07 NOTE — PLAN OF CARE
/75 (BP Location: Left arm)   Pulse 77   Temp 98  F (36.7  C) (Oral)   Resp 16   Wt 72.2 kg (159 lb 3.2 oz)   SpO2 97%   BMI 29.12 kg/m       Goal Outcome Evaluation:  [id number]      Plan of Care Reviewed With: patient    Overall Patient Progress: no changeOverall Patient Progress: no change     VSS, on RA. A&Ox4. Ind in rm. Reg diet. Denies N/V/SOB. Denies pain. Voiding spont, AUO. LBM 1/6, passing gas. Ca ionized q6, 5.0 & 4.8, next recheck 1130. PIV SL. R AVF. Cont POC.

## 2025-01-07 NOTE — PLAN OF CARE
Goal Outcome Evaluation:      Plan of Care Reviewed With: patient        Outcome Evaluation: PICC line placed    Vitals: /83 (BP Location: Left arm)   Pulse 92   Temp 98.4  F (36.9  C) (Oral)   Resp 16   Wt 72.2 kg (159 lb 3.2 oz)   SpO2 98%   BMI 29.12 kg/m      Endocrine: n/a  Labs: Stable labs.Magnesium 1.4 mg, replaced with 4 GM IV.  Pain: Denies pain.  PRN's: n/a  Diet: Regular diet, eating well.  LDA: PIV saline locked, PICC being placed.  GI: Loose BM's.  : Voids spontaneously.  Skin: Skin is intact.  Neuro: Alert and oriented.  Mobility: Up ad roya.  Education: n/a  Plan: Discharge home today.

## 2025-01-07 NOTE — PROGRESS NOTES
" Transplant & Immunocompromised   Infectious Disease     Follow Up Note   Patient: Ira Zamora, Date of birth 1990, Medical record number [id number].     Assessment and Recommendations   Recommendations  Continue Foscarnet 90 mg/kg BID (started 1/4/25)  Twice a week CBC with diff, CMP, phosphorus, Mg.   Weekly CMV PCR.   Maribavir 400 mg bid was ordered to be started as OP but the patient was instructed not to start maribavir until cleared by ID.   Would not start maribavir unless CMV VL is <8000 international unit(s)/mL.   Would place double lumen PICC line for infusion and blood draws.   Would keep PICC line in place and maintain for two weeks after starting maribavir to confirm response to PO maribavir.     Prolonged Parenteral/Oral Antibiotic Recommendations and ID Follow up  This template provides final ID recommendations as of this date. If there are clinical changes or questions please call the ID team.     Infectious Diseases Indication: CMV viremia with resistance.     Antibiotic Information  Name of Antibiotic Dose of Antibiotic1 Pharmacy to assist with dosing Y/N Anticipated duration Effective start date2 End date   Foscarnet  90 mg/kg/bid Y 4 weeks 1/4/25  TBD as outpatient.                    1.Dose of antibiotic will need to be renally adjusted if creatinine clearance changes  2.Effective start date is the date of therapy with appropriate spectrum    Method of antibiotic delivery:PICC line. At the end of therapy should the line be removed? No. Selecting \"yes\" will function as written order to remove PICC line at the end of therapy.    Tentative plans for disposition:  TBD    Weekly labs required:  CMV PCR Weekly. Twice a week CMP, phosphorus, Mg, CBC with diff . Dr. Mccoy will follow labs at discharge until ID follow up. Please have labs faxed to ID clinic.    Appointment to be scheduled: Within 2-4 weeks of discharge. Type of ID Clinic Appointment Okay for in person or a virtual visit. " Appointment will be scheduled with: Yumiko. Routine hospital follow up appointments are 30 minutes. If 60 minutes is necessary due to complexity of case indicate that a 60 min appointment is required. Appointment time Appointment Time: 30 minutes. If the patient remains in the hospital at this date please re-consult ID.     Imaging for ID follow up: ID Imaging: No.   ID provider to route this note to the appropriate Epic pools: [facility] INFECTIOUS DISEASE ADULT [facility], PANDA, [facility]    [facility]/ID Clinic Information:  [address], [address]  [place]  [address]  Phone: [phone]        Patient Summary:   Transplants:  2024 (Kidney); POD  265.  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 Here with CMV viremia, progressing since October.      ID Problem List and Discussion:   Resistant Refractory donor-derived primary CMV Viremia  At risk for drug-induced adverse events.   CMV viremia occurred while on the CMV universal ppx prompting resistance testing in 10/2024 with UL54 resistance to GCV and CDV. VGCV continued at induction dosing with rising VL.     CMV Trend Below, Resistance testing from 10/28/24 below- on 10/28/24 the key resistance mutation was T503I and additional resistance testing 24 with UL97 high grade mutation to GCV but no UL54 this time around. This discrepant results are not unusual and still reflects significant resistance to GCV/VGCV.   We should continue to use foscarnet until the VL is <8000.  Once VL is <8000 international unit(s)/mL, we can then stop foscarnet and start maribavir  mg bid. I would keep/maintain the PICC line until we ascertain continued response to maribavir.     CMV VL trend                                            CMV resistance panel 10/2024                                                                     CMV resistance panel 12/31/24     - Med History   -  Prophylactic VGCV (900 daily equivalent Renally dosed when indicated) through 10/23/24  -  Treatment Dose VGCV 900 mg BID 10/23/24-1/4/24  -  Foscarnet 1/4/24- Onwards     Previous ID Issues:  - Crohn's disease- follows with [facility] on ustekinumab (Stelara) pre-transplant, last dose 3/2024   - H/o Ulcers 6/5/24  ID work up included swab of the lesion for HSV, NP Mycoplasma pneumoniae PCR, blood adenovirus PCR, CMV PCR, EBV PCR, HIV, syphilis, Coxsackie ab, chlamydia/gonorrhea PCR of cervix, Karius test, urine histo antigen which were negative. Resolved when stopping MPA     Other ID issues:  - Bacterial prophylaxis:  none  - Pneumocystis prophylaxis:  inhaled pentamidine due to leukopenia, last dose 7/9/24, on bactrim now with resolution of leukopenia on stopping MPA  - Viral serostatus: CMV D+/R-, EBV D+/R+, HSV 1?/2?, VZV +  - See above  - Viral prophylaxis:  See above  - Fungal prophylaxis:  none  - Immunosuppression: Induction with thymo and steroid taper; Currently on AZA 50, pred 5, tac 1/0.5   - Immunization status:  No record of COVID recently, or shingrix  - Gamma globulin status:  unknown      History of Presenting Illness from 1/4/25     Ira Zamora is a 34 year old patient who presented on 1/3/2025 for resistant refractory CMV viremia.    She had a kidney transplant in April which was complicated by leukopenia and ulcers from CellCept which was then switched to azathioprine with resolution.  The patient had some intermittent low-level CMV viremia while on prophylaxis which escalated to higher grade CMV viremia in October prompting resistance testing and dose escalation.  Resistance testing did show a UL 54 codon mutation and the patient was continued on valganciclovir without much effect with eventually rapidly rising CMV viremia over the last 2 to  3 weeks prompting admission for further management.  On admission the patient no symptoms  no fevers, chills or diarrhea (aside from baseline diarrhea). No cough or vision changes.    Review of Symptoms:  A comprehensive 14 system review of symptoms was conducted and was otherwise negative (unless mentioned above)      Interval/Subjective     PICC line placed today.   No other complaints.     Review of Symptoms.  A comprehensive 14 system review of symptoms was conducted and was otherwise negative (unless mentioned above)        Physical Exam:     Temp: 98  F (36.7  C) Temp src: Oral BP: 116/63 Pulse: 83   Resp: 16 SpO2: 99 % O2 Device: None (Room air)       GENERAL APPEARANCE: Not in acute distress      ATTESTATION   Total duration of visit including chart review, reviewing labs and imaging, interviewing and examining the patient, documentation, and sending communication to the patient and to the primary treating team, all at the same day of this encounter, is: 30 minutes.   Edgardo Mccoy MD   1/6/2025

## 2025-01-07 NOTE — PROGRESS NOTES
[facility]  Transplant Nephrology Progress Note  Date of Admission:  1/3/2025  Today's Date: 01/07/2025  Requesting physician: Herber Patel*    Recommendations:   - Replete magnesium IV today and continue PO magnesium oxide 800 mg bid.   - If bicarb remains low tomorrow would start sodium bicarb 650 mg bid.   - continue on current immunosuppression   - Appreciate ID recommendations for CMV management. Agree with continuing Foscarnet for CMV viremia.  - No acute indications for dialysis.     Assessment & Plan   # DDKT: CKD stage I, Stable; creatinine at baseline. Good urine output.    - Baseline Creatinine: ~ 0.7-0.9   - Proteinuria: Normal (<0.2 grams)   - DSA Hx: No DSA   - Last cPRA: 9%   - BK Viremia: Yes, minimally elevated (< 1000)   - Kidney Tx Biopsy Hx: No biopsy history.    # H/o Interstitial Nephritis: No evidence of recurrence.     # Immunosuppression: Tacrolimus immediate release (goal 6-8), Azathioprine (dose 50 mg daily), and Prednisone (dose 5 mg daily)   - Induction with Recent Transplant:  High Intensity Protocol   - Continue with intensive monitoring of immunosuppression for efficacy and toxicity.   - Historical Changes in Immunosuppression:  Changed from mycophenolate to azathioprine and added prednisone due to oral and genital ulcers, concern for Behcet's versus mycophenolate toxicity.  Decreased azathioprine dose due to BK and CMV viremia.   - Changes: Not at this time    # Infection Prevention:   - PJP: Sulfa/TMP (Bactrim)  - CMV: Being treated for CMV viremia and/or disease      - CMV IgG Ab High Risk Discordance (D+/R-): Yes  CMV Serostatus: Positive  - EBV IgG Ab High Risk Discordance (D+/R-): No  EBV Serostatus: Positive    # Hypertension: Controlled;  Goal BP: > 100, but < 130 systolic   - Changes: Not at this time    # Leukopenia: Trend up, moderately low WBC at ~ 2.  Possibly due to medications and/or CMV disease.              -  Will monitor differential and consider filgrastim if ANC < 500.    # Anemia in Chronic Renal Disease: Hgb: Stable      JUNG: No   - Iron studies: Replete    # Mineral Bone Disorder:    - Secondary renal hyperparathyroidism; PTH level: Minimally elevated ( pg/ml)        On treatment: None  - Vitamin D; level: Not checked recently, but was normal last check        On supplement: No  - Calcium; level: Normal        On supplement: No    # Electrolytes:   - Potassium; level: Normal        On supplement: No  - Magnesium; level: Low        On supplement: Yes, magnesium oxide 800 mg bid.   - Bicarbonate; level: Low        On supplement: No; monitor  - Sodium; level: Normal    # BK Viremia: Stable, minimally elevated BK PCR at ~ 500 with last check dec/2024.  IgG level is normal.  Decreased immunosuppression.              - Will continue to follow BK PCR every 2 weeks.     # CMV Viremia: CMV PCR at ~ 702[address] in 12/2024.  CMV IgG Ab positive. Was on Valcyte. IgG level is normal. Decreased immunosuppression.   - resistance panel showed resistance to Ganciclovir, so ID was consulted and initiated Foscarnet.               - Will continue to follow CMV PCR weekly. CMV DNA down trending.      # H/o Oral and Genital Ulcers: Patient with h/o significant and debilitating ulcers due to unclear etiology.  Doubt EBV as a cause, although positive staining on biopsy.  Felt possibly related to Behcet's or mycophenolate toxicity.  Symptoms resolved with overall decrease in immunosuppression and change from mycophenolate to azathioprine.  Followed by Transplant ID and Rheumatology in the past.    # Other Significant PMH:   - GERD: Asymptomatic and now off medications.              - Crohn's Disease: Appears to be stable with no recent symptoms.  Previously was on ustekinumab prior to kidney transplant, but hasn't restarted it due to ongoing oral and vaginal ulcers    # Transplant History:  Etiology of Kidney Failure: Interstitial  nephritis  Tx: DDKT  Transplant: 4/17/2024 (Kidney)  Significant transplant-related complications: BK Viremia, CMV Viremia, and Oral and genital ulcers    Plan discussed with Dr. Tsai.     Recommendations were communicated to the primary team verbally.    LAURITA Cruz CNP  Transplant Nephrology  Contact information via Vocera Web Console         Physician Attestation     I evaluated Ira Zamora as part of a shared APRN/PA visit.     I personally reviewed the vital signs, medications, labs, and imaging.    I personally provided a substantive portion of care for this patient and I approve the care plan as written by the REICA.  I was involved with Medical Decision Making including:   Pt with stable renal function and asymptomatic with CMV viremia at this point.c continue on current immunosuppression which is at its lowest, decrease in setting of CMV viremia.  CMV viremia management per transplant ID, appreciate their recommendations.  Please see A&P for additional details of medical decision making.    LAURITA Cruz CNP  Date of Service (when I saw the patient): 01/06/25       Interval History  Ms. Zamora's creatinine is 0.6 (01/07 0538); Stable.  Good urine output.  Other significant labs/tests/vitals: VSS.  No events overnight.  No chest pain or shortness of breath.  No leg swelling.  No nausea and vomiting.  Bowel movements are normal.  No fever, sweats or chills.      Review of Systems   4 point ROS was obtained and negative except as noted in the Interval History.    MEDICATIONS:  Current Facility-Administered Medications   Medication Dose Route Frequency Provider Last Rate Last Admin    aspirin (ASA) chewable tablet 81 mg  81 mg Oral Daily Judy Gallardo PA-C   81 mg at 01/06/25 0745    atorvastatin (LIPITOR) tablet 10 mg  10 mg Oral Daily Judy Gallardo PA-C   10 mg at 01/06/25 0746    azaTHIOprine (IMURAN) tablet 50 mg  50 mg Oral QPM Judy Gallardo PA-C   50 mg  at 25    foscarnet (FOSCAVIR) 6,000 mg in D5W 500 mL intermittent infusion  90 mg/kg Intravenous Q12H Herber Patel  mL/hr at 25 0647 6,000 mg at 25 0647    magnesium oxide (MAG-OX) tablet 800 mg  800 mg Oral BID Brenda Wagner, NP   800 mg at 25    PARoxetine (PAXIL) tablet 30 mg  30 mg Oral QPM Judy Gallardo PA-C   30 mg at 25    predniSONE (DELTASONE) tablet 5 mg  5 mg Oral Daily Judy Gallardo PA-C   5 mg at 25 0745    sodium chloride (PF) 0.9% PF flush 3 mL  3 mL Intracatheter Q8H Holli Nelson APRN CNP   3 mL at 25 1733    sodium chloride 0.9% BOLUS 1,000 mL  1,000 mL Intravenous Q12H Herber Patel MD 0 mL/hr at 25 0419 Restarted at 25 0446    sulfamethoxazole-trimethoprim (BACTRIM) 400-80 MG per tablet 1 tablet  1 tablet Oral Daily Judy Gallardo PA-C   1 tablet at 25 0745    tacrolimus (GENERIC EQUIVALENT) capsule 0.5 mg  0.5 mg Oral QPM Judy Gallardo PA-C   0.5 mg at 25 1738    tacrolimus (GENERIC EQUIVALENT) capsule 1 mg  1 mg Oral QAM Judy Gallardo PA-C   1 mg at 25 0745    [Held by provider] triamcinolone (KENALOG) 0.1 % paste   Mouth/Throat BID Judy Gallardo PA-C        Vonoprazan Fumarate TABS 20 mg  20 mg Oral Daily Judy Gallardo PA-C   20 mg at 25     Current Facility-Administered Medications   Medication Dose Route Frequency Provider Last Rate Last Admin    Calcium ionized level less than 3.3 mg/dL   Does not apply Continuous PRN Carmen Luna PA-C           Physical Exam   Temp  Av.1  F (36.7  C)  Min: 98  F (36.7  C)  Max: 98.3  F (36.8  C)      Pulse  Av  Min: 74  Max: 90 Resp  Av.2  Min: 16  Max: 18  SpO2  Av.4 %  Min: 94 %  Max: 100 %     /75 (BP Location: Left arm)   Pulse 77   Temp 98  F (36.7  C) (Oral)   Resp 16   Wt 72.2 kg (159 lb 3.2 oz)   SpO2 97%    BMI 29.12 kg/m     Date 01/04/25 0700 - 01/05/25 0659   Shift 6872-8441 6957-6869 8334-0367 24 Hour Total   INTAKE   P.O. 500   500   Shift Total(mL/kg) 500(6.99)   500(6.99)   OUTPUT   Urine 750   750   Shift Total(mL/kg) 750(10.49)   750(10.49)   Weight (kg) 71.49 71.49 71.49 71.49      Admit Weight: 71.5 kg (157 lb 9.6 oz)     GENERAL APPEARANCE: alert and no distress  EYES: eyes grossly normal to inspection  HENT: normal cephalic/atraumatic  RESP: breath sounds clear bilaterally   CV: S1, S2 present and regular  EDEMA: no LE edema bilaterally  ABDOMEN: soft, nondistended, nontender  MS: extremities normal - no gross deformities noted  SKIN: no rash  NEURO: mentation intact and speech normal  TX KIDNEY: normal    Data   All labs reviewed by me.  CMP  Recent Labs   Lab 01/07/25  0545 01/06/25  0538 01/05/25  0540 01/04/25  1423 01/04/25  0614 01/03/25  1651    140 140  --  141 138   POTASSIUM 4.3 4.2 3.7  --  4.0 3.7   CHLORIDE 109* 110* 110*  --  107 106   CO2 18* 22 21*  --  24 20*   ANIONGAP 11 8 9  --  10 12   GLC 85 90 93  --  91 151*   BUN 12.2 9.9 12.9  --  14.3 9.3   CR 0.60 0.67 0.73 0.80 0.78 0.84   GFRESTIMATED >90 >90 >90 >90 >90 >90   ROBLES 9.3 8.9 8.8  --  10.1 9.7   MAG 1.4* 1.6* 1.4*  --   --  1.5*   PHOS  --  2.2*  --   --   --  2.5     CBC  Recent Labs   Lab 01/07/25  0545 01/06/25  0538 01/05/25  0540 01/04/25  0614   HGB 12.1 12.4 11.5* 12.7   WBC 2.4* 1.8* 1.6* 2.0*   RBC 3.29* 3.36* 3.20* 3.50*   HCT 34.8* 35.8 34.3* 37.3   * 107* 107* 107*   MCH 36.8* 36.9* 35.9* 36.3*   MCHC 34.8 34.6 33.5 34.0   RDW 14.4 14.4 14.5 14.3   * 110* 107* 120*     INRNo lab results found in last 7 days.  ABGNo lab results found in last 7 days.   Urine Studies  Recent Labs   Lab Test 07/06/24  1841 06/06/24  1113 05/03/24  1600 04/17/24  1113   COLOR Yellow Yellow Light Yellow Straw   APPEARANCE Clear Clear Clear Clear   URINEGLC Negative Negative 100* 70*   URINEBILI Negative Negative  Negative Negative   URINEKETONE Negative Negative Negative Negative   SG 1.015 1.028 1.014 1.004   UBLD Moderate* Small* Negative Trace*   URINEPH 7.0 5.5 5.5 8.5*   PROTEIN Negative 10* Negative 100*   NITRITE Negative Negative Negative Negative   LEUKEST Trace* Moderate* Trace* Negative   RBCU 1 2 2 2   WBCU 3 6* 4 <1     No lab results found.  PTH  Recent Labs   Lab Test 08/13/24  0738 05/23/24  0949 04/22/24  0821   PTHI 71* 131* 138*     Iron Studies  Recent Labs   Lab Test 05/23/24  0949 04/22/24  0821   IRON 77 53   * 147*   IRONSAT 38 36   PRICILA 2,181* 2,181*       IMAGING:  All imaging studies reviewed by me.

## 2025-01-07 NOTE — PLAN OF CARE
DISCHARGE:  D: Patient with orders to discharge to home.   I: Discharge instructions, medications, & follow ups reviewed with patient. Copy of discharge summary given to patient.  PIV removed. All belongings packed & sent with patient. Medications filled & picked up at discharge pharmacy. Paperwork faxed.   A: Patient in stable condition. AVSS. PT had no further questions regarding discharge instructions and medications. Patient transferred out by self & left with spouse.   P: Plan for home foscarnet infusions

## 2025-01-07 NOTE — PROGRESS NOTES
Care Management Follow Up    Length of Stay (days): 3    Expected Discharge Date: 01/06/2025     Concerns to be Addressed: discharge planning     Patient plan of care discussed at interdisciplinary rounds: Yes    Anticipated Discharge Disposition: Home, Home Infusion              Anticipated Discharge Services: None  Anticipated Discharge DME: None    Patient/family educated on Medicare website which has current facility and service quality ratings: yes  Education Provided on the Discharge Plan: Yes  Patient/Family in Agreement with the Plan: yes    Referrals Placed by CM/SW: External Care Coordination, Home Infusion  Private pay costs discussed: Not applicable    Discussed  Partnership in Safe Discharge Planning  document with patient/family: No     Handoff Completed: No, handoff not indicated or clinically appropriate    Additional Information:  Pt admitted with ganciclovir resistant CMV admitted for IV foscarnet.  Anticipated plan for discharge pending confirmation of home infusion coverage.       Messaged [facility] [employer] Liaison inquiring on status of benefit check.    1330: Pt medically cleared for discharge today. Home infusion benefits confirmed.  Tiffanie [facility] [employer] Liaison updated writer that education completed.  Pt will have supplies and medication delivered to her home this evening by 6 p.m.  Met with pt.  Pt noted no concerns or questions.     Next Steps:     Coordinate home infusion set up pending [facility] Benefit check  Home infusion orders    Yudith Jimenez RN BSN, PHN, ACM-RN  January 7, 2025  7A Nurse Coordinator    Phone: [phone]  Available on [employer] 7A SOT RNCC  Weekend/Holiday 7A SOT RNCC    1/7/2025

## 2025-01-07 NOTE — PLAN OF CARE
/70 (BP Location: Left arm)   Pulse 71   Temp 98.2  F (36.8  C) (Oral)   Resp 16   Wt 72.7 kg (160 lb 3.2 oz)   SpO2 98%   BMI 29.30 kg/m      Shift: 7600-1157  Isolation Status: none   VS: stable on RA, afebrile  Neuro: Aox4  Behaviors: none   BG: none  Labs: ionized calcium 5.3 next check 11:30p   Respiratory: RA  Cardiac: none   Pain/Nausea: denied pain and nausea   PRN: none   Diet: Regular good appetite   IV Access: PIV with 1L bolus   Infusion(s): has NS bolus running and will get Foscarnet   Lines/Drains: none   GI/: LBM today and is voiding   Skin: intact   Mobility: UAL   Plan: Monitor CMV levels to determine discharge            Goal Outcome Evaluation:      Plan of Care Reviewed With: patient, spouse    Overall Patient Progress: improvingOverall Patient Progress: improving    Outcome Evaluation: CMV levels has lowered with goal of less than 8,000

## 2025-01-07 NOTE — PROCEDURES
[facility]    Single Lumen PICC Placement    Date/Time: 1/7/2025 11:56 AM    Performed by: Savage Glasgow RN  Authorized by: Carmen Luna PA-C  Indications: Foscarnet.      UNIVERSAL PROTOCOL   Site Marked: Yes  Prior Images Obtained and Reviewed:  Yes  Required items: Required blood products, implants, devices and special equipment available    Patient identity confirmed:  Verbally with patient, hospital-assigned identification number, arm band and provided demographic data  Patient was reevaluated immediately before administering moderate or deep sedation or anesthesia  Confirmation Checklist:  Patient's identity using two indicators, procedure was appropriate and matched the consent or emergent situation, correct equipment/implants were available and relevant allergies  Time out: Immediately prior to the procedure a time out was called    Universal Protocol: the Joint Commission Universal Protocol was followed    Preparation: Patient was prepped and draped in usual sterile fashion       ANESTHESIA    Anesthesia:  See MAR for details  Local Anesthetic:  Lidocaine 1% without epinephrine  Anesthetic Total (mL):  1      SEDATION    Patient Sedated: No        Preparation: skin prepped with ChloraPrep  Skin prep agent: skin prep agent completely dried prior to procedure  Sterile barriers: maximum sterile barriers were used: cap, mask, sterile gown, sterile gloves, and large sterile sheet  Hand hygiene: hand hygiene performed prior to central venous catheter insertion  Type of line used: PICC  Catheter type: single lumen  Lumen type: non-valved and power PICC  Catheter size: 4 Fr  Brand: Bard  Lot number: [id number]  Placement method: venipuncture, MST, ultrasound and tip navigation system  Number of attempts: 1  Difficulty threading catheter: no  Successful placement: yes  Orientation: left  Catheter to Vein (%): 38  Location: basilic vein  Tip Location:  SVC  Site rationale: RUE AV fistula  Arm circumference: adults 10 cm  Extremity circumference: 28  Visible catheter length: 3  Total catheter length: 44  Dressing and securement: alcohol impregnated caps, chlorhexidine disc applied, transparent dressing, tissue adhesive, subcutaneous anchor securement system, sterile dressing applied and site cleansed  Post procedure assessment: blood return through all ports, free fluid flow and placement verified by 3CG technology  PROCEDURE   Patient Tolerance:  Patient tolerated the procedure well with no immediate complicationsDescribe Procedure: Patient tolerated well and denied pain immediately after procedure.  PICC tip is in satisfactory location as verified by [employer] 3CG Tip Confirmation System. PICC is OK to use.  Disposal: sharps and needle count correct at the end of procedure, needles and guidewire disposed in sharps container

## 2025-01-07 NOTE — PROVIDER NOTIFICATION
01/07/25 1156   PICC 01/07/25 Single Lumen Left Basilic Foscarnet   Placement Date/Time: 01/07/25 (c) 1156   Lumens (Required): Single Lumen  Catheter Brand: [employer]  Catheter Size: 4 fr  Lot #: [id number]  Full barrier precautions done: Yes, hand hygiene, sterile gown, sterile gloves, mask, cap, full body drape, chlorhexi...   Site Assessment WDL   External Cath Length (cm) (S)  3 cm   Extremity Circumference (cm) 28 cm   Dressing (S)  Chlorhexidine disk;Transparent;Securement device;Other (Comment)  (ALLERGIC TO TEGADERM)   Dressing Status clean;dry;intact   Dressing Change Due (S)  01/14/25   Status blood return noted;saline locked   Cap Change Due 01/11/25   Phlebitis Scale 0-->no symptoms   Infiltration? no   PICC Comment (S)  PICC is OK to use

## 2025-01-07 NOTE — PHARMACY
[facility]  Parenteral ANtibiotic Review at Departure from Acute Care Collaborative Note     Patient: Ira Zamora  MRN: [id number]  Allergies: Amlodipine, Omeprazole, Proton pump inhibitors, and Tegaderm transparent dressing (informational only)    Current Location: [facility]  OPAT to be provided by: [facility]       Line Type: PICC    Diagnosis/Indications: CMV viremia with resistance  Organism(s): CMV  MRDO? Yes - other  Pending Cultures/Microbiological Tests: no      Inpatient ID involved in developing OPAT plan: Yes - discharge OPAT plan has no changes from ID provider, Dr. Mccoy, OPAT plan charted on 01/06/2025    Outpatient ID Follow-up: ID OPAT Clinic Referral Placed ([facility] Ph: [phone] and Fax: [phone]) - appointment scheduled  Designated Provider: Dr. Mccoy    Antimicrobial Regimen / Route Anticipated  Duration Start Date Stop /  Reassess Date   Foscarnet 90 mg/kg (6,000 mg) every 12 hours/IV 4 weeks 01/04/2025 TBD as outpatient     Laboratory Tests and Monitoring Frequency: Other, CMP, Phosphorus, Magnesium, CBC with Diff (CMV PCR (weekly)) Twice Weekly    Kevin Medrano, PharmD  Pager: [phone]

## 2025-01-07 NOTE — PROGRESS NOTES
Vascular Access Services Notes:    PICC/Midline to be placed today as ordered. Primary RN informed to assure that patient have had Chlorhexidine Gluconate (CHG) bath & linen change prior to procedure. RN to contact VAS once done.       Savage Glasgow, BSN, RN VA-BC  Vascular Access Services  [facility]  [phone]

## 2025-01-07 NOTE — PROGRESS NOTES
Home Infusion  Ira is expected to discharge today and will be going home on Hydration and Foscarnet 90mg/kg q12hrs.   She has never done home IV therapy before.   Benefits for home IV abx therapy have been checked and pt will have 100%  through her UMR policy for the drug, supplies and home nursing/therapies subject to meeting her deductible and OOP expenses.    Met with rIa and  at bedside to discuss discharge plans, inform them of her coverage and offer choice of agency for the home infusion services.   She stated she would like to remain in the [employer] network so will use I.  Provided them with information about IV abx therapy with [facility] services.  Explained about administration method which will be via IV homepump,  showed them  the teaching materials and provided hands on teaching.  Informed them about supplies and delivery of supplies, storage of medication, dosing schedule, plan for SNV and 24/7 availability of I staff while on IV therapy.       Ira verbalized understanding of all information given.   She is willing and able to learn and manage home IV therapy and is agreeable with the plan.  Questions answered.  Will continue to follow update pt with final details once discharge is confirmed.    Eleanor Campbell RN, BSN, B.S., [facility]  [facility], [address] [address]  [email or web address]

## 2025-01-07 NOTE — PROGRESS NOTES
Vascular Access Services Notes:    Order received for PICC/Midline Placement.  Patient has a history of CKD.  In accordance to Kidney Disease Outcomes Quality Initiative (KDOQI) guidelines request made to nephrology to obtain clearance for PICC/Midline placement.  Once clearance is obtained PICC/Midline will be scheduled by the [facility] Team.  For questions or plan of care change please contact  Vascular Access Charge via [employer].      DARY EasonN, RN VA-BC  Vascular Access Services  [facility]  [phone]

## 2025-01-08 ENCOUNTER — HOME INFUSION BILLING (OUTPATIENT)
Dept: HOME HEALTH SERVICES | Facility: HOME HEALTH | Age: 35
End: 2025-01-08
Payer: MEDICARE

## 2025-01-08 ENCOUNTER — DOCUMENTATION ONLY (OUTPATIENT)
Dept: INFECTIOUS DISEASES | Facility: CLINIC | Age: 35
End: 2025-01-08

## 2025-01-08 ENCOUNTER — HOME INFUSION (OUTPATIENT)
Dept: HOME HEALTH SERVICES | Facility: HOME HEALTH | Age: 35
End: 2025-01-08
Payer: MEDICARE

## 2025-01-08 DIAGNOSIS — B27.00 EBV (EPSTEIN-BARR VIRUS) VIREMIA: ICD-10-CM

## 2025-01-08 DIAGNOSIS — B25.9 CYTOMEGALOVIRUS (CMV) VIREMIA (H): ICD-10-CM

## 2025-01-08 DIAGNOSIS — B25.9 CYTOMEGALOVIRUS (CMV) VIREMIA (H): Primary | ICD-10-CM

## 2025-01-08 RX ORDER — ATORVASTATIN CALCIUM 10 MG/1
10 TABLET, FILM COATED ORAL DAILY
Qty: 30 TABLET | Refills: 1 | Status: SHIPPED | OUTPATIENT
Start: 2025-01-08

## 2025-01-09 ENCOUNTER — LAB REQUISITION (OUTPATIENT)
Dept: LAB | Facility: CLINIC | Age: 35
End: 2025-01-09
Payer: MEDICARE

## 2025-01-09 ENCOUNTER — HOME INFUSION BILLING (OUTPATIENT)
Dept: HOME HEALTH SERVICES | Facility: HOME HEALTH | Age: 35
End: 2025-01-09
Payer: MEDICARE

## 2025-01-09 ENCOUNTER — HOME CARE VISIT (OUTPATIENT)
Dept: HOME HEALTH SERVICES | Facility: HOME HEALTH | Age: 35
End: 2025-01-09
Payer: MEDICARE

## 2025-01-09 ENCOUNTER — HOSPITAL ENCOUNTER (EMERGENCY)
Facility: CLINIC | Age: 35
Discharge: HOME | End: 2025-01-09
Attending: EMERGENCY MEDICINE | Admitting: EMERGENCY MEDICINE
Payer: MEDICARE

## 2025-01-09 VITALS
RESPIRATION RATE: 18 BRPM | WEIGHT: 160 LBS | SYSTOLIC BLOOD PRESSURE: 120 MMHG | OXYGEN SATURATION: 99 % | DIASTOLIC BLOOD PRESSURE: 78 MMHG | TEMPERATURE: 98.4 F | HEART RATE: 88 BPM | BODY MASS INDEX: 29.26 KG/M2

## 2025-01-09 VITALS
TEMPERATURE: 98 F | OXYGEN SATURATION: 98 % | RESPIRATION RATE: 16 BRPM | DIASTOLIC BLOOD PRESSURE: 70 MMHG | HEART RATE: 81 BPM | SYSTOLIC BLOOD PRESSURE: 115 MMHG

## 2025-01-09 DIAGNOSIS — E83.42 HYPOMAGNESEMIA: ICD-10-CM

## 2025-01-09 DIAGNOSIS — B25.9 CYTOMEGALOVIRAL DISEASE, UNSPECIFIED (H): ICD-10-CM

## 2025-01-09 LAB
ALBUMIN SERPL BCG-MCNC: 3.5 G/DL (ref 3.5–5.2)
ALP SERPL-CCNC: 55 U/L (ref 40–150)
ALT SERPL W P-5'-P-CCNC: 50 U/L (ref 0–50)
ANION GAP SERPL CALCULATED.3IONS-SCNC: 14 MMOL/L (ref 7–15)
ANION GAP SERPL CALCULATED.3IONS-SCNC: 9 MMOL/L (ref 7–15)
AST SERPL W P-5'-P-CCNC: 51 U/L (ref 0–45)
ATRIAL RATE - MUSE: 78 BPM
BASOPHILS # BLD AUTO: 0 10E3/UL (ref 0–0.2)
BASOPHILS # BLD AUTO: 0 10E3/UL (ref 0–0.2)
BASOPHILS NFR BLD AUTO: 0 %
BASOPHILS NFR BLD AUTO: 1 %
BILIRUB SERPL-MCNC: 0.6 MG/DL
BUN SERPL-MCNC: 6.8 MG/DL (ref 6–20)
BUN SERPL-MCNC: 7.5 MG/DL (ref 6–20)
CALCIUM SERPL-MCNC: 8.4 MG/DL (ref 8.8–10.4)
CALCIUM SERPL-MCNC: 8.8 MG/DL (ref 8.8–10.4)
CHLORIDE SERPL-SCNC: 108 MMOL/L (ref 98–107)
CHLORIDE SERPL-SCNC: 111 MMOL/L (ref 98–107)
CREAT SERPL-MCNC: 0.56 MG/DL (ref 0.51–0.95)
CREAT SERPL-MCNC: 0.57 MG/DL (ref 0.51–0.95)
DIASTOLIC BLOOD PRESSURE - MUSE: NORMAL MMHG
EGFRCR SERPLBLD CKD-EPI 2021: >90 ML/MIN/1.73M2
EGFRCR SERPLBLD CKD-EPI 2021: >90 ML/MIN/1.73M2
EOSINOPHIL # BLD AUTO: 0 10E3/UL (ref 0–0.7)
EOSINOPHIL # BLD AUTO: 0.1 10E3/UL (ref 0–0.7)
EOSINOPHIL NFR BLD AUTO: 2 %
EOSINOPHIL NFR BLD AUTO: 3 %
ERYTHROCYTE [DISTWIDTH] IN BLOOD BY AUTOMATED COUNT: 14.2 % (ref 10–15)
ERYTHROCYTE [DISTWIDTH] IN BLOOD BY AUTOMATED COUNT: 14.3 % (ref 10–15)
GLUCOSE SERPL-MCNC: 124 MG/DL (ref 70–99)
GLUCOSE SERPL-MCNC: 69 MG/DL (ref 70–99)
HCO3 SERPL-SCNC: 18 MMOL/L (ref 22–29)
HCO3 SERPL-SCNC: 19 MMOL/L (ref 22–29)
HCT VFR BLD AUTO: 33.1 % (ref 35–47)
HCT VFR BLD AUTO: 34.2 % (ref 35–47)
HGB BLD-MCNC: 11.9 G/DL (ref 11.7–15.7)
HGB BLD-MCNC: 11.9 G/DL (ref 11.7–15.7)
IMM GRANULOCYTES # BLD: 0 10E3/UL
IMM GRANULOCYTES # BLD: 0 10E3/UL
IMM GRANULOCYTES NFR BLD: 0 %
IMM GRANULOCYTES NFR BLD: 1 %
INTERPRETATION ECG - MUSE: NORMAL
LYMPHOCYTES # BLD AUTO: 0.2 10E3/UL (ref 0.8–5.3)
LYMPHOCYTES # BLD AUTO: 0.2 10E3/UL (ref 0.8–5.3)
LYMPHOCYTES NFR BLD AUTO: 7 %
LYMPHOCYTES NFR BLD AUTO: 8 %
MAGNESIUM SERPL-MCNC: 1.3 MG/DL (ref 1.7–2.3)
MAGNESIUM SERPL-MCNC: 1.4 MG/DL (ref 1.7–2.3)
MCH RBC QN AUTO: 36.8 PG (ref 26.5–33)
MCH RBC QN AUTO: 37.7 PG (ref 26.5–33)
MCHC RBC AUTO-ENTMCNC: 34.8 G/DL (ref 31.5–36.5)
MCHC RBC AUTO-ENTMCNC: 36 G/DL (ref 31.5–36.5)
MCV RBC AUTO: 105 FL (ref 78–100)
MCV RBC AUTO: 106 FL (ref 78–100)
MONOCYTES # BLD AUTO: 0.2 10E3/UL (ref 0–1.3)
MONOCYTES # BLD AUTO: 0.3 10E3/UL (ref 0–1.3)
MONOCYTES NFR BLD AUTO: 10 %
MONOCYTES NFR BLD AUTO: 9 %
NEUTROPHILS # BLD AUTO: 1.9 10E3/UL (ref 1.6–8.3)
NEUTROPHILS # BLD AUTO: 2 10E3/UL (ref 1.6–8.3)
NEUTROPHILS NFR BLD AUTO: 80 %
NEUTROPHILS NFR BLD AUTO: 80 %
NRBC # BLD AUTO: 0 10E3/UL
NRBC # BLD AUTO: 0 10E3/UL
NRBC BLD AUTO-RTO: 0 /100
NRBC BLD AUTO-RTO: 0 /100
P AXIS - MUSE: 30 DEGREES
PHOSPHATE SERPL-MCNC: 0.4 MG/DL (ref 2.5–4.5)
PHOSPHATE SERPL-MCNC: 2.5 MG/DL (ref 2.5–4.5)
PLATELET # BLD AUTO: 142 10E3/UL (ref 150–450)
PLATELET # BLD AUTO: 150 10E3/UL (ref 150–450)
POTASSIUM SERPL-SCNC: 4 MMOL/L (ref 3.4–5.3)
POTASSIUM SERPL-SCNC: 4.1 MMOL/L (ref 3.4–5.3)
PR INTERVAL - MUSE: 158 MS
PROT SERPL-MCNC: 5.9 G/DL (ref 6.4–8.3)
QRS DURATION - MUSE: 76 MS
QT - MUSE: 384 MS
QTC - MUSE: 437 MS
R AXIS - MUSE: -10 DEGREES
RBC # BLD AUTO: 3.16 10E6/UL (ref 3.8–5.2)
RBC # BLD AUTO: 3.23 10E6/UL (ref 3.8–5.2)
SODIUM SERPL-SCNC: 139 MMOL/L (ref 135–145)
SODIUM SERPL-SCNC: 140 MMOL/L (ref 135–145)
SYSTOLIC BLOOD PRESSURE - MUSE: NORMAL MMHG
T AXIS - MUSE: 21 DEGREES
VENTRICULAR RATE- MUSE: 78 BPM
WBC # BLD AUTO: 2.4 10E3/UL (ref 4–11)
WBC # BLD AUTO: 2.5 10E3/UL (ref 4–11)

## 2025-01-09 PROCEDURE — 36592 COLLECT BLOOD FROM PICC: CPT | Performed by: EMERGENCY MEDICINE

## 2025-01-09 PROCEDURE — 85018 HEMOGLOBIN: CPT | Performed by: EMERGENCY MEDICINE

## 2025-01-09 PROCEDURE — 85004 AUTOMATED DIFF WBC COUNT: CPT | Performed by: INTERNAL MEDICINE

## 2025-01-09 PROCEDURE — 85018 HEMOGLOBIN: CPT | Performed by: INTERNAL MEDICINE

## 2025-01-09 PROCEDURE — A4223 INFUSION SUPPLIES W/O PUMP: HCPCS

## 2025-01-09 PROCEDURE — S9374 HIT HYDRA 1 LITER DIEM: HCPCS | Mod: SH

## 2025-01-09 PROCEDURE — 85004 AUTOMATED DIFF WBC COUNT: CPT | Performed by: EMERGENCY MEDICINE

## 2025-01-09 PROCEDURE — 93005 ELECTROCARDIOGRAM TRACING: CPT | Performed by: EMERGENCY MEDICINE

## 2025-01-09 PROCEDURE — 83735 ASSAY OF MAGNESIUM: CPT | Performed by: INTERNAL MEDICINE

## 2025-01-09 PROCEDURE — 83735 ASSAY OF MAGNESIUM: CPT | Performed by: EMERGENCY MEDICINE

## 2025-01-09 PROCEDURE — 93010 ELECTROCARDIOGRAM REPORT: CPT | Performed by: EMERGENCY MEDICINE

## 2025-01-09 PROCEDURE — 99284 EMERGENCY DEPT VISIT MOD MDM: CPT | Performed by: EMERGENCY MEDICINE

## 2025-01-09 PROCEDURE — 82310 ASSAY OF CALCIUM: CPT | Performed by: EMERGENCY MEDICINE

## 2025-01-09 PROCEDURE — 84100 ASSAY OF PHOSPHORUS: CPT | Performed by: INTERNAL MEDICINE

## 2025-01-09 PROCEDURE — 84100 ASSAY OF PHOSPHORUS: CPT | Performed by: EMERGENCY MEDICINE

## 2025-01-09 PROCEDURE — S9501 HIT ANTIBIOTIC Q12H DIEM: HCPCS

## 2025-01-09 PROCEDURE — 250N000013 HC RX MED GY IP 250 OP 250 PS 637: Performed by: EMERGENCY MEDICINE

## 2025-01-09 PROCEDURE — 82310 ASSAY OF CALCIUM: CPT | Performed by: INTERNAL MEDICINE

## 2025-01-09 RX ORDER — MAGNESIUM OXIDE 400 MG/1
400 TABLET ORAL ONCE
Status: COMPLETED | OUTPATIENT
Start: 2025-01-09 | End: 2025-01-09

## 2025-01-09 RX ADMIN — MAGNESIUM OXIDE TAB 400 MG (241.3 MG ELEMENTAL MG) 400 MG: 400 (241.3 MG) TAB at 23:01

## 2025-01-09 ASSESSMENT — COLUMBIA-SUICIDE SEVERITY RATING SCALE - C-SSRS
1. IN THE PAST MONTH, HAVE YOU WISHED YOU WERE DEAD OR WISHED YOU COULD GO TO SLEEP AND NOT WAKE UP?: NO
2. HAVE YOU ACTUALLY HAD ANY THOUGHTS OF KILLING YOURSELF IN THE PAST MONTH?: NO
6. HAVE YOU EVER DONE ANYTHING, STARTED TO DO ANYTHING, OR PREPARED TO DO ANYTHING TO END YOUR LIFE?: NO

## 2025-01-09 ASSESSMENT — ACTIVITIES OF DAILY LIVING (ADL)
ADLS_ACUITY_SCORE: 56
ADLS_ACUITY_SCORE: 56

## 2025-01-09 NOTE — PROGRESS NOTES
Nursing Visit Note:  Nurse visit today for SOC and labs for Ira Zamora.    Pt feeling well today, no new issues or concerns to report. States everything has been going well since getting discharged, no noticeable side effects from foscarnet. Jann () present for teaching as well.     Pt due for tacro level today, however appointment scheduled in the afternoon so unable to draw. Writer notified Kunbi in pharmacy and sent IB to transplant team-- they responded and would like level drawn tomorrow if possible-- writer notified staffing to get it scheduled tomorrow morning between 8-9am      present during visit today: Not Applicable.     Education Provided:     Patient Education focused on PICC line care, side effects of foscarnet, IV administration.     Labs obtained via PICC    ONTIME Tracking number: [id number]    Facility sent to: [facility]    Saline administered (in mLs): 30    Next Visit Plan:   Tomorrow for tacro level, Monday for CLC and labs     Ginger Luciano RN 1/9/2025

## 2025-01-10 ENCOUNTER — HOME INFUSION BILLING (OUTPATIENT)
Dept: HOME HEALTH SERVICES | Facility: HOME HEALTH | Age: 35
End: 2025-01-10
Payer: COMMERCIAL

## 2025-01-10 ENCOUNTER — LAB REQUISITION (OUTPATIENT)
Dept: LAB | Facility: CLINIC | Age: 35
End: 2025-01-10
Payer: MEDICARE

## 2025-01-10 DIAGNOSIS — B25.9 CYTOMEGALOVIRAL DISEASE, UNSPECIFIED (H): ICD-10-CM

## 2025-01-10 LAB
HOLD SPECIMEN: NORMAL
TACROLIMUS BLD-MCNC: 4.3 UG/L (ref 5–15)
TME LAST DOSE: ABNORMAL H
TME LAST DOSE: ABNORMAL H

## 2025-01-10 PROCEDURE — A9270 NON-COVERED ITEM OR SERVICE: HCPCS

## 2025-01-10 PROCEDURE — S1015 IV TUBING EXTENSION SET: HCPCS

## 2025-01-10 PROCEDURE — S9501 HIT ANTIBIOTIC Q12H DIEM: HCPCS

## 2025-01-10 PROCEDURE — S9374 HIT HYDRA 1 LITER DIEM: HCPCS | Mod: SH

## 2025-01-10 PROCEDURE — 80197 ASSAY OF TACROLIMUS: CPT | Performed by: INTERNAL MEDICINE

## 2025-01-10 PROCEDURE — A4223 INFUSION SUPPLIES W/O PUMP: HCPCS

## 2025-01-10 NOTE — ED NOTES
Patient discharge home, accompanied by . Patient's labs came back normal, vitally stable, and ambulatory at discharge

## 2025-01-10 NOTE — ED PROVIDER NOTES
"    [facility] ([facility])    1/09/25       ED PROVIDER NOTE       History     Chief Complaint   Patient presents with    Abnormal Labs     HPI  Ira Zamora is a 34 year old female with PMHx of interstitial nephritis secondary to PPIs s/p kidney transplant (pediatric en bloc) 4/17/2024 with BL Cr 0.7-0.9, Crohn's disease, anxiety, HTN, and GERD who presents to the ED with a low phosphorus of 0.4. she states she has been feeling fatigued the last couple days. Today she states she felt generally well but noticed \"brain fog\" and her legs were cramping. She had labs done showing hypophosphatemia and was told to go to the ED. No chest pain or palpitations or shortness of breath.         Past Medical History  Past Medical History:   Diagnosis Date    Anemia in chronic kidney disease     Anxiety 2007    Ascending aorta dilation 2020 2020: ascending aorta 3.8 cm, aortic sinus 3.6 cm. 2024 Echo: Ao root diam: 3.2 cm, asc Aorta Diam: 3.8 cm    ASCUS with positive high risk HPV cervical 2017    ASCUS with positive high risk HPV cervical 07/01/2017    Crohn's disease (H) 2004    Esophageal ulcer 06/27/2024    ESRD (end stage renal disease) on dialysis (H) 2020    GERD (gastroesophageal reflux disease)     Hidradenitis suppurativa 2015    History of blood transfusion     Hypertension     Juvenile rheumatoid arthritis (H)     Asymptomatic in adulthood    Kidney replaced by transplant 04/17/2024    En bloc. Induction w/ Thymoglobulin.    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     Past Surgical History:   Procedure Laterality Date    BIOPSY VULVA Left 07/02/2024    Procedure: BIOPSY, VULVA;  Surgeon: Roxanne Montenegro MD;  Location: [facility] OR    ESOPHAGOSCOPY, GASTROSCOPY, DUODENOSCOPY (EGD), COMBINED N/A 06/28/2024    Procedure: ESOPHAGOGASTRODUODENOSCOPY, WITH BIOPSY;  Surgeon: " Tamy Miranda MD;  Location: [facility] GI    EXAM UNDER ANESTHESIA PELVIC N/A 2024    Procedure: EXAM UNDER ANESTHESIA, PELVIS;  Surgeon: Roxanne Montenegro MD;  Location: [facility] OR    H NEEDLE GUIDE,  KIDNEY BIOPSY      PICC SINGLE LUMEN PLACEMENT Left 2025    44-3cm, Basilic vein    TRANSPLANT KIDNEY RECIPIENT  DONOR N/A 2024    Procedure: Transplant kidney recipient  donor,bilateral uretral stent implantation;  Surgeon: Carlitos Díaz MD;  Location: [facility] OR     acetaminophen (TYLENOL) 500 MG tablet  aspirin 81 MG EC tablet  atorvastatin (LIPITOR) 10 MG tablet  azaTHIOprine (IMURAN) 50 MG tablet  Emergency Supply Kit, Central,  foscarnet (FOSCAVIR) 6,000 mg in sodium chloride 0.9 % 500 mL via HOMEPUMP  hydrOXYzine HCl (ATARAX) 25 MG tablet  ketoconazole (NIZORAL) 2 % external shampoo  levonorgestrel (KYLEENA) 19.5 MG IUD  magnesium oxide (MAG-OX) 400 MG tablet  maribavir (LIVTENCITY) 200 MG tablet  PARoxetine (PAXIL) 30 MG tablet  phosphorus tablet 250 mg (PHOSPHA 250 NEUTRAL) 250 MG per tablet  predniSONE (DELTASONE) 5 MG tablet  sodium chloride 0.9 % 500 mL via elastomeric pump  sodium chloride, PF, 0.9% PF flush  sulfamethoxazole-trimethoprim (BACTRIM) 400-80 MG tablet  tacrolimus (GENERIC EQUIVALENT) 0.5 MG capsule  tacrolimus (GENERIC EQUIVALENT) 1 MG capsule  triamcinolone (KENALOG) 0.1 % paste  Vonoprazan Fumarate (VOQUEZNA) 20 MG TABS      Allergies   Allergen Reactions    Amlodipine Headache and Other (See Comments)     Other Reaction(s): Unknown    Omeprazole Other (See Comments)     All PPIs per patient    Proton Pump Inhibitors Nephrotoxicity     No PPIs due to history of renal failure due to interstitial nephritis    Tegaderm Transparent Dressing (Informational Only) Blisters     Family History  Family History   Problem Relation Age of Onset    Endometriosis Mother     Coronary Artery Disease Mother     Skin Cancer No family hx of     Melanoma No family hx of       Social History   Social History     Tobacco Use    Smoking status: Never     Passive exposure: Never    Smokeless tobacco: Never   Substance Use Topics    Alcohol use: Not Currently     Comment: Very occasional, last drink two months ago, wine cooler    Drug use: Never      A medically appropriate review of systems was performed with pertinent positives and negatives noted in the HPI, and all other systems negative.    Physical Exam   BP: (!) 137/92  Pulse: 92  Temp: 98.1  F (36.7  C)  Resp: 16  SpO2: 97 %  Physical Exam  Vitals and nursing note reviewed.   Constitutional:       General: She is not in acute distress.     Appearance: Normal appearance. She is not toxic-appearing or diaphoretic.   HENT:      Head: Normocephalic and atraumatic.      Mouth/Throat:      Mouth: Mucous membranes are moist.   Eyes:      General: No scleral icterus.     Conjunctiva/sclera: Conjunctivae normal.   Cardiovascular:      Rate and Rhythm: Normal rate.      Heart sounds: No murmur heard.     No gallop.   Pulmonary:      Effort: No respiratory distress.      Breath sounds: Normal breath sounds. No wheezing or rales.   Musculoskeletal:      Cervical back: Neck supple.   Skin:     General: Skin is warm and dry.   Neurological:      Mental Status: She is alert.           ED Course, Procedures, & Data      Procedures            EKG Interpretation:      Interpreted by Andrea Saucedo MD  Time reviewed: 10:12  Symptoms at time of EKG: fatigue   Rhythm: normal sinus   Rate: normal  Ectopy: none  Conduction: normal  ST Segments/ T Waves: No ST-T wave changes  Comparison to prior: Unchanged    Clinical Impression: normal EKG            Results for orders placed or performed during the hospital encounter of 01/09/25   Basic metabolic panel     Status: Abnormal   Result Value Ref Range    Sodium 139 135 - 145 mmol/L    Potassium 4.0 3.4 - 5.3 mmol/L    Chloride 111 (H) 98 - 107 mmol/L    Carbon Dioxide (CO2) 19 (L) 22 - 29 mmol/L     Anion Gap 9 7 - 15 mmol/L    Urea Nitrogen 7.5 6.0 - 20.0 mg/dL    Creatinine 0.57 0.51 - 0.95 mg/dL    GFR Estimate >90 >60 mL/min/1.73m2    Calcium 8.8 8.8 - 10.4 mg/dL    Glucose 124 (H) 70 - 99 mg/dL   Magnesium     Status: Abnormal   Result Value Ref Range    Magnesium 1.3 (L) 1.7 - 2.3 mg/dL   Phosphorus     Status: Normal   Result Value Ref Range    Phosphorus 2.5 2.5 - 4.5 mg/dL   CBC with platelets and differential     Status: Abnormal   Result Value Ref Range    WBC Count 2.5 (L) 4.0 - 11.0 10e3/uL    RBC Count 3.16 (L) 3.80 - 5.20 10e6/uL    Hemoglobin 11.9 11.7 - 15.7 g/dL    Hematocrit 33.1 (L) 35.0 - 47.0 %     (H) 78 - 100 fL    MCH 37.7 (H) 26.5 - 33.0 pg    MCHC 36.0 31.5 - 36.5 g/dL    RDW 14.3 10.0 - 15.0 %    Platelet Count 142 (L) 150 - 450 10e3/uL    % Neutrophils 80 %    % Lymphocytes 8 %    % Monocytes 10 %    % Eosinophils 2 %    % Basophils 0 %    % Immature Granulocytes 0 %    NRBCs per 100 WBC 0 <1 /100    Absolute Neutrophils 2.0 1.6 - 8.3 10e3/uL    Absolute Lymphocytes 0.2 (L) 0.8 - 5.3 10e3/uL    Absolute Monocytes 0.3 0.0 - 1.3 10e3/uL    Absolute Eosinophils 0.0 0.0 - 0.7 10e3/uL    Absolute Basophils 0.0 0.0 - 0.2 10e3/uL    Absolute Immature Granulocytes 0.0 <=0.4 10e3/uL    Absolute NRBCs 0.0 10e3/uL   EKG 12 lead     Status: None   Result Value Ref Range    Systolic Blood Pressure  mmHg    Diastolic Blood Pressure  mmHg    Ventricular Rate 78 BPM    Atrial Rate 78 BPM    KY Interval 158 ms    QRS Duration 76 ms     ms    QTc 437 ms    P Axis 30 degrees    R AXIS -10 degrees    T Axis 21 degrees    Interpretation ECG       Sinus rhythm  Minimal voltage criteria for LVH, may be normal variant ( R in aVL )  Cannot rule out Anterior infarct , age undetermined  Abnormal ECG  Unconfirmed report - interpretation of this ECG is computer generated - see medical record for final interpretation  Confirmed by - EMERGENCY ROOM, PHYSICIAN ([id number]),  AMADOU FAIRBANKS ([id number]) on  1/9/2025 10:32:36 PM     CBC with platelets differential     Status: Abnormal    Narrative    The following orders were created for panel order CBC with platelets differential.  Procedure                               Abnormality         Status                     ---------                               -----------         ------                     CBC with platelets and d...[[id number]]  Abnormal            Final result                 Please view results for these tests on the individual orders.   Results for orders placed or performed in visit on 01/09/25   Comprehensive metabolic panel     Status: Abnormal   Result Value Ref Range    Sodium 140 135 - 145 mmol/L    Potassium 4.1 3.4 - 5.3 mmol/L    Carbon Dioxide (CO2) 18 (L) 22 - 29 mmol/L    Anion Gap 14 7 - 15 mmol/L    Urea Nitrogen 6.8 6.0 - 20.0 mg/dL    Creatinine 0.56 0.51 - 0.95 mg/dL    GFR Estimate >90 >60 mL/min/1.73m2    Calcium 8.4 (L) 8.8 - 10.4 mg/dL    Chloride 108 (H) 98 - 107 mmol/L    Glucose 69 (L) 70 - 99 mg/dL    Alkaline Phosphatase 55 40 - 150 U/L    AST 51 (H) 0 - 45 U/L    ALT 50 0 - 50 U/L    Protein Total 5.9 (L) 6.4 - 8.3 g/dL    Albumin 3.5 3.5 - 5.2 g/dL    Bilirubin Total 0.6 <=1.2 mg/dL   Magnesium     Status: Abnormal   Result Value Ref Range    Magnesium 1.4 (L) 1.7 - 2.3 mg/dL   Phosphorus     Status: Abnormal   Result Value Ref Range    Phosphorus 0.4 (LL) 2.5 - 4.5 mg/dL   CBC with platelets and differential     Status: Abnormal   Result Value Ref Range    WBC Count 2.4 (L) 4.0 - 11.0 10e3/uL    RBC Count 3.23 (L) 3.80 - 5.20 10e6/uL    Hemoglobin 11.9 11.7 - 15.7 g/dL    Hematocrit 34.2 (L) 35.0 - 47.0 %     (H) 78 - 100 fL    MCH 36.8 (H) 26.5 - 33.0 pg    MCHC 34.8 31.5 - 36.5 g/dL    RDW 14.2 10.0 - 15.0 %    Platelet Count 150 150 - 450 10e3/uL    % Neutrophils 80 %    % Lymphocytes 7 %    % Monocytes 9 %    % Eosinophils 3 %    % Basophils 1 %    % Immature Granulocytes 1 %    NRBCs per 100 WBC 0 <1 /100     Absolute Neutrophils 1.9 1.6 - 8.3 10e3/uL    Absolute Lymphocytes 0.2 (L) 0.8 - 5.3 10e3/uL    Absolute Monocytes 0.2 0.0 - 1.3 10e3/uL    Absolute Eosinophils 0.1 0.0 - 0.7 10e3/uL    Absolute Basophils 0.0 0.0 - 0.2 10e3/uL    Absolute Immature Granulocytes 0.0 <=0.4 10e3/uL    Absolute NRBCs 0.0 10e3/uL   CBC with Platelets & Differential     Status: Abnormal    Narrative    The following orders were created for panel order CBC with Platelets & Differential.  Procedure                               Abnormality         Status                     ---------                               -----------         ------                     CBC with platelets and d...[[id number]]  Abnormal            Final result                 Please view results for these tests on the individual orders.     Medications   magnesium oxide (MAG-OX) tablet 400 mg (has no administration in time range)     Labs Ordered and Resulted from Time of ED Arrival to Time of ED Departure   BASIC METABOLIC PANEL - Abnormal       Result Value    Sodium 139      Potassium 4.0      Chloride 111 (*)     Carbon Dioxide (CO2) 19 (*)     Anion Gap 9      Urea Nitrogen 7.5      Creatinine 0.57      GFR Estimate >90      Calcium 8.8      Glucose 124 (*)    MAGNESIUM - Abnormal    Magnesium 1.3 (*)    CBC WITH PLATELETS AND DIFFERENTIAL - Abnormal    WBC Count 2.5 (*)     RBC Count 3.16 (*)     Hemoglobin 11.9      Hematocrit 33.1 (*)      (*)     MCH 37.7 (*)     MCHC 36.0      RDW 14.3      Platelet Count 142 (*)     % Neutrophils 80      % Lymphocytes 8      % Monocytes 10      % Eosinophils 2      % Basophils 0      % Immature Granulocytes 0      NRBCs per 100 WBC 0      Absolute Neutrophils 2.0      Absolute Lymphocytes 0.2 (*)     Absolute Monocytes 0.3      Absolute Eosinophils 0.0      Absolute Basophils 0.0      Absolute Immature Granulocytes 0.0      Absolute NRBCs 0.0     PHOSPHORUS - Normal    Phosphorus 2.5       No orders to display           Critical care was not performed.     Medical Decision Making  The patient's presentation was of high complexity (a chronic illness severe exacerbation, progression, or side effect of treatment).    The patient's evaluation involved:  review of external note(s) from 3+ sources (discharge summary, progress notes, clinic notes)  review of 3+ test result(s) ordered prior to this encounter (cbc, bmp, mag, phos)  ordering and/or review of 3+ test(s) in this encounter (see separate area of note for details)  discussion of management or test interpretation with another health professional (see separate area of note for details)    The patient's management necessitated high risk (a decision regarding hospitalization).    Assessment & Plan    This is a 34-year-old female with history of kidney transplant in 2024 here with abnormal labs.  Vitals were reassuring.  She had blood work done earlier today which showed hypophosphatemia.  Labs were ordered here to reassess and ensure this without a lab error.    BMP, magnesium, phosphorus notable for slightly low magnesium which is chronic appearing for her, phosphorus is normal.  Electrolytes otherwise unremarkable.    Patient was discussed with transplant surgery who initially recommended admission given her low potassium but after the recheck came back normal I spoke with them again and they recommended follow up with the patient was agreeable to.    She was stable for discharge. Return precautions discussed and all questions answered.    I have reviewed the nursing notes. I have reviewed the findings, diagnosis, plan and need for follow up with the patient.    New Prescriptions    No medications on file       Final diagnoses:   Hypomagnesemia       Andrea Saucedo MD  [facility] EMERGENCY DEPARTMENT  1/9/2025     Andrea Saucedo MD  01/09/25 [id number]

## 2025-01-10 NOTE — DISCHARGE INSTRUCTIONS
Your magnesium was low here but is similar to what has been in the past.  Your phosphorus was normal.  Your  low phosphorus level earlier may have been a error with the lab.  You should follow-up with the transplant team and have this rechecked.  If you develop new or worsening symptoms or other concerns please return to emergency room.

## 2025-01-10 NOTE — ED TRIAGE NOTES
Pt ambulatory to triage with c/o abnormal labs. Pt states that her phosphorus is low today. Pt states she is asymptomatic, but has noticed some brain fog.      Triage Assessment (Adult)       Row Name 01/09/25 2122          Triage Assessment    Airway WDL WDL        Respiratory WDL    Respiratory WDL WDL        Skin Circulation/Temperature WDL    Skin Circulation/Temperature WDL WDL        Cardiac WDL    Cardiac WDL WDL        Peripheral/Neurovascular WDL    Peripheral Neurovascular WDL WDL        Cognitive/Neuro/Behavioral WDL    Cognitive/Neuro/Behavioral WDL WDL

## 2025-01-11 PROCEDURE — S9379 HIT NOC PER DIEM: HCPCS | Mod: SH

## 2025-01-11 PROCEDURE — A4223 INFUSION SUPPLIES W/O PUMP: HCPCS

## 2025-01-11 PROCEDURE — S9501 HIT ANTIBIOTIC Q12H DIEM: HCPCS

## 2025-01-12 PROCEDURE — A4223 INFUSION SUPPLIES W/O PUMP: HCPCS

## 2025-01-12 PROCEDURE — S9379 HIT NOC PER DIEM: HCPCS | Mod: SH

## 2025-01-12 PROCEDURE — S9501 HIT ANTIBIOTIC Q12H DIEM: HCPCS

## 2025-01-13 ENCOUNTER — HOME CARE VISIT (OUTPATIENT)
Dept: HOME HEALTH SERVICES | Facility: HOME HEALTH | Age: 35
End: 2025-01-13
Payer: MEDICARE

## 2025-01-13 ENCOUNTER — LAB REQUISITION (OUTPATIENT)
Dept: LAB | Facility: CLINIC | Age: 35
End: 2025-01-13
Payer: MEDICARE

## 2025-01-13 ENCOUNTER — TELEPHONE (OUTPATIENT)
Dept: TRANSPLANT | Facility: CLINIC | Age: 35
End: 2025-01-13

## 2025-01-13 ENCOUNTER — HOME INFUSION BILLING (OUTPATIENT)
Dept: HOME HEALTH SERVICES | Facility: HOME HEALTH | Age: 35
End: 2025-01-13
Payer: MEDICARE

## 2025-01-13 VITALS
OXYGEN SATURATION: 95 % | HEART RATE: 90 BPM | BODY MASS INDEX: 29.26 KG/M2 | DIASTOLIC BLOOD PRESSURE: 68 MMHG | SYSTOLIC BLOOD PRESSURE: 110 MMHG | WEIGHT: 160 LBS | TEMPERATURE: 97.5 F | RESPIRATION RATE: 16 BRPM

## 2025-01-13 DIAGNOSIS — B27.00 EBV (EPSTEIN-BARR VIRUS) VIREMIA: ICD-10-CM

## 2025-01-13 DIAGNOSIS — B25.9 CYTOMEGALOVIRAL DISEASE, UNSPECIFIED (H): ICD-10-CM

## 2025-01-13 DIAGNOSIS — B25.9 CYTOMEGALOVIRUS (CMV) VIREMIA (H): ICD-10-CM

## 2025-01-13 DIAGNOSIS — Z94.0 KIDNEY REPLACED BY TRANSPLANT: ICD-10-CM

## 2025-01-13 LAB
ALBUMIN SERPL BCG-MCNC: 4.1 G/DL (ref 3.5–5.2)
ALP SERPL-CCNC: 70 U/L (ref 40–150)
ALT SERPL W P-5'-P-CCNC: 61 U/L (ref 0–50)
ANION GAP SERPL CALCULATED.3IONS-SCNC: 16 MMOL/L (ref 7–15)
AST SERPL W P-5'-P-CCNC: 43 U/L (ref 0–45)
BASOPHILS # BLD AUTO: 0 10E3/UL (ref 0–0.2)
BASOPHILS NFR BLD AUTO: 1 %
BILIRUB SERPL-MCNC: 0.9 MG/DL
BUN SERPL-MCNC: 10.1 MG/DL (ref 6–20)
CALCIUM SERPL-MCNC: 13.6 MG/DL (ref 8.8–10.4)
CHLORIDE SERPL-SCNC: 102 MMOL/L (ref 98–107)
CREAT SERPL-MCNC: 0.75 MG/DL (ref 0.51–0.95)
EGFRCR SERPLBLD CKD-EPI 2021: >90 ML/MIN/1.73M2
EOSINOPHIL # BLD AUTO: 0 10E3/UL (ref 0–0.7)
EOSINOPHIL NFR BLD AUTO: 1 %
ERYTHROCYTE [DISTWIDTH] IN BLOOD BY AUTOMATED COUNT: 14.4 % (ref 10–15)
GLUCOSE SERPL-MCNC: 89 MG/DL (ref 70–99)
HCO3 SERPL-SCNC: 22 MMOL/L (ref 22–29)
HCT VFR BLD AUTO: 38.8 % (ref 35–47)
HGB BLD-MCNC: 13.7 G/DL (ref 11.7–15.7)
IMM GRANULOCYTES # BLD: 0 10E3/UL
IMM GRANULOCYTES NFR BLD: 1 %
LYMPHOCYTES # BLD AUTO: 0.2 10E3/UL (ref 0.8–5.3)
LYMPHOCYTES NFR BLD AUTO: 7 %
MAGNESIUM SERPL-MCNC: 1.2 MG/DL (ref 1.7–2.3)
MCH RBC QN AUTO: 36.8 PG (ref 26.5–33)
MCHC RBC AUTO-ENTMCNC: 35.3 G/DL (ref 31.5–36.5)
MCV RBC AUTO: 104 FL (ref 78–100)
MONOCYTES # BLD AUTO: 0.4 10E3/UL (ref 0–1.3)
MONOCYTES NFR BLD AUTO: 10 %
NEUTROPHILS # BLD AUTO: 2.8 10E3/UL (ref 1.6–8.3)
NEUTROPHILS NFR BLD AUTO: 80 %
NRBC # BLD AUTO: 0 10E3/UL
NRBC BLD AUTO-RTO: 0 /100
PHOSPHATE SERPL-MCNC: 6.4 MG/DL (ref 2.5–4.5)
PLATELET # BLD AUTO: 184 10E3/UL (ref 150–450)
POTASSIUM SERPL-SCNC: 3.7 MMOL/L (ref 3.4–5.3)
PROT SERPL-MCNC: 7 G/DL (ref 6.4–8.3)
RBC # BLD AUTO: 3.72 10E6/UL (ref 3.8–5.2)
SODIUM SERPL-SCNC: 140 MMOL/L (ref 135–145)
TACROLIMUS BLD-MCNC: 7 UG/L (ref 5–15)
TME LAST DOSE: NORMAL H
TME LAST DOSE: NORMAL H
WBC # BLD AUTO: 3.5 10E3/UL (ref 4–11)

## 2025-01-13 PROCEDURE — 85004 AUTOMATED DIFF WBC COUNT: CPT | Performed by: INTERNAL MEDICINE

## 2025-01-13 PROCEDURE — 84460 ALANINE AMINO (ALT) (SGPT): CPT | Performed by: INTERNAL MEDICINE

## 2025-01-13 PROCEDURE — 84100 ASSAY OF PHOSPHORUS: CPT | Performed by: INTERNAL MEDICINE

## 2025-01-13 PROCEDURE — 85041 AUTOMATED RBC COUNT: CPT | Performed by: INTERNAL MEDICINE

## 2025-01-13 PROCEDURE — S9501 HIT ANTIBIOTIC Q12H DIEM: HCPCS

## 2025-01-13 PROCEDURE — A4223 INFUSION SUPPLIES W/O PUMP: HCPCS

## 2025-01-13 PROCEDURE — 82435 ASSAY OF BLOOD CHLORIDE: CPT | Performed by: INTERNAL MEDICINE

## 2025-01-13 PROCEDURE — 80197 ASSAY OF TACROLIMUS: CPT | Performed by: INTERNAL MEDICINE

## 2025-01-13 PROCEDURE — 83735 ASSAY OF MAGNESIUM: CPT | Performed by: INTERNAL MEDICINE

## 2025-01-13 PROCEDURE — S9379 HIT NOC PER DIEM: HCPCS | Mod: SH

## 2025-01-13 RX ORDER — TACROLIMUS 1 MG/1
1 CAPSULE ORAL 2 TIMES DAILY
Qty: 60 CAPSULE | Refills: 3 | Status: CANCELLED | OUTPATIENT
Start: 2025-01-13

## 2025-01-13 RX ORDER — TACROLIMUS 0.5 MG/1
CAPSULE ORAL
Status: CANCELLED
Start: 2025-01-13

## 2025-01-13 NOTE — PROGRESS NOTES
Nursing Visit Note:  Nurse visit today for CLC/labs for Ira BARNES Noah.     present during visit today: Not Applicable.    Pt alert and oriented, in NAD. Feeling better, more energy overall. Ongoing diarrhea - loose and/or watery stools about 2x/day. IV therapy otherwise going well. CLC completed per protocol without incident, labs drawn. No further questions or concerns today.       Florecita Prather RN 1/13/2025

## 2025-01-13 NOTE — TELEPHONE ENCOUNTER
Edgardo Mccoy MD Colianni, Lauren, RN  I think I would wait for her next blood draw.  Thanks,  KO          Previous Messages       ----- Message -----  From: Radha Jorge, RN  Sent: 1/13/2025   3:03 PM CST  To: Edgardo Mccoy MD    Hi Dr. Mccoy,  I saw you sent a message to patient about her phosphorus supplement. Do you have guidance about her elevated calcium now or should I refer to tx nephrology?  ----- Message -----  From: Lab, Background User  Sent: 1/13/2025  11:47 AM CST  To: Radha Jorge RN

## 2025-01-14 ENCOUNTER — HOME INFUSION BILLING (OUTPATIENT)
Dept: HOME HEALTH SERVICES | Facility: HOME HEALTH | Age: 35
End: 2025-01-14
Payer: MEDICARE

## 2025-01-14 LAB
CMV DNA SPEC NAA+PROBE-ACNC: ABNORMAL IU/ML
CMV DNA SPEC NAA+PROBE-LOG#: 4.4 {LOG_COPIES}/ML
SPECIMEN TYPE: ABNORMAL

## 2025-01-14 PROCEDURE — A9270 NON-COVERED ITEM OR SERVICE: HCPCS

## 2025-01-14 PROCEDURE — A4221 SUPP NON-INSULIN INF CATH/WK: HCPCS

## 2025-01-14 PROCEDURE — S9379 HIT NOC PER DIEM: HCPCS | Mod: SH

## 2025-01-14 PROCEDURE — S9501 HIT ANTIBIOTIC Q12H DIEM: HCPCS

## 2025-01-14 PROCEDURE — A4223 INFUSION SUPPLIES W/O PUMP: HCPCS

## 2025-01-14 PROCEDURE — S1015 IV TUBING EXTENSION SET: HCPCS

## 2025-01-15 ENCOUNTER — TELEPHONE (OUTPATIENT)
Dept: TRANSPLANT | Facility: CLINIC | Age: 35
End: 2025-01-15
Payer: MEDICARE

## 2025-01-15 DIAGNOSIS — B25.9 CYTOMEGALOVIRUS (CMV) VIREMIA (H): ICD-10-CM

## 2025-01-15 DIAGNOSIS — Z48.298 AFTERCARE FOLLOWING ORGAN TRANSPLANT: Primary | ICD-10-CM

## 2025-01-15 DIAGNOSIS — B27.00 EBV (EPSTEIN-BARR VIRUS) VIREMIA: ICD-10-CM

## 2025-01-15 PROCEDURE — S9379 HIT NOC PER DIEM: HCPCS | Mod: SH

## 2025-01-15 PROCEDURE — A4223 INFUSION SUPPLIES W/O PUMP: HCPCS

## 2025-01-15 PROCEDURE — S9501 HIT ANTIBIOTIC Q12H DIEM: HCPCS

## 2025-01-15 NOTE — TELEPHONE ENCOUNTER
Post Kidney and Pancreas Transplant Team Conference  Date: 1/15/2025  Transplant Coordinator: Radha     Attendees:  [x]  Dr. Ortiz [] Roxanne Betancur, RN [x] Tanna Castanon LPN     [x]  Dr. Arellano [x] Radha Jorge, RN [] Diamond Atkinson LPN    [x] Dr. Patino [x] Otilia Prather, AMANDA    [x] Dr. Tsai [x] Geneva Dennis, RN [x] Frida Harris RN   [] Dr. Burgess [] Shantelel Ko, RN    [] Dr. Díaz [] Jael Olson, RN    []  Dr. Patel [] Nia Pryor, AMANDA    [x] Dr. Brooks [] Mao Mahan RN    [] Nicole Kowalski, HEBERT [] Mary Lou Corcoran RN    [x] Pratima Macedo NP [] Brenda Roca, AMANDA        Verbal Plan Read Back:   Room to reduce tac goal? No  Update immunology  Watch CMV trends    Routed to RN Coordinator   Tanna Castanon LPN

## 2025-01-16 ENCOUNTER — HOME CARE VISIT (OUTPATIENT)
Dept: HOME HEALTH SERVICES | Facility: HOME HEALTH | Age: 35
End: 2025-01-16
Payer: MEDICARE

## 2025-01-16 ENCOUNTER — HOME INFUSION BILLING (OUTPATIENT)
Dept: HOME HEALTH SERVICES | Facility: HOME HEALTH | Age: 35
End: 2025-01-16
Payer: MEDICARE

## 2025-01-16 ENCOUNTER — ENROLLMENT (OUTPATIENT)
Dept: HOME HEALTH SERVICES | Facility: HOME HEALTH | Age: 35
End: 2025-01-16
Payer: MEDICARE

## 2025-01-16 ENCOUNTER — LAB REQUISITION (OUTPATIENT)
Dept: LAB | Facility: CLINIC | Age: 35
End: 2025-01-16
Payer: MEDICARE

## 2025-01-16 VITALS
DIASTOLIC BLOOD PRESSURE: 74 MMHG | BODY MASS INDEX: 29.63 KG/M2 | WEIGHT: 162 LBS | SYSTOLIC BLOOD PRESSURE: 113 MMHG | RESPIRATION RATE: 17 BRPM | TEMPERATURE: 97.5 F | HEART RATE: 99 BPM | OXYGEN SATURATION: 98 %

## 2025-01-16 DIAGNOSIS — B25.9 CYTOMEGALOVIRAL DISEASE, UNSPECIFIED (H): ICD-10-CM

## 2025-01-16 LAB
ALBUMIN SERPL BCG-MCNC: 3.4 G/DL (ref 3.5–5.2)
ALP SERPL-CCNC: 55 U/L (ref 40–150)
ALT SERPL W P-5'-P-CCNC: 42 U/L (ref 0–50)
ANION GAP SERPL CALCULATED.3IONS-SCNC: 10 MMOL/L (ref 7–15)
AST SERPL W P-5'-P-CCNC: 34 U/L (ref 0–45)
BASOPHILS # BLD AUTO: 0 10E3/UL (ref 0–0.2)
BASOPHILS NFR BLD AUTO: 1 %
BILIRUB SERPL-MCNC: 0.6 MG/DL
BUN SERPL-MCNC: 17 MG/DL (ref 6–20)
CALCIUM SERPL-MCNC: 12.2 MG/DL (ref 8.8–10.4)
CHLORIDE SERPL-SCNC: 108 MMOL/L (ref 98–107)
CREAT SERPL-MCNC: 1.09 MG/DL (ref 0.51–0.95)
EGFRCR SERPLBLD CKD-EPI 2021: 68 ML/MIN/1.73M2
EOSINOPHIL # BLD AUTO: 0.1 10E3/UL (ref 0–0.7)
EOSINOPHIL NFR BLD AUTO: 2 %
ERYTHROCYTE [DISTWIDTH] IN BLOOD BY AUTOMATED COUNT: 14.3 % (ref 10–15)
GLUCOSE SERPL-MCNC: 108 MG/DL (ref 70–99)
HCO3 SERPL-SCNC: 24 MMOL/L (ref 22–29)
HCT VFR BLD AUTO: 34.8 % (ref 35–47)
HGB BLD-MCNC: 11.7 G/DL (ref 11.7–15.7)
IMM GRANULOCYTES # BLD: 0 10E3/UL
IMM GRANULOCYTES NFR BLD: 1 %
LYMPHOCYTES # BLD AUTO: 0.3 10E3/UL (ref 0.8–5.3)
LYMPHOCYTES NFR BLD AUTO: 10 %
MAGNESIUM SERPL-MCNC: 1 MG/DL (ref 1.7–2.3)
MCH RBC QN AUTO: 36.2 PG (ref 26.5–33)
MCHC RBC AUTO-ENTMCNC: 33.6 G/DL (ref 31.5–36.5)
MCV RBC AUTO: 108 FL (ref 78–100)
MONOCYTES # BLD AUTO: 0.4 10E3/UL (ref 0–1.3)
MONOCYTES NFR BLD AUTO: 12 %
NEUTROPHILS # BLD AUTO: 2.3 10E3/UL (ref 1.6–8.3)
NEUTROPHILS NFR BLD AUTO: 75 %
NRBC # BLD AUTO: 0 10E3/UL
NRBC BLD AUTO-RTO: 0 /100
PHOSPHATE SERPL-MCNC: 3.4 MG/DL (ref 2.5–4.5)
PLATELET # BLD AUTO: 157 10E3/UL (ref 150–450)
POTASSIUM SERPL-SCNC: 3.8 MMOL/L (ref 3.4–5.3)
PROT SERPL-MCNC: 6 G/DL (ref 6.4–8.3)
RBC # BLD AUTO: 3.23 10E6/UL (ref 3.8–5.2)
SODIUM SERPL-SCNC: 142 MMOL/L (ref 135–145)
TACROLIMUS BLD-MCNC: 7.2 UG/L (ref 5–15)
TME LAST DOSE: NORMAL H
TME LAST DOSE: NORMAL H
WBC # BLD AUTO: 3 10E3/UL (ref 4–11)

## 2025-01-16 PROCEDURE — S9379 HIT NOC PER DIEM: HCPCS | Mod: SJ

## 2025-01-16 PROCEDURE — S9501 HIT ANTIBIOTIC Q12H DIEM: HCPCS | Mod: SH

## 2025-01-16 PROCEDURE — A4245 ALCOHOL WIPES PER BOX: HCPCS

## 2025-01-16 PROCEDURE — 85004 AUTOMATED DIFF WBC COUNT: CPT | Performed by: INTERNAL MEDICINE

## 2025-01-16 PROCEDURE — 80197 ASSAY OF TACROLIMUS: CPT | Performed by: INTERNAL MEDICINE

## 2025-01-16 PROCEDURE — 82310 ASSAY OF CALCIUM: CPT | Performed by: INTERNAL MEDICINE

## 2025-01-16 PROCEDURE — 83735 ASSAY OF MAGNESIUM: CPT | Performed by: INTERNAL MEDICINE

## 2025-01-16 PROCEDURE — A4223 INFUSION SUPPLIES W/O PUMP: HCPCS

## 2025-01-16 PROCEDURE — S9501 HIT ANTIBIOTIC Q12H DIEM: HCPCS

## 2025-01-16 PROCEDURE — 85041 AUTOMATED RBC COUNT: CPT | Performed by: INTERNAL MEDICINE

## 2025-01-16 PROCEDURE — 84100 ASSAY OF PHOSPHORUS: CPT | Performed by: INTERNAL MEDICINE

## 2025-01-16 PROCEDURE — S1015 IV TUBING EXTENSION SET: HCPCS

## 2025-01-16 NOTE — PROGRESS NOTES
Lab-Only Nursing Note    Labs obtained via PICC    ONTIME Tracking number: [id number]    Facility sent to: [facility]    Saline administered (in mLs): 30 mls  pt reports she had 100.0 temp last night at 8 pm, took Tylenol, fell asleep, woke up at 8:30 and temp is down to 97.5.     Next Visit Plan:   Monday for labs and CLC    Lizzie Queen RN 1/16/2025

## 2025-01-17 PROCEDURE — S9379 HIT NOC PER DIEM: HCPCS | Mod: SJ

## 2025-01-17 PROCEDURE — B4224 PARENTERAL ADMINISTRATION KI: HCPCS

## 2025-01-17 PROCEDURE — S9501 HIT ANTIBIOTIC Q12H DIEM: HCPCS

## 2025-01-17 PROCEDURE — S9374 HIT HYDRA 1 LITER DIEM: HCPCS | Mod: SJ

## 2025-01-17 PROCEDURE — A4223 INFUSION SUPPLIES W/O PUMP: HCPCS

## 2025-01-17 PROCEDURE — S9501 HIT ANTIBIOTIC Q12H DIEM: HCPCS | Mod: SH

## 2025-01-17 PROCEDURE — B4220 PARENTERAL SUPPLY KIT PREMIX: HCPCS

## 2025-01-18 PROCEDURE — S9501 HIT ANTIBIOTIC Q12H DIEM: HCPCS | Mod: SH

## 2025-01-18 PROCEDURE — S9379 HIT NOC PER DIEM: HCPCS | Mod: SJ

## 2025-01-18 PROCEDURE — S9501 HIT ANTIBIOTIC Q12H DIEM: HCPCS

## 2025-01-18 PROCEDURE — A4223 INFUSION SUPPLIES W/O PUMP: HCPCS

## 2025-01-19 PROCEDURE — S9501 HIT ANTIBIOTIC Q12H DIEM: HCPCS

## 2025-01-19 PROCEDURE — S9379 HIT NOC PER DIEM: HCPCS | Mod: SJ

## 2025-01-19 PROCEDURE — A4223 INFUSION SUPPLIES W/O PUMP: HCPCS

## 2025-01-19 PROCEDURE — S9501 HIT ANTIBIOTIC Q12H DIEM: HCPCS | Mod: SH

## 2025-01-20 ENCOUNTER — HOME CARE VISIT (OUTPATIENT)
Dept: HOME HEALTH SERVICES | Facility: HOME HEALTH | Age: 35
End: 2025-01-20
Payer: MEDICARE

## 2025-01-20 ENCOUNTER — LAB REQUISITION (OUTPATIENT)
Dept: LAB | Facility: CLINIC | Age: 35
End: 2025-01-20
Payer: MEDICARE

## 2025-01-20 VITALS
DIASTOLIC BLOOD PRESSURE: 76 MMHG | OXYGEN SATURATION: 96 % | RESPIRATION RATE: 16 BRPM | HEART RATE: 79 BPM | SYSTOLIC BLOOD PRESSURE: 108 MMHG | TEMPERATURE: 97.8 F

## 2025-01-20 DIAGNOSIS — B25.9 CYTOMEGALOVIRAL DISEASE, UNSPECIFIED (H): ICD-10-CM

## 2025-01-20 LAB
ALBUMIN SERPL BCG-MCNC: 3.7 G/DL (ref 3.5–5.2)
ALP SERPL-CCNC: 58 U/L (ref 40–150)
ALT SERPL W P-5'-P-CCNC: 34 U/L (ref 0–50)
ANION GAP SERPL CALCULATED.3IONS-SCNC: 10 MMOL/L (ref 7–15)
AST SERPL W P-5'-P-CCNC: 31 U/L (ref 0–45)
BASOPHILS # BLD AUTO: 0 10E3/UL (ref 0–0.2)
BASOPHILS NFR BLD AUTO: 1 %
BILIRUB SERPL-MCNC: 0.6 MG/DL
BUN SERPL-MCNC: 21.4 MG/DL (ref 6–20)
CALCIUM SERPL-MCNC: 13.6 MG/DL (ref 8.8–10.4)
CHLORIDE SERPL-SCNC: 108 MMOL/L (ref 98–107)
CMV DNA SPEC NAA+PROBE-ACNC: 2060 IU/ML
CMV DNA SPEC NAA+PROBE-LOG#: 3.3 {LOG_COPIES}/ML
CREAT SERPL-MCNC: 1.16 MG/DL (ref 0.51–0.95)
EGFRCR SERPLBLD CKD-EPI 2021: 63 ML/MIN/1.73M2
EOSINOPHIL # BLD AUTO: 0.1 10E3/UL (ref 0–0.7)
EOSINOPHIL NFR BLD AUTO: 2 %
ERYTHROCYTE [DISTWIDTH] IN BLOOD BY AUTOMATED COUNT: 13.5 % (ref 10–15)
GLUCOSE SERPL-MCNC: 90 MG/DL (ref 70–99)
HCO3 SERPL-SCNC: 22 MMOL/L (ref 22–29)
HCT VFR BLD AUTO: 35.5 % (ref 35–47)
HGB BLD-MCNC: 11.9 G/DL (ref 11.7–15.7)
HOLD SPECIMEN: NORMAL
IMM GRANULOCYTES # BLD: 0.1 10E3/UL
IMM GRANULOCYTES NFR BLD: 1 %
LYMPHOCYTES # BLD AUTO: 0.5 10E3/UL (ref 0.8–5.3)
LYMPHOCYTES NFR BLD AUTO: 11 %
MAGNESIUM SERPL-MCNC: 1.1 MG/DL (ref 1.7–2.3)
MCH RBC QN AUTO: 36 PG (ref 26.5–33)
MCHC RBC AUTO-ENTMCNC: 33.5 G/DL (ref 31.5–36.5)
MCV RBC AUTO: 107 FL (ref 78–100)
MONOCYTES # BLD AUTO: 0.6 10E3/UL (ref 0–1.3)
MONOCYTES NFR BLD AUTO: 13 %
NEUTROPHILS # BLD AUTO: 3.3 10E3/UL (ref 1.6–8.3)
NEUTROPHILS NFR BLD AUTO: 73 %
NRBC # BLD AUTO: 0 10E3/UL
NRBC BLD AUTO-RTO: 0 /100
PHOSPHATE SERPL-MCNC: 2.8 MG/DL (ref 2.5–4.5)
PLATELET # BLD AUTO: 188 10E3/UL (ref 150–450)
POTASSIUM SERPL-SCNC: 3.9 MMOL/L (ref 3.4–5.3)
PROT SERPL-MCNC: 6.9 G/DL (ref 6.4–8.3)
RBC # BLD AUTO: 3.31 10E6/UL (ref 3.8–5.2)
SODIUM SERPL-SCNC: 140 MMOL/L (ref 135–145)
SPECIMEN TYPE: ABNORMAL
TACROLIMUS BLD-MCNC: 9.4 UG/L (ref 5–15)
TME LAST DOSE: NORMAL H
TME LAST DOSE: NORMAL H
WBC # BLD AUTO: 4.4 10E3/UL (ref 4–11)

## 2025-01-20 PROCEDURE — A4221 SUPP NON-INSULIN INF CATH/WK: HCPCS

## 2025-01-20 PROCEDURE — S9501 HIT ANTIBIOTIC Q12H DIEM: HCPCS

## 2025-01-20 PROCEDURE — S9379 HIT NOC PER DIEM: HCPCS | Mod: SJ

## 2025-01-20 PROCEDURE — 83735 ASSAY OF MAGNESIUM: CPT | Performed by: INTERNAL MEDICINE

## 2025-01-20 PROCEDURE — 85004 AUTOMATED DIFF WBC COUNT: CPT | Performed by: INTERNAL MEDICINE

## 2025-01-20 PROCEDURE — 80197 ASSAY OF TACROLIMUS: CPT | Performed by: INTERNAL MEDICINE

## 2025-01-20 PROCEDURE — A4223 INFUSION SUPPLIES W/O PUMP: HCPCS

## 2025-01-20 PROCEDURE — 84295 ASSAY OF SERUM SODIUM: CPT | Performed by: INTERNAL MEDICINE

## 2025-01-20 PROCEDURE — S9501 HIT ANTIBIOTIC Q12H DIEM: HCPCS | Mod: SH

## 2025-01-20 PROCEDURE — G0068 ADM OF INFUSION DRUG IN HOME: HCPCS

## 2025-01-20 PROCEDURE — 84100 ASSAY OF PHOSPHORUS: CPT | Performed by: INTERNAL MEDICINE

## 2025-01-20 NOTE — PROGRESS NOTES
Lab-Only Nursing Note    Labs obtained via PICC    ONTIME Tracking number: [id number]    Facility sent to: [facility]    Saline administered (in mLs): 40mls    Next Visit Plan:   RN revisit for lab draw and GA Thurs 1/23/25 830am    Kelsey Delgado RN 1/20/2025

## 2025-01-20 NOTE — PROGRESS NOTES
TRANSPLANT NEPHROLOGY CLINIC VISIT     Assessment & Plan   # DDKT: CKD Stage 2 - Increased in the setting of foscarnet therapy, moderate hypercalcemia and decrease intake with vomiting in the past 1-2 days. 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 Unlikely rejection in the setting of CMV viremia. Re-check labs Thursday.    - Baseline Creatinine: ~ 0.7-0.9   - Proteinuria: Normal (<0.2 grams)   - DSA Hx: No DSA   - Last cPRA: 9%   - BK Viremia: Yes, minimally elevated (< 1000)   - Kidney Tx Biopsy Hx: No biopsy history.    # H/o Interstitial Nephritis: No evidence of recurrence.    # Immunosuppression: Tacrolimus immediate release (goal 6-8), Azathioprine (dose 50 mg daily), and Prednisone (dose 5 mg daily)   - Induction with Recent Transplant:  High Intensity Protocol   - Continue with intensive monitoring of immunosuppression for efficacy and toxicity.   - Historical Changes in Immunosuppression:  Changed from mycophenolate to azathioprine and added prednisone due to oral and genital ulcers, concern for Behcet's versus mycophenolate toxicity.  Decreased azathioprine dose due to BK and CMV viremia.   - Changes: Not at this time with improved CMV viremia. No need for further decrease in IS at this time.    # Infection Prevention:      - PJP: Sulfa/TMP (Bactrim) 400-80 mg every day.   - CMV: Being treated for CMV viremia and/or disease      - CMV IgG Ab High Risk Discordance (D+/R-): Yes  CMV Serostatus: Negative  - EBV IgG Ab High Risk Discordance (D+/R-): No  EBV Serostatus: Negative    # Blood Pressure: Controlled;  Goal BP: < 130/80   - Changes: No    # Leukopenia: Improved, now steadily above 3,000.    # Mineral Bone Disorder:    - Secondary renal hyperparathyroidism; PTH level: Minimally elevated ( pg/ml)        On treatment: None  - Vitamin D; level: Normal        On supplement: No  - Calcium; level: High        On supplement: No    PTH is high normal.  Had normal 25 vitamin D.  Not taking over-the-counter vitamin D or  calcium supplements.  Mostly eating 2 shakes a day so no significant calcium intake.  Differential of hypercalcemia includes a component of hyperparathyroidism given nonsuppressed PTH, but not tertiary or severe secondary given PTH in normal levels.  Given history of Crohn's disease we have sent 1-25 vitamin D.  This can also be a component of contraction.  Continue IV fluids.  Today she received pamidronate and calcitonin.  She will start Cinacalcet 30 mg daily and if tolerating, increase to 60 mg daily.    # Electrolytes:   - Potassium; level: Normal        On supplement: No  - Magnesium; level: Not checked recently        On supplement: Yes change to mg glycinate 300 mg twice a day. Will 2 grams IV at home.  - Bicarbonate; level: Normal        On supplement: No    # BK Viremia: Stable, minimally elevated BK PCR at ~ 360 with last check Dec/2024.  IgG level is normal.  Decreased immunosuppression.   - Will continue to follow BK PCR every 2 weeks.    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 Started on Foscarnet in Jan, 2025. Decreased at ~ 20K with last check Oct/2024.  CMV IgG Ab positive.   - Started on Maribavir about 5 days ago.  - CMV level yesterday at 2K. Foscarnet held today, but PICC line will remain in place until there is 2 negative CMV's.  - Will continue to follow CMV PCR every weekly. Followed by transplant ID.    # H/o Oral and Genital Ulcers: Patient with h/o significant and debilitating ulcers due to unclear etiology. Doubt EBV as a cause, although positive staining on biopsy. Felt possibly related to Behcet's or mycophenolate toxicity. Symptoms resolved with overall decrease in immunosuppression and change from mycophenolate to azathioprine.  Followed by Transplant ID and Rheumatology in the past.    # Other Significant PMH:   - GERD: Asymptomatic and now off medications.   - Crohn's Disease: Appears to be stable with no recent symptoms.  Previously was on ustekinumab prior  to kidney transplant, but hasn't restarted it due to ongoing oral and vaginal ulcers.      # Skin Cancer Risk: Discussed sun protection and recommend regular follow up with Dermatology.    # Transplant History:  Etiology of Kidney Failure: Interstitial nephritis  Tx: DDKT  Transplant: 4/17/2024 (Kidney)  Significant transplant-related complications: BK Viremia, CMV Viremia, and Oral and genital ulcers    Transplant Office Phone Number: [phone]    Assessment and plan was discussed with the patient and she voiced her understanding and agreement.    Return visit: Return for appointment scheduled for 4/22/25.    Merritt Patino MD    The longitudinal plan of care for the diagnosis(es)/condition(s) as documented were addressed during this visit. Due to the added complexity in care, I will continue to support Ira in the subsequent management and with ongoing continuity of care.      Chief Complaint   Ms. Zamora is a 34 year old here for kidney transplant and immunosuppression management.     History of Present Illness   Ms. Zamora reports feeling okay overall.  She is accompanied by her  and dad.  She feels stable, but with continued decreased appetite, mostly eating 2 shakes a day, with minimal solid food intake.  Yesterday she had episodes of vomiting that improved today.  Continues to have watery bowel movements about 1 to 2/day, with occasional colicky abdominal pain, but no blood in her stools.  Temperatures at home highest 99  F for which she takes Tylenol given feeling of subjective fever.  No lightheadedness on standing.  No low blood pressures.  No dysuria or hematuria.  Blood pressure mostly 120s to 130s.    Problem List   Patient Active Problem List   Diagnosis    Crohn's disease (H)    CKD (chronic kidney disease) stage 2, GFR 60-89 ml/min    Secondary renal hyperparathyroidism    Long QT interval    Kidney replaced by transplant    Immunosuppressed status    Dehydration    Aftercare following  "organ transplant    Mouth ulcers    Vaginal ulcer    Leukopenia, unspecified type    Severe malnutrition    Need for pneumocystis prophylaxis    Anemia in chronic renal disease    Hypomagnesemia    Odynophagia    Inadequate oral intake    Vulvar lesion    Ulcer of esophagus without bleeding    Other neutropenia    Cytomegalovirus (CMV) viremia (H)    EBV (Jake-Barr virus) viremia    Hypercalcemia    Hyperparathyroidism, secondary renal       Allergies   Allergies   Allergen Reactions    Amlodipine Headache and Other (See Comments)     Other Reaction(s): Unknown    Omeprazole Other (See Comments)     All PPIs per patient    Proton Pump Inhibitors Nephrotoxicity     No PPIs due to history of renal failure due to interstitial nephritis    Tegaderm Transparent Dressing (Informational Only) Blisters       Medications   Current Outpatient Medications   Medication Sig Dispense Refill    acetaminophen (TYLENOL) 500 MG tablet Take 1,000 mg by mouth every 6 hours as needed      aspirin 81 MG EC tablet Take 81 mg by mouth daily.      atorvastatin (LIPITOR) 10 MG tablet TAKE ONE TABLET BY MOUTH ONCE DAILY 30 tablet 1    azaTHIOprine (IMURAN) 50 MG tablet Take 1 tablet (50 mg) by mouth daily. 30 tablet 11    cinacalcet (SENSIPAR) 30 MG tablet Take 2 tablets (60 mg) by mouth daily. 60 tablet 2    Emergency Supply Kit, Central, Patient use for emergency only. Contents: 3 sodium chloride 0.9% flushes, 1 dressing kit, 1 microclave ext set 14\", 4 nitrile gloves (med), 6 alcohol prep pads, 1 bacitracin, 1 syringe (10 cc 20 G 1\"). Call [phone] to reorder. 398757 kit 0    foscarnet (FOSCAVIR) 5,000 mg in sodium chloride 0.9 % 500 mL via HOMEPUMP Infuse 5,000 mg over 2 hours into the vein every 12 hours for 12 days. 281704 mL 0    hydrOXYzine HCl (ATARAX) 25 MG tablet Take 1 tablet (25 mg) by mouth every 6 hours as needed for other (adjuvant pain) 20 tablet 0    ketoconazole (NIZORAL) 2 % external shampoo Apply topically daily " He is well-developed  HENT:      Head: Normocephalic and atraumatic  Eyes:      Pupils: Pupils are equal, round, and reactive to light  Neck:      Thyroid: No thyromegaly  Vascular: No JVD  Trachea: Trachea normal    Cardiovascular:      Rate and Rhythm: Normal rate and regular rhythm  Pulses:           Carotid pulses are 2+ on the right side and 2+ on the left side  Radial pulses are 2+ on the right side and 2+ on the left side  Heart sounds: S1 normal and S2 normal  Murmur heard  No friction rub  No gallop  Pulmonary:      Effort: Pulmonary effort is normal  No accessory muscle usage or respiratory distress  Breath sounds: No wheezing or rales  Comments: Decreased BS  Supplemental O2  Abdominal:      General: Bowel sounds are normal  There is no distension  Palpations: Abdomen is soft  Tenderness: There is no abdominal tenderness  Musculoskeletal:         General: No deformity  Normal range of motion  Cervical back: Neck supple  Right lower le+ Pitting Edema present  Left lower le+ Pitting Edema present  Skin:     General: Skin is warm and dry  Findings: No lesion or rash  Nails: There is no clubbing  Neurological:      Mental Status: He is alert and oriented to person, place, and time  Comments: Grossly normal    Psychiatric:         Behavior: Behavior is cooperative  I have reviewed and made appropriate changes to the review of systems input by the medical assistant      Vitals:    04/15/as needed for itching or irritation (use there times a week and let sit on scalp for 5 minutes). 120 mL 11    levonorgestrel (KYLEENA) 19.5 MG IUD 1 Device by Intrauterine route      magnesium glycinate 100 MG CAPS capsule Take 3 capsules (300 mg) by mouth 2 times daily. Take in place of magnesium oxide 180 capsule 2    magnesium oxide (MAG-OX) 400 MG tablet Take 2 tablets (800 mg) by mouth 2 times daily. 120 tablet 3    magnesium sulfate 2,000 mg in sodium chloride 0.9 % 500 mL via elastomeric pump Infuse 500 mLs into the vein 2 times daily for 2 doses. Infuse 1 elastomeric pump(s). Each elastomeric to infuse over 2 hours. Each dose is 2g magnesium sulfate. 387467 mL 0    maribavir (LIVTENCITY) 200 MG tablet Take 2 tablets (400 mg) by mouth 2 times daily. 120 tablet 11    PARoxetine (PAXIL) 30 MG tablet Take 30 mg by mouth every evening      phosphorus tablet 250 mg (PHOSPHA 250 NEUTRAL) 250 MG per tablet Take 250 mg by mouth 2 times daily.      predniSONE (DELTASONE) 5 MG tablet Take 1 tablet (5 mg) by mouth daily. 30 tablet 2    sodium chloride 0.9 % 500 mL via elastomeric pump Infuse 1,000 mLs over 2 hours into the vein 2 times daily. Infuse 2 x 500mL (total of 1000mL twice daily) elastomeric pump(s), twice daily. Each elastomeric to infuse over 2 hours. 905472 mL 0    sodium chloride, PF, 0.9% PF flush Inject 10 mLs into the vein as needed for line flush. Flush IV before and after medication administration as directed and/or at least every 24 hours. 994775 mL 0    sulfamethoxazole-trimethoprim (BACTRIM) 400-80 MG tablet Take 1 tablet by mouth daily 30 tablet 11    tacrolimus (GENERIC EQUIVALENT) 0.5 MG capsule Take 1 capsule (0.5 mg) by mouth every evening. 1mg AM and 0.5mg PM 90 capsule 3    tacrolimus (GENERIC EQUIVALENT) 1 MG capsule Take 1 capsule (1 mg) by mouth every morning. 1mg AM and 0.5mg PM 90 capsule 3    triamcinolone (KENALOG) 0.1 % paste Take by mouth 2 times daily as needed (canker sore).       Luther Kern MD;  Location: [place] [address] [facility] LAB; Service:     CYSTOSCOPY Left 3/18/2016    Procedure: CYSTOSCOPY, BLADDER BIOPY, URETEROSCOPY, BLADDER FULGERATION (C-ARM), left stent exchange, left retrograde pylelogram;  Surgeon: Edis Patterson MD;  Location: [address] [place] [address];  [id number]);   Surgeon: Jennifer Vasquez MD;  Location: [facility] MAIN OR;  Service: Pain Management     EPIDURAL BLOCK INJECTION N/A 12/28/2021    Procedure: C6 C7 cervical Vonoprazan Fumarate (VOQUEZNA) 20 MG TABS Take 20 mg by mouth daily 60 tablet 0     No current facility-administered medications for this visit.     There are no discontinued medications.    Physical Exam   Vital Signs: BP (!) 148/99 (BP Location: Left arm, Cuff Size: Adult Small)   Pulse 93   Resp 16   Wt 73.6 kg (162 lb 5 oz)   SpO2 96%   BMI 29.69 kg/m      GENERAL APPEARANCE: alert and no distress  HENT: mouth without ulcers or lesions  RESP: lungs clear to auscultation - no rales, rhonchi or wheezes  CV: regular rhythm, normal rate, no rub, no murmur  EDEMA: no LE edema bilaterally  ABDOMEN: soft, nondistended, nontender, bowel sounds normal  MS: extremities normal - no gross deformities noted, no evidence of inflammation in joints, no muscle tenderness  SKIN: no rash  TX KIDNEY: normal  DIALYSIS ACCESS:  RUE AV fistula with good thrill, aneurysmal    Data         Latest Ref Rng & Units 1/21/2025     2:45 PM 1/20/2025     8:50 AM 1/16/2025     9:05 AM   Renal   Sodium 135 - 145 mmol/L 142  140  142    K 3.4 - 5.3 mmol/L 4.1  3.9  3.8    Cl 98 - 107 mmol/L 108  108  108    Cl (external) 98 - 107 mmol/L 108  108  108    CO2 22 - 29 mmol/L 24  22  24    Urea Nitrogen 6.0 - 20.0 mg/dL 21.0  21.4  17.0    Creatinine 0.51 - 0.95 mg/dL 1.12  1.16  1.09    Glucose 70 - 99 mg/dL 98  90  108    Calcium 8.8 - 10.4 mg/dL 11.7  13.6  12.2    Magnesium 1.7 - 2.3 mg/dL  1.1  1.0          Latest Ref Rng & Units 1/21/2025     2:45 PM 1/20/2025     8:50 AM 1/16/2025     9:05 AM   Bone Health   Phosphorus 2.5 - 4.5 mg/dL  2.8  3.4    Parathyroid Hormone Intact 15 - 65 pg/mL 64            Latest Ref Rng & Units 1/21/2025     2:45 PM 1/20/2025     8:50 AM 1/16/2025     9:05 AM   Heme   WBC 4.0 - 11.0 10e3/uL 3.9  4.4  3.0    Hgb 11.7 - 15.7 g/dL 11.2  11.9  11.7    Plt 150 - 450 10e3/uL 168  188  157          Latest Ref Rng & Units 1/20/2025     8:50 AM 1/16/2025     9:05 AM 1/13/2025     8:50 AM   Liver   AP 40 - 150 U/L 58   epidural steroid injection ( 31984); Surgeon: Brook Muinz MD;  Location: [facility] MAIN OR;  Service: Pain Management     EPIDURAL BLOCK INJECTION N/A 3/4/2022    Procedure: C6 C7 cervical epidural steroid injection (25296); Surgeon: Brook Muniz MD;  Location: [facility] 49935 [id number]); Surgeon: Brook Muinz MD;  Location: [facility] MAIN OR;  Service: Pain Management     NERVE BLOCK Bilateral 1/28/2022    Procedure: L3 L4 L5 S1 medial branch block #2 ( [id number]); Surgeon: Brook Muniz MD;  Location: [facility] 28044;  Surgeon: Brook Muniz MD;  Location: [facility] MAIN OR;  Service: Pain Management     RADIOFREQUENCY ABLATION Right 4/8/2022    Procedure: L4 L5 S1 radio frequency ablation ( [id number]);   Surgeon: Brook Muniz MD;  Location: [facility] MAIN OR;  Service: Pain 55  70    TBili <=1.2 mg/dL 0.6  0.6  0.9    ALT 0 - 50 U/L 34  42  61    AST 0 - 45 U/L 31  34  43    Tot Protein 6.4 - 8.3 g/dL 6.9  6.0  7.0    Albumin 3.5 - 5.2 g/dL 3.7  3.4  4.1          Latest Ref Rng & Units 10/30/2024     8:20 AM 6/26/2024     5:06 PM 4/17/2024     9:58 AM   Pancreas   A1C 0.0 - 5.6 % 5.6   4.7    Lipase (Roche) 13 - 60 U/L  14           Latest Ref Rng & Units 5/23/2024     9:49 AM 4/22/2024     8:21 AM   Iron studies   Iron 37 - 145 ug/dL 77  53    Iron Sat Index 15 - 46 % 38  36    Ferritin 6 - 175 ng/mL 2,181  2,181          1/20/2025     8:50 AM 1/13/2025     8:50 AM 1/6/2025     9:41 AM   [facility] Virology   LOG IU/ML OF CMVQNT 3.3  4.4  4.4      Failed to redirect to the Timeline version of the [employer].  Recent Labs   Lab Test 01/10/25  0815 01/13/25  0850 01/16/25  0905 01/20/25  0850   DOSTAC 1/9/2025 1/12/2025 1/15/2025  --    TACROL 4.3* 7.0 7.2 9.4     Recent Labs   Lab Test 04/23/24  0735 10/22/24  0903   DOSMPA 4/22/2024   9:00 PM 10/21/2024   9:30 PM   MPACID 2.20 <0.25*   MPAG 107.5* <6.5*       

## 2025-01-21 ENCOUNTER — LAB (OUTPATIENT)
Dept: LAB | Facility: CLINIC | Age: 35
End: 2025-01-21
Attending: STUDENT IN AN ORGANIZED HEALTH CARE EDUCATION/TRAINING PROGRAM
Payer: COMMERCIAL

## 2025-01-21 ENCOUNTER — TELEPHONE (OUTPATIENT)
Dept: TRANSPLANT | Facility: CLINIC | Age: 35
End: 2025-01-21

## 2025-01-21 ENCOUNTER — HOME INFUSION BILLING (OUTPATIENT)
Dept: HOME HEALTH SERVICES | Facility: HOME HEALTH | Age: 35
End: 2025-01-21
Payer: MEDICARE

## 2025-01-21 ENCOUNTER — VIRTUAL VISIT (OUTPATIENT)
Dept: INFECTIOUS DISEASES | Facility: CLINIC | Age: 35
End: 2025-01-21
Attending: INTERNAL MEDICINE
Payer: MEDICARE

## 2025-01-21 ENCOUNTER — OFFICE VISIT (OUTPATIENT)
Dept: TRANSPLANT | Facility: CLINIC | Age: 35
End: 2025-01-21
Attending: INTERNAL MEDICINE
Payer: MEDICARE

## 2025-01-21 ENCOUNTER — ENROLLMENT (OUTPATIENT)
Dept: HOME HEALTH SERVICES | Facility: HOME HEALTH | Age: 35
End: 2025-01-21
Payer: MEDICARE

## 2025-01-21 VITALS
HEART RATE: 93 BPM | BODY MASS INDEX: 29.69 KG/M2 | SYSTOLIC BLOOD PRESSURE: 148 MMHG | RESPIRATION RATE: 16 BRPM | DIASTOLIC BLOOD PRESSURE: 99 MMHG | WEIGHT: 162.31 LBS | OXYGEN SATURATION: 96 %

## 2025-01-21 VITALS — WEIGHT: 162.31 LBS | BODY MASS INDEX: 29.87 KG/M2 | HEIGHT: 62 IN

## 2025-01-21 DIAGNOSIS — E83.52 HYPERCALCEMIA: ICD-10-CM

## 2025-01-21 DIAGNOSIS — D63.1 ANEMIA IN STAGE 2 CHRONIC KIDNEY DISEASE: ICD-10-CM

## 2025-01-21 DIAGNOSIS — B25.9 CYTOMEGALOVIRUS (CMV) VIREMIA (H): ICD-10-CM

## 2025-01-21 DIAGNOSIS — Z48.298 AFTERCARE FOLLOWING ORGAN TRANSPLANT: ICD-10-CM

## 2025-01-21 DIAGNOSIS — T88.7XXA ADVERSE DRUG EVENT: ICD-10-CM

## 2025-01-21 DIAGNOSIS — E83.42 HYPOMAGNESEMIA: ICD-10-CM

## 2025-01-21 DIAGNOSIS — Z94.0 KIDNEY REPLACED BY TRANSPLANT: Chronic | ICD-10-CM

## 2025-01-21 DIAGNOSIS — D84.9 IMMUNOSUPPRESSED STATUS: Chronic | ICD-10-CM

## 2025-01-21 DIAGNOSIS — B27.00 EBV (EPSTEIN-BARR VIRUS) VIREMIA: ICD-10-CM

## 2025-01-21 DIAGNOSIS — N18.2 CKD (CHRONIC KIDNEY DISEASE) STAGE 2, GFR 60-89 ML/MIN: ICD-10-CM

## 2025-01-21 DIAGNOSIS — R43.2 DYSGEUSIA: ICD-10-CM

## 2025-01-21 DIAGNOSIS — N25.81 HYPERPARATHYROIDISM, SECONDARY RENAL: ICD-10-CM

## 2025-01-21 DIAGNOSIS — N18.2 ANEMIA IN STAGE 2 CHRONIC KIDNEY DISEASE: ICD-10-CM

## 2025-01-21 DIAGNOSIS — Z94.0 KIDNEY REPLACED BY TRANSPLANT: Primary | ICD-10-CM

## 2025-01-21 DIAGNOSIS — N25.81 SECONDARY RENAL HYPERPARATHYROIDISM: Primary | ICD-10-CM

## 2025-01-21 DIAGNOSIS — E83.52 HYPERCALCEMIA: Primary | ICD-10-CM

## 2025-01-21 DIAGNOSIS — Z29.89 NEED FOR PNEUMOCYSTIS PROPHYLAXIS: ICD-10-CM

## 2025-01-21 LAB
ANION GAP SERPL CALCULATED.3IONS-SCNC: 10 MMOL/L (ref 7–15)
BUN SERPL-MCNC: 21 MG/DL (ref 6–20)
CALCIUM SERPL-MCNC: 11.7 MG/DL (ref 8.8–10.4)
CHLORIDE SERPL-SCNC: 108 MMOL/L (ref 98–107)
CREAT SERPL-MCNC: 1.12 MG/DL (ref 0.51–0.95)
EGFRCR SERPLBLD CKD-EPI 2021: 66 ML/MIN/1.73M2
ERYTHROCYTE [DISTWIDTH] IN BLOOD BY AUTOMATED COUNT: 13.6 % (ref 10–15)
GLUCOSE SERPL-MCNC: 98 MG/DL (ref 70–99)
HCO3 SERPL-SCNC: 24 MMOL/L (ref 22–29)
HCT VFR BLD AUTO: 32.6 % (ref 35–47)
HGB BLD-MCNC: 11.2 G/DL (ref 11.7–15.7)
MCH RBC QN AUTO: 36.4 PG (ref 26.5–33)
MCHC RBC AUTO-ENTMCNC: 34.4 G/DL (ref 31.5–36.5)
MCV RBC AUTO: 106 FL (ref 78–100)
PLATELET # BLD AUTO: 168 10E3/UL (ref 150–450)
POTASSIUM SERPL-SCNC: 4.1 MMOL/L (ref 3.4–5.3)
PTH-INTACT SERPL-MCNC: 64 PG/ML (ref 15–65)
RBC # BLD AUTO: 3.08 10E6/UL (ref 3.8–5.2)
SODIUM SERPL-SCNC: 142 MMOL/L (ref 135–145)
WBC # BLD AUTO: 3.9 10E3/UL (ref 4–11)

## 2025-01-21 PROCEDURE — 36415 COLL VENOUS BLD VENIPUNCTURE: CPT | Performed by: PATHOLOGY

## 2025-01-21 PROCEDURE — A4223 INFUSION SUPPLIES W/O PUMP: HCPCS

## 2025-01-21 PROCEDURE — S9379 HIT NOC PER DIEM: HCPCS | Mod: SJ

## 2025-01-21 PROCEDURE — 80048 BASIC METABOLIC PNL TOTAL CA: CPT | Performed by: PATHOLOGY

## 2025-01-21 PROCEDURE — 99000 SPECIMEN HANDLING OFFICE-LAB: CPT | Performed by: PATHOLOGY

## 2025-01-21 PROCEDURE — 83970 ASSAY OF PARATHORMONE: CPT | Performed by: PATHOLOGY

## 2025-01-21 PROCEDURE — S9501 HIT ANTIBIOTIC Q12H DIEM: HCPCS | Mod: SH

## 2025-01-21 PROCEDURE — G0463 HOSPITAL OUTPT CLINIC VISIT: HCPCS | Performed by: STUDENT IN AN ORGANIZED HEALTH CARE EDUCATION/TRAINING PROGRAM

## 2025-01-21 PROCEDURE — 85027 COMPLETE CBC AUTOMATED: CPT | Performed by: PATHOLOGY

## 2025-01-21 PROCEDURE — 87799 DETECT AGENT NOS DNA QUANT: CPT | Performed by: INTERNAL MEDICINE

## 2025-01-21 PROCEDURE — S9501 HIT ANTIBIOTIC Q12H DIEM: HCPCS

## 2025-01-21 PROCEDURE — 98007 SYNCH AUDIO-VIDEO EST HI 40: CPT | Performed by: INTERNAL MEDICINE

## 2025-01-21 PROCEDURE — 82652 VIT D 1 25-DIHYDROXY: CPT | Performed by: STUDENT IN AN ORGANIZED HEALTH CARE EDUCATION/TRAINING PROGRAM

## 2025-01-21 RX ORDER — MAGNESIUM GLYCINATE 100 MG
300 CAPSULE ORAL 2 TIMES DAILY
Qty: 180 CAPSULE | Refills: 2 | Status: SHIPPED | OUTPATIENT
Start: 2025-01-21

## 2025-01-21 RX ORDER — DIPHENHYDRAMINE HYDROCHLORIDE 50 MG/ML
50 INJECTION INTRAMUSCULAR; INTRAVENOUS
Start: 2025-01-21

## 2025-01-21 RX ORDER — ALBUTEROL SULFATE 90 UG/1
1-2 INHALANT RESPIRATORY (INHALATION)
Start: 2025-01-21

## 2025-01-21 RX ORDER — HEPARIN SODIUM,PORCINE 10 UNIT/ML
5-20 VIAL (ML) INTRAVENOUS DAILY PRN
OUTPATIENT
Start: 2025-01-21

## 2025-01-21 RX ORDER — METHYLPREDNISOLONE SODIUM SUCCINATE 40 MG/ML
40 INJECTION INTRAMUSCULAR; INTRAVENOUS
Start: 2025-01-21

## 2025-01-21 RX ORDER — MEPERIDINE HYDROCHLORIDE 25 MG/ML
25 INJECTION INTRAMUSCULAR; INTRAVENOUS; SUBCUTANEOUS
OUTPATIENT
Start: 2025-01-21

## 2025-01-21 RX ORDER — CINACALCET 30 MG/1
60 TABLET, FILM COATED ORAL DAILY
Qty: 60 TABLET | Refills: 2 | Status: SHIPPED | OUTPATIENT
Start: 2025-01-21

## 2025-01-21 RX ORDER — EPINEPHRINE 1 MG/ML
0.3 INJECTION, SOLUTION, CONCENTRATE INTRAVENOUS EVERY 5 MIN PRN
OUTPATIENT
Start: 2025-01-21

## 2025-01-21 RX ORDER — ALBUTEROL SULFATE 0.83 MG/ML
2.5 SOLUTION RESPIRATORY (INHALATION)
OUTPATIENT
Start: 2025-01-21

## 2025-01-21 RX ORDER — HEPARIN SODIUM (PORCINE) LOCK FLUSH IV SOLN 100 UNIT/ML 100 UNIT/ML
5 SOLUTION INTRAVENOUS
OUTPATIENT
Start: 2025-01-21

## 2025-01-21 RX ORDER — DIPHENHYDRAMINE HYDROCHLORIDE 50 MG/ML
25 INJECTION INTRAMUSCULAR; INTRAVENOUS
Start: 2025-01-21

## 2025-01-21 RX ORDER — CALCITONIN SALMON 200 [USP'U]/ML
30 INJECTION, SOLUTION INTRAMUSCULAR; SUBCUTANEOUS ONCE
Start: 2025-01-21 | End: 2025-01-21

## 2025-01-21 ASSESSMENT — PAIN SCALES - GENERAL
PAINLEVEL_OUTOF10: NO PAIN (0)
PAINLEVEL_OUTOF10: NO PAIN (0)

## 2025-01-21 NOTE — PATIENT INSTRUCTIONS
Ms. Zamora,   As we discussed  Hold any additional foscarnet.   Continue intravenous hydration.   Please call [phone] to schedule an appointment with me in 4 weeks, in-person or virtual.   May take Tdap and updated COVID-19 vaccines at your pharmacy.     Thank you  Edgardo Mccoy MD

## 2025-01-21 NOTE — LETTER
1/21/2025       RE: Ira Zamora  [address] [facility] [address]     Dear Colleague,    Thank you for referring your patient, Ira Zamora, to the [facility]FECTI[facility] DISE[facility] [place] at [facility]. Please see a copy of my visit note below.        Transplant Infectious Diseases Outpatient Progress note      Patient:  Ira Zamora, Date of birth 1990, Medical record number [id number]  Date of Visit:  01/21/2025         Recommendations:   Hold foscarnet IV for now.   Keep PICC line until we secure another low or undetected CMV value.   Continue IV hydration with 1 L NS bid until further notice.   Will add IV Magnesium IV 1/22/25 followed by the PO supplement per nephrology recommendations.   Hypercalcemia management per nephrology.   Tdap and updated COVID-19 vaccines.      RTC: one month. In person or virtually.         Synopsis of Immune Status and Presentation:   Transplants:  4/17/2024 (Kidney), Postoperative day:  279     This patient is a 34 year old female with PPI-induced interstitial nephritis s/p KT in 4/2024. ATG for induction followed by TAC/azathioprine. Also received belatacept x1 in 7/2024 when MPA was discontinued due to GI/ ulcers.   Has history of Crohn's disease and RA.  With resistant CMV viremia.         Active Problems and Infectious Diseases Issues:   Donor derived, resistant CMV viremia.   Foscarnet-induced DANY and electrolytes disturbances.   Occurred 10/2024 while on CMV universal ppx with VGCV. Resistance testing on 10/22/24 revealing UL54 mutation T503I with resistance to GCV and CDV. VGCV was continued with predictable lack of response associated with accumulation of UL97 C603W high grade mutation but the loss of the UL54 mutation 12/31/24. This discrepant mutation results is not unusual.   Foscarnet was started 1/4/25 with adverse events of hypophosphatemia, hypomagnesemia, hypercalcemia and most recently DANY.      After 9 days of foscarnet use, VL did not decrease substantially and maribavir was added 1/15/25. VL declined as of 1/20/25 to 3.3 log from peak of 4.5 log.     With the decline of VL, will hold foscarnet and continue maribavir. Will continue PICC line until we confirm persistence decline of CMV VL and to use it for hydration and electrolyte repletion.     10/22/24     12/31/24      Hypercalcemia.   Hydration 1 L bid.      Hypomagnesemia   Will give additional IV 2 gm bid x1 day and PO replacement.      Dysgeusia  Maribavir-induced.   This is not likely to result in serious events like weight loss.                 Old Problems and Infectious Diseases Issues:   Crohn's treated with ustekinumab, last dose 3/2024.   Oral and genital ulcers with negative ID-related workup. Resolved after switching from MPA to azathioprine 7/2024.     Other Infectious Disease issues include:  - QTc: 437 as of 1/9/25.   - Toxoplasma serostatus: IgG ?  - Viral serostatus: CMV D+/R-, EBV D+/R+, HSV1?/2?, VZV +, currently on foscarnet for CMV therapy.   - PJP (and possibly Toxoplasma) prophylaxis: bactrim.  - Immunization status: Tdap and COVID-19 vaccines.   - Gamma globulin status: 900 as of 12/31/24.       Attestation:  Total duration of visit including chart review, reviewing labs and imaging, interviewing and examining the patient, documentation, and sending communication to the patient and to the primary treating team, all at the same day of this encounter, is: 85 minutes.   Edgardo Mccoy MD    Contact information available via [facility] Paging/Directory     01/21/2025         Interim History:   Since I last saw the patient, her diarrhea is intermittent. No fever.   Since she was started on maribavir she developed bad taste in the mouth.          Review of Systems:     As mentioned in the interim history otherwise negative by reviewing constitutional symptoms, central and peripheral neurological systems, respiratory system, cardiac  system, GI system,  system, musculoskeletal, skin, allergy, and lymphatics.                  Past Medical History:     Past Medical History:   Diagnosis Date     Anemia in chronic kidney disease      Anxiety 2007     Ascending aorta dilation 2020: ascending aorta 3.8 cm, aortic sinus 3.6 cm.  Echo: Ao root diam: 3.2 cm, asc Aorta Diam: 3.8 cm     ASCUS with positive high risk HPV cervical 2017    ASCUS with positive high risk HPV cervical 2017     Crohn's disease (H) 2004     Esophageal ulcer 2024     ESRD (end stage renal disease) on dialysis (H)      GERD (gastroesophageal reflux disease)      Hidradenitis suppurativa      History of blood transfusion      Hypertension      Juvenile rheumatoid arthritis (H)     Asymptomatic in adulthood     Kidney replaced by transplant 2024    En bloc. Induction w/ Thymoglobulin.     Secondary renal hyperparathyroidism      Tubulointerstitial nephropathy 2011    kidney biopsy 2011 - Acute and  chronic tubulointerstitial nephropathy.             Past Surgical History:     Past Surgical History:   Procedure Laterality Date     BIOPSY VULVA Left 2024    Procedure: BIOPSY, VULVA;  Surgeon: Roxanne Montenegro MD;  Location: [facility] OR     ESOPHAGOSCOPY, GASTROSCOPY, DUODENOSCOPY (EGD), COMBINED N/A 2024    Procedure: ESOPHAGOGASTRODUODENOSCOPY, WITH BIOPSY;  Surgeon: Tamy Miranda MD;  Location: [facility] GI     EXAM UNDER ANESTHESIA PELVIC N/A 2024    Procedure: EXAM UNDER ANESTHESIA, PELVIS;  Surgeon: Roxanne Montenegro MD;  Location: [facility] OR      NEEDLE GUIDE,  KIDNEY BIOPSY       PICC SINGLE LUMEN PLACEMENT Left 2025    44-3cm, Basilic vein     TRANSPLANT KIDNEY RECIPIENT  DONOR N/A 2024    Procedure: Transplant kidney recipient  donor,bilateral uretral stent implantation;  Surgeon: Carlitos Díaz MD;  Location: [facility] OR               Social History:     Social History     Tobacco  Use     Smoking status: Never     Passive exposure: Never     Smokeless tobacco: Never   Substance Use Topics     Alcohol use: Not Currently     Comment: Very occasional, last drink two months ago, wine cooler              Immunizations:     Immunization History   Administered Date(s) Administered     COVID-19 Bivalent 12+ (Pfizer) 09/23/2022     COVID-19 MONOVALENT 12+ (Pfizer) 01/21/2021, 02/10/2021, 09/16/2021     COVID-19 Monovalent 12+ (Pfizer 2022) 02/25/2022     HPV Quadrivalent 10/28/2008, 06/20/2011, 10/28/2011     HepB, Unspecified 12/30/2002     Hepatitis A (ADULT 19+) 02/19/2019, 08/19/2019     Hepatitis B, Peds 12/30/2002     Influenza (intradermal) 09/14/2021     Influenza Vaccine 18-64 (Flublok) 10/13/2023     Influenza, Split Virus, Trivalent, Pf (Fluzone\Fluarix) 11/29/2024     Pneumo Conj 13-V (2010&after) 10/23/2018     Pneumococcal 20 valent Conjugate (Prevnar 20) 11/17/2023     Pneumococcal 23 valent 11/22/2021     Pneumococcal, Unspecified 12/18/2018, 11/22/2021     TDAP (Adacel,Boostrix) 10/23/2012     Td (Adult), Adsorbed 04/12/2004, 01/01/2006             Allergies:     Allergies   Allergen Reactions     Amlodipine Headache and Other (See Comments)     Other Reaction(s): Unknown     Omeprazole Other (See Comments)     All PPIs per patient     Proton Pump Inhibitors Nephrotoxicity     No PPIs due to history of renal failure due to interstitial nephritis     Tegaderm Transparent Dressing (Informational Only) Blisters             Medications:     Current Outpatient Medications   Medication Sig Dispense Refill     acetaminophen (TYLENOL) 500 MG tablet Take 1,000 mg by mouth every 6 hours as needed       aspirin 81 MG EC tablet Take 81 mg by mouth daily.       atorvastatin (LIPITOR) 10 MG tablet TAKE ONE TABLET BY MOUTH ONCE DAILY 30 tablet 1     azaTHIOprine (IMURAN) 50 MG tablet Take 1 tablet (50 mg) by mouth daily. 30 tablet 11     cinacalcet (SENSIPAR) 30 MG tablet Take 2 tablets (60 mg) by  "mouth daily. 60 tablet 2     Emergency Supply Kit, Central, Patient use for emergency only. Contents: 3 sodium chloride 0.9% flushes, 1 dressing kit, 1 microclave ext set 14\", 4 nitrile gloves (med), 6 alcohol prep pads, 1 bacitracin, 1 syringe (10 cc 20 G 1\"). Call [phone] to reorder. 307205 kit 0     foscarnet (FOSCAVIR) 5,000 mg in sodium chloride 0.9 % 500 mL via HOMEPUMP Infuse 5,000 mg over 2 hours into the vein every 12 hours for 12 days. 778123 mL 0     hydrOXYzine HCl (ATARAX) 25 MG tablet Take 1 tablet (25 mg) by mouth every 6 hours as needed for other (adjuvant pain) 20 tablet 0     ketoconazole (NIZORAL) 2 % external shampoo Apply topically daily as needed for itching or irritation (use there times a week and let sit on scalp for 5 minutes). 120 mL 11     levonorgestrel (KYLEENA) 19.5 MG IUD 1 Device by Intrauterine route       magnesium glycinate 100 MG CAPS capsule Take 3 capsules (300 mg) by mouth 2 times daily. Take in place of magnesium oxide 180 capsule 2     magnesium oxide (MAG-OX) 400 MG tablet Take 2 tablets (800 mg) by mouth 2 times daily. 120 tablet 3     maribavir (LIVTENCITY) 200 MG tablet Take 2 tablets (400 mg) by mouth 2 times daily. 120 tablet 11     PARoxetine (PAXIL) 30 MG tablet Take 30 mg by mouth every evening       phosphorus tablet 250 mg (PHOSPHA 250 NEUTRAL) 250 MG per tablet Take 250 mg by mouth 2 times daily.       predniSONE (DELTASONE) 5 MG tablet Take 1 tablet (5 mg) by mouth daily. 30 tablet 2     sodium chloride 0.9 % 500 mL via elastomeric pump Infuse 1,500 mLs over 2 hours into the vein 2 times daily for 11 days. Infuse 3 x500mL (total of 1500mL twice daily) elastomeric pump(s), twice daily, prior to each foscarnet dose. Each elastomeric to infuse over 2 hours. Start on 1/17/25 AM. 219113 mL 0     sodium chloride, PF, 0.9% PF flush Inject 10 mLs into the vein as needed for line flush. Flush IV before and after medication administration as directed and/or at " least every 24 hours. 698186 mL 0     sulfamethoxazole-trimethoprim (BACTRIM) 400-80 MG tablet Take 1 tablet by mouth daily 30 tablet 11     tacrolimus (GENERIC EQUIVALENT) 0.5 MG capsule Take 1 capsule (0.5 mg) by mouth every evening. 1mg AM and 0.5mg PM 90 capsule 3     tacrolimus (GENERIC EQUIVALENT) 1 MG capsule Take 1 capsule (1 mg) by mouth every morning. 1mg AM and 0.5mg PM 90 capsule 3     triamcinolone (KENALOG) 0.1 % paste Take by mouth 2 times daily as needed (canker sore).       Vonoprazan Fumarate (VOQUEZNA) 20 MG TABS Take 20 mg by mouth daily 60 tablet 0     No current facility-administered medications for this visit.            Physical Exam:   There were no vitals taken for this visit.  No vitals are available during this virtual visit.   Constitutional: awake, alert, cooperative, no apparent distress and appears at stated age, well nourished.            Laboratory Data:     Absolute CD4, [place] T Cells   Date Value Ref Range Status   12/31/2024 73 (L) 441 - 2,156 cells/uL Final   10/22/2024 82 (L) 441 - 2,156 cells/uL Final   07/10/2024 59 (L) 441 - 2,156 cells/uL Final       Inflammatory Markers  No lab results found.    Immune Globulin Studies      Recent Labs   Lab Test 12/31/24  0840 07/08/24  0542    850       Metabolic Studies    Recent Labs   Lab Test 01/20/25  0850 01/16/25  0905 01/13/25  0850 01/09/25  2153 01/09/25  1345 01/07/25  0545 11/05/24  0835 10/30/24  0820 04/17/24  2230 04/17/24  2123    142 140 139 140 138   < > 142   < > 136   POTASSIUM 3.9 3.8 3.7 4.0 4.1 4.3   < > 4.5   < > 3.7   CHLORIDE 108* 108* 102 111* 108* 109*   < > 106   < >  --    CO2 22 24 22 19* 18* 18*   < > 26   < >  --    ANIONGAP 10 10 16* 9 14 11   < > 10   < >  --    BUN 21.4* 17.0 10.1 7.5 6.8 12.2   < > 20.8*   < >  --    CR 1.16* 1.09* 0.75 0.57 0.56 0.60   < > 0.85   < >  --    GFRESTIMATED 63 68 >90 >90 >90 >90   < > >90   < >  --    GLC 90 108* 89 124* 69* 85   < > 97   < > 168*   A1C  --  "  --   --   --   --   --   --  5.6  --   --    ROBLES 13.6* 12.2* 13.6* 8.8 8.4* 9.3   < > 9.7   < >  --    PHOS 2.8 3.4 6.4* 2.5 0.4*  --    < >  --    < >  --    MAG 1.1* 1.0* 1.2* 1.3* 1.4* 1.4*   < >  --    < >  --    LACT  --   --   --   --   --   --   --   --   --  0.8    < > = values in this interval not displayed.       Hepatic Studies    Recent Labs   Lab Test 01/20/25  0850 01/16/25  0905 01/13/25  0850 01/09/25  1345 10/22/24  0903 07/08/24  0542   BILITOTAL 0.6 0.6 0.9 0.6 0.5 0.2   ALKPHOS 58 55 70 55 58 140   PROTTOTAL 6.9 6.0* 7.0 5.9* 6.6 5.6*   ALBUMIN 3.7 3.4* 4.1 3.5 4.0 3.0*   AST 31 34 43 51* 34 16   ALT 34 42 61* 50 43 39       Hematology Studies     Recent Labs   Lab Test 01/20/25  0850 01/16/25  0905 01/13/25  0850 01/09/25  2153 01/09/25  1345 01/07/25  0545 05/02/24  0920 04/25/24  0930   WBC 4.4 3.0* 3.5* 2.5* 2.4* 2.4*   < > 7.1   ANEU  --   --   --   --   --   --   --  5.8   ALYM  --   --   --   --   --   --   --  0.2*   FAVIAN  --   --   --   --   --   --   --  0.4   AEOS  --   --   --   --   --   --   --  0.2   HGB 11.9 11.7 13.7 11.9 11.9 12.1   < > 8.4*   HCT 35.5 34.8* 38.8 33.1* 34.2* 34.8*   < > 26.0*    157 184 142* 150 123*   < > 197    < > = values in this interval not displayed.       Clotting Studies    Recent Labs   Lab Test 07/08/24  0542 07/01/24  0618 06/30/24  0516 06/28/24  0602 04/17/24  0958 09/29/22  1140   INR 1.07 1.20* 1.27* 1.25* 1.20* 1.01   PTT  --   --   --   --  27 29       Urine Studies    Recent Labs   Lab Test 07/06/24  1841 06/06/24  1113 05/03/24  1600 04/17/24  1113 09/29/22  1153   URINEPH 7.0 5.5 5.5 8.5* 8.5*   NITRITE Negative Negative Negative Negative Negative   LEUKEST Trace* Moderate* Trace* Negative Negative   WBCU 3 6* 4 <1 1         Microbiology:  Last 6 Culture results with specimen source  No results found for: \"CULT\" No results found for: \"SDES\"     Last check of C difficile  No results found for: \"CDBPCT\"      Virology:  CMV viral loads "    CMV viral loads    CMV DNA IU/mL   Date Value Ref Range Status   09/18/2024 <35 (A) Not Detected IU/mL Final     Comment:     CMV DNA detected, less than 35 IU/mL   09/04/2024 Not Detected Not Detected IU/mL Final     CMV DNA IU/mL, Instrument   Date Value Ref Range Status   01/20/2025 2,060 (H) Not Detected IU/mL Final   01/13/2025 27,500 (H) Not Detected IU/mL Final   01/06/2025 27,600 (H) Not Detected IU/mL Final   01/05/2025 31,200 (H) Not Detected IU/mL Final   12/31/2024 12,700 (H) Not Detected IU/mL Final   12/27/2024 6,170 (H) Not Detected IU/mL Final   12/20/2024 1,610 (H) Not Detected IU/mL Final   12/13/2024 880 (H) Not Detected IU/mL Final   12/05/2024 925 (H) Not Detected IU/mL Final     CMV Qualitative PCR   Date Value Ref Range Status   07/02/2024 Not Detected  Final     Comment:     NOT DETECTED - A negative result does not rule out the   presence of PCR inhibitors in the patient specimen or   assay specific nucleic acid in concentrations below the   level of detection by the assay.  INTERPRETIVE INFORMATION: Cytomegalovirus Detection by PCR    This test was developed and its performance characteristics   determined by [facility]. It has not been cleared or   approved by the US Food and Drug Administration. This test   was performed in a CLIA certified laboratory and is   intended for clinical purposes.  Performed By: [facility]  [address] [place] [id number]  : Sam Abel MD, PhD  CLIA Number: [id number]     Viral loads    Recent Labs   Lab Test 01/20/25  0850 01/13/25  0850 01/06/25  0941 01/05/25  1131 12/31/24  0840 12/27/24  0931 12/20/24  0827 12/13/24  0954 12/05/24  0829 10/04/24  0931 09/18/24  0929 09/04/24  0904 08/06/24  0858 07/30/24  0940 07/09/24  0639 07/02/24  1249 07/02/24  1238 05/31/24  0930 05/23/24  0949   EBQI  --   --   --   --   --   --   --   --   --   --   --   --   --  Not Detected   < >  --   --    < >  --    CMVQNT   --   --   --   --   --   --   --   --   --   --  <35* Not Detected   < >  --    < >  --   --    < >  --    CMVRESINST 2,060* 27,500* 27,600* 31,200* 12,700* 6,170* 1,610* 880* 925*   < >  --   --    < > 169*   < >  --   --   --   --    CMVLOG 3.3 4.4 4.4 4.5 4.1 3.8 3.2 2.9 3.0   < > <1.5  --    < > 2.2   < >  --   --   --   --    CMVQAL  --   --   --   --   --   --   --   --   --   --   --   --   --   --   --  Not Detected  --   --   --    HSDNA1  --   --   --   --   --   --   --   --   --   --   --   --   --   --   --   --  Not Detected   < >  --    HSDNA2  --   --   --   --   --   --   --   --   --   --   --   --   --   --   --   --  Not Detected   < >  --    BKRES  --   --   --   --   --   --   --   --   --   --   --   --   --   --   --   --   --   --  Not Detected   BKVSPTYPE  --   --   --   --  Blood Blood Blood Blood Blood   < >  --  Blood   < >  --    < >  --   --   --  Blood    < > = values in this interval not displayed.       CMV viral loads    CMV DNA IU/mL   Date Value Ref Range Status   09/18/2024 <35 (A) Not Detected IU/mL Final     Comment:     CMV DNA detected, less than 35 IU/mL   09/04/2024 Not Detected Not Detected IU/mL Final   08/28/2024 <35 (A) Not Detected IU/mL Final     Comment:     CMV DNA detected, less than 35 IU/mL   07/16/2024 <35 (A) Not Detected IU/mL Final     Comment:     CMV DNA detected, less than 35 IU/mL   07/09/2024 <35 (A) Not Detected IU/mL Final     Comment:     CMV DNA detected, less than 35 IU/mL   06/27/2024 Not Detected Not Detected IU/mL Final   06/05/2024 Not Detected Not Detected IU/mL Final   05/31/2024 Not Detected Not Detected IU/mL Final   04/23/2024 Not Detected Not Detected IU/mL Final     CMV DNA IU/mL, Instrument   Date Value Ref Range Status   01/20/2025 2,060 (H) Not Detected IU/mL Final   01/13/2025 27,500 (H) Not Detected IU/mL Final   01/06/2025 27,600 (H) Not Detected IU/mL Final   01/05/2025 31,200 (H) Not Detected IU/mL Final   12/31/2024 12,700 (H)  Not Detected IU/mL Final   12/27/2024 6,170 (H) Not Detected IU/mL Final   12/20/2024 1,610 (H) Not Detected IU/mL Final   12/13/2024 880 (H) Not Detected IU/mL Final   12/05/2024 925 (H) Not Detected IU/mL Final     CMV log   Date Value Ref Range Status   01/20/2025 3.3  Final   01/13/2025 4.4  Final   01/06/2025 4.4  Final   01/05/2025 4.5  Final   12/31/2024 4.1  Final   12/27/2024 3.8  Final   12/20/2024 3.2  Final   12/13/2024 2.9  Final   12/05/2024 3.0  Final   11/29/2024 2.8  Final   11/19/2024 2.9  Final   11/12/2024 3.1  Final   11/05/2024 3.0  Final   10/30/2024 3.0  Final   10/22/2024 2.8  Final   10/15/2024 2.7  Final   10/04/2024 1.9  Final   09/18/2024 <1.5  Final   08/28/2024 <1.5  Final   08/13/2024 1.9  Final   08/06/2024 2.1  Final   07/30/2024 2.2  Final   07/23/2024 2.1  Final   07/16/2024 <1.5  Final   07/09/2024 <1.5  Final       CMV resistance testing  Recent Labs   Lab Test 12/31/24  0840 10/22/24  0915   CMVDRUGRES See Note See Note     CMV Drug Resistance by NGS   Date Value Ref Range Status   12/31/2024 See Note  Final     Comment:               Ganciclovir,GCV             Resistant            Foscarnet,FOS               Sensitive            Cidofovir,CDV               Sensitive            Maribavir,MBV               Sensitive            Letermovir,LTV              Sensitive               UL97 drug resistance mutations identified: C603W            UL97 additional mutations identified:D68N, T75A,   Q126L, V244I            UL97 uncalled mutation sites:None               UL54 drug resistance mutations identified: None            UL54 additional mutations identified:S655L,   F669L, N685S, L897S, O2327G, W8886T            UL54 uncalled mutation sites:None               UL27 drug resistance mutations identified: None            UL27 additional mutations identified:P11L, L12P,   R90K, D289N, N294D, Y297H, G300N, D351N, A519T, V520A            UL27 uncalled mutation sites:None               UL56  drug resistance mutations identified: None            UL56 additional mutations identified:V425A,   N586D, S749N            UL56 uncalled mutation sites:None     CMVResistanceCaller software version:  2.0.0.1     [id number]  INTERPRETIVE INFORMATION: CMV Drug Resistance by NGS,                            5 Drugs  This assay assesses resistance to ganciclovir, foscarnet,   cidofovir, maribavir, and letermovir. Resistance-associated   mutations in the UL97, UL54, UL27, and UL56 genes are   sequenced using next generation sequencing. Drug resistance   is assigned using an [employer]-developed database of published   resistance mutations. For a list of resistance mutations   refer to https://GeneAssess.[employer]/Tests/Pub/8661926.  This test detects populations down to 10% of the total   population which may account for resistance interpretation   differences between methods. Some insertions or deletions   may be difficult to detect using this software.  Drug Resistance Interpretations are defined as follows:  -Not determined indicates incomplete sequence coverage   across a given gene or genes.  -Likely sensitive indicates that a single drug resistance   mutation position did not have adequate coverage. However,   the missing mutation is rarely observed.  -Sensitive indicates no drug resistance mutations were   detected.  -Possible resistance indicates mutations were detected with   borderline-level drug resistance or conflicting resistance   status reported in the literature.  -Resistant indicates that mutations associated with drug   resistance were detected.  -Inadequate sequence coverage indicates a low number of   sequence reads at a given drug resistance site.    Mutations are classified as follows:  -Drug Resistance Mutations reduce susceptibility of   specific drug classes whether found in isolation or in   combination with other drugs.  -Additional mutations have not been associated with drug    resistance.  -Uncalled mutation sites are known locations of drug   resistance mutations that have an inadequate number of   sequencing reads to accurately determine if mutations are   present.    Drugs associated with each gene are as follows:  UL97: ganciclovir, maribavir  UL54: ganciclovir, foscarnet, cidofovir  UL27: maribavir  UL56: letermovir    This test was developed, and its performance   characteristics determined by [facility]. It has not   been cleared or approved by the U.S. Food and Drug   Administration. This test was performed in a CLIA-certified   laboratory and is intended for clinical purposes.  Performed By: [facility]  [address] [place] [id number]  : Sam Abel MD, PhD  IA Number: [id number]   10/22/2024 See Note  Final     Comment:               Ganciclovir,GCV             Resistant            Foscarnet,FOS               Sensitive            Cidofovir,CDV               Resistant            Maribavir,MBV               Sensitive            Letermovir,LTV              Sensitive               UL97 drug resistance mutations identified: None            UL97 additional mutations identified:D68N, T75A,   Q126L, V244I            UL97 uncalled mutation sites:None               UL54 drug resistance mutations identified: T503I            UL54 additional mutations identified:S655L,   F669L, N685S, L897S, I1813G, H1306P            UL54 uncalled mutation sites:None               UL27 drug resistance mutations identified: None            UL27 additional mutations identified:P11L, L12P,   R90K, D289N, N294D, Y297H, G300N, D351N, A519T, V520A            UL27 uncalled mutation sites:None               UL56 drug resistance mutations identified: None            UL56 additional mutations identified:V425A,   N586D, S749N            UL56 uncalled mutation sites:None     CMVResistanceCaller software version:  2.0.0.1      [id number]  INTERPRETIVE INFORMATION: CMV Drug Resistance by NGS,                            5 Drugs  This assay assesses resistance to ganciclovir, foscarnet,   cidofovir, maribavir, and letermovir. Resistance-associated   mutations in the UL97, UL54, UL27, and UL56 genes are   sequenced using next generation sequencing. Drug resistance   is assigned using an [employer]-developed database of published   resistance mutations. For a list of resistance mutations   refer to https://[employer]/Tests/Pub/4813005.    This test detects populations down to 10% of the total   population which may account for resistance interpretation   differences between methods. Some insertions or deletions   may be difficult to detect using this software.     Result interpretations are as follows:    -Sensitive indicates no evidence of drug resistance   compared with a wild-type virus.  -Possible resistance indicates mutations were detected with   borderline-level drug resistance or conflicting resistance   status reported in the literature.  -Resistant indicates evidence of drug resistance compared   with a wild-type virus.  -Not determined indicates incomplete sequence coverage   across a given gene or genes.  -Additional mutations include variants that have not been   associated with drug resistance.  -Uncalled mutation sites include drug resistance mutation   positions with an inadequate number of sequencing reads.  -Inadequate sequence coverage indicates a low number of   sequence reads at a given drug resistance site.    Drugs associated with each gene are as follows:    UL97: ganciclovir, maribavir  UL54: ganciclovir, foscarnet, cidofovir   UL27: maribavir   UL56: letermovir    This test was developed, and its performance   characteristics determined by [facility]. It has not   been cleared or approved by the [place]. Food and Drug   Administration. This test was performed in a CLIA-certified  "  laboratory and is intended for clinical purposes.  Performed By: [facility]  [address] [place] [address]  : Sam Abel MD, PhD  CLIA Number: [id number]        EBV viral loads   No lab results found.  EBV Qualitative PCR   Date Value Ref Range Status   07/02/2024 Detected (A)  Final     Comment:     INTERPRETIVE INFORMATION:  Jake Barr Virus by PCR    This test was developed and its performance characteristics   determined by [facility]. It has not been cleared or   approved by the [place] Food and Drug Administration. This test   was performed in a CLIA certified laboratory and is   intended for clinical purposes.  Performed By: [facility]  [address] [place] [address]  : Sam Abel MD, PhD  CLIA Number: [id number]       Human Herpes Virus 6 viral loads    No results found for: \"H6RES\" No results found for: \"H6SPEC\"    CMV Antibody IgG   Date Value Ref Range Status   08/13/2024 Positive, suggests recent or past exposure. (A) No detectable antibody.  Final   07/30/2024 No detectable antibody. No detectable antibody.  Final   04/17/2024 No detectable antibody. No detectable antibody.  Final     No results found for: \"EBIG2\", \"EBIGM\", \"EBVIGG\", \"EBIGG\", \"EBVAGN\", \"WJ4170\", \"TOXG\"    Virtual Visit Details    Type of service:  Video Visit   Video Start Time:  4:28 PM  Video End Time:4:43 PM  Originating Location (pt. Location): Other [facility] lobby.    Distant Location (provider location):  On-site  Platform used for Video Visit: Javad      Again, thank you for allowing me to participate in the care of your patient.      Sincerely,    Edgardo Mccoy MD    "

## 2025-01-21 NOTE — PROGRESS NOTES
Transplant Infectious Diseases Outpatient Progress note      Patient:  Ira Zamora, Date of birth 1990, Medical record number [id number]  Date of Visit:  01/21/2025         Recommendations:   Hold foscarnet IV for now.   Keep PICC line until we secure another low or undetected CMV value.   Continue IV hydration with 1 L NS bid until further notice.   Will add IV Magnesium IV 1/22/25 followed by the PO supplement per nephrology recommendations.   Hypercalcemia management per nephrology.   Tdap and updated COVID-19 vaccines.      RTC: one month. In person or virtually.         Synopsis of Immune Status and Presentation:   Transplants:  4/17/2024 (Kidney), Postoperative day:  279     This patient is a 34 year old female with PPI-induced interstitial nephritis s/p KT in 4/2024. ATG for induction followed by TAC/azathioprine. Also received belatacept x1 in 7/2024 when MPA was discontinued due to GI/ ulcers.   Has history of Crohn's disease and RA.  With resistant CMV viremia.         Active Problems and Infectious Diseases Issues:   Donor derived, resistant CMV viremia.   Foscarnet-induced DANY and electrolytes disturbances.   Occurred 10/2024 while on CMV universal ppx with VGCV. Resistance testing on 10/22/24 revealing UL54 mutation T503I with resistance to GCV and CDV. VGCV was continued with predictable lack of response associated with accumulation of UL97 C603W high grade mutation but the loss of the UL54 mutation 12/31/24. This discrepant mutation results is not unusual.   Foscarnet was started 1/4/25 with adverse events of hypophosphatemia, hypomagnesemia, hypercalcemia and most recently DANY.     After 9 days of foscarnet use, VL did not decrease substantially and maribavir was added 1/15/25. VL declined as of 1/20/25 to 3.3 log from peak of 4.5 log.     With the decline of VL, will hold foscarnet and continue maribavir. Will continue PICC line until we confirm persistence decline of CMV VL and to use it  for hydration and electrolyte repletion.     10/22/24     12/31/24      Hypercalcemia.   Hydration 1 L bid.      Hypomagnesemia   Will give additional IV 2 gm bid x1 day and PO replacement.      Dysgeusia  Maribavir-induced.   This is not likely to result in serious events like weight loss.                 Old Problems and Infectious Diseases Issues:   Crohn's treated with ustekinumab, last dose 3/2024.   Oral and genital ulcers with negative ID-related workup. Resolved after switching from MPA to azathioprine 7/2024.     Other Infectious Disease issues include:  - QTc: 437 as of 1/9/25.   - Toxoplasma serostatus: IgG ?  - Viral serostatus: CMV D+/R-, EBV D+/R+, HSV1?/2?, VZV +, currently on foscarnet for CMV therapy.   - PJP (and possibly Toxoplasma) prophylaxis: bactrim.  - Immunization status: Tdap and COVID-19 vaccines.   - Gamma globulin status: 900 as of 12/31/24.       Attestation:  Total duration of visit including chart review, reviewing labs and imaging, interviewing and examining the patient, documentation, and sending communication to the patient and to the primary treating team, all at the same day of this encounter, is: 85 minutes.   Edgardo Mccoy MD    Contact information available via [facility] Paging/Directory     01/21/2025         Interim History:   Since I last saw the patient, her diarrhea is intermittent. No fever.   Since she was started on maribavir she developed bad taste in the mouth.          Review of Systems:     As mentioned in the interim history otherwise negative by reviewing constitutional symptoms, central and peripheral neurological systems, respiratory system, cardiac system, GI system,  system, musculoskeletal, skin, allergy, and lymphatics.                  Past Medical History:     Past Medical History:   Diagnosis Date    Anemia in chronic kidney disease     Anxiety 2007    Ascending aorta dilation 2020 2020: ascending aorta 3.8 cm, aortic sinus 3.6 cm. 2024 Echo: Ao root  diam: 3.2 cm, asc Aorta Diam: 3.8 cm    ASCUS with positive high risk HPV cervical 2017    ASCUS with positive high risk HPV cervical 2017    Crohn's disease (H) 2004    Esophageal ulcer 2024    ESRD (end stage renal disease) on dialysis (H)     GERD (gastroesophageal reflux disease)     Hidradenitis suppurativa 2015    History of blood transfusion     Hypertension     Juvenile rheumatoid arthritis (H)     Asymptomatic in adulthood    Kidney replaced by transplant 2024    En bloc. Induction w/ Thymoglobulin.    Secondary renal hyperparathyroidism     Tubulointerstitial nephropathy 2011    kidney biopsy 2011 - Acute and  chronic tubulointerstitial nephropathy.             Past Surgical History:     Past Surgical History:   Procedure Laterality Date    BIOPSY VULVA Left 2024    Procedure: BIOPSY, VULVA;  Surgeon: Roxanne Montenegro MD;  Location: [facility] OR    ESOPHAGOSCOPY, GASTROSCOPY, DUODENOSCOPY (EGD), COMBINED N/A 2024    Procedure: ESOPHAGOGASTRODUODENOSCOPY, WITH BIOPSY;  Surgeon: Tamy Miranda MD;  Location: [facility] GI    EXAM UNDER ANESTHESIA PELVIC N/A 2024    Procedure: EXAM UNDER ANESTHESIA, PELVIS;  Surgeon: Roxanne Montenegro MD;  Location: [facility] OR     NEEDLE GUIDE,  KIDNEY BIOPSY      PICC SINGLE LUMEN PLACEMENT Left 2025    44-3cm, Basilic vein    TRANSPLANT KIDNEY RECIPIENT  DONOR N/A 2024    Procedure: Transplant kidney recipient  donor,bilateral uretral stent implantation;  Surgeon: Carlitos Díaz MD;  Location: [facility] OR               Social History:     Social History     Tobacco Use    Smoking status: Never     Passive exposure: Never    Smokeless tobacco: Never   Substance Use Topics    Alcohol use: Not Currently     Comment: Very occasional, last drink two months ago, wine cooler              Immunizations:     Immunization History   Administered Date(s) Administered    COVID-19 Bivalent 12+ (Pfizer) 2022  "   COVID-19 MONOVALENT 12+ (Pfizer) 01/21/2021, 02/10/2021, 09/16/2021    COVID-19 Monovalent 12+ (Pfizer 2022) 02/25/2022    HPV Quadrivalent 10/28/2008, 06/20/2011, 10/28/2011    HepB, Unspecified 12/30/2002    Hepatitis A (ADULT 19+) 02/19/2019, 08/19/2019    Hepatitis B, Peds 12/30/2002    Influenza (intradermal) 09/14/2021    Influenza Vaccine 18-64 (Flublok) 10/13/2023    Influenza, Split Virus, Trivalent, Pf (Fluzone\Fluarix) 11/29/2024    Pneumo Conj 13-V (2010&after) 10/23/2018    Pneumococcal 20 valent Conjugate (Prevnar 20) 11/17/2023    Pneumococcal 23 valent 11/22/2021    Pneumococcal, Unspecified 12/18/2018, 11/22/2021    TDAP (Adacel,Boostrix) 10/23/2012    Td (Adult), Adsorbed 04/12/2004, 01/01/2006             Allergies:     Allergies   Allergen Reactions    Amlodipine Headache and Other (See Comments)     Other Reaction(s): Unknown    Omeprazole Other (See Comments)     All PPIs per patient    Proton Pump Inhibitors Nephrotoxicity     No PPIs due to history of renal failure due to interstitial nephritis    Tegaderm Transparent Dressing (Informational Only) Blisters             Medications:     Current Outpatient Medications   Medication Sig Dispense Refill    acetaminophen (TYLENOL) 500 MG tablet Take 1,000 mg by mouth every 6 hours as needed      aspirin 81 MG EC tablet Take 81 mg by mouth daily.      atorvastatin (LIPITOR) 10 MG tablet TAKE ONE TABLET BY MOUTH ONCE DAILY 30 tablet 1    azaTHIOprine (IMURAN) 50 MG tablet Take 1 tablet (50 mg) by mouth daily. 30 tablet 11    cinacalcet (SENSIPAR) 30 MG tablet Take 2 tablets (60 mg) by mouth daily. 60 tablet 2    Emergency Supply Kit, Central, Patient use for emergency only. Contents: 3 sodium chloride 0.9% flushes, 1 dressing kit, 1 microclave ext set 14\", 4 nitrile gloves (med), 6 alcohol prep pads, 1 bacitracin, 1 syringe (10 cc 20 G 1\"). Call [phone] to reorder. [id number] kit 0    foscarnet (FOSCAVIR) 5,000 mg in sodium chloride 0.9 % 500 mL " via HOMEPUMP Infuse 5,000 mg over 2 hours into the vein every 12 hours for 12 days. 503864 mL 0    hydrOXYzine HCl (ATARAX) 25 MG tablet Take 1 tablet (25 mg) by mouth every 6 hours as needed for other (adjuvant pain) 20 tablet 0    ketoconazole (NIZORAL) 2 % external shampoo Apply topically daily as needed for itching or irritation (use there times a week and let sit on scalp for 5 minutes). 120 mL 11    levonorgestrel (KYLEENA) 19.5 MG IUD 1 Device by Intrauterine route      magnesium glycinate 100 MG CAPS capsule Take 3 capsules (300 mg) by mouth 2 times daily. Take in place of magnesium oxide 180 capsule 2    magnesium oxide (MAG-OX) 400 MG tablet Take 2 tablets (800 mg) by mouth 2 times daily. 120 tablet 3    maribavir (LIVTENCITY) 200 MG tablet Take 2 tablets (400 mg) by mouth 2 times daily. 120 tablet 11    PARoxetine (PAXIL) 30 MG tablet Take 30 mg by mouth every evening      phosphorus tablet 250 mg (PHOSPHA 250 NEUTRAL) 250 MG per tablet Take 250 mg by mouth 2 times daily.      predniSONE (DELTASONE) 5 MG tablet Take 1 tablet (5 mg) by mouth daily. 30 tablet 2    sodium chloride 0.9 % 500 mL via elastomeric pump Infuse 1,500 mLs over 2 hours into the vein 2 times daily for 11 days. Infuse 3 x500mL (total of 1500mL twice daily) elastomeric pump(s), twice daily, prior to each foscarnet dose. Each elastomeric to infuse over 2 hours. Start on 1/17/25 AM. 526383 mL 0    sodium chloride, PF, 0.9% PF flush Inject 10 mLs into the vein as needed for line flush. Flush IV before and after medication administration as directed and/or at least every 24 hours. 538209 mL 0    sulfamethoxazole-trimethoprim (BACTRIM) 400-80 MG tablet Take 1 tablet by mouth daily 30 tablet 11    tacrolimus (GENERIC EQUIVALENT) 0.5 MG capsule Take 1 capsule (0.5 mg) by mouth every evening. 1mg AM and 0.5mg PM 90 capsule 3    tacrolimus (GENERIC EQUIVALENT) 1 MG capsule Take 1 capsule (1 mg) by mouth every morning. 1mg AM and 0.5mg PM 90  capsule 3    triamcinolone (KENALOG) 0.1 % paste Take by mouth 2 times daily as needed (canker sore).      Vonoprazan Fumarate (VOQUEZNA) 20 MG TABS Take 20 mg by mouth daily 60 tablet 0     No current facility-administered medications for this visit.            Physical Exam:   There were no vitals taken for this visit.  No vitals are available during this virtual visit.   Constitutional: awake, alert, cooperative, no apparent distress and appears at stated age, well nourished.            Laboratory Data:     Absolute CD4, [place] T Cells   Date Value Ref Range Status   12/31/2024 73 (L) 441 - 2,156 cells/uL Final   10/22/2024 82 (L) 441 - 2,156 cells/uL Final   07/10/2024 59 (L) 441 - 2,156 cells/uL Final       Inflammatory Markers  No lab results found.    Immune Globulin Studies      Recent Labs   Lab Test 12/31/24  0840 07/08/24  0542    850       Metabolic Studies    Recent Labs   Lab Test 01/20/25  0850 01/16/25  0905 01/13/25  0850 01/09/25  2153 01/09/25  1345 01/07/25  0545 11/05/24  0835 10/30/24  0820 04/17/24  2230 04/17/24  2123    142 140 139 140 138   < > 142   < > 136   POTASSIUM 3.9 3.8 3.7 4.0 4.1 4.3   < > 4.5   < > 3.7   CHLORIDE 108* 108* 102 111* 108* 109*   < > 106   < >  --    CO2 22 24 22 19* 18* 18*   < > 26   < >  --    ANIONGAP 10 10 16* 9 14 11   < > 10   < >  --    BUN 21.4* 17.0 10.1 7.5 6.8 12.2   < > 20.8*   < >  --    CR 1.16* 1.09* 0.75 0.57 0.56 0.60   < > 0.85   < >  --    GFRESTIMATED 63 68 >90 >90 >90 >90   < > >90   < >  --    GLC 90 108* 89 124* 69* 85   < > 97   < > 168*   A1C  --   --   --   --   --   --   --  5.6  --   --    ROBLES 13.6* 12.2* 13.6* 8.8 8.4* 9.3   < > 9.7   < >  --    PHOS 2.8 3.4 6.4* 2.5 0.4*  --    < >  --    < >  --    MAG 1.1* 1.0* 1.2* 1.3* 1.4* 1.4*   < >  --    < >  --    LACT  --   --   --   --   --   --   --   --   --  0.8    < > = values in this interval not displayed.       Hepatic Studies    Recent Labs   Lab Test 01/20/25  0884  "01/16/25  0905 01/13/25  0850 01/09/25  1345 10/22/24  0903 07/08/24  0542   BILITOTAL 0.6 0.6 0.9 0.6 0.5 0.2   ALKPHOS 58 55 70 55 58 140   PROTTOTAL 6.9 6.0* 7.0 5.9* 6.6 5.6*   ALBUMIN 3.7 3.4* 4.1 3.5 4.0 3.0*   AST 31 34 43 51* 34 16   ALT 34 42 61* 50 43 39       Hematology Studies     Recent Labs   Lab Test 01/20/25  0850 01/16/25  0905 01/13/25  0850 01/09/25  2153 01/09/25  1345 01/07/25  0545 05/02/24  0920 04/25/24  0930   WBC 4.4 3.0* 3.5* 2.5* 2.4* 2.4*   < > 7.1   ANEU  --   --   --   --   --   --   --  5.8   ALYM  --   --   --   --   --   --   --  0.2*   FAVIAN  --   --   --   --   --   --   --  0.4   AEOS  --   --   --   --   --   --   --  0.2   HGB 11.9 11.7 13.7 11.9 11.9 12.1   < > 8.4*   HCT 35.5 34.8* 38.8 33.1* 34.2* 34.8*   < > 26.0*    157 184 142* 150 123*   < > 197    < > = values in this interval not displayed.       Clotting Studies    Recent Labs   Lab Test 07/08/24  0542 07/01/24  0618 06/30/24  0516 06/28/24  0602 04/17/24  0958 09/29/22  1140   INR 1.07 1.20* 1.27* 1.25* 1.20* 1.01   PTT  --   --   --   --  27 29       Urine Studies    Recent Labs   Lab Test 07/06/24  1841 06/06/24  1113 05/03/24  1600 04/17/24  1113 09/29/22  1153   URINEPH 7.0 5.5 5.5 8.5* 8.5*   NITRITE Negative Negative Negative Negative Negative   LEUKEST Trace* Moderate* Trace* Negative Negative   WBCU 3 6* 4 <1 1         Microbiology:  Last 6 Culture results with specimen source  No results found for: \"CULT\" No results found for: \"SDES\"     Last check of C difficile  No results found for: \"CDBPCT\"      Virology:  CMV viral loads    CMV viral loads    CMV DNA IU/mL   Date Value Ref Range Status   09/18/2024 <35 (A) Not Detected IU/mL Final     Comment:     CMV DNA detected, less than 35 IU/mL   09/04/2024 Not Detected Not Detected IU/mL Final     CMV DNA IU/mL, Instrument   Date Value Ref Range Status   01/20/2025 2,060 (H) Not Detected IU/mL Final   01/13/2025 27,500 (H) Not Detected IU/mL Final "   01/06/2025 27,600 (H) Not Detected IU/mL Final   01/05/2025 31,200 (H) Not Detected IU/mL Final   12/31/2024 12,700 (H) Not Detected IU/mL Final   12/27/2024 6,170 (H) Not Detected IU/mL Final   12/20/2024 1,610 (H) Not Detected IU/mL Final   12/13/2024 880 (H) Not Detected IU/mL Final   12/05/2024 925 (H) Not Detected IU/mL Final     CMV Qualitative PCR   Date Value Ref Range Status   07/02/2024 Not Detected  Final     Comment:     NOT DETECTED - A negative result does not rule out the   presence of PCR inhibitors in the patient specimen or   assay specific nucleic acid in concentrations below the   level of detection by the assay.  INTERPRETIVE INFORMATION: Cytomegalovirus Detection by PCR    This test was developed and its performance characteristics   determined by [facility]. It has not been cleared or   approved by the US Food and Drug Administration. This test   was performed in a CLIA certified laboratory and is   intended for clinical purposes.  Performed By: [facility]  [address] [place] [id number]  : Sam Abel MD, PhD  CLIA Number: [id number]     Viral loads    Recent Labs   Lab Test 01/20/25  0850 01/13/25  0850 01/06/25  0941 01/05/25  1131 12/31/24  0840 12/27/24  0931 12/20/24  0827 12/13/24  0954 12/05/24  0829 10/04/24  0931 09/18/24  0929 09/04/24  0904 08/06/24  0858 07/30/24  0940 07/09/24  0639 07/02/24  1249 07/02/24  1238 05/31/24  0930 05/23/24  0949   EBQI  --   --   --   --   --   --   --   --   --   --   --   --   --  Not Detected   < >  --   --    < >  --    CMVQNT  --   --   --   --   --   --   --   --   --   --  <35* Not Detected   < >  --    < >  --   --    < >  --    CMVRESINST 2,060* 27,500* 27,600* 31,200* 12,700* 6,170* 1,610* 880* 925*   < >  --   --    < > 169*   < >  --   --   --   --    CMVLOG 3.3 4.4 4.4 4.5 4.1 3.8 3.2 2.9 3.0   < > <1.5  --    < > 2.2   < >  --   --   --   --    CMVQAL  --   --   --   --   --   --    --   --   --   --   --   --   --   --   --  Not Detected  --   --   --    HSDNA1  --   --   --   --   --   --   --   --   --   --   --   --   --   --   --   --  Not Detected   < >  --    HSDNA2  --   --   --   --   --   --   --   --   --   --   --   --   --   --   --   --  Not Detected   < >  --    BKRES  --   --   --   --   --   --   --   --   --   --   --   --   --   --   --   --   --   --  Not Detected   BKVSPTYPE  --   --   --   --  Blood Blood Blood Blood Blood   < >  --  Blood   < >  --    < >  --   --   --  Blood    < > = values in this interval not displayed.       CMV viral loads    CMV DNA IU/mL   Date Value Ref Range Status   09/18/2024 <35 (A) Not Detected IU/mL Final     Comment:     CMV DNA detected, less than 35 IU/mL   09/04/2024 Not Detected Not Detected IU/mL Final   08/28/2024 <35 (A) Not Detected IU/mL Final     Comment:     CMV DNA detected, less than 35 IU/mL   07/16/2024 <35 (A) Not Detected IU/mL Final     Comment:     CMV DNA detected, less than 35 IU/mL   07/09/2024 <35 (A) Not Detected IU/mL Final     Comment:     CMV DNA detected, less than 35 IU/mL   06/27/2024 Not Detected Not Detected IU/mL Final   06/05/2024 Not Detected Not Detected IU/mL Final   05/31/2024 Not Detected Not Detected IU/mL Final   04/23/2024 Not Detected Not Detected IU/mL Final     CMV DNA IU/mL, Instrument   Date Value Ref Range Status   01/20/2025 2,060 (H) Not Detected IU/mL Final   01/13/2025 27,500 (H) Not Detected IU/mL Final   01/06/2025 27,600 (H) Not Detected IU/mL Final   01/05/2025 31,200 (H) Not Detected IU/mL Final   12/31/2024 12,700 (H) Not Detected IU/mL Final   12/27/2024 6,170 (H) Not Detected IU/mL Final   12/20/2024 1,610 (H) Not Detected IU/mL Final   12/13/2024 880 (H) Not Detected IU/mL Final   12/05/2024 925 (H) Not Detected IU/mL Final     CMV log   Date Value Ref Range Status   01/20/2025 3.3  Final   01/13/2025 4.4  Final   01/06/2025 4.4  Final   01/05/2025 4.5  Final   12/31/2024 4.1   Final   12/27/2024 3.8  Final   12/20/2024 3.2  Final   12/13/2024 2.9  Final   12/05/2024 3.0  Final   11/29/2024 2.8  Final   11/19/2024 2.9  Final   11/12/2024 3.1  Final   11/05/2024 3.0  Final   10/30/2024 3.0  Final   10/22/2024 2.8  Final   10/15/2024 2.7  Final   10/04/2024 1.9  Final   09/18/2024 <1.5  Final   08/28/2024 <1.5  Final   08/13/2024 1.9  Final   08/06/2024 2.1  Final   07/30/2024 2.2  Final   07/23/2024 2.1  Final   07/16/2024 <1.5  Final   07/09/2024 <1.5  Final       CMV resistance testing  Recent Labs   Lab Test 12/31/24  0840 10/22/24  0915   CMVDRUGRES See Note See Note     CMV Drug Resistance by NGS   Date Value Ref Range Status   12/31/2024 See Note  Final     Comment:               Ganciclovir,GCV             Resistant            Foscarnet,FOS               Sensitive            Cidofovir,CDV               Sensitive            Maribavir,MBV               Sensitive            Letermovir,LTV              Sensitive               UL97 drug resistance mutations identified: C603W            UL97 additional mutations identified:D68N, T75A,   Q126L, V244I            UL97 uncalled mutation sites:None               UL54 drug resistance mutations identified: None            UL54 additional mutations identified:S655L,   F669L, N685S, L897S, B4121L, H3081A            UL54 uncalled mutation sites:None               UL27 drug resistance mutations identified: None            UL27 additional mutations identified:P11L, L12P,   R90K, D289N, N294D, Y297H, G300N, D351N, A519T, V520A            UL27 uncalled mutation sites:None               UL56 drug resistance mutations identified: None            UL56 additional mutations identified:V425A,   N586D, S749N            UL56 uncalled mutation sites:None     CMVResistanceCaller software version:  2.0.0.1     [id number]  INTERPRETIVE INFORMATION: CMV Drug Resistance by NGS,                            5 Drugs  This assay assesses  resistance to ganciclovir, foscarnet,   cidofovir, maribavir, and letermovir. Resistance-associated   mutations in the UL97, UL54, UL27, and UL56 genes are   sequenced using next generation sequencing. Drug resistance   is assigned using an [employer]-developed database of published   resistance mutations. For a list of resistance mutations   refer to [email or web address]/8734610.  This test detects populations down to 10% of the total   population which may account for resistance interpretation   differences between methods. Some insertions or deletions   may be difficult to detect using this software.  Drug Resistance Interpretations are defined as follows:  -Not determined indicates incomplete sequence coverage   across a given gene or genes.  -Likely sensitive indicates that a single drug resistance   mutation position did not have adequate coverage. However,   the missing mutation is rarely observed.  -Sensitive indicates no drug resistance mutations were   detected.  -Possible resistance indicates mutations were detected with   borderline-level drug resistance or conflicting resistance   status reported in the literature.  -Resistant indicates that mutations associated with drug   resistance were detected.  -Inadequate sequence coverage indicates a low number of   sequence reads at a given drug resistance site.    Mutations are classified as follows:  -Drug Resistance Mutations reduce susceptibility of   specific drug classes whether found in isolation or in   combination with other drugs.  -Additional mutations have not been associated with drug   resistance.  -Uncalled mutation sites are known locations of drug   resistance mutations that have an inadequate number of   sequencing reads to accurately determine if mutations are   present.    Drugs associated with each gene are as follows:  UL97: ganciclovir, maribavir  UL54: ganciclovir, foscarnet, cidofovir  UL27: maribavir  UL56: letermovir    This  test was developed, and its performance   characteristics determined by [facility]. It has not   been cleared or approved by the U.S. Food and Drug   Administration. This test was performed in a CLIA-certified   laboratory and is intended for clinical purposes.  Performed By: [facility]  [address] [place] [id number]  : Sam Abel MD, PhD  IA Number: [id number]   10/22/2024 See Note  Final     Comment:               Ganciclovir,GCV             Resistant            Foscarnet,FOS               Sensitive            Cidofovir,CDV               Resistant            Maribavir,MBV               Sensitive            Letermovir,LTV              Sensitive               UL97 drug resistance mutations identified: None            UL97 additional mutations identified:D68N, T75A,   Q126L, V244I            UL97 uncalled mutation sites:None               UL54 drug resistance mutations identified: T503I            UL54 additional mutations identified:S655L,   F669L, N685S, L897S, X1024T, Z0085I            UL54 uncalled mutation sites:None               UL27 drug resistance mutations identified: None            UL27 additional mutations identified:P11L, L12P,   R90K, D289N, N294D, Y297H, G300N, D351N, A519T, V520A            UL27 uncalled mutation sites:None               UL56 drug resistance mutations identified: None            UL56 additional mutations identified:V425A,   N586D, S749N            UL56 uncalled mutation sites:None     CMVResistanceCaller software version:  2.0.0.1     [id number]  INTERPRETIVE INFORMATION: CMV Drug Resistance by NGS,                            5 Drugs  This assay assesses resistance to ganciclovir, foscarnet,   cidofovir, maribavir, and letermovir. Resistance-associated   mutations in the UL97, UL54, UL27, and UL56 genes are   sequenced using next generation sequencing. Drug resistance   is assigned using an  [employer]-developed database of published   resistance mutations. For a list of resistance mutations   refer to [email or web address]/Pub/7075032.    This test detects populations down to 10% of the total   population which may account for resistance interpretation   differences between methods. Some insertions or deletions   may be difficult to detect using this software.     Result interpretations are as follows:    -Sensitive indicates no evidence of drug resistance   compared with a wild-type virus.  -Possible resistance indicates mutations were detected with   borderline-level drug resistance or conflicting resistance   status reported in the literature.  -Resistant indicates evidence of drug resistance compared   with a wild-type virus.  -Not determined indicates incomplete sequence coverage   across a given gene or genes.  -Additional mutations include variants that have not been   associated with drug resistance.  -Uncalled mutation sites include drug resistance mutation   positions with an inadequate number of sequencing reads.  -Inadequate sequence coverage indicates a low number of   sequence reads at a given drug resistance site.    Drugs associated with each gene are as follows:    UL97: ganciclovir, maribavir  UL54: ganciclovir, foscarnet, cidofovir   UL27: maribavir   UL56: letermovir    This test was developed, and its performance   characteristics determined by [facility]. It has not   been cleared or approved by the U.S. Food and Drug   Administration. This test was performed in a CLIA-certified   laboratory and is intended for clinical purposes.  Performed By: [facility]  [address] [place] [address]  : Sam Abel MD, PhD  CLIA Number: [id number]        EBV viral loads   No lab results found.  EBV Qualitative PCR   Date Value Ref Range Status   07/02/2024 Detected (A)  Final     Comment:     INTERPRETIVE INFORMATION:  Jake Dougherty Virus by  "PCR    This test was developed and its performance characteristics   determined by [facility]. It has not been cleared or   approved by the US Food and Drug Administration. This test   was performed in a CLIA certified laboratory and is   intended for clinical purposes.  Performed By: [facility]  [address] [place] [address]  : Sam Abel MD, PhD  CLIA Number: [id number]       Human Herpes Virus 6 viral loads    No results found for: \"H6RES\" No results found for: \"H6SPEC\"    CMV Antibody IgG   Date Value Ref Range Status   08/13/2024 Positive, suggests recent or past exposure. (A) No detectable antibody.  Final   07/30/2024 No detectable antibody. No detectable antibody.  Final   04/17/2024 No detectable antibody. No detectable antibody.  Final     No results found for: \"EBIG2\", \"EBIGM\", \"EBVIGG\", \"EBIGG\", \"EBVAGN\", \"YI2993\", \"TOXG\"    Virtual Visit Details    Type of service:  Video Visit   Video Start Time:  4:28 PM  Video End Time:4:43 PM  Originating Location (pt. Location): Other [facility] lobby.    Distant Location (provider location):  On-site  Platform used for Video Visit: Javad  "

## 2025-01-21 NOTE — TELEPHONE ENCOUNTER
Frederick, , called to report that she has been on infusion since Jan 5th.    Pt did have have emesis an hour after her meds. He feels that her infusion will end later today and wondering if she should finish this dose out.    Pt will finish dose out. Pt  does have another dose due later today and wondering if needs to proceed later. Pt and  aware will have RNCC follow up with provider in the Am and return call.

## 2025-01-21 NOTE — TELEPHONE ENCOUNTER
Went over plan with patient and discussed steps in order to decrease calcium and increase magnesium. Patient will call  Specialty pharmacy to have this sent out to her once coverage is secured. Patient will ensure she writes down her questions to ask both Dr. Camp and ID doctor.

## 2025-01-21 NOTE — PATIENT INSTRUCTIONS
Patient Recommendations:  - Start Cinecalcet 30 mg every day for one week, if tolerating, then increase to 60 mg every day.  - Blood work Thursday.   - No changes to immunosuppression.  - Start magnesium glycinate 300 mg twice a day.    Transplant Patient Information  Your Post Transplant Coordinator is: Radha Jorge  For non urgent items, we encourage you to contact your coordinator/care team online via My[employer]  You and your care team can also contact your transplant coordinator Monday - Friday, 8am - 5pm at [phone] (Option 2 to reach the coordinator or Option 4 to schedule an appointment).  After hours for urgent matters, please call [facility] at [phone].

## 2025-01-21 NOTE — TELEPHONE ENCOUNTER
ISSUE:  SCr 1.1  Calcium 13.6  Mag 1.1     Spoke with [facility] pharmacist, Ira is receiving foscarnet q12 hours with 1500 ml of NS before each infusion (3L/day). Confirmed she is not receiving IV calcium replacement.     Merritt Escobar MD Poucher, Jessica, RN  Repeat PTH and 1-25 vitamin D. Ensure she's not taking Tums. Start Sensipar 60 mg every day, if tolerating we might increase. Repeat labs Thursday.  For Mg, recommend changing to Mg Glycinate 300 mg twice a day.  Continue home IVF. Pamidronate 90 mg IV then. Calcitonin can be subcutaneous 30 units once. Tends to act faster, then pamidronate, but its short them, after a few days Pamidronate kicks in, so it's common to use both.    Merritt    Therapy plan updated, sent to [facility] to schedule.

## 2025-01-21 NOTE — NURSING NOTE
Current patient location:  [address] [place] [address]     Is the patient currently in the state of [place]? YES    Visit mode: VIDEO    If the visit is dropped, the patient can be reconnected by:VIDEO VISIT: Text to cell phone:   Telephone Information:   Mobile [phone]        Will anyone else be joining the visit? Yes  (If patient encounters technical issues they should call [phone]    Are changes needed to the allergy or medication list? Pt stated no changes to allergies and Pt stated no med changes    Are refills needed on medications prescribed by this physician? NO    Rooming Documentation:  Questionnaire(s) completed    Reason for visit: [place]

## 2025-01-21 NOTE — LETTER
1/21/2025      Ira Zamora  [address] [place] [address]  [place] [address]      Dear Colleague,    Thank you for referring your patient, Ira Zamora, to the [facility]. Please see a copy of my visit note below.    TRANSPLANT NEPHROLOGY CLINIC VISIT     Assessment & Plan  # DDKT: CKD Stage 2 - Increased in the setting of foscarnet therapy, moderate hypercalcemia and decrease intake with vomiting in the past 1-2 days. 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 Unlikely rejection in the setting of CMV viremia. Re-check labs Thursday.    - Baseline Creatinine: ~ 0.7-0.9   - Proteinuria: Normal (<0.2 grams)   - DSA Hx: No DSA   - Last cPRA: 9%   - BK Viremia: Yes, minimally elevated (< 1000)   - Kidney Tx Biopsy Hx: No biopsy history.    # H/o Interstitial Nephritis: No evidence of recurrence.    # Immunosuppression: Tacrolimus immediate release (goal 6-8), Azathioprine (dose 50 mg daily), and Prednisone (dose 5 mg daily)   - Induction with Recent Transplant:  High Intensity Protocol   - Continue with intensive monitoring of immunosuppression for efficacy and toxicity.   - Historical Changes in Immunosuppression:  Changed from mycophenolate to azathioprine and added prednisone due to oral and genital ulcers, concern for Behcet's versus mycophenolate toxicity.  Decreased azathioprine dose due to BK and CMV viremia.   - Changes: Not at this time with improved CMV viremia. No need for further decrease in IS at this time.    # Infection Prevention:      - PJP: Sulfa/TMP (Bactrim) 400-80 mg every day.   - CMV: Being treated for CMV viremia and/or disease      - CMV IgG Ab High Risk Discordance (D+/R-): Yes  CMV Serostatus: Negative  - EBV IgG Ab High Risk Discordance (D+/R-): No  EBV Serostatus: Negative    # Blood Pressure: Controlled;  Goal BP: < 130/80   - Changes: No    # Leukopenia: Improved, now steadily above 3,000.    # Mineral Bone Disorder:    - Secondary renal hyperparathyroidism; PTH level: Minimally elevated  ( pg/ml)        On treatment: None  - Vitamin D; level: Normal        On supplement: No  - Calcium; level: High        On supplement: No    PTH is high normal.  Had normal 25 vitamin D.  Not taking over-the-counter vitamin D or calcium supplements.  Mostly eating 2 shakes a day so no significant calcium intake.  Differential of hypercalcemia includes a component of hyperparathyroidism given nonsuppressed PTH, but not tertiary or severe secondary given PTH in normal levels.  Given history of Crohn's disease we have sent 1-25 vitamin D.  This can also be a component of contraction.  Continue IV fluids.  Today she received pamidronate and calcitonin.  She will start Cinacalcet 30 mg daily and if tolerating, increase to 60 mg daily.    # Electrolytes:   - Potassium; level: Normal        On supplement: No  - Magnesium; level: Not checked recently        On supplement: Yes change to mg glycinate 300 mg twice a day. Will 2 grams IV at home.  - Bicarbonate; level: Normal        On supplement: No    # BK Viremia: Stable, minimally elevated BK PCR at ~ 360 with last check Dec/2024.  IgG level is normal.  Decreased immunosuppression.   - Will continue to follow BK PCR every 2 weeks.    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 Started on Foscarnet in Jan, 2025. Decreased at ~ 20K with last check Oct/2024.  CMV IgG Ab positive.   - Started on Maribavir about 5 days ago.  - CMV level yesterday at 2K. Foscarnet held today, but PICC line will remain in place until there is 2 negative CMV's.  - Will continue to follow CMV PCR every weekly. Followed by transplant ID.    # H/o Oral and Genital Ulcers: Patient with h/o significant and debilitating ulcers due to unclear etiology. Doubt EBV as a cause, although positive staining on biopsy. Felt possibly related to Behcet's or mycophenolate toxicity. Symptoms resolved with overall decrease in immunosuppression and change from mycophenolate to azathioprine.   Followed by Transplant ID and Rheumatology in the past.    # Other Significant PMH:   - GERD: Asymptomatic and now off medications.   - Crohn's Disease: Appears to be stable with no recent symptoms.  Previously was on ustekinumab prior to kidney transplant, but hasn't restarted it due to ongoing oral and vaginal ulcers.      # Skin Cancer Risk: Discussed sun protection and recommend regular follow up with Dermatology.    # Transplant History:  Etiology of Kidney Failure: Interstitial nephritis  Tx: DDKT  Transplant: 4/17/2024 (Kidney)  Significant transplant-related complications: BK Viremia, CMV Viremia, and Oral and genital ulcers    Transplant Office Phone Number: [phone]    Assessment and plan was discussed with the patient and she voiced her understanding and agreement.    Return visit: Return for appointment scheduled for 4/22/25.    Merritt Patino MD    The longitudinal plan of care for the diagnosis(es)/condition(s) as documented were addressed during this visit. Due to the added complexity in care, I will continue to support Ira in the subsequent management and with ongoing continuity of care.      Chief Complaint  Ms. Zamora is a 34 year old here for kidney transplant and immunosuppression management.     History of Present Illness  Ms. Zamora reports feeling okay overall.  She is accompanied by her  and dad.  She feels stable, but with continued decreased appetite, mostly eating 2 shakes a day, with minimal solid food intake.  Yesterday she had episodes of vomiting that improved today.  Continues to have watery bowel movements about 1 to 2/day, with occasional colicky abdominal pain, but no blood in her stools.  Temperatures at home highest 99  F for which she takes Tylenol given feeling of subjective fever.  No lightheadedness on standing.  No low blood pressures.  No dysuria or hematuria.  Blood pressure mostly 120s to 130s.    Problem List  Patient Active Problem List   Diagnosis      "Crohn's disease (H)     CKD (chronic kidney disease) stage 2, GFR 60-89 ml/min     Secondary renal hyperparathyroidism     Long QT interval     Kidney replaced by transplant     Immunosuppressed status     Dehydration     Aftercare following organ transplant     Mouth ulcers     Vaginal ulcer     Leukopenia, unspecified type     Severe malnutrition     Need for pneumocystis prophylaxis     Anemia in chronic renal disease     Hypomagnesemia     Odynophagia     Inadequate oral intake     Vulvar lesion     Ulcer of esophagus without bleeding     Other neutropenia     Cytomegalovirus (CMV) viremia (H)     EBV (Jake-Barr virus) viremia     Hypercalcemia     Hyperparathyroidism, secondary renal       Allergies  Allergies   Allergen Reactions     Amlodipine Headache and Other (See Comments)     Other Reaction(s): Unknown     Omeprazole Other (See Comments)     All PPIs per patient     Proton Pump Inhibitors Nephrotoxicity     No PPIs due to history of renal failure due to interstitial nephritis     Tegaderm Transparent Dressing (Informational Only) Blisters       Medications  Current Outpatient Medications   Medication Sig Dispense Refill     acetaminophen (TYLENOL) 500 MG tablet Take 1,000 mg by mouth every 6 hours as needed       aspirin 81 MG EC tablet Take 81 mg by mouth daily.       atorvastatin (LIPITOR) 10 MG tablet TAKE ONE TABLET BY MOUTH ONCE DAILY 30 tablet 1     azaTHIOprine (IMURAN) 50 MG tablet Take 1 tablet (50 mg) by mouth daily. 30 tablet 11     cinacalcet (SENSIPAR) 30 MG tablet Take 2 tablets (60 mg) by mouth daily. 60 tablet 2     Emergency Supply Kit, Central, Patient use for emergency only. Contents: 3 sodium chloride 0.9% flushes, 1 dressing kit, 1 microclave ext set 14\", 4 nitrile gloves (med), 6 alcohol prep pads, 1 bacitracin, 1 syringe (10 cc 20 G 1\"). Call [phone] to reorder. 067702 kit 0     foscarnet (FOSCAVIR) 5,000 mg in sodium chloride 0.9 % 500 mL via HOMEPUMP Infuse 5,000 mg " over 2 hours into the vein every 12 hours for 12 days. 588818 mL 0     hydrOXYzine HCl (ATARAX) 25 MG tablet Take 1 tablet (25 mg) by mouth every 6 hours as needed for other (adjuvant pain) 20 tablet 0     ketoconazole (NIZORAL) 2 % external shampoo Apply topically daily as needed for itching or irritation (use there times a week and let sit on scalp for 5 minutes). 120 mL 11     levonorgestrel (KYLEENA) 19.5 MG IUD 1 Device by Intrauterine route       magnesium glycinate 100 MG CAPS capsule Take 3 capsules (300 mg) by mouth 2 times daily. Take in place of magnesium oxide 180 capsule 2     magnesium oxide (MAG-OX) 400 MG tablet Take 2 tablets (800 mg) by mouth 2 times daily. 120 tablet 3     magnesium sulfate 2,000 mg in sodium chloride 0.9 % 500 mL via elastomeric pump Infuse 500 mLs into the vein 2 times daily for 2 doses. Infuse 1 elastomeric pump(s). Each elastomeric to infuse over 2 hours. Each dose is 2g magnesium sulfate. 361712 mL 0     maribavir (LIVTENCITY) 200 MG tablet Take 2 tablets (400 mg) by mouth 2 times daily. 120 tablet 11     PARoxetine (PAXIL) 30 MG tablet Take 30 mg by mouth every evening       phosphorus tablet 250 mg (PHOSPHA 250 NEUTRAL) 250 MG per tablet Take 250 mg by mouth 2 times daily.       predniSONE (DELTASONE) 5 MG tablet Take 1 tablet (5 mg) by mouth daily. 30 tablet 2     sodium chloride 0.9 % 500 mL via elastomeric pump Infuse 1,000 mLs over 2 hours into the vein 2 times daily. Infuse 2 x 500mL (total of 1000mL twice daily) elastomeric pump(s), twice daily. Each elastomeric to infuse over 2 hours. 278558 mL 0     sodium chloride, PF, 0.9% PF flush Inject 10 mLs into the vein as needed for line flush. Flush IV before and after medication administration as directed and/or at least every 24 hours. 636236 mL 0     sulfamethoxazole-trimethoprim (BACTRIM) 400-80 MG tablet Take 1 tablet by mouth daily 30 tablet 11     tacrolimus (GENERIC EQUIVALENT) 0.5 MG capsule Take 1 capsule (0.5  mg) by mouth every evening. 1mg AM and 0.5mg PM 90 capsule 3     tacrolimus (GENERIC EQUIVALENT) 1 MG capsule Take 1 capsule (1 mg) by mouth every morning. 1mg AM and 0.5mg PM 90 capsule 3     triamcinolone (KENALOG) 0.1 % paste Take by mouth 2 times daily as needed (canker sore).       Vonoprazan Fumarate (VOQUEZNA) 20 MG TABS Take 20 mg by mouth daily 60 tablet 0     No current facility-administered medications for this visit.     There are no discontinued medications.    Physical Exam  Vital Signs: BP (!) 148/99 (BP Location: Left arm, Cuff Size: Adult Small)   Pulse 93   Resp 16   Wt 73.6 kg (162 lb 5 oz)   SpO2 96%   BMI 29.69 kg/m      GENERAL APPEARANCE: alert and no distress  HENT: mouth without ulcers or lesions  RESP: lungs clear to auscultation - no rales, rhonchi or wheezes  CV: regular rhythm, normal rate, no rub, no murmur  EDEMA: no LE edema bilaterally  ABDOMEN: soft, nondistended, nontender, bowel sounds normal  MS: extremities normal - no gross deformities noted, no evidence of inflammation in joints, no muscle tenderness  SKIN: no rash  TX KIDNEY: normal  DIALYSIS ACCESS:  RUE AV fistula with good thrill, aneurysmal    Data        Latest Ref Rng & Units 1/21/2025     2:45 PM 1/20/2025     8:50 AM 1/16/2025     9:05 AM   Renal   Sodium 135 - 145 mmol/L 142  140  142    K 3.4 - 5.3 mmol/L 4.1  3.9  3.8    Cl 98 - 107 mmol/L 108  108  108    Cl (external) 98 - 107 mmol/L 108  108  108    CO2 22 - 29 mmol/L 24  22  24    Urea Nitrogen 6.0 - 20.0 mg/dL 21.0  21.4  17.0    Creatinine 0.51 - 0.95 mg/dL 1.12  1.16  1.09    Glucose 70 - 99 mg/dL 98  90  108    Calcium 8.8 - 10.4 mg/dL 11.7  13.6  12.2    Magnesium 1.7 - 2.3 mg/dL  1.1  1.0          Latest Ref Rng & Units 1/21/2025     2:45 PM 1/20/2025     8:50 AM 1/16/2025     9:05 AM   Bone Health   Phosphorus 2.5 - 4.5 mg/dL  2.8  3.4    Parathyroid Hormone Intact 15 - 65 pg/mL 64            Latest Ref Rng & Units 1/21/2025     2:45 PM 1/20/2025      8:50 AM 1/16/2025     9:05 AM   Heme   WBC 4.0 - 11.0 10e3/uL 3.9  4.4  3.0    Hgb 11.7 - 15.7 g/dL 11.2  11.9  11.7    Plt 150 - 450 10e3/uL 168  188  157          Latest Ref Rng & Units 1/20/2025     8:50 AM 1/16/2025     9:05 AM 1/13/2025     8:50 AM   Liver   AP 40 - 150 U/L 58  55  70    TBili <=1.2 mg/dL 0.6  0.6  0.9    ALT 0 - 50 U/L 34  42  61    AST 0 - 45 U/L 31  34  43    Tot Protein 6.4 - 8.3 g/dL 6.9  6.0  7.0    Albumin 3.5 - 5.2 g/dL 3.7  3.4  4.1          Latest Ref Rng & Units 10/30/2024     8:20 AM 6/26/2024     5:06 PM 4/17/2024     9:58 AM   Pancreas   A1C 0.0 - 5.6 % 5.6   4.7    Lipase (Roche) 13 - 60 U/L  14           Latest Ref Rng & Units 5/23/2024     9:49 AM 4/22/2024     8:21 AM   Iron studies   Iron 37 - 145 ug/dL 77  53    Iron Sat Index 15 - 46 % 38  36    Ferritin 6 - 175 ng/mL 2,181  2,181          1/20/2025     8:50 AM 1/13/2025     8:50 AM 1/6/2025     9:41 AM   [facility] Virology   LOG IU/ML OF CMVQNT 3.3  4.4  4.4      Failed to redirect to the Timeline version of the [employer].  Recent Labs   Lab Test 01/10/25  0815 01/13/25  0850 01/16/25  0905 01/20/25  0850   DOSTAC 1/9/2025 1/12/2025 1/15/2025  --    TACROL 4.3* 7.0 7.2 9.4     Recent Labs   Lab Test 04/23/24  0735 10/22/24  0903   DOSMPA 4/22/2024   9:00 PM 10/21/2024   9:30 PM   MPACID 2.20 <0.25*   MPAG 107.5* <6.5*        Again, thank you for allowing me to participate in the care of your patient.        Sincerely,        Merritt Patino MD    Electronically signed

## 2025-01-21 NOTE — NURSING NOTE
Chief Complaint   Patient presents with    RECHECK     Post Txp Neph     BP (!) 148/99 (BP Location: Left arm, Cuff Size: Adult Small)   Pulse 93   Resp 16   Wt 73.6 kg (162 lb 5 oz)   SpO2 96%   BMI 29.69 kg/m    Henny Brar, JERARDO on 1/21/2025 at 3:45 PM

## 2025-01-22 LAB
1,25(OH)2D SERPL-MCNC: <5 PG/ML (ref 19.9–79.3)
BK VIRUS DNA IU/ML, INSTRUMENT (6800): 1800 IU/ML
BK VIRUS SPECIMEN TYPE: ABNORMAL
BKV DNA SPEC NAA+PROBE-LOG#: 3.3 {LOG_COPIES}/ML
CMV DNA SPEC NAA+PROBE-ACNC: 1680 IU/ML
CMV DNA SPEC NAA+PROBE-LOG#: 3.2 {LOG_COPIES}/ML
SPECIMEN TYPE: ABNORMAL

## 2025-01-22 PROCEDURE — A4223 INFUSION SUPPLIES W/O PUMP: HCPCS

## 2025-01-22 PROCEDURE — S9501 HIT ANTIBIOTIC Q12H DIEM: HCPCS | Mod: SH

## 2025-01-22 PROCEDURE — B4220 PARENTERAL SUPPLY KIT PREMIX: HCPCS

## 2025-01-22 PROCEDURE — B4224 PARENTERAL ADMINISTRATION KI: HCPCS

## 2025-01-22 PROCEDURE — S9374 HIT HYDRA 1 LITER DIEM: HCPCS | Mod: SJ

## 2025-01-22 PROCEDURE — S9379 HIT NOC PER DIEM: HCPCS | Mod: SJ

## 2025-01-22 PROCEDURE — S9501 HIT ANTIBIOTIC Q12H DIEM: HCPCS

## 2025-01-23 ENCOUNTER — LAB REQUISITION (OUTPATIENT)
Dept: LAB | Facility: CLINIC | Age: 35
End: 2025-01-23
Payer: MEDICARE

## 2025-01-23 ENCOUNTER — HOME CARE VISIT (OUTPATIENT)
Dept: HOME HEALTH SERVICES | Facility: HOME HEALTH | Age: 35
End: 2025-01-23
Payer: MEDICARE

## 2025-01-23 VITALS
DIASTOLIC BLOOD PRESSURE: 84 MMHG | SYSTOLIC BLOOD PRESSURE: 134 MMHG | OXYGEN SATURATION: 100 % | HEART RATE: 67 BPM | RESPIRATION RATE: 18 BRPM | TEMPERATURE: 98 F

## 2025-01-23 DIAGNOSIS — B25.9 CYTOMEGALOVIRAL DISEASE, UNSPECIFIED (H): ICD-10-CM

## 2025-01-23 LAB
ALBUMIN SERPL BCG-MCNC: 3.8 G/DL (ref 3.5–5.2)
ALP SERPL-CCNC: 59 U/L (ref 40–150)
ALT SERPL W P-5'-P-CCNC: 34 U/L (ref 0–50)
ANION GAP SERPL CALCULATED.3IONS-SCNC: 12 MMOL/L (ref 7–15)
AST SERPL W P-5'-P-CCNC: 31 U/L (ref 0–45)
BASOPHILS # BLD AUTO: 0 10E3/UL (ref 0–0.2)
BASOPHILS NFR BLD AUTO: 0 %
BILIRUB SERPL-MCNC: 0.7 MG/DL
BUN SERPL-MCNC: 21.7 MG/DL (ref 6–20)
CALCIUM SERPL-MCNC: 11.7 MG/DL (ref 8.8–10.4)
CHLORIDE SERPL-SCNC: 103 MMOL/L (ref 98–107)
CREAT SERPL-MCNC: 1.2 MG/DL (ref 0.51–0.95)
DONOR IDENTIFICATION: NORMAL
DSA COMMENTS: NORMAL
DSA PRESENT: NO
DSA TEST METHOD: NORMAL
EGFRCR SERPLBLD CKD-EPI 2021: 61 ML/MIN/1.73M2
EOSINOPHIL # BLD AUTO: 0.1 10E3/UL (ref 0–0.7)
EOSINOPHIL NFR BLD AUTO: 2 %
ERYTHROCYTE [DISTWIDTH] IN BLOOD BY AUTOMATED COUNT: 13.8 % (ref 10–15)
GLUCOSE SERPL-MCNC: 87 MG/DL (ref 70–99)
HCO3 SERPL-SCNC: 24 MMOL/L (ref 22–29)
HCT VFR BLD AUTO: 34.1 % (ref 35–47)
HGB BLD-MCNC: 11.8 G/DL (ref 11.7–15.7)
IMM GRANULOCYTES # BLD: 0.1 10E3/UL
IMM GRANULOCYTES NFR BLD: 1 %
LYMPHOCYTES # BLD AUTO: 0.7 10E3/UL (ref 0.8–5.3)
LYMPHOCYTES NFR BLD AUTO: 13 %
MAGNESIUM SERPL-MCNC: 2 MG/DL (ref 1.7–2.3)
MCH RBC QN AUTO: 36.2 PG (ref 26.5–33)
MCHC RBC AUTO-ENTMCNC: 34.6 G/DL (ref 31.5–36.5)
MCV RBC AUTO: 105 FL (ref 78–100)
MONOCYTES # BLD AUTO: 0.6 10E3/UL (ref 0–1.3)
MONOCYTES NFR BLD AUTO: 12 %
NEUTROPHILS # BLD AUTO: 3.7 10E3/UL (ref 1.6–8.3)
NEUTROPHILS NFR BLD AUTO: 72 %
NRBC # BLD AUTO: 0 10E3/UL
NRBC BLD AUTO-RTO: 0 /100
ORGAN: NORMAL
PHOSPHATE SERPL-MCNC: 3.4 MG/DL (ref 2.5–4.5)
PLATELET # BLD AUTO: 204 10E3/UL (ref 150–450)
POTASSIUM SERPL-SCNC: 3.9 MMOL/L (ref 3.4–5.3)
PROT SERPL-MCNC: 6.8 G/DL (ref 6.4–8.3)
RBC # BLD AUTO: 3.26 10E6/UL (ref 3.8–5.2)
SA 1  COMMENTS: NORMAL
SA 1 CELL: NORMAL
SA 1 TEST METHOD: NORMAL
SA 2 CELL: NORMAL
SA 2 COMMENTS: NORMAL
SA 2 TEST METHOD: NORMAL
SA1 HI RISK ABY: NORMAL
SA1 MOD RISK ABY: NORMAL
SA2 HI RISK ABY: NORMAL
SA2 MOD RISK ABY: NORMAL
SODIUM SERPL-SCNC: 139 MMOL/L (ref 135–145)
TACROLIMUS BLD-MCNC: 15.5 UG/L (ref 5–15)
TME LAST DOSE: ABNORMAL H
TME LAST DOSE: ABNORMAL H
UNACCEPTABLE ANTIGENS: NORMAL
UNOS CPRA: 18
WBC # BLD AUTO: 5.1 10E3/UL (ref 4–11)

## 2025-01-23 PROCEDURE — 80197 ASSAY OF TACROLIMUS: CPT | Performed by: INTERNAL MEDICINE

## 2025-01-23 PROCEDURE — 85014 HEMATOCRIT: CPT | Performed by: INTERNAL MEDICINE

## 2025-01-23 PROCEDURE — 83735 ASSAY OF MAGNESIUM: CPT | Performed by: INTERNAL MEDICINE

## 2025-01-23 PROCEDURE — 82040 ASSAY OF SERUM ALBUMIN: CPT | Performed by: INTERNAL MEDICINE

## 2025-01-23 PROCEDURE — 84100 ASSAY OF PHOSPHORUS: CPT | Performed by: INTERNAL MEDICINE

## 2025-01-23 PROCEDURE — 80053 COMPREHEN METABOLIC PANEL: CPT | Performed by: INTERNAL MEDICINE

## 2025-01-23 PROCEDURE — 85004 AUTOMATED DIFF WBC COUNT: CPT | Performed by: INTERNAL MEDICINE

## 2025-01-23 PROCEDURE — 85041 AUTOMATED RBC COUNT: CPT | Performed by: INTERNAL MEDICINE

## 2025-01-23 PROCEDURE — A4223 INFUSION SUPPLIES W/O PUMP: HCPCS

## 2025-01-23 NOTE — PROGRESS NOTES
Nursing Visit Note:  Nurse visit today for labs for Ira Zamora.     present during visit today: Not Applicable.    Intravenous Access:  Labs drawn without difficulty. and PICC.    Lab-Only Nursing Note    Labs obtained via PICC    Time Specimen drawn: 08:55am    Last dose (if applicable): Yes: Last dose date: tacro, 1/22 at 830pm  Last dose start time: 830  Last dose end time: 830    [place] [id number]    Note: pt feeling well today, no new issues or concerns to report. pt confirms she is no longer taking the foscarnet but continues to do the IVF, states she s slowly starting to feel better. hoping her cmv level continues to decrease next week so she can get PICC removed     Saline administered: 30 (ml)    Supply Check:   Does the patient have all the supplies they need for the next visit?  Yes    Next visit plan: Monday 1/27    Ginger Luciano RN 1/23/2025

## 2025-01-24 ENCOUNTER — HOME INFUSION BILLING (OUTPATIENT)
Dept: HOME HEALTH SERVICES | Facility: HOME HEALTH | Age: 35
End: 2025-01-24
Payer: COMMERCIAL

## 2025-01-24 ENCOUNTER — TELEPHONE (OUTPATIENT)
Dept: TRANSPLANT | Facility: CLINIC | Age: 35
End: 2025-01-24
Payer: MEDICARE

## 2025-01-24 DIAGNOSIS — B25.9 CYTOMEGALOVIRUS (CMV) VIREMIA (H): ICD-10-CM

## 2025-01-24 DIAGNOSIS — B27.00 EBV (EPSTEIN-BARR VIRUS) VIREMIA: ICD-10-CM

## 2025-01-24 PROCEDURE — A4223 INFUSION SUPPLIES W/O PUMP: HCPCS

## 2025-01-24 NOTE — TELEPHONE ENCOUNTER
----- Message from Edgardo Mccoy sent at 1/23/2025  5:05 PM CST -----  Hello,   I am sure you are on top of this value. I am just concerned because I discontinued foscarnet.     Thanks,

## 2025-01-26 PROCEDURE — S9374 HIT HYDRA 1 LITER DIEM: HCPCS

## 2025-01-26 PROCEDURE — A4223 INFUSION SUPPLIES W/O PUMP: HCPCS

## 2025-01-27 ENCOUNTER — HOME CARE VISIT (OUTPATIENT)
Dept: HOME HEALTH SERVICES | Facility: HOME HEALTH | Age: 35
End: 2025-01-27
Payer: MEDICARE

## 2025-01-27 ENCOUNTER — LAB REQUISITION (OUTPATIENT)
Dept: LAB | Facility: CLINIC | Age: 35
End: 2025-01-27
Payer: MEDICARE

## 2025-01-27 VITALS
RESPIRATION RATE: 18 BRPM | SYSTOLIC BLOOD PRESSURE: 122 MMHG | WEIGHT: 163.14 LBS | HEART RATE: 74 BPM | TEMPERATURE: 97.1 F | DIASTOLIC BLOOD PRESSURE: 80 MMHG | OXYGEN SATURATION: 99 % | BODY MASS INDEX: 29.84 KG/M2

## 2025-01-27 DIAGNOSIS — B25.9 CYTOMEGALOVIRAL DISEASE, UNSPECIFIED (H): ICD-10-CM

## 2025-01-27 LAB
ALBUMIN SERPL BCG-MCNC: 3.8 G/DL (ref 3.5–5.2)
ALP SERPL-CCNC: 57 U/L (ref 40–150)
ALT SERPL W P-5'-P-CCNC: 46 U/L (ref 0–50)
ANION GAP SERPL CALCULATED.3IONS-SCNC: 11 MMOL/L (ref 7–15)
AST SERPL W P-5'-P-CCNC: 40 U/L (ref 0–45)
BASOPHILS # BLD AUTO: 0 10E3/UL (ref 0–0.2)
BASOPHILS NFR BLD AUTO: 0 %
BILIRUB SERPL-MCNC: 0.7 MG/DL
BUN SERPL-MCNC: 17.6 MG/DL (ref 6–20)
CALCIUM SERPL-MCNC: 9.4 MG/DL (ref 8.8–10.4)
CHLORIDE SERPL-SCNC: 106 MMOL/L (ref 98–107)
CMV DNA SPEC NAA+PROBE-ACNC: 444 IU/ML
CMV DNA SPEC NAA+PROBE-LOG#: 2.6 {LOG_COPIES}/ML
CREAT SERPL-MCNC: 1.25 MG/DL (ref 0.51–0.95)
EGFRCR SERPLBLD CKD-EPI 2021: 58 ML/MIN/1.73M2
EOSINOPHIL # BLD AUTO: 0.1 10E3/UL (ref 0–0.7)
EOSINOPHIL NFR BLD AUTO: 1 %
ERYTHROCYTE [DISTWIDTH] IN BLOOD BY AUTOMATED COUNT: 14 % (ref 10–15)
GLUCOSE SERPL-MCNC: 82 MG/DL (ref 70–99)
HCO3 SERPL-SCNC: 23 MMOL/L (ref 22–29)
HCT VFR BLD AUTO: 33.5 % (ref 35–47)
HGB BLD-MCNC: 11.8 G/DL (ref 11.7–15.7)
IMM GRANULOCYTES # BLD: 0.1 10E3/UL
IMM GRANULOCYTES NFR BLD: 1 %
LYMPHOCYTES # BLD AUTO: 1 10E3/UL (ref 0.8–5.3)
LYMPHOCYTES NFR BLD AUTO: 14 %
MAGNESIUM SERPL-MCNC: 1.4 MG/DL (ref 1.7–2.3)
MCH RBC QN AUTO: 38.2 PG (ref 26.5–33)
MCHC RBC AUTO-ENTMCNC: 35.2 G/DL (ref 31.5–36.5)
MCV RBC AUTO: 108 FL (ref 78–100)
MONOCYTES # BLD AUTO: 0.8 10E3/UL (ref 0–1.3)
MONOCYTES NFR BLD AUTO: 11 %
NEUTROPHILS # BLD AUTO: 5.2 10E3/UL (ref 1.6–8.3)
NEUTROPHILS NFR BLD AUTO: 72 %
NRBC # BLD AUTO: 0 10E3/UL
NRBC BLD AUTO-RTO: 0 /100
PHOSPHATE SERPL-MCNC: 2.8 MG/DL (ref 2.5–4.5)
PLATELET # BLD AUTO: 167 10E3/UL (ref 150–450)
POTASSIUM SERPL-SCNC: 4.5 MMOL/L (ref 3.4–5.3)
PROT SERPL-MCNC: 6.4 G/DL (ref 6.4–8.3)
RBC # BLD AUTO: 3.09 10E6/UL (ref 3.8–5.2)
SODIUM SERPL-SCNC: 140 MMOL/L (ref 135–145)
SPECIMEN TYPE: ABNORMAL
WBC # BLD AUTO: 7.2 10E3/UL (ref 4–11)

## 2025-01-27 PROCEDURE — 80197 ASSAY OF TACROLIMUS: CPT | Performed by: INTERNAL MEDICINE

## 2025-01-27 PROCEDURE — A4221 SUPP NON-INSULIN INF CATH/WK: HCPCS

## 2025-01-27 PROCEDURE — S9374 HIT HYDRA 1 LITER DIEM: HCPCS

## 2025-01-27 PROCEDURE — 82040 ASSAY OF SERUM ALBUMIN: CPT | Performed by: INTERNAL MEDICINE

## 2025-01-27 PROCEDURE — 85014 HEMATOCRIT: CPT | Performed by: INTERNAL MEDICINE

## 2025-01-27 PROCEDURE — 85004 AUTOMATED DIFF WBC COUNT: CPT | Performed by: INTERNAL MEDICINE

## 2025-01-27 PROCEDURE — 83735 ASSAY OF MAGNESIUM: CPT | Performed by: INTERNAL MEDICINE

## 2025-01-27 PROCEDURE — G0068 ADM OF INFUSION DRUG IN HOME: HCPCS

## 2025-01-27 PROCEDURE — 85041 AUTOMATED RBC COUNT: CPT | Performed by: INTERNAL MEDICINE

## 2025-01-27 PROCEDURE — 84100 ASSAY OF PHOSPHORUS: CPT | Performed by: INTERNAL MEDICINE

## 2025-01-27 PROCEDURE — A4223 INFUSION SUPPLIES W/O PUMP: HCPCS

## 2025-01-27 NOTE — PROGRESS NOTES
Nursing Visit Note:  Nurse visit today for Labs, CLC and assessment in the home for Ira Zamora.     present during visit today: Not Applicable.    Intravenous Access:  PICC.    Lab-Only Nursing Note    Labs obtained via PICC    Time Specimen drawn: 0815    Last dose (if applicable): Yes: Last dose date: 1/27/25 2030 (tacro)  Last dose start time: 2030  Last dose end time: 2030    Facility sent to: [facility] Tracking number: [id number]    Note: PT VSS, lungs clear and reports nausea is better. Able to eat and drink more, reports continued fatigue and intermittent diarrhea/constipation with an average of approx 2 loose stools daily. Reports UOP is  normal  amounts and clear. Denies feeling lightheaded or dizzy. Right sclera noted to be reddened, but pt denies pain, discharge or vision changes. Brisk blood return from line, flushed well.     Saline administered: 30ml (ml)    Supply Check:   Does the patient have all the supplies they need for the next visit?  Yes    Next visit plan: Thursday 1/30/25    Henny Summers RN 1/27/2025

## 2025-01-28 ENCOUNTER — MYC MEDICAL ADVICE (OUTPATIENT)
Dept: INFECTIOUS DISEASES | Facility: CLINIC | Age: 35
End: 2025-01-28
Payer: MEDICARE

## 2025-01-28 ENCOUNTER — TELEPHONE (OUTPATIENT)
Dept: TRANSPLANT | Facility: CLINIC | Age: 35
End: 2025-01-28
Payer: MEDICARE

## 2025-01-28 DIAGNOSIS — Z94.0 KIDNEY REPLACED BY TRANSPLANT: ICD-10-CM

## 2025-01-28 DIAGNOSIS — B25.9 CYTOMEGALOVIRUS (CMV) VIREMIA (H): ICD-10-CM

## 2025-01-28 DIAGNOSIS — B27.00 EBV (EPSTEIN-BARR VIRUS) VIREMIA: ICD-10-CM

## 2025-01-28 LAB
TACROLIMUS BLD-MCNC: 22.1 UG/L (ref 5–15)
TME LAST DOSE: ABNORMAL H
TME LAST DOSE: ABNORMAL H

## 2025-01-28 PROCEDURE — A4223 INFUSION SUPPLIES W/O PUMP: HCPCS

## 2025-01-28 PROCEDURE — S9374 HIT HYDRA 1 LITER DIEM: HCPCS

## 2025-01-28 RX ORDER — TACROLIMUS 0.5 MG/1
0.5 CAPSULE ORAL 2 TIMES DAILY
Qty: 60 CAPSULE | Refills: 11 | Status: SHIPPED | OUTPATIENT
Start: 2025-01-28

## 2025-01-28 RX ORDER — TACROLIMUS 1 MG/1
CAPSULE ORAL
Qty: 90 CAPSULE | Refills: 3 | Status: SHIPPED | OUTPATIENT
Start: 2025-01-28

## 2025-01-28 NOTE — TELEPHONE ENCOUNTER
DATE:  1/28/2025     TIME OF RECEIPT FROM LAB:  9:03      ORDERING PROVIDER: Darius (Spong patient)    LAB TEST:  Tacrolimus level    LAB VALUE:  22.1

## 2025-01-28 NOTE — TELEPHONE ENCOUNTER
Message sent to Dr. Ortiz regarding Tacrolimus value. Plan is to hold dose tonight, and resume at 0.5mg BID. Updated patient on this plan and she will repeat labs Monday

## 2025-01-29 ENCOUNTER — TELEPHONE (OUTPATIENT)
Dept: TRANSPLANT | Facility: CLINIC | Age: 35
End: 2025-01-29
Payer: MEDICARE

## 2025-01-29 DIAGNOSIS — B25.9 CYTOMEGALOVIRUS (CMV) VIREMIA (H): ICD-10-CM

## 2025-01-29 DIAGNOSIS — B27.00 EBV (EPSTEIN-BARR VIRUS) VIREMIA: ICD-10-CM

## 2025-01-29 PROCEDURE — A4223 INFUSION SUPPLIES W/O PUMP: HCPCS

## 2025-01-29 PROCEDURE — S9374 HIT HYDRA 1 LITER DIEM: HCPCS

## 2025-01-30 ENCOUNTER — LAB REQUISITION (OUTPATIENT)
Dept: LAB | Facility: CLINIC | Age: 35
End: 2025-01-30
Payer: MEDICARE

## 2025-01-30 ENCOUNTER — HOME CARE VISIT (OUTPATIENT)
Dept: HOME HEALTH SERVICES | Facility: HOME HEALTH | Age: 35
End: 2025-01-30
Payer: MEDICARE

## 2025-01-30 VITALS
HEART RATE: 70 BPM | RESPIRATION RATE: 16 BRPM | WEIGHT: 160 LBS | BODY MASS INDEX: 29.26 KG/M2 | OXYGEN SATURATION: 98 % | TEMPERATURE: 97.3 F | SYSTOLIC BLOOD PRESSURE: 112 MMHG | DIASTOLIC BLOOD PRESSURE: 68 MMHG

## 2025-01-30 DIAGNOSIS — B25.9 CYTOMEGALOVIRAL DISEASE, UNSPECIFIED (H): ICD-10-CM

## 2025-01-30 LAB
ALBUMIN SERPL BCG-MCNC: 3.7 G/DL (ref 3.5–5.2)
ALP SERPL-CCNC: 58 U/L (ref 40–150)
ALT SERPL W P-5'-P-CCNC: 38 U/L (ref 0–50)
ANION GAP SERPL CALCULATED.3IONS-SCNC: 9 MMOL/L (ref 7–15)
AST SERPL W P-5'-P-CCNC: 28 U/L (ref 0–45)
BASOPHILS # BLD AUTO: 0.1 10E3/UL (ref 0–0.2)
BASOPHILS NFR BLD AUTO: 1 %
BILIRUB SERPL-MCNC: 0.7 MG/DL
BUN SERPL-MCNC: 16.4 MG/DL (ref 6–20)
CALCIUM SERPL-MCNC: 9.2 MG/DL (ref 8.8–10.4)
CHLORIDE SERPL-SCNC: 111 MMOL/L (ref 98–107)
CREAT SERPL-MCNC: 0.89 MG/DL (ref 0.51–0.95)
EGFRCR SERPLBLD CKD-EPI 2021: 87 ML/MIN/1.73M2
EOSINOPHIL # BLD AUTO: 0.2 10E3/UL (ref 0–0.7)
EOSINOPHIL NFR BLD AUTO: 2 %
ERYTHROCYTE [DISTWIDTH] IN BLOOD BY AUTOMATED COUNT: 13.7 % (ref 10–15)
GLUCOSE SERPL-MCNC: 82 MG/DL (ref 70–99)
HCO3 SERPL-SCNC: 17 MMOL/L (ref 22–29)
HCT VFR BLD AUTO: 35.5 % (ref 35–47)
HGB BLD-MCNC: 11.4 G/DL (ref 11.7–15.7)
IMM GRANULOCYTES # BLD: 0.1 10E3/UL
IMM GRANULOCYTES NFR BLD: 1 %
LYMPHOCYTES # BLD AUTO: 1 10E3/UL (ref 0.8–5.3)
LYMPHOCYTES NFR BLD AUTO: 13 %
MAGNESIUM SERPL-MCNC: 1.4 MG/DL (ref 1.7–2.3)
MCH RBC QN AUTO: 35.7 PG (ref 26.5–33)
MCHC RBC AUTO-ENTMCNC: 32.1 G/DL (ref 31.5–36.5)
MCV RBC AUTO: 111 FL (ref 78–100)
MONOCYTES # BLD AUTO: 0.9 10E3/UL (ref 0–1.3)
MONOCYTES NFR BLD AUTO: 11 %
NEUTROPHILS # BLD AUTO: 5.6 10E3/UL (ref 1.6–8.3)
NEUTROPHILS NFR BLD AUTO: 73 %
NRBC # BLD AUTO: 0 10E3/UL
NRBC BLD AUTO-RTO: 0 /100
PHOSPHATE SERPL-MCNC: 2.1 MG/DL (ref 2.5–4.5)
PLATELET # BLD AUTO: 122 10E3/UL (ref 150–450)
POTASSIUM SERPL-SCNC: 4.5 MMOL/L (ref 3.4–5.3)
PROT SERPL-MCNC: 6.5 G/DL (ref 6.4–8.3)
RBC # BLD AUTO: 3.19 10E6/UL (ref 3.8–5.2)
SODIUM SERPL-SCNC: 137 MMOL/L (ref 135–145)
TACROLIMUS BLD-MCNC: 16 UG/L (ref 5–15)
TME LAST DOSE: ABNORMAL H
TME LAST DOSE: ABNORMAL H
WBC # BLD AUTO: 7.7 10E3/UL (ref 4–11)

## 2025-01-30 PROCEDURE — 82040 ASSAY OF SERUM ALBUMIN: CPT | Performed by: INTERNAL MEDICINE

## 2025-01-30 PROCEDURE — 80197 ASSAY OF TACROLIMUS: CPT | Performed by: INTERNAL MEDICINE

## 2025-01-30 PROCEDURE — 85004 AUTOMATED DIFF WBC COUNT: CPT | Performed by: INTERNAL MEDICINE

## 2025-01-30 PROCEDURE — 82310 ASSAY OF CALCIUM: CPT | Performed by: INTERNAL MEDICINE

## 2025-01-30 PROCEDURE — 83735 ASSAY OF MAGNESIUM: CPT | Performed by: INTERNAL MEDICINE

## 2025-01-30 PROCEDURE — 84100 ASSAY OF PHOSPHORUS: CPT | Performed by: INTERNAL MEDICINE

## 2025-01-30 NOTE — PROGRESS NOTES
Nursing Visit Note:  Nurse visit today for labs, CLC for Ira Zamora.     present during visit today: Not Applicable.    Intravenous Access:  PICC.    Lab-Only Nursing Note    Labs obtained via PICC    Time Specimen drawn: 0855    Last dose (if applicable): Yes: Last dose date: 1/29/25 2200  Last dose start time: 2200  Last dose end time: 2200    Facility sent to: [facility] Tracking number: [id number]    Note: Pt alert and oriented, in NAD. Feeling ok for the most part, just tired. Experiences some nausea without vomiting, particularly surrounding meal times. Relieved by rest. Due to a combination of that and taste alteration, eating less, down 1.5kg since last weight assessment. Stools continue to be loose, 1-2 times a day, normal color, no blood, no cramping. Experiencing some restless leg sensations at night in bed as well, over the last week or so. PICC dressing changed dt bleeding at the insertion site. Some pain at the site two days ago, none today. Otherwise stable. Instructed to call [facility] if PICC site continues to bleed. No further concerns or issues today.     Saline administered: 40 (ml)    Supply Check:   Does the patient have all the supplies they need for the next visit?  Yes    Next visit plan: Monday 2/3    Florecita Prather RN 1/30/2025

## 2025-02-02 ENCOUNTER — HEALTH MAINTENANCE LETTER (OUTPATIENT)
Age: 35
End: 2025-02-02

## 2025-02-03 ENCOUNTER — LAB (OUTPATIENT)
Dept: LAB | Facility: OTHER | Age: 35
End: 2025-02-03
Payer: COMMERCIAL

## 2025-02-03 DIAGNOSIS — Z48.298 AFTERCARE FOLLOWING ORGAN TRANSPLANT: ICD-10-CM

## 2025-02-03 LAB
ANION GAP SERPL CALCULATED.3IONS-SCNC: 9 MMOL/L (ref 7–15)
BUN SERPL-MCNC: 22 MG/DL (ref 6–20)
CALCIUM SERPL-MCNC: 10.2 MG/DL (ref 8.8–10.4)
CHLORIDE SERPL-SCNC: 106 MMOL/L (ref 98–107)
CREAT SERPL-MCNC: 1.01 MG/DL (ref 0.51–0.95)
EGFRCR SERPLBLD CKD-EPI 2021: 75 ML/MIN/1.73M2
ERYTHROCYTE [DISTWIDTH] IN BLOOD BY AUTOMATED COUNT: 13.3 % (ref 10–15)
GLUCOSE SERPL-MCNC: 104 MG/DL (ref 70–99)
HCO3 SERPL-SCNC: 26 MMOL/L (ref 22–29)
HCT VFR BLD AUTO: 38.7 % (ref 35–47)
HGB BLD-MCNC: 12.9 G/DL (ref 11.7–15.7)
MCH RBC QN AUTO: 37.3 PG (ref 26.5–33)
MCHC RBC AUTO-ENTMCNC: 33.3 G/DL (ref 31.5–36.5)
MCV RBC AUTO: 112 FL (ref 78–100)
PLATELET # BLD AUTO: 119 10E3/UL (ref 150–450)
POTASSIUM SERPL-SCNC: 4.5 MMOL/L (ref 3.4–5.3)
RBC # BLD AUTO: 3.46 10E6/UL (ref 3.8–5.2)
SODIUM SERPL-SCNC: 141 MMOL/L (ref 135–145)
TACROLIMUS BLD-MCNC: 10 UG/L (ref 5–15)
TME LAST DOSE: NORMAL H
TME LAST DOSE: NORMAL H
WBC # BLD AUTO: 8.5 10E3/UL (ref 4–11)

## 2025-02-03 PROCEDURE — 36415 COLL VENOUS BLD VENIPUNCTURE: CPT

## 2025-02-03 PROCEDURE — 80048 BASIC METABOLIC PNL TOTAL CA: CPT

## 2025-02-03 PROCEDURE — 80197 ASSAY OF TACROLIMUS: CPT

## 2025-02-03 PROCEDURE — 85027 COMPLETE CBC AUTOMATED: CPT

## 2025-02-03 PROCEDURE — 87799 DETECT AGENT NOS DNA QUANT: CPT

## 2025-02-04 LAB
BK VIRUS DNA IU/ML, INSTRUMENT (6800): 1710 IU/ML
BK VIRUS SPECIMEN TYPE: ABNORMAL
BKV DNA SPEC NAA+PROBE-LOG#: 3.2 {LOG_COPIES}/ML
CMV DNA SPEC NAA+PROBE-ACNC: 42 IU/ML
CMV DNA SPEC NAA+PROBE-LOG#: 1.6 {LOG_COPIES}/ML
SPECIMEN TYPE: ABNORMAL

## 2025-02-04 PROCEDURE — A9270 NON-COVERED ITEM OR SERVICE: HCPCS

## 2025-02-04 PROCEDURE — S1015 IV TUBING EXTENSION SET: HCPCS

## 2025-02-04 PROCEDURE — A4245 ALCOHOL WIPES PER BOX: HCPCS

## 2025-02-10 ENCOUNTER — LAB (OUTPATIENT)
Dept: LAB | Facility: OTHER | Age: 35
End: 2025-02-10
Payer: COMMERCIAL

## 2025-02-10 DIAGNOSIS — B27.00 EBV (EPSTEIN-BARR VIRUS) VIREMIA: ICD-10-CM

## 2025-02-10 DIAGNOSIS — Z48.298 AFTERCARE FOLLOWING ORGAN TRANSPLANT: ICD-10-CM

## 2025-02-10 DIAGNOSIS — B25.9 CYTOMEGALOVIRUS (CMV) VIREMIA (H): ICD-10-CM

## 2025-02-10 LAB
ANION GAP SERPL CALCULATED.3IONS-SCNC: 9 MMOL/L (ref 7–15)
BUN SERPL-MCNC: 20.6 MG/DL (ref 6–20)
CALCIUM SERPL-MCNC: 9.9 MG/DL (ref 8.8–10.4)
CHLORIDE SERPL-SCNC: 105 MMOL/L (ref 98–107)
CREAT SERPL-MCNC: 0.97 MG/DL (ref 0.51–0.95)
EGFRCR SERPLBLD CKD-EPI 2021: 78 ML/MIN/1.73M2
ERYTHROCYTE [DISTWIDTH] IN BLOOD BY AUTOMATED COUNT: 12.6 % (ref 10–15)
GLUCOSE SERPL-MCNC: 108 MG/DL (ref 70–99)
HCO3 SERPL-SCNC: 27 MMOL/L (ref 22–29)
HCT VFR BLD AUTO: 35 % (ref 35–47)
HGB BLD-MCNC: 11.7 G/DL (ref 11.7–15.7)
MCH RBC QN AUTO: 37.6 PG (ref 26.5–33)
MCHC RBC AUTO-ENTMCNC: 33.4 G/DL (ref 31.5–36.5)
MCV RBC AUTO: 113 FL (ref 78–100)
PLATELET # BLD AUTO: 107 10E3/UL (ref 150–450)
POTASSIUM SERPL-SCNC: 4.4 MMOL/L (ref 3.4–5.3)
RBC # BLD AUTO: 3.11 10E6/UL (ref 3.8–5.2)
SODIUM SERPL-SCNC: 141 MMOL/L (ref 135–145)
TACROLIMUS BLD-MCNC: 6.8 UG/L (ref 5–15)
TME LAST DOSE: NORMAL H
TME LAST DOSE: NORMAL H
WBC # BLD AUTO: 5.6 10E3/UL (ref 4–11)

## 2025-02-10 PROCEDURE — 85027 COMPLETE CBC AUTOMATED: CPT

## 2025-02-10 PROCEDURE — 36415 COLL VENOUS BLD VENIPUNCTURE: CPT

## 2025-02-10 PROCEDURE — 87799 DETECT AGENT NOS DNA QUANT: CPT

## 2025-02-10 PROCEDURE — 80048 BASIC METABOLIC PNL TOTAL CA: CPT

## 2025-02-10 PROCEDURE — 80197 ASSAY OF TACROLIMUS: CPT

## 2025-02-11 LAB
BK VIRUS DNA IU/ML, INSTRUMENT (6800): 6640 IU/ML
BK VIRUS SPECIMEN TYPE: ABNORMAL
BKV DNA SPEC NAA+PROBE-LOG#: 3.8 {LOG_COPIES}/ML
CMV DNA SPEC NAA+PROBE-ACNC: <35 IU/ML
CMV DNA SPEC NAA+PROBE-LOG#: <1.5 {LOG_COPIES}/ML
SPECIMEN TYPE: ABNORMAL

## 2025-02-17 ENCOUNTER — TELEPHONE (OUTPATIENT)
Dept: TRANSPLANT | Facility: CLINIC | Age: 35
End: 2025-02-17
Payer: MEDICARE

## 2025-02-17 DIAGNOSIS — B27.00 EBV (EPSTEIN-BARR VIRUS) VIREMIA: ICD-10-CM

## 2025-02-17 DIAGNOSIS — B25.9 CYTOMEGALOVIRUS (CMV) VIREMIA (H): ICD-10-CM

## 2025-02-17 NOTE — TELEPHONE ENCOUNTER
Discussed with pharmacy that patient is currently holding this medication and does not need a refill at this time. Will have patient touch base when needed

## 2025-02-17 NOTE — TELEPHONE ENCOUNTER
Pt's pharmacy called to see if the Team could assist them in contacting the Pt regarding her refill on maribavir (LIVTENCITY) 200 MG tablet. Caller would like to patient to contact [facility] pharmacy customer services at [phone].

## 2025-02-24 ENCOUNTER — TELEPHONE (OUTPATIENT)
Dept: TRANSPLANT | Facility: CLINIC | Age: 35
End: 2025-02-24
Payer: MEDICARE

## 2025-02-24 DIAGNOSIS — B25.9 CYTOMEGALOVIRUS (CMV) VIREMIA (H): ICD-10-CM

## 2025-02-24 DIAGNOSIS — Z48.298 AFTERCARE FOLLOWING ORGAN TRANSPLANT: Primary | ICD-10-CM

## 2025-02-24 DIAGNOSIS — B27.00 EBV (EPSTEIN-BARR VIRUS) VIREMIA: ICD-10-CM

## 2025-02-24 NOTE — TELEPHONE ENCOUNTER
----- Message from Neno Ortiz sent at 2/23/2025  2:49 PM CST -----  Slight trend up in BK viremia and continued very low level CMV viremia.  Recommend changing tacrolimus to cyclosporine with goal level 100-125.

## 2025-02-27 DIAGNOSIS — Z94.0 KIDNEY REPLACED BY TRANSPLANT: Primary | Chronic | ICD-10-CM

## 2025-02-27 RX ORDER — CYCLOSPORINE 25 MG/1
50 CAPSULE, LIQUID FILLED ORAL 2 TIMES DAILY
Qty: 120 CAPSULE | Refills: 11 | Status: SHIPPED | OUTPATIENT
Start: 2025-02-27

## 2025-02-27 RX ORDER — ATORVASTATIN CALCIUM 10 MG/1
10 TABLET, FILM COATED ORAL DAILY
Qty: 30 TABLET | Refills: 1 | Status: SHIPPED | OUTPATIENT
Start: 2025-02-27

## 2025-02-27 NOTE — TELEPHONE ENCOUNTER
Spoke to patient about cyclosporine change. She will call the pharmacy to have this delivered and continue weekly labs.

## 2025-03-03 DIAGNOSIS — B25.9 CYTOMEGALOVIRUS (CMV) VIREMIA (H): ICD-10-CM

## 2025-03-10 ENCOUNTER — TELEPHONE (OUTPATIENT)
Dept: INFECTIOUS DISEASES | Facility: CLINIC | Age: 35
End: 2025-03-10
Payer: MEDICARE

## 2025-03-10 DIAGNOSIS — B25.9 CYTOMEGALOVIRUS (CMV) VIREMIA (H): ICD-10-CM

## 2025-03-10 DIAGNOSIS — B27.00 EBV (EPSTEIN-BARR VIRUS) VIREMIA: ICD-10-CM

## 2025-03-10 DIAGNOSIS — Z48.298 AFTERCARE FOLLOWING ORGAN TRANSPLANT: ICD-10-CM

## 2025-03-10 RX ORDER — CYCLOSPORINE 25 MG/1
75 CAPSULE, LIQUID FILLED ORAL 2 TIMES DAILY
Qty: 180 CAPSULE | Refills: 11 | Status: SHIPPED | OUTPATIENT
Start: 2025-03-10

## 2025-03-10 NOTE — TELEPHONE ENCOUNTER
OPAT Therapy Plan complete, encounter resolved.      Edgardo Mccoy MD   Physician  Specialty: Infectious Diseases     Progress Notes  Signed     Encounter Date: 1/31/2025       The CrCl normalized, the CMV VL continues to improve since stopping foscarnet on 1/21/25.   Will discontinue the PICC line and its care.   This was communicated with [facility] and the patient.  The patient is aware that the we still need weekly CMV.       MD Christina Landeros, BSN, RN  RN Care Coordinator [facility]

## 2025-03-11 ENCOUNTER — VIRTUAL VISIT (OUTPATIENT)
Dept: INFECTIOUS DISEASES | Facility: CLINIC | Age: 35
End: 2025-03-11
Attending: INTERNAL MEDICINE
Payer: MEDICARE

## 2025-03-11 VITALS — BODY MASS INDEX: 29.44 KG/M2 | WEIGHT: 160 LBS | HEIGHT: 62 IN

## 2025-03-11 DIAGNOSIS — B34.8 BK VIREMIA: ICD-10-CM

## 2025-03-11 DIAGNOSIS — R43.2 DYSGEUSIA: ICD-10-CM

## 2025-03-11 DIAGNOSIS — Z94.0 KIDNEY REPLACED BY TRANSPLANT: ICD-10-CM

## 2025-03-11 DIAGNOSIS — D84.9 IMMUNOSUPPRESSED STATUS: ICD-10-CM

## 2025-03-11 DIAGNOSIS — B25.9 CYTOMEGALOVIRUS (CMV) VIREMIA (H): Primary | ICD-10-CM

## 2025-03-11 DIAGNOSIS — Z23 NEED FOR VACCINATION: ICD-10-CM

## 2025-03-11 ASSESSMENT — PAIN SCALES - GENERAL: PAINLEVEL_OUTOF10: NO PAIN (0)

## 2025-03-11 NOTE — NURSING NOTE
Current patient location: [address] [place] [id number]    Is the patient currently in the state of [place]? YES    Visit mode: TELEPHONE    If the visit is dropped, the patient can be reconnected by:TELEPHONE VISIT: Phone number: [phone]    Will anyone else be joining the visit? Pts  Collin   (If patient encounters technical issues they should call [phone])    Are changes needed to the allergy or medication list? No    Patient denies any changes since echeck-in completion and states all information entered during echeck-in remains accurate.    Are refills needed on medications prescribed by this physician? NO    Rooming Documentation:  Questionnaire(s) completed    No other vitals to report today    Reason for visit: SANGEETHA Hunter MA VVF

## 2025-03-11 NOTE — PROGRESS NOTES
Virtual Visit Details    Type of service:  Telephone Visit   Phone call duration: 11 minutes   Originating Location (pt. Location): Home    Distant Location (provider location):  On-site  Telephone visit completed due to the patient did not have access to video, while the distant provider did.        Transplant Infectious Diseases Outpatient Progress note      Patient:  Ira Zamora, Date of birth 1990, Medical record number [id number]  Date of Visit:  03/11/2025         Recommendations:   Continue to monitor CMV PCR weekly while off of maribavir (last dose 2/17/25)  Hold on COVID-19 updated vaccine, Tdap and updated COVID-19 vaccines until IS and CMV are more stable.      RTC: 6 weeks. In person or virtually.         Synopsis of Immune Status and Presentation:   Transplants:  4/17/2024 (Kidney), Postoperative day:  328     This patient is a 34 year old female with PPI-induced interstitial nephritis s/p KT in 4/2024. ATG for induction followed by TAC/azathioprine then CsA/azathioprine/prednisone. Also received belatacept x1 in 7/2024 when MPA was discontinued due to GI/ ulcers.   Has history of Crohn's disease and RA.  With resistant CMV viremia.         Active Problems and Infectious Diseases Issues:   1. Donor derived, resistant CMV viremia.   Occurred 10/2024 while on CMV universal ppx with VGCV. Resistance testing on 10/22/24 revealing UL54 mutation T503I with resistance to GCV and CDV. VGCV was continued with predictable lack of response associated with accumulation of UL97 C603W high grade mutation but the loss of the UL54 mutation 12/31/24. This discrepant mutation results is not unusual.   Foscarnet was started 1/4/25 with adverse events of hypophosphatemia, hypomagnesemia, hypercalcemia and most recently DANY. After 9 days of foscarnet use, VL did not decrease substantially and maribavir was added 1/15/25. VL declined as of 1/20/25 to 3.3 log from peak of 4.5 log. With the decline of VL, we held  foscarnet on 1/21/25 and continued maribavir till 2/17/24 after securing two values of <1.5 log one week apart.    The current low grade viremia of 1.6-1.8 log is this asymptomatic patient is likely inconsequential. However, will continue to monitor weekly given recent changes to IS (switch from TAC to CsA on 3/4/25).     10/22/24     12/31/24      2. BKV viremia  At low value of 3 log (3/7/25) after peaking at 4 log on 2/21/25.   TAC switched to CsA on 3/4/25.     3. Dysgeusia  Maribavir-induced.   Resolved within 24 hr of stopping maribavir.         Old Problems and Infectious Diseases Issues:   Crohn's treated with ustekinumab, last dose 3/2024.   Oral and genital ulcers with negative ID-related workup. Resolved after switching from MPA to azathioprine 7/2024.     Other Infectious Disease issues include:  - QTc: 437 as of 1/9/25.   - Toxoplasma serostatus: IgG ?  - Viral serostatus: CMV D+/R-, EBV D+/R+, HSV1?/2?, VZV +, currently on foscarnet for CMV therapy.   - PJP (and possibly Toxoplasma) prophylaxis: bactrim.  - Immunization status: Tdap and COVID-19 vaccines. Will wait until the IS therapy is more stable and CMV is more stable.   - Gamma globulin status: 900 as of 12/31/24.       Attestation:  Total duration of visit including chart review, reviewing labs and imaging, interviewing and examining the patient, documentation, and sending communication to the patient and to the primary treating team, all at the same day of this encounter, is: 25 minutes.   Edgardo Mccoy MD    Contact information available via [facility] Paging/Directory     03/11/2025         Interim History:   No fever, N/V, diarrhea. No dysgeusia. No other complaints. Changes to IS noted.          Review of Systems:     As mentioned in the interim history otherwise negative by reviewing constitutional symptoms, central and peripheral neurological systems, respiratory system, cardiac system, GI system,  system, musculoskeletal, skin, allergy,  and lymphatics.                  Past Medical History:     Past Medical History:   Diagnosis Date    Anemia in chronic kidney disease     Anxiety 2007    Ascending aorta dilation 2020: ascending aorta 3.8 cm, aortic sinus 3.6 cm.  Echo: Ao root diam: 3.2 cm, asc Aorta Diam: 3.8 cm    ASCUS with positive high risk HPV cervical 2017    ASCUS with positive high risk HPV cervical 2017    Crohn's disease (H) 2004    Esophageal ulcer 2024    ESRD (end stage renal disease) on dialysis (H)     GERD (gastroesophageal reflux disease)     Hidradenitis suppurativa     History of blood transfusion     Hypertension     Juvenile rheumatoid arthritis (H)     Asymptomatic in adulthood    Kidney replaced by transplant 2024    En bloc. Induction w/ Thymoglobulin.    Secondary renal hyperparathyroidism     Tubulointerstitial nephropathy 2011    kidney biopsy 2011 - Acute and  chronic tubulointerstitial nephropathy.             Past Surgical History:     Past Surgical History:   Procedure Laterality Date    BIOPSY VULVA Left 2024    Procedure: BIOPSY, VULVA;  Surgeon: Roxanne Montenegro MD;  Location: [facility] OR    ESOPHAGOSCOPY, GASTROSCOPY, DUODENOSCOPY (EGD), COMBINED N/A 2024    Procedure: ESOPHAGOGASTRODUODENOSCOPY, WITH BIOPSY;  Surgeon: Tamy Miranda MD;  Location: [facility] GI    EXAM UNDER ANESTHESIA PELVIC N/A 2024    Procedure: EXAM UNDER ANESTHESIA, PELVIS;  Surgeon: Roxanne Montenegro MD;  Location: [facility] OR    H NEEDLE GUIDE,  KIDNEY BIOPSY      PICC SINGLE LUMEN PLACEMENT Left 2025    44-3cm, Basilic vein    TRANSPLANT KIDNEY RECIPIENT  DONOR N/A 2024    Procedure: Transplant kidney recipient  donor,bilateral uretral stent implantation;  Surgeon: Carlitos Díaz MD;  Location: [facility] OR               Social History:     Social History     Tobacco Use    Smoking status: Never     Passive exposure: Never    Smokeless tobacco:  Never   Substance Use Topics    Alcohol use: Not Currently     Comment: Very occasional, last drink two months ago, wine cooler              Immunizations:     Immunization History   Administered Date(s) Administered    COVID-19 Bivalent 12+ (Pfizer) 09/23/2022    COVID-19 MONOVALENT 12+ (Pfizer) 01/21/2021, 02/10/2021, 09/16/2021    COVID-19 Monovalent 12+ (Pfizer 2022) 02/25/2022    HPV Quadrivalent 10/28/2008, 06/20/2011, 10/28/2011    HepB, Unspecified 12/30/2002    Hepatitis A (ADULT 19+) 02/19/2019, 08/19/2019    Hepatitis B, Peds 12/30/2002    Influenza (intradermal) 09/14/2021    Influenza Vaccine 18-64 (Flublok) 10/13/2023    Influenza, Split Virus, Trivalent, Pf (Fluzone\Fluarix) 11/29/2024    Pneumo Conj 13-V (2010&after) 10/23/2018    Pneumococcal 20 valent Conjugate (Prevnar 20) 11/17/2023    Pneumococcal 23 valent 11/22/2021    Pneumococcal, Unspecified 12/18/2018, 11/22/2021    TDAP (Adacel,Boostrix) 10/23/2012    Td (Adult), Adsorbed 04/12/2004, 01/01/2006             Allergies:     Allergies   Allergen Reactions    Amlodipine Headache and Other (See Comments)     Other Reaction(s): Unknown    Omeprazole Other (See Comments)     All PPIs per patient    Proton Pump Inhibitors Nephrotoxicity     No PPIs due to history of renal failure due to interstitial nephritis    Tegaderm Transparent Dressing (Informational Only) Blisters             Medications:     Current Outpatient Medications   Medication Sig Dispense Refill    acetaminophen (TYLENOL) 500 MG tablet Take 1,000 mg by mouth every 6 hours as needed      aspirin 81 MG EC tablet Take 81 mg by mouth daily.      atorvastatin (LIPITOR) 10 MG tablet Take 1 tablet (10 mg) by mouth daily. 30 tablet 1    azaTHIOprine (IMURAN) 50 MG tablet Take 1 tablet (50 mg) by mouth daily. 30 tablet 11    cinacalcet (SENSIPAR) 30 MG tablet Take 2 tablets (60 mg) by mouth daily. 60 tablet 2    cycloSPORINE modified (GENERIC EQUIVALENT) 25 MG capsule Take 3 capsules (75  "mg) by mouth 2 times daily. 180 capsule 11    hydrOXYzine HCl (ATARAX) 25 MG tablet Take 1 tablet (25 mg) by mouth every 6 hours as needed for other (adjuvant pain) 20 tablet 0    ketoconazole (NIZORAL) 2 % external shampoo Apply topically daily as needed for itching or irritation (use there times a week and let sit on scalp for 5 minutes). 120 mL 11    levonorgestrel (KYLEENA) 19.5 MG IUD 1 Device by Intrauterine route      magnesium glycinate 100 MG CAPS capsule Take 3 capsules (300 mg) by mouth 2 times daily. Take in place of magnesium oxide 180 capsule 2    magnesium oxide (MAG-OX) 400 MG tablet Take 2 tablets (800 mg) by mouth 2 times daily. 120 tablet 3    maribavir (LIVTENCITY) 200 MG tablet Take 2 tablets (400 mg) by mouth 2 times daily. 120 tablet 11    PARoxetine (PAXIL) 30 MG tablet Take 30 mg by mouth every evening      phosphorus tablet 250 mg (PHOSPHA 250 NEUTRAL) 250 MG per tablet Take 250 mg by mouth 2 times daily.      predniSONE (DELTASONE) 5 MG tablet Take 1 tablet (5 mg) by mouth daily. 30 tablet 2    sulfamethoxazole-trimethoprim (BACTRIM) 400-80 MG tablet Take 1 tablet by mouth daily 30 tablet 11    triamcinolone (KENALOG) 0.1 % paste Take by mouth 2 times daily as needed (canker sore).      Vonoprazan Fumarate (VOQUEZNA) 20 MG TABS Take 20 mg by mouth daily 60 tablet 0     No current facility-administered medications for this visit.            Physical Exam:   Ht 1.575 m (5' 2\")   Wt 72.6 kg (160 lb)   BMI 29.26 kg/m    No vitals are available during this virtual visit.   Constitutional: awake, alert, cooperative, no apparent distress and appears at stated age, well nourished. Did not sound short of breath.            Laboratory Data:     Absolute CD4, [place] T Cells   Date Value Ref Range Status   12/31/2024 73 (L) 441 - 2,156 cells/uL Final   10/22/2024 82 (L) 441 - 2,156 cells/uL Final   07/10/2024 59 (L) 441 - 2,156 cells/uL Final       Inflammatory Markers  No lab results " found.    Immune Globulin Studies      Recent Labs   Lab Test 02/14/25  1112 12/31/24  0840 07/08/24  0542   IGG 1,177 903 850       Metabolic Studies    Recent Labs   Lab Test 03/07/25  0826 02/28/25  0951 02/21/25  0923 02/14/25  1112 02/10/25  0858 02/07/25  1021 02/03/25  1101 01/30/25  0835 01/27/25  0813 11/05/24  0835 10/30/24  0820 04/17/24  2230 04/17/24  2123    141 141 142 141 139   < > 137 140   < > 142   < > 136   POTASSIUM 4.2 3.9 4.6 4.2 4.4 4.2   < > 4.5 4.5   < > 4.5   < > 3.7   CHLORIDE 106 104 103 105 105 105   < > 111* 106   < > 106   < >  --    CO2 23 24 25 25 27 24   < > 17* 23   < > 26   < >  --    ANIONGAP 13 13 13 12 9 10   < > 9 11   < > 10   < >  --    BUN 14.2 18.7 26.3* 20.3* 20.6* 14.9   < > 16.4 17.6   < > 20.8*   < >  --    CR 0.94 0.89 0.78 0.96* 0.97* 0.97*   < > 0.89 1.25*   < > 0.85   < >  --    GFRESTIMATED 81 87 >90 79 78 78   < > 87 58*   < > >90   < >  --    * 92 102* 94 108* 104*   < > 82 82   < > 97   < > 168*   A1C  --   --   --   --   --   --   --   --   --   --  5.6  --   --    ROBLES 10.3 10.3 10.6* 10.3 9.9 9.6   < > 9.2 9.4   < > 9.7   < >  --    PHOS  --   --   --   --   --   --   --  2.1* 2.8   < >  --    < >  --    MAG  --   --   --   --   --   --   --  1.4* 1.4*   < >  --    < >  --    LACT  --   --   --   --   --   --   --   --   --   --   --   --  0.8    < > = values in this interval not displayed.       Hepatic Studies    Recent Labs   Lab Test 01/30/25  0835 01/27/25  0813 01/23/25  0855 01/20/25  0850 01/16/25  0905 01/13/25  0850   BILITOTAL 0.7 0.7 0.7 0.6 0.6 0.9   ALKPHOS 58 57 59 58 55 70   PROTTOTAL 6.5 6.4 6.8 6.9 6.0* 7.0   ALBUMIN 3.7 3.8 3.8 3.7 3.4* 4.1   AST 28 40 31 31 34 43   ALT 38 46 34 34 42 61*       Hematology Studies     Recent Labs   Lab Test 03/07/25  0826 02/28/25  0951 02/21/25  0923 02/14/25  1112 02/10/25  0858 02/07/25  1021 05/02/24  0920 04/25/24  0930   WBC 4.2 4.5 5.6 6.1 5.6 7.7   < > 7.1   ANEU  --   --   --   --   --  "  --   --  5.8   ALYM  --   --   --   --   --   --   --  0.2*   FAVIAN  --   --   --   --   --   --   --  0.4   AEOS  --   --   --   --   --   --   --  0.2   HGB 12.2 11.8 11.8 12.3 11.7 12.2   < > 8.4*   HCT 36.8 35.6 35.9 37.0 35.0 36.1   < > 26.0*    197 171 137* 107* 115*   < > 197    < > = values in this interval not displayed.       Clotting Studies    Recent Labs   Lab Test 07/08/24  0542 07/01/24  0618 06/30/24  0516 06/28/24  0602 04/17/24  0958 09/29/22  1140   INR 1.07 1.20* 1.27* 1.25* 1.20* 1.01   PTT  --   --   --   --  27 29       Urine Studies    Recent Labs   Lab Test 07/06/24  1841 06/06/24  1113 05/03/24  1600 04/17/24  1113 09/29/22  1153   URINEPH 7.0 5.5 5.5 8.5* 8.5*   NITRITE Negative Negative Negative Negative Negative   LEUKEST Trace* Moderate* Trace* Negative Negative   WBCU 3 6* 4 <1 1         Microbiology:  Last 6 Culture results with specimen source  No results found for: \"CULT\" No results found for: \"SDES\"     Last check of C difficile  No results found for: \"CDBPCT\"      Virology:  CMV viral loads    CMV viral loads    CMV DNA IU/mL   Date Value Ref Range Status   02/14/2025 <35 (A) Not Detected IU/mL Final     Comment:     CMV DNA detected, less than 35 IU/mL   02/10/2025 <35 (A) Not Detected IU/mL Final     Comment:     CMV DNA detected, less than 35 IU/mL     CMV DNA IU/mL, Instrument   Date Value Ref Range Status   03/07/2025 43 (H) Not Detected IU/mL Final   02/28/2025 62 (H) Not Detected IU/mL Final   02/21/2025 38 (H) Not Detected IU/mL Final   02/07/2025 49 (H) Not Detected IU/mL Final   02/03/2025 42 (H) Not Detected IU/mL Final   01/27/2025 444 (H) Not Detected IU/mL Final   01/21/2025 1,680 (H) Not Detected IU/mL Final   01/20/2025 2,060 (H) Not Detected IU/mL Final   01/13/2025 27,500 (H) Not Detected IU/mL Final     CMV Qualitative PCR   Date Value Ref Range Status   07/02/2024 Not Detected  Final     Comment:     NOT DETECTED - A negative result does not rule out " the   presence of PCR inhibitors in the patient specimen or   assay specific nucleic acid in concentrations below the   level of detection by the assay.  INTERPRETIVE INFORMATION: Cytomegalovirus Detection by PCR    This test was developed and its performance characteristics   determined by [facility]. It has not been cleared or   approved by the US Food and Drug Administration. This test   was performed in a CLIA certified laboratory and is   intended for clinical purposes.  Performed By: [facility]  [address] [place] [id number]  : Sam Abel MD, PhD  CLIA Number: [id number]     Viral loads    Recent Labs   Lab Test 03/07/25  0827 03/07/25  0826 02/28/25  0951 02/21/25  0923 02/14/25  1112 02/10/25  0858 02/07/25  1021 02/03/25  1101 01/27/25  0813 01/21/25  1445 01/20/25  0850 01/13/25  0850 08/06/24  0858 07/30/24  0940 07/09/24  0639 07/02/24  1249 07/02/24  1238 05/31/24  0930 05/23/24  0949   EBQI  --   --   --   --   --   --   --   --   --   --   --   --   --  Not Detected   < >  --   --    < >  --    CMVQNT  --   --   --   --  <35* <35*  --   --   --   --   --   --    < >  --    < >  --   --    < >  --    CMVRESINST  --  43* 62* 38*  --   --  49* 42* 444* 1,680* 2,060* 27,500*   < > 169*   < >  --   --   --   --    CMVLOG  --  1.6 1.8 1.6 <1.5 <1.5 1.7 1.6 2.6 3.2 3.3 4.4   < > 2.2   < >  --   --   --   --    CMVQAL  --   --   --   --   --   --   --   --   --   --   --   --   --   --   --  Not Detected  --   --   --    HSDNA1  --   --   --   --   --   --   --   --   --   --   --   --   --   --   --   --  Not Detected   < >  --    HSDNA2  --   --   --   --   --   --   --   --   --   --   --   --   --   --   --   --  Not Detected   < >  --    BKRES  --   --   --   --   --   --   --   --   --   --   --   --   --   --   --   --   --   --  Not Detected   BKVSPTYPE Blood  --  Blood Blood Blood Blood Blood Blood  --  Blood  --   --    < >  --    < >  --   --    --  Blood    < > = values in this interval not displayed.       CMV viral loads    CMV DNA IU/mL   Date Value Ref Range Status   02/14/2025 <35 (A) Not Detected IU/mL Final     Comment:     CMV DNA detected, less than 35 IU/mL   02/10/2025 <35 (A) Not Detected IU/mL Final     Comment:     CMV DNA detected, less than 35 IU/mL   09/18/2024 <35 (A) Not Detected IU/mL Final     Comment:     CMV DNA detected, less than 35 IU/mL   09/04/2024 Not Detected Not Detected IU/mL Final   08/28/2024 <35 (A) Not Detected IU/mL Final     Comment:     CMV DNA detected, less than 35 IU/mL   07/16/2024 <35 (A) Not Detected IU/mL Final     Comment:     CMV DNA detected, less than 35 IU/mL   07/09/2024 <35 (A) Not Detected IU/mL Final     Comment:     CMV DNA detected, less than 35 IU/mL   06/27/2024 Not Detected Not Detected IU/mL Final   06/05/2024 Not Detected Not Detected IU/mL Final     CMV DNA IU/mL, Instrument   Date Value Ref Range Status   03/07/2025 43 (H) Not Detected IU/mL Final   02/28/2025 62 (H) Not Detected IU/mL Final   02/21/2025 38 (H) Not Detected IU/mL Final   02/07/2025 49 (H) Not Detected IU/mL Final   02/03/2025 42 (H) Not Detected IU/mL Final   01/27/2025 444 (H) Not Detected IU/mL Final   01/21/2025 1,680 (H) Not Detected IU/mL Final   01/20/2025 2,060 (H) Not Detected IU/mL Final   01/13/2025 27,500 (H) Not Detected IU/mL Final     CMV log   Date Value Ref Range Status   03/07/2025 1.6  Final   02/28/2025 1.8  Final   02/21/2025 1.6  Final   02/14/2025 <1.5  Final   02/10/2025 <1.5  Final   02/07/2025 1.7  Final   02/03/2025 1.6  Final   01/27/2025 2.6  Final   01/21/2025 3.2  Final   01/20/2025 3.3  Final   01/13/2025 4.4  Final   01/06/2025 4.4  Final   01/05/2025 4.5  Final   12/31/2024 4.1  Final   12/27/2024 3.8  Final   12/20/2024 3.2  Final   12/13/2024 2.9  Final   12/05/2024 3.0  Final   11/29/2024 2.8  Final   11/19/2024 2.9  Final   11/12/2024 3.1  Final   11/05/2024 3.0  Final   10/30/2024 3.0   Final   10/22/2024 2.8  Final   10/15/2024 2.7  Final   10/04/2024 1.9  Final   09/18/2024 <1.5  Final   08/28/2024 <1.5  Final   08/13/2024 1.9  Final   08/06/2024 2.1  Final       CMV resistance testing  Recent Labs   Lab Test 12/31/24  0840 10/22/24  0915   CMVDRUGRES See Note See Note     CMV Drug Resistance by NGS   Date Value Ref Range Status   12/31/2024 See Note  Final     Comment:               Ganciclovir,GCV             Resistant            Foscarnet,FOS               Sensitive            Cidofovir,CDV               Sensitive            Maribavir,MBV               Sensitive            Letermovir,LTV              Sensitive               UL97 drug resistance mutations identified: C603W            UL97 additional mutations identified:D68N, T75A,   Q126L, V244I            UL97 uncalled mutation sites:None               UL54 drug resistance mutations identified: None            UL54 additional mutations identified:S655L,   F669L, N685S, L897S, Z2307Q, S9778T            UL54 uncalled mutation sites:None               UL27 drug resistance mutations identified: None            UL27 additional mutations identified:P11L, L12P,   R90K, D289N, N294D, Y297H, G300N, D351N, A519T, V520A            UL27 uncalled mutation sites:None               UL56 drug resistance mutations identified: None            UL56 additional mutations identified:V425A,   N586D, S749N            UL56 uncalled mutation sites:None     CMVResistanceCaller software version:  2.0.0.1     [id number]  INTERPRETIVE INFORMATION: CMV Drug Resistance by NGS,                            5 Drugs  This assay assesses resistance to ganciclovir, foscarnet,   cidofovir, maribavir, and letermovir. Resistance-associated   mutations in the UL97, UL54, UL27, and UL56 genes are   sequenced using next generation sequencing. Drug resistance   is assigned using an [employer]-developed database of published   resistance mutations. For a list of  resistance mutations   refer to [email or web address]/[email or web address]/4886615.  This test detects populations down to 10% of the total   population which may account for resistance interpretation   differences between methods. Some insertions or deletions   may be difficult to detect using this software.  Drug Resistance Interpretations are defined as follows:  -Not determined indicates incomplete sequence coverage   across a given gene or genes.  -Likely sensitive indicates that a single drug resistance   mutation position did not have adequate coverage. However,   the missing mutation is rarely observed.  -Sensitive indicates no drug resistance mutations were   detected.  -Possible resistance indicates mutations were detected with   borderline-level drug resistance or conflicting resistance   status reported in the literature.  -Resistant indicates that mutations associated with drug   resistance were detected.  -Inadequate sequence coverage indicates a low number of   sequence reads at a given drug resistance site.    Mutations are classified as follows:  -Drug Resistance Mutations reduce susceptibility of   specific drug classes whether found in isolation or in   combination with other drugs.  -Additional mutations have not been associated with drug   resistance.  -Uncalled mutation sites are known locations of drug   resistance mutations that have an inadequate number of   sequencing reads to accurately determine if mutations are   present.    Drugs associated with each gene are as follows:  UL97: ganciclovir, maribavir  UL54: ganciclovir, foscarnet, cidofovir  UL27: maribavir  UL56: letermovir    This test was developed, and its performance   characteristics determined by [facility]. It has not   been cleared or approved by the U.S. Food and Drug   Administration. This test was performed in a CLIA-certified   laboratory and is intended for clinical purposes.  Performed By: [facility]  [address] [place] [address]  [place] [address]  : Sam Abel MD, PhD  [facility] Number: [id number]   10/22/2024 See Note  Final     Comment:               Ganciclovir,GCV             Resistant            Foscarnet,FOS               Sensitive            Cidofovir,CDV               Resistant            Maribavir,MBV               Sensitive            Letermovir,LTV              Sensitive               UL97 drug resistance mutations identified: None            UL97 additional mutations identified:D68N, T75A,   Q126L, V244I            UL97 uncalled mutation sites:None               UL54 drug resistance mutations identified: T503I            UL54 additional mutations identified:S655L,   F669L, N685S, L897S, B2222M, Y4096R            UL54 uncalled mutation sites:None               UL27 drug resistance mutations identified: None            UL27 additional mutations identified:P11L, L12P,   R90K, D289N, N294D, Y297H, G300N, D351N, A519T, V520A            UL27 uncalled mutation sites:None               UL56 drug resistance mutations identified: None            UL56 additional mutations identified:V425A,   N586D, S749N            UL56 uncalled mutation sites:None     CMVResistanceCaller software version:  2.0.0.1     [id number]  INTERPRETIVE INFORMATION: CMV Drug Resistance by NGS,                            5 Drugs  This assay assesses resistance to ganciclovir, foscarnet,   cidofovir, maribavir, and letermovir. Resistance-associated   mutations in the UL97, UL54, UL27, and UL56 genes are   sequenced using next generation sequencing. Drug resistance   is assigned using an [employer]-developed database of published   resistance mutations. For a list of resistance mutations   refer to [email or web address]/3302149.    This test detects populations down to 10% of the total   population which may account for resistance interpretation   differences between methods. Some insertions or  deletions   may be difficult to detect using this software.     Result interpretations are as follows:    -Sensitive indicates no evidence of drug resistance   compared with a wild-type virus.  -Possible resistance indicates mutations were detected with   borderline-level drug resistance or conflicting resistance   status reported in the literature.  -Resistant indicates evidence of drug resistance compared   with a wild-type virus.  -Not determined indicates incomplete sequence coverage   across a given gene or genes.  -Additional mutations include variants that have not been   associated with drug resistance.  -Uncalled mutation sites include drug resistance mutation   positions with an inadequate number of sequencing reads.  -Inadequate sequence coverage indicates a low number of   sequence reads at a given drug resistance site.    Drugs associated with each gene are as follows:    UL97: ganciclovir, maribavir  UL54: ganciclovir, foscarnet, cidofovir   UL27: maribavir   UL56: letermovir    This test was developed, and its performance   characteristics determined by [facility]. It has not   been cleared or approved by the U.S. Food and Drug   Administration. This test was performed in a CLIA-certified   laboratory and is intended for clinical purposes.  Performed By: [facility]  [address] [place] [id number]  : Sam Abel MD, PhD  CLIA Number: [id number]        EBV viral loads   No lab results found.  EBV Qualitative PCR   Date Value Ref Range Status   07/02/2024 Detected (A)  Final     Comment:     INTERPRETIVE INFORMATION:  Jake Barr Virus by PCR    This test was developed and its performance characteristics   determined by [facility]. It has not been cleared or   approved by the [place] Food and Drug Administration. This test   was performed in a CLIA certified laboratory and is   intended for clinical purposes.  Performed By: [facility]  [facility]  [place] [address]  : Sam Abel MD, PhD  CLIA Number: [id number]       Human Herpes Virus 6 viral loads    No results found for: \"H6RES\" No results found for: \"H6SPEC\"    CMV Antibody IgG   Date Value Ref Range Status   08/13/2024 Positive, suggests recent or past exposure. (A) No detectable antibody.  Final   07/30/2024 No detectable antibody. No detectable antibody.  Final   04/17/2024 No detectable antibody. No detectable antibody.  Final     No results found for: \"EBIG2\", \"EBIGM\", \"EBVIGG\", \"EBIGG\", \"EBVAGN\", \"VO1804\", \"TOXG\"    Virtual Visit Details    Type of service:  Video Visit   Video Start Time:  4:28 PM  Video End Time:4:43 PM  Originating Location (pt. Location): Other [facility] [place] [facility].    Distant Location (provider location):  On-site  Platform used for Video Visit: Javad"

## 2025-03-11 NOTE — LETTER
3/11/2025       RE: Ira Zamora  [address]  [place] [address]     Dear Colleague,    Thank you for referring your patient, Ira Zamora, to the [facility] [place] [facility]. Please see a copy of my visit note below.    Virtual Visit Details    Type of service:  Telephone Visit   Phone call duration: 11 minutes   Originating Location (pt. Location): Home    Distant Location (provider location):  On-site  Telephone visit completed due to the patient did not have access to video, while the distant provider did.        Transplant Infectious Diseases Outpatient Progress note      Patient:  Ira Zamora, Date of birth 1990, Medical record number [id number]  Date of Visit:  03/11/2025         Recommendations:   Continue to monitor CMV PCR weekly while off of maribavir (last dose 2/17/25)  Hold on COVID-19 updated vaccine, Tdap and updated COVID-19 vaccines until IS and CMV are more stable.      RTC: 6 weeks. In person or virtually.         Synopsis of Immune Status and Presentation:   Transplants:  4/17/2024 (Kidney), Postoperative day:  328     This patient is a 34 year old female with PPI-induced interstitial nephritis s/p KT in 4/2024. ATG for induction followed by TAC/azathioprine then CsA/azathioprine/prednisone. Also received belatacept x1 in 7/2024 when MPA was discontinued due to GI/ ulcers.   Has history of Crohn's disease and RA.  With resistant CMV viremia.         Active Problems and Infectious Diseases Issues:   1. Donor derived, resistant CMV viremia.   Occurred 10/2024 while on CMV universal ppx with VGCV. Resistance testing on 10/22/24 revealing UL54 mutation T503I with resistance to GCV and CDV. VGCV was continued with predictable lack of response associated with accumulation of UL97 C603W high grade mutation but the loss of the UL54 mutation 12/31/24. This discrepant mutation results is not unusual.    Foscarnet was started 1/4/25 with adverse events of hypophosphatemia, hypomagnesemia, hypercalcemia and most recently DANY. After 9 days of foscarnet use, VL did not decrease substantially and maribavir was added 1/15/25. VL declined as of 1/20/25 to 3.3 log from peak of 4.5 log. With the decline of VL, we held foscarnet on 1/21/25 and continued maribavir till 2/17/24 after securing two values of <1.5 log one week apart.    The current low grade viremia of 1.6-1.8 log is this asymptomatic patient is likely inconsequential. However, will continue to monitor weekly given recent changes to IS (switch from TAC to CsA on 3/4/25).     10/22/24     12/31/24      2. BKV viremia  At low value of 3 log (3/7/25) after peaking at 4 log on 2/21/25.   TAC switched to CsA on 3/4/25.     3. Dysgeusia  Maribavir-induced.   Resolved within 24 hr of stopping maribavir.         Old Problems and Infectious Diseases Issues:   Crohn's treated with ustekinumab, last dose 3/2024.   Oral and genital ulcers with negative ID-related workup. Resolved after switching from MPA to azathioprine 7/2024.     Other Infectious Disease issues include:  - QTc: 437 as of 1/9/25.   - Toxoplasma serostatus: IgG ?  - Viral serostatus: CMV D+/R-, EBV D+/R+, HSV1?/2?, VZV +, currently on foscarnet for CMV therapy.   - PJP (and possibly Toxoplasma) prophylaxis: bactrim.  - Immunization status: Tdap and COVID-19 vaccines. Will wait until the IS therapy is more stable and CMV is more stable.   - Gamma globulin status: 900 as of 12/31/24.       Attestation:  Total duration of visit including chart review, reviewing labs and imaging, interviewing and examining the patient, documentation, and sending communication to the patient and to the primary treating team, all at the same day of this encounter, is: 85 minutes.   Edgardo Mccoy MD    Contact information available via [facility] Paging/Directory     03/11/2025         Interim History:   No fever, N/V, diarrhea. No  dysgeusia. No other complaints. Changes to IS noted.          Review of Systems:     As mentioned in the interim history otherwise negative by reviewing constitutional symptoms, central and peripheral neurological systems, respiratory system, cardiac system, GI system,  system, musculoskeletal, skin, allergy, and lymphatics.                  Past Medical History:     Past Medical History:   Diagnosis Date     Anemia in chronic kidney disease      Anxiety 2007     Ascending aorta dilation 2020 2020: ascending aorta 3.8 cm, aortic sinus 3.6 cm. 2024 Echo: Ao root diam: 3.2 cm, asc Aorta Diam: 3.8 cm     ASCUS with positive high risk HPV cervical 2017    ASCUS with positive high risk HPV cervical 07/01/2017     Crohn's disease (H) 2004     Esophageal ulcer 06/27/2024     ESRD (end stage renal disease) on dialysis (H) 2020     GERD (gastroesophageal reflux disease)      Hidradenitis suppurativa 2015     History of blood transfusion      Hypertension      Juvenile rheumatoid arthritis (H)     Asymptomatic in adulthood     Kidney replaced by transplant 04/17/2024    En bloc. Induction w/ Thymoglobulin.     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             Past Surgical History:     Past Surgical History:   Procedure Laterality Date     BIOPSY VULVA Left 07/02/2024    Procedure: BIOPSY, VULVA;  Surgeon: Roxanne Montenegro MD;  Location: [facility] OR     ESOPHAGOSCOPY, GASTROSCOPY, DUODENOSCOPY (EGD), COMBINED N/A 06/28/2024    Procedure: ESOPHAGOGASTRODUODENOSCOPY, WITH BIOPSY;  Surgeon: Tamy Miranda MD;  Location: [facility] GI     EXAM UNDER ANESTHESIA PELVIC N/A 07/02/2024    Procedure: EXAM UNDER ANESTHESIA, PELVIS;  Surgeon: Roxanne Montenegro MD;  Location: [facility] OR     H NEEDLE GUIDE,  KIDNEY BIOPSY       PICC SINGLE LUMEN PLACEMENT Left 01/07/2025    44-3cm, Basilic vein     TRANSPLANT  KIDNEY RECIPIENT  DONOR N/A 2024    Procedure: Transplant kidney recipient  donor,bilateral uretral stent implantation;  Surgeon: Carlitos Díaz MD;  Location:  OR               Social History:     Social History     Tobacco Use     Smoking status: Never     Passive exposure: Never     Smokeless tobacco: Never   Substance Use Topics     Alcohol use: Not Currently     Comment: Very occasional, last drink two months ago, wine cooler              Immunizations:     Immunization History   Administered Date(s) Administered     COVID-19 Bivalent 12+ (Pfizer) 2022     COVID-19 MONOVALENT 12+ (Pfizer) 2021, 02/10/2021, 2021     COVID-19 Monovalent 12+ (Pfizer ) 2022     HPV Quadrivalent 10/28/2008, 2011, 10/28/2011     HepB, Unspecified 2002     Hepatitis A (ADULT 19+) 2019, 2019     Hepatitis B, Peds 2002     Influenza (intradermal) 2021     Influenza Vaccine 18-64 (Flublok) 10/13/2023     Influenza, Split Virus, Trivalent, Pf (Fluzone\Fluarix) 2024     Pneumo Conj 13-V (2010&after) 10/23/2018     Pneumococcal 20 valent Conjugate (Prevnar 20) 2023     Pneumococcal 23 valent 2021     Pneumococcal, Unspecified 2018, 2021     TDAP (Adacel,Boostrix) 10/23/2012     Td (Adult), Adsorbed 2004, 2006             Allergies:     Allergies   Allergen Reactions     Amlodipine Headache and Other (See Comments)     Other Reaction(s): Unknown     Omeprazole Other (See Comments)     All PPIs per patient     Proton Pump Inhibitors Nephrotoxicity     No PPIs due to history of renal failure due to interstitial nephritis     Tegaderm Transparent Dressing (Informational Only) Blisters             Medications:     Current Outpatient Medications   Medication Sig Dispense Refill     acetaminophen (TYLENOL) 500 MG tablet Take 1,000 mg by mouth every 6 hours as needed       aspirin 81 MG EC tablet Take 81 mg by mouth  "daily.       atorvastatin (LIPITOR) 10 MG tablet Take 1 tablet (10 mg) by mouth daily. 30 tablet 1     azaTHIOprine (IMURAN) 50 MG tablet Take 1 tablet (50 mg) by mouth daily. 30 tablet 11     cinacalcet (SENSIPAR) 30 MG tablet Take 2 tablets (60 mg) by mouth daily. 60 tablet 2     cycloSPORINE modified (GENERIC EQUIVALENT) 25 MG capsule Take 3 capsules (75 mg) by mouth 2 times daily. 180 capsule 11     hydrOXYzine HCl (ATARAX) 25 MG tablet Take 1 tablet (25 mg) by mouth every 6 hours as needed for other (adjuvant pain) 20 tablet 0     ketoconazole (NIZORAL) 2 % external shampoo Apply topically daily as needed for itching or irritation (use there times a week and let sit on scalp for 5 minutes). 120 mL 11     levonorgestrel (KYLEENA) 19.5 MG IUD 1 Device by Intrauterine route       magnesium glycinate 100 MG CAPS capsule Take 3 capsules (300 mg) by mouth 2 times daily. Take in place of magnesium oxide 180 capsule 2     magnesium oxide (MAG-OX) 400 MG tablet Take 2 tablets (800 mg) by mouth 2 times daily. 120 tablet 3     maribavir (LIVTENCITY) 200 MG tablet Take 2 tablets (400 mg) by mouth 2 times daily. 120 tablet 11     PARoxetine (PAXIL) 30 MG tablet Take 30 mg by mouth every evening       phosphorus tablet 250 mg (PHOSPHA 250 NEUTRAL) 250 MG per tablet Take 250 mg by mouth 2 times daily.       predniSONE (DELTASONE) 5 MG tablet Take 1 tablet (5 mg) by mouth daily. 30 tablet 2     sulfamethoxazole-trimethoprim (BACTRIM) 400-80 MG tablet Take 1 tablet by mouth daily 30 tablet 11     triamcinolone (KENALOG) 0.1 % paste Take by mouth 2 times daily as needed (canker sore).       Vonoprazan Fumarate (VOQUEZNA) 20 MG TABS Take 20 mg by mouth daily 60 tablet 0     No current facility-administered medications for this visit.            Physical Exam:   Ht 1.575 m (5' 2\")   Wt 72.6 kg (160 lb)   BMI 29.26 kg/m    No vitals are available during this virtual visit.   Constitutional: awake, alert, cooperative, no apparent " distress and appears at stated age, well nourished. Did not sound short of breath.            Laboratory Data:     Absolute CD4, Ball T Cells   Date Value Ref Range Status   12/31/2024 73 (L) 441 - 2,156 cells/uL Final   10/22/2024 82 (L) 441 - 2,156 cells/uL Final   07/10/2024 59 (L) 441 - 2,156 cells/uL Final       Inflammatory Markers  No lab results found.    Immune Globulin Studies      Recent Labs   Lab Test 02/14/25  1112 12/31/24  0840 07/08/24  0542   IGG 1,177 903 850       Metabolic Studies    Recent Labs   Lab Test 03/07/25  0826 02/28/25  0951 02/21/25  0923 02/14/25  1112 02/10/25  0858 02/07/25  1021 02/03/25  1101 01/30/25  0835 01/27/25  0813 11/05/24  0835 10/30/24  0820 04/17/24  2230 04/17/24  2123    141 141 142 141 139   < > 137 140   < > 142   < > 136   POTASSIUM 4.2 3.9 4.6 4.2 4.4 4.2   < > 4.5 4.5   < > 4.5   < > 3.7   CHLORIDE 106 104 103 105 105 105   < > 111* 106   < > 106   < >  --    CO2 23 24 25 25 27 24   < > 17* 23   < > 26   < >  --    ANIONGAP 13 13 13 12 9 10   < > 9 11   < > 10   < >  --    BUN 14.2 18.7 26.3* 20.3* 20.6* 14.9   < > 16.4 17.6   < > 20.8*   < >  --    CR 0.94 0.89 0.78 0.96* 0.97* 0.97*   < > 0.89 1.25*   < > 0.85   < >  --    GFRESTIMATED 81 87 >90 79 78 78   < > 87 58*   < > >90   < >  --    * 92 102* 94 108* 104*   < > 82 82   < > 97   < > 168*   A1C  --   --   --   --   --   --   --   --   --   --  5.6  --   --    ROBLES 10.3 10.3 10.6* 10.3 9.9 9.6   < > 9.2 9.4   < > 9.7   < >  --    PHOS  --   --   --   --   --   --   --  2.1* 2.8   < >  --    < >  --    MAG  --   --   --   --   --   --   --  1.4* 1.4*   < >  --    < >  --    LACT  --   --   --   --   --   --   --   --   --   --   --   --  0.8    < > = values in this interval not displayed.       Hepatic Studies    Recent Labs   Lab Test 01/30/25  0835 01/27/25  0813 01/23/25  0855 01/20/25  0850 01/16/25  0905 01/13/25  0850   BILITOTAL 0.7 0.7 0.7 0.6 0.6 0.9   ALKPHOS 58 57 59 58 55 70  "  PROTTOTAL 6.5 6.4 6.8 6.9 6.0* 7.0   ALBUMIN 3.7 3.8 3.8 3.7 3.4* 4.1   AST 28 40 31 31 34 43   ALT 38 46 34 34 42 61*       Hematology Studies     Recent Labs   Lab Test 03/07/25  0826 02/28/25  0951 02/21/25  0923 02/14/25  1112 02/10/25  0858 02/07/25  1021 05/02/24  0920 04/25/24  0930   WBC 4.2 4.5 5.6 6.1 5.6 7.7   < > 7.1   ANEU  --   --   --   --   --   --   --  5.8   ALYM  --   --   --   --   --   --   --  0.2*   FAVIAN  --   --   --   --   --   --   --  0.4   AEOS  --   --   --   --   --   --   --  0.2   HGB 12.2 11.8 11.8 12.3 11.7 12.2   < > 8.4*   HCT 36.8 35.6 35.9 37.0 35.0 36.1   < > 26.0*    197 171 137* 107* 115*   < > 197    < > = values in this interval not displayed.       Clotting Studies    Recent Labs   Lab Test 07/08/24  0542 07/01/24  0618 06/30/24  0516 06/28/24  0602 04/17/24  0958 09/29/22  1140   INR 1.07 1.20* 1.27* 1.25* 1.20* 1.01   PTT  --   --   --   --  27 29       Urine Studies    Recent Labs   Lab Test 07/06/24  1841 06/06/24  1113 05/03/24  1600 04/17/24  1113 09/29/22  1153   URINEPH 7.0 5.5 5.5 8.5* 8.5*   NITRITE Negative Negative Negative Negative Negative   LEUKEST Trace* Moderate* Trace* Negative Negative   WBCU 3 6* 4 <1 1         Microbiology:  Last 6 Culture results with specimen source  No results found for: \"CULT\" No results found for: \"SDES\"     Last check of C difficile  No results found for: \"CDBPCT\"      Virology:  CMV viral loads    CMV viral loads    CMV DNA IU/mL   Date Value Ref Range Status   02/14/2025 <35 (A) Not Detected IU/mL Final     Comment:     CMV DNA detected, less than 35 IU/mL   02/10/2025 <35 (A) Not Detected IU/mL Final     Comment:     CMV DNA detected, less than 35 IU/mL     CMV DNA IU/mL, Instrument   Date Value Ref Range Status   03/07/2025 43 (H) Not Detected IU/mL Final   02/28/2025 62 (H) Not Detected IU/mL Final   02/21/2025 38 (H) Not Detected IU/mL Final   02/07/2025 49 (H) Not Detected IU/mL Final   02/03/2025 42 (H) Not " Detected IU/mL Final   01/27/2025 444 (H) Not Detected IU/mL Final   01/21/2025 1,680 (H) Not Detected IU/mL Final   01/20/2025 2,060 (H) Not Detected IU/mL Final   01/13/2025 27,500 (H) Not Detected IU/mL Final     CMV Qualitative PCR   Date Value Ref Range Status   07/02/2024 Not Detected  Final     Comment:     NOT DETECTED - A negative result does not rule out the   presence of PCR inhibitors in the patient specimen or   assay specific nucleic acid in concentrations below the   level of detection by the assay.  INTERPRETIVE INFORMATION: Cytomegalovirus Detection by PCR    This test was developed and its performance characteristics   determined by [facility]. It has not been cleared or   approved by the US Food and Drug Administration. This test   was performed in a CLIA certified laboratory and is   intended for clinical purposes.  Performed By: [facility]  [address] [place] [address]  : Sam Abel MD, PhD  CLIA Number: [id number]     Viral loads    Recent Labs   Lab Test 03/07/25  0827 03/07/25  0826 02/28/25  0951 02/21/25  0923 02/14/25  1112 02/10/25  0858 02/07/25  1021 02/03/25  1101 01/27/25  0813 01/21/25  1445 01/20/25  0850 01/13/25  0850 08/06/24  0858 07/30/24  0940 07/09/24  0639 07/02/24  1249 07/02/24  1238 05/31/24  0930 05/23/24  0949   EBQI  --   --   --   --   --   --   --   --   --   --   --   --   --  Not Detected   < >  --   --    < >  --    CMVQNT  --   --   --   --  <35* <35*  --   --   --   --   --   --    < >  --    < >  --   --    < >  --    CMVRESINST  --  43* 62* 38*  --   --  49* 42* 444* 1,680* 2,060* 27,500*   < > 169*   < >  --   --   --   --    CMVLOG  --  1.6 1.8 1.6 <1.5 <1.5 1.7 1.6 2.6 3.2 3.3 4.4   < > 2.2   < >  --   --   --   --    CMVQAL  --   --   --   --   --   --   --   --   --   --   --   --   --   --   --  Not Detected  --   --   --    HSDNA1  --   --   --   --   --   --   --   --   --   --   --   --   --   --    --   --  Not Detected   < >  --    HSDNA2  --   --   --   --   --   --   --   --   --   --   --   --   --   --   --   --  Not Detected   < >  --    BKRES  --   --   --   --   --   --   --   --   --   --   --   --   --   --   --   --   --   --  Not Detected   BKVSPTYPE Blood  --  Blood Blood Blood Blood Blood Blood  --  Blood  --   --    < >  --    < >  --   --   --  Blood    < > = values in this interval not displayed.       CMV viral loads    CMV DNA IU/mL   Date Value Ref Range Status   02/14/2025 <35 (A) Not Detected IU/mL Final     Comment:     CMV DNA detected, less than 35 IU/mL   02/10/2025 <35 (A) Not Detected IU/mL Final     Comment:     CMV DNA detected, less than 35 IU/mL   09/18/2024 <35 (A) Not Detected IU/mL Final     Comment:     CMV DNA detected, less than 35 IU/mL   09/04/2024 Not Detected Not Detected IU/mL Final   08/28/2024 <35 (A) Not Detected IU/mL Final     Comment:     CMV DNA detected, less than 35 IU/mL   07/16/2024 <35 (A) Not Detected IU/mL Final     Comment:     CMV DNA detected, less than 35 IU/mL   07/09/2024 <35 (A) Not Detected IU/mL Final     Comment:     CMV DNA detected, less than 35 IU/mL   06/27/2024 Not Detected Not Detected IU/mL Final   06/05/2024 Not Detected Not Detected IU/mL Final     CMV DNA IU/mL, Instrument   Date Value Ref Range Status   03/07/2025 43 (H) Not Detected IU/mL Final   02/28/2025 62 (H) Not Detected IU/mL Final   02/21/2025 38 (H) Not Detected IU/mL Final   02/07/2025 49 (H) Not Detected IU/mL Final   02/03/2025 42 (H) Not Detected IU/mL Final   01/27/2025 444 (H) Not Detected IU/mL Final   01/21/2025 1,680 (H) Not Detected IU/mL Final   01/20/2025 2,060 (H) Not Detected IU/mL Final   01/13/2025 27,500 (H) Not Detected IU/mL Final     CMV log   Date Value Ref Range Status   03/07/2025 1.6  Final   02/28/2025 1.8  Final   02/21/2025 1.6  Final   02/14/2025 <1.5  Final   02/10/2025 <1.5  Final   02/07/2025 1.7  Final   02/03/2025 1.6  Final   01/27/2025  2.6  Final   01/21/2025 3.2  Final   01/20/2025 3.3  Final   01/13/2025 4.4  Final   01/06/2025 4.4  Final   01/05/2025 4.5  Final   12/31/2024 4.1  Final   12/27/2024 3.8  Final   12/20/2024 3.2  Final   12/13/2024 2.9  Final   12/05/2024 3.0  Final   11/29/2024 2.8  Final   11/19/2024 2.9  Final   11/12/2024 3.1  Final   11/05/2024 3.0  Final   10/30/2024 3.0  Final   10/22/2024 2.8  Final   10/15/2024 2.7  Final   10/04/2024 1.9  Final   09/18/2024 <1.5  Final   08/28/2024 <1.5  Final   08/13/2024 1.9  Final   08/06/2024 2.1  Final       CMV resistance testing  Recent Labs   Lab Test 12/31/24  0840 10/22/24  0915   CMVDRUGRES See Note See Note     CMV Drug Resistance by NGS   Date Value Ref Range Status   12/31/2024 See Note  Final     Comment:               Ganciclovir,GCV             Resistant            Foscarnet,FOS               Sensitive            Cidofovir,CDV               Sensitive            Maribavir,MBV               Sensitive            Letermovir,LTV              Sensitive               UL97 drug resistance mutations identified: C603W            UL97 additional mutations identified:D68N, T75A,   Q126L, V244I            UL97 uncalled mutation sites:None               UL54 drug resistance mutations identified: None            UL54 additional mutations identified:S655L,   F669L, N685S, L897S, Y4020Z, P3561B            UL54 uncalled mutation sites:None               UL27 drug resistance mutations identified: None            UL27 additional mutations identified:P11L, L12P,   R90K, D289N, N294D, Y297H, G300N, D351N, A519T, V520A            UL27 uncalled mutation sites:None               UL56 drug resistance mutations identified: None            UL56 additional mutations identified:V425A,   N586D, S749N            UL56 uncalled mutation sites:None     CMVResistanceCaller software version:  2.0.0.1     [id number]  INTERPRETIVE INFORMATION: CMV Drug Resistance by NGS,                             5 Drugs  This assay assesses resistance to ganciclovir, foscarnet,   cidofovir, maribavir, and letermovir. Resistance-associated   mutations in the UL97, UL54, UL27, and UL56 genes are   sequenced using next generation sequencing. Drug resistance   is assigned using an [employer]-developed database of published   resistance mutations. For a list of resistance mutations   refer to [email or web address]/2045279.  This test detects populations down to 10% of the total   population which may account for resistance interpretation   differences between methods. Some insertions or deletions   may be difficult to detect using this software.  Drug Resistance Interpretations are defined as follows:  -Not determined indicates incomplete sequence coverage   across a given gene or genes.  -Likely sensitive indicates that a single drug resistance   mutation position did not have adequate coverage. However,   the missing mutation is rarely observed.  -Sensitive indicates no drug resistance mutations were   detected.  -Possible resistance indicates mutations were detected with   borderline-level drug resistance or conflicting resistance   status reported in the literature.  -Resistant indicates that mutations associated with drug   resistance were detected.  -Inadequate sequence coverage indicates a low number of   sequence reads at a given drug resistance site.    Mutations are classified as follows:  -Drug Resistance Mutations reduce susceptibility of   specific drug classes whether found in isolation or in   combination with other drugs.  -Additional mutations have not been associated with drug   resistance.  -Uncalled mutation sites are known locations of drug   resistance mutations that have an inadequate number of   sequencing reads to accurately determine if mutations are   present.    Drugs associated with each gene are as follows:  UL97: ganciclovir, maribavir  UL54: ganciclovir, foscarnet,  cidofovir  UL27: maribavir  UL56: letermovir    This test was developed, and its performance   characteristics determined by [facility]. It has not   been cleared or approved by the U.S. Food and Drug   Administration. This test was performed in a CLIA-certified   laboratory and is intended for clinical purposes.  Performed By: [facility]  [address] [place] [id number]  : Sam Abel MD, PhD  IA Number: [id number]   10/22/2024 See Note  Final     Comment:               Ganciclovir,GCV             Resistant            Foscarnet,FOS               Sensitive            Cidofovir,CDV               Resistant            Maribavir,MBV               Sensitive            Letermovir,LTV              Sensitive               UL97 drug resistance mutations identified: None            UL97 additional mutations identified:D68N, T75A,   Q126L, V244I            UL97 uncalled mutation sites:None               UL54 drug resistance mutations identified: T503I            UL54 additional mutations identified:S655L,   F669L, N685S, L897S, V7907B, W9276S            UL54 uncalled mutation sites:None               UL27 drug resistance mutations identified: None            UL27 additional mutations identified:P11L, L12P,   R90K, D289N, N294D, Y297H, G300N, D351N, A519T, V520A            UL27 uncalled mutation sites:None               UL56 drug resistance mutations identified: None            UL56 additional mutations identified:V425A,   N586D, S749N            UL56 uncalled mutation sites:None     CMVResistanceCaller software version:  2.0.0.1     [id number]  INTERPRETIVE INFORMATION: CMV Drug Resistance by NGS,                            5 Drugs  This assay assesses resistance to ganciclovir, foscarnet,   cidofovir, maribavir, and letermovir. Resistance-associated   mutations in the UL97, UL54, UL27, and UL56 genes are   sequenced using next generation  sequencing. Drug resistance   is assigned using an [employer]-developed database of published   resistance mutations. For a list of resistance mutations   refer to [email or web address].    This test detects populations down to 10% of the total   population which may account for resistance interpretation   differences between methods. Some insertions or deletions   may be difficult to detect using this software.     Result interpretations are as follows:    -Sensitive indicates no evidence of drug resistance   compared with a wild-type virus.  -Possible resistance indicates mutations were detected with   borderline-level drug resistance or conflicting resistance   status reported in the literature.  -Resistant indicates evidence of drug resistance compared   with a wild-type virus.  -Not determined indicates incomplete sequence coverage   across a given gene or genes.  -Additional mutations include variants that have not been   associated with drug resistance.  -Uncalled mutation sites include drug resistance mutation   positions with an inadequate number of sequencing reads.  -Inadequate sequence coverage indicates a low number of   sequence reads at a given drug resistance site.    Drugs associated with each gene are as follows:    UL97: ganciclovir, maribavir  UL54: ganciclovir, foscarnet, cidofovir   UL27: maribavir   UL56: letermovir    This test was developed, and its performance   characteristics determined by [facility]. It has not   been cleared or approved by the U.S. Food and Drug   Administration. This test was performed in a CLIA-certified   laboratory and is intended for clinical purposes.  Performed By: [facility]  [address] [place] [id number]  : Sam Abel MD, PhD  CLIA Number: [id number]        EBV viral loads   No lab results found.  EBV Qualitative PCR   Date Value Ref Range Status   07/02/2024 Detected (A)  Final     Comment:     " INTERPRETIVE INFORMATION:  Jake Barr Virus by PCR    This test was developed and its performance characteristics   determined by [facility]. It has not been cleared or   approved by the US Food and Drug Administration. This test   was performed in a CLIA certified laboratory and is   intended for clinical purposes.  Performed By: [facility]  [address] [place] [id number]  : Sam Abel MD, PhD  CLIA Number: [id number]       Human Herpes Virus 6 viral loads    No results found for: \"H6RES\" No results found for: \"H6SPEC\"    CMV Antibody IgG   Date Value Ref Range Status   08/13/2024 Positive, suggests recent or past exposure. (A) No detectable antibody.  Final   07/30/2024 No detectable antibody. No detectable antibody.  Final   04/17/2024 No detectable antibody. No detectable antibody.  Final     No results found for: \"EBIG2\", \"EBIGM\", \"EBVIGG\", \"EBIGG\", \"EBVAGN\", \"NN4238\", \"TOXG\"    Virtual Visit Details    Type of service:  Video Visit   Video Start Time:  4:28 PM  Video End Time:4:43 PM  Originating Location (pt. Location): [place] [facility] [place].    Distant Location (provider location):  On-site  Platform used for Video Visit: AmWell      Again, thank you for allowing me to participate in the care of your patient.      Sincerely,    Edgardo Mccoy MD    "

## 2025-03-17 ENCOUNTER — TELEPHONE (OUTPATIENT)
Dept: TRANSPLANT | Facility: CLINIC | Age: 35
End: 2025-03-17
Payer: MEDICARE

## 2025-03-17 DIAGNOSIS — B25.9 CYTOMEGALOVIRUS (CMV) VIREMIA (H): ICD-10-CM

## 2025-03-17 DIAGNOSIS — B27.00 EBV (EPSTEIN-BARR VIRUS) VIREMIA: ICD-10-CM

## 2025-03-17 NOTE — TELEPHONE ENCOUNTER
----- Message from Nicole CONKLIN sent at 3/14/2025  2:19 PM CDT -----  Regarding: FW: Pamidronate    ----- Message -----  From: Celeste Doran  Sent: 3/14/2025   2:14 PM CDT  To: Nicole Prather RN  Subject: Pamidronate                                      Looks like Ira doing her infusion through [facility]?     Could you take the Infusion Appointment Request out of the therapy plan, so the orders would not continue to fall into the infusion work que?     Thank you,  Celeste PINTO [facility] - 2nd Floor  [facility] Scheduling  [phone] (option 3 then option 2)  [facility] Scheduling   [phone] (option 5 then option 2)

## 2025-04-01 DIAGNOSIS — Z94.0 KIDNEY REPLACED BY TRANSPLANT: Primary | ICD-10-CM

## 2025-04-01 DIAGNOSIS — Z48.298 AFTERCARE FOLLOWING ORGAN TRANSPLANT: ICD-10-CM

## 2025-04-01 RX ORDER — CYCLOSPORINE 25 MG/1
50 CAPSULE, LIQUID FILLED ORAL 2 TIMES DAILY
Qty: 180 CAPSULE | Refills: 11 | Status: SHIPPED | OUTPATIENT
Start: 2025-04-01

## 2025-04-01 NOTE — TELEPHONE ENCOUNTER
Spoke with patient and advised of lab results. Patient states she is currently taking Cyclosporine 75 mg bid and only an 11-hour trough was done.    No new medications or missed doses.    No new illness, infection or diarrhea.    Patient advised to decrease her dose to 50 mg bid and repeat labs in 1-2 weeks. Patient verbalized understanding of plan.

## 2025-04-01 NOTE — TELEPHONE ENCOUNTER
ISSUE:   Cyclosporine level 211 on 04/01/25, goal 125, dose 75 mg BID.    PLAN:   Call Patient and confirm this was an accurate 12-hour trough.   Verify Cyclosporine dose 75 mg BID.   Confirm no new medications or illness.   Confirm no missed doses.     If accurate trough and accurate dose, decrease Cyclosporine dose to 50 mg BID  *Recommended dose rounded from calculated dose 44.43 mg  BID.      Repeat labs in 1-2 weeks.   For any dose change <50%, recheck labs per guideline or within 1 month.  For any dose change of more than 50%, recheck drug level based on timing to reach steady state:   Immediate release tacrolimus: 2-3 days  Extended-release tacrolimus: 7 days  Cyclosporine: 2 days  Sirolimus: 12 days  Everolimus: 8 days      OUTCOME:

## 2025-04-22 ENCOUNTER — OFFICE VISIT (OUTPATIENT)
Dept: TRANSPLANT | Facility: CLINIC | Age: 35
End: 2025-04-22
Attending: INTERNAL MEDICINE
Payer: MEDICARE

## 2025-04-22 ENCOUNTER — LAB (OUTPATIENT)
Dept: LAB | Facility: CLINIC | Age: 35
End: 2025-04-22
Attending: INTERNAL MEDICINE
Payer: COMMERCIAL

## 2025-04-22 VITALS
OXYGEN SATURATION: 95 % | DIASTOLIC BLOOD PRESSURE: 84 MMHG | TEMPERATURE: 98.8 F | WEIGHT: 163.8 LBS | BODY MASS INDEX: 29.96 KG/M2 | SYSTOLIC BLOOD PRESSURE: 124 MMHG | HEART RATE: 96 BPM

## 2025-04-22 DIAGNOSIS — Z94.0 KIDNEY REPLACED BY TRANSPLANT: ICD-10-CM

## 2025-04-22 DIAGNOSIS — E83.52 HYPERCALCEMIA: ICD-10-CM

## 2025-04-22 DIAGNOSIS — E83.42 HYPOMAGNESEMIA: ICD-10-CM

## 2025-04-22 DIAGNOSIS — D84.9 IMMUNOSUPPRESSED STATUS: ICD-10-CM

## 2025-04-22 DIAGNOSIS — Z20.828 CONTACT WITH AND (SUSPECTED) EXPOSURE TO OTHER VIRAL COMMUNICABLE DISEASES: ICD-10-CM

## 2025-04-22 DIAGNOSIS — N25.81 SECONDARY RENAL HYPERPARATHYROIDISM: ICD-10-CM

## 2025-04-22 DIAGNOSIS — E83.39 HYPOPHOSPHATEMIA: ICD-10-CM

## 2025-04-22 DIAGNOSIS — Z48.298 AFTERCARE FOLLOWING ORGAN TRANSPLANT: ICD-10-CM

## 2025-04-22 DIAGNOSIS — Z98.890 OTHER SPECIFIED POSTPROCEDURAL STATES: ICD-10-CM

## 2025-04-22 DIAGNOSIS — N18.2 CKD (CHRONIC KIDNEY DISEASE) STAGE 2, GFR 60-89 ML/MIN: ICD-10-CM

## 2025-04-22 DIAGNOSIS — B25.9 CYTOMEGALOVIRUS (CMV) VIREMIA (H): ICD-10-CM

## 2025-04-22 DIAGNOSIS — Z29.89 NEED FOR PNEUMOCYSTIS PROPHYLAXIS: ICD-10-CM

## 2025-04-22 DIAGNOSIS — Z79.899 ENCOUNTER FOR LONG-TERM CURRENT USE OF MEDICATION: ICD-10-CM

## 2025-04-22 DIAGNOSIS — B27.00 EBV (EPSTEIN-BARR VIRUS) VIREMIA: ICD-10-CM

## 2025-04-22 DIAGNOSIS — B27.00 EBV (EPSTEIN-BARR VIRUS) VIREMIA: Primary | ICD-10-CM

## 2025-04-22 LAB
ALBUMIN SERPL BCG-MCNC: 4.1 G/DL (ref 3.5–5.2)
ALBUMIN SERPL BCG-MCNC: 4.2 G/DL (ref 3.5–5.2)
ALP SERPL-CCNC: 67 U/L (ref 40–150)
ALT SERPL W P-5'-P-CCNC: 17 U/L (ref 0–50)
ANION GAP SERPL CALCULATED.3IONS-SCNC: 9 MMOL/L (ref 7–15)
AST SERPL W P-5'-P-CCNC: 22 U/L (ref 0–45)
BILIRUB DIRECT SERPL-MCNC: 0.33 MG/DL (ref 0–0.3)
BILIRUB SERPL-MCNC: 0.9 MG/DL
BUN SERPL-MCNC: 19.8 MG/DL (ref 6–20)
CALCIUM SERPL-MCNC: 10.4 MG/DL (ref 8.8–10.4)
CD3 CELLS # BLD: 958 CELLS/UL (ref 603–2990)
CD3 CELLS NFR BLD: 74 % (ref 49–84)
CD3+CD4+ CELLS # BLD: 215 CELLS/UL (ref 441–2156)
CD3+CD4+ CELLS NFR BLD: 17 % (ref 28–63)
CD3+CD4+ CELLS/CD3+CD8+ CLL BLD: 0.33 % (ref 1.4–2.6)
CD3+CD8+ CELLS # BLD: 662 CELLS/UL (ref 125–1312)
CD3+CD8+ CELLS NFR BLD: 51 % (ref 10–40)
CHLORIDE SERPL-SCNC: 104 MMOL/L (ref 98–107)
CHOLEST SERPL-MCNC: 150 MG/DL
CREAT SERPL-MCNC: 0.87 MG/DL (ref 0.51–0.95)
CYCLOSPORINE BLD LC/MS/MS-MCNC: 68 UG/L (ref 50–400)
EGFRCR SERPLBLD CKD-EPI 2021: 89 ML/MIN/1.73M2
EST. AVERAGE GLUCOSE BLD GHB EST-MCNC: 97 MG/DL
FASTING STATUS PATIENT QL REPORTED: YES
GLUCOSE SERPL-MCNC: 91 MG/DL (ref 70–99)
HBA1C MFR BLD: 5 %
HCO3 SERPL-SCNC: 24 MMOL/L (ref 22–29)
HDLC SERPL-MCNC: 37 MG/DL
LDLC SERPL CALC-MCNC: 61 MG/DL
MAGNESIUM SERPL-MCNC: 1.8 MG/DL (ref 1.7–2.3)
NONHDLC SERPL-MCNC: 113 MG/DL
PHOSPHATE SERPL-MCNC: 3.4 MG/DL (ref 2.5–4.5)
POTASSIUM SERPL-SCNC: 3.9 MMOL/L (ref 3.4–5.3)
PROT SERPL-MCNC: 7.3 G/DL (ref 6.4–8.3)
PTH-INTACT SERPL-MCNC: 91 PG/ML (ref 15–65)
SODIUM SERPL-SCNC: 137 MMOL/L (ref 135–145)
T CELL COMMENT: ABNORMAL
TME LAST DOSE: NORMAL H
TME LAST DOSE: NORMAL H
TRIGL SERPL-MCNC: 262 MG/DL
URATE SERPL-MCNC: 4.2 MG/DL (ref 2.4–5.7)
VIT D+METAB SERPL-MCNC: 19 NG/ML (ref 20–50)

## 2025-04-22 PROCEDURE — 1126F AMNT PAIN NOTED NONE PRSNT: CPT | Performed by: INTERNAL MEDICINE

## 2025-04-22 PROCEDURE — 86665 EPSTEIN-BARR CAPSID VCA: CPT | Performed by: INTERNAL MEDICINE

## 2025-04-22 PROCEDURE — 86833 HLA CLASS II HIGH DEFIN QUAL: CPT | Performed by: STUDENT IN AN ORGANIZED HEALTH CARE EDUCATION/TRAINING PROGRAM

## 2025-04-22 PROCEDURE — 3079F DIAST BP 80-89 MM HG: CPT | Performed by: INTERNAL MEDICINE

## 2025-04-22 PROCEDURE — 83036 HEMOGLOBIN GLYCOSYLATED A1C: CPT | Performed by: INTERNAL MEDICINE

## 2025-04-22 PROCEDURE — 84550 ASSAY OF BLOOD/URIC ACID: CPT | Performed by: PATHOLOGY

## 2025-04-22 PROCEDURE — 86644 CMV ANTIBODY: CPT | Performed by: INTERNAL MEDICINE

## 2025-04-22 PROCEDURE — 80158 DRUG ASSAY CYCLOSPORINE: CPT | Performed by: INTERNAL MEDICINE

## 2025-04-22 PROCEDURE — 83735 ASSAY OF MAGNESIUM: CPT | Performed by: PATHOLOGY

## 2025-04-22 PROCEDURE — 3074F SYST BP LT 130 MM HG: CPT | Performed by: INTERNAL MEDICINE

## 2025-04-22 PROCEDURE — G2211 COMPLEX E/M VISIT ADD ON: HCPCS | Performed by: INTERNAL MEDICINE

## 2025-04-22 PROCEDURE — 80061 LIPID PANEL: CPT | Performed by: PATHOLOGY

## 2025-04-22 PROCEDURE — 86832 HLA CLASS I HIGH DEFIN QUAL: CPT | Performed by: STUDENT IN AN ORGANIZED HEALTH CARE EDUCATION/TRAINING PROGRAM

## 2025-04-22 PROCEDURE — 84100 ASSAY OF PHOSPHORUS: CPT | Performed by: PATHOLOGY

## 2025-04-22 PROCEDURE — 82306 VITAMIN D 25 HYDROXY: CPT | Performed by: INTERNAL MEDICINE

## 2025-04-22 PROCEDURE — 99000 SPECIMEN HANDLING OFFICE-LAB: CPT | Performed by: PATHOLOGY

## 2025-04-22 PROCEDURE — 80053 COMPREHEN METABOLIC PANEL: CPT | Performed by: PATHOLOGY

## 2025-04-22 PROCEDURE — 99214 OFFICE O/P EST MOD 30 MIN: CPT | Performed by: INTERNAL MEDICINE

## 2025-04-22 PROCEDURE — 36415 COLL VENOUS BLD VENIPUNCTURE: CPT | Performed by: PATHOLOGY

## 2025-04-22 PROCEDURE — 86359 T CELLS TOTAL COUNT: CPT | Performed by: INTERNAL MEDICINE

## 2025-04-22 PROCEDURE — G0463 HOSPITAL OUTPT CLINIC VISIT: HCPCS | Performed by: INTERNAL MEDICINE

## 2025-04-22 PROCEDURE — 83970 ASSAY OF PARATHORMONE: CPT | Performed by: PATHOLOGY

## 2025-04-22 PROCEDURE — 82248 BILIRUBIN DIRECT: CPT | Performed by: PATHOLOGY

## 2025-04-22 PROCEDURE — 82652 VIT D 1 25-DIHYDROXY: CPT | Performed by: INTERNAL MEDICINE

## 2025-04-22 ASSESSMENT — PAIN SCALES - GENERAL: PAINLEVEL_OUTOF10: NO PAIN (0)

## 2025-04-22 NOTE — LETTER
On supplement: No  - Calcium; level: High normal        On supplement: No    # Electrolytes:   - Potassium; level: Normal        On supplement: No  - Magnesium; level: Not checked recently        On supplement: Yes  - Bicarbonate; level: Normal        On supplement: No    # BK Viremia: Stable, minimally elevated BK PCR at detectable, but less than quantifiable with last check 4/2025.  IgG level is normal.  Immunosuppression has been decreased.   - Will continue to follow BK PCR every month.    # CMV Viremia: Stable, minimally elevated CMV PCR at detectable, but less than quantifiable with last check 4/2025.  Patient was CMV IgG Ab discordant (D+/R-) at time of transplant.  CMV IgG Ab negative.  She developed UL54 mutation  with resistance to ganciclovir and cidofovir.  Then subsequently also developed UL97 mutation.  Patient was treated with foscarnet, then maribavir. Presently off any antiviral medications.  IgG level is normal.  Followed by Transplant ID.   - No need to follow CMV PCR, unless clinically indicated.    # Overweight (BMI = 29.9): Weight is stable.   - Recommend weight loss for overall health by increasing exercise and watching caloric intake.    # Other Significant PMH:   - GERD: Asymptomatic and now off medications.   - Crohn's Disease: Appears to be stable with no recent symptoms.  Previously was on ustekinumab prior to kidney transplant, but hasn't restarted it due to ongoing oral and vaginal ulcers.   - H/o Oral and Genital Ulcers: Patient with h/o significant and debilitating ulcers due to unclear etiology. Doubt EBV as a cause, although positive staining on biopsy. Felt possibly related to Behcet's or mycophenolate toxicity. Symptoms resolved with overall decrease in immunosuppression and change from mycophenolate to azathioprine.  Followed by Transplant ID and Rheumatology in the past.     # Skin Cancer Risk: Discussed sun protection and recommend regular follow up with  Dermatology.    # Transplant History:  Etiology of Kidney Failure: Interstitial nephritis  Tx: DDKT  Transplant: 4/17/2024 (Kidney)  Significant transplant-related complications: BK Viremia, CMV Viremia, and Oral and genital ulcers    Transplant Office Phone Number: [phone]    Assessment and plan was discussed with the patient and she voiced her understanding and agreement.    Return visit: Return in about 6 months (around 10/22/2025).    Neno Ortiz MD    The longitudinal plan of care for the diagnosis(es)/condition(s) as documented were addressed during this visit. Due to the added complexity in care, I will continue to support Ira in the subsequent management and with ongoing continuity of care.      Chief Complaint  Ms. Zamora is a 35 year old here for kidney transplant and immunosuppression management.     History of Present Illness   Ms. Zamora reports feeling good overall.  Since last clinic visit:   Hospitalizations: No   New Medical Issues: No  Active/Exercise: Yes; She is active, although really only gets minimal exercise.  However, she now has more energy than she has had in years.  Chest pain or shortness of breath: No  Lower extremity swelling: No  Weight change: No   Appetite: Comes and goes  Nausea and vomiting: Yes; Rare, random episodes of nausea, but no vomiting.  Diarrhea: No  Heartburn symptoms: No  Fever, sweats or chills: No  Night sweats: No  Urinary complaints: No    Home BP: 120/80s    Problem List  Patient Active Problem List   Diagnosis     Crohn's disease (H)     CKD (chronic kidney disease) stage 2, GFR 60-89 ml/min     Secondary renal hyperparathyroidism     Long QT interval     Kidney replaced by transplant     Immunosuppressed status     GERD (gastroesophageal reflux disease)     Aftercare following organ transplant     Vaginal ulcer     Need for pneumocystis prophylaxis     Hypomagnesemia     Odynophagia     Inadequate oral intake     Vulvar lesion     Cytomegalovirus  (CMV) viremia (H)     EBV (Jake-Barr virus) viremia     Hypercalcemia       Allergies  Allergies   Allergen Reactions     Amlodipine Headache and Other (See Comments)     Other Reaction(s): Unknown     Omeprazole Other (See Comments)     All PPIs per patient     Proton Pump Inhibitors Nephrotoxicity     No PPIs due to history of renal failure due to interstitial nephritis     Tegaderm Transparent Dressing (Informational Only) Blisters       Medications  Current Outpatient Medications   Medication Sig Dispense Refill     acetaminophen (TYLENOL) 500 MG tablet Take 1,000 mg by mouth every 6 hours as needed       aspirin 81 MG EC tablet Take 81 mg by mouth daily.       azaTHIOprine (IMURAN) 50 MG tablet Take 1 tablet (50 mg) by mouth daily. 30 tablet 11     cycloSPORINE modified (GENERIC EQUIVALENT) 25 MG capsule Take 2 capsules (50 mg) by mouth 2 times daily. 180 capsule 11     hydrOXYzine HCl (ATARAX) 25 MG tablet Take 1 tablet (25 mg) by mouth every 6 hours as needed for other (adjuvant pain) 20 tablet 0     ketoconazole (NIZORAL) 2 % external shampoo Apply topically daily as needed for itching or irritation (use there times a week and let sit on scalp for 5 minutes). 120 mL 11     levonorgestrel (KYLEENA) 19.5 MG IUD 1 Device by Intrauterine route       magnesium glycinate 100 MG CAPS capsule Take 3 capsules (300 mg) by mouth 2 times daily. Take in place of magnesium oxide 180 capsule 2     magnesium oxide (MAG-OX) 400 MG tablet Take 2 tablets (800 mg) by mouth 2 times daily. 120 tablet 3     PARoxetine (PAXIL) 30 MG tablet Take 30 mg by mouth every evening       phosphorus tablet 250 mg (PHOSPHA 250 NEUTRAL) 250 MG per tablet Take 250 mg by mouth 2 times daily.       predniSONE (DELTASONE) 5 MG tablet Take 1 tablet (5 mg) by mouth daily. 30 tablet 11     sulfamethoxazole-trimethoprim (BACTRIM) 400-80 MG tablet Take 1 tablet by mouth daily 30 tablet 11     triamcinolone (KENALOG) 0.1 % paste Take by mouth 2 times  daily as needed (canker sore).       Vonoprazan Fumarate (VOQUEZNA) 20 MG TABS Take 20 mg by mouth daily 60 tablet 0     atorvastatin (LIPITOR) 10 MG tablet TAKE 1 TABLET  BY MOUTH DAILY. 30 tablet 1     cinacalcet (SENSIPAR) 30 MG tablet Take 2 tablets (60 mg) by mouth daily. 60 tablet 2     No current facility-administered medications for this visit.     Medications Discontinued During This Encounter   Medication Reason     maribavir (LIVTENCITY) 200 MG tablet        Physical Exam  Vital Signs: /84   Pulse 96   Temp 98.8  F (37.1  C) (Oral)   Wt 74.3 kg (163 lb 12.8 oz)   SpO2 95%   BMI 29.96 kg/m      GENERAL APPEARANCE: alert and no distress  HENT: mouth without ulcers or lesions  RESP: lungs clear to auscultation - no rales, rhonchi or wheezes  CV: regular rhythm, normal rate, no rub, no murmur  EDEMA: no LE edema bilaterally  ABDOMEN: soft, nondistended, nontender, bowel sounds normal  MS: extremities normal - no gross deformities noted, no evidence of inflammation in joints, no muscle tenderness  SKIN: no rash  TX KIDNEY: normal  DIALYSIS ACCESS:  RUE AV fistula with good thrill, aneurysmal    Data        Latest Ref Rng & Units 4/22/2025    11:39 AM 4/18/2025     9:06 AM 4/4/2025    10:51 AM   Renal   Sodium 135 - 145 mmol/L 137  141  141    K 3.4 - 5.3 mmol/L 3.9  4.3  4.5    Cl 98 - 107 mmol/L 104  106  104    Cl (external) 98 - 107 mmol/L 104  106  104    CO2 22 - 29 mmol/L 24  24  27    Urea Nitrogen 6.0 - 20.0 mg/dL 19.8  17.4  17.8    Creatinine 0.51 - 0.95 mg/dL 0.87  0.91  1.07    Glucose 70 - 99 mg/dL 91  81  96    Calcium 8.8 - 10.4 mg/dL 10.4  10.2  10.4    Magnesium 1.7 - 2.3 mg/dL 1.8            Latest Ref Rng & Units 4/22/2025    11:39 AM 4/22/2025    10:40 AM 1/30/2025     8:35 AM   Bone Health   Phosphorus 2.5 - 4.5 mg/dL 3.4   2.1    Parathyroid Hormone Intact 15 - 65 pg/mL  91     Vit D Def 20 - 50 ng/mL  19           Latest Ref Rng & Units 4/18/2025     9:06 AM 4/4/2025    10:51  AM 3/28/2025     8:36 AM   Heme   WBC 4.0 - 11.0 10e3/uL 6.2  5.1  7.4    Hgb 11.7 - 15.7 g/dL 13.4  13.2  13.9    Plt 150 - 450 10e3/uL 175  193  187          Latest Ref Rng & Units 4/22/2025    11:39 AM 4/22/2025    10:40 AM 1/30/2025     8:35 AM   Liver   AP 40 - 150 U/L  67  58    TBili <=1.2 mg/dL  0.9  0.7    Bilirubin Direct 0.00 - 0.30 mg/dL  0.33     ALT 0 - 50 U/L  17  38    AST 0 - 45 U/L  22  28    Tot Protein 6.4 - 8.3 g/dL  7.3  6.5    Albumin 3.5 - 5.2 g/dL 4.2  4.1  3.7          Latest Ref Rng & Units 4/22/2025    10:40 AM 10/30/2024     8:20 AM 6/26/2024     5:06 PM   Pancreas   A1C <5.7 % 5.0  5.6     Lipase (Roche) 13 - 60 U/L   14          Latest Ref Rng & Units 5/23/2024     9:49 AM 4/22/2024     8:21 AM   Iron studies   Iron 37 - 145 ug/dL 77  53    Iron Sat Index 15 - 46 % 38  36    Ferritin 6 - 175 ng/mL 2,181  2,181          Latest Ref Rng & Units 4/22/2025    11:39 AM 4/18/2025     9:06 AM 4/4/2025    10:51 AM   [facility] Virology   LOG IU/ML OF CMVQNT   <1.5  <1.5    EBV CAPSID ANTIBODY IGG No detectable antibody. Positive        Failed to redirect to the Timeline version of the [employer].  Recent Labs   Lab Test 02/10/25  0858 02/14/25  1112 02/21/25  0923   DOSTAC 2/9/2025 2/13/2025 2/20/2025   TACROL 6.8 7.6 4.1*     Recent Labs   Lab Test 04/23/24  0735 10/22/24  0903   DOSMPA 4/22/2024   9:00 PM 10/21/2024   9:30 PM   MPACID 2.20 <0.25*   MPAG 107.5* <6.5*        Again, thank you for allowing me to participate in the care of your patient.        Sincerely,        Neno Ortiz MD    Electronically signed

## 2025-04-22 NOTE — PATIENT INSTRUCTIONS
Patient Recommendations:  - Recommend weight loss for overall health by increasing exercise and watching caloric intake.  - Recommend routine follow up with Dr. Feldman in 3 months or so to resume care with a goal of ongoing co-management between your primary Nephrologist and the Transplant Team.     Transplant Patient Information  Your Post Transplant Coordinator is: Radha Jorge  For non urgent items, we encourage you to contact your coordinator/care team online via [employer]  You and your care team can also contact your transplant coordinator Monday - Friday, 8am - 5pm at [phone] (Option 2 to reach the coordinator or Option 4 to schedule an appointment).  After hours for urgent matters, please call [facility] at [phone].

## 2025-04-22 NOTE — Clinical Note
4/22/2025      Ira Zamora  [address] [place] [id number]      Dear Colleague,    Thank you for referring your patient, Ira Zamora, to the [facility]. Please see a copy of my visit note below.    No notes on file    Again, thank you for allowing me to participate in the care of your patient.        Sincerely,        Neno Ortiz MD    Electronically signed

## 2025-04-22 NOTE — NURSING NOTE
Chief Complaint   Patient presents with    RECHECK       /84   Pulse 96   Temp 98.8  F (37.1  C) (Oral)   Wt 74.3 kg (163 lb 12.8 oz)   SpO2 95%   BMI 29.96 kg/m      Bronson Luna on 4/22/2025 at 10:51 AM

## 2025-04-22 NOTE — LETTER
4/22/2025      RE: Ira Zamora  [address], [place]  [place] [address]       TRANSPLANT NEPHROLOGY CLINIC VISIT     Assessment & Plan  # DDKT: CKD Stage 2 - Stable creatinine at baseline ~ 0.9-1.1 since starting foscarnet with mild DANY about 4 months ago.  Slightly above previous baseline closer to ~ 0.7-0.9.   - Baseline Creatinine: ~ 0.9-1.1   - Proteinuria: Normal (<0.2 grams)   - DSA Hx: No DSA   - Last cPRA: 18%   - BK Viremia: Yes, detectable, but less than quantifiable   - Kidney Tx Biopsy Hx: No biopsy history.    # H/o Interstitial Nephritis: No evidence of recurrent native kidney disease.    # Immunosuppression: Cyclosporine (goal ), Azathioprine (dose 50 mg daily), and Prednisone (dose 5 mg daily)   - Induction with Recent Transplant:  High Intensity Protocol   - Continue with intensive monitoring of immunosuppression for efficacy and toxicity.   - Historical Changes in Immunosuppression: Changed from mycophenolate to azathioprine and added prednisone due to oral and genital ulcers, concern for Behcet's versus mycophenolate toxicity.  Decreased azathioprine dose due to BK and CMV viremia.   - Changes: No    # Infection Prevention:   Last CD4 Level: 73 (12/2024)  - PJP: Sulfa/TMP (Bactrim)  - CMV: Being treated for CMV viremia and/or disease      - CMV IgG Ab High Risk Discordance (D+/R-) at time of transplant: Yes  Present CMV Serostatus: Negative  - EBV IgG Ab High Risk Discordance (D+/R-) at time of transplant: No  Present EBV Serostatus: Negative    # Blood Pressure: Controlled;  Goal BP: < 130/80   - Changes: No    # Hemoglobin: Stable, normal       Iron studies: Not checked recently    # Mineral Bone Disorder:    - Secondary renal hyperparathyroidism; PTH level: Normal (15-65 pg/ml)        On treatment: Cinacalcet  - Vitamin D; level: Normal        On supplement: No  - Calcium; level: High normal        On supplement: No    # Electrolytes:   - Potassium; level: Normal        On supplement: No  -  Magnesium; level: Not checked recently        On supplement: Yes  - Bicarbonate; level: Normal        On supplement: No    # BK Viremia: Stable, minimally elevated BK PCR at detectable, but less than quantifiable with last check 4/2025.  IgG level is normal.  Immunosuppression has been decreased.   - Will continue to follow BK PCR every month.    # CMV Viremia: Stable, minimally elevated CMV PCR at detectable, but less than quantifiable with last check 4/2025.  Patient was CMV IgG Ab discordant (D+/R-) at time of transplant.  CMV IgG Ab negative.  She developed UL54 mutation  with resistance to ganciclovir and cidofovir.  Then subsequently also developed UL97 mutation.  Patient was treated with foscarnet, then maribavir. Presently off any antiviral medications.  IgG level is normal.  Followed by Transplant ID.   - No need to follow CMV PCR, unless clinically indicated.    # Overweight (BMI = 29.9): Weight is stable.   - Recommend weight loss for overall health by increasing exercise and watching caloric intake.    # Other Significant PMH:   - GERD: Asymptomatic and now off medications.   - Crohn's Disease: Appears to be stable with no recent symptoms.  Previously was on ustekinumab prior to kidney transplant, but hasn't restarted it due to ongoing oral and vaginal ulcers.   - H/o Oral and Genital Ulcers: Patient with h/o significant and debilitating ulcers due to unclear etiology. Doubt EBV as a cause, although positive staining on biopsy. Felt possibly related to Behcet's or mycophenolate toxicity. Symptoms resolved with overall decrease in immunosuppression and change from mycophenolate to azathioprine.  Followed by Transplant ID and Rheumatology in the past.     # Skin Cancer Risk: Discussed sun protection and recommend regular follow up with Dermatology.    # Transplant History:  Etiology of Kidney Failure: Interstitial nephritis  Tx: DDKT  Transplant: 4/17/2024 (Kidney)  Significant transplant-related  complications: BK Viremia, CMV Viremia, and Oral and genital ulcers    Transplant Office Phone Number: [phone]    Assessment and plan was discussed with the patient and she voiced her understanding and agreement.    Return visit: Return in about 6 months (around 10/22/2025).    Neno Ortiz MD    The longitudinal plan of care for the diagnosis(es)/condition(s) as documented were addressed during this visit. Due to the added complexity in care, I will continue to support Ira in the subsequent management and with ongoing continuity of care.      Chief Complaint  Ms. Zamora is a 35 year old here for kidney transplant and immunosuppression management.     History of Present Illness   Ms. Zamora reports feeling good overall.  Since last clinic visit:   Hospitalizations: No   New Medical Issues: No  Active/Exercise: Yes; She is active, although really only gets minimal exercise.  However, she now has more energy than she has had in years.  Chest pain or shortness of breath: No  Lower extremity swelling: No  Weight change: No   Appetite: Comes and goes  Nausea and vomiting: Yes; Rare, random episodes of nausea, but no vomiting.  Diarrhea: No  Heartburn symptoms: No  Fever, sweats or chills: No  Night sweats: No  Urinary complaints: No    Home BP: 120/80s    Problem List  Patient Active Problem List   Diagnosis     Crohn's disease (H)     CKD (chronic kidney disease) stage 2, GFR 60-89 ml/min     Secondary renal hyperparathyroidism     Long QT interval     Kidney replaced by transplant     Immunosuppressed status     GERD (gastroesophageal reflux disease)     Aftercare following organ transplant     Vaginal ulcer     Need for pneumocystis prophylaxis     Hypomagnesemia     Odynophagia     Inadequate oral intake     Vulvar lesion     Cytomegalovirus (CMV) viremia (H)     EBV (Jake-Barr virus) viremia     Hypercalcemia       Allergies  Allergies   Allergen Reactions     Amlodipine Headache and Other (See  Comments)     Other Reaction(s): Unknown     Omeprazole Other (See Comments)     All PPIs per patient     Proton Pump Inhibitors Nephrotoxicity     No PPIs due to history of renal failure due to interstitial nephritis     Tegaderm Transparent Dressing (Informational Only) Blisters       Medications  Current Outpatient Medications   Medication Sig Dispense Refill     acetaminophen (TYLENOL) 500 MG tablet Take 1,000 mg by mouth every 6 hours as needed       aspirin 81 MG EC tablet Take 81 mg by mouth daily.       azaTHIOprine (IMURAN) 50 MG tablet Take 1 tablet (50 mg) by mouth daily. 30 tablet 11     cycloSPORINE modified (GENERIC EQUIVALENT) 25 MG capsule Take 2 capsules (50 mg) by mouth 2 times daily. 180 capsule 11     hydrOXYzine HCl (ATARAX) 25 MG tablet Take 1 tablet (25 mg) by mouth every 6 hours as needed for other (adjuvant pain) 20 tablet 0     ketoconazole (NIZORAL) 2 % external shampoo Apply topically daily as needed for itching or irritation (use there times a week and let sit on scalp for 5 minutes). 120 mL 11     levonorgestrel (KYLEENA) 19.5 MG IUD 1 Device by Intrauterine route       magnesium glycinate 100 MG CAPS capsule Take 3 capsules (300 mg) by mouth 2 times daily. Take in place of magnesium oxide 180 capsule 2     magnesium oxide (MAG-OX) 400 MG tablet Take 2 tablets (800 mg) by mouth 2 times daily. 120 tablet 3     PARoxetine (PAXIL) 30 MG tablet Take 30 mg by mouth every evening       phosphorus tablet 250 mg (PHOSPHA 250 NEUTRAL) 250 MG per tablet Take 250 mg by mouth 2 times daily.       predniSONE (DELTASONE) 5 MG tablet Take 1 tablet (5 mg) by mouth daily. 30 tablet 11     sulfamethoxazole-trimethoprim (BACTRIM) 400-80 MG tablet Take 1 tablet by mouth daily 30 tablet 11     triamcinolone (KENALOG) 0.1 % paste Take by mouth 2 times daily as needed (canker sore).       Vonoprazan Fumarate (VOQUEZNA) 20 MG TABS Take 20 mg by mouth daily 60 tablet 0     atorvastatin (LIPITOR) 10 MG tablet  TAKE 1 TABLET  BY MOUTH DAILY. 30 tablet 1     cinacalcet (SENSIPAR) 30 MG tablet Take 2 tablets (60 mg) by mouth daily. 60 tablet 2     No current facility-administered medications for this visit.     Medications Discontinued During This Encounter   Medication Reason     maribavir (LIVTENCITY) 200 MG tablet        Physical Exam  Vital Signs: /84   Pulse 96   Temp 98.8  F (37.1  C) (Oral)   Wt 74.3 kg (163 lb 12.8 oz)   SpO2 95%   BMI 29.96 kg/m      GENERAL APPEARANCE: alert and no distress  HENT: mouth without ulcers or lesions  RESP: lungs clear to auscultation - no rales, rhonchi or wheezes  CV: regular rhythm, normal rate, no rub, no murmur  EDEMA: no LE edema bilaterally  ABDOMEN: soft, nondistended, nontender, bowel sounds normal  MS: extremities normal - no gross deformities noted, no evidence of inflammation in joints, no muscle tenderness  SKIN: no rash  TX KIDNEY: normal  DIALYSIS ACCESS:  RUE AV fistula with good thrill, aneurysmal    Data        Latest Ref Rng & Units 4/22/2025    11:39 AM 4/18/2025     9:06 AM 4/4/2025    10:51 AM   Renal   Sodium 135 - 145 mmol/L 137  141  141    K 3.4 - 5.3 mmol/L 3.9  4.3  4.5    Cl 98 - 107 mmol/L 104  106  104    Cl (external) 98 - 107 mmol/L 104  106  104    CO2 22 - 29 mmol/L 24  24  27    Urea Nitrogen 6.0 - 20.0 mg/dL 19.8  17.4  17.8    Creatinine 0.51 - 0.95 mg/dL 0.87  0.91  1.07    Glucose 70 - 99 mg/dL 91  81  96    Calcium 8.8 - 10.4 mg/dL 10.4  10.2  10.4    Magnesium 1.7 - 2.3 mg/dL 1.8            Latest Ref Rng & Units 4/22/2025    11:39 AM 4/22/2025    10:40 AM 1/30/2025     8:35 AM   Bone Health   Phosphorus 2.5 - 4.5 mg/dL 3.4   2.1    Parathyroid Hormone Intact 15 - 65 pg/mL  91     Vit D Def 20 - 50 ng/mL  19           Latest Ref Rng & Units 4/18/2025     9:06 AM 4/4/2025    10:51 AM 3/28/2025     8:36 AM   Heme   WBC 4.0 - 11.0 10e3/uL 6.2  5.1  7.4    Hgb 11.7 - 15.7 g/dL 13.4  13.2  13.9    Plt 150 - 450 10e3/uL 175  193  187           Latest Ref Rng & Units 4/22/2025    11:39 AM 4/22/2025    10:40 AM 1/30/2025     8:35 AM   Liver   AP 40 - 150 U/L  67  58    TBili <=1.2 mg/dL  0.9  0.7    Bilirubin Direct 0.00 - 0.30 mg/dL  0.33     ALT 0 - 50 U/L  17  38    AST 0 - 45 U/L  22  28    Tot Protein 6.4 - 8.3 g/dL  7.3  6.5    Albumin 3.5 - 5.2 g/dL 4.2  4.1  3.7          Latest Ref Rng & Units 4/22/2025    10:40 AM 10/30/2024     8:20 AM 6/26/2024     5:06 PM   Pancreas   A1C <5.7 % 5.0  5.6     Lipase (Roche) 13 - 60 U/L   14          Latest Ref Rng & Units 5/23/2024     9:49 AM 4/22/2024     8:21 AM   Iron studies   Iron 37 - 145 ug/dL 77  53    Iron Sat Index 15 - 46 % 38  36    Ferritin 6 - 175 ng/mL 2,181  2,181          Latest Ref Rng & Units 4/22/2025    11:39 AM 4/18/2025     9:06 AM 4/4/2025    10:51 AM   [facility] Virology   LOG IU/ML OF CMVQNT   <1.5  <1.5    EBV CAPSID ANTIBODY IGG No detectable antibody. Positive        Failed to redirect to the Timeline version of the [employer].  Recent Labs   Lab Test 02/10/25  0858 02/14/25  1112 02/21/25  0923   DOSTAC 2/9/2025 2/13/2025 2/20/2025   TACROL 6.8 7.6 4.1*     Recent Labs   Lab Test 04/23/24  0735 10/22/24  0903   DOSMPA 4/22/2024   9:00 PM 10/21/2024   9:30 PM   MPACID 2.20 <0.25*   MPAG 107.5* <6.5*        Neno Ortiz MD

## 2025-04-23 ENCOUNTER — TELEPHONE (OUTPATIENT)
Dept: PHARMACY | Facility: CLINIC | Age: 35
End: 2025-04-23
Payer: MEDICARE

## 2025-04-23 LAB — 1,25(OH)2D SERPL-MCNC: 54 PG/ML (ref 19.9–79.3)

## 2025-04-23 NOTE — TELEPHONE ENCOUNTER
Clinical Pharmacy Consult:                                                      Transplant Specific: 12 month post transplant pharmacy review  Date of Transplant: 4/17/24  Type of Transplant: kidney  First Transplant: yes  History of rejection: no    Immunosuppression Regimen   Cyclosporine  50 mg qAM & 50 mg qPM, AZA 50mg qAM, and Prednisone 5mg daily  Patient specific goal: 125  Most recent level: 68 on 04/22/2025  Immunosuppressant Levels:  Subtherapeutic  Pt adherent to lab draws: yes  Scr:   Lab Results   Component Value Date    CR 4.16 04/18/2024     Side effects: Unknown - unable to contact pt    Prophylactic Medications  PJP Prophylactic: Bactrim SS daily  Scheduled Discontinue Date: Lifelong     Antifungal: Not needed thus far  Scheduled Discontinue Date: N/A     CMV Prophylactic: Valcyte 900 mg once daily   Scheduled Discontinue Date:  3 to 6 months  Anticipate stop 10/17/2024 - therapy complete    Acid Reducer: Pepcid (famotidine)  Scheduled Reviewed Date:  Therapy complete    Vascular Prophylactic: Aspirin 81mg QD  Scheduled Discontinue Date: TBD    Med rec/DUR performed: no  Med Rec Discrepancies: unable to contact pt    Recent hospitalizations, urgent care or ED visits: 1, hypomagnesemia  Unable to contact Ira for 12 month follow up call. Chart review only. Last CSA lab was below goal. Messaged coordinator with concern. Patient switched from tacrolimus to cyclosporine just a few months ago and is likely still figuring out the correct dose. Refill history suggests good adherence.  Last OV BP was at goal.  No additional questions or concerns. Next follow up in 1 year.  Anibal Cooney, PharmD  [phone]

## 2025-04-26 DIAGNOSIS — Z94.0 KIDNEY REPLACED BY TRANSPLANT: Chronic | ICD-10-CM

## 2025-04-28 DIAGNOSIS — E83.52 HYPERCALCEMIA: ICD-10-CM

## 2025-04-28 DIAGNOSIS — B27.00 EBV (EPSTEIN-BARR VIRUS) VIREMIA: ICD-10-CM

## 2025-04-28 DIAGNOSIS — B25.9 CYTOMEGALOVIRUS (CMV) VIREMIA (H): Primary | ICD-10-CM

## 2025-04-28 RX ORDER — CINACALCET 30 MG/1
60 TABLET, FILM COATED ORAL DAILY
Qty: 60 TABLET | Refills: 2 | Status: SHIPPED | OUTPATIENT
Start: 2025-04-28

## 2025-04-28 RX ORDER — ATORVASTATIN CALCIUM 10 MG/1
10 TABLET, FILM COATED ORAL DAILY
Qty: 30 TABLET | Refills: 1 | Status: SHIPPED | OUTPATIENT
Start: 2025-04-28

## 2025-05-10 PROBLEM — N25.81 HYPERPARATHYROIDISM, SECONDARY RENAL: Status: RESOLVED | Noted: 2025-01-21 | Resolved: 2025-05-10

## 2025-05-10 PROBLEM — D70.8 OTHER NEUTROPENIA: Status: RESOLVED | Noted: 2024-07-09 | Resolved: 2025-05-10

## 2025-05-10 PROBLEM — K22.10 ULCER OF ESOPHAGUS WITHOUT BLEEDING: Status: RESOLVED | Noted: 2024-07-09 | Resolved: 2025-05-10

## 2025-05-10 PROBLEM — K12.1 MOUTH ULCERS: Status: RESOLVED | Noted: 2024-06-05 | Resolved: 2025-05-10

## 2025-05-10 PROBLEM — D63.1 ANEMIA IN CHRONIC RENAL DISEASE: Status: RESOLVED | Noted: 2024-06-27 | Resolved: 2025-05-10

## 2025-05-10 PROBLEM — N18.9 ANEMIA IN CHRONIC RENAL DISEASE: Status: RESOLVED | Noted: 2024-06-27 | Resolved: 2025-05-10

## 2025-05-10 PROBLEM — E86.0 DEHYDRATION: Status: RESOLVED | Noted: 2024-05-03 | Resolved: 2025-05-10

## 2025-05-10 PROBLEM — D72.819 LEUKOPENIA, UNSPECIFIED TYPE: Status: RESOLVED | Noted: 2024-06-07 | Resolved: 2025-05-10

## 2025-05-10 PROBLEM — E43 SEVERE MALNUTRITION: Status: RESOLVED | Noted: 2024-06-17 | Resolved: 2025-05-10

## 2025-05-10 NOTE — PROGRESS NOTES
TRANSPLANT NEPHROLOGY CLINIC VISIT     Assessment & Plan   # DDKT: CKD Stage 2 - Stable creatinine at baseline ~ 0.9-1.1 since starting foscarnet with mild DANY about 4 months ago.  Slightly above previous baseline closer to ~ 0.7-0.9.   - Baseline Creatinine: ~ 0.9-1.1   - Proteinuria: Normal (<0.2 grams)   - DSA Hx: No DSA   - Last cPRA: 18%   - BK Viremia: Yes, detectable, but less than quantifiable   - Kidney Tx Biopsy Hx: No biopsy history.    # H/o Interstitial Nephritis: No evidence of recurrent native kidney disease.    # Immunosuppression: Cyclosporine (goal ), Azathioprine (dose 50 mg daily), and Prednisone (dose 5 mg daily)   - Induction with Recent Transplant:  High Intensity Protocol   - Continue with intensive monitoring of immunosuppression for efficacy and toxicity.   - Historical Changes in Immunosuppression: Changed from mycophenolate to azathioprine and added prednisone due to oral and genital ulcers, concern for Behcet's versus mycophenolate toxicity.  Decreased azathioprine dose due to BK and CMV viremia.   - Changes: No    # Infection Prevention:   Last CD4 Level: 73 (12/2024)  - PJP: Sulfa/TMP (Bactrim)  - CMV: Being treated for CMV viremia and/or disease      - CMV IgG Ab High Risk Discordance (D+/R-) at time of transplant: Yes  Present CMV Serostatus: Negative  - EBV IgG Ab High Risk Discordance (D+/R-) at time of transplant: No  Present EBV Serostatus: Negative    # Blood Pressure: Controlled;  Goal BP: < 130/80   - Changes: No    # Hemoglobin: Stable, normal       Iron studies: Not checked recently    # Mineral Bone Disorder:    - Secondary renal hyperparathyroidism; PTH level: Normal (15-65 pg/ml)        On treatment: Cinacalcet  - Vitamin D; level: Normal        On supplement: No  - Calcium; level: High normal        On supplement: No    # Electrolytes:   - Potassium; level: Normal        On supplement: No  - Magnesium; level: Not checked recently        On supplement: Yes  -  Bicarbonate; level: Normal        On supplement: No    # BK Viremia: Stable, minimally elevated BK PCR at detectable, but less than quantifiable with last check 4/2025.  IgG level is normal.  Immunosuppression has been decreased.   - Will continue to follow BK PCR every month.    # CMV Viremia: Stable, minimally elevated CMV PCR at detectable, but less than quantifiable with last check 4/2025.  Patient was CMV IgG Ab discordant (D+/R-) at time of transplant.  CMV IgG Ab negative.  She developed UL54 mutation  with resistance to ganciclovir and cidofovir.  Then subsequently also developed UL97 mutation.  Patient was treated with foscarnet, then maribavir. Presently off any antiviral medications.  IgG level is normal.  Followed by Transplant ID.   - No need to follow CMV PCR, unless clinically indicated.    # Overweight (BMI = 29.9): Weight is stable.   - Recommend weight loss for overall health by increasing exercise and watching caloric intake.    # Other Significant PMH:   - GERD: Asymptomatic and now off medications.   - Crohn's Disease: Appears to be stable with no recent symptoms.  Previously was on ustekinumab prior to kidney transplant, but hasn't restarted it due to ongoing oral and vaginal ulcers.   - H/o Oral and Genital Ulcers: Patient with h/o significant and debilitating ulcers due to unclear etiology. Doubt EBV as a cause, although positive staining on biopsy. Felt possibly related to Behcet's or mycophenolate toxicity. Symptoms resolved with overall decrease in immunosuppression and change from mycophenolate to azathioprine.  Followed by Transplant ID and Rheumatology in the past.     # Skin Cancer Risk: Discussed sun protection and recommend regular follow up with Dermatology.    # Transplant History:  Etiology of Kidney Failure: Interstitial nephritis  Tx: DDKT  Transplant: 4/17/2024 (Kidney)  Significant transplant-related complications: BK Viremia, CMV Viremia, and Oral and genital  ulcers    Transplant Office Phone Number: [phone]    Assessment and plan was discussed with the patient and she voiced her understanding and agreement.    Return visit: Return in about 6 months (around 10/22/2025).    Neno Ortiz MD    The longitudinal plan of care for the diagnosis(es)/condition(s) as documented were addressed during this visit. Due to the added complexity in care, I will continue to support Ira in the subsequent management and with ongoing continuity of care.      Chief Complaint   Ms. Zamora is a 35 year old here for kidney transplant and immunosuppression management.     History of Present Illness    Ms. Zamora reports feeling good overall.  Since last clinic visit:   Hospitalizations: No   New Medical Issues: No  Active/Exercise: Yes; She is active, although really only gets minimal exercise.  However, she now has more energy than she has had in years.  Chest pain or shortness of breath: No  Lower extremity swelling: No  Weight change: No   Appetite: Comes and goes  Nausea and vomiting: Yes; Rare, random episodes of nausea, but no vomiting.  Diarrhea: No  Heartburn symptoms: No  Fever, sweats or chills: No  Night sweats: No  Urinary complaints: No    Home BP: 120/80s    Problem List   Patient Active Problem List   Diagnosis    Crohn's disease (H)    CKD (chronic kidney disease) stage 2, GFR 60-89 ml/min    Secondary renal hyperparathyroidism    Long QT interval    Kidney replaced by transplant    Immunosuppressed status    GERD (gastroesophageal reflux disease)    Aftercare following organ transplant    Vaginal ulcer    Need for pneumocystis prophylaxis    Hypomagnesemia    Odynophagia    Inadequate oral intake    Vulvar lesion    Cytomegalovirus (CMV) viremia (H)    EBV (Jake-Barr virus) viremia    Hypercalcemia       Allergies   Allergies   Allergen Reactions    Amlodipine Headache and Other (See Comments)     Other Reaction(s): Unknown    Omeprazole Other (See Comments)      All PPIs per patient    Proton Pump Inhibitors Nephrotoxicity     No PPIs due to history of renal failure due to interstitial nephritis    Tegaderm Transparent Dressing (Informational Only) Blisters       Medications   Current Outpatient Medications   Medication Sig Dispense Refill    acetaminophen (TYLENOL) 500 MG tablet Take 1,000 mg by mouth every 6 hours as needed      aspirin 81 MG EC tablet Take 81 mg by mouth daily.      azaTHIOprine (IMURAN) 50 MG tablet Take 1 tablet (50 mg) by mouth daily. 30 tablet 11    cycloSPORINE modified (GENERIC EQUIVALENT) 25 MG capsule Take 2 capsules (50 mg) by mouth 2 times daily. 180 capsule 11    hydrOXYzine HCl (ATARAX) 25 MG tablet Take 1 tablet (25 mg) by mouth every 6 hours as needed for other (adjuvant pain) 20 tablet 0    ketoconazole (NIZORAL) 2 % external shampoo Apply topically daily as needed for itching or irritation (use there times a week and let sit on scalp for 5 minutes). 120 mL 11    levonorgestrel (KYLEENA) 19.5 MG IUD 1 Device by Intrauterine route      magnesium glycinate 100 MG CAPS capsule Take 3 capsules (300 mg) by mouth 2 times daily. Take in place of magnesium oxide 180 capsule 2    magnesium oxide (MAG-OX) 400 MG tablet Take 2 tablets (800 mg) by mouth 2 times daily. 120 tablet 3    PARoxetine (PAXIL) 30 MG tablet Take 30 mg by mouth every evening      phosphorus tablet 250 mg (PHOSPHA 250 NEUTRAL) 250 MG per tablet Take 250 mg by mouth 2 times daily.      predniSONE (DELTASONE) 5 MG tablet Take 1 tablet (5 mg) by mouth daily. 30 tablet 11    sulfamethoxazole-trimethoprim (BACTRIM) 400-80 MG tablet Take 1 tablet by mouth daily 30 tablet 11    triamcinolone (KENALOG) 0.1 % paste Take by mouth 2 times daily as needed (canker sore).      Vonoprazan Fumarate (VOQUEZNA) 20 MG TABS Take 20 mg by mouth daily 60 tablet 0    atorvastatin (LIPITOR) 10 MG tablet TAKE 1 TABLET  BY MOUTH DAILY. 30 tablet 1    cinacalcet (SENSIPAR) 30 MG tablet Take 2 tablets (60  mg) by mouth daily. 60 tablet 2     No current facility-administered medications for this visit.     Medications Discontinued During This Encounter   Medication Reason    maribavir (LIVTENCITY) 200 MG tablet        Physical Exam   Vital Signs: /84   Pulse 96   Temp 98.8  F (37.1  C) (Oral)   Wt 74.3 kg (163 lb 12.8 oz)   SpO2 95%   BMI 29.96 kg/m      GENERAL APPEARANCE: alert and no distress  HENT: mouth without ulcers or lesions  RESP: lungs clear to auscultation - no rales, rhonchi or wheezes  CV: regular rhythm, normal rate, no rub, no murmur  EDEMA: no LE edema bilaterally  ABDOMEN: soft, nondistended, nontender, bowel sounds normal  MS: extremities normal - no gross deformities noted, no evidence of inflammation in joints, no muscle tenderness  SKIN: no rash  TX KIDNEY: normal  DIALYSIS ACCESS:  RUE AV fistula with good thrill, aneurysmal    Data         Latest Ref Rng & Units 4/22/2025    11:39 AM 4/18/2025     9:06 AM 4/4/2025    10:51 AM   Renal   Sodium 135 - 145 mmol/L 137  141  141    K 3.4 - 5.3 mmol/L 3.9  4.3  4.5    Cl 98 - 107 mmol/L 104  106  104    Cl (external) 98 - 107 mmol/L 104  106  104    CO2 22 - 29 mmol/L 24  24  27    Urea Nitrogen 6.0 - 20.0 mg/dL 19.8  17.4  17.8    Creatinine 0.51 - 0.95 mg/dL 0.87  0.91  1.07    Glucose 70 - 99 mg/dL 91  81  96    Calcium 8.8 - 10.4 mg/dL 10.4  10.2  10.4    Magnesium 1.7 - 2.3 mg/dL 1.8            Latest Ref Rng & Units 4/22/2025    11:39 AM 4/22/2025    10:40 AM 1/30/2025     8:35 AM   Bone Health   Phosphorus 2.5 - 4.5 mg/dL 3.4   2.1    Parathyroid Hormone Intact 15 - 65 pg/mL  91     Vit D Def 20 - 50 ng/mL  19           Latest Ref Rng & Units 4/18/2025     9:06 AM 4/4/2025    10:51 AM 3/28/2025     8:36 AM   Heme   WBC 4.0 - 11.0 10e3/uL 6.2  5.1  7.4    Hgb 11.7 - 15.7 g/dL 13.4  13.2  13.9    Plt 150 - 450 10e3/uL 175  193  187          Latest Ref Rng & Units 4/22/2025    11:39 AM 4/22/2025    10:40 AM 1/30/2025     8:35 AM   Liver    AP 40 - 150 U/L  67  58    TBili <=1.2 mg/dL  0.9  0.7    Bilirubin Direct 0.00 - 0.30 mg/dL  0.33     ALT 0 - 50 U/L  17  38    AST 0 - 45 U/L  22  28    Tot Protein 6.4 - 8.3 g/dL  7.3  6.5    Albumin 3.5 - 5.2 g/dL 4.2  4.1  3.7          Latest Ref Rng & Units 4/22/2025    10:40 AM 10/30/2024     8:20 AM 6/26/2024     5:06 PM   Pancreas   A1C <5.7 % 5.0  5.6     Lipase (Roche) 13 - 60 U/L   14          Latest Ref Rng & Units 5/23/2024     9:49 AM 4/22/2024     8:21 AM   Iron studies   Iron 37 - 145 ug/dL 77  53    Iron Sat Index 15 - 46 % 38  36    Ferritin 6 - 175 ng/mL 2,181  2,181          Latest Ref Rng & Units 4/22/2025    11:39 AM 4/18/2025     9:06 AM 4/4/2025    10:51 AM   [facility] Virology   LOG IU/ML OF CMVQNT   <1.5  <1.5    EBV CAPSID ANTIBODY IGG No detectable antibody. Positive        Failed to redirect to the Timeline version of the [employer].  Recent Labs   Lab Test 02/10/25  0858 02/14/25  1112 02/21/25  0923   DOSTAC 2/9/2025 2/13/2025 2/20/2025   TACROL 6.8 7.6 4.1*     Recent Labs   Lab Test 04/23/24  0735 10/22/24  0903   DOSMPA 4/22/2024   9:00 PM 10/21/2024   9:30 PM   MPACID 2.20 <0.25*   MPAG 107.5* <6.5*

## 2025-05-24 ENCOUNTER — RESULTS FOLLOW-UP (OUTPATIENT)
Dept: TRANSPLANT | Facility: CLINIC | Age: 35
End: 2025-05-24

## 2025-05-24 DIAGNOSIS — B25.9 CYTOMEGALOVIRUS (CMV) VIREMIA (H): Primary | ICD-10-CM

## 2025-05-24 DIAGNOSIS — B27.00 EBV (EPSTEIN-BARR VIRUS) VIREMIA: ICD-10-CM

## 2025-06-09 ENCOUNTER — MYC MEDICAL ADVICE (OUTPATIENT)
Dept: INFECTIOUS DISEASES | Facility: CLINIC | Age: 35
End: 2025-06-09
Payer: COMMERCIAL

## 2025-06-09 DIAGNOSIS — B27.00 EBV (EPSTEIN-BARR VIRUS) VIREMIA: ICD-10-CM

## 2025-06-09 DIAGNOSIS — B25.9 CYTOMEGALOVIRUS (CMV) VIREMIA (H): Primary | ICD-10-CM

## 2025-06-09 NOTE — TELEPHONE ENCOUNTER
Patient sent in message wondering if she should get labs prior to her appointment with Dr. Mccoy.  Per chart review, patient needs weekly CMV PCR. This has been ordered by Dr. Ortiz (standing).    RN let patient know above and ordered standing order for CMV PCR quantitative. Sent as standing order to Dr. Mccoy.

## 2025-06-16 ENCOUNTER — APPOINTMENT (OUTPATIENT)
Dept: LAB | Facility: OTHER | Age: 35
End: 2025-06-16
Payer: MEDICARE

## 2025-06-16 ENCOUNTER — RESULTS FOLLOW-UP (OUTPATIENT)
Dept: TRANSPLANT | Facility: CLINIC | Age: 35
End: 2025-06-16

## 2025-06-16 ENCOUNTER — TELEPHONE (OUTPATIENT)
Dept: TRANSPLANT | Facility: CLINIC | Age: 35
End: 2025-06-16

## 2025-06-16 DIAGNOSIS — B25.9 CYTOMEGALOVIRUS (CMV) VIREMIA (H): Primary | ICD-10-CM

## 2025-06-16 DIAGNOSIS — B27.00 EBV (EPSTEIN-BARR VIRUS) VIREMIA: ICD-10-CM

## 2025-06-16 DIAGNOSIS — Z48.298 AFTERCARE FOLLOWING ORGAN TRANSPLANT: Primary | ICD-10-CM

## 2025-06-16 DIAGNOSIS — B25.9 CYTOMEGALOVIRUS (CMV) VIREMIA (H): ICD-10-CM

## 2025-06-16 RX ORDER — TACROLIMUS 1 MG/1
1 CAPSULE ORAL 2 TIMES DAILY
Qty: 60 CAPSULE | Refills: 11 | Status: SHIPPED | OUTPATIENT
Start: 2025-06-16

## 2025-06-16 NOTE — TELEPHONE ENCOUNTER
----- Message from Neno Ortiz sent at 6/16/2025  2:26 PM CDT -----  Regarding: RE: IS changes  Let's slowly make the changes.  Okay with changing cyclosporine to tacrolimus 1 mg bid (might be a bit high, but I'm concerned about 0.5 bid).  Goal level 4-6.    After a couple of months and if doing okay, we could then discuss the next changes.    Jas  ----- Message -----  From: Radha Jorge RN  Sent: 6/16/2025  12:08 PM CDT  To: Neno Ortiz MD  Subject: IS changes                                       Adrien Ortiz,  Below is a message I received from the patient, enquiring about some medication changes. How do you feel about her changes she is requesting..?      I'm not sure if you're the right person to ask but I'm wondering if I can go back on the tacrolimus again. Ever since I started the cyclosporine I haven't had an appetite and it seems to be worsening, I have to basically force myself to eat. Along with that I'm growing facial hair.. not a big deal but it's not something I like. I am also wondering if we could try putting me back on the mycophenolate and stopping the prednisone. Thanks in advance!

## 2025-06-17 ENCOUNTER — OFFICE VISIT (OUTPATIENT)
Dept: INFECTIOUS DISEASES | Facility: CLINIC | Age: 35
End: 2025-06-17
Attending: INTERNAL MEDICINE
Payer: COMMERCIAL

## 2025-06-17 VITALS
WEIGHT: 162 LBS | HEART RATE: 114 BPM | BODY MASS INDEX: 29.63 KG/M2 | OXYGEN SATURATION: 94 % | DIASTOLIC BLOOD PRESSURE: 88 MMHG | SYSTOLIC BLOOD PRESSURE: 128 MMHG

## 2025-06-17 DIAGNOSIS — Z94.0 KIDNEY REPLACED BY TRANSPLANT: ICD-10-CM

## 2025-06-17 DIAGNOSIS — D84.9 IMMUNOSUPPRESSED STATUS: ICD-10-CM

## 2025-06-17 DIAGNOSIS — B25.9 CYTOMEGALOVIRUS (CMV) VIREMIA (H): Primary | ICD-10-CM

## 2025-06-17 PROCEDURE — 1126F AMNT PAIN NOTED NONE PRSNT: CPT | Performed by: INTERNAL MEDICINE

## 2025-06-17 PROCEDURE — 3074F SYST BP LT 130 MM HG: CPT | Performed by: INTERNAL MEDICINE

## 2025-06-17 PROCEDURE — 3079F DIAST BP 80-89 MM HG: CPT | Performed by: INTERNAL MEDICINE

## 2025-06-17 PROCEDURE — 99214 OFFICE O/P EST MOD 30 MIN: CPT | Performed by: INTERNAL MEDICINE

## 2025-06-17 PROCEDURE — 99213 OFFICE O/P EST LOW 20 MIN: CPT | Performed by: INTERNAL MEDICINE

## 2025-06-17 ASSESSMENT — PAIN SCALES - GENERAL: PAINLEVEL_OUTOF10: NO PAIN (0)

## 2025-06-17 NOTE — NURSING NOTE
"Chief Complaint   Patient presents with    RECHECK       Vital signs:      BP: 128/88 Pulse: 114     SpO2: 94 %       Weight: 73.5 kg (162 lb)  Estimated body mass index is 29.63 kg/m  as calculated from the following:    Height as of 3/11/25: 1.575 m (5' 2\").    Weight as of this encounter: 73.5 kg (162 lb).      Magy Lora CMA   6/17/2025 4:34 PM    "

## 2025-06-17 NOTE — PROGRESS NOTES
Transplant Infectious Diseases Outpatient Progress note      Patient:  Ira Zamora, Date of birth 1990, Medical record number [id number]  Date of Visit:  06/17/2025         Recommendations:   Continue to monitor CMV PCR weekly while off of maribavir (last dose 2/17/25)  Hold on Tdap until IS is more stable.      RTC: 2 months. In person or virtually.         Synopsis of Immune Status and Presentation:   Transplants:  4/17/2024 (Kidney), Postoperative day:  426     This patient is a 34 year old female with PPI-induced interstitial nephritis s/p KT in 4/2024. ATG for induction followed by TAC/azathioprine then CsA/azathioprine/prednisone but will be back on TAC/azathioprine/prednisone on 6/19/25 due to CsA-induced poor appetite. Also received belatacept x1 in 7/2024 when MPA was discontinued due to GI/ ulcers.   Has history of Crohn's disease and RA.  With resistant CMV viremia.         Active Problems and Infectious Diseases Issues:   1. Donor derived, resistant CMV viremia.   Occurred 10/2024 while on CMV universal ppx with VGCV. Resistance testing on 10/22/24 revealing UL54 mutation T503I with resistance to GCV and CDV. VGCV was continued with predictable lack of response associated with accumulation of UL97 C603W high grade mutation but the loss of the UL54 mutation 12/31/24. This discrepant mutation results is not unusual.   Foscarnet was started 1/4/25 with adverse events of hypophosphatemia, hypomagnesemia, hypercalcemia and most recently DANY. After 9 days of foscarnet use, VL did not decrease substantially so maribavir was added on 1/15/25. VL declined as of 1/20/25 to 3.3 log from peak of 4.5 log. With the decline of VL, we held foscarnet on 1/21/25 and continued maribavir till 2/17/24 after securing two values of <1.5 log one week apart.   The discontinuation of maribavir on 2/17/25 was followed by asymptomatic, low grade viremia of 1.6-1.8 log is this asymptomatic patient is likely  inconsequential. However, will continue to monitor weekly given recent changes to IS (switch from TAC to CsA on 3/4/25).     Ideally, we should be able to now monitor CMV monthly or per kidney transplant protocol; however, with the plans to switch from CsA to TAC then from TAC/azathioprine/prednisone to TAC/MMF, I would like to resume weekly CMV.     10/22/24     12/31/24      2. BKV viremia  At low value of 3 log (3/7/25) after peaking at 4 log on 2/21/25.   TAC switched to CsA on 3/4/25.     3. Dysgeusia  Maribavir-induced.   Resolved within 24 hr of stopping maribavir.         Old Problems and Infectious Diseases Issues:   Crohn's treated with ustekinumab, last dose 3/2024.   Oral and genital ulcers with negative ID-related workup. Resolved after switching from MPA to azathioprine 7/2024.     Other Infectious Disease issues include:  - QTc: 437 as of 1/9/25.   - Toxoplasma serostatus: IgG ?  - Viral serostatus: CMV D+/R-, EBV D+/R+, HSV1?/2?, VZV +, currently on foscarnet for CMV therapy.   - PJP (and possibly Toxoplasma) prophylaxis: bactrim.  - Immunization status: Tdap and COVID-19 vaccines. Will wait until the IS therapy is more stable and CMV is more stable.   - Gamma globulin status: 900 as of 12/31/24.       Attestation:  Total duration of visit including chart review, reviewing labs and imaging, interviewing and examining the patient, documentation, and sending communication to the patient and to the primary treating team, all at the same day of this encounter, is: 39 minutes.   Edgardo Mccoy MD    Contact information available via [facility] Paging/Directory     06/17/2025         Interim History:   Poor appetite but no weight loss.   No odynophagia and no dysphagia. No fever or night sweats.   No other complaints.          Review of Systems:     As mentioned in the interim history otherwise negative by reviewing constitutional symptoms, central and peripheral neurological systems, respiratory system,  cardiac system, GI system,  system, musculoskeletal, skin, allergy, and lymphatics.                  Immunizations:     Immunization History   Administered Date(s) Administered    COVID-19 Bivalent 12+ (Pfizer) 09/23/2022    COVID-19 MONOVALENT 12+ (Pfizer) 01/21/2021, 02/10/2021, 09/16/2021    HPV Quadrivalent 10/28/2008, 06/20/2011, 10/28/2011    HepB, Unspecified 12/30/2002    Hepatitis A (VAQTA)(ADULT 19+) 02/19/2019, 08/19/2019    Hepatitis B, Peds (Engerix-B/Recombivax HB) 12/30/2002    Influenza (intradermal) 09/14/2021    Influenza Vaccine 18-64 (Flublok) 10/13/2023    Influenza Vaccine, 6+MO IM (QUADRIVALENT W/PRESERVATIVES) 09/14/2021    Influenza, Split Virus, Trivalent, Pf (Fluzone\Fluarix) 11/29/2024    Pneumo Conj 13-V (2010&after) 10/23/2018    Pneumococcal 20 valent Conjugate (Prevnar 20) 11/17/2023    Pneumococcal 23 valent 11/22/2021    Pneumococcal, Unspecified 12/18/2018, 11/22/2021    TDAP (Adacel,Boostrix) 10/23/2012    Td (Adult), Adsorbed 04/12/2004, 01/01/2006             Allergies:     Allergies   Allergen Reactions    Amlodipine Headache and Other (See Comments)     Other Reaction(s): Unknown    Omeprazole Other (See Comments)     All PPIs per patient    Proton Pump Inhibitors Nephrotoxicity     No PPIs due to history of renal failure due to interstitial nephritis    Tegaderm Transparent Dressing (Informational Only) Blisters             Medications:     Current Outpatient Medications   Medication Sig Dispense Refill    acetaminophen (TYLENOL) 500 MG tablet Take 1,000 mg by mouth every 6 hours as needed      aspirin 81 MG EC tablet Take 81 mg by mouth daily.      atorvastatin (LIPITOR) 10 MG tablet TAKE 1 TABLET  BY MOUTH DAILY. 30 tablet 1    azaTHIOprine (IMURAN) 50 MG tablet Take 1 tablet (50 mg) by mouth daily. 30 tablet 11    cinacalcet (SENSIPAR) 30 MG tablet Take 2 tablets (60 mg) by mouth daily. 60 tablet 2    hydrOXYzine HCl (ATARAX) 25 MG tablet Take 1 tablet (25 mg) by mouth  every 6 hours as needed for other (adjuvant pain) 20 tablet 0    ketoconazole (NIZORAL) 2 % external shampoo Apply topically daily as needed for itching or irritation (use there times a week and let sit on scalp for 5 minutes). 120 mL 11    levonorgestrel (KYLEENA) 19.5 MG IUD 1 Device by Intrauterine route      magnesium glycinate 100 MG CAPS capsule Take 3 capsules (300 mg) by mouth 2 times daily. Take in place of magnesium oxide 180 capsule 2    magnesium oxide (MAG-OX) 400 MG tablet Take 2 tablets (800 mg) by mouth 2 times daily. 120 tablet 3    PARoxetine (PAXIL) 30 MG tablet Take 30 mg by mouth every evening      phosphorus tablet 250 mg (PHOSPHA 250 NEUTRAL) 250 MG per tablet Take 250 mg by mouth 2 times daily.      predniSONE (DELTASONE) 5 MG tablet Take 1 tablet (5 mg) by mouth daily. 30 tablet 11    sulfamethoxazole-trimethoprim (BACTRIM) 400-80 MG tablet Take 1 tablet by mouth daily 30 tablet 11    tacrolimus (GENERIC EQUIVALENT) 1 MG capsule Take 1 capsule (1 mg) by mouth 2 times daily. 60 capsule 11    triamcinolone (KENALOG) 0.1 % paste Take by mouth 2 times daily as needed (canker sore).      Vonoprazan Fumarate (VOQUEZNA) 20 MG TABS Take 20 mg by mouth daily 60 tablet 0     No current facility-administered medications for this visit.            Physical Exam:     Vitals:    06/17/25 1631   BP: 128/88   Pulse: 114   SpO2: 94%   Weight: 73.5 kg (162 lb)      Constitutional: awake, alert, cooperative, no apparent distress and appears at stated age, well nourished. Did not sound short of breath.   HEENT: normocephalic, moist buccal mucosa with no ulcers and no thrush, no enlarged tonsils.   Lymphatics: no cervical or axillary LA Bi.            Laboratory Data:     Absolute CD4, [place] T Cells   Date Value Ref Range Status   04/22/2025 215 (L) 441 - 2,156 cells/uL Final   12/31/2024 73 (L) 441 - 2,156 cells/uL Final   10/22/2024 82 (L) 441 - 2,156 cells/uL Final   07/10/2024 59 (L) 441 - 2,156 cells/uL  Final       Inflammatory Markers  No lab results found.    Immune Globulin Studies      Recent Labs   Lab Test 02/14/25  1112 12/31/24  0840 07/08/24  0542   IGG 1,177 903 850       Metabolic Studies    Recent Labs   Lab Test 06/13/25  1531 05/23/25  0947 04/22/25  1139 04/22/25  1040 04/18/25  0906 04/04/25  1051 03/28/25  0836 02/03/25  1101 01/30/25  0835 04/17/24  2230 04/17/24  2123    140 137  --  141 141 140   < > 137   < > 136   POTASSIUM 4.6 4.0 3.9  --  4.3 4.5 4.1   < > 4.5   < > 3.7   CHLORIDE 103 106 104  --  106 104 106   < > 111*   < >  --    CO2 25 28 24  --  24 27 25   < > 17*   < >  --    ANIONGAP 12 6* 9  --  11 10 9   < > 9   < >  --    BUN 10.9 11.5 19.8  --  17.4 17.8 13.8   < > 16.4   < >  --    CR 0.94 0.93 0.87  --  0.91 1.07* 1.05*   < > 0.89   < >  --    GFRESTIMATED 81 82 89  --  84 69 71   < > 87   < >  --    * 126* 91  --  81 96 95   < > 82   < > 168*   A1C  --   --   --  5.0  --   --   --   --   --    < >  --    ROBLES 10.7* 10.1 10.4  --  10.2 10.4 9.9   < > 9.2   < >  --    PHOS  --   --  3.4  --   --   --   --   --  2.1*   < >  --    MAG  --   --  1.8  --   --   --   --   --  1.4*   < >  --    LACT  --   --   --   --   --   --   --   --   --   --  0.8    < > = values in this interval not displayed.       Hepatic Studies    Recent Labs   Lab Test 04/22/25  1139 04/22/25  1040 01/30/25  0835 01/27/25  0813 01/23/25  0855 01/20/25  0850 01/16/25  0905   BILITOTAL  --  0.9 0.7 0.7 0.7 0.6 0.6   ALKPHOS  --  67 58 57 59 58 55   PROTTOTAL  --  7.3 6.5 6.4 6.8 6.9 6.0*   ALBUMIN 4.2 4.1 3.7 3.8 3.8 3.7 3.4*   AST  --  22 28 40 31 31 34   ALT  --  17 38 46 34 34 42       Hematology Studies     Recent Labs   Lab Test 06/13/25  1531 05/23/25  0947 04/18/25  0906 04/04/25  1051 03/28/25  0836 03/21/25  0848 02/03/25  1101 01/30/25  0835 01/27/25  0813 01/23/25  0855 01/21/25  1445 01/20/25  0850 01/16/25  0905 01/13/25  0850   WBC 8.5 6.1 6.2 5.1 7.4 6.7   < > 7.7 7.2 5.1   < > 4.4  "3.0* 3.5*   ANEU  --   --   --   --   --   --   --  5.6 5.2 3.7  --  3.3 2.3 2.8   ALYM  --   --   --   --   --   --   --  1.0 1.0 0.7*  --  0.5* 0.3* 0.2*   FAVINA  --   --   --   --   --   --   --  0.9 0.8 0.6  --  0.6 0.4 0.4   AEOS  --   --   --   --   --   --   --  0.2 0.1 0.1  --  0.1 0.1 0.0   HGB 14.8 14.7 13.4 13.2 13.9 13.9   < > 11.4* 11.8 11.8   < > 11.9 11.7 13.7   HCT 45.5 46.0 40.6 40.0 41.9 42.4   < > 35.5 33.5* 34.1*   < > 35.5 34.8* 38.8    178 175 193 187 199   < > 122* 167 204   < > 188 157 184    < > = values in this interval not displayed.       Clotting Studies    Recent Labs   Lab Test 07/08/24  0542 07/01/24  0618 06/30/24  0516 06/28/24  0602 04/17/24  0958 09/29/22  1140   INR 1.07 1.20* 1.27* 1.25* 1.20* 1.01   PTT  --   --   --   --  27 29       Urine Studies    Recent Labs   Lab Test 07/06/24  1841 06/06/24  1113 05/03/24  1600 04/17/24  1113 09/29/22  1153   URINEPH 7.0 5.5 5.5 8.5* 8.5*   NITRITE Negative Negative Negative Negative Negative   LEUKEST Trace* Moderate* Trace* Negative Negative   WBCU 3 6* 4 <1 1         Microbiology:  Last 6 Culture results with specimen source  No results found for: \"CULT\" No results found for: \"SDES\"     Last check of C difficile  No results found for: \"CDBPCT\"      Virology:  CMV viral loads    CMV viral loads    CMV DNA IU/mL   Date Value Ref Range Status   06/13/2025 <35 (A) Not Detected IU/mL Final     Comment:     CMV DNA detected, less than 35 IU/mL   05/23/2025 <35 (A) Not Detected IU/mL Final     Comment:     CMV DNA detected, less than 35 IU/mL     CMV DNA IU/mL, Instrument   Date Value Ref Range Status   03/28/2025 54 (H) Not Detected IU/mL Final   03/14/2025 43 (H) Not Detected IU/mL Final   03/07/2025 43 (H) Not Detected IU/mL Final   02/28/2025 62 (H) Not Detected IU/mL Final   02/21/2025 38 (H) Not Detected IU/mL Final   02/07/2025 49 (H) Not Detected IU/mL Final   02/03/2025 42 (H) Not Detected IU/mL Final   01/27/2025 444 (H) Not " Detected IU/mL Final   01/21/2025 1,680 (H) Not Detected IU/mL Final     CMV Qualitative PCR   Date Value Ref Range Status   07/02/2024 Not Detected  Final     Comment:     NOT DETECTED - A negative result does not rule out the   presence of PCR inhibitors in the patient specimen or   assay specific nucleic acid in concentrations below the   level of detection by the assay.  INTERPRETIVE INFORMATION: Cytomegalovirus Detection by PCR    This test was developed and its performance characteristics   determined by [facility]. It has not been cleared or   approved by the US Food and Drug Administration. This test   was performed in a CLIA certified laboratory and is   intended for clinical purposes.  Performed By: [facility]  [address] [place] [id number]  : Sam Abel MD, PhD  CLIA Number: [id number]     Viral loads    Recent Labs   Lab Test 06/13/25  1531 05/23/25  0947 04/18/25  0906 04/04/25  1051 03/28/25  0836 03/21/25  0848 03/14/25  0942 03/07/25  0827 03/07/25  0826 02/28/25  0951 02/21/25  0923 02/10/25  0858 02/07/25  1021 02/03/25  1101 01/27/25  0813 01/21/25  1445 08/06/24  0858 07/30/24  0940 07/09/24  0639 07/02/24  1249 07/02/24  1238   EBQI  --   --   --   --   --   --   --   --   --   --   --   --   --   --   --   --   --  Not Detected   < >  --   --    CMVQNT <35* <35* <35*   < >  --    < >  --   --   --   --   --    < >  --   --   --   --    < >  --    < >  --   --    CMVRESINST  --   --   --   --  54*  --  43*  --  43* 62* 38*  --  49* 42* 444* 1,680*   < > 169*   < >  --   --    CMVLOG <1.5 <1.5 <1.5   < > 1.7   < > 1.6  --  1.6 1.8 1.6   < > 1.7 1.6 2.6 3.2   < > 2.2   < >  --   --    CMVQAL  --   --   --   --   --   --   --   --   --   --   --   --   --   --   --   --   --   --   --  Not Detected  --    HSDNA1  --   --   --   --   --   --   --   --   --   --   --   --   --   --   --   --   --   --   --   --  Not Detected   HSDNA2  --   --   --    --   --   --   --   --   --   --   --   --   --   --   --   --   --   --   --   --  Not Detected   BKRES Not Detected  --  <22*   < > <22*  --   --   --   --   --   --   --   --   --   --   --   --   --   --   --   --    BKVSPTYPE Blood Blood Blood   < > Blood  --   --    < >  --  Blood Blood   < > Blood Blood  --  Blood   < >  --    < >  --   --     < > = values in this interval not displayed.       CMV viral loads    CMV DNA IU/mL   Date Value Ref Range Status   06/13/2025 <35 (A) Not Detected IU/mL Final     Comment:     CMV DNA detected, less than 35 IU/mL   05/23/2025 <35 (A) Not Detected IU/mL Final     Comment:     CMV DNA detected, less than 35 IU/mL   04/18/2025 <35 (A) Not Detected IU/mL Final     Comment:     CMV DNA detected, less than 35 IU/mL   04/04/2025 <35 (A) Not Detected IU/mL Final     Comment:     CMV DNA detected, less than 35 IU/mL   03/21/2025 <35 (A) Not Detected IU/mL Final     Comment:     CMV DNA detected, less than 35 IU/mL   02/14/2025 <35 (A) Not Detected IU/mL Final     Comment:     CMV DNA detected, less than 35 IU/mL   02/10/2025 <35 (A) Not Detected IU/mL Final     Comment:     CMV DNA detected, less than 35 IU/mL   09/18/2024 <35 (A) Not Detected IU/mL Final     Comment:     CMV DNA detected, less than 35 IU/mL   09/04/2024 Not Detected Not Detected IU/mL Final     CMV DNA IU/mL, Instrument   Date Value Ref Range Status   03/28/2025 54 (H) Not Detected IU/mL Final   03/14/2025 43 (H) Not Detected IU/mL Final   03/07/2025 43 (H) Not Detected IU/mL Final   02/28/2025 62 (H) Not Detected IU/mL Final   02/21/2025 38 (H) Not Detected IU/mL Final   02/07/2025 49 (H) Not Detected IU/mL Final   02/03/2025 42 (H) Not Detected IU/mL Final   01/27/2025 444 (H) Not Detected IU/mL Final   01/21/2025 1,680 (H) Not Detected IU/mL Final     CMV log   Date Value Ref Range Status   06/13/2025 <1.5  Final   05/23/2025 <1.5  Final   04/18/2025 <1.5  Final   04/04/2025 <1.5  Final   03/28/2025 1.7   Final   03/21/2025 <1.5  Final   03/14/2025 1.6  Final   03/07/2025 1.6  Final   02/28/2025 1.8  Final   02/21/2025 1.6  Final   02/14/2025 <1.5  Final   02/10/2025 <1.5  Final   02/07/2025 1.7  Final   02/03/2025 1.6  Final   01/27/2025 2.6  Final   01/21/2025 3.2  Final   01/20/2025 3.3  Final   01/13/2025 4.4  Final   01/06/2025 4.4  Final   01/05/2025 4.5  Final   12/31/2024 4.1  Final   12/27/2024 3.8  Final   12/20/2024 3.2  Final   12/13/2024 2.9  Final   12/05/2024 3.0  Final   11/29/2024 2.8  Final   11/19/2024 2.9  Final   11/12/2024 3.1  Final   11/05/2024 3.0  Final   10/30/2024 3.0  Final       CMV resistance testing  Recent Labs   Lab Test 12/31/24  0840 10/22/24  0915   CMVDRUGRES See Note See Note     CMV Drug Resistance by NGS   Date Value Ref Range Status   12/31/2024 See Note  Final     Comment:               Ganciclovir,GCV             Resistant            Foscarnet,FOS               Sensitive            Cidofovir,CDV               Sensitive            Maribavir,MBV               Sensitive            Letermovir,LTV              Sensitive               UL97 drug resistance mutations identified: C603W            UL97 additional mutations identified:D68N, T75A,   Q126L, V244I            UL97 uncalled mutation sites:None               UL54 drug resistance mutations identified: None            UL54 additional mutations identified:S655L,   F669L, N685S, L897S, L6560Q, I0867T            UL54 uncalled mutation sites:None               UL27 drug resistance mutations identified: None            UL27 additional mutations identified:P11L, L12P,   R90K, D289N, N294D, Y297H, G300N, D351N, A519T, V520A            UL27 uncalled mutation sites:None               UL56 drug resistance mutations identified: None            UL56 additional mutations identified:V425A,   N586D, S749N            UL56 uncalled mutation sites:None     CMVResistanceCaller software version:  2.0.0.1      [id number]  INTERPRETIVE INFORMATION: CMV Drug Resistance by NGS,                            5 Drugs  This assay assesses resistance to ganciclovir, foscarnet,   cidofovir, maribavir, and letermovir. Resistance-associated   mutations in the UL97, UL54, UL27, and UL56 genes are   sequenced using next generation sequencing. Drug resistance   is assigned using an [employer]-developed database of published   resistance mutations. For a list of resistance mutations   refer to https://[email or web address]/Pub/6026356.  This test detects populations down to 10% of the total   population which may account for resistance interpretation   differences between methods. Some insertions or deletions   may be difficult to detect using this software.  Drug Resistance Interpretations are defined as follows:  -Not determined indicates incomplete sequence coverage   across a given gene or genes.  -Likely sensitive indicates that a single drug resistance   mutation position did not have adequate coverage. However,   the missing mutation is rarely observed.  -Sensitive indicates no drug resistance mutations were   detected.  -Possible resistance indicates mutations were detected with   borderline-level drug resistance or conflicting resistance   status reported in the literature.  -Resistant indicates that mutations associated with drug   resistance were detected.  -Inadequate sequence coverage indicates a low number of   sequence reads at a given drug resistance site.    Mutations are classified as follows:  -Drug Resistance Mutations reduce susceptibility of   specific drug classes whether found in isolation or in   combination with other drugs.  -Additional mutations have not been associated with drug   resistance.  -Uncalled mutation sites are known locations of drug   resistance mutations that have an inadequate number of   sequencing reads to accurately determine if mutations are   present.    Drugs  associated with each gene are as follows:  UL97: ganciclovir, maribavir  UL54: ganciclovir, foscarnet, cidofovir  UL27: maribavir  UL56: letermovir    This test was developed, and its performance   characteristics determined by [facility]. It has not   been cleared or approved by the U.S. Food and Drug   Administration. This test was performed in a CLIA-certified   laboratory and is intended for clinical purposes.  Performed By: [facility]  [address] [place] [id number]  : Sam Abel MD, PhD  CLIA Number: [id number]   10/22/2024 See Note  Final     Comment:               Ganciclovir,GCV             Resistant            Foscarnet,FOS               Sensitive            Cidofovir,CDV               Resistant            Maribavir,MBV               Sensitive            Letermovir,LTV              Sensitive               UL97 drug resistance mutations identified: None            UL97 additional mutations identified:D68N, T75A,   Q126L, V244I            UL97 uncalled mutation sites:None               UL54 drug resistance mutations identified: T503I            UL54 additional mutations identified:S655L,   F669L, N685S, L897S, N1442H, A4940P            UL54 uncalled mutation sites:None               UL27 drug resistance mutations identified: None            UL27 additional mutations identified:P11L, L12P,   R90K, D289N, N294D, Y297H, G300N, D351N, A519T, V520A            UL27 uncalled mutation sites:None               UL56 drug resistance mutations identified: None            UL56 additional mutations identified:V425A,   N586D, S749N            UL56 uncalled mutation sites:None     CMVResistanceCaller software version:  2.0.0.1     [id number]  INTERPRETIVE INFORMATION: CMV Drug Resistance by NGS,                            5 Drugs  This assay assesses resistance to ganciclovir, foscarnet,   cidofovir, maribavir, and letermovir.  Resistance-associated   mutations in the UL97, UL54, UL27, and UL56 genes are   sequenced using next generation sequencing. Drug resistance   is assigned using an [employer]-developed database of published   resistance mutations. For a list of resistance mutations   refer to [email or web address]/Pub/3004838.    This test detects populations down to 10% of the total   population which may account for resistance interpretation   differences between methods. Some insertions or deletions   may be difficult to detect using this software.     Result interpretations are as follows:    -Sensitive indicates no evidence of drug resistance   compared with a wild-type virus.  -Possible resistance indicates mutations were detected with   borderline-level drug resistance or conflicting resistance   status reported in the literature.  -Resistant indicates evidence of drug resistance compared   with a wild-type virus.  -Not determined indicates incomplete sequence coverage   across a given gene or genes.  -Additional mutations include variants that have not been   associated with drug resistance.  -Uncalled mutation sites include drug resistance mutation   positions with an inadequate number of sequencing reads.  -Inadequate sequence coverage indicates a low number of   sequence reads at a given drug resistance site.    Drugs associated with each gene are as follows:    UL97: ganciclovir, maribavir  UL54: ganciclovir, foscarnet, cidofovir   UL27: maribavir   UL56: letermovir    This test was developed, and its performance   characteristics determined by [facility]. It has not   been cleared or approved by the U.S. Food and Drug   Administration. This test was performed in a CLIA-certified   laboratory and is intended for clinical purposes.  Performed By: [facility]  [address] [place] [id number]  : Sam Abel MD, PhD  CLIA Number: [id number]        EBV viral loads   No lab  "results found.  EBV Qualitative PCR   Date Value Ref Range Status   07/02/2024 Detected (A)  Final     Comment:     INTERPRETIVE INFORMATION:  Jake Barr Virus by PCR    This test was developed and its performance characteristics   determined by [facility]. It has not been cleared or   approved by the US Food and Drug Administration. This test   was performed in a CLIA certified laboratory and is   intended for clinical purposes.  Performed By: [facility]  [address] [place] [id number]  : Sam Abel MD, PhD  CLIA Number: [id number]       Human Herpes Virus 6 viral loads    No results found for: \"H6RES\" No results found for: \"H6SPEC\"    CMV Antibody IgG   Date Value Ref Range Status   04/22/2025 Positive, suggests recent or past exposure. (A) No detectable antibody.  Final   08/13/2024 Positive, suggests recent or past exposure. (A) No detectable antibody.  Final   07/30/2024 No detectable antibody. No detectable antibody.  Final     No results found for: \"EBIG2\", \"EBIGM\", \"EBVIGG\", \"EBIGG\", \"EBVAGN\", \"[id number]\", \"TOXG\"  "

## 2025-06-17 NOTE — LETTER
6/17/2025       RE: Ira Zamora  [address] [facility]  [place] [address]     Dear Colleague,    Thank you for referring your patient, Ira Zamora, to the [facility] INFECTIOUS DISEASE CLINIC [place] at [facility]. Please see a copy of my visit note below.        Transplant Infectious Diseases Outpatient Progress note      Patient:  Ira Zamora, Date of birth 1990, Medical record number [id number]  Date of Visit:  06/17/2025         Recommendations:   Continue to monitor CMV PCR weekly while off of maribavir (last dose 2/17/25)  Hold on Tdap until IS is more stable.      RTC: 2 months. In person or virtually.         Synopsis of Immune Status and Presentation:   Transplants:  4/17/2024 (Kidney), Postoperative day:  426     This patient is a 34 year old female with PPI-induced interstitial nephritis s/p KT in 4/2024. ATG for induction followed by TAC/azathioprine then CsA/azathioprine/prednisone but will be back on TAC/azathioprine/prednisone on 6/19/25 due to CsA-induced poor appetite. Also received belatacept x1 in 7/2024 when MPA was discontinued due to GI/ ulcers.   Has history of Crohn's disease and RA.  With resistant CMV viremia.         Active Problems and Infectious Diseases Issues:   1. Donor derived, resistant CMV viremia.   Occurred 10/2024 while on CMV universal ppx with VGCV. Resistance testing on 10/22/24 revealing UL54 mutation T503I with resistance to GCV and CDV. VGCV was continued with predictable lack of response associated with accumulation of UL97 C603W high grade mutation but the loss of the UL54 mutation 12/31/24. This discrepant mutation results is not unusual.   Foscarnet was started 1/4/25 with adverse events of hypophosphatemia, hypomagnesemia, hypercalcemia and most recently DANY. After 9 days of foscarnet use, VL did not decrease substantially so maribavir was added on 1/15/25. VL declined as of 1/20/25 to 3.3 log from peak of 4.5  log. With the decline of VL, we held foscarnet on 1/21/25 and continued maribavir till 2/17/24 after securing two values of <1.5 log one week apart.   The discontinuation of maribavir on 2/17/25 was followed by asymptomatic, low grade viremia of 1.6-1.8 log is this asymptomatic patient is likely inconsequential. However, will continue to monitor weekly given recent changes to IS (switch from TAC to CsA on 3/4/25).     Ideally, we should be able to now monitor CMV monthly or per kidney transplant protocol; however, with the plans to switch from CsA to TAC then from TAC/azathioprine/prednisone to TAC/MMF, I would like to resume weekly CMV.     10/22/24     12/31/24      2. BKV viremia  At low value of 3 log (3/7/25) after peaking at 4 log on 2/21/25.   TAC switched to CsA on 3/4/25.     3. Dysgeusia  Maribavir-induced.   Resolved within 24 hr of stopping maribavir.         Old Problems and Infectious Diseases Issues:   Crohn's treated with ustekinumab, last dose 3/2024.   Oral and genital ulcers with negative ID-related workup. Resolved after switching from MPA to azathioprine 7/2024.     Other Infectious Disease issues include:  - QTc: 437 as of 1/9/25.   - Toxoplasma serostatus: IgG ?  - Viral serostatus: CMV D+/R-, EBV D+/R+, HSV1?/2?, VZV +, currently on foscarnet for CMV therapy.   - PJP (and possibly Toxoplasma) prophylaxis: bactrim.  - Immunization status: Tdap and COVID-19 vaccines. Will wait until the IS therapy is more stable and CMV is more stable.   - Gamma globulin status: 900 as of 12/31/24.       Attestation:  Total duration of visit including chart review, reviewing labs and imaging, interviewing and examining the patient, documentation, and sending communication to the patient and to the primary treating team, all at the same day of this encounter, is: 39 minutes.   Edgardo Mccoy MD    Contact information available via [facility] Paging/Directory     06/17/2025         Interim History:   Poor appetite  but no weight loss.   No odynophagia and no dysphagia. No fever or night sweats.   No other complaints.          Review of Systems:     As mentioned in the interim history otherwise negative by reviewing constitutional symptoms, central and peripheral neurological systems, respiratory system, cardiac system, GI system,  system, musculoskeletal, skin, allergy, and lymphatics.                  Immunizations:     Immunization History   Administered Date(s) Administered     COVID-19 Bivalent 12+ (Pfizer) 09/23/2022     COVID-19 MONOVALENT 12+ (Pfizer) 01/21/2021, 02/10/2021, 09/16/2021     HPV Quadrivalent 10/28/2008, 06/20/2011, 10/28/2011     HepB, Unspecified 12/30/2002     Hepatitis A (VAQTA)(ADULT 19+) 02/19/2019, 08/19/2019     Hepatitis B, Peds (Engerix-B/Recombivax HB) 12/30/2002     Influenza (intradermal) 09/14/2021     Influenza Vaccine 18-64 (Flublok) 10/13/2023     Influenza Vaccine, 6+MO IM (QUADRIVALENT W/PRESERVATIVES) 09/14/2021     Influenza, Split Virus, Trivalent, Pf (Fluzone\Fluarix) 11/29/2024     Pneumo Conj 13-V (2010&after) 10/23/2018     Pneumococcal 20 valent Conjugate (Prevnar 20) 11/17/2023     Pneumococcal 23 valent 11/22/2021     Pneumococcal, Unspecified 12/18/2018, 11/22/2021     TDAP (Adacel,Boostrix) 10/23/2012     Td (Adult), Adsorbed 04/12/2004, 01/01/2006             Allergies:     Allergies   Allergen Reactions     Amlodipine Headache and Other (See Comments)     Other Reaction(s): Unknown     Omeprazole Other (See Comments)     All PPIs per patient     Proton Pump Inhibitors Nephrotoxicity     No PPIs due to history of renal failure due to interstitial nephritis     Tegaderm Transparent Dressing (Informational Only) Blisters             Medications:     Current Outpatient Medications   Medication Sig Dispense Refill     acetaminophen (TYLENOL) 500 MG tablet Take 1,000 mg by mouth every 6 hours as needed       aspirin 81 MG EC tablet Take 81 mg by mouth daily.       atorvastatin  (LIPITOR) 10 MG tablet TAKE 1 TABLET  BY MOUTH DAILY. 30 tablet 1     azaTHIOprine (IMURAN) 50 MG tablet Take 1 tablet (50 mg) by mouth daily. 30 tablet 11     cinacalcet (SENSIPAR) 30 MG tablet Take 2 tablets (60 mg) by mouth daily. 60 tablet 2     hydrOXYzine HCl (ATARAX) 25 MG tablet Take 1 tablet (25 mg) by mouth every 6 hours as needed for other (adjuvant pain) 20 tablet 0     ketoconazole (NIZORAL) 2 % external shampoo Apply topically daily as needed for itching or irritation (use there times a week and let sit on scalp for 5 minutes). 120 mL 11     levonorgestrel (KYLEENA) 19.5 MG IUD 1 Device by Intrauterine route       magnesium glycinate 100 MG CAPS capsule Take 3 capsules (300 mg) by mouth 2 times daily. Take in place of magnesium oxide 180 capsule 2     magnesium oxide (MAG-OX) 400 MG tablet Take 2 tablets (800 mg) by mouth 2 times daily. 120 tablet 3     PARoxetine (PAXIL) 30 MG tablet Take 30 mg by mouth every evening       phosphorus tablet 250 mg (PHOSPHA 250 NEUTRAL) 250 MG per tablet Take 250 mg by mouth 2 times daily.       predniSONE (DELTASONE) 5 MG tablet Take 1 tablet (5 mg) by mouth daily. 30 tablet 11     sulfamethoxazole-trimethoprim (BACTRIM) 400-80 MG tablet Take 1 tablet by mouth daily 30 tablet 11     tacrolimus (GENERIC EQUIVALENT) 1 MG capsule Take 1 capsule (1 mg) by mouth 2 times daily. 60 capsule 11     triamcinolone (KENALOG) 0.1 % paste Take by mouth 2 times daily as needed (canker sore).       Vonoprazan Fumarate (VOQUEZNA) 20 MG TABS Take 20 mg by mouth daily 60 tablet 0     No current facility-administered medications for this visit.            Physical Exam:     Vitals:    06/17/25 1631   BP: 128/88   Pulse: 114   SpO2: 94%   Weight: 73.5 kg (162 lb)      Constitutional: awake, alert, cooperative, no apparent distress and appears at stated age, well nourished. Did not sound short of breath.   HEENT: normocephalic, moist buccal mucosa with no ulcers and no thrush, no enlarged  tonsils.   Lymphatics: no cervical or axillary LA Bi.            Laboratory Data:     Absolute CD4, [place] T Cells   Date Value Ref Range Status   04/22/2025 215 (L) 441 - 2,156 cells/uL Final   12/31/2024 73 (L) 441 - 2,156 cells/uL Final   10/22/2024 82 (L) 441 - 2,156 cells/uL Final   07/10/2024 59 (L) 441 - 2,156 cells/uL Final       Inflammatory Markers  No lab results found.    Immune Globulin Studies      Recent Labs   Lab Test 02/14/25  1112 12/31/24  0840 07/08/24  0542   IGG 1,177 903 850       Metabolic Studies    Recent Labs   Lab Test 06/13/25  1531 05/23/25  0947 04/22/25  1139 04/22/25  1040 04/18/25  0906 04/04/25  1051 03/28/25  0836 02/03/25  1101 01/30/25  0835 04/17/24  2230 04/17/24  2123    140 137  --  141 141 140   < > 137   < > 136   POTASSIUM 4.6 4.0 3.9  --  4.3 4.5 4.1   < > 4.5   < > 3.7   CHLORIDE 103 106 104  --  106 104 106   < > 111*   < >  --    CO2 25 28 24  --  24 27 25   < > 17*   < >  --    ANIONGAP 12 6* 9  --  11 10 9   < > 9   < >  --    BUN 10.9 11.5 19.8  --  17.4 17.8 13.8   < > 16.4   < >  --    CR 0.94 0.93 0.87  --  0.91 1.07* 1.05*   < > 0.89   < >  --    GFRESTIMATED 81 82 89  --  84 69 71   < > 87   < >  --    * 126* 91  --  81 96 95   < > 82   < > 168*   A1C  --   --   --  5.0  --   --   --   --   --    < >  --    ROBLES 10.7* 10.1 10.4  --  10.2 10.4 9.9   < > 9.2   < >  --    PHOS  --   --  3.4  --   --   --   --   --  2.1*   < >  --    MAG  --   --  1.8  --   --   --   --   --  1.4*   < >  --    LACT  --   --   --   --   --   --   --   --   --   --  0.8    < > = values in this interval not displayed.       Hepatic Studies    Recent Labs   Lab Test 04/22/25  1139 04/22/25  1040 01/30/25  0835 01/27/25  0813 01/23/25  0855 01/20/25  0850 01/16/25  0905   BILITOTAL  --  0.9 0.7 0.7 0.7 0.6 0.6   ALKPHOS  --  67 58 57 59 58 55   PROTTOTAL  --  7.3 6.5 6.4 6.8 6.9 6.0*   ALBUMIN 4.2 4.1 3.7 3.8 3.8 3.7 3.4*   AST  --  22 28 40 31 31 34   ALT  --  17 38 46 34  "34 42       Hematology Studies     Recent Labs   Lab Test 06/13/25  1531 05/23/25  0947 04/18/25  0906 04/04/25  1051 03/28/25  0836 03/21/25  0848 02/03/25  1101 01/30/25  0835 01/27/25  0813 01/23/25  0855 01/21/25  1445 01/20/25  0850 01/16/25  0905 01/13/25  0850   WBC 8.5 6.1 6.2 5.1 7.4 6.7   < > 7.7 7.2 5.1   < > 4.4 3.0* 3.5*   ANEU  --   --   --   --   --   --   --  5.6 5.2 3.7  --  3.3 2.3 2.8   ALYM  --   --   --   --   --   --   --  1.0 1.0 0.7*  --  0.5* 0.3* 0.2*   FAVIAN  --   --   --   --   --   --   --  0.9 0.8 0.6  --  0.6 0.4 0.4   AEOS  --   --   --   --   --   --   --  0.2 0.1 0.1  --  0.1 0.1 0.0   HGB 14.8 14.7 13.4 13.2 13.9 13.9   < > 11.4* 11.8 11.8   < > 11.9 11.7 13.7   HCT 45.5 46.0 40.6 40.0 41.9 42.4   < > 35.5 33.5* 34.1*   < > 35.5 34.8* 38.8    178 175 193 187 199   < > 122* 167 204   < > 188 157 184    < > = values in this interval not displayed.       Clotting Studies    Recent Labs   Lab Test 07/08/24  0542 07/01/24  0618 06/30/24  0516 06/28/24  0602 04/17/24  0958 09/29/22  1140   INR 1.07 1.20* 1.27* 1.25* 1.20* 1.01   PTT  --   --   --   --  27 29       Urine Studies    Recent Labs   Lab Test 07/06/24  1841 06/06/24  1113 05/03/24  1600 04/17/24  1113 09/29/22  1153   URINEPH 7.0 5.5 5.5 8.5* 8.5*   NITRITE Negative Negative Negative Negative Negative   LEUKEST Trace* Moderate* Trace* Negative Negative   WBCU 3 6* 4 <1 1         Microbiology:  Last 6 Culture results with specimen source  No results found for: \"CULT\" No results found for: \"SDES\"     Last check of C difficile  No results found for: \"CDBPCT\"      Virology:  CMV viral loads    CMV viral loads    CMV DNA IU/mL   Date Value Ref Range Status   06/13/2025 <35 (A) Not Detected IU/mL Final     Comment:     CMV DNA detected, less than 35 IU/mL   05/23/2025 <35 (A) Not Detected IU/mL Final     Comment:     CMV DNA detected, less than 35 IU/mL     CMV DNA IU/mL, Instrument   Date Value Ref Range Status   03/28/2025 " 54 (H) Not Detected IU/mL Final   03/14/2025 43 (H) Not Detected IU/mL Final   03/07/2025 43 (H) Not Detected IU/mL Final   02/28/2025 62 (H) Not Detected IU/mL Final   02/21/2025 38 (H) Not Detected IU/mL Final   02/07/2025 49 (H) Not Detected IU/mL Final   02/03/2025 42 (H) Not Detected IU/mL Final   01/27/2025 444 (H) Not Detected IU/mL Final   01/21/2025 1,680 (H) Not Detected IU/mL Final     CMV Qualitative PCR   Date Value Ref Range Status   07/02/2024 Not Detected  Final     Comment:     NOT DETECTED - A negative result does not rule out the   presence of PCR inhibitors in the patient specimen or   assay specific nucleic acid in concentrations below the   level of detection by the assay.  INTERPRETIVE INFORMATION: Cytomegalovirus Detection by PCR    This test was developed and its performance characteristics   determined by [facility]. It has not been cleared or   approved by the US Food and Drug Administration. This test   was performed in a CLIA certified laboratory and is   intended for clinical purposes.  Performed By: [facility]  [address] [place] [id number]  : Sam Abel MD, PhD  CLIA Number: [id number]     Viral loads    Recent Labs   Lab Test 06/13/25  1531 05/23/25  0947 04/18/25  0906 04/04/25  1051 03/28/25  0836 03/21/25  0848 03/14/25  0942 03/07/25  0827 03/07/25  0826 02/28/25  0951 02/21/25  0923 02/10/25  0858 02/07/25  1021 02/03/25  1101 01/27/25  0813 01/21/25  1445 08/06/24  0858 07/30/24  0940 07/09/24  0639 07/02/24  1249 07/02/24  1238   EBQI  --   --   --   --   --   --   --   --   --   --   --   --   --   --   --   --   --  Not Detected   < >  --   --    CMVQNT <35* <35* <35*   < >  --    < >  --   --   --   --   --    < >  --   --   --   --    < >  --    < >  --   --    CMVRESINST  --   --   --   --  54*  --  43*  --  43* 62* 38*  --  49* 42* 444* 1,680*   < > 169*   < >  --   --    CMVLOG <1.5 <1.5 <1.5   < > 1.7   < > 1.6  --   1.6 1.8 1.6   < > 1.7 1.6 2.6 3.2   < > 2.2   < >  --   --    CMVQAL  --   --   --   --   --   --   --   --   --   --   --   --   --   --   --   --   --   --   --  Not Detected  --    HSDNA1  --   --   --   --   --   --   --   --   --   --   --   --   --   --   --   --   --   --   --   --  Not Detected   HSDNA2  --   --   --   --   --   --   --   --   --   --   --   --   --   --   --   --   --   --   --   --  Not Detected   BKRES Not Detected  --  <22*   < > <22*  --   --   --   --   --   --   --   --   --   --   --   --   --   --   --   --    BKVSPTYPE Blood Blood Blood   < > Blood  --   --    < >  --  Blood Blood   < > Blood Blood  --  Blood   < >  --    < >  --   --     < > = values in this interval not displayed.       CMV viral loads    CMV DNA IU/mL   Date Value Ref Range Status   06/13/2025 <35 (A) Not Detected IU/mL Final     Comment:     CMV DNA detected, less than 35 IU/mL   05/23/2025 <35 (A) Not Detected IU/mL Final     Comment:     CMV DNA detected, less than 35 IU/mL   04/18/2025 <35 (A) Not Detected IU/mL Final     Comment:     CMV DNA detected, less than 35 IU/mL   04/04/2025 <35 (A) Not Detected IU/mL Final     Comment:     CMV DNA detected, less than 35 IU/mL   03/21/2025 <35 (A) Not Detected IU/mL Final     Comment:     CMV DNA detected, less than 35 IU/mL   02/14/2025 <35 (A) Not Detected IU/mL Final     Comment:     CMV DNA detected, less than 35 IU/mL   02/10/2025 <35 (A) Not Detected IU/mL Final     Comment:     CMV DNA detected, less than 35 IU/mL   09/18/2024 <35 (A) Not Detected IU/mL Final     Comment:     CMV DNA detected, less than 35 IU/mL   09/04/2024 Not Detected Not Detected IU/mL Final     CMV DNA IU/mL, Instrument   Date Value Ref Range Status   03/28/2025 54 (H) Not Detected IU/mL Final   03/14/2025 43 (H) Not Detected IU/mL Final   03/07/2025 43 (H) Not Detected IU/mL Final   02/28/2025 62 (H) Not Detected IU/mL Final   02/21/2025 38 (H) Not Detected IU/mL Final   02/07/2025 49  (H) Not Detected IU/mL Final   02/03/2025 42 (H) Not Detected IU/mL Final   01/27/2025 444 (H) Not Detected IU/mL Final   01/21/2025 1,680 (H) Not Detected IU/mL Final     CMV log   Date Value Ref Range Status   06/13/2025 <1.5  Final   05/23/2025 <1.5  Final   04/18/2025 <1.5  Final   04/04/2025 <1.5  Final   03/28/2025 1.7  Final   03/21/2025 <1.5  Final   03/14/2025 1.6  Final   03/07/2025 1.6  Final   02/28/2025 1.8  Final   02/21/2025 1.6  Final   02/14/2025 <1.5  Final   02/10/2025 <1.5  Final   02/07/2025 1.7  Final   02/03/2025 1.6  Final   01/27/2025 2.6  Final   01/21/2025 3.2  Final   01/20/2025 3.3  Final   01/13/2025 4.4  Final   01/06/2025 4.4  Final   01/05/2025 4.5  Final   12/31/2024 4.1  Final   12/27/2024 3.8  Final   12/20/2024 3.2  Final   12/13/2024 2.9  Final   12/05/2024 3.0  Final   11/29/2024 2.8  Final   11/19/2024 2.9  Final   11/12/2024 3.1  Final   11/05/2024 3.0  Final   10/30/2024 3.0  Final       CMV resistance testing  Recent Labs   Lab Test 12/31/24  0840 10/22/24  0915   CMVDRUGRES See Note See Note     CMV Drug Resistance by NGS   Date Value Ref Range Status   12/31/2024 See Note  Final     Comment:               Ganciclovir,GCV             Resistant            Foscarnet,FOS               Sensitive            Cidofovir,CDV               Sensitive            Maribavir,MBV               Sensitive            Letermovir,LTV              Sensitive               UL97 drug resistance mutations identified: C603W            UL97 additional mutations identified:D68N, T75A,   Q126L, V244I            UL97 uncalled mutation sites:None               UL54 drug resistance mutations identified: None            UL54 additional mutations identified:S655L,   F669L, N685S, L897S, B8032U, R3860G            UL54 uncalled mutation sites:None               UL27 drug resistance mutations identified: None            UL27 additional mutations identified:P11L, L12P,   R90K, D289N, N294D, Y297H, G300N, D351N,  A519T, V520A            UL27 uncalled mutation sites:None               UL56 drug resistance mutations identified: None            UL56 additional mutations identified:V425A,   N586D, S749N            UL56 uncalled mutation sites:None     CMVResistanceCaller software version:  2.0.0.1     [id number]  INTERPRETIVE INFORMATION: CMV Drug Resistance by NGS,                            5 Drugs  This assay assesses resistance to ganciclovir, foscarnet,   cidofovir, maribavir, and letermovir. Resistance-associated   mutations in the UL97, UL54, UL27, and UL56 genes are   sequenced using next generation sequencing. Drug resistance   is assigned using an [employer]-developed database of published   resistance mutations. For a list of resistance mutations   refer to [email or web address]/7526851.  This test detects populations down to 10% of the total   population which may account for resistance interpretation   differences between methods. Some insertions or deletions   may be difficult to detect using this software.  Drug Resistance Interpretations are defined as follows:  -Not determined indicates incomplete sequence coverage   across a given gene or genes.  -Likely sensitive indicates that a single drug resistance   mutation position did not have adequate coverage. However,   the missing mutation is rarely observed.  -Sensitive indicates no drug resistance mutations were   detected.  -Possible resistance indicates mutations were detected with   borderline-level drug resistance or conflicting resistance   status reported in the literature.  -Resistant indicates that mutations associated with drug   resistance were detected.  -Inadequate sequence coverage indicates a low number of   sequence reads at a given drug resistance site.    Mutations are classified as follows:  -Drug Resistance Mutations reduce susceptibility of   specific drug classes whether found in isolation or in   combination  with other drugs.  -Additional mutations have not been associated with drug   resistance.  -Uncalled mutation sites are known locations of drug   resistance mutations that have an inadequate number of   sequencing reads to accurately determine if mutations are   present.    Drugs associated with each gene are as follows:  UL97: ganciclovir, maribavir  UL54: ganciclovir, foscarnet, cidofovir  UL27: maribavir  UL56: letermovir    This test was developed, and its performance   characteristics determined by [facility]. It has not   been cleared or approved by the U.S. Food and Drug   Administration. This test was performed in a CLIA-certified   laboratory and is intended for clinical purposes.  Performed By: [facility]  [address] [place], [place] [id number]  : Sam Abel MD, PhD  IA Number: [id number]   10/22/2024 See Note  Final     Comment:               Ganciclovir,GCV             Resistant            Foscarnet,FOS               Sensitive            Cidofovir,CDV               Resistant            Maribavir,MBV               Sensitive            Letermovir,LTV              Sensitive               UL97 drug resistance mutations identified: None            UL97 additional mutations identified:D68N, T75A,   Q126L, V244I            UL97 uncalled mutation sites:None               UL54 drug resistance mutations identified: T503I            UL54 additional mutations identified:S655L,   F669L, N685S, L897S, L8195Y, G2630V            UL54 uncalled mutation sites:None               UL27 drug resistance mutations identified: None            UL27 additional mutations identified:P11L, L12P,   R90K, D289N, N294D, Y297H, G300N, D351N, A519T, V520A            UL27 uncalled mutation sites:None               UL56 drug resistance mutations identified: None            UL56 additional mutations identified:V425A,   N586D, S749N            UL56 uncalled mutation sites:None      CMVResistanceCaller software version:  2.0.0.1     [id number]  INTERPRETIVE INFORMATION: CMV Drug Resistance by NGS,                            5 Drugs  This assay assesses resistance to ganciclovir, foscarnet,   cidofovir, maribavir, and letermovir. Resistance-associated   mutations in the UL97, UL54, UL27, and UL56 genes are   sequenced using next generation sequencing. Drug resistance   is assigned using an [employer]-developed database of published   resistance mutations. For a list of resistance mutations   refer to [email or web address]/9604542.    This test detects populations down to 10% of the total   population which may account for resistance interpretation   differences between methods. Some insertions or deletions   may be difficult to detect using this software.     Result interpretations are as follows:    -Sensitive indicates no evidence of drug resistance   compared with a wild-type virus.  -Possible resistance indicates mutations were detected with   borderline-level drug resistance or conflicting resistance   status reported in the literature.  -Resistant indicates evidence of drug resistance compared   with a wild-type virus.  -Not determined indicates incomplete sequence coverage   across a given gene or genes.  -Additional mutations include variants that have not been   associated with drug resistance.  -Uncalled mutation sites include drug resistance mutation   positions with an inadequate number of sequencing reads.  -Inadequate sequence coverage indicates a low number of   sequence reads at a given drug resistance site.    Drugs associated with each gene are as follows:    UL97: ganciclovir, maribavir  UL54: ganciclovir, foscarnet, cidofovir   UL27: maribavir   UL56: letermovir    This test was developed, and its performance   characteristics determined by [facility]. It has not   been cleared or approved by the U.S. Food and Drug   Administration.  "This test was performed in a CLIA-certified   laboratory and is intended for clinical purposes.  Performed By: [facility]  [address] [place] [address]  : Sam Abel MD, PhD  CLIA Number: [id number]        EBV viral loads   No lab results found.  EBV Qualitative PCR   Date Value Ref Range Status   07/02/2024 Detected (A)  Final     Comment:     INTERPRETIVE INFORMATION:  Jake Barr Virus by PCR    This test was developed and its performance characteristics   determined by [facility]. It has not been cleared or   approved by the US Food and Drug Administration. This test   was performed in a CLIA certified laboratory and is   intended for clinical purposes.  Performed By: [facility]  [address] [place] [address]  : Sam Abel MD, PhD  CLIA Number: [id number]       Human Herpes Virus 6 viral loads    No results found for: \"H6RES\" No results found for: \"H6SPEC\"    CMV Antibody IgG   Date Value Ref Range Status   04/22/2025 Positive, suggests recent or past exposure. (A) No detectable antibody.  Final   08/13/2024 Positive, suggests recent or past exposure. (A) No detectable antibody.  Final   07/30/2024 No detectable antibody. No detectable antibody.  Final     No results found for: \"EBIG2\", \"EBIGM\", \"EBVIGG\", \"EBIGG\", \"EBVAGN\", \"SR5556\", \"TOXG\"    Again, thank you for allowing me to participate in the care of your patient.      Sincerely,    Edgardo Mccoy MD    "

## 2025-06-23 ENCOUNTER — LAB (OUTPATIENT)
Dept: LAB | Facility: OTHER | Age: 35
End: 2025-06-23
Payer: MEDICARE

## 2025-06-23 DIAGNOSIS — B25.9 CYTOMEGALOVIRUS (CMV) VIREMIA (H): ICD-10-CM

## 2025-06-23 DIAGNOSIS — Z48.298 AFTERCARE FOLLOWING ORGAN TRANSPLANT: ICD-10-CM

## 2025-06-23 LAB
CYCLOSPORINE BLD LC/MS/MS-MCNC: 30 UG/L (ref 50–400)
TME LAST DOSE: ABNORMAL H
TME LAST DOSE: ABNORMAL H

## 2025-06-23 PROCEDURE — 80158 DRUG ASSAY CYCLOSPORINE: CPT

## 2025-06-23 PROCEDURE — 36415 COLL VENOUS BLD VENIPUNCTURE: CPT

## 2025-06-24 DIAGNOSIS — B27.00 EBV (EPSTEIN-BARR VIRUS) VIREMIA: ICD-10-CM

## 2025-06-24 DIAGNOSIS — Z48.298 AFTERCARE FOLLOWING ORGAN TRANSPLANT: Primary | ICD-10-CM

## 2025-06-24 DIAGNOSIS — B25.9 CYTOMEGALOVIRUS (CMV) VIREMIA (H): ICD-10-CM

## 2025-06-24 LAB
CMV DNA SPEC NAA+PROBE-ACNC: <35 IU/ML
CMV DNA SPEC NAA+PROBE-LOG#: <1.5 {LOG_COPIES}/ML
SPECIMEN TYPE: ABNORMAL

## 2025-06-27 ENCOUNTER — RESULTS FOLLOW-UP (OUTPATIENT)
Dept: TRANSPLANT | Facility: CLINIC | Age: 35
End: 2025-06-27

## 2025-06-27 DIAGNOSIS — Z48.298 AFTERCARE FOLLOWING ORGAN TRANSPLANT: Primary | ICD-10-CM

## 2025-06-27 DIAGNOSIS — B27.00 EBV (EPSTEIN-BARR VIRUS) VIREMIA: ICD-10-CM

## 2025-06-27 DIAGNOSIS — B25.9 CYTOMEGALOVIRUS (CMV) VIREMIA (H): ICD-10-CM

## 2025-07-01 RX ORDER — TACROLIMUS 1 MG/1
CAPSULE ORAL
Qty: 60 CAPSULE | Refills: 11 | Status: SHIPPED | OUTPATIENT
Start: 2025-07-01

## 2025-07-01 RX ORDER — TACROLIMUS 0.5 MG/1
0.5 CAPSULE ORAL 2 TIMES DAILY
Qty: 60 CAPSULE | Refills: 11 | Status: SHIPPED | OUTPATIENT
Start: 2025-07-01

## 2025-07-03 ENCOUNTER — RESULTS FOLLOW-UP (OUTPATIENT)
Dept: TRANSPLANT | Facility: CLINIC | Age: 35
End: 2025-07-03

## 2025-07-03 ENCOUNTER — LAB (OUTPATIENT)
Dept: LAB | Facility: OTHER | Age: 35
End: 2025-07-03
Payer: COMMERCIAL

## 2025-07-03 DIAGNOSIS — B27.00 EBV (EPSTEIN-BARR VIRUS) VIREMIA: ICD-10-CM

## 2025-07-03 DIAGNOSIS — B25.9 CYTOMEGALOVIRUS (CMV) VIREMIA (H): ICD-10-CM

## 2025-07-03 DIAGNOSIS — Z48.298 AFTERCARE FOLLOWING ORGAN TRANSPLANT: ICD-10-CM

## 2025-07-03 LAB
ANION GAP SERPL CALCULATED.3IONS-SCNC: 4 MMOL/L (ref 7–15)
BUN SERPL-MCNC: 14.5 MG/DL (ref 6–20)
CALCIUM SERPL-MCNC: 10.7 MG/DL (ref 8.8–10.4)
CHLORIDE SERPL-SCNC: 106 MMOL/L (ref 98–107)
CREAT SERPL-MCNC: 0.83 MG/DL (ref 0.51–0.95)
EGFRCR SERPLBLD CKD-EPI 2021: >90 ML/MIN/1.73M2
ERYTHROCYTE [DISTWIDTH] IN BLOOD BY AUTOMATED COUNT: 12.6 % (ref 10–15)
GLUCOSE SERPL-MCNC: 88 MG/DL (ref 70–99)
HCO3 SERPL-SCNC: 32 MMOL/L (ref 22–29)
HCT VFR BLD AUTO: 44.9 % (ref 35–47)
HGB BLD-MCNC: 14.7 G/DL (ref 11.7–15.7)
MCH RBC QN AUTO: 32.4 PG (ref 26.5–33)
MCHC RBC AUTO-ENTMCNC: 32.7 G/DL (ref 31.5–36.5)
MCV RBC AUTO: 99 FL (ref 78–100)
PLATELET # BLD AUTO: 182 10E3/UL (ref 150–450)
POTASSIUM SERPL-SCNC: 5.1 MMOL/L (ref 3.4–5.3)
RBC # BLD AUTO: 4.54 10E6/UL (ref 3.8–5.2)
SODIUM SERPL-SCNC: 142 MMOL/L (ref 135–145)
TACROLIMUS BLD-MCNC: 5 UG/L (ref 5–15)
TME LAST DOSE: NORMAL H
TME LAST DOSE: NORMAL H
WBC # BLD AUTO: 6.9 10E3/UL (ref 4–11)

## 2025-07-10 DIAGNOSIS — B25.9 CYTOMEGALOVIRUS (CMV) VIREMIA (H): ICD-10-CM

## 2025-07-10 DIAGNOSIS — Z94.0 KIDNEY REPLACED BY TRANSPLANT: Primary | Chronic | ICD-10-CM

## 2025-07-10 DIAGNOSIS — B27.00 EBV (EPSTEIN-BARR VIRUS) VIREMIA: ICD-10-CM

## 2025-07-10 DIAGNOSIS — E83.52 HYPERCALCEMIA: ICD-10-CM

## 2025-07-10 RX ORDER — CINACALCET 30 MG/1
60 TABLET, FILM COATED ORAL DAILY
Qty: 60 TABLET | Refills: 11 | Status: SHIPPED | OUTPATIENT
Start: 2025-07-10

## 2025-07-14 ENCOUNTER — LAB (OUTPATIENT)
Dept: LAB | Facility: OTHER | Age: 35
End: 2025-07-14
Payer: COMMERCIAL

## 2025-07-14 DIAGNOSIS — B25.9 CYTOMEGALOVIRUS (CMV) VIREMIA (H): ICD-10-CM

## 2025-07-14 DIAGNOSIS — Z48.298 AFTERCARE FOLLOWING ORGAN TRANSPLANT: ICD-10-CM

## 2025-07-14 LAB
ANION GAP SERPL CALCULATED.3IONS-SCNC: 9 MMOL/L (ref 7–15)
BUN SERPL-MCNC: 11.7 MG/DL (ref 6–20)
CALCIUM SERPL-MCNC: 10.2 MG/DL (ref 8.8–10.4)
CHLORIDE SERPL-SCNC: 105 MMOL/L (ref 98–107)
CREAT SERPL-MCNC: 0.75 MG/DL (ref 0.51–0.95)
EGFRCR SERPLBLD CKD-EPI 2021: >90 ML/MIN/1.73M2
ERYTHROCYTE [DISTWIDTH] IN BLOOD BY AUTOMATED COUNT: 13 % (ref 10–15)
GLUCOSE SERPL-MCNC: 144 MG/DL (ref 70–99)
HCO3 SERPL-SCNC: 25 MMOL/L (ref 22–29)
HCT VFR BLD AUTO: 43.4 % (ref 35–47)
HGB BLD-MCNC: 14.1 G/DL (ref 11.7–15.7)
MCH RBC QN AUTO: 31.9 PG (ref 26.5–33)
MCHC RBC AUTO-ENTMCNC: 32.5 G/DL (ref 31.5–36.5)
MCV RBC AUTO: 98 FL (ref 78–100)
PLATELET # BLD AUTO: 187 10E3/UL (ref 150–450)
POTASSIUM SERPL-SCNC: 4.2 MMOL/L (ref 3.4–5.3)
RBC # BLD AUTO: 4.42 10E6/UL (ref 3.8–5.2)
SODIUM SERPL-SCNC: 139 MMOL/L (ref 135–145)
WBC # BLD AUTO: 7.9 10E3/UL (ref 4–11)

## 2025-07-14 PROCEDURE — 80197 ASSAY OF TACROLIMUS: CPT

## 2025-07-14 PROCEDURE — 85027 COMPLETE CBC AUTOMATED: CPT

## 2025-07-14 PROCEDURE — 36415 COLL VENOUS BLD VENIPUNCTURE: CPT

## 2025-07-14 PROCEDURE — 80048 BASIC METABOLIC PNL TOTAL CA: CPT

## 2025-07-15 DIAGNOSIS — B25.9 CYTOMEGALOVIRUS (CMV) VIREMIA (H): ICD-10-CM

## 2025-07-15 DIAGNOSIS — Z48.298 AFTERCARE FOLLOWING ORGAN TRANSPLANT: Primary | ICD-10-CM

## 2025-07-15 DIAGNOSIS — B27.00 EBV (EPSTEIN-BARR VIRUS) VIREMIA: ICD-10-CM

## 2025-07-15 LAB
CMV DNA SPEC NAA+PROBE-ACNC: NOT DETECTED IU/ML
SPECIMEN TYPE: NORMAL
TACROLIMUS BLD-MCNC: 6.4 UG/L (ref 5–15)
TME LAST DOSE: NORMAL H
TME LAST DOSE: NORMAL H

## 2025-07-24 DIAGNOSIS — B27.00 EBV (EPSTEIN-BARR VIRUS) VIREMIA: ICD-10-CM

## 2025-07-24 DIAGNOSIS — Z48.298 AFTERCARE FOLLOWING ORGAN TRANSPLANT: Primary | ICD-10-CM

## 2025-07-24 DIAGNOSIS — B25.9 CYTOMEGALOVIRUS (CMV) VIREMIA (H): ICD-10-CM

## 2025-07-28 DIAGNOSIS — B27.00 EBV (EPSTEIN-BARR VIRUS) VIREMIA: ICD-10-CM

## 2025-07-28 DIAGNOSIS — B25.9 CYTOMEGALOVIRUS (CMV) VIREMIA (H): ICD-10-CM

## 2025-07-30 ENCOUNTER — TELEPHONE (OUTPATIENT)
Dept: TRANSPLANT | Facility: CLINIC | Age: 35
End: 2025-07-30
Payer: MEDICARE

## 2025-07-30 DIAGNOSIS — B25.9 CYTOMEGALOVIRUS (CMV) VIREMIA (H): ICD-10-CM

## 2025-07-30 DIAGNOSIS — Z48.298 AFTERCARE FOLLOWING ORGAN TRANSPLANT: Primary | ICD-10-CM

## 2025-07-30 DIAGNOSIS — B27.00 EBV (EPSTEIN-BARR VIRUS) VIREMIA: ICD-10-CM

## 2025-07-30 RX ORDER — MYCOPHENOLIC ACID 180 MG/1
180 TABLET, DELAYED RELEASE ORAL 2 TIMES DAILY
Qty: 60 TABLET | Refills: 11 | Status: SHIPPED | OUTPATIENT
Start: 2025-07-30

## 2025-07-30 NOTE — TELEPHONE ENCOUNTER
Post Kidney [facility] Pancreas Transplant Team Conference  Date: 7/30/2025  Transplant Coordinator: Radha     Attendees:  [x]  Dr. Ortiz [x] Roxanne Betancur, RN [x] Tanna Castanon LPN     [x]  Dr. Arellano [x] Radha Jorge, RN    [] Dr. Camp [x] Otilia Prather, RN    [x] Dr. Tsai [x] Geneva Dennis, RN [] Frida Harris RN   [] Dr. Vann [] Shantelle Ko, RN    [] Dr. Díaz [] Jael Olson RN [] Kirk Patel, PharmD   [x]  Dr. Patel [] Nia Pryor, RN    [] Dr. Brooks [] AMANDA Delgado    [] Mary Lou Corcoran, AMANDA Mitchell   [x] Pratima Macedo, HEBERT [] Brenda Roca RN        Verbal Plan Read Back:   Stop azathioprine   Start Mycophenolic acid 180 x 2 months  After 1 month- cosyntropin test.      Routed to RN Coordinator   Tanna Castanon LPN

## 2025-07-30 NOTE — TELEPHONE ENCOUNTER
Kirk Patel, [facility]  Radha Jorge, RN  If she is actually taking phosphorus we can stop this as well- but last check was in April. May want a recheck if still taking.      Updated patient on these changes. Sent MPA to [facility].

## 2025-08-12 ENCOUNTER — VIRTUAL VISIT (OUTPATIENT)
Dept: INFECTIOUS DISEASES | Facility: CLINIC | Age: 35
End: 2025-08-12
Attending: INTERNAL MEDICINE
Payer: COMMERCIAL

## 2025-08-12 VITALS — WEIGHT: 155 LBS | BODY MASS INDEX: 28.52 KG/M2 | HEIGHT: 62 IN

## 2025-08-12 DIAGNOSIS — B25.9 CYTOMEGALOVIRUS (CMV) VIREMIA (H): Primary | ICD-10-CM

## 2025-08-12 DIAGNOSIS — B27.00 EBV (EPSTEIN-BARR VIRUS) VIREMIA: ICD-10-CM

## 2025-08-12 ASSESSMENT — PAIN SCALES - GENERAL: PAINLEVEL_OUTOF10: NO PAIN (0)

## 2025-08-27 ENCOUNTER — TELEPHONE (OUTPATIENT)
Dept: TRANSPLANT | Facility: CLINIC | Age: 35
End: 2025-08-27
Payer: MEDICARE

## 2025-08-27 DIAGNOSIS — B27.00 EBV (EPSTEIN-BARR VIRUS) VIREMIA: ICD-10-CM

## 2025-08-27 DIAGNOSIS — B25.9 CYTOMEGALOVIRUS (CMV) VIREMIA (H): ICD-10-CM

## 2025-08-27 DIAGNOSIS — Z48.298 AFTERCARE FOLLOWING ORGAN TRANSPLANT: Primary | ICD-10-CM

## 2025-08-27 RX ORDER — ALBUTEROL SULFATE 90 UG/1
1-2 INHALANT RESPIRATORY (INHALATION)
Start: 2025-08-27

## 2025-08-27 RX ORDER — ALBUTEROL SULFATE 0.83 MG/ML
2.5 SOLUTION RESPIRATORY (INHALATION)
OUTPATIENT
Start: 2025-08-27

## 2025-08-27 RX ORDER — EPINEPHRINE 1 MG/ML
0.3 INJECTION, SOLUTION, CONCENTRATE INTRAVENOUS EVERY 5 MIN PRN
OUTPATIENT
Start: 2025-08-27

## 2025-08-27 RX ORDER — METHYLPREDNISOLONE SODIUM SUCCINATE 40 MG/ML
40 INJECTION INTRAMUSCULAR; INTRAVENOUS
Start: 2025-08-27

## 2025-08-27 RX ORDER — COSYNTROPIN 0.25 MG/ML
250 INJECTION, POWDER, FOR SOLUTION INTRAMUSCULAR; INTRAVENOUS ONCE
Start: 2025-08-27 | End: 2025-08-27

## 2025-08-27 RX ORDER — DIPHENHYDRAMINE HYDROCHLORIDE 50 MG/ML
25 INJECTION, SOLUTION INTRAMUSCULAR; INTRAVENOUS
Start: 2025-08-27

## 2025-08-27 RX ORDER — MEPERIDINE HYDROCHLORIDE 25 MG/ML
25 INJECTION INTRAMUSCULAR; INTRAVENOUS; SUBCUTANEOUS
OUTPATIENT
Start: 2025-08-27

## 2025-08-27 RX ORDER — DIPHENHYDRAMINE HYDROCHLORIDE 50 MG/ML
50 INJECTION, SOLUTION INTRAMUSCULAR; INTRAVENOUS
Start: 2025-08-27

## (undated) DEVICE — LINEN TOWEL PACK X6 WHITE 5487

## (undated) DEVICE — GOWN XLG DISP 9545

## (undated) DEVICE — SU PROLENE 6-0 C-1DA 30" 8706H

## (undated) DEVICE — SURGICEL ABSORBABLE HEMOSTAT SNOW 4"X4" 2083

## (undated) DEVICE — PREP CHLORAPREP 26ML TINTED HI-LITE ORANGE 930815

## (undated) DEVICE — INSERT FOGARTY 33MM TRACTION HYDRAJAW HYDRA33

## (undated) DEVICE — SU PDS II 6-0 RB-2DA 30" Z149H

## (undated) DEVICE — SUCTION MANIFOLD NEPTUNE 2 SYS 4 PORT 0702-020-000

## (undated) DEVICE — BLADE KNIFE SURG 11 371111

## (undated) DEVICE — GLOVE SENSICARE 7.0 MSG1070 LATEX FREE

## (undated) DEVICE — SYR 10ML FINGER CONTROL W/O NDL 309695

## (undated) DEVICE — CATH PLUG W/CAP 000076

## (undated) DEVICE — TUBING SUCTION 10'X3/16" N510

## (undated) DEVICE — SU PROLENE 5-0 RB-1DA 36"  8556H

## (undated) DEVICE — SU SILK 0 TIE 6X30" A306H

## (undated) DEVICE — Device

## (undated) DEVICE — SU SILK 2-0 TIE 12X30" A305H

## (undated) DEVICE — SU PROLENE 6-0 RB-2DA 30" 8711H

## (undated) DEVICE — DRAPE LEGGINGS CLEAR 8430

## (undated) DEVICE — NDL COUNTER 20CT 31142493

## (undated) DEVICE — CATH FOLEY 3WAY 16FR 30ML LATEX 0167SI16

## (undated) DEVICE — JELLY LUBRICATING SURGILUBE 2OZ TUBE

## (undated) DEVICE — SU SILK 4-0 TIE 12X30" A303H

## (undated) DEVICE — SU SILK 3-0 SH CR 8X18" C013D

## (undated) DEVICE — SOL NACL 0.9% INJ 1000ML BAG 2B1324X

## (undated) DEVICE — SU VICRYL 2-0 CT-1 27" UND J259H

## (undated) DEVICE — PAD CHUX UNDERPAD 30X36" P3036C

## (undated) DEVICE — DRSG PRIMAPORE 02X3" 7133

## (undated) DEVICE — DECANTER BAG 2002S

## (undated) DEVICE — PUNCH AORTIC 4MM SINGLE USE 1001-602

## (undated) DEVICE — SU SILK 1 TIE 6X30" A307H

## (undated) DEVICE — SU PDS II 5-0 RB-1 27" Z303H

## (undated) DEVICE — PUNCH SKIN 3MM P350

## (undated) DEVICE — SYR 10ML LL W/O NDL 302995

## (undated) DEVICE — LINEN TOWEL PACK X5 5464

## (undated) DEVICE — SU DERMABOND ADVANCED .7ML DNX12

## (undated) DEVICE — SU VICRYL 4-0 P-3 18" UND  J494H

## (undated) DEVICE — SOL WATER IRRIG 1000ML BOTTLE 2F7114

## (undated) DEVICE — DRAPE FLUID WARMING 52 X 60" ORS-321

## (undated) DEVICE — SPONGE RAY-TEC 4X8" 7318

## (undated) DEVICE — SOL NACL 0.9% IRRIG 1000ML BOTTLE 2F7124

## (undated) DEVICE — DRSG MEDIPORE 3 1/2X13 3/4" 3573

## (undated) DEVICE — LINEN TOWEL PACK X30 5481

## (undated) DEVICE — SU PDS II 0 TP-1 60" Z991G

## (undated) DEVICE — GLOVE BIOGEL PI MICRO INDICATOR UNDERGLOVE SZ 7.0 48970

## (undated) DEVICE — SU VICRYL 3-0 SH 27" UND J416H

## (undated) DEVICE — TUBING IRRIG CYSTO/BLADDER SET 81" LF 2C4040

## (undated) DEVICE — DRSG TELFA 3X8" 1238

## (undated) DEVICE — CLIP APPLIER 09 3/8" SM LIGACLIP MCS20

## (undated) DEVICE — SU SILK 3-0 TIE 12X30" A304H

## (undated) DEVICE — CLAMP BULLDOG VASCUSTATT II MINI STR YELLOW 1001-572

## (undated) DEVICE — PUNCH SKIN 5MM P550

## (undated) DEVICE — SU PDS II 4-0 RB-1 27" Z304H

## (undated) DEVICE — DRAPE SHEET MED 44X70" 9355

## (undated) DEVICE — GLOVE BIOGEL PI MICRO SZ 6.5 48565

## (undated) DEVICE — BLADE KNIFE SURG 15 371115

## (undated) DEVICE — SU PDS II 5-0 RB-1 DA 30" Z320H

## (undated) RX ORDER — PROPOFOL 10 MG/ML
INJECTION, EMULSION INTRAVENOUS
Status: DISPENSED
Start: 2024-07-02

## (undated) RX ORDER — FENTANYL CITRATE 50 UG/ML
INJECTION, SOLUTION INTRAMUSCULAR; INTRAVENOUS
Status: DISPENSED
Start: 2024-06-28

## (undated) RX ORDER — FENTANYL CITRATE 50 UG/ML
INJECTION, SOLUTION INTRAMUSCULAR; INTRAVENOUS
Status: DISPENSED
Start: 2024-04-17

## (undated) RX ORDER — HYDROMORPHONE HYDROCHLORIDE 1 MG/ML
INJECTION, SOLUTION INTRAMUSCULAR; INTRAVENOUS; SUBCUTANEOUS
Status: DISPENSED
Start: 2024-04-17

## (undated) RX ORDER — BUPIVACAINE HYDROCHLORIDE 2.5 MG/ML
INJECTION, SOLUTION EPIDURAL; INFILTRATION; INTRACAUDAL
Status: DISPENSED
Start: 2024-07-02

## (undated) RX ORDER — DEXAMETHASONE SODIUM PHOSPHATE 4 MG/ML
INJECTION, SOLUTION INTRA-ARTICULAR; INTRALESIONAL; INTRAMUSCULAR; INTRAVENOUS; SOFT TISSUE
Status: DISPENSED
Start: 2024-07-02

## (undated) RX ORDER — ACETAMINOPHEN 325 MG/1
TABLET ORAL
Status: DISPENSED
Start: 2024-07-02

## (undated) RX ORDER — SODIUM CHLORIDE 9 MG/ML
INJECTION, SOLUTION INTRAVENOUS
Status: DISPENSED
Start: 2024-04-17

## (undated) RX ORDER — SODIUM CHLORIDE 450 MG/100ML
INJECTION, SOLUTION INTRAVENOUS
Status: DISPENSED
Start: 2024-04-17

## (undated) RX ORDER — ACETAMINOPHEN 325 MG/1
TABLET ORAL
Status: DISPENSED
Start: 2024-04-17

## (undated) RX ORDER — FENTANYL CITRATE 50 UG/ML
INJECTION, SOLUTION INTRAMUSCULAR; INTRAVENOUS
Status: DISPENSED
Start: 2024-07-02

## (undated) RX ORDER — VERAPAMIL HYDROCHLORIDE 2.5 MG/ML
INJECTION, SOLUTION INTRAVENOUS
Status: DISPENSED
Start: 2024-04-17

## (undated) RX ORDER — ONDANSETRON 2 MG/ML
INJECTION INTRAMUSCULAR; INTRAVENOUS
Status: DISPENSED
Start: 2024-07-02

## (undated) RX ORDER — HEPARIN SODIUM 1000 [USP'U]/ML
INJECTION, SOLUTION INTRAVENOUS; SUBCUTANEOUS
Status: DISPENSED
Start: 2024-04-17

## (undated) RX ORDER — DEXTROSE, SODIUM CHLORIDE, SODIUM LACTATE, POTASSIUM CHLORIDE, AND CALCIUM CHLORIDE 5; .6; .31; .03; .02 G/100ML; G/100ML; G/100ML; G/100ML; G/100ML
INJECTION, SOLUTION INTRAVENOUS
Status: DISPENSED
Start: 2024-04-17

## (undated) RX ORDER — EPHEDRINE SULFATE 50 MG/ML
INJECTION, SOLUTION INTRAMUSCULAR; INTRAVENOUS; SUBCUTANEOUS
Status: DISPENSED
Start: 2024-04-17

## (undated) RX ORDER — PAPAVERINE HYDROCHLORIDE 30 MG/ML
INJECTION INTRAMUSCULAR; INTRAVENOUS
Status: DISPENSED
Start: 2024-04-17

## (undated) RX ORDER — CEFUROXIME SODIUM 1.5 G/16ML
INJECTION, POWDER, FOR SOLUTION INTRAVENOUS
Status: DISPENSED
Start: 2024-04-17